# Patient Record
Sex: FEMALE | Race: BLACK OR AFRICAN AMERICAN | Employment: UNEMPLOYED | URBAN - METROPOLITAN AREA
[De-identification: names, ages, dates, MRNs, and addresses within clinical notes are randomized per-mention and may not be internally consistent; named-entity substitution may affect disease eponyms.]

---

## 2017-02-06 ENCOUNTER — OFFICE VISIT (OUTPATIENT)
Dept: INTERNAL MEDICINE CLINIC | Age: 52
End: 2017-02-06

## 2017-02-06 ENCOUNTER — OFFICE VISIT (OUTPATIENT)
Dept: PULMONOLOGY | Age: 52
End: 2017-02-06

## 2017-02-06 VITALS
WEIGHT: 173 LBS | BODY MASS INDEX: 31.83 KG/M2 | DIASTOLIC BLOOD PRESSURE: 78 MMHG | HEART RATE: 90 BPM | TEMPERATURE: 98 F | SYSTOLIC BLOOD PRESSURE: 108 MMHG | HEIGHT: 62 IN | RESPIRATION RATE: 16 BRPM | OXYGEN SATURATION: 97 %

## 2017-02-06 VITALS
SYSTOLIC BLOOD PRESSURE: 106 MMHG | HEART RATE: 109 BPM | BODY MASS INDEX: 31.65 KG/M2 | DIASTOLIC BLOOD PRESSURE: 80 MMHG | OXYGEN SATURATION: 97 % | WEIGHT: 172 LBS | HEIGHT: 62 IN | TEMPERATURE: 98.7 F

## 2017-02-06 DIAGNOSIS — J45.30 MILD PERSISTENT ASTHMA WITHOUT COMPLICATION: ICD-10-CM

## 2017-02-06 DIAGNOSIS — D86.9 SARCOIDOSIS: ICD-10-CM

## 2017-02-06 DIAGNOSIS — M77.12 LATERAL EPICONDYLITIS OF BOTH ELBOWS: Primary | ICD-10-CM

## 2017-02-06 DIAGNOSIS — M77.11 LATERAL EPICONDYLITIS OF BOTH ELBOWS: Primary | ICD-10-CM

## 2017-02-06 DIAGNOSIS — J32.1 SINUSITIS CHRONIC, FRONTAL: Primary | ICD-10-CM

## 2017-02-06 RX ORDER — PREDNISONE 5 MG/1
TABLET ORAL
Qty: 180 TAB | Refills: 3 | Status: SHIPPED | OUTPATIENT
Start: 2017-02-06 | End: 2018-12-03 | Stop reason: SDUPTHER

## 2017-02-06 NOTE — PROGRESS NOTES
Bryn Heart is a 45 y.o. female presents to office for asthma      1. Have you been to the ER, urgent care clinic or hospitalized since your last visit? no  2. Have you seen any other providers outside of [facility] since your last visit? no  3. Have you had a Flu shot this year?  no

## 2017-02-06 NOTE — PROGRESS NOTES
1. Have you been to the ER, urgent care clinic or hospitalized since your last visit? NO.     2. Have you seen or consulted any other health care providers outside of the [facility] since your last visit (Include any pap smears or colon screening)? NO      Do you have an Advanced Directive? NO    Would you like information on Advanced Directives?  YES

## 2017-02-06 NOTE — MR AVS SNAPSHOT
Visit Information Date & Time Provider Department Dept. Phone Encounter #  
 2/6/2017  3:30 PM Karsten Ramirez MD [facility] [id number] Follow-up Instructions Return in about 4 months (around 6/6/2017). Upcoming Health Maintenance Date Due MICROALBUMIN Q1 5/4/1974 Pneumococcal 19-64 Medium Risk (1 of 1 - PPSV23) 5/4/1983 DTaP/Tdap/Td series (1 - Tdap) 5/4/1985 INFLUENZA AGE 9 TO ADULT 8/1/2016 HEMOGLOBIN A1C Q6M 3/28/2017 EYE EXAM RETINAL OR DILATED Q1 4/7/2017 FOOT EXAM Q1 5/16/2017 LIPID PANEL Q1 6/15/2017 PAP AKA CERVICAL CYTOLOGY 8/7/2017 BREAST CANCER SCRN MAMMOGRAM 6/14/2018 COLONOSCOPY 5/26/2021 Allergies as of 2/6/2017  Review Complete On: 2/6/2017 By: Karsten Ramirez MD  
  
 Severity Noted Reaction Type Reactions Pollen Extracts Medium 05/07/2015    Runny Nose, Cough Lipitor [Atorvastatin]  04/25/2016    Other (comments) Muscle cramps and weakness Current Immunizations  Reviewed on 4/29/2016 No immunizations on file. Not reviewed this visit You Were Diagnosed With   
  
 Codes Comments Sinusitis chronic, frontal    -  Primary ICD-10-CM: J32.1 ICD-9-CM: 565.5 [facility]     ICD-10-CM: D86.9 ICD-9-CM: 135 Mild persistent asthma without complication     VJJ-37-CK: J45.30 ICD-9-CM: 493.90 Vitals BP Pulse Temp Resp Height(growth percentile) Weight(growth percentile) 108/78 (BP 1 Location: Right arm, BP Patient Position: Sitting) 90 98 °F (36.7 °C) (Oral) 16 5' 2\" (1.575 m) 173 lb (78.5 kg) LMP SpO2 BMI OB Status Smoking Status 03/30/2013 97% 31.64 kg/m2 Postmenopausal Never Smoker BMI and BSA Data Body Mass Index Body Surface Area  
 31.64 kg/m 2 1.85 m 2 Preferred Pharmacy Pharmacy Name Phone [id number] [phone] Your Updated Medication List  
 This list is accurate as of: 2/6/17  4:45 PM.  Always use your most recent med list.  
  
  
  
  
 ADVAIR DISKUS 500-50 mcg/dose diskus inhaler Generic drug:  fluticasone-salmeterol INHALE 1 PUFF BY MOUTH TWO TIMES A DAY  
  
 albuterol 90 mcg/actuation inhaler Commonly known as:  PROVENTIL HFA, VENTOLIN HFA, PROAIR HFA Take 2 Puffs by inhalation every four (4) hours as needed for Wheezing. aspirin delayed-release 81 mg tablet Take  by mouth daily. cyclobenzaprine 10 mg tablet Commonly known as:  FLEXERIL Take 1 Tab by mouth three (3) times daily as needed for Muscle Spasm(s). DULoxetine 60 mg capsule Commonly known as:  CYMBALTA Take 1 Cap by mouth daily. esomeprazole 40 mg capsule Commonly known as:  NEXIUM  
TAKE ONE CAPSULE BY MOUTH DAILY  
  
 fluticasone 50 mcg/actuation nasal spray Commonly known as:  FLONASE  
2 sprays in each nostril daily  
  
 glucose blood VI test strips strip Commonly known as:  ACCU-CHEK POOJA Pt to test 5 times daily. Dx:E11.65 JANUVIA 25 mg tablet Generic drug:  SITagliptin Take 25 mg by mouth daily. lisinopril 5 mg tablet Commonly known as:  Nonah Kirti Take  by mouth daily. metFORMIN 500 mg tablet Commonly known as:  GLUCOPHAGE Take  by mouth two (2) times daily (with meals). oxyCODONE-acetaminophen 5-325 mg per tablet Commonly known as:  PERCOCET Take 1 Tab by mouth every four (4) hours as needed for Pain. Max Daily Amount: 6 Tabs. * predniSONE 5 mg tablet Commonly known as:  DELTASONE  
TAKE TWO TABLETS BY MOUTH DAILY * predniSONE 5 mg tablet Commonly known as:  DELTASONE  
2 tablets every morning with breakfast  
  
 rosuvastatin 10 mg tablet Commonly known as:  CRESTOR Take 1 Tab by mouth nightly. triamterene-hydroCHLOROthiazide 37.5-25 mg per capsule Commonly known as:  Crystal Saint [place]air One tablet daily as needed for swelling  ZYRTEC PO  
 Take  by mouth. * Notice: This list has 2 medication(s) that are the same as other medications prescribed for you. Read the directions carefully, and ask your doctor or other care provider to review them with you. Prescriptions Sent to Pharmacy Refills  
 predniSONE (DELTASONE) 5 mg tablet 3 Si tablets every morning with breakfast  
 Class: Normal  
 Pharmacy: [address] [place] [address] #: [phone] Follow-up Instructions Return in about 4 months (around 2017). To-Do List   
 Around 2017 Imaging:  CT MAXILLOFACIAL WO CONT Patient Instructions Continue Advair 1 inhalation twice daily and remember to wash mouth with water and spit it out after inhaling Advair and also remember to exhale fully before inhaling Advair Albuterol 2 inhalations every 4 hours as needed if you require albuterol every 12 to control respiratory symptoms call the office for severe symptoms or the emergency room Prednisone 10 mg every morning Call if pain in elbow becomes a cyst and problem Introducing [facility]! Duke Schneider introduces [place] patient portal. Now you can access parts of your medical record, email your doctor's office, and request medication refills online. 1. In your internet browser, go to [email or web address] 2. Click on the First Time User? Click Here link in the Sign In box. You will see the New Member Sign Up page. 3. Enter your [place] Access Code exactly as it appears below. You will not need to use this code after youve completed the sign-up process. If you do not sign up before the expiration date, you must request a new code. · [place] Access Code: [email or web address] Expires: 2017  1:33 PM 
 
4. Enter the last four digits of your Social Security Number (xxxx) and Date of Birth (mm/dd/yyyy) as indicated and click Submit. You will be taken to the next sign-up page. 5. Create a Dropost.it ID. This will be your Dropost.it login ID and cannot be changed, so think of one that is secure and easy to remember. 6. Create a Dropost.it password. You can change your password at any time. 7. Enter your Password Reset Question and Answer. This can be used at a later time if you forget your password. 8. Enter your e-mail address. You will receive e-mail notification when new information is available in [address] 9. Click Sign Up. You can now view and download portions of your medical record. 10. Click the Download Summary menu link to download a portable copy of your medical information. If you have questions, please visit the Frequently Asked Questions section of the Dropost.it website. Remember, Dropost.it is NOT to be used for urgent needs. For medical emergencies, dial 911. Now available from your iPhone and Android! Please provide this summary of care documentation to your next provider. Your primary care clinician is listed as Konstantin Gonzales. If you have any questions after today's visit, please call [phone].

## 2017-02-06 NOTE — PATIENT INSTRUCTIONS
Continue Advair 1 inhalation twice daily and remember to wash mouth with water and spit it out after inhaling Advair and also remember to exhale fully before inhaling Advair  Albuterol 2 inhalations every 4 hours as needed if you require albuterol every 12 to control respiratory symptoms call the office for severe symptoms or the emergency room  Prednisone 10 mg every morning  Call if pain in elbow becomes a cyst and problem

## 2017-02-06 NOTE — MR AVS SNAPSHOT
Visit Information Date & Time Provider Department Dept. Phone Encounter #  
 2/6/2017  1:30 PM Medina Parker MD [facility] [phone] [id number] Your Appointments 2/6/2017  3:30 PM  
Follow Up with Cari Barton MD  
[address] ([address]) Appt Note: 3MFU FROM 11/17/16  
 [address] [place] [address], [address] [address]  
[phone]  
  
   
 [address] [place] [address], [address] [facility] [place] [address] Upcoming Health Maintenance Date Due MICROALBUMIN Q1 5/4/1974 Pneumococcal 19-64 Medium Risk (1 of 1 - PPSV23) 5/4/1983 DTaP/Tdap/Td series (1 - Tdap) 5/4/1985 INFLUENZA AGE 9 TO ADULT 8/1/2016 HEMOGLOBIN A1C Q6M 3/28/2017 EYE EXAM RETINAL OR DILATED Q1 4/7/2017 FOOT EXAM Q1 5/16/2017 LIPID PANEL Q1 6/15/2017 PAP AKA CERVICAL CYTOLOGY 8/7/2017 BREAST CANCER SCRN MAMMOGRAM 6/14/2018 COLONOSCOPY 5/26/2021 Allergies as of 2/6/2017  Review Complete On: 11/17/2016 By: Blossom Corado LPN Severity Noted Reaction Type Reactions Pollen Extracts Medium 05/07/2015    Runny Nose, Cough Lipitor [Atorvastatin]  04/25/2016    Other (comments) Muscle cramps and weakness Current Immunizations  Reviewed on 4/29/2016 No immunizations on file. Not reviewed this visit Vitals BP Pulse Temp Height(growth percentile) Weight(growth percentile) LMP  
 106/80 (!) 109 98.7 °F (37.1 °C) (Oral) 5' 2\" (1.575 m) 172 lb (78 kg) 03/30/2013 SpO2 BMI OB Status Smoking Status 97% 31.46 kg/m2 Postmenopausal Never Smoker Vitals History BMI and BSA Data Body Mass Index Body Surface Area  
 31.46 kg/m 2 1.85 m 2 Preferred Pharmacy Pharmacy Name Phone [id number] [address] [phone] Your Updated Medication List  
  
   
This list is accurate as of: 2/6/17  2:12 PM.  Always use your most recent med list.  
  
 ADVAIR DISKUS 500-50 mcg/dose diskus inhaler Generic drug:  fluticasone-salmeterol INHALE 1 PUFF BY MOUTH TWO TIMES A DAY  
  
 albuterol 90 mcg/actuation inhaler Commonly known as:  PROVENTIL HFA, VENTOLIN HFA, PROAIR HFA Take 2 Puffs by inhalation every four (4) hours as needed for Wheezing. aspirin delayed-release 81 mg tablet Take  by mouth daily. cyclobenzaprine 10 mg tablet Commonly known as:  FLEXERIL Take 1 Tab by mouth three (3) times daily as needed for Muscle Spasm(s). DULoxetine 60 mg capsule Commonly known as:  CYMBALTA Take 1 Cap by mouth daily. esomeprazole 40 mg capsule Commonly known as:  NEXIUM  
TAKE ONE CAPSULE BY MOUTH DAILY  
  
 fluticasone 50 mcg/actuation nasal spray Commonly known as:  FLONASE  
2 sprays in each nostril daily  
  
 glucose blood VI test strips strip Commonly known as:  ACCU-CHEK POOJA Pt to test 5 times daily. Dx:E11.65 JANUVIA 25 mg tablet Generic drug:  SITagliptin Take 25 mg by mouth daily. lisinopril 5 mg tablet Commonly known as:  Irizaryr Gely Take  by mouth daily. metFORMIN 500 mg tablet Commonly known as:  GLUCOPHAGE Take  by mouth two (2) times daily (with meals). oxyCODONE-acetaminophen 5-325 mg per tablet Commonly known as:  PERCOCET Take 1 Tab by mouth every four (4) hours as needed for Pain. Max Daily Amount: 6 Tabs. predniSONE 5 mg tablet Commonly known as:  DELTASONE  
TAKE TWO TABLETS BY MOUTH DAILY  
  
 rosuvastatin 10 mg tablet Commonly known as:  CRESTOR Take 1 Tab by mouth nightly. triamterene-hydroCHLOROthiazide 37.5-25 mg per capsule Commonly known as:  Esdras Makos One tablet daily as needed for swelling ZYRTEC PO Take  by mouth. Introducing [facility]!    
 Arias Bruno introduces [place] patient portal. Now you can access parts of your medical record, email your doctor's office, and request medication refills online. 1. In your internet browser, go to [email or web address] 2. Click on the First Time User? Click Here link in the Sign In box. You will see the New Member Sign Up page. 3. Enter your Pay with a Tweet Access Code exactly as it appears below. You will not need to use this code after youve completed the sign-up process. If you do not sign up before the expiration date, you must request a new code. · Pay with a Tweet Access Code: [email or web address] Expires: 5/7/2017  1:33 PM 
 
4. Enter the last four digits of your Social Security Number (xxxx) and Date of Birth (mm/dd/yyyy) as indicated and click Submit. You will be taken to the next sign-up page. 5. Create a GO-SIMt ID. This will be your Pay with a Tweet login ID and cannot be changed, so think of one that is secure and easy to remember. 6. Create a Pay with a Tweet password. You can change your password at any time. 7. Enter your Password Reset Question and Answer. This can be used at a later time if you forget your password. 8. Enter your e-mail address. You will receive e-mail notification when new information is available in [address] 9. Click Sign Up. You can now view and download portions of your medical record. 10. Click the Download Summary menu link to download a portable copy of your medical information. If you have questions, please visit the Frequently Asked Questions section of the Pay with a Tweet website. Remember, Pay with a Tweet is NOT to be used for urgent needs. For medical emergencies, dial 911. Now available from your iPhone and Android! Please provide this summary of care documentation to your next provider. Your primary care clinician is listed as Javier Odell. If you have any questions after today's visit, please call [phone].

## 2017-02-06 NOTE — PROGRESS NOTES
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 She denies chest pain and continues to cough up purulent mucus but also has nasal drainage with facial pain which is persistent and is well. She denies leg pain or swelling but is having pain in both elbows which he relates possibly to her new job duties  Allergies   Allergen Reactions    Pollen Extracts Runny Nose and Cough    Lipitor [Atorvastatin] Other (comments)     Muscle cramps and weakness       Current Outpatient Prescriptions   Medication Sig    lisinopril (PRINIVIL, ZESTRIL) 5 mg tablet Take  by mouth daily.  DULoxetine (CYMBALTA) 60 mg capsule Take 1 Cap by mouth daily.  metFORMIN (GLUCOPHAGE) 500 mg tablet Take  by mouth two (2) times daily (with meals).  sitaGLIPtin (JANUVIA) 25 mg tablet Take 25 mg by mouth daily.  oxyCODONE-acetaminophen (PERCOCET) 5-325 mg per tablet Take 1 Tab by mouth every four (4) hours as needed for Pain. Max Daily Amount: 6 Tabs.  glucose blood VI test strips (ACCU-CHEK POOJA) strip Pt to test 5 times daily. Dx:E11.65    aspirin delayed-release 81 mg tablet Take  by mouth daily.  predniSONE (DELTASONE) 5 mg tablet TAKE TWO TABLETS BY MOUTH DAILY    cyclobenzaprine (FLEXERIL) 10 mg tablet Take 1 Tab by mouth three (3) times daily as needed for Muscle Spasm(s).  esomeprazole (NEXIUM) 40 mg capsule TAKE ONE CAPSULE BY MOUTH DAILY    ADVAIR DISKUS 500-50 mcg/dose diskus inhaler INHALE 1 PUFF BY MOUTH TWO TIMES A DAY    albuterol (PROVENTIL HFA, VENTOLIN HFA) 90 mcg/actuation inhaler Take 2 Puffs by inhalation every four (4) hours as needed for Wheezing.  CETIRIZINE HCL (ZYRTEC PO) Take  by mouth.       fluticasone (FLONASE) 50 mcg/actuation nasal spray 2 sprays in each nostril daily    rosuvastatin (CRESTOR) 10 mg tablet Take 1 Tab by mouth nightly.  triamterene-hydrochlorothiazide (DYAZIDE) 37.5-25 mg per capsule One tablet daily as needed for swelling     No current facility-administered medications for this visit.       Past Medical History   Diagnosis Date    Allergic rhinitis due to allergen      s/p shots    Arthritis 6/13      MRI c spine w degen changes; Dr Higgins Quivers      Dr. Sandra Llamas;  ratio 68%, FEV1 78% w 6% inc postbd, TLC 77, RV 56, DLCO 61%    Bilateral great toe fractures 2014    Chronic sinusitis      Dr. Barbara Toth in the past    Colon polyp 5/16     Dr uAstin Anderson    Diabetes [facility]) 3/16     presented w hyperosmotic nonketotic hyperglycemia bs >1000    Diabetes mellitus ([facility]     Dyslipidemia      calculated 10 year risk score was 2.0% (12/13)    Edema     Eustachian tube dysfunction     FHx: colon cancer     FHx: heart disease     Fibrocystic breast      Dr Gilles Beatty GERD (gastroesophageal reflux disease)     Morbid obesity ([facility]      peak weight 196 lbs, bmi 35.8 from 10/13    Osteopenia      Dr. Elida Murray; DEXA t score -0.7 spine, -0.2 hip (8/14)    Sarcoidosis ([facility]      Past Surgical History   Procedure Laterality Date    Hx breast reduction       Dr. Nandini Hansen Pr chest surgery procedure unlisted  7/12      thyroid negative    Pr chest surgery procedure unlisted  2012     pfts w mod restrictive defect     Pr cardiac surg procedure unlist  9/10, 8/13     thallium negative ef 72%; negative ef 70%    Vascular surgery procedure unlist  12/13     venous doppler negative    Hx heent       nasal polypectomy Dr. Abdullahi Leal Hx heent       tear duct surgery right Dr. Clinton Perez 2010; left Dr Emil Matamoros 2015    Hx orthopaedic       DEXA -0.7 spine, -0.2 hip 8/14    Colonoscopy N/A 5/26/2016     Dr Austin Anderson hyperplastic     Social History     Social History    Marital status: LEGALLY      Spouse name: N/A    Number of children: N/A    Years of education: N/A     Occupational History    traffic control person [employer]     Social History Main Topics    Smoking status: Never Smoker    Smokeless tobacco: Never Used    Alcohol use No    Drug use: No    Sexual activity: Not on file     Other Topics Concern    Not on file     Social History Narrative     Family History   Problem Relation Age of Onset    Cancer Mother     Hypertension Mother     Cancer Father     Diabetes Father     Hypertension Father     Stroke Father     Diabetes Sister     Hypertension Sister     Heart Disease Sister     Colon Cancer Sister 52    Hypertension Brother     Cancer Maternal Aunt        Review of systems:  No fever or chills poor appetite or weight loss    Physical Exam:  Visit Vitals    /78 (BP 1 Location: Right arm, BP Patient Position: Sitting)    Pulse 90    Temp 98 °F (36.7 °C) (Oral)    Resp 16    Ht 5' 2\" (1.575 m)    Wt 78.5 kg (173 lb)    LMP 03/30/2013    SpO2 97%    BMI 31.64 kg/m2       Well-developed well-nourished  HEENT: Facial pain bilaterally under the eyes   lymph node: Supraclavicular cervical lymph nodes negative  Chest: Equal symmetrical expansion no dullness no wheezes rales  Heart: Regular rhythm no gallop or murmur no JVD no peripheral edema  Extremities: No cyanosis or clubbing  Neurological: Alert and oriented    LABS:    O2 sat 97% room air at rest    Impression:   Sarcoidosis which appears to be stable or improved related to shortness of breath with also a history of some dermatological lesions which have improved  Cough which is chronic but may be related to chronic sinusitis by history and physical exam of facial pain    Plan:  CT of the sinuses  Prednisone 10 mg a day and continue Advair and albuterol  The patient will call if her pain does not improve in her elbows and we will see her again otherwise in 4 months    Yong Wilkerson MD , [facility]    CC: Mainor Nichols MD [address] [facility], [address],[place] [address]     P: [phone]     F: [phone]

## 2017-02-06 NOTE — PROGRESS NOTES
45 y.o. BLACK OR  female who presents for evaluation. She has been having bilateral elbow pain for the last month or two. She was previously seeing Dr. Vee Boyle but this was more for her low back. We will try to get records. She works at [employer] but she apparently had her responsibilities change. 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  She has pain in the bilateral lateral epicondyles. She has been using Aleve 2 tablets twice daily along with prednisone 10 mg under the management of Dr. Juan Ignacio for her sarcoidosis. No neck pain or radicular complaints, no weakness, numbness or paresthesias, denied any actual joint swelling or injuries    Past Medical History   Diagnosis Date    Allergic rhinitis due to allergen      s/p shots    Arthritis 6/13      MRI c spine w degen changes; Dr Wyatt Scot      Dr. Juan Ignacio;  ratio 68%, FEV1 78% w 6% inc postbd, TLC 77, RV 56, DLCO 61%    Bilateral great toe fractures 2014    Chronic sinusitis      Dr. Nick Shells in the past    Colon polyp 5/16     Dr Brooke [facility]    Diabetes [facility]) 3/16     presented w hyperosmotic nonketotic hyperglycemia bs >1000    Diabetes mellitus ([facility]     Dyslipidemia      calculated 10 year risk score was 2.0% (12/13)    Edema     Eustachian tube dysfunction     FHx: colon cancer     FHx: heart disease     Fibrocystic breast      Dr Bradly Goel GERD (gastroesophageal reflux disease)     Morbid obesity ([address]      peak weight 196 lbs, bmi 35.8 from 10/13    Osteopenia      Dr. Sharrie Cranker; DEXA t score -0.7 spine, -0.2 hip (8/14)    Sarcoidosis ([address]      Current Outpatient Prescriptions   Medication Sig    lisinopril (PRINIVIL, ZESTRIL) 5 mg tablet Take  by mouth daily.  DULoxetine (CYMBALTA) 60 mg capsule Take 1 Cap by mouth daily.     metFORMIN (GLUCOPHAGE) 500 mg tablet Take  by mouth two (2) times daily (with meals).  sitaGLIPtin (JANUVIA) 25 mg tablet Take 25 mg by mouth daily.  fluticasone (FLONASE) 50 mcg/actuation nasal spray 2 sprays in each nostril daily    oxyCODONE-acetaminophen (PERCOCET) 5-325 mg per tablet Take 1 Tab by mouth every four (4) hours as needed for Pain. Max Daily Amount: 6 Tabs.  rosuvastatin (CRESTOR) 10 mg tablet Take 1 Tab by mouth nightly.  triamterene-hydrochlorothiazide (DYAZIDE) 37.5-25 mg per capsule One tablet daily as needed for swelling    glucose blood VI test strips (ACCU-CHEK POOJA) strip Pt to test 5 times daily. Dx:E11.65    aspirin delayed-release 81 mg tablet Take  by mouth daily.  predniSONE (DELTASONE) 5 mg tablet TAKE TWO TABLETS BY MOUTH DAILY    cyclobenzaprine (FLEXERIL) 10 mg tablet Take 1 Tab by mouth three (3) times daily as needed for Muscle Spasm(s).  esomeprazole (NEXIUM) 40 mg capsule TAKE ONE CAPSULE BY MOUTH DAILY    ADVAIR DISKUS 500-50 mcg/dose diskus inhaler INHALE 1 PUFF BY MOUTH TWO TIMES A DAY    albuterol (PROVENTIL HFA, VENTOLIN HFA) 90 mcg/actuation inhaler Take 2 Puffs by inhalation every four (4) hours as needed for Wheezing.  CETIRIZINE HCL (ZYRTEC PO) Take  by mouth.  predniSONE (DELTASONE) 5 mg tablet 2 tablets every morning with breakfast     No current facility-administered medications for this visit.    l  Allergies   Allergen Reactions    Pollen Extracts Runny Nose and Cough    Lipitor [Atorvastatin] Other (comments)     Muscle cramps and weakness       Visit Vitals    /80    Pulse (!) 109    Temp 98.7 °F (37.1 °C) (Oral)    Ht 5' 2\" (1.575 m)    Wt 172 lb (78 kg)    SpO2 97%    BMI 31.46 kg/m2     Range of motion for the shoulder and elbow joints bilaterally were normal.  There was marked tenderness on palpation of the bilateral lateral epicondyles. Strength, sensation, reflexes, pulses were otherwise normal    Assessment and plan:  1. Bilateral lateral epicondylitis.   Quick discussion regarding this. 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 Suggested she go see Dr. Endy Larsen regarding possible cortisone shot if the forearm bands do not help. Continue nonsteroidal as needed for now. Above conditions discussed at length and patient vocalized understanding.   All questions answered to patient satifaction

## 2017-02-07 RX ORDER — ESOMEPRAZOLE MAGNESIUM 40 MG/1
CAPSULE, DELAYED RELEASE ORAL
Qty: 90 CAP | Refills: 3 | Status: SHIPPED | OUTPATIENT
Start: 2017-02-07 | End: 2018-03-20 | Stop reason: SDUPTHER

## 2017-02-23 RX ORDER — DULOXETIN HYDROCHLORIDE 60 MG/1
CAPSULE, DELAYED RELEASE ORAL
Qty: 90 CAP | Refills: 0 | Status: SHIPPED | OUTPATIENT
Start: 2017-02-23 | End: 2017-08-25 | Stop reason: SDUPTHER

## 2017-06-22 ENCOUNTER — HOSPITAL ENCOUNTER (OUTPATIENT)
Dept: LAB | Age: 52
Discharge: HOME | End: 2017-06-22
Payer: COMMERCIAL

## 2017-06-22 ENCOUNTER — OFFICE VISIT (OUTPATIENT)
Dept: INTERNAL MEDICINE CLINIC | Age: 52
End: 2017-06-22

## 2017-06-22 VITALS
HEIGHT: 62 IN | WEIGHT: 169 LBS | OXYGEN SATURATION: 98 % | BODY MASS INDEX: 31.1 KG/M2 | DIASTOLIC BLOOD PRESSURE: 82 MMHG | SYSTOLIC BLOOD PRESSURE: 130 MMHG | HEART RATE: 97 BPM | RESPIRATION RATE: 14 BRPM | TEMPERATURE: 97.8 F

## 2017-06-22 DIAGNOSIS — H53.9 VISUAL DISTURBANCE: ICD-10-CM

## 2017-06-22 DIAGNOSIS — R51.9 NONINTRACTABLE HEADACHE, UNSPECIFIED CHRONICITY PATTERN, UNSPECIFIED HEADACHE TYPE: Primary | ICD-10-CM

## 2017-06-22 DIAGNOSIS — R51.9 NONINTRACTABLE HEADACHE, UNSPECIFIED CHRONICITY PATTERN, UNSPECIFIED HEADACHE TYPE: ICD-10-CM

## 2017-06-22 LAB
ANION GAP BLD CALC-SCNC: 8 MMOL/L (ref 3–18)
BASOPHILS # BLD AUTO: 0 K/UL (ref 0–0.06)
BASOPHILS # BLD: 0 % (ref 0–2)
BUN SERPL-MCNC: 15 MG/DL (ref 7–18)
BUN/CREAT SERPL: 12 (ref 12–20)
CALCIUM SERPL-MCNC: 8.9 MG/DL (ref 8.5–10.1)
CHLORIDE SERPL-SCNC: 104 MMOL/L (ref 100–108)
CO2 SERPL-SCNC: 28 MMOL/L (ref 21–32)
CREAT SERPL-MCNC: 1.25 MG/DL (ref 0.6–1.3)
CRP SERPL-MCNC: 2.6 MG/DL (ref 0–0.3)
DIFFERENTIAL METHOD BLD: NORMAL
EOSINOPHIL # BLD: 0.2 K/UL (ref 0–0.4)
EOSINOPHIL NFR BLD: 5 % (ref 0–5)
ERYTHROCYTE [DISTWIDTH] IN BLOOD BY AUTOMATED COUNT: 13 % (ref 11.6–14.5)
ERYTHROCYTE [SEDIMENTATION RATE] IN BLOOD: 49 MM/HR (ref 0–30)
GLUCOSE SERPL-MCNC: 236 MG/DL (ref 74–99)
HCT VFR BLD AUTO: 42.4 % (ref 35–45)
HGB BLD-MCNC: 13.6 G/DL (ref 12–16)
LYMPHOCYTES # BLD AUTO: 27 % (ref 21–52)
LYMPHOCYTES # BLD: 1.3 K/UL (ref 0.9–3.6)
MCH RBC QN AUTO: 30 PG (ref 24–34)
MCHC RBC AUTO-ENTMCNC: 32.1 G/DL (ref 31–37)
MCV RBC AUTO: 93.6 FL (ref 74–97)
MONOCYTES # BLD: 0.4 K/UL (ref 0.05–1.2)
MONOCYTES NFR BLD AUTO: 9 % (ref 3–10)
NEUTS SEG # BLD: 2.9 K/UL (ref 1.8–8)
NEUTS SEG NFR BLD AUTO: 59 % (ref 40–73)
PLATELET # BLD AUTO: 165 K/UL (ref 135–420)
PMV BLD AUTO: 11.4 FL (ref 9.2–11.8)
POTASSIUM SERPL-SCNC: 3.9 MMOL/L (ref 3.5–5.5)
RBC # BLD AUTO: 4.53 M/UL (ref 4.2–5.3)
SODIUM SERPL-SCNC: 140 MMOL/L (ref 136–145)
WBC # BLD AUTO: 4.9 K/UL (ref 4.6–13.2)

## 2017-06-22 PROCEDURE — 36415 COLL VENOUS BLD VENIPUNCTURE: CPT | Performed by: PHYSICIAN ASSISTANT

## 2017-06-22 PROCEDURE — 86140 C-REACTIVE PROTEIN: CPT | Performed by: PHYSICIAN ASSISTANT

## 2017-06-22 PROCEDURE — 85025 COMPLETE CBC W/AUTO DIFF WBC: CPT | Performed by: PHYSICIAN ASSISTANT

## 2017-06-22 PROCEDURE — 80048 BASIC METABOLIC PNL TOTAL CA: CPT | Performed by: PHYSICIAN ASSISTANT

## 2017-06-22 PROCEDURE — 85652 RBC SED RATE AUTOMATED: CPT | Performed by: PHYSICIAN ASSISTANT

## 2017-06-22 NOTE — PROGRESS NOTES
1. Have you been to the ER, urgent care clinic or hospitalized since your last visit? NO.     2. Have you seen or consulted any other health care providers outside of the [address] [place] [address] since your last visit (Include any pap smears or colon screening)? NO      Do you have an Advanced Directive? NO    Would you like information on Advanced Directives?  NO

## 2017-06-22 NOTE — PROGRESS NOTES
HPI/History  Andrae Alvarez is a 47 y.o.  female who presents for evaluation. Pt c/o bitemporal headache like someone squeezing or a constricting band for 6 days. Was intermittent and now more constant. Some past nausea and vomiting per report. Reports blurry vision on occasion then loss of vision which she cannot describe but reports she really cannot see anything for a few minutes. Some photophobia per report. No speech or facial abnormalities, weakness, or paresthesias. No cardiopulmonary sxs. No other sxs or complaints. Reports a hx of migraines but I do not see this in records and current issues do not sound to be migraine. Brain MRI 6/2014 normal. Her ophthalmologist is Barrie Jackson. Patient Active Problem List   Diagnosis Code    Sarcoidosis Dr. Marly Jean-Baptiste on low dose steroid D86.9    Asthma Dr. Marly Jean-Baptiste J45.909    Dyslipidemia E78.5    Gastroesophageal reflux disease without esophagitis K21.9    Diabetes type 2, uncontrolled ([facility] E11.65    Obesity (BMI 30.0-34. 9) E66.9    Lateral epicondylitis of both elbows M77.11, M77.12     Past Medical History:   Diagnosis Date    Allergic rhinitis due to allergen     s/p shots    Arthritis 6/13     MRI c spine w degen changes; Dr Keshawn Jean-Baptiste;  ratio 68%, FEV1 78% w 6% inc postbd, TLC 77, RV 56, DLCO 61%    Bilateral great toe fractures 2014    Chronic sinusitis     Dr. Hayden Peralta in the past    Colon polyp 5/16    Dr Álvaro Watkins    Diabetes [facility]) 3/16    presented w hyperosmotic nonketotic hyperglycemia bs >1000    Diabetes mellitus ([facility]     Dyslipidemia     calculated 10 year risk score was 2.0% (12/13)    Edema     Eustachian tube dysfunction     FHx: colon cancer     FHx: heart disease     Fibrocystic breast     Dr Radha Wilson GERD (gastroesophageal reflux disease)     Lateral epicondylitis of both elbows 2/6/2017    Morbid obesity (HCC)     peak weight 196 lbs, bmi 35.8 from 10/13   [address] [place] [address] Osteopenia     Dr. Ben Tavarez; DEXA t score -0.7 spine, -0.2 hip (8/14)    Sarcoidosis [facility])      Past Surgical History:   Procedure Laterality Date    CARDIAC SURG PROCEDURE UNLIST  9/10, 8/13    thallium negative ef 72%; negative ef 70%    CHEST SURGERY PROCEDURE UNLISTED  7/12    US thyroid negative    CHEST SURGERY PROCEDURE UNLISTED  2012    pfts w mod restrictive defect     COLONOSCOPY N/A 5/26/2016    Dr Shira Brice hyperplastic    Zion Mulch      Dr. Julian Willis HX HEENT      nasal polypectomy Dr. Kavitha Napier HX HEENT      tear duct surgery right Dr. Angelica Thacker 2010; left Dr Dusty Maldonado 2015    HX ORTHOPAEDIC      DEXA -0.7 spine, -0.2 hip 8/14    VASCULAR SURGERY PROCEDURE UNLIST  12/13    venous doppler negative     Social History     Social History    Marital status: LEGALLY      Spouse name: N/A    Number of children: N/A    Years of education: N/A     Occupational History    traffic control person [employer]     Social History Main Topics    Smoking status: Never Smoker    Smokeless tobacco: Never Used    Alcohol use No    Drug use: No    Sexual activity: Not on file     Other Topics Concern    Not on file     Social History Narrative     Family History   Problem Relation Age of Onset    Cancer Mother     Hypertension Mother     Cancer Father     Diabetes Father     Hypertension Father     Stroke Father     Diabetes Sister     Hypertension Sister     Heart Disease Sister     Colon Cancer Sister 52    Hypertension Brother     Cancer Maternal Aunt      Current Outpatient Prescriptions   Medication Sig    DULoxetine (CYMBALTA) 60 mg capsule TAKE 1 CAPSULE BY MOUTH DAILY    esomeprazole (NEXIUM) 40 mg capsule TAKE ONE CAPSULE BY MOUTH DAILY    predniSONE (DELTASONE) 5 mg tablet 2 tablets every morning with breakfast    lisinopril (PRINIVIL, ZESTRIL) 5 mg tablet Take  by mouth daily.     metFORMIN (GLUCOPHAGE) 500 mg tablet Take  by mouth two (2) times daily (with meals).  sitaGLIPtin (JANUVIA) 25 mg tablet Take 25 mg by mouth daily.  glucose blood VI test strips (ACCU-CHEK POOJA) strip Pt to test 5 times daily. Dx:E11.65    aspirin delayed-release 81 mg tablet Take  by mouth daily.  predniSONE (DELTASONE) 5 mg tablet TAKE TWO TABLETS BY MOUTH DAILY    ADVAIR DISKUS 500-50 mcg/dose diskus inhaler INHALE 1 PUFF BY MOUTH TWO TIMES A DAY    albuterol (PROVENTIL HFA, VENTOLIN HFA) 90 mcg/actuation inhaler Take 2 Puffs by inhalation every four (4) hours as needed for Wheezing.  CETIRIZINE HCL (ZYRTEC PO) Take  by mouth.  fluticasone (FLONASE) 50 mcg/actuation nasal spray 2 sprays in each nostril daily     No current facility-administered medications for this visit. Allergies   Allergen Reactions    Pollen Extracts Runny Nose and Cough    Lipitor [Atorvastatin] Other (comments)     Muscle cramps and weakness         Review of Systems  Aside from those included in HPI, remainder of complete ROS negative. Physical Examination  Visit Vitals    /82 (BP 1 Location: Right arm, BP Patient Position: Sitting)    Pulse 97    Temp 97.8 °F (36.6 °C) (Oral)    Resp 14    Ht 5' 2\" (1.575 m)    Wt 169 lb (76.7 kg)    LMP 03/30/2013    SpO2 98%    BMI 30.91 kg/m2       General - Alert and in no acute distress. Pt appears well, comfortable, and in good spirits. Pleasant, engaging. Nontoxic. Not anxious, non-diaphoretic. Mental status - Appropriate mood, behavior, speech content, dress, and thought processes. Head/face/scalp - Normocephalic, atraumatic. No temporal swelling, erythema, or warmth. Verbalized tenderness without grimace upon palpation of temples bilat but no palpable cords or abnormalities noted. Eyes - No periorbital findings. Pupils equal and reactive, extraocular movements intact. No erythema or discharge. No objective photophobia or FB sensation. Vision intact currently. Ears - Auditory canals and TMs unremarkable.   Nose - No erythema. No rhinorrhea. Mouth - Mucous membranes moist. Pharynx without lesions, swelling, erythema, or exudate. Midline uvula, palate rise, and tongue protrusion. Normal phonation. No trismus. Neck - Supple without rigidity. Lymph - No periauricular, perimandibular, or ant/post cervical tenderness or swelling. Pulm - No tachypnea, retractions, or cyanosis. Good respiratory effort. Cardiovascular - Normal rate. Neuromuscular - CN 2-12 appear intact. Good facial expressions. No speech abnormalities. Symmetric  strength with good dexterity. Symmetric U/LE strength. Assessment and Plan  1. Bitemporal headache, blurry vision, and vision loss/disturbance - In regards to headaches, it appears more tension than other. However, need to rule out temporal arteritis and intracranial process (mass, lesion, etc). Will check CBC, BMP, ESR, CRP today. Placed order for stat CT. Referral to ophtho and nurses to call, pt will also call Barrie Ritchie/Seth immediately after visit. She will promptly visit ED if ominous developments or concerns. Further planning as warranted and pending results/progression. Pt happily agrees with plan. PLEASE NOTE:   This document has been produced using voice recognition software. Unrecognized errors in transcription may be present.     Charlee Dance [employer] [facility] [place] [facility]  [phone]  6/22/2017

## 2017-06-22 NOTE — MR AVS SNAPSHOT
Visit Information Date & Time Provider Department Dept. Phone Encounter #  
 6/22/2017  2:00 PM Gracie Ziegler, [address] Internist of [address] [id number] Upcoming Health Maintenance Date Due MICROALBUMIN Q1 5/4/1974 Pneumococcal 19-64 Medium Risk (1 of 1 - PPSV23) 5/4/1983 DTaP/Tdap/Td series (1 - Tdap) 5/4/1985 HEMOGLOBIN A1C Q6M 3/28/2017 EYE EXAM RETINAL OR DILATED Q1 4/7/2017 FOOT EXAM Q1 5/16/2017 LIPID PANEL Q1 6/15/2017 PAP AKA CERVICAL CYTOLOGY 8/7/2017 INFLUENZA AGE 9 TO ADULT 8/1/2017 BREAST CANCER SCRN MAMMOGRAM 6/14/2018 COLONOSCOPY 5/26/2021 Allergies as of 6/22/2017  Review Complete On: 6/22/2017 By: ASHLI Armijo Severity Noted Reaction Type Reactions Pollen Extracts Medium 05/07/2015    Runny Nose, Cough Lipitor [Atorvastatin]  04/25/2016    Other (comments) Muscle cramps and weakness Current Immunizations  Reviewed on 4/29/2016 No immunizations on file. Not reviewed this visit You Were Diagnosed With   
  
 Codes Comments Nonintractable headache, unspecified chronicity pattern, unspecified headache type    -  Primary ICD-10-CM: R51 ICD-9-CM: 476. 0 Visual disturbance     ICD-10-CM: H53.9 ICD-9-CM: 368.9 Vitals BP Pulse Temp Resp Height(growth percentile) Weight(growth percentile) 130/82 (BP 1 Location: Right arm, BP Patient Position: Sitting) 97 97.8 °F (36.6 °C) (Oral) 14 5' 2\" (1.575 m) 169 lb (76.7 kg) LMP SpO2 BMI OB Status Smoking Status 03/30/2013 98% 30.91 kg/m2 Postmenopausal Never Smoker Vitals History BMI and BSA Data Body Mass Index Body Surface Area 30.91 kg/m 2 1.83 m 2 Preferred Pharmacy Pharmacy Name Phone [id number] [phone] Your Updated Medication List  
  
   
This list is accurate as of: 6/22/17  2:35 PM.  Always use your most recent med list.  
  
  
 ADVAIR DISKUS 500-50 mcg/dose diskus inhaler Generic drug:  fluticasone-salmeterol INHALE 1 PUFF BY MOUTH TWO TIMES A DAY  
  
 albuterol 90 mcg/actuation inhaler Commonly known as:  PROVENTIL HFA, VENTOLIN HFA, PROAIR HFA Take 2 Puffs by inhalation every four (4) hours as needed for Wheezing. aspirin delayed-release 81 mg tablet Take  by mouth daily. DULoxetine 60 mg capsule Commonly known as:  CYMBALTA TAKE 1 CAPSULE BY MOUTH DAILY  
  
 esomeprazole 40 mg capsule Commonly known as:  NEXIUM  
TAKE ONE CAPSULE BY MOUTH DAILY  
  
 fluticasone 50 mcg/actuation nasal spray Commonly known as:  FLONASE  
2 sprays in each nostril daily  
  
 glucose blood VI test strips strip Commonly known as:  ACCU-CHEK POOJA Pt to test 5 times daily. Dx:E11.65 JANUVIA 25 mg tablet Generic drug:  SITagliptin Take 25 mg by mouth daily. lisinopril 5 mg tablet Commonly known as:  Markel Apo Take  by mouth daily. metFORMIN 500 mg tablet Commonly known as:  GLUCOPHAGE Take  by mouth two (2) times daily (with meals). * predniSONE 5 mg tablet Commonly known as:  DELTASONE  
TAKE TWO TABLETS BY MOUTH DAILY * predniSONE 5 mg tablet Commonly known as:  DELTASONE  
2 tablets every morning with breakfast  
  
 ZYRTEC PO Take  by mouth. * Notice: This list has 2 medication(s) that are the same as other medications prescribed for you. Read the directions carefully, and ask your doctor or other care provider to review them with you. We Performed the Following REFERRAL TO OPHTHALMOLOGY [REF57 Custom] Comments: ASAP with any provider Blurry and loss of vision. Bitemporal headache (temporal arteritis?) Pt has seen Dr. Cathy Snow and Josué Griggs. To-Do List   
 Around 06/22/2017 Imaging:  CT HEAD WO CONT Referral Information Referral ID Referred By Referred To [id number] Mera Bullock MD   
   [facility], [place] [address] [address] [place]pa, [facility] Phone: [phone] Fax: [phone] Visits Status Start Date End Date 1 New Request 6/22/17 6/22/18 If your referral has a status of pending review or denied, additional information will be sent to support the outcome of this decision. Referral ID Referred By Referred To  
 [id number] Girtha Mustard B Not Available Visits Status Start Date End Date 1 New Request 6/22/17 6/22/18 If your referral has a status of pending review or denied, additional information will be sent to support the outcome of this decision. Introducing [facility] & HEALTH SERVICES! Caitie Barlow introduces [employer] patient portal. Now you can access parts of your medical record, email your doctor's office, and request medication refills online. 1. In your internet browser, go to [email or web address] 2. Click on the First Time User? Click Here link in the Sign In box. You will see the New Member Sign Up page. 3. Enter your [employer] Access Code exactly as it appears below. You will not need to use this code after youve completed the sign-up process. If you do not sign up before the expiration date, you must request a new code. · [employer] Access Code: [email or web address] Expires: 9/20/2017  2:35 PM 
 
4. Enter the last four digits of your Social Security Number (xxxx) and Date of Birth (mm/dd/yyyy) as indicated and click Submit. You will be taken to the next sign-up page. 5. Create a [employer] ID. This will be your [employer] login ID and cannot be changed, so think of one that is secure and easy to remember. 6. Create a [employer] password. You can change your password at any time. 7. Enter your Password Reset Question and Answer. This can be used at a later time if you forget your password. 8. Enter your e-mail address.  You will receive e-mail notification when new information is available in [employer]. 9. Click Sign Up. You can now view and download portions of your medical record. 10. Click the Download Summary menu link to download a portable copy of your medical information. If you have questions, please visit the Frequently Asked Questions section of the [employer] website. Remember, [employer] is NOT to be used for urgent needs. For medical emergencies, dial 911. Now available from your iPhone and Android! Please provide this summary of care documentation to your next provider. Your primary care clinician is listed as Liang Michael. If you have any questions after today's visit, please call [phone].

## 2017-06-23 ENCOUNTER — TELEPHONE (OUTPATIENT)
Dept: INTERNAL MEDICINE CLINIC | Age: 52
End: 2017-06-23

## 2017-06-23 ENCOUNTER — OFFICE VISIT (OUTPATIENT)
Dept: SURGERY | Age: 52
End: 2017-06-23

## 2017-06-23 ENCOUNTER — HOSPITAL ENCOUNTER (OUTPATIENT)
Dept: CT IMAGING | Age: 52
Discharge: HOME | End: 2017-06-23
Attending: PHYSICIAN ASSISTANT
Payer: COMMERCIAL

## 2017-06-23 VITALS — WEIGHT: 169 LBS | BODY MASS INDEX: 31.1 KG/M2 | HEIGHT: 62 IN | RESPIRATION RATE: 13 BRPM

## 2017-06-23 DIAGNOSIS — D86.9 SARCOIDOSIS: Primary | ICD-10-CM

## 2017-06-23 DIAGNOSIS — R51.9 NONINTRACTABLE HEADACHE, UNSPECIFIED CHRONICITY PATTERN, UNSPECIFIED HEADACHE TYPE: ICD-10-CM

## 2017-06-23 DIAGNOSIS — R51.9 NONINTRACTABLE HEADACHE, UNSPECIFIED CHRONICITY PATTERN, UNSPECIFIED HEADACHE TYPE: Primary | ICD-10-CM

## 2017-06-23 DIAGNOSIS — R79.82 ELEVATED C-REACTIVE PROTEIN (CRP): ICD-10-CM

## 2017-06-23 DIAGNOSIS — R70.0 ELEVATED ERYTHROCYTE SEDIMENTATION RATE: ICD-10-CM

## 2017-06-23 DIAGNOSIS — H53.9 VISUAL DISTURBANCE: ICD-10-CM

## 2017-06-23 PROCEDURE — 70450 CT HEAD/BRAIN W/O DYE: CPT

## 2017-06-23 RX ORDER — PREDNISONE 20 MG/1
60 TABLET ORAL
Qty: 20 TAB | Refills: 0 | Status: SHIPPED | OUTPATIENT
Start: 2017-06-23 | End: 2017-07-27

## 2017-06-23 NOTE — TELEPHONE ENCOUNTER
Signs of inflammation (slightly elevated ESR and elevated CRP). Need to rule out temporal arteritis. I will refer to vascular STAT. Will start 60mg prednisone daily until vascular can eval/potential biopsy to see if arteritis present. Have pt keep plan to see ophtho STAT as well and keep plan for CT. Discussed case with Dr. Richardson Gresham.

## 2017-06-23 NOTE — MR AVS SNAPSHOT
Visit Information Date & Time Provider Department Dept. Phone Encounter #  
 6/23/2017 11:00 AM Maria C Winter MD [facility] [phone] [id number] Upcoming Health Maintenance Date Due MICROALBUMIN Q1 5/4/1974 Pneumococcal 19-64 Medium Risk (1 of 1 - PPSV23) 5/4/1983 DTaP/Tdap/Td series (1 - Tdap) 5/4/1985 HEMOGLOBIN A1C Q6M 3/28/2017 EYE EXAM RETINAL OR DILATED Q1 4/7/2017 FOOT EXAM Q1 5/16/2017 LIPID PANEL Q1 6/15/2017 PAP AKA CERVICAL CYTOLOGY 8/7/2017 INFLUENZA AGE 9 TO ADULT 8/1/2017 BREAST CANCER SCRN MAMMOGRAM 6/14/2018 COLONOSCOPY 5/26/2021 Allergies as of 6/23/2017  Review Complete On: 6/23/2017 By: Romina Garcia LPN Severity Noted Reaction Type Reactions Pollen Extracts Medium 05/07/2015    Runny Nose, Cough Lipitor [Atorvastatin]  04/25/2016    Other (comments) Muscle cramps and weakness Current Immunizations  Reviewed on 4/29/2016 No immunizations on file. Not reviewed this visit Vitals Resp Height(growth percentile) Weight(growth percentile) LMP BMI OB Status 13 5' 2\" (1.575 m) 169 lb (76.7 kg) 03/30/2013 30.91 kg/m2 Postmenopausal  
 Smoking Status Never Smoker BMI and BSA Data Body Mass Index Body Surface Area 30.91 kg/m 2 1.83 m 2 Preferred Pharmacy Pharmacy Name Phone [id number] [phone] Your Updated Medication List  
  
   
This list is accurate as of: 6/23/17 12:01 PM.  Always use your most recent med list.  
  
  
  
  
 ADVAIR DISKUS 500-50 mcg/dose diskus inhaler Generic drug:  fluticasone-salmeterol INHALE 1 PUFF BY MOUTH TWO TIMES A DAY  
  
 albuterol 90 mcg/actuation inhaler Commonly known as:  PROVENTIL HFA, VENTOLIN HFA, PROAIR HFA Take 2 Puffs by inhalation every four (4) hours as needed for Wheezing. aspirin delayed-release 81 mg tablet Take  by mouth daily. DULoxetine 60 mg capsule Commonly known as:  CYMBALTA TAKE 1 CAPSULE BY MOUTH DAILY  
  
 esomeprazole 40 mg capsule Commonly known as:  NEXIUM  
TAKE ONE CAPSULE BY MOUTH DAILY  
  
 fluticasone 50 mcg/actuation nasal spray Commonly known as:  FLONASE  
2 sprays in each nostril daily  
  
 glucose blood VI test strips strip Commonly known as:  ACCU-CHEK POOJA Pt to test 5 times daily. Dx:E11.65 JANUVIA 25 mg tablet Generic drug:  SITagliptin Take 25 mg by mouth daily. lisinopril 5 mg tablet Commonly known as:  Ky Leaver Take  by mouth daily. metFORMIN 500 mg tablet Commonly known as:  GLUCOPHAGE Take  by mouth two (2) times daily (with meals). * predniSONE 5 mg tablet Commonly known as:  DELTASONE  
TAKE TWO TABLETS BY MOUTH DAILY * predniSONE 5 mg tablet Commonly known as:  DELTASONE  
2 tablets every morning with breakfast  
  
 * predniSONE 20 mg tablet Commonly known as:  Thao Caden Take 3 Tabs by mouth daily (with breakfast). ZYRTEC PO Take  by mouth. * Notice: This list has 3 medication(s) that are the same as other medications prescribed for you. Read the directions carefully, and ask your doctor or other care provider to review them with you. To-Do List   
 06/23/2017 3:00 PM  
  Appointment with [facility] CT RM 1 at [facility] RAD CT ([phone]) GENERAL INSTRUCTIONS  This study does not require you to drink contrast prior to your study. RELATED STUDY INFORMATION  Bring any films, CDs, and reports related with you on the day of your exam.  This only includes studies done outside of [address] [place] [address], [facility], Amy, and [facility] Alberto. QUESTIONS  Notify the CT Department if you have any questions concerning your study. Amy - [phone] [facility] [place] - [phone] Patient Instructions Call the office at [phone] if you have any questions or concerns. Introducing [facility] & HEALTH SERVICES! [facility] introduces [employer] patient portal. Now you can access parts of your medical record, email your doctor's office, and request medication refills online. 1. In your internet browser, go to [email or web address] 2. Click on the First Time User? Click Here link in the Sign In box. You will see the New Member Sign Up page. 3. Enter your [employer] Access Code exactly as it appears below. You will not need to use this code after youve completed the sign-up process. If you do not sign up before the expiration date, you must request a new code. · [employer] Access Code: [email or web address] Expires: 9/20/2017  2:35 PM 
 
4. Enter the last four digits of your Social Security Number (xxxx) and Date of Birth (mm/dd/yyyy) as indicated and click Submit. You will be taken to the next sign-up page. 5. Create a [employer] ID. This will be your [employer] login ID and cannot be changed, so think of one that is secure and easy to remember. 6. Create a [employer] password. You can change your password at any time. 7. Enter your Password Reset Question and Answer. This can be used at a later time if you forget your password. 8. Enter your e-mail address. You will receive e-mail notification when new information is available in [address] 9. Click Sign Up. You can now view and download portions of your medical record. 10. Click the Download Summary menu link to download a portable copy of your medical information. If you have questions, please visit the Frequently Asked Questions section of the [employer] website. Remember, [employer] is NOT to be used for urgent needs. For medical emergencies, dial 911. Now available from your iPhone and Android! Please provide this summary of care documentation to your next provider. Your primary care clinician is listed as Ramona Nageotte. If you have any questions after today's visit, please call [phone].

## 2017-06-23 NOTE — TELEPHONE ENCOUNTER
Spoke with Dr. Allan Cast who doubts the usefulness and/or validity of bx given her chronic use of steroids (via Dr. Mirza Russ) and her presentation. He advised waiting for results from ophtho eval and if still wish to pursue with bx then he can schedule. Discussed above with Dr. Lashell Mercedes. Will await ophtho eval before bx but will continue other tx and workup/eval as planned.

## 2017-06-23 NOTE — TELEPHONE ENCOUNTER
Left detailed message with information below, pt gave me permission to earlier since its the weekend

## 2017-06-23 NOTE — TELEPHONE ENCOUNTER
Head CT normal.   No evidence of mass, lesions, hemorrhage, sinus issue, or other abnormality. No significant change noted from 2014 MRI.

## 2017-06-23 NOTE — PROGRESS NOTES
Progress Note    Patient: Beverley Mg  MRN: [id number]  SSN: [id number]   YOB: 1965  Age: 47 y.o. Sex: female     Chief Complaint   Patient presents with    Headache       HPI    This bottoms is a 78-year-old woman sent by Dr. Facundo Sandy for bilateral temporal artery biopsy. She has been complaining of some lateral headaches and has had diminished vision for a good while. She also has sarcoidosis and takes chronic steroids for it. Her sed rate is 49. I discussed with her PCP fact that her being on prednisone will probably negate any accurate biopsy result that she could get.     Past Medical History:   Diagnosis Date    Allergic rhinitis due to allergen     s/p shots    Arthritis 6/13     MRI c spine w degen changes; Dr Renetta Mcneil;  ratio 68%, FEV1 78% w 6% inc postbd, TLC 77, RV 56, DLCO 61%    Bilateral great toe fractures 2014    Chronic sinusitis     Dr. Estefania Bhagat in the past    Colon polyp 5/16    Dr Palma Green    Diabetes [facility]) 3/16    presented w hyperosmotic nonketotic hyperglycemia bs >1000    Diabetes mellitus ([facility] [address]     Dyslipidemia     calculated 10 year risk score was 2.0% (12/13)    Edema     Eustachian tube dysfunction     FHx: colon cancer     FHx: heart disease     Fibrocystic breast     Dr Rocco Garsia GERD (gastroesophageal reflux disease)     Lateral epicondylitis of both elbows 2/6/2017    Morbid obesity (HCC)     peak weight 196 lbs, bmi 35.8 from 10/13    Osteopenia     Dr. Carlota Burleson; DEXA t score -0.7 spine, -0.2 hip (8/14)    Sarcoidosis [facility])      Past Surgical History:   Procedure Laterality Date    CARDIAC SURG PROCEDURE UNLIST  9/10, 8/13    thallium negative ef 72%; negative ef 70%    CHEST SURGERY PROCEDURE UNLISTED  7/12     thyroid negative    CHEST SURGERY PROCEDURE UNLISTED  2012    pfts w mod restrictive defect     COLONOSCOPY N/A 5/26/2016    Dr Palma uTcker nasal polypectomy Dr. Zahraa Carpio HX HEENT      tear duct surgery right Dr. Zaida Saez 2010; left Dr Mylene Saul 2015    HX ORTHOPAEDIC      DEXA -0.7 spine, -0.2 hip 8/14    VASCULAR SURGERY PROCEDURE UNLIST  12/13    venous doppler negative     Allergies   Allergen Reactions    Pollen Extracts Runny Nose and Cough    Lipitor [Atorvastatin] Other (comments)     Muscle cramps and weakness       Current Outpatient Prescriptions   Medication Sig Dispense Refill    predniSONE (DELTASONE) 20 mg tablet Take 3 Tabs by mouth daily (with breakfast). 20 Tab 0    DULoxetine (CYMBALTA) 60 mg capsule TAKE 1 CAPSULE BY MOUTH DAILY 90 Cap 0    esomeprazole (NEXIUM) 40 mg capsule TAKE ONE CAPSULE BY MOUTH DAILY 90 Cap 3    predniSONE (DELTASONE) 5 mg tablet 2 tablets every morning with breakfast 180 Tab 3    lisinopril (PRINIVIL, ZESTRIL) 5 mg tablet Take  by mouth daily.  metFORMIN (GLUCOPHAGE) 500 mg tablet Take  by mouth two (2) times daily (with meals).  sitaGLIPtin (JANUVIA) 25 mg tablet Take 25 mg by mouth daily.  fluticasone (FLONASE) 50 mcg/actuation nasal spray 2 sprays in each nostril daily 1 Bottle 5    glucose blood VI test strips (ACCU-CHEK POOJA) strip Pt to test 5 times daily. Dx:E11.65 500 Strip 3    aspirin delayed-release 81 mg tablet Take  by mouth daily.  predniSONE (DELTASONE) 5 mg tablet TAKE TWO TABLETS BY MOUTH DAILY 60 Tab 5    ADVAIR DISKUS 500-50 mcg/dose diskus inhaler INHALE 1 PUFF BY MOUTH TWO TIMES A DAY 1 Inhaler 5    albuterol (PROVENTIL HFA, VENTOLIN HFA) 90 mcg/actuation inhaler Take 2 Puffs by inhalation every four (4) hours as needed for Wheezing. 1 Inhaler 3    CETIRIZINE HCL (ZYRTEC PO) Take  by mouth.          Social History     Social History    Marital status: LEGALLY      Spouse name: N/A    Number of children: N/A    Years of education: N/A     Occupational History    traffic control person [employer]     Social History Main Topics  Smoking status: Never Smoker    Smokeless tobacco: Never Used    Alcohol use No    Drug use: No    Sexual activity: Not on file     Other Topics Concern    Not on file     Social History Narrative     Family History   Problem Relation Age of Onset    Cancer Mother     Hypertension Mother     Cancer Father     Diabetes Father     Hypertension Father     Stroke Father     Diabetes Sister     Hypertension Sister     Heart Disease Sister     Colon Cancer Sister 52    Hypertension Brother     Cancer Maternal Aunt          Review of systems:  Patient denies any reflux, emesis, abdominal pain, change in bowel habits, hematochezia, melena, fever, weight loss, fatigue chills, dermatitis, abnormal moles, change in vision, vertigo, epistaxis, dysphagia, hoarseness, chest pain, palpitations, hypertension, edema, cough, shortness of breath, wheezing, hemoptysis, snoring, hematuria, diabetes, thyroid disease, anemia, bruising, history of blood transfusion, dizziness, headache, or fainting.     Physical Examination    Well developed well nourished female in no apparent distress  Visit Vitals    Resp 13    Ht 5' 2\" (1.575 m)    Wt 76.7 kg (169 lb)    LMP 03/30/2013    BMI 30.91 kg/m2      Head: normocephalic, atraumatic  Mouth: Clear, no overt lesions, oral mucosa pink and moist  Neck: supple, no masses, no adenopathy or carotid bruits, trachea midline  Resp: clear to auscultation bilaterally, no wheeze, rhonchi or rales, excursions normal and symmetrical  Cardio: Regular rate and rhythm, no murmurs, clicks, gallops or rubs, no edema or varicosities  Abdomen: soft, nontender, nondistended, normoactive bowel sounds, no hernias, no hepatosplenomegaly,   Back: Deferred  Extremeties: warm, well-perfused, no tenderness or swelling, normal gait/station  Neuro: sensation and strength grossly intact and symmetrical  Psych: alert and oriented to person, place and time  Breast exam deferred    IMPRESSION  Headache    PLAN  No orders of the defined types were placed in this encounter.     Steroids for now  Amarilis Christie MD

## 2017-06-28 NOTE — TELEPHONE ENCOUNTER
Left detailed message asking if she could call back and let us know if she has seen the eye doctor or not. When she calls back, please get the answer to this question please and send to Arabella Garza.

## 2017-07-25 ENCOUNTER — TELEPHONE (OUTPATIENT)
Dept: INTERNAL MEDICINE CLINIC | Age: 52
End: 2017-07-25

## 2017-07-25 NOTE — TELEPHONE ENCOUNTER
[facility]26 with Paulo Roth, appt time was 3pm r/s to 11:30am    Paulo Roth has his half day tomorrow

## 2017-07-27 ENCOUNTER — OFFICE VISIT (OUTPATIENT)
Dept: INTERNAL MEDICINE CLINIC | Age: 52
End: 2017-07-27

## 2017-07-27 VITALS
HEIGHT: 62 IN | DIASTOLIC BLOOD PRESSURE: 70 MMHG | OXYGEN SATURATION: 98 % | TEMPERATURE: 97.7 F | WEIGHT: 166.6 LBS | BODY MASS INDEX: 30.66 KG/M2 | HEART RATE: 100 BPM | SYSTOLIC BLOOD PRESSURE: 130 MMHG | RESPIRATION RATE: 14 BRPM

## 2017-07-27 DIAGNOSIS — R51.9 HEADACHE, UNSPECIFIED HEADACHE TYPE: ICD-10-CM
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
## 2017-07-27 DIAGNOSIS — H53.9 CHANGE IN VISION: ICD-10-CM

## 2017-07-27 DIAGNOSIS — R20.2 PARESTHESIAS IN LEFT HAND: Primary | ICD-10-CM

## 2017-07-27 NOTE — MR AVS SNAPSHOT
Visit Information Date & Time Provider Department Dept. Phone Encounter #  
 7/27/2017  4:00 PM Jodie Dooley, [address] Internist of [facility] [id number] Your Appointments 7/27/2017  4:00 PM  
Office Visit with ASHLI Salas Internist of [facility] Appt Note: follow up on arm  
 [address] [facility] [address] [place]  
  
   
 [address], [address] Upcoming Health Maintenance Date Due MICROALBUMIN Q1 5/4/1974 Pneumococcal 19-64 Medium Risk (1 of 1 - PPSV23) 5/4/1983 DTaP/Tdap/Td series (1 - Tdap) 5/4/1985 HEMOGLOBIN A1C Q6M 3/28/2017 FOOT EXAM Q1 5/16/2017 LIPID PANEL Q1 6/15/2017 PAP AKA CERVICAL CYTOLOGY 8/7/2017 INFLUENZA AGE 9 TO ADULT 8/1/2017 BREAST CANCER SCRN MAMMOGRAM 6/14/2018 EYE EXAM RETINAL OR DILATED Q1 6/27/2018 COLONOSCOPY 5/26/2021 Allergies as of 7/27/2017  Review Complete On: 7/27/2017 By: ASHLI Salas Severity Noted Reaction Type Reactions Pollen Extracts Medium 05/07/2015    Runny Nose, Cough Lipitor [Atorvastatin]  04/25/2016    Other (comments) Muscle cramps and weakness Current Immunizations  Reviewed on 4/29/2016 No immunizations on file. Not reviewed this visit Vitals BP Pulse Temp Resp Height(growth percentile) Weight(growth percentile) 130/70 (BP 1 Location: Right arm, BP Patient Position: Sitting) 100 97.7 °F (36.5 °C) (Oral) 14 5' 2\" (1.575 m) 166 lb 9.6 oz (75.6 kg) LMP SpO2 BMI OB Status Smoking Status 03/30/2013 98% 30.47 kg/m2 Postmenopausal Never Smoker Vitals History BMI and BSA Data Body Mass Index Body Surface Area  
 30.47 kg/m 2 1.82 m 2 Preferred Pharmacy Pharmacy Name Phone [id number] [address] [address]  [phone] Your Updated Medication List  
  
   
 This list is accurate as of: 7/27/17  3:21 PM.  Always use your most recent med list.  
  
  
  
  
 ADVAIR DISKUS 500-50 mcg/dose diskus inhaler Generic drug:  fluticasone-salmeterol INHALE 1 PUFF BY MOUTH TWO TIMES A DAY  
  
 albuterol 90 mcg/actuation inhaler Commonly known as:  PROVENTIL HFA, VENTOLIN HFA, PROAIR HFA Take 2 Puffs by inhalation every four (4) hours as needed for Wheezing. aspirin delayed-release 81 mg tablet Take  by mouth daily. DULoxetine 60 mg capsule Commonly known as:  CYMBALTA TAKE 1 CAPSULE BY MOUTH DAILY  
  
 esomeprazole 40 mg capsule Commonly known as:  NEXIUM  
TAKE ONE CAPSULE BY MOUTH DAILY  
  
 fluticasone 50 mcg/actuation nasal spray Commonly known as:  FLONASE  
2 sprays in each nostril daily  
  
 glucose blood VI test strips strip Commonly known as:  ACCU-CHEK POOJA Pt to test 5 times daily. Dx:E11.65 JANUVIA 25 mg tablet Generic drug:  SITagliptin Take 25 mg by mouth daily. lisinopril 5 mg tablet Commonly known as:  Helon Estrin Take  by mouth daily. metFORMIN 500 mg tablet Commonly known as:  GLUCOPHAGE Take  by mouth two (2) times daily (with meals). * predniSONE 5 mg tablet Commonly known as:  DELTASONE  
TAKE TWO TABLETS BY MOUTH DAILY * predniSONE 5 mg tablet Commonly known as:  DELTASONE  
2 tablets every morning with breakfast  
  
 ZYRTEC PO Take  by mouth. * Notice: This list has 2 medication(s) that are the same as other medications prescribed for you. Read the directions carefully, and ask your doctor or other care provider to review them with you. Introducing [facility] & HEALTH SERVICES! Polly Persaud introduces [employer] patient portal. Now you can access parts of your medical record, email your doctor's office, and request medication refills online. 1. In your internet browser, go to [email or web address] 2. Click on the First Time User? Click Here link in the Sign In box. You will see the New Member Sign Up page. 3. Enter your [employer] Access Code exactly as it appears below. You will not need to use this code after youve completed the sign-up process. If you do not sign up before the expiration date, you must request a new code. · [employer] Access Code: [email or web address] Expires: 9/20/2017  2:35 PM 
 
4. Enter the last four digits of your Social Security Number (xxxx) and Date of Birth (mm/dd/yyyy) as indicated and click Submit. You will be taken to the next sign-up page. 5. Create a X[employer]rem[employer] ID. This will be your [employer] login ID and cannot be changed, so think of one that is secure and easy to remember. 6. Create a [employer] password. You can change your password at any time. 7. Enter your Password Reset Question and Answer. This can be used at a later time if you forget your password. 8. Enter your e-mail address. You will receive e-mail notification when new information is available in [address] 9. Click Sign Up. You can now view and download portions of your medical record. 10. Click the Download Summary menu link to download a portable copy of your medical information. If you have questions, please visit the Frequently Asked Questions section of the [employer] website. Remember, [employer] is NOT to be used for urgent needs. For medical emergencies, dial 911. Now available from your iPhone and Android! Please provide this summary of care documentation to your next provider. Your primary care clinician is listed as Jay Johnson. If you have any questions after today's visit, please call [phone].

## 2017-07-27 NOTE — PROGRESS NOTES
1. Have you been to the ER, urgent care clinic or hospitalized since your last visit? YES.     2. Have you seen or consulted any other health care providers outside of the [address] [place] [address] since your last visit (Include any pap smears or colon screening)? NO      Do you have an Advanced Directive? NO    Would you like information on Advanced Directives?  NO

## 2017-07-27 NOTE — PROGRESS NOTES
HPI/History  Maritza Cordoba is a 47 y.o.  female who presents for evaluation. Pt fell on 7/23 landing on left elbow. Functioning well with no issues aside from slight paresthesia/numbness affecting left 4th and 5th digits in ulnar distribution. No other associated sxs/complaints. Appears to be improving. Of note, pt was seen 6/22 for bitemporal headaches with visual disturbances/loss. ESR and CRP was elevated. Started on prednisone. Vascular was unavailable and sent to Dr. Shaun Jones for biopsy to rule out TA. Dr. Shaun Jones doubted the usefulness in this case and recommended waiting for ophtho eval which I had ordered. Head CT at the time was negative. Pt never answered or returned our calls for f/u and results of ophtho eval.    Today, pt reports she saw ophtho who had noted vision change and changed her eyeglass prescription. Aside from this, ophtho thought sinus issues may have contributed and referred to ENT. Pt has yet to make appt with ENT. HAs are essentially resolved, possibly intermittent but rare and mild since that visit. No other developments and denies any HEENT sxs today. She had no issues with or discontinuing prednisone that I prescribed. No other sxs or complaints. Patient Active Problem List   Diagnosis Code    Sarcoidosis Dr. Robbert Goodpasture on low dose steroid D86.9    Asthma Dr. Robbert Goodpasture J45.909    Dyslipidemia E78.5    Gastroesophageal reflux disease without esophagitis K21.9    Diabetes type 2, uncontrolled ([facility] [address] E11.65    Obesity (BMI 30.0-34. 9) E66.9    Lateral epicondylitis of both elbows M77.11, M77.12     Past Medical History:   Diagnosis Date    Allergic rhinitis due to allergen     s/p shots    Arthritis 6/13     MRI c spine w degen changes; Dr Jock Mill Dr. Robbert Goodpasture;  ratio 68%, FEV1 78% w 6% inc postbd, TLC 77, RV 56, DLCO 61%    Bilateral great toe fractures 2014    Chronic sinusitis     Dr. Jaime Butler in the past    Colon polyp 5/16     Tony    Diabetes ([facility] [address] 3/16    presented w hyperosmotic nonketotic hyperglycemia bs >1000    Diabetes mellitus ([facility] [address]     Dyslipidemia     calculated 10 year risk score was 2.0% (12/13)    Edema     Eustachian tube dysfunction     FHx: colon cancer     FHx: heart disease     Fibrocystic breast     Dr Jesus Cary GERD (gastroesophageal reflux disease)     Lateral epicondylitis of both elbows 2/6/2017    Morbid obesity (HCC)     peak weight 196 lbs, bmi 35.8 from 10/13    Osteopenia     Dr. Miladis Chacon; DEXA t score -0.7 spine, -0.2 hip (8/14)    Sarcoidosis [facility])      Past Surgical History:   Procedure Laterality Date    CARDIAC SURG PROCEDURE UNLIST  9/10, 8/13    thallium negative ef 72%; negative ef 70%    CHEST SURGERY PROCEDURE UNLISTED  7/12     thyroid negative    CHEST SURGERY PROCEDURE UNLISTED  2012    pfts w mod restrictive defect     COLONOSCOPY N/A 5/26/2016    Dr Crys Jerry [facility]    [facility]      Dr. Aminata Mar HX HEENT      nasal polypectomy Dr. Lisa Medina HX HEENT      tear duct surgery right Dr. 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[employer]     Social History Main Topics    Smoking status: Never Smoker    Smokeless tobacco: Never Used    Alcohol use No    Drug use: No    Sexual activity: Not on file     Other Topics Concern    Not on file     Social History Narrative     Family History   Problem Relation Age of Onset    Cancer Mother     Hypertension Mother     Cancer Father     Diabetes Father     Hypertension Father     Stroke Father     Diabetes Sister     Hypertension Sister     Heart Disease Sister     Colon Cancer Sister 52    Hypertension Brother     Cancer Maternal Aunt      Current Outpatient Prescriptions   Medication Sig    DULoxetine (CYMBALTA) 60 mg capsule TAKE 1 CAPSULE BY MOUTH DAILY    esomeprazole (NEXIUM) 40 mg capsule TAKE ONE CAPSULE BY MOUTH DAILY    predniSONE (DELTASONE) 5 mg tablet 2 tablets every morning with breakfast    lisinopril (PRINIVIL, ZESTRIL) 5 mg tablet Take  by mouth daily.  metFORMIN (GLUCOPHAGE) 500 mg tablet Take  by mouth two (2) times daily (with meals).  sitaGLIPtin (JANUVIA) 25 mg tablet Take 25 mg by mouth daily.  glucose blood VI test strips (ACCU-CHEK POOJA) strip Pt to test 5 times daily. Dx:E11.65    aspirin delayed-release 81 mg tablet Take  by mouth daily.  predniSONE (DELTASONE) 5 mg tablet TAKE TWO TABLETS BY MOUTH DAILY    ADVAIR DISKUS 500-50 mcg/dose diskus inhaler INHALE 1 PUFF BY MOUTH TWO TIMES A DAY    albuterol (PROVENTIL HFA, VENTOLIN HFA) 90 mcg/actuation inhaler Take 2 Puffs by inhalation every four (4) hours as needed for Wheezing.  CETIRIZINE HCL (ZYRTEC PO) Take  by mouth.  fluticasone (FLONASE) 50 mcg/actuation nasal spray 2 sprays in each nostril daily     No current facility-administered medications for this visit. Allergies   Allergen Reactions    Pollen Extracts Runny Nose and Cough    Lipitor [Atorvastatin] Other (comments)     Muscle cramps and weakness         Review of Systems  Aside from those included in HPI, remainder of ROS negative. Physical Examination  Visit Vitals    /70 (BP 1 Location: Right arm, BP Patient Position: Sitting)    Pulse 100    Temp 97.7 °F (36.5 °C) (Oral)    Resp 14    Ht 5' 2\" (1.575 m)    Wt 166 lb 9.6 oz (75.6 kg)    LMP 03/30/2013    SpO2 98%    BMI 30.47 kg/m2       General - Alert and in no acute distress. Pt appears well, comfortable, and in good spirits. Pleasant, engaging. Nontoxic. Not anxious, non-diaphoretic.   Mental status - Appropriate mood, behavior, speech content, dress, and thought processes. Eyes - Pupils equal and reactive, extraocular movements intact. No erythema or discharge. Pulm - No tachypnea, retractions, or cyanosis. Good respiratory effort. Cardiovascular - Normal rate. Left elbow unremarkable. No swelling, discoloration, abrasions, warmth, or other signs of trauma or findings. Good P/AROM. No tenderness or palpable deformities. Ulnar Tinel at elbow positive. No hand findings; good functioning. Neurovascularly intact. Otherwise, unremarkable exam.  Neuromuscular - No overt focal findings or movement disorder noted. No other findings. Assessment and Plan  1. Pt with paresthesia in left 4-5th fingers (ulnar distribution) after fall on 7/23 impacting left elbow. Functioning normally with no concerning sxs/developments. No signs of fracture or other injury. Should resolve or improve with time. Return/call if worsening, developments, decreasing functionality, or other issues. 2. Update on headaches and vision changes/loss from last visit (6/22). CT was unremarkable. Saw ophtho. Prescription lenses were adjusted. Pt is doing much better with no concerning developments/sxs. She is seeing ENT as ophtho thought sinuses were also contributing, however, pt asymptomatic. Observation otherwise. Further planning as warranted. Pt happily agrees with plan. PLEASE NOTE:   This document has been produced using voice recognition software. Unrecognized errors in transcription may be present.     [employer] of [address] [place] [address]  [phone]  7/27/2017

## 2017-08-25 RX ORDER — DULOXETIN HYDROCHLORIDE 60 MG/1
CAPSULE, DELAYED RELEASE ORAL
Qty: 90 CAP | Refills: 0 | Status: SHIPPED | OUTPATIENT
Start: 2017-08-25 | End: 2018-08-27

## 2017-09-25 RX ORDER — METFORMIN HYDROCHLORIDE 500 MG/1
500 TABLET ORAL 2 TIMES DAILY WITH MEALS
Qty: 180 TAB | Refills: 0 | Status: SHIPPED | OUTPATIENT
Start: 2017-09-25 | End: 2017-11-15 | Stop reason: SDUPTHER

## 2017-09-28 ENCOUNTER — HOSPITAL ENCOUNTER (OUTPATIENT)
Dept: LAB | Age: 52
Discharge: HOME | End: 2017-09-28
Payer: COMMERCIAL

## 2017-09-28 ENCOUNTER — OFFICE VISIT (OUTPATIENT)
Dept: INTERNAL MEDICINE CLINIC | Age: 52
End: 2017-09-28

## 2017-09-28 VITALS
HEIGHT: 62 IN | RESPIRATION RATE: 16 BRPM | TEMPERATURE: 98.4 F | BODY MASS INDEX: 30.47 KG/M2 | SYSTOLIC BLOOD PRESSURE: 124 MMHG | WEIGHT: 165.6 LBS | DIASTOLIC BLOOD PRESSURE: 78 MMHG | HEART RATE: 86 BPM | OXYGEN SATURATION: 98 %

## 2017-09-28 DIAGNOSIS — E78.5 DYSLIPIDEMIA: ICD-10-CM

## 2017-09-28 DIAGNOSIS — Z23 ENCOUNTER FOR IMMUNIZATION: ICD-10-CM

## 2017-09-28 DIAGNOSIS — Z12.4 SCREENING FOR CERVICAL CANCER: ICD-10-CM
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
## 2017-09-28 LAB
ANION GAP SERPL CALC-SCNC: 13 MMOL/L (ref 3–18)
BASOPHILS # BLD: 0 K/UL (ref 0–0.06)
BASOPHILS NFR BLD: 0 % (ref 0–2)
BUN SERPL-MCNC: 15 MG/DL (ref 7–18)
BUN/CREAT SERPL: 11 (ref 12–20)
CALCIUM SERPL-MCNC: 9.1 MG/DL (ref 8.5–10.1)
CHLORIDE SERPL-SCNC: 99 MMOL/L (ref 100–108)
CO2 SERPL-SCNC: 24 MMOL/L (ref 21–32)
CREAT SERPL-MCNC: 1.31 MG/DL (ref 0.6–1.3)
DIFFERENTIAL METHOD BLD: ABNORMAL
EOSINOPHIL # BLD: 0.1 K/UL (ref 0–0.4)
EOSINOPHIL NFR BLD: 1 % (ref 0–5)
ERYTHROCYTE [DISTWIDTH] IN BLOOD BY AUTOMATED COUNT: 12.6 % (ref 11.6–14.5)
EST. AVERAGE GLUCOSE BLD GHB EST-MCNC: 298 MG/DL
GLUCOSE SERPL-MCNC: 327 MG/DL (ref 74–99)
HBA1C MFR BLD: 12 % (ref 4.2–5.6)
HCT VFR BLD AUTO: 40 % (ref 35–45)
HGB BLD-MCNC: 13.1 G/DL (ref 12–16)
LYMPHOCYTES # BLD: 1.2 K/UL (ref 0.9–3.6)
LYMPHOCYTES NFR BLD: 19 % (ref 21–52)
MCH RBC QN AUTO: 30.1 PG (ref 24–34)
MCHC RBC AUTO-ENTMCNC: 32.8 G/DL (ref 31–37)
MCV RBC AUTO: 92 FL (ref 74–97)
MONOCYTES # BLD: 0.6 K/UL (ref 0.05–1.2)
MONOCYTES NFR BLD: 10 % (ref 3–10)
NEUTS SEG # BLD: 4.4 K/UL (ref 1.8–8)
NEUTS SEG NFR BLD: 70 % (ref 40–73)
PLATELET # BLD AUTO: 165 K/UL (ref 135–420)
PMV BLD AUTO: 11.9 FL (ref 9.2–11.8)
POTASSIUM SERPL-SCNC: 3.6 MMOL/L (ref 3.5–5.5)
RBC # BLD AUTO: 4.35 M/UL (ref 4.2–5.3)
SODIUM SERPL-SCNC: 136 MMOL/L (ref 136–145)
WBC # BLD AUTO: 6.2 K/UL (ref 4.6–13.2)

## 2017-09-28 PROCEDURE — 80061 LIPID PANEL: CPT | Performed by: NURSE PRACTITIONER

## 2017-09-28 PROCEDURE — 85025 COMPLETE CBC W/AUTO DIFF WBC: CPT | Performed by: NURSE PRACTITIONER

## 2017-09-28 PROCEDURE — 82043 UR ALBUMIN QUANTITATIVE: CPT | Performed by: NURSE PRACTITIONER

## 2017-09-28 PROCEDURE — 83036 HEMOGLOBIN GLYCOSYLATED A1C: CPT | Performed by: NURSE PRACTITIONER

## 2017-09-28 PROCEDURE — 36415 COLL VENOUS BLD VENIPUNCTURE: CPT | Performed by: NURSE PRACTITIONER

## 2017-09-28 PROCEDURE — 80048 BASIC METABOLIC PNL TOTAL CA: CPT | Performed by: NURSE PRACTITIONER

## 2017-09-28 RX ORDER — METRONIDAZOLE 500 MG/1
500 TABLET ORAL
COMMUNITY
Start: 2017-09-26 | End: 2017-10-03

## 2017-09-28 RX ORDER — SITAGLIPTIN 100 MG/1
25 TABLET, FILM COATED ORAL DAILY
COMMUNITY
Start: 2017-09-25 | End: 2017-11-21 | Stop reason: DRUGHIGH

## 2017-09-28 RX ORDER — AMOXICILLIN AND CLAVULANATE POTASSIUM 875; 125 MG/1; MG/1
TABLET, FILM COATED ORAL
COMMUNITY
Start: 2017-09-26 | End: 2017-10-06

## 2017-09-28 RX ORDER — METFORMIN HYDROCHLORIDE 1000 MG/1
1000 TABLET ORAL 2 TIMES DAILY
COMMUNITY
Start: 2017-09-26 | End: 2017-10-20 | Stop reason: SDUPTHER

## 2017-09-28 NOTE — PROGRESS NOTES
Pt is here for F/U from [facility] ED for elevated blood sugar. Was seen Tuesday early AM. Pt states her BS was 700 at the ED. Pt states she was treated for a sinus infection & Trichomonitis at the ER. 1. Have you been to the ER, urgent care clinic since your last visit? Hospitalized since your last visit? Yes [address] [facility] ED 9/26/17 Elevated BS    2. Have you seen or consulted any other health care providers outside of the [address] [place] [address] since your last visit? Include any pap smears or colon screening. No      Pt states she no longer goes to an endocrinologist.     Pt given Flu vaccine in R deltoid & Pneumovax 23 in L deltoid per verbral read back order Berna Carney NP  Pt tolerated procedures w/o reaction.

## 2017-09-28 NOTE — PATIENT INSTRUCTIONS
Please get your cholesterol labs done prior to the next follow up appointment. These labs require you to be fasting. Learning About Diabetes Food Guidelines  Your Care Instructions  Meal planning is important to manage diabetes. It helps keep your blood sugar at a target level (which you set with your doctor). You don't have to eat special foods. You can eat what your family eats, including sweets once in a while. But you do have to pay attention to how often you eat and how much you eat of certain foods. You may want to work with a dietitian or a certified diabetes educator (CDE) to help you plan meals and snacks. A dietitian or CDE can also help you lose weight if that is one of your goals. What should you know about eating carbs? Managing the amount of carbohydrate (carbs) you eat is an important part of healthy meals when you have diabetes. Carbohydrate is found in many foods. · Learn which foods have carbs. And learn the amounts of carbs in different foods. ¨ Bread, cereal, pasta, and rice have about 15 grams of carbs in a serving. A serving is 1 slice of bread (1 ounce), ½ cup of cooked cereal, or 1/3 cup of cooked pasta or rice. ¨ Fruits have 15 grams of carbs in a serving. A serving is 1 small fresh fruit, such as an apple or orange; ½ of a banana; ½ cup of cooked or canned fruit; ½ cup of fruit juice; 1 cup of melon or raspberries; or 2 tablespoons of dried fruit. ¨ Milk and no-sugar-added yogurt have 15 grams of carbs in a serving. A serving is 1 cup of milk or 2/3 cup of no-sugar-added yogurt. ¨ Starchy vegetables have 15 grams of carbs in a serving. A serving is ½ cup of mashed potatoes or sweet potato; 1 cup winter squash; ½ of a small baked potato; ½ cup of cooked beans; or ½ cup cooked corn or green peas. · Learn how much carbs to eat each day and at each meal. A dietitian or CDE can teach you how to keep track of the amount of carbs you eat. This is called carbohydrate counting.   · If you are not sure how to count carbohydrate grams, use the Plate Method to plan meals. It is a good, quick way to make sure that you have a balanced meal. It also helps you spread carbs throughout the day. ¨ Divide your plate by types of foods. Put non-starchy vegetables on half the plate, meat or other protein food on one-quarter of the plate, and a grain or starchy vegetable in the final quarter of the plate. To this you can add a small piece of fruit and 1 cup of milk or yogurt, depending on how many carbs you are supposed to eat at a meal.  · Try to eat about the same amount of carbs at each meal. Do not \"save up\" your daily allowance of carbs to eat at one meal.  · Proteins have very little or no carbs per serving. Examples of proteins are beef, chicken, turkey, fish, eggs, tofu, cheese, cottage cheese, and peanut butter. A serving size of meat is 3 ounces, which is about the size of a deck of cards. Examples of meat substitute serving sizes (equal to 1 ounce of meat) are 1/4 cup of cottage cheese, 1 egg, 1 tablespoon of peanut butter, and ½ cup of tofu. How can you eat out and still eat healthy? · Learn to estimate the serving sizes of foods that have carbohydrate. If you measure food at home, it will be easier to estimate the amount in a serving of restaurant food. · If the meal you order has too much carbohydrate (such as potatoes, corn, or baked beans), ask to have a low-carbohydrate food instead. Ask for a salad or green vegetables. · If you use insulin, check your blood sugar before and after eating out to help you plan how much to eat in the future. · If you eat more carbohydrate at a meal than you had planned, take a walk or do other exercise. This will help lower your blood sugar. What else should you know? · Limit saturated fat, such as the fat from meat and dairy products. This is a healthy choice because people who have diabetes are at higher risk of heart disease.  So choose lean cuts of meat and nonfat or low-fat dairy products. Use olive or canola oil instead of butter or shortening when cooking. · Don't skip meals. Your blood sugar may drop too low if you skip meals and take insulin or certain medicines for diabetes. · Check with your doctor before you drink alcohol. Alcohol can cause your blood sugar to drop too low. Alcohol can also cause a bad reaction if you take certain diabetes medicines. Follow-up care is a key part of your treatment and safety. Be sure to make and go to all appointments, and call your doctor if you are having problems. It's also a good idea to know your test results and keep a list of the medicines you take. Where can you learn more? Go to http://ralph-fabian.info/. Enter J814 in the search box to learn more about \"Learning About Diabetes Food Guidelines. \"  Current as of: March 13, 2017  Content Version: 11.3  © 6843-5784 [employer]. Care instructions adapted under license by [employer] (which disclaims liability or warranty for this information). If you have questions about a medical condition or this instruction, always ask your healthcare professional. [place] [address] any warranty or liability for your use of this information. Learning About Meal Planning for Diabetes  Why plan your meals? Meal planning can be a key part of managing diabetes. Planning meals and snacks with the right balance of carbohydrate, protein, and fat can help you keep your blood sugar at the target level you set with your doctor. You don't have to eat special foods. You can eat what your family eats, including sweets once in a while. But you do have to pay attention to how often you eat and how much you eat of certain foods. You may want to work with a dietitian or a certified diabetes educator. He or she can give you tips and meal ideas and can answer your questions about meal planning.  This health professional can also help you reach a healthy weight if that is one of your goals. What plan is right for you? Your dietitian or diabetes educator may suggest that you start with the plate format or carbohydrate counting. The plate format  The plate format is a simple way to help you manage how you eat. You plan meals by learning how much space each food should take on a plate. Using the plate format helps you spread carbohydrate throughout the day. It can make it easier to keep your blood sugar level within your target range. It also helps you see if you're eating healthy portion sizes. To use the plate format, you put non-starchy vegetables on half your plate. Add meat or meat substitutes on one-quarter of the plate. Put a grain or starchy vegetable (such as brown rice or a potato) on the final quarter of the plate. You can add a small piece of fruit and some low-fat or fat-free milk or yogurt, depending on your carbohydrate goal for each meal.  Here are some tips for using the plate format:  · Make sure that you are not using an oversized plate. A 9-inch plate is best. Many restaurants use larger plates. · Get used to using the plate format at home. Then you can use it when you eat out. · Write down your questions about using the plate format. Talk to your doctor, a dietitian, or a diabetes educator about your concerns. Carbohydrate counting  With carbohydrate counting, you plan meals based on the amount of carbohydrate in each food. Carbohydrate raises blood sugar higher and more quickly than any other nutrient. It is found in desserts, breads and cereals, and fruit. It's also found in starchy vegetables such as potatoes and corn, grains such as rice and pasta, and milk and yogurt. Spreading carbohydrate throughout the day helps keep your blood sugar levels within your target range.   Your daily amount depends on several things, including your weight, how active you are, which diabetes medicines you take, and what your goals are for your blood sugar levels. A registered dietitian or diabetes educator can help you plan how much carbohydrate to include in each meal and snack. A guideline for your daily amount of carbohydrate is:  · 45 to 60 grams at each meal. That's about the same as 3 to 4 carbohydrate servings. · 15 to 20 grams at each snack. That's about the same as 1 carbohydrate serving. The Nutrition Facts label on packaged foods tells you how much carbohydrate is in a serving of the food. First, look at the serving size on the food label. Is that the amount you eat in a serving? All of the nutrition information on a food label is based on that serving size. So if you eat more or less than that, you'll need to adjust the other numbers. Total carbohydrate is the next thing you need to look for on the label. If you count carbohydrate servings, one serving of carbohydrate is 15 grams. For foods that don't come with labels, such as fresh fruits and vegetables, you'll need a guide that lists carbohydrate in these foods. Ask your doctor, dietitian, or diabetes educator about books or other nutrition guides you can use. If you take insulin, you need to know how many grams of carbohydrate are in a meal. This lets you know how much rapid-acting insulin to take before you eat. If you use an insulin pump, you get a constant rate of insulin during the day. So the pump must be programmed at meals to give you extra insulin to cover the rise in blood sugar after meals. When you know how much carbohydrate you will eat, you can take the right amount of insulin. Or, if you always use the same amount of insulin, you need to make sure that you eat the same amount of carbohydrate at meals. If you need more help to understand carbohydrate counting and food labels, ask your doctor, dietitian, or diabetes educator. How do you get started with meal planning? Here are some tips to get started:  · Plan your meals a week at a time.  Don't forget to include snacks too. · Use cookbooks or online recipes to plan several main meals. Plan some quick meals for busy nights. You also can double some recipes that freeze well. Then you can save half for other busy nights when you don't have time to cook. · Make sure you have the ingredients you need for your recipes. If you're running low on basic items, put these items on your shopping list too. · List foods that you use to make breakfasts, lunches, and snacks. List plenty of fruits and vegetables. · Post this list on the refrigerator. Add to it as you think of more things you need. · Take the list to the store to do your weekly shopping. Follow-up care is a key part of your treatment and safety. Be sure to make and go to all appointments, and call your doctor if you are having problems. It's also a good idea to know your test results and keep a list of the medicines you take. Where can you learn more? Go to http://ralph-fabian.info/. [employer] in the search box to learn more about \"Learning About Meal Planning for Diabetes. \"  Current as of: March 13, 2017  Content Version: 11.3  © 9453-5936 [employer] Care instructions adapted under license by [employer] (which disclaims liability or warranty for this information). If you have questions about a medical condition or this instruction, always ask your healthcare professional. [place] [address] any warranty or liability for your use of this information. Noninsulin Medicines for Type 2 Diabetes: Care Instructions  Your Care Instructions  There are different types of noninsulin medicines for diabetes. Each works in a different way. But they all help you control your blood sugar. Some types help your body make insulin to lower your blood sugar. Others lower how much insulin your body needs. Some can slow how fast your body digests sugars.  And some can remove extra glucose through your urine.  · Alpha-glucosidase inhibitors. These keep starches from breaking down. This means that they lower the amount of glucose absorbed when you eat. They don't help your body make more insulin. So they will not cause low blood sugar unless you use them with other medicines for diabetes. They include acarbose and miglitol. · DPP-4 inhibitors. These help your body raise the level of insulin after you eat. They also help your body make less of a hormone that raises blood sugar. They include linagliptin, saxagliptin, and sitagliptin. · Incretin hormones (GLP-1 receptor agonists). Your body makes a protein that can raise your insulin level. It also can lower your blood sugar and make you less hungry. You can get shots of hormones that work the same way. They include exenatide and liraglutide. · Meglitinides. These help your body release insulin. They also help slow how your body digests sugars. So they can keep your blood sugar from rising too fast after you eat. They include nateglinide and repaglinide. · Metformin. This lowers how much glucose your liver makes. And it helps you respond better to insulin. It also lowers the amount of stored sugar that your liver releases when you are not eating. · SGLT2 inhibitors. These help to remove extra glucose through your urine. They may also help some people lose weight. They include canagliflozin, dapagliflozin, and empagliflozin. · Sulfonylureas. These help your body release more insulin. Some work for many hours. They can cause low blood sugar if you don't eat as you planned. They include glipizide and glyburide. · Thiazolidinediones. These reduce the amount of blood glucose. They also help you respond better to insulin. They include pioglitazone and rosiglitazone. You may need to take more than one medicine for diabetes. Two or more medicines may work better to lower your blood sugar level than just one does.   Follow-up care is a key part of your treatment and safety. Be sure to make and go to all appointments, and call your doctor if you are having problems. It's also a good idea to know your test results and keep a list of the medicines you take. How can you care for yourself at home? · Eat a healthy diet. Get some exercise each day. This may help you to reduce how much medicine you need. · Do not take other prescription or over-the-counter medicines, vitamins, herbal products, or supplements without talking to your doctor first. Some medicines for type 2 diabetes can cause problems with other medicines or supplements. · Tell your doctor if you plan to get pregnant. Some of these drugs are not safe for pregnant women. · Be safe with medicines. Take your medicines exactly as prescribed. Meglitinides and sulfonylureas can cause your blood sugar to drop very low. Call your doctor if you think you are having a problem with your medicine. · Check your blood sugar often. You can use a glucose monitor. Keeping track can help you know how certain foods, activities, and medicines affect your blood sugar. And it can help you keep your blood sugar from getting so low that it's not safe. When should you call for help? Call 911 anytime you think you may need emergency care. For example, call if:  · You passed out (lost consciousness). · You are confused or cannot think clearly. · Your blood sugar is very high or very low. Watch closely for changes in your health, and be sure to contact your doctor if:  · Your blood sugar stays outside the level your doctor set for you. · You have any problems. Where can you learn more? Go to http://ralph-fabian.info/. Enter H153 in the search box to learn more about \"Noninsulin Medicines for Type 2 Diabetes: Care Instructions. \"  Current as of: March 13, 2017  Content Version: 11.3  © 9844-3640 [employer].  Care instructions adapted under license by [employer] (which disclaims liability or warranty for this information). If you have questions about a medical condition or this instruction, always ask your healthcare professional. [address] any warranty or liability for your use of this information. Albumin Urine Test: About This Test  What is it? An albumin urine test checks urine for a protein called albumin. This protein is normally found in the blood. When the kidneys are damaged, small amounts of albumin leak into the urine. This is called albuminuria. If the amount of albumin is very small, but still abnormal, it is called microalbuminuria. You might provide a urine sample for your doctor during a visit. Your doctor might also ask you for a one-time sample at home or over a specific period of time, such as over 4 hours or 24 hours. Your doctor will tell you what to do. Why is this test done? This test is done to check for albumin in the urine. It helps tell your doctor how well your kidneys are working. This test is done most often to check the kidneys in people with diabetes. Other conditions also cause albuminuria. These conditions include high blood pressure, heart failure, and cirrhosis. The sooner your doctor knows you have kidney damage, the more your doctor can do to protect your kidneys. How can you prepare for the test?  · Do not exercise just before the test.  · Tell your doctor if you are having your period or have vaginal discharge. · Tell your doctor about all the nonprescription and prescription medicines and herbs or other supplements you take. Some of these can affect the results of this test.  What happens before the test?  · Your doctor or the lab likely will give you the container you need to hold the urine. You will get instructions on when and how to collect the urine. This might be a one-time sample or a number of samples over a period of time.   What happens during the test?  One-time urine collection  · Wash your hands before you start.  · If the collection cup you are given has a lid, remove it carefully. Set it down with the inner surface up. Do not touch the inside of the cup with your fingers. · Clean the area around your genitals. ¨ For men: Pull back the foreskin, if present, and clean the head of the penis with medicated towelettes or swabs. ¨ For women: Spread open the genital folds of skin with one hand. Then use medicated towelettes or swabs in your other hand to clean the area where urine comes out (the urethra). Wipe the area from front to back. · Start urinating into the toilet or urinal. A woman should hold apart the genital folds of skin while she urinates. · After the urine has flowed for several seconds, place the cup into the urine stream. Collect about 2 fluid ounces of this \"midstream\" urine without stopping your flow of urine. · Don't touch the rim of the cup to your genital area. Don't get toilet paper, pubic hair, stool (feces), menstrual blood, or anything else in the urine sample. · Finish urinating into the toilet or urinal.  · Carefully replace and tighten the lid on the cup, and then return it to the lab. If you are collecting the urine at home and can't get it to the lab in an hour, refrigerate it. Urine collection over time  You collect your urine for a period of time, such as over 4 or 24 hours. · You start collecting your urine in the morning. When you first get up, empty your bladder. But do not save this urine. Write down the time that you began. · For the set period of time, collect all your urine. Urinate into a small, clean container. Then pour the urine into the large container. Don't touch the inside of the container with your fingers. · Keep the collected urine in the refrigerator for the collection time. Empty your bladder for the last time at or just before the end of the collection period. Add this urine to the large container. Then write down the time.   What else should you know about the test?  · If your results are higher than normal, your doctor may check your urine more often to watch for kidney damage. · If your test shows that you may have kidney damage, you may get other tests. What happens after the test?  · Follow your doctor's instructions for taking the urine to the doctor's office or lab. · You can go back to your usual activities right away. When should you call for help? Watch closely for changes in your health, and be sure to contact your doctor if you have any problems. Follow-up care is a key part of your treatment and safety. Be sure to make and go to all appointments, and call your doctor if you are having problems. It's also a good idea to keep a list of the medicines you take. Ask your doctor when you can expect to have your test results. Where can you learn more? Go to http://ralph-fabian.info/. Enter 321 [id number] in the search box to learn more about \"Albumin Urine Test: About This Test.\"  Current as of: March 16, 2016  Content Version: 11.3  © 0864-6890 [employer]. Care instructions adapted under license by [employer] (which disclaims liability or warranty for this information). If you have questions about a medical condition or this instruction, always ask your healthcare professional. [address] any warranty or liability for your use of this information.

## 2017-09-28 NOTE — MR AVS SNAPSHOT
Visit Information Date & Time Provider Department Dept. Phone Encounter #  
 9/28/2017  3:00 PM Tarsha Bazzi NP Internist of [facility] [id number] Follow-up Instructions Return in about 2 weeks (around 10/12/2017) for lab results, diabetes. Your Appointments 10/13/2017  8:40 AM  
LAB with [facility] NURSE VISIT Internist of [place] ([address]) Appt Note: LABS PER [address] [place]  
  
   
 [address] [facility]  
  
    
 10/20/2017  3:00 PM  
Office Visit with Tarsha Bazzi NP Internist of [facility] ([address]) Appt Note: ov per   
 [address] [place] [address] [address] [place]  
  
   
 [address] Upcoming Health Maintenance Date Due  
 LIPID PANEL Q1 6/15/2017 PAP AKA CERVICAL CYTOLOGY 8/7/2017 HEMOGLOBIN A1C Q6M 3/28/2018 BREAST CANCER SCRN MAMMOGRAM 6/14/2018 EYE EXAM RETINAL OR DILATED Q1 6/27/2018 FOOT EXAM Q1 9/28/2018 MICROALBUMIN Q1 9/28/2018 COLONOSCOPY 5/26/2021 DTaP/Tdap/Td series (2 - Td) 4/24/2023 Allergies as of 9/28/2017  Review Complete On: 9/28/2017 By: Tarsha Bazzi NP Severity Noted Reaction Type Reactions Pollen Extracts Medium 05/07/2015    Runny Nose, Cough Lipitor [Atorvastatin]  04/25/2016    Other (comments) Muscle cramps and weakness Current Immunizations  Reviewed on 9/28/2017 Name Date Influenza Vaccine (Quad) PF  Incomplete Pneumococcal Polysaccharide (PPSV-23)  Incomplete Tdap 4/24/2013 12:00 AM  
  
 Reviewed by Tarsha Bazzi NP on 9/28/2017 at  3:51 PM  
You Were Diagnosed With   
  
 Codes Comments  Uncontrolled type 2 diabetes mellitus with diabetic nephropathy, without long-term current use of insulin ([facility]    -  Primary ICD-10-CM: E11.21, E11.65 
 ICD-9-CM: 250.42, 583.81 Screening for cervical cancer     ICD-10-CM: Z12.4 ICD-9-CM: V76.2 Encounter for immunization     ICD-10-CM: I54 ICD-9-CM: V03.89 Dyslipidemia     ICD-10-CM: E78.5 ICD-9-CM: 272.4 Vitals BP Pulse Temp Resp Height(growth percentile) Weight(growth percentile) 124/78 86 98.4 °F (36.9 °C) (Oral) 16 5' 2\" (1.575 m) 165 lb 9.6 oz (75.1 kg) LMP SpO2 BMI OB Status Smoking Status 03/30/2013 98% 30.29 kg/m2 Postmenopausal Never Smoker Vitals History BMI and BSA Data Body Mass Index Body Surface Area  
 30.29 kg/m 2 1.81 m 2 Preferred Pharmacy Pharmacy Name Phone [id number] [address] [place] [phone] Your Updated Medication List  
  
   
This list is accurate as of: 9/28/17  4:13 PM.  Always use your most recent med list.  
  
  
  
  
 ADVAIR DISKUS 500-50 mcg/dose diskus inhaler Generic drug:  fluticasone-salmeterol INHALE 1 PUFF BY MOUTH TWO TIMES A DAY  
  
 albuterol 90 mcg/actuation inhaler Commonly known as:  PROVENTIL HFA, VENTOLIN HFA, PROAIR HFA Take 2 Puffs by inhalation every four (4) hours as needed for Wheezing. amoxicillin-clavulanate 875-125 mg per tablet Commonly known as:  AUGMENTIN Take 1 Tab by Mouth Twice Daily for 10 days. aspirin delayed-release 81 mg tablet Take  by mouth daily. DULoxetine 60 mg capsule Commonly known as:  CYMBALTA TAKE 1 CAPSULE BY MOUTH DAILY  
  
 esomeprazole 40 mg capsule Commonly known as:  NEXIUM  
TAKE ONE CAPSULE BY MOUTH DAILY  
  
 fluticasone 50 mcg/actuation nasal spray Commonly known as:  FLONASE  
2 sprays in each nostril daily  
  
 glucose blood VI test strips strip Commonly known as:  ACCU-CHEK POOJA Pt to test 5 times daily. Dx:E11.65 JANUVIA 100 mg tablet Generic drug:  SITagliptin Take 25 mg by mouth daily. lisinopril 5 mg tablet Commonly known as:  Laury [place]  
 Take  by mouth daily. * metFORMIN 500 mg tablet Commonly known as:  GLUCOPHAGE Take 1 Tab by mouth two (2) times daily (with meals). * metFORMIN 1,000 mg tablet Commonly known as:  GLUCOPHAGE Take 1,000 mg by mouth two (2) times a day. metroNIDAZOLE 500 mg tablet Commonly known as:  FLAGYL  
500 mg.  
  
 predniSONE 5 mg tablet Commonly known as:  DELTASONE  
2 tablets every morning with breakfast  
  
 ZYRTEC PO Take  by mouth. * Notice: This list has 2 medication(s) that are the same as other medications prescribed for you. Read the directions carefully, and ask your doctor or other care provider to review them with you. We Performed the Following INFLUENZA VIRUS VAC QUAD,SPLIT,PRESV FREE SYRINGE IM [id number] CPT(R)] PNEUMOCOCCAL POLYSACCHARIDE VACCINE, 23-VALENT, ADULT OR IMMUNOSUPPRESSED PT DOSE, [24794 CPT(R)] REFERRAL TO OBSTETRICS AND GYNECOLOGY [REF51 Custom] Comments:  
 Screening cervical cancer Follow-up Instructions Return in about 2 weeks (around 10/12/2017) for lab results, diabetes. To-Do List   
 10/02/2017 Lab:  LIPID PANEL Referral Information Referral ID Referred By Referred To  
  
 [id number] Ted Villa MD   
   [address] [place] [address],[facility] Phone: [phone] Fax: [phone] Visits Status Start Date End Date 1 New Request 9/28/17 9/28/18 If your referral has a status of pending review or denied, additional information will be sent to support the outcome of this decision. Patient Instructions Please get your cholesterol labs done prior to the next follow up appointment. These labs require you to be fasting. Learning About Diabetes Food Guidelines Your Care Instructions Meal planning is important to manage diabetes. It helps keep your blood sugar at a target level (which you set with your doctor).  You don't have to eat special foods. You can eat what your family eats, including sweets once in a while. But you do have to pay attention to how often you eat and how much you eat of certain foods. You may want to work with a dietitian or a certified diabetes educator (CDE) to help you plan meals and snacks. A dietitian or CDE can also help you lose weight if that is one of your goals. What should you know about eating carbs? Managing the amount of carbohydrate (carbs) you eat is an important part of healthy meals when you have diabetes. Carbohydrate is found in many foods. · Learn which foods have carbs. And learn the amounts of carbs in different foods. ¨ Bread, cereal, pasta, and rice have about 15 grams of carbs in a serving. A serving is 1 slice of bread (1 ounce), ½ cup of cooked cereal, or 1/3 cup of cooked pasta or rice. ¨ Fruits have 15 grams of carbs in a serving. A serving is 1 small fresh fruit, such as an apple or orange; ½ of a banana; ½ cup of cooked or canned fruit; ½ cup of fruit juice; 1 cup of melon or raspberries; or 2 tablespoons of dried fruit. ¨ Milk and no-sugar-added yogurt have 15 grams of carbs in a serving. A serving is 1 cup of milk or 2/3 cup of no-sugar-added yogurt. ¨ Starchy vegetables have 15 grams of carbs in a serving. A serving is ½ cup of mashed potatoes or sweet potato; 1 cup winter squash; ½ of a small baked potato; ½ cup of cooked beans; or ½ cup cooked corn or green peas. · Learn how much carbs to eat each day and at each meal. A dietitian or CDE can teach you how to keep track of the amount of carbs you eat. This is called carbohydrate counting. · If you are not sure how to count carbohydrate grams, use the Plate Method to plan meals. It is a good, quick way to make sure that you have a balanced meal. It also helps you spread carbs throughout the day. ¨ Divide your plate by types of foods.  Put non-starchy vegetables on half the plate, meat or other protein food on one-quarter of the plate, and a grain or starchy vegetable in the final quarter of the plate. To this you can add a small piece of fruit and 1 cup of milk or yogurt, depending on how many carbs you are supposed to eat at a meal. 
· Try to eat about the same amount of carbs at each meal. Do not \"save up\" your daily allowance of carbs to eat at one meal. 
· Proteins have very little or no carbs per serving. Examples of proteins are beef, chicken, turkey, fish, eggs, tofu, cheese, cottage cheese, and peanut butter. A serving size of meat is 3 ounces, which is about the size of a deck of cards. Examples of meat substitute serving sizes (equal to 1 ounce of meat) are 1/4 cup of cottage cheese, 1 egg, 1 tablespoon of peanut butter, and ½ cup of tofu. How can you eat out and still eat healthy? · Learn to estimate the serving sizes of foods that have carbohydrate. If you measure food at home, it will be easier to estimate the amount in a serving of restaurant food. · If the meal you order has too much carbohydrate (such as potatoes, corn, or baked beans), ask to have a low-carbohydrate food instead. Ask for a salad or green vegetables. · If you use insulin, check your blood sugar before and after eating out to help you plan how much to eat in the future. · If you eat more carbohydrate at a meal than you had planned, take a walk or do other exercise. This will help lower your blood sugar. What else should you know? · Limit saturated fat, such as the fat from meat and dairy products. This is a healthy choice because people who have diabetes are at higher risk of heart disease. So choose lean cuts of meat and nonfat or low-fat dairy products. Use olive or canola oil instead of butter or shortening when cooking. · Don't skip meals. Your blood sugar may drop too low if you skip meals and take insulin or certain medicines for diabetes. · Check with your doctor before you drink alcohol. Alcohol can cause your blood sugar to drop too low. Alcohol can also cause a bad reaction if you take certain diabetes medicines. Follow-up care is a key part of your treatment and safety. Be sure to make and go to all appointments, and call your doctor if you are having problems. It's also a good idea to know your test results and keep a list of the medicines you take. Where can you learn more? Go to http://ralph-fabian.info/. Enter D543 in the search box to learn more about \"Learning About Diabetes Food Guidelines. \" Current as of: March 13, 2017 Content Version: 11.3 © 7122-2494 [employer]. Care instructions adapted under license by [employer] (which disclaims liability or warranty for this information). If you have questions about a medical condition or this instruction, always ask your healthcare professional. [place] [address] any warranty or liability for your use of this information. Learning About Meal Planning for Diabetes Why plan your meals? Meal planning can be a key part of managing diabetes. Planning meals and snacks with the right balance of carbohydrate, protein, and fat can help you keep your blood sugar at the target level you set with your doctor. You don't have to eat special foods. You can eat what your family eats, including sweets once in a while. But you do have to pay attention to how often you eat and how much you eat of certain foods. You may want to work with a dietitian or a certified diabetes educator. He or she can give you tips and meal ideas and can answer your questions about meal planning. This health professional can also help you reach a healthy weight if that is one of your goals. What plan is right for you? Your dietitian or diabetes educator may suggest that you start with the plate format or carbohydrate counting. The plate format The plate format is a simple way to help you manage how you eat. You plan meals by learning how much space each food should take on a plate. Using the plate format helps you spread carbohydrate throughout the day. It can make it easier to keep your blood sugar level within your target range. It also helps you see if you're eating healthy portion sizes. To use the plate format, you put non-starchy vegetables on half your plate. Add meat or meat substitutes on one-quarter of the plate. Put a grain or starchy vegetable (such as brown rice or a potato) on the final quarter of the plate. You can add a small piece of fruit and some low-fat or fat-free milk or yogurt, depending on your carbohydrate goal for each meal. 
Here are some tips for using the plate format: · Make sure that you are not using an oversized plate. A 9-inch plate is best. Many restaurants use larger plates. · Get used to using the plate format at home. Then you can use it when you eat out. · Write down your questions about using the plate format. Talk to your doctor, a dietitian, or a diabetes educator about your concerns. Carbohydrate counting With carbohydrate counting, you plan meals based on the amount of carbohydrate in each food. Carbohydrate raises blood sugar higher and more quickly than any other nutrient. It is found in desserts, breads and cereals, and fruit. It's also found in starchy vegetables such as potatoes and corn, grains such as rice and pasta, and milk and yogurt. Spreading carbohydrate throughout the day helps keep your blood sugar levels within your target range. Your daily amount depends on several things, including your weight, how active you are, which diabetes medicines you take, and what your goals are for your blood sugar levels. A registered dietitian or diabetes educator can help you plan how much carbohydrate to include in each meal and snack. A guideline for your daily amount of carbohydrate is: · 45 to 60 grams at each meal. That's about the same as 3 to 4 carbohydrate servings. · 15 to 20 grams at each snack. That's about the same as 1 carbohydrate serving. The Nutrition Facts label on packaged foods tells you how much carbohydrate is in a serving of the food. First, look at the serving size on the food label. Is that the amount you eat in a serving? All of the nutrition information on a food label is based on that serving size. So if you eat more or less than that, you'll need to adjust the other numbers. Total carbohydrate is the next thing you need to look for on the label. If you count carbohydrate servings, one serving of carbohydrate is 15 grams. For foods that don't come with labels, such as fresh fruits and vegetables, you'll need a guide that lists carbohydrate in these foods. Ask your doctor, dietitian, or diabetes educator about books or other nutrition guides you can use. If you take insulin, you need to know how many grams of carbohydrate are in a meal. This lets you know how much rapid-acting insulin to take before you eat. If you use an insulin pump, you get a constant rate of insulin during the day. So the pump must be programmed at meals to give you extra insulin to cover the rise in blood sugar after meals. When you know how much carbohydrate you will eat, you can take the right amount of insulin. Or, if you always use the same amount of insulin, you need to make sure that you eat the same amount of carbohydrate at meals. If you need more help to understand carbohydrate counting and food labels, ask your doctor, dietitian, or diabetes educator. How do you get started with meal planning? Here are some tips to get started: 
· Plan your meals a week at a time. Don't forget to include snacks too. · Use cookbooks or online recipes to plan several main meals. Plan some quick meals for busy nights.  You also can double some recipes that freeze well. Then you can save half for other busy nights when you don't have time to cook. · Make sure you have the ingredients you need for your recipes. If you're running low on basic items, put these items on your shopping list too. · List foods that you use to make breakfasts, lunches, and snacks. List plenty of fruits and vegetables. · Post this list on the refrigerator. Add to it as you think of more things you need. · Take the list to the store to do your weekly shopping. Follow-up care is a key part of your treatment and safety. Be sure to make and go to all appointments, and call your doctor if you are having problems. It's also a good idea to know your test results and keep a list of the medicines you take. Where can you learn more? Go to http://ralphBimbasketfabian.info/. Celestine Lizama in the search box to learn more about \"Learning About Meal Planning for Diabetes. \" Current as of: March 13, 2017 Content Version: 11.3 © 0388-6487 [employer]. Care instructions adapted under license by [employer] (which disclaims liability or warranty for this information). If you have questions about a medical condition or this instruction, always ask your healthcare professional. [address] any warranty or liability for your use of this information. Noninsulin Medicines for Type 2 Diabetes: Care Instructions Your Care Instructions There are different types of noninsulin medicines for diabetes. Each works in a different way. But they all help you control your blood sugar. Some types help your body make insulin to lower your blood sugar. Others lower how much insulin your body needs. Some can slow how fast your body digests sugars. And some can remove extra glucose through your urine. · Alpha-glucosidase inhibitors. These keep starches from breaking down. This means that they lower the amount of glucose absorbed when you eat. They don't help your body make more insulin. So they will not cause low blood sugar unless you use them with other medicines for diabetes. They include acarbose and miglitol. · DPP-4 inhibitors. These help your body raise the level of insulin after you eat. They also help your body make less of a hormone that raises blood sugar. They include linagliptin, saxagliptin, and sitagliptin. · Incretin hormones (GLP-1 receptor agonists). Your body makes a protein that can raise your insulin level. It also can lower your blood sugar and make you less hungry. You can get shots of hormones that work the same way. They include exenatide and liraglutide. · Meglitinides. These help your body release insulin. They also help slow how your body digests sugars. So they can keep your blood sugar from rising too fast after you eat. They include nateglinide and repaglinide. · Metformin. This lowers how much glucose your liver makes. And it helps you respond better to insulin. It also lowers the amount of stored sugar that your liver releases when you are not eating. · SGLT2 inhibitors. These help to remove extra glucose through your urine. They may also help some people lose weight. They include canagliflozin, dapagliflozin, and empagliflozin. · Sulfonylureas. These help your body release more insulin. Some work for many hours. They can cause low blood sugar if you don't eat as you planned. They include glipizide and glyburide. · Thiazolidinediones. These reduce the amount of blood glucose. They also help you respond better to insulin. They include pioglitazone and rosiglitazone. You may need to take more than one medicine for diabetes. Two or more medicines may work better to lower your blood sugar level than just one does. Follow-up care is a key part of your treatment and safety.  Be sure to make and go to all appointments, and call your doctor if you are having problems. It's also a good idea to know your test results and keep a list of the medicines you take. How can you care for yourself at home? · Eat a healthy diet. Get some exercise each day. This may help you to reduce how much medicine you need. · Do not take other prescription or over-the-counter medicines, vitamins, herbal products, or supplements without talking to your doctor first. Some medicines for type 2 diabetes can cause problems with other medicines or supplements. · Tell your doctor if you plan to get pregnant. Some of these drugs are not safe for pregnant women. · Be safe with medicines. Take your medicines exactly as prescribed. Meglitinides and sulfonylureas can cause your blood sugar to drop very low. Call your doctor if you think you are having a problem with your medicine. · Check your blood sugar often. You can use a glucose monitor. Keeping track can help you know how certain foods, activities, and medicines affect your blood sugar. And it can help you keep your blood sugar from getting so low that it's not safe. When should you call for help? Call 911 anytime you think you may need emergency care. For example, call if: 
· You passed out (lost consciousness). · You are confused or cannot think clearly. · Your blood sugar is very high or very low. Watch closely for changes in your health, and be sure to contact your doctor if: 
· Your blood sugar stays outside the level your doctor set for you. · You have any problems. Where can you learn more? Go to http://ralph-fabian.info/. Enter H153 in the search box to learn more about \"Noninsulin Medicines for Type 2 Diabetes: Care Instructions. \" Current as of: March 13, 2017 Content Version: 11.3 © 6934-5791 [employer]. Care instructions adapted under license by [employer] (which disclaims liability or warranty for this information).  If you have questions about a medical condition or this instruction, always ask your healthcare professional. [address] any warranty or liability for your use of this information. Albumin Urine Test: About This Test 
What is it? An albumin urine test checks urine for a protein called albumin. This protein is normally found in the blood. When the kidneys are damaged, small amounts of albumin leak into the urine. This is called albuminuria. If the amount of albumin is very small, but still abnormal, it is called microalbuminuria. You might provide a urine sample for your doctor during a visit. Your doctor might also ask you for a one-time sample at home or over a specific period of time, such as over 4 hours or 24 hours. Your doctor will tell you what to do. Why is this test done? This test is done to check for albumin in the urine. It helps tell your doctor how well your kidneys are working. This test is done most often to check the kidneys in people with diabetes. Other conditions also cause albuminuria. These conditions include high blood pressure, heart failure, and cirrhosis. The sooner your doctor knows you have kidney damage, the more your doctor can do to protect your kidneys. How can you prepare for the test? 
· Do not exercise just before the test. 
· Tell your doctor if you are having your period or have vaginal discharge. · Tell your doctor about all the nonprescription and prescription medicines and herbs or other supplements you take. Some of these can affect the results of this test. 
What happens before the test? 
· Your doctor or the lab likely will give you the container you need to hold the urine. You will get instructions on when and how to collect the urine. This might be a one-time sample or a number of samples over a period of time. What happens during the test? 
One-time urine collection · Wash your hands before you start. · If the collection cup you are given has a lid, remove it carefully.  Set it down with the inner surface up. Do not touch the inside of the cup with your fingers. · Clean the area around your genitals. ¨ For men: Pull back the foreskin, if present, and clean the head of the penis with medicated towelettes or swabs. ¨ For women: Spread open the genital folds of skin with one hand. Then use medicated towelettes or swabs in your other hand to clean the area where urine comes out (the urethra). Wipe the area from front to back. · Start urinating into the toilet or urinal. A woman should hold apart the genital folds of skin while she urinates. · After the urine has flowed for several seconds, place the cup into the urine stream. Collect about 2 fluid ounces of this \"midstream\" urine without stopping your flow of urine. · Don't touch the rim of the cup to your genital area. Don't get toilet paper, pubic hair, stool (feces), menstrual blood, or anything else in the urine sample. · Finish urinating into the toilet or urinal. 
· Carefully replace and tighten the lid on the cup, and then return it to the lab. If you are collecting the urine at home and can't get it to the lab in an hour, refrigerate it. Urine collection over time You collect your urine for a period of time, such as over 4 or 24 hours. · You start collecting your urine in the morning. When you first get up, empty your bladder. But do not save this urine. Write down the time that you began. · For the set period of time, collect all your urine. Urinate into a small, clean container. Then pour the urine into the large container. Don't touch the inside of the container with your fingers. · Keep the collected urine in the refrigerator for the collection time. Empty your bladder for the last time at or just before the end of the collection period. Add this urine to the large container. Then write down the time.  
What else should you know about the test? 
 · If your results are higher than normal, your doctor may check your urine more often to watch for kidney damage. · If your test shows that you may have kidney damage, you may get other tests. What happens after the test? 
· Follow your doctor's instructions for taking the urine to the doctor's office or lab. · You can go back to your usual activities right away. When should you call for help? Watch closely for changes in your health, and be sure to contact your doctor if you have any problems. Follow-up care is a key part of your treatment and safety. Be sure to make and go to all appointments, and call your doctor if you are having problems. It's also a good idea to keep a list of the medicines you take. Ask your doctor when you can expect to have your test results. Where can you learn more? Go to http://ralph-fabian.info/. Enter L729 in the search box to learn more about \"Albumin Urine Test: About This Test.\" Current as of: March 16, 2016 Content Version: 11.3 © 5310-5411 [employer]. Care instructions adapted under license by [address] [facility] [employer] (which disclaims liability or warranty for this information). If you have questions about a medical condition or this instruction, always ask your healthcare professional. [address] any warranty or liability for your use of this information. Introducing [facility] & HEALTH SERVICES! [facility] introduces Jellyvision patient portal. Now you can access parts of your medical record, email your doctor's office, and request medication refills online. 1. In your internet browser, go to [email or web address] 2. Click on the First Time User? Click Here link in the Sign In box. You will see the New Member Sign Up page. 3. Enter your Jellyvision Access Code exactly as it appears below. You will not need to use this code after youve completed the sign-up process.  If you do not sign up before the expiration date, you must request a new code. · [employer] Access Code: [email or web address] Expires: 12/27/2017  1:51 PM 
 
4. Enter the last four digits of your Social Security Number (xxxx) and Date of Birth (mm/dd/yyyy) as indicated and click Submit. You will be taken to the next sign-up page. 5. Create a [employer] ID. This will be your [employer] login ID and cannot be changed, so think of one that is secure and easy to remember. 6. Create a [employer] password. You can change your password at any time. 7. Enter your Password Reset Question and Answer. This can be used at a later time if you forget your password. 8. Enter your e-mail address. You will receive e-mail notification when new information is available in [address] 9. Click Sign Up. You can now view and download portions of your medical record. 10. Click the Download Summary menu link to download a portable copy of your medical information. If you have questions, please visit the Frequently Asked Questions section of the [employer] website. Remember, [employer] is NOT to be used for urgent needs. For medical emergencies, dial 911. Now available from your iPhone and Android! Please provide this summary of care documentation to your next provider. Your primary care clinician is listed as Jorje Thakkar. If you have any questions after today's visit, please call [phone].
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
## 2017-09-29 ENCOUNTER — TELEPHONE (OUTPATIENT)
Dept: INTERNAL MEDICINE CLINIC | Age: 52
End: 2017-09-29

## 2017-09-29 LAB
CHOLEST SERPL-MCNC: 271 MG/DL
CREAT UR-MCNC: 159.43 MG/DL (ref 30–125)
HDLC SERPL-MCNC: 53 MG/DL (ref 40–60)
HDLC SERPL: 5.1 {RATIO} (ref 0–5)
LDLC SERPL CALC-MCNC: 169.8 MG/DL (ref 0–100)
LIPID PROFILE,FLP: ABNORMAL
MICROALBUMIN UR-MCNC: 0.8 MG/DL (ref 0–3)
MICROALBUMIN/CREAT UR-RTO: 5 MG/G (ref 0–30)
TRIGL SERPL-MCNC: 241 MG/DL (ref ?–150)
VLDLC SERPL CALC-MCNC: 48.2 MG/DL

## 2017-09-29 NOTE — TELEPHONE ENCOUNTER
Please inform Mrs. Joseph that her labs returned. No signs of infection or anemia. Her electrolytes, Na, K have returned to normal however your kidneys show damage, this is most likely related to the uncontrolled diabetes. Also the urine that we took yesterday is another measure of your kidney function and the lab was quite elevated. At this time I recommend good hydration, 6-8- 8 oz glasses of water daily. Monitor you blood sugars as we discussed and log them and bring the log to the next appointment. Take the medications as currently prescribed. It is important to work on getting the diabetes under control or your kidneys will continue to be damaged. Make good healthy food choices, decrease carbohydrates in the diet- breads, pastas, rice, decrease sweets, soda, sweet tea. Exercise at least three times a week, the exercise needs to increase the heart rate and make you sweat. Diet and exercise are the staples to improving diabetes. Please follow up as recommended. We will repeat the labs in the future.

## 2017-10-02 DIAGNOSIS — E78.5 DYSLIPIDEMIA: ICD-10-CM

## 2017-10-13 ENCOUNTER — HOSPITAL ENCOUNTER (OUTPATIENT)
Dept: LAB | Age: 52
Discharge: HOME | End: 2017-10-13
Payer: COMMERCIAL

## 2017-10-13 LAB
CHOLEST SERPL-MCNC: 247 MG/DL
HDLC SERPL-MCNC: 48 MG/DL (ref 40–60)
HDLC SERPL: 5.1 {RATIO} (ref 0–5)
LDLC SERPL CALC-MCNC: 154.6 MG/DL (ref 0–100)
LIPID PROFILE,FLP: ABNORMAL
TRIGL SERPL-MCNC: 222 MG/DL (ref ?–150)
VLDLC SERPL CALC-MCNC: 44.4 MG/DL

## 2017-10-13 PROCEDURE — 36415 COLL VENOUS BLD VENIPUNCTURE: CPT | Performed by: NURSE PRACTITIONER

## 2017-10-13 PROCEDURE — 80061 LIPID PANEL: CPT | Performed by: NURSE PRACTITIONER

## 2017-10-20 ENCOUNTER — HOSPITAL ENCOUNTER (OUTPATIENT)
Dept: LAB | Age: 52
Discharge: HOME | End: 2017-10-20
Payer: COMMERCIAL

## 2017-10-20 ENCOUNTER — OFFICE VISIT (OUTPATIENT)
Dept: INTERNAL MEDICINE CLINIC | Age: 52
End: 2017-10-20

## 2017-10-20 VITALS
BODY MASS INDEX: 29.63 KG/M2 | RESPIRATION RATE: 16 BRPM | TEMPERATURE: 98.2 F | HEIGHT: 62 IN | HEART RATE: 108 BPM | DIASTOLIC BLOOD PRESSURE: 76 MMHG | WEIGHT: 161 LBS | SYSTOLIC BLOOD PRESSURE: 112 MMHG | OXYGEN SATURATION: 98 %

## 2017-10-20 DIAGNOSIS — E11.65 UNCONTROLLED TYPE 2 DIABETES MELLITUS WITH HYPERGLYCEMIA, WITHOUT LONG-TERM CURRENT USE OF INSULIN (HCC): ICD-10-CM

## 2017-10-20 DIAGNOSIS — E88.9 PERIPHERAL NEUROPATHY DUE TO METABOLIC DISORDER (HCC): ICD-10-CM

## 2017-10-20 DIAGNOSIS — E66.3 OVERWEIGHT (BMI 25.0-29.9): ICD-10-CM

## 2017-10-20 DIAGNOSIS — R79.89 ELEVATED SERUM CREATININE: ICD-10-CM

## 2017-10-20 DIAGNOSIS — E78.5 DYSLIPIDEMIA: ICD-10-CM

## 2017-10-20 DIAGNOSIS — E11.65 UNCONTROLLED TYPE 2 DIABETES MELLITUS WITH HYPERGLYCEMIA, WITHOUT LONG-TERM CURRENT USE OF INSULIN (HCC): Primary | ICD-10-CM

## 2017-10-20 DIAGNOSIS — G63 PERIPHERAL NEUROPATHY DUE TO METABOLIC DISORDER (HCC): ICD-10-CM

## 2017-10-20 DIAGNOSIS — M25.50 ARTHRALGIA, UNSPECIFIED JOINT: ICD-10-CM

## 2017-10-20 LAB
ANION GAP SERPL CALC-SCNC: 9 MMOL/L (ref 3–18)
BUN SERPL-MCNC: 21 MG/DL (ref 7–18)
BUN/CREAT SERPL: 16 (ref 12–20)
CALCIUM SERPL-MCNC: 9.5 MG/DL (ref 8.5–10.1)
CHLORIDE SERPL-SCNC: 104 MMOL/L (ref 100–108)
CO2 SERPL-SCNC: 27 MMOL/L (ref 21–32)
CREAT SERPL-MCNC: 1.35 MG/DL (ref 0.6–1.3)
GLUCOSE SERPL-MCNC: 155 MG/DL (ref 74–99)
POTASSIUM SERPL-SCNC: 4 MMOL/L (ref 3.5–5.5)
SODIUM SERPL-SCNC: 140 MMOL/L (ref 136–145)

## 2017-10-20 PROCEDURE — 80048 BASIC METABOLIC PNL TOTAL CA: CPT | Performed by: NURSE PRACTITIONER

## 2017-10-20 PROCEDURE — 36415 COLL VENOUS BLD VENIPUNCTURE: CPT | Performed by: NURSE PRACTITIONER

## 2017-10-20 RX ORDER — PEN NEEDLE, DIABETIC 30 GX3/16"
NEEDLE, DISPOSABLE MISCELLANEOUS
Qty: 1 PACKAGE | Refills: 11 | Status: SHIPPED | OUTPATIENT
Start: 2017-10-20 | End: 2017-10-20 | Stop reason: CLARIF

## 2017-10-20 RX ORDER — OXYCODONE AND ACETAMINOPHEN 10; 325 MG/1; MG/1
1 TABLET ORAL
Qty: 21 TAB | Refills: 0 | Status: SHIPPED | OUTPATIENT
Start: 2017-10-20 | End: 2018-08-27

## 2017-10-20 RX ORDER — NAPROXEN SODIUM 220 MG
TABLET ORAL
Qty: 100 SYRINGE | Refills: 3 | Status: SHIPPED | OUTPATIENT
Start: 2017-10-20 | End: 2018-08-27 | Stop reason: ALTCHOICE

## 2017-10-20 RX ORDER — INSULIN GLARGINE 100 [IU]/ML
INJECTION, SOLUTION SUBCUTANEOUS
Qty: 1 VIAL | Refills: 0 | Status: SHIPPED | OUTPATIENT
Start: 2017-10-20 | End: 2018-02-26 | Stop reason: SDUPTHER

## 2017-10-20 NOTE — PROGRESS NOTES
Chief Complaint   Patient presents with    Diabetes     2 week follow up with lab results. Patient states that she test her blood sugar regularly and takes her DM mediation. Health Maintenance Due   Topic Date Due    PAP AKA CERVICAL CYTOLOGY  08/07/2017     1. Have you been to the ER, urgent care clinic or hospitalized since your last visit? NO.     2. Have you seen or consulted any other health care providers outside of the [address] [place] [address] since your last visit (Include any pap smears or colon screening)?  NO

## 2017-10-20 NOTE — PATIENT INSTRUCTIONS
Hypoglycemic Signs and Symptoms    Shaky or jittery  Sweaty  Hungry  Headachy  Blurred vision  Sleepy or tired  Dizzy or lightheaded  Confused or disoriented  Pale  Uncoordinated  Irritable or nervous  Argumentative or combative  Changed behavior or personality  Trouble concentrating  Weak  Fast or irregular heart beat  Unable to eat or drink  Seizures or convulsions (jerky movements)  Unconsciousness    Check blood glucose levels    Knowing your blood glucose level can help you decide how much medicine to take, what food to eat, and how physically active to be. To find out your blood glucose level, check yourself with a blood glucose meter as often as your doctor advises. How do I treat hypoglycemia? If you begin to feel one or more hypoglycemia symptoms, check your blood glucose. If your blood glucose level is below your target or less than 70, eat or drink 15 grams of carbohydrates right away. Examples include    four glucose tablets or one tube of glucose gel  1/2 cup (4 ounces) of fruit juice--not low-calorie or reduced sugar*  1/2 can (4 to 6 ounces) of soda--not low-calorie or reduced sugar  1 tablespoon of sugar, honey, or corn syrup  2 tablespoons of raisins  Wait 15 minutes and check your blood glucose again. If your glucose level is still low, eat or drink another 15 grams of glucose or carbohydrates. Check your blood glucose again after another 15 minutes. Repeat these steps until your glucose level is back to normal.    If your next meal is more than 1 hour away, have a snack to keep your blood glucose level in your target range. Try crackers or a piece of fruit. Insulin Glargine (By injection)   Insulin Glargine, Recombinant (IN-oakes-markus GLAR-nic, nestor-KOM-bi-star)  Treats diabetes. Brand Name(s): Basaglar KwikPen, Lantus, Lantus Novaplus, Lantus SoloStar, Lantus SoloStar Novaplus, Toujeo   There may be other brand names for this medicine. When This Medicine Should Not Be Used:    This medicine is not right for everyone. Do not use it if you had an allergic reaction to insulin glargine. How to Use This Medicine:   Injectable  · Your healthcare provider will work with you to personalize your dose and treatment based on your insulin needs and lifestyle. You will be taught how to give yourself the injections. Make sure you understand all instructions. Ask the doctor, nurse, or pharmacist if you have questions. · If you use insulin once a day, it is best to use it at about the same time every day. · Always double-check both the concentration (strength) of your insulin and your dose. Concentration and dose are not the same. The dose is how many units of insulin you will use. The concentration tells how many units of insulin are in each milliliter (mL), such as 100 units/mL (U-100), but this does not mean you will use 100 units at a time. · Read and follow the patient instructions that come with this medicine. Talk to your doctor or pharmacist if you have any questions. · This medicine should look clear before you use it. Do not shake the vial. Do not mix this medicine with any other insulin or with water. · You will be shown the body areas where this shot can be given. Use a different body area each time you give yourself a shot. Keep track of where you give each shot to make sure you rotate body areas. · Use a new needle and syringe each time you inject your medicine. If you use a syringe, use only the kind that is made for insulin injections. Some insulin must be given with a specific type of syringe or needle. Ask your pharmacist if you are not sure which one to use. · Always check the label before use, to make sure you have the correct type of insulin. Do not change the brand, type, or concentration unless your doctor tells you to. If you use a pump or other device, make sure the insulin is made for that device. · Unopened medicine: Store the vials, cartridges, and SoloStar® pens in the refrigerator. Protect from light. Do not freeze. · Opened medicine:   ¨ Vials: Store in the refrigerator or at room temperature in a cool place, away from sunlight and heat. Use within 28 days. ¨ Cartridge or [employer]: Store at room temperature, away from direct heat and light. Do not refrigerate. Throw away any opened cartridge or Lantus® SoloStar® pen after 28 days. Throw away any opened [employer] after 42 days. · Throw away used needles in a hard, closed container that the needles cannot poke through. Keep this container away from children and pets. Drugs and Foods to Avoid:   Ask your doctor or pharmacist before using any other medicine, including over-the-counter medicines, vitamins, and herbal products. · Some medicines can change the amount of insulin you need to use and make it harder for you to control your diabetes. Tell your doctor about all other medicines that you are using. · Do not drink alcohol while you are using this medicine. Warnings While Using This Medicine:   · Tell your doctor if you are pregnant or breastfeeding, or if you have kidney disease, liver disease, heart disease, or heart failure. · This medicine may cause the following problems:  ¨ Low blood sugar or low potassium levels in the blood  ¨ Fluid retention or heart failure (when used with thiazolidinedione [TZD] medicine)  · Never share insulin pens, needles, or cartridges with anyone. Sharing these can pass hepatitis viruses, HIV, or other illnesses from one person to another. · Keep all medicine out of the reach of children. Never share your medicine with anyone.   Possible Side Effects While Using This Medicine:   Call your doctor right away if you notice any of these side effects:  · Allergic reaction: Itching or hives, swelling in your face or hands, swelling or tingling in your mouth or throat, chest tightness, trouble breathing  · Dry mouth, increased thirst, muscle cramps, nausea or vomiting, uneven heartbeat  · Rapid weight gain, swelling in your hands, ankles, or feet, trouble breathing, tiredness  · Shaking, trembling, sweating, fast or pounding heartbeat, lightheadedness, hunger, confusion  If you notice these less serious side effects, talk with your doctor:   · Redness, pain, itching, swelling, or any skin changes where the shot was given  If you notice other side effects that you think are caused by this medicine, tell your doctor. Call your doctor for medical advice about side effects. You may report side effects to FDA at 0-088-NNJ-3572  © 2017 [facility] Information is for End User's use only and may not be sold, redistributed or otherwise used for commercial purposes. The above information is an  only. It is not intended as medical advice for individual conditions or treatments. Talk to your doctor, nurse or pharmacist before following any medical regimen to see if it is safe and effective for you.

## 2017-10-20 NOTE — PROGRESS NOTES
Peña Appiah is a 47 y.o.  female and presents with    Chief Complaint   Patient presents with    Diabetes     2 week follow up with lab results. Subjective:  HPI   Mrs. Melchor Correia presents today for follow up to diabetes and ?LOC stated on last visit note. She is checking BS at home fasting, averaging between 250s-300s daily. Taking Metformin 2000 mg PO and Januvia as prescribed. Denies polydipsia, however complaints of increased blurred vision despite recent eye examination and new glasses, increased joint and peripheral neuropathy. She has been to DM education classes at [facility] in the past. Her weight status has changed from obese to overweight. Elevated creatinine on last labs. She states her disease is starting to affect her work, related to lack of pain control. Patient reported x 1 episode on last office visit after visiting the ED for hyperglycemia. Since that time she denies other episodes of possible LOC, black outs, syncope, lightheadedness, or dizziness. Denies CP, SOB, dyspnea, N/V, headaches. Additional Concerns: none     ROS   Review of Systems   Constitutional: Negative for chills and fever. HENT: Positive for congestion. Negative for ear discharge, ear pain and sore throat. Eyes: Positive for blurred vision. Negative for double vision, photophobia, pain, discharge and redness. Respiratory: Negative for cough, hemoptysis, sputum production, shortness of breath and wheezing. Cardiovascular: Negative for chest pain, palpitations, orthopnea, leg swelling and PND. Gastrointestinal: Negative for abdominal pain, constipation, diarrhea, heartburn, nausea and vomiting. Genitourinary: Negative for dysuria, frequency and urgency. Musculoskeletal: Positive for joint pain. Negative for falls and myalgias. Skin: Negative for itching and rash. Neurological: Positive for tingling. Negative for dizziness, tremors, loss of consciousness and headaches. Endo/Heme/Allergies: Negative for polydipsia. Allergies   Allergen Reactions    Pollen Extracts Runny Nose and Cough    Lipitor [Atorvastatin] Other (comments)     Muscle cramps and weakness         Current Outpatient Prescriptions   Medication Sig Dispense Refill    insulin glargine (LANTUS) 100 unit/mL injection Lantus 10 unit SQ QHS  Indications: type 2 diabetes mellitus 1 Vial 0    Insulin Needles, Disposable, 31 gauge x 5/16\" ndle Use with Lantus administration QHS, dispose of needle after use 1 Package 11    oxyCODONE-acetaminophen (PERCOCET 10)  mg per tablet Take 1 Tab by mouth every eight (8) hours as needed for Pain. Max Daily Amount: 3 Tabs. 21 Tab 0    JANUVIA 100 mg tablet Take 25 mg by mouth daily.  metFORMIN (GLUCOPHAGE) 500 mg tablet Take 1 Tab by mouth two (2) times daily (with meals). (Patient taking differently: Take 1,000 mg by mouth two (2) times daily (with meals). ) 180 Tab 0    DULoxetine (CYMBALTA) 60 mg capsule TAKE 1 CAPSULE BY MOUTH DAILY 90 Cap 0    esomeprazole (NEXIUM) 40 mg capsule TAKE ONE CAPSULE BY MOUTH DAILY 90 Cap 3    predniSONE (DELTASONE) 5 mg tablet 2 tablets every morning with breakfast 180 Tab 3    lisinopril (PRINIVIL, ZESTRIL) 5 mg tablet Take  by mouth daily.  glucose blood VI test strips (ACCU-CHEK POOJA) strip Pt to test 5 times daily. Dx:E11.65 500 Strip 3    aspirin delayed-release 81 mg tablet Take  by mouth daily.  ADVAIR DISKUS 500-50 mcg/dose diskus inhaler INHALE 1 PUFF BY MOUTH TWO TIMES A DAY 1 Inhaler 5    albuterol (PROVENTIL HFA, VENTOLIN HFA) 90 mcg/actuation inhaler Take 2 Puffs by inhalation every four (4) hours as needed for Wheezing. 1 Inhaler 3    CETIRIZINE HCL (ZYRTEC PO) Take  by mouth.         fluticasone (FLONASE) 50 mcg/actuation nasal spray 2 sprays in each nostril daily 1 Bottle 5       Social History     Social History    Marital status: LEGALLY      Spouse name: N/A    Number of children: N/A    Years of education: N/A     Occupational History    traffic control person [employer]     Social History Main Topics    Smoking status: Never Smoker    Smokeless tobacco: Never Used    Alcohol use No    Drug use: No    Sexual activity: Not on file     Other Topics Concern    Not on file     Social History Narrative       Past Medical History:   Diagnosis Date    Allergic rhinitis due to allergen     s/p shots    Arthritis 6/13     MRI c spine w degen changes; Dr Issa Stoddard;  ratio 68%, FEV1 78% w 6% inc postbd, TLC 77, RV 56, DLCO 61%    Bilateral great toe fractures 2014    Chronic sinusitis     Dr. Ricardo Arreaga in the past    Colon polyp 5/16    Dr Karena Stallworth    Diabetes [facility]) 3/16    presented w hyperosmotic nonketotic hyperglycemia bs >1000    Diabetes mellitus ([facility] [address]     Dyslipidemia     calculated 10 year risk score was 2.0% (12/13)    Edema     Eustachian tube dysfunction     FHx: colon cancer     FHx: heart disease     Fibrocystic breast     Dr Zara Potter GERD (gastroesophageal reflux disease)     Lateral epicondylitis of both elbows 2/6/2017    Morbid obesity (HCC)     peak weight 196 lbs, bmi 35.8 from 10/13    Osteopenia     Dr. Nabila Ba; DEXA t score -0.7 spine, -0.2 hip (8/14)    Sarcoidosis        Past Surgical History:   Procedure Laterality Date    CARDIAC SURG PROCEDURE UNLIST  9/10, 8/13    thallium negative ef 72%; negative ef 70%    CHEST SURGERY PROCEDURE UNLISTED  7/12     thyroid negative    CHEST SURGERY PROCEDURE UNLISTED  2012    pfts w mod restrictive defect     COLONOSCOPY N/A 5/26/2016    Dr Karena Stallworth hyperplastic    [facility]      Dr. Jeffrey Agustin HX HEENT      nasal polypectomy Dr. Tad Diaz HX HEENT      tear duct surgery right Dr. Olivia Gonzalez 2010; left Dr Clerance Spurling 2015    HX ORTHOPAEDIC      DEXA -0.7 spine, -0.2 hip 8/14    VASCULAR SURGERY PROCEDURE UNLIST  12/13    venous doppler negative       Family History Problem Relation Age of Onset    Cancer Mother     Hypertension Mother     Cancer Father     Diabetes Father     Hypertension Father     Stroke Father     Diabetes Sister     Hypertension Sister     Heart Disease Sister     Colon Cancer Sister 52    Hypertension Brother     Cancer Maternal Aunt        Objective:  Vitals:    10/20/17 1443   BP: 112/76   Pulse: (!) 108   Resp: 16   Temp: 98.2 °F (36.8 °C)   TempSrc: Oral   SpO2: 98%   Weight: 161 lb (73 kg)   Height: 5' 2\" (1.575 m)   PainSc:   8   PainLoc: Back   LMP: 03/30/2013       LABS   Results for orders placed or performed during the hospital encounter of 10/13/17   LIPID PANEL   Result Value Ref Range    LIPID PROFILE          Cholesterol, total 247 (H) <200 MG/DL    Triglyceride 222 (H) <150 MG/DL    HDL Cholesterol 48 40 - 60 MG/DL    LDL, calculated 154.6 (H) 0 - 100 MG/DL    VLDL, calculated 44.4 MG/DL    CHOL/HDL Ratio 5.1 (H) 0 - 5.0         TESTS  none    PE  Physical Exam   Constitutional: She is oriented to person, place, and time. She appears well-developed and well-nourished. HENT:   Head: Normocephalic and atraumatic. Eyes: Conjunctivae are normal.   Wearing prescription eye glasses, eyes mild redness, patient has seasonal allergies and uses OTC eye drops   Neurological: She is alert and oriented to person, place, and time. Psychiatric: She has a normal mood and affect. Her behavior is normal. Judgment and thought content normal.       Assessment/Plan:    1. Type 2 Diabetes uncontrolled- Reviewed labs with the patient and HgbA1c 12.0, lipid panel, Cr 1.31-this is increased from 6/2017 labs. She is taking Metformin and Januvia as prescribed. Repeat BMP today. Prescribed Lantus 10 unit SQ QHS. Discussed and given handouts regarding hypoglycemia.  Keep food log, pt encouraged to eat multiple small meals daily as she states she will skip meals, discussed past history of hypoglycemic events on Lantus/Lispro most likely was related to not eating, not the insulin dose. Monitor blood sugars at home. I did not discontinue any of her other medications at this time as repeating BMP today. She denies repeat episode of ? LOC/passing out, this was a single episode reported on last visit, post ED visit for elevated blood sugars. Prescribed percocet x 7 days for joint/neuropathy pain however instructed to discuss pain control further with Dr. Andra Romero. Patient instructed to make a 2 week follow up visit with Dr. Andra Romero to discuss chronic problems- diabetes with peripheral neuropathy and nephropathy, dyslipidemia. Patient verbalized understanding and agrees with the plan of care. 2. Dyslipidemia- Follow up with Dr. Andra Romero in 2 weeks to discuss, history of myalgias on Lipitor. 3. Elevated Creatinine- Repeat BMP today. Follow up with Dr. Andra Romero in 2 weeks. 4. Overweight- Patient moved from obese category to overweight due to 12 lb weight loss since 2/2017. Lab review: labs reviewed and patient was notified prior to visit    Today's Visit:   Diagnoses and all orders for this visit:    1. Uncontrolled type 2 diabetes mellitus with hyperglycemia, without long-term current use of insulin (HCC)  -     insulin glargine (LANTUS) 100 unit/mL injection; Lantus 10 unit SQ QHS  Indications: type 2 diabetes mellitus  -     Insulin Needles, Disposable, 31 gauge x 5/16\" ndle; Use with Lantus administration QHS, dispose of needle after use  -     METABOLIC PANEL, BASIC; Future    2. Dyslipidemia    3. Arthralgia, unspecified joint  -     oxyCODONE-acetaminophen (PERCOCET 10)  mg per tablet; Take 1 Tab by mouth every eight (8) hours as needed for Pain. Max Daily Amount: 3 Tabs. 4. Peripheral neuropathy due to metabolic disorder  -     oxyCODONE-acetaminophen (PERCOCET 10)  mg per tablet; Take 1 Tab by mouth every eight (8) hours as needed for Pain. Max Daily Amount: 3 Tabs. 5. Elevated serum creatinine    6.  Overweight (BMI 25.0-29. 9)      Health Maintenance: Not addressed today. I have discussed the diagnosis with the patient and the intended plan as seen in the above orders. The patient has received an after-visit summary and questions were answered concerning future plans. I have discussed medication side effects and warnings with the patient as well. I have reviewed the plan of care with the patient, accepted their input and they are in agreement with the treatment goals. Follow-up Disposition:  Return in about 2 weeks (around 11/3/2017) for Diabetes, cholesterol, elevated creatinine. More than 1/2 of this 40 minute visit was spent in counseling and coordination of care, as described above.     EILEEN Limon  Internist of [address], [address] [place] [address].  Phone: [phone]  Fax: [phone]

## 2017-10-20 NOTE — MR AVS SNAPSHOT
Visit Information Date & Time Provider Department Dept. Phone Encounter #  
 10/20/2017  3:00 PM Jennifer Baer NP [facility] [address] [place] [address] [phone] Follow-up Instructions Return in about 2 weeks (around 11/3/2017) for Diabetes, cholesterol, elevated creatinine. Your Appointments 11/2/2017  8:00 AM  
Office Visit with Que Lyon MD  
[facility] [address] [place] [address]-[facility] Appt Note: 2 week follow up  
 [address], [address] [address] [place] [address] [place]  
  
   
 [address], [facility] Upcoming Health Maintenance Date Due  
 PAP AKA CERVICAL CYTOLOGY 8/7/2017 HEMOGLOBIN A1C Q6M 3/28/2018 BREAST CANCER SCRN MAMMOGRAM 6/14/2018 EYE EXAM RETINAL OR DILATED Q1 6/27/2018 FOOT EXAM Q1 9/28/2018 MICROALBUMIN Q1 9/28/2018 LIPID PANEL Q1 10/13/2018 COLONOSCOPY 5/26/2021 DTaP/Tdap/Td series (2 - Td) 4/24/2023 Allergies as of 10/20/2017  Review Complete On: 10/20/2017 By: Jennifer Baer NP Severity Noted Reaction Type Reactions Pollen Extracts Medium 05/07/2015    Runny Nose, Cough Lipitor [Atorvastatin]  04/25/2016    Other (comments) Muscle cramps and weakness Current Immunizations  Reviewed on 9/28/2017 Name Date Influenza Vaccine (Quad) PF 9/28/2017 Pneumococcal Polysaccharide (PPSV-23) 9/28/2017 Tdap 4/24/2013 12:00 AM  
  
 Not reviewed this visit You Were Diagnosed With   
  
 Codes Comments Uncontrolled type 2 diabetes mellitus with hyperglycemia, without long-term current use of insulin ([facility] [address]    -  Primary ICD-10-CM: E11.65 ICD-9-CM: 250.02 Dyslipidemia     ICD-10-CM: E78.5 ICD-9-CM: 272.4 Arthralgia, unspecified joint     ICD-10-CM: M25.50 ICD-9-CM: 719.40 Vitals BP Pulse Temp Resp Height(growth percentile) Weight(growth percentile) 112/76 (BP 1 Location: Left arm, BP Patient Position: Sitting) (!) 108 98.2 °F (36.8 °C) (Oral) 16 5' 2\" (1.575 m) 161 lb (73 kg) LMP SpO2 BMI OB Status Smoking Status 03/30/2013 98% 29.45 kg/m2 Postmenopausal Never Smoker Vitals History BMI and BSA Data Body Mass Index Body Surface Area  
 29.45 kg/m 2 1.79 m 2 Preferred Pharmacy Pharmacy Name Phone [facility]ARMACY #[id number]- [place] [address] Uma Schaeffer [phone] Your Updated Medication List  
  
   
This list is accurate as of: 10/20/17  3:51 PM.  Always use your most recent med list.  
  
  
  
  
 ADVAIR DISKUS 500-50 mcg/dose diskus inhaler Generic drug:  fluticasone-salmeterol INHALE 1 PUFF BY MOUTH TWO TIMES A DAY  
  
 albuterol 90 mcg/actuation inhaler Commonly known as:  PROVENTIL HFA, VENTOLIN HFA, PROAIR HFA Take 2 Puffs by inhalation every four (4) hours as needed for Wheezing. aspirin delayed-release 81 mg tablet Take  by mouth daily. DULoxetine 60 mg capsule Commonly known as:  CYMBALTA TAKE 1 CAPSULE BY MOUTH DAILY  
  
 esomeprazole 40 mg capsule Commonly known as:  NEXIUM  
TAKE ONE CAPSULE BY MOUTH DAILY  
  
 fluticasone 50 mcg/actuation nasal spray Commonly known as:  FLONASE  
2 sprays in each nostril daily  
  
 glucose blood VI test strips strip Commonly known as:  ACCU-CHEK POOJA Pt to test 5 times daily. Dx:E11.65  
  
 insulin glargine 100 unit/mL injection Commonly known as:  LANTUS Lantus 10 unit SQ QHS  Indications: type 2 diabetes mellitus Insulin Needles (Disposable) 31 gauge x 5/16\" Ndle Use with Lantus administration QHS, dispose of needle after use JANUVIA 100 mg tablet Generic drug:  SITagliptin Take 25 mg by mouth daily. lisinopril 5 mg tablet Commonly known as:  Nguyen Reasons Take  by mouth daily. metFORMIN 500 mg tablet Commonly known as:  GLUCOPHAGE  
 Take 1 Tab by mouth two (2) times daily (with meals). oxyCODONE-acetaminophen  mg per tablet Commonly known as:  PERCOCET 10 Take 1 Tab by mouth every eight (8) hours as needed for Pain. Max Daily Amount: 3 Tabs. predniSONE 5 mg tablet Commonly known as:  DELTASONE  
2 tablets every morning with breakfast  
  
 ZYRTEC PO Take  by mouth. Prescriptions Printed Refills  
 oxyCODONE-acetaminophen (PERCOCET 10)  mg per tablet 0 Sig: Take 1 Tab by mouth every eight (8) hours as needed for Pain. Max Daily Amount: 3 Tabs. Class: Print Route: Oral  
  
Prescriptions Sent to Pharmacy Refills  
 insulin glargine (LANTUS) 100 unit/mL injection 0 Sig: Lantus 10 unit SQ QHS  Indications: type 2 diabetes mellitus Class: Normal  
 Pharmacy: [facility], [place] [address] Ph #: [phone] Insulin Needles, Disposable, 31 gauge x 5/16\" ndle 11 Sig: Use with Lantus administration QHS, dispose of needle after use Class: Normal  
 Pharmacy: [facility], [place] [address] Ph #: [phone] Follow-up Instructions Return in about 2 weeks (around 11/3/2017) for Diabetes, cholesterol, elevated creatinine. Patient Instructions Hypoglycemic Signs and Symptoms Shaky or jittery Sweaty Hungry Headachy Blurred vision Sleepy or tired Dizzy or lightheaded Confused or disoriented Pale Uncoordinated Irritable or nervous Argumentative or combative Changed behavior or personality Trouble concentrating Weak Fast or irregular heart beat Unable to eat or drink Seizures or convulsions (jerky movements) Unconsciousness Check blood glucose levels Knowing your blood glucose level can help you decide how much medicine to take, what food to eat, and how physically active to be.  To find out your blood glucose level, check yourself with a blood glucose meter as often as your doctor advises. How do I treat hypoglycemia? If you begin to feel one or more hypoglycemia symptoms, check your blood glucose. If your blood glucose level is below your target or less than 70, eat or drink 15 grams of carbohydrates right away. Examples include 
 
four glucose tablets or one tube of glucose gel 1/2 cup (4 ounces) of fruit juicenot low-calorie or reduced sugar* 
1/2 can (4 to 6 ounces) of sodanot low-calorie or reduced sugar 1 tablespoon of sugar, honey, or corn syrup 2 tablespoons of raisins Wait 15 minutes and check your blood glucose again. If your glucose level is still low, eat or drink another 15 grams of glucose or carbohydrates. Check your blood glucose again after another 15 minutes. Repeat these steps until your glucose level is back to normal. 
 
If your next meal is more than 1 hour away, have a snack to keep your blood glucose level in your target range. Try crackers or a piece of fruit. Insulin Glargine (By injection) Insulin Glargine, Recombinant (INTaraoakes-markus GLAR-jeen, ree-KOM-bi-nant) Treats diabetes. Brand Name(s): Basaglar KwikPen, Lantus, Lantus Novaplus, Lantus SoloStar, Lantus SoloStar Novaplus, Toujeo There may be other brand names for this medicine. When This Medicine Should Not Be Used: This medicine is not right for everyone. Do not use it if you had an allergic reaction to insulin glargine. How to Use This Medicine:  
Injectable · Your healthcare provider will work with you to personalize your dose and treatment based on your insulin needs and lifestyle. You will be taught how to give yourself the injections. Make sure you understand all instructions. Ask the doctor, nurse, or pharmacist if you have questions. · If you use insulin once a day, it is best to use it at about the same time every day. · Always double-check both the concentration (strength) of your insulin and your dose. Concentration and dose are not the same.  The dose is how many units of insulin you will use. The concentration tells how many units of insulin are in each milliliter (mL), such as 100 units/mL (U-100), but this does not mean you will use 100 units at a time. · Read and follow the patient instructions that come with this medicine. Talk to your doctor or pharmacist if you have any questions. · This medicine should look clear before you use it. Do not shake the vial. Do not mix this medicine with any other insulin or with water. · You will be shown the body areas where this shot can be given. Use a different body area each time you give yourself a shot. Keep track of where you give each shot to make sure you rotate body areas. · Use a new needle and syringe each time you inject your medicine. If you use a syringe, use only the kind that is made for insulin injections. Some insulin must be given with a specific type of syringe or needle. Ask your pharmacist if you are not sure which one to use. · Always check the label before use, to make sure you have the correct type of insulin. Do not change the brand, type, or concentration unless your doctor tells you to. If you use a pump or other device, make sure the insulin is made for that device. · Unopened medicine: Store the vials, cartridges, and SoloStar® pens in the refrigerator. Protect from light. Do not freeze. · Opened medicine: ¨ Vials: Store in the refrigerator or at room temperature in a cool place, away from sunlight and heat. Use within 28 days. ¨ Cartridge or [employer]: Store at room temperature, away from direct heat and light. Do not refrigerate. Throw away any opened cartridge or Lantus® SoloStar® pen after 28 days. Throw away any opened [employer] after 42 days. · Throw away used needles in a hard, closed container that the needles cannot poke through. Keep this container away from children and pets. Drugs and Foods to Avoid: Ask your doctor or pharmacist before using any other medicine, including over-the-counter medicines, vitamins, and herbal products. · Some medicines can change the amount of insulin you need to use and make it harder for you to control your diabetes. Tell your doctor about all other medicines that you are using. · Do not drink alcohol while you are using this medicine. Warnings While Using This Medicine: · Tell your doctor if you are pregnant or breastfeeding, or if you have kidney disease, liver disease, heart disease, or heart failure. · This medicine may cause the following problems: 
¨ Low blood sugar or low potassium levels in the blood ¨ Fluid retention or heart failure (when used with thiazolidinedione [TZD] medicine) · Never share insulin pens, needles, or cartridges with anyone. Sharing these can pass hepatitis viruses, HIV, or other illnesses from one person to another. · Keep all medicine out of the reach of children. Never share your medicine with anyone. Possible Side Effects While Using This Medicine:  
Call your doctor right away if you notice any of these side effects: · Allergic reaction: Itching or hives, swelling in your face or hands, swelling or tingling in your mouth or throat, chest tightness, trouble breathing · Dry mouth, increased thirst, muscle cramps, nausea or vomiting, uneven heartbeat · Rapid weight gain, swelling in your hands, ankles, or feet, trouble breathing, tiredness · Shaking, trembling, sweating, fast or pounding heartbeat, lightheadedness, hunger, confusion If you notice these less serious side effects, talk with your doctor: · Redness, pain, itching, swelling, or any skin changes where the shot was given If you notice other side effects that you think are caused by this medicine, tell your doctor. Call your doctor for medical advice about side effects. You may report side effects to FDA at 5-698-FDA-4021 © 2017 [facility] Information is for End User's use only and may not be sold, redistributed or otherwise used for commercial purposes. The above information is an  only. It is not intended as medical advice for individual conditions or treatments. Talk to your doctor, nurse or pharmacist before following any medical regimen to see if it is safe and effective for you. Introducing [facility]! Tiffanie Torres introduces [employer] patient portal. Now you can access parts of your medical record, email your doctor's office, and request medication refills online. 1. In your internet browser, go to [email or web address] 2. Click on the First Time User? Click Here link in the Sign In box. You will see the New Member Sign Up page. 3. Enter your [employer] Access Code exactly as it appears below. You will not need to use this code after youve completed the sign-up process. If you do not sign up before the expiration date, you must request a new code. · [employer] Access Code: [email or web address] Expires: 12/27/2017  1:51 PM 
 
4. Enter the last four digits of your Social Security Number (xxxx) and Date of Birth (mm/dd/yyyy) as indicated and click Submit. You will be taken to the next sign-up page. 5. Create a [employer] ID. This will be your [employer] login ID and cannot be changed, so think of one that is secure and easy to remember. 6. Create a [employer] password. You can change your password at any time. 7. Enter your Password Reset Question and Answer. This can be used at a later time if you forget your password. 8. Enter your e-mail address. You will receive e-mail notification when new information is available in [address] 9. Click Sign Up. You can now view and download portions of your medical record. 10. Click the Download Summary menu link to download a portable copy of your medical information. If you have questions, please visit the Frequently Asked Questions section of the [employer] website. Remember, [employer] is NOT to be used for urgent needs. For medical emergencies, dial 911. Now available from your iPhone and Android! Please provide this summary of care documentation to your next provider. Your primary care clinician is listed as Adiel Moralez. If you have any questions after today's visit, please call [phone].

## 2017-10-20 NOTE — TELEPHONE ENCOUNTER
Last Visit: 09/28/2017 with MD Rosalva Plaza    Next Appointment: noted to f/u in 2 weeks   Previous Refill Encounters: 04/13/2016 per MD Andra Romero 500 stripts with 3 refills     Requested Prescriptions     Pending Prescriptions Disp Refills    glucose blood VI test strips (ACCU-CHEK POOJA) strip 500 Strip 3     Sig: Pt to test 5 times daily.   Dx:E11.65

## 2017-10-25 ENCOUNTER — TELEPHONE (OUTPATIENT)
Dept: INTERNAL MEDICINE CLINIC | Age: 52
End: 2017-10-25

## 2017-10-25 NOTE — TELEPHONE ENCOUNTER
Pt dropping off a [address] form to have filled out    Please call pt when form is ready.     Form was given to [facility]
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
## 2017-10-27 NOTE — TELEPHONE ENCOUNTER
I 'm unable to come up with reason for pt to be unemployed after reviewing her chart    Is this for disability or time off due to illness?

## 2017-11-02 ENCOUNTER — OFFICE VISIT (OUTPATIENT)
Dept: INTERNAL MEDICINE CLINIC | Age: 52
End: 2017-11-02

## 2017-11-02 VITALS
WEIGHT: 162.4 LBS | SYSTOLIC BLOOD PRESSURE: 112 MMHG | OXYGEN SATURATION: 98 % | DIASTOLIC BLOOD PRESSURE: 60 MMHG | HEIGHT: 62 IN | RESPIRATION RATE: 14 BRPM | TEMPERATURE: 99.2 F | HEART RATE: 94 BPM | BODY MASS INDEX: 29.88 KG/M2

## 2017-11-02 DIAGNOSIS — G89.29 CHRONIC NECK PAIN: ICD-10-CM

## 2017-11-02 DIAGNOSIS — M54.2 CHRONIC NECK PAIN: ICD-10-CM

## 2017-11-02 DIAGNOSIS — D86.9 SARCOIDOSIS: ICD-10-CM

## 2017-11-02 DIAGNOSIS — E78.5 DYSLIPIDEMIA: ICD-10-CM

## 2017-11-02 RX ORDER — PRAVASTATIN SODIUM 20 MG/1
20 TABLET ORAL
Qty: 30 TAB | Refills: 5 | Status: SHIPPED | OUTPATIENT
Start: 2017-11-02 | End: 2017-12-01 | Stop reason: SDUPTHER

## 2017-11-02 RX ORDER — PREGABALIN 50 MG/1
50 CAPSULE ORAL 2 TIMES DAILY
Qty: 60 CAP | Refills: 5 | Status: SHIPPED | OUTPATIENT
Start: 2017-11-02 | End: 2018-08-27 | Stop reason: SDUPTHER

## 2017-11-02 NOTE — PROGRESS NOTES
1. Have you been to the ER, urgent care clinic or hospitalized since your last visit? NO.     2. Have you seen or consulted any other health care providers outside of the [address] [place] [address] since your last visit (Include any pap smears or colon screening)? NO      Do you have an Advanced Directive? NO    Would you like information on Advanced Directives?  YES

## 2017-11-02 NOTE — PATIENT INSTRUCTIONS
Advance Directives: Care Instructions  Your Care Instructions  An advance directive is a legal way to state your wishes at the end of your life. It tells your family and your doctor what to do if you can no longer say what you want. There are two main types of advance directives. You can change them any time that your wishes change. · A living will tells your family and your doctor your wishes about life support and other treatment. · A durable power of  for health care lets you name a person to make treatment decisions for you when you can't speak for yourself. This person is called a health care agent. If you do not have an advance directive, decisions about your medical care may be made by a doctor or a  who doesn't know you. It may help to think of an advance directive as a gift to the people who care for you. If you have one, they won't have to make tough decisions by themselves. Follow-up care is a key part of your treatment and safety. Be sure to make and go to all appointments, and call your doctor if you are having problems. It's also a good idea to know your test results and keep a list of the medicines you take. How can you care for yourself at home? · Discuss your wishes with your loved ones and your doctor. This way, there are no surprises. · Many states have a unique form. Or you might use a universal form that has been approved by many states. This kind of form can sometimes be completed and stored online. Your electronic copy will then be available wherever you have a connection to the Internet. In most cases, doctors will respect your wishes even if you have a form from a different state. · You don't need a  to do an advance directive. But you may want to get legal advice. · Think about these questions when you prepare an advance directive:  ¨ Who do you want to make decisions about your medical care if you are not able to?  Many people choose a family member or close friend. ¨ Do you know enough about life support methods that might be used? If not, talk to your doctor so you understand. ¨ What are you most afraid of that might happen? You might be afraid of having pain, losing your independence, or being kept alive by machines. ¨ Where would you prefer to die? Choices include your home, a hospital, or a nursing home. ¨ Would you like to have information about hospice care to support you and your family? ¨ Do you want to donate organs when you die? ¨ Do you want certain Cong[employer] practices performed before you die? If so, put your wishes in the advance directive. · Read your advance directive every year, and make changes as needed. When should you call for help? Be sure to contact your doctor if you have any questions. Where can you learn more? Go to http://ralph-fabian.info/. Enter R264 in the search box to learn more about \"Advance Directives: Care Instructions. \"  Current as of: September 24, 2016  Content Version: 11.4  © 4760-6481 [employer]. Care instructions adapted under license by [employer] (which disclaims liability or warranty for this information). If you have questions about a medical condition or this instruction, always ask your healthcare professional. [address] any warranty or liability for your use of this information.

## 2017-11-02 NOTE — MR AVS SNAPSHOT
Visit Information Date & Time Provider Department Dept. Phone Encounter #  
 11/2/2017  8:00 AM Luis Woodson MD [facility] [phone] [id number] Your Appointments 2/1/2018  8:00 AM  
LAB with [facility] NURSE VISIT [facility] ([address]) Appt Note: lab  
 [address], [address] [facility] [address]  
  
   
 [address], [address]  
  
    
 2/8/2018  8:40 AM  
Office Visit with Luis Woodson MD  
[facility] [address]) Appt Note: 3 month follow up  
 [address], [address] [place] [address] [address]  
  
   
 [address], [address] Upcoming Health Maintenance Date Due  
 PAP AKA CERVICAL CYTOLOGY 8/7/2017 HEMOGLOBIN A1C Q6M 3/28/2018 BREAST CANCER SCRN MAMMOGRAM 6/14/2018 EYE EXAM RETINAL OR DILATED Q1 6/27/2018 FOOT EXAM Q1 9/28/2018 MICROALBUMIN Q1 9/28/2018 LIPID PANEL Q1 10/13/2018 COLONOSCOPY 5/26/2021 DTaP/Tdap/Td series (2 - Td) 4/24/2023 Allergies as of 11/2/2017  Review Complete On: 11/2/2017 By: Nasra Isbell Severity Noted Reaction Type Reactions Pollen Extracts Medium 05/07/2015    Runny Nose, Cough Lipitor [Atorvastatin]  04/25/2016    Other (comments) Muscle cramps and weakness Current Immunizations  Reviewed on 9/28/2017 Name Date Influenza Vaccine (Quad) PF 9/28/2017 Pneumococcal Polysaccharide (PPSV-23) 9/28/2017 Tdap 4/24/2013 12:00 AM  
  
 Not reviewed this visit Vitals BP Pulse Temp Resp Height(growth percentile) Weight(growth percentile) 112/60 (BP 1 Location: Right arm, BP Patient Position: Sitting) 94 99.2 °F (37.3 °C) (Oral) 14 5' 2\" (1.575 m) 162 lb 6.4 oz (73.7 kg) LMP SpO2 BMI OB Status Smoking Status 03/30/2013 98% 29.7 kg/m2 Postmenopausal Never Smoker Vitals History BMI and BSA Data Body Mass Index Body Surface Area  
 29.7 kg/m 2 1.8 m 2 Preferred Pharmacy Pharmacy Name Phone [id number]- [address] Caitie Toure [phone] Your Updated Medication List  
  
   
This list is accurate as of: 11/2/17  8:54 AM.  Always use your most recent med list.  
  
  
  
  
 ADVAIR DISKUS 500-50 mcg/dose diskus inhaler Generic drug:  fluticasone-salmeterol INHALE 1 PUFF BY MOUTH TWO TIMES A DAY  
  
 albuterol 90 mcg/actuation inhaler Commonly known as:  PROVENTIL HFA, VENTOLIN HFA, PROAIR HFA Take 2 Puffs by inhalation every four (4) hours as needed for Wheezing. aspirin delayed-release 81 mg tablet Take  by mouth daily. DULoxetine 60 mg capsule Commonly known as:  CYMBALTA TAKE 1 CAPSULE BY MOUTH DAILY  
  
 esomeprazole 40 mg capsule Commonly known as:  NEXIUM  
TAKE ONE CAPSULE BY MOUTH DAILY  
  
 fluticasone 50 mcg/actuation nasal spray Commonly known as:  FLONASE  
2 sprays in each nostril daily  
  
 glucose blood VI test strips strip Commonly known as:  ACCU-CHEK POOJA Pt to test 5 times daily. Dx:E11.65  
  
 insulin glargine 100 unit/mL injection Commonly known as:  LANTUS Lantus 10 unit SQ QHS  Indications: type 2 diabetes mellitus Insulin Syringe-Needle U-100 0.5 mL 31 gauge x 5/16 Syrg Commonly known as:  BD INSULIN SYRINGE ULTRA-FINE Lantus 10 units SQ QHS daily JANUVIA 100 mg tablet Generic drug:  SITagliptin Take 25 mg by mouth daily. lisinopril 5 mg tablet Commonly known as:  Ronjuanita Ruffing Take  by mouth daily. metFORMIN 500 mg tablet Commonly known as:  GLUCOPHAGE Take 1 Tab by mouth two (2) times daily (with meals). oxyCODONE-acetaminophen  mg per tablet Commonly known as:  PERCOCET 10 Take 1 Tab by mouth every eight (8) hours as needed for Pain. Max Daily Amount: 3 Tabs. predniSONE 5 mg tablet Commonly known as:  Marcelene Im  
 2 tablets every morning with breakfast  
  
 ZYRTEC PO Take  by mouth. Patient Instructions Advance Directives: Care Instructions Your Care Instructions An advance directive is a legal way to state your wishes at the end of your life. It tells your family and your doctor what to do if you can no longer say what you want. There are two main types of advance directives. You can change them any time that your wishes change. · A living will tells your family and your doctor your wishes about life support and other treatment. · A durable power of  for health care lets you name a person to make treatment decisions for you when you can't speak for yourself. This person is called a health care agent. If you do not have an advance directive, decisions about your medical care may be made by a doctor or a  who doesn't know you. It may help to think of an advance directive as a gift to the people who care for you. If you have one, they won't have to make tough decisions by themselves. Follow-up care is a key part of your treatment and safety. Be sure to make and go to all appointments, and call your doctor if you are having problems. It's also a good idea to know your test results and keep a list of the medicines you take. How can you care for yourself at home? · Discuss your wishes with your loved ones and your doctor. This way, there are no surprises. · Many states have a unique form. Or you might use a universal form that has been approved by many states. This kind of form can sometimes be completed and stored online. Your electronic copy will then be available wherever you have a connection to the Internet. In most cases, doctors will respect your wishes even if you have a form from a different state. · You don't need a  to do an advance directive. But you may want to get legal advice. · Think about these questions when you prepare an advance directive: ¨ Who do you want to make decisions about your medical care if you are not able to? Many people choose a family member or close friend. ¨ Do you know enough about life support methods that might be used? If not, talk to your doctor so you understand. ¨ What are you most afraid of that might happen? You might be afraid of having pain, losing your independence, or being kept alive by machines. ¨ Where would you prefer to die? Choices include your home, a hospital, or a nursing home. ¨ Would you like to have information about hospice care to support you and your family? ¨ Do you want to donate organs when you die? ¨ Do you want certain Ep[employer] practices performed before you die? If so, put your wishes in the advance directive. · Read your advance directive every year, and make changes as needed. When should you call for help? Be sure to contact your doctor if you have any questions. Where can you learn more? Go to http://ralph-fabian.info/. Enter R264 in the search box to learn more about \"Advance Directives: Care Instructions. \" Current as of: September 24, 2016 Content Version: 11.4 © 3705-0574 mPATH. Care instructions adapted under license by [employer] (which disclaims liability or warranty for this information). If you have questions about a medical condition or this instruction, always ask your healthcare professional. [address] any warranty or liability for your use of this information. Introducing \[facility] [place] [facility]\"" & HEALTH SERVICES! [facility] introduces [employer] patient portal. Now you can access parts of your medical record, email your doctor's office, and request medication refills online. 1. In your internet browser, go to [email or web address] 2. Click on the First Time User? Click Here link in the Sign In box. You will see the New Member Sign Up page. 3. Enter your Bizzingo Access Code exactly as it appears below. You will not need to use this code after youve completed the sign-up process. If you do not sign up before the expiration date, you must request a new code. · Bizzingo Access Code: [email or web address] Expires: 12/27/2017  1:51 PM 
 
4. Enter the last four digits of your Social Security Number (xxxx) and Date of Birth (mm/dd/yyyy) as indicated and click Submit. You will be taken to the next sign-up page. 5. Create a Bizzingo ID. This will be your Bizzingo login ID and cannot be changed, so think of one that is secure and easy to remember. 6. Create a Bizzingo password. You can change your password at any time. 7. Enter your Password Reset Question and Answer. This can be used at a later time if you forget your password. 8. Enter your e-mail address. You will receive e-mail notification when new information is available in [address]. 9. Click Sign Up. You can now view and download portions of your medical record. 10. Click the Download Summary menu link to download a portable copy of your medical information. If you have questions, please visit the Frequently Asked Questions section of the Bizzingo website. Remember, Bizzingo is NOT to be used for urgent needs. For medical emergencies, dial 911. Now available from your iPhone and Android! Please provide this summary of care documentation to your next provider. Your primary care clinician is listed as Noa Batista. If you have any questions after today's visit, please call [phone].

## 2017-11-03 ENCOUNTER — TELEPHONE (OUTPATIENT)
Dept: INTERNAL MEDICINE CLINIC | Age: 52
End: 2017-11-03

## 2017-11-03 NOTE — PROGRESS NOTES
47 y.o. BLACK OR  female who presents for eval    She has forms to fill out for her employment    She remains under the care of Dr. Yamel Alvarez for the sarcoid and continues on pred 10 with no outlined plans to dec the dosing in the near future    She had severe hyperglycemia in 3/16 and ended up on basal bolus under the mgmt of [facility]. She was kept on metformin and januvia also. Apparently, her control was good enough that she was having HYPO episodes and her insulin regimen was eventually dc'ed in late 2016. She has not had routine f/u since then as she was released to us but she never came for routine check. Earlier this year, she was running up to the 300+ range then drop to the 100 range on oral regimen but she did not call for guidance. She ended up in the [facility] ER on 9/26/17 presenting w uti and trichomomiasis and sugars>700. Improved w insulin and hydration. She drove home from the ER that visit and apparently blacked out although no injury, etiology unclear. She has not had any recurrence. She f/u w [facility] 2 times afterwards and seems to be doing ok. Sugars are still running high, she had been started back on lantus only by NP Geovanna    No cardiovascular or gi/gu sx    She could not tolerate the lipitor due to cramps but is willing to try something else. Had similar issues w crestor also    She's been seeing Dr Jim Padilla group for her spine issues. She's on cymbalta for pain but it doesn't seem to be helping.   She wants to try lyrica    Past Medical History:   Diagnosis Date    Allergic rhinitis due to allergen     s/p shots    Arthritis 6/13     MRI c spine w degen changes; Dr Xavier Alvarez;  ratio 68%, FEV1 78% w 6% inc postbd, TLC 77, RV 56, DLCO 61%    Bilateral great toe fractures 2014    Chronic sinusitis     Dr. Jazmín Deal in the past    Colon polyp 5/16    Dr Chang Davidson    Diabetes [facility])     presented w hyperosmotic nonketotic hyperglycemia bs >1000 (3/16); gluc>700 (9/17)    Dyslipidemia     calculated 10 year risk score was 2.0% (12/13)    Edema     Eustachian tube dysfunction     FHx: colon cancer     FHx: heart disease     Fibrocystic breast     Dr Gilles Beatty GERD (gastroesophageal reflux disease)     Lateral epicondylitis of both elbows 2/6/2017    Morbid obesity (HCC)     peak weight 196 lbs, bmi 35.8 from 10/13    Osteopenia     Dr. Elida Murray; DEXA t score -0.7 spine, -0.2 hip (8/14)    Sarcoidosis      Past Surgical History:   Procedure Laterality Date    CARDIAC SURG PROCEDURE UNLIST  9/10, 8/13    thallium negative ef 72%; negative ef 70%    CHEST SURGERY PROCEDURE UNLISTED  7/12     thyroid negative    CHEST SURGERY PROCEDURE UNLISTED  2012    pfts w mod restrictive defect     COLONOSCOPY N/A 5/26/2016    Dr Austin Anderson hyperplastic    Fuad Hansen HX HEENT      nasal polypectomy Dr. Abdullahi Leal HX HEENT      tear duct surgery right Dr. Clinton Perez 2010; left Dr Emil Matamoros 2015    HX ORTHOPAEDIC      DEXA -0.7 spine, -0.2 hip 8/14    VASCULAR SURGERY PROCEDURE UNLIST  12/13    venous doppler negative     Social History     Social History    Marital status: LEGALLY      Spouse name: N/A    Number of children: N/A    Years of education: N/A     Occupational History    traffic control person [employer]     Social History Main Topics    Smoking status: Never Smoker    Smokeless tobacco: Never Used    Alcohol use No    Drug use: No    Sexual activity: Not on file     Other Topics Concern    Not on file     Social History Narrative     Current Outpatient Prescriptions   Medication Sig    pravastatin (PRAVACHOL) 20 mg tablet Take 1 Tab by mouth nightly.  pregabalin (LYRICA) 50 mg capsule Take 1 Cap by mouth two (2) times a day. Max Daily Amount: 100 mg.    glucose blood VI test strips (ACCU-CHEK POOJA) strip Pt to test 5 times daily.   Dx:E11.65    insulin glargine (LANTUS) 100 unit/mL injection Lantus 10 unit SQ QHS  Indications: type 2 diabetes mellitus    oxyCODONE-acetaminophen (PERCOCET 10)  mg per tablet Take 1 Tab by mouth every eight (8) hours as needed for Pain. Max Daily Amount: 3 Tabs.  Insulin Syringe-Needle U-100 (BD INSULIN SYRINGE ULTRA-FINE) 0.5 mL 31 gauge x 5/16 syrg Lantus 10 units SQ QHS daily    JANUVIA 100 mg tablet Take 25 mg by mouth daily.  metFORMIN (GLUCOPHAGE) 500 mg tablet Take 1 Tab by mouth two (2) times daily (with meals). (Patient taking differently: Take 1,000 mg by mouth two (2) times daily (with meals). )    DULoxetine (CYMBALTA) 60 mg capsule TAKE 1 CAPSULE BY MOUTH DAILY    esomeprazole (NEXIUM) 40 mg capsule TAKE ONE CAPSULE BY MOUTH DAILY    predniSONE (DELTASONE) 5 mg tablet 2 tablets every morning with breakfast    lisinopril (PRINIVIL, ZESTRIL) 5 mg tablet Take  by mouth daily.  aspirin delayed-release 81 mg tablet Take  by mouth daily.  ADVAIR DISKUS 500-50 mcg/dose diskus inhaler INHALE 1 PUFF BY MOUTH TWO TIMES A DAY    albuterol (PROVENTIL HFA, VENTOLIN HFA) 90 mcg/actuation inhaler Take 2 Puffs by inhalation every four (4) hours as needed for Wheezing.  CETIRIZINE HCL (ZYRTEC PO) Take  by mouth. No current facility-administered medications for this visit.       Allergies   Allergen Reactions    Pollen Extracts Runny Nose and Cough    Crestor [Rosuvastatin] Myalgia    Lipitor [Atorvastatin] Other (comments)     Muscle cramps and weakness       REVIEW OF SYSTEMS: gyn 8/14 Dr Dian Aguirre,  [facility] 9/14, DEXA 8/14  Ophtho  no vision change or eye pain  Oral  no mouth pain, tongue or tooth problems  Ears  no hearing loss, ear pain, fullness, no swallowing problems  Cardiac  no CP, PND, orthopnea, edema, palpitations or syncope  Chest  no breast masses  Resp  no wheezing, chronic coughing, dyspnea  GI  no heartburn, nausea, vomiting, change in bowel habits, bleeding, hemorrhoids  Urinary  no dysuria, hematuria, flank pain, urgency, frequency  Constitutional  no wt loss, night sweats, unexplained fevers  Neuro  no focal weakness, numbness, paresthesias, incoordination, ataxia, involuntary movements  Endo - no polyuria, polydipsia, nocturia, hot flashes    Visit Vitals    /60 (BP 1 Location: Right arm, BP Patient Position: Sitting)    Pulse 94    Temp 99.2 °F (37.3 °C) (Oral)    Resp 14    Ht 5' 2\" (1.575 m)    Wt 162 lb 6.4 oz (73.7 kg)    SpO2 98%    BMI 29.7 kg/m2     Affect is appropriate. Mood stable  No apparent distress  HEENT --Anicteric sclerae  No thyromegaly, JVD, or bruits. Lungs --Clear to auscultation although diminished BS bilat, normal percussion. No wheezes or rales  Heart --Regular rate and rhythm, no murmurs, rubs, gallops, or clicks. Abdomen -- Soft and nontender, no hepatosplenomegaly or masses. Extremities -- Without cyanosis, clubbing, trace pedal edema. 2+ pulses equally and bilaterally.   Foot exam bilaterally showed  Reflexes 2+   Vibration, proprioception, filament test intact  Pulses 1-2+ DP and PT  No deformities noted  No onychomycosis    LABS  From 9/10 showed   gluc 90,   cr 1.10, gfr>60, alt 30, hba1c 5.6,   chol 219, tg 142, hdl 51, ldl-c 140,               hiv/rpr neg, hep b/c neg  From 8/13 showed   gluc 94,   cr 0.89, gfr 88,  alt 19,            chol 229, tg 135, hdl 45, ldl-c 157, wbc 4.2, hb 13.7, plt 170, ua neg, tsh 1.14  From 12/13 showed                 chol 247, tg 190, hdl 47, ldl-c 162  From 2/14 showed   gluc 102, cr 1.16, gfr 60,             wbc 4.7, hb 13.9, plt 156  From 6/14 showed   gluc 121, cr 0.97, gfr 79,  alt 17,            chol 261, tg 157, hdl 47, ldl-c 183,                 tsh 0.92, b12 244, mma 144  From 6/15 showed   gluc 188, cr 1.16, gfr 60,             wbc 6.1, hb 14.7, plt 166  From 3/16 showed   glu 1004, cr 1.64, gfr 33,  alt 30, hba1c 14.4,           wbc 6.0, hb 14.9, plt 175, ua neg, tsh 0.72, hep a/b/c  From 6/16 showed   gluc 102, cr 1.17, gfr 49,  alt 47, hba1c 7.6,   chol 226, tg 136, hdl 54, ldl-c 145, wbc 3.3, hb 13.0, plt 172, ua neg, tsh 1.64  From 9/16 showed   gluc 136, cr 1.21, gfr 59,  alt 35, hba1c 6.7,             fructosamine 231  From 6/17 showed   gluc 236, cr 1.25, gfr 54,            wbc 4.9, hb 13.6, plt 165  From 9/17 showed   glu>700,  cr 1.60, gfr 40  From 10/17 showed gluc 155, cr 1.35, gfr 50,   hba1c 12.0, chol 247, tg 222, hdl 48, ldl-c 155,              umar 5     Patient Active Problem List   Diagnosis Code    Sarcoidosis Dr. Mariaa Estevez on low dose steroid D86.9    Asthma Dr. Mariaa Estevez J45.909    Dyslipidemia E78.5    Gastroesophageal reflux disease without esophagitis K21.9    Overweight (BMI 25.0-29. 9) E66.3    Lateral epicondylitis of both elbows M77.11, M77.12    Uncontrolled type 2 diabetes mellitus with complication, with long-term current use of insulin (HCC) E11.8, E11.65, Z79.4     Assessment and plan:  1. Sarcoidosis and asthma. F/U Dr. Mariaa Estevez, on pred 10  2. GERD. PPI and avoidance measures  3. Obesity. Lifestyle and dietary measures  4. Edema. Prn med  5. Dyslipidemia. Trial pravachol although she could have same issues  6. Osteopenia. Ca/d/wt bearing exercise  7. DM. Continue current and will ask for PharmERIKA Lai to help with titration, f/u 3 mos  8. Spine issues. Will get records Dr Haroldo Bettencourt. Will start lyrica and eventually wean off cymbalta  9. Colon polyp, FHx colon ca. Fiber, colo 2021  10. Forms filled out for time off from 9/26/17 to 11/3/17        RTC 1/18    Above conditions discussed at length and patient vocalized understanding.   All questions answered to patient satifaction      TOTAL time 40 min spent of which at least 30 min was on counseling, answering questions and coordination of care

## 2017-11-15 RX ORDER — METFORMIN HYDROCHLORIDE 1000 MG/1
1000 TABLET ORAL 2 TIMES DAILY WITH MEALS
Qty: 180 TAB | Refills: 1 | Status: SHIPPED | OUTPATIENT
Start: 2017-11-15 | End: 2018-05-29 | Stop reason: SDUPTHER

## 2017-11-15 NOTE — TELEPHONE ENCOUNTER
Refills req for metformin 1000 mgs , send to [facility] on file on Adelina Kessler in [facility], [address]

## 2017-11-15 NOTE — TELEPHONE ENCOUNTER
Patient requests a prescription for Metformin to reflect new directions. Last Visit: 11/02/2017 with MD Moon Grier    Next Appointment: 02/08/2018 with MD Moon Grier     Requested Prescriptions     Pending Prescriptions Disp Refills    metFORMIN (GLUCOPHAGE) 1,000 mg tablet 180 Tab 1     Sig: Take 1 Tab by mouth two (2) times daily (with meals).

## 2017-11-17 NOTE — TELEPHONE ENCOUNTER
Patient has changed pharmacies. Last Visit: 11/02/2017 with MD Ivan Ortiz    Next Appointment: 02/08/2018 with MD Ivan Ortiz     Requested Prescriptions     Pending Prescriptions Disp Refills    glucose blood VI test strips (ACCU-CHEK POOJA) strip 500 Strip 3     Sig: Pt to test 5 times daily.   Dx:E11.65

## 2017-11-21 ENCOUNTER — OFFICE VISIT (OUTPATIENT)
Dept: INTERNAL MEDICINE CLINIC | Age: 52
End: 2017-11-21

## 2017-11-21 NOTE — MR AVS SNAPSHOT
Visit Information Date & Time Provider Department Dept. Phone Encounter #  
 11/21/2017  8:00 AM Danelle Hyatt [facility] [phone] [id number] Your Appointments 2/1/2018  8:00 AM  
LAB with [facility] NURSE VISIT [facility] ([address]) Appt Note: lab  
 [address], [address] [facility] [address]  
  
   
 [address], [address]  
  
    
 2/8/2018  8:40 AM  
Office Visit with Mariana Roach MD  
[facility] [address]) Appt Note: 3 month follow up  
 [address], [address] [place] [address] [address]  
  
   
 [address], [address] Upcoming Health Maintenance Date Due  
 PAP AKA CERVICAL CYTOLOGY 8/7/2017 HEMOGLOBIN A1C Q6M 3/28/2018 BREAST CANCER SCRN MAMMOGRAM 6/14/2018 EYE EXAM RETINAL OR DILATED Q1 6/27/2018 FOOT EXAM Q1 9/28/2018 MICROALBUMIN Q1 9/28/2018 LIPID PANEL Q1 10/13/2018 COLONOSCOPY 5/26/2021 DTaP/Tdap/Td series (2 - Td) 4/24/2023 Allergies as of 11/21/2017  Review Complete On: 11/2/2017 By: Mariana Roach MD  
  
 Severity Noted Reaction Type Reactions Pollen Extracts Medium 05/07/2015    Runny Nose, Cough Crestor [Rosuvastatin]  11/02/2017    Myalgia Lipitor [Atorvastatin]  04/25/2016    Other (comments) Muscle cramps and weakness Current Immunizations  Reviewed on 11/2/2017 Name Date Influenza Vaccine (Quad) PF 9/28/2017 Pneumococcal Polysaccharide (PPSV-23) 9/28/2017 Tdap 4/24/2013 12:00 AM  
  
 Not reviewed this visit Vitals LMP OB Status Smoking Status 03/30/2013 Postmenopausal Never Smoker Preferred Pharmacy Pharmacy Name Phone [id number] [place], [address] [facility] [id number] [place] [address] Your Updated Medication List  
  
   
 This list is accurate as of: 11/21/17  8:35 AM.  Always use your most recent med list.  
  
  
  
  
 ADVAIR DISKUS 500-50 mcg/dose diskus inhaler Generic drug:  fluticasone-salmeterol INHALE 1 PUFF BY MOUTH TWO TIMES A DAY  
  
 albuterol 90 mcg/actuation inhaler Commonly known as:  PROVENTIL HFA, VENTOLIN HFA, PROAIR HFA Take 2 Puffs by inhalation every four (4) hours as needed for Wheezing. aspirin delayed-release 81 mg tablet Take  by mouth daily. DULoxetine 60 mg capsule Commonly known as:  CYMBALTA TAKE 1 CAPSULE BY MOUTH DAILY  
  
 esomeprazole 40 mg capsule Commonly known as:  NEXIUM  
TAKE ONE CAPSULE BY MOUTH DAILY  
  
 glucose blood VI test strips strip Commonly known as:  ACCU-CHEK POOJA Pt to test 5 times daily. Dx:E11.65  
  
 insulin glargine 100 unit/mL injection Commonly known as:  LANTUS Lantus 10 unit SQ QHS  Indications: type 2 diabetes mellitus Insulin Syringe-Needle U-100 0.5 mL 31 gauge x 5/16 Syrg Commonly known as:  BD INSULIN SYRINGE ULTRA-FINE Lantus 10 units SQ QHS daily JANUVIA 100 mg tablet Generic drug:  SITagliptin Take 25 mg by mouth daily. lisinopril 5 mg tablet Commonly known as:  Samantha Berg Take  by mouth daily. metFORMIN 1,000 mg tablet Commonly known as:  GLUCOPHAGE Take 1 Tab by mouth two (2) times daily (with meals). oxyCODONE-acetaminophen  mg per tablet Commonly known as:  PERCOCET 10 Take 1 Tab by mouth every eight (8) hours as needed for Pain. Max Daily Amount: 3 Tabs. pravastatin 20 mg tablet Commonly known as:  PRAVACHOL Take 1 Tab by mouth nightly. predniSONE 5 mg tablet Commonly known as:  DELTASONE  
2 tablets every morning with breakfast  
  
 pregabalin 50 mg capsule Commonly known as:  Walda Smoker Take 1 Cap by mouth two (2) times a day. Max Daily Amount: 100 mg. ZYRTEC PO Take  by mouth. Patient Instructions December 19th 8am 
 
Merary Mcmahon [phone] Introducing [facility]! Jasson Vallejo introduces [employer] patient portal. Now you can access parts of your medical record, email your doctor's office, and request medication refills online. 1. In your internet browser, go to [email or web address] 2. Click on the First Time User? Click Here link in the Sign In box. You will see the New Member Sign Up page. 3. Enter your [employer] Access Code exactly as it appears below. You will not need to use this code after youve completed the sign-up process. If you do not sign up before the expiration date, you must request a new code. · [employer] Access Code: [email or web address] Expires: 12/27/2017 12:51 PM 
 
4. Enter the last four digits of your Social Security Number (xxxx) and Date of Birth (mm/dd/yyyy) as indicated and click Submit. You will be taken to the next sign-up page. 5. Create a [employer] ID. This will be your [employer] login ID and cannot be changed, so think of one that is secure and easy to remember. 6. Create a [employer] password. You can change your password at any time. 7. Enter your Password Reset Question and Answer. This can be used at a later time if you forget your password. 8. Enter your e-mail address. You will receive e-mail notification when new information is available in [address] 9. Click Sign Up. You can now view and download portions of your medical record. 10. Click the Download Summary menu link to download a portable copy of your medical information. If you have questions, please visit the Frequently Asked Questions section of the [employer] website. Remember, [employer] is NOT to be used for urgent needs. For medical emergencies, dial 911. Now available from your iPhone and Android! Please provide this summary of care documentation to your next provider. Your primary care clinician is listed as Noa Batista.  If you have any questions after today's visit, please call [phone].

## 2017-11-21 NOTE — PROGRESS NOTES
Pharmacy Note - Diabetes   11/21/17         Patient name: Thony Rodriguez (69 y.o., female)  YOB: 1965    Referred by: Dr. Mariana Roach for diabetes education / management     Past Medical History:   Diagnosis Date    Allergic rhinitis due to allergen     s/p shots    Arthritis 6/13     MRI c spine w degen changes; Dr Cristino Cabezas;  ratio 68%, FEV1 78% w 6% inc postbd, TLC 77, RV 56, DLCO 61%    Bilateral great toe fractures 2014    Chronic sinusitis     Dr. Karma Johnson in the past    Colon polyp 5/16    Dr Chao Almendarez    Diabetes [facility])     presented w hyperosmotic nonketotic hyperglycemia bs >1000 (3/16); gluc>700 (9/17)    Dyslipidemia     calculated 10 year risk score was 2.0% (12/13)    Edema     Eustachian tube dysfunction     FHx: colon cancer     FHx: heart disease     Fibrocystic breast     Dr Blaise Apley GERD (gastroesophageal reflux disease)     Lateral epicondylitis of both elbows 2/6/2017    Morbid obesity ([address]     peak weight 196 lbs, bmi 35.8 from 10/13    Osteopenia     Dr. Linnea Champion; DEXA t score -0.7 spine, -0.2 hip (8/14)   Delmy Fujrosangela Sarcoidosis      Family History   Problem Relation Age of Onset    Cancer Mother     Hypertension Mother     Cancer Father    Delmy Fujita Diabetes Father     Hypertension Father     Stroke Father     Diabetes Sister     Hypertension Sister     Heart Disease Sister     Colon Cancer Sister 52    Hypertension Brother     Cancer Maternal Aunt        Allergies   Allergen Reactions    Pollen Extracts Runny Nose and Cough    Crestor [Rosuvastatin] Myalgia    Lipitor [Atorvastatin] Other (comments)     Muscle cramps and weakness         Subjective / Objective      Patient initially diagnosed with diabetes in 2015. Medications:   Current medications for diabetes include:      Key Antihyperglycemic Medications             metFORMIN (GLUCOPHAGE) 1,000 mg tablet Take 1 Tab by mouth two (2) times daily (with meals).     insulin glargine (LANTUS) 100 unit/mL injection Lantus 10 unit SQ QHS  Indications: type 2 diabetes mellitus    MATTHIEUUVIA 100 mg tablet Take 25 mg by mouth daily. Previous medications for diabetes include:   ·  insulin injections: Humalog & Lantus    Patient reports adherence with her medications. Blood Glucose Findings:    She checks her blood glucose readings 3 times daily (usually a fasting reading, one reading around 2-3PM and another around 10-11PM). - fasting range: 88-mid 100's mg/dL   - postprandial range: 140-160's mg/dL   - trend: decreasing steadily    Looked at patient meter and most readings within this range. Did have a a couple of readings in the 200's but most numbers within reported range. The meter reported a 7 day average of 131, 21 day average of 146, and a 30 day average of 150. Hypoglycemic episodes: Not currently, has had episodes in the past when using basal bolus insulin regimen  Hypo: none  Hyper: none    Her last A1c value(s) noted to be:      Lab Results   Component Value Date/Time    Hemoglobin A1c 12.0 09/28/2017 04:08 PM    Hemoglobin A1c 7.6 06/15/2016 08:17 AM    Hemoglobin A1c 14.5 03/24/2016 10:27 AM    Hemoglobin A1c, External 6.7 09/28/2016       Dietary Considerations:   A typical days food intake is as follows:   - breakfast: cereal (with 1% [employer] Milk), fruit    - lunch: to-go peanut butter, banananas, sandwiches   - dinner: fruit, peanut butter/jelly, milk    - beverages: water, small soda (lasts whole day)    - snacks: fruit (likes bananas/grapes)     Exercise consists of ~1hr per day (started within past month and had turned an empty bedroom into a home gym).       Screenings/Prevention:  -Diabetic Eye and Foot Exams:      Diabetic Foot and Eye Exam HM Status   Topic Date Due    Eye Exam  06/27/2018    Diabetic Foot Care  09/28/2018     -Microalbumin / Creatinine ratio:       Lab Results   Component Value Date/Time    Microalbumin/Creat ratio (mg/g creat) 5 09/28/2017 04:08 PM    Microalbumin,urine random 0.80 09/28/2017 04:08 PM     -Lipid Panel / ASCVD Risk Parameters      Lab Results   Component Value Date/Time    Cholesterol, total 247 10/13/2017 08:52 AM    HDL Cholesterol 48 10/13/2017 08:52 AM    LDL, calculated 154.6 10/13/2017 08:52 AM    VLDL, calculated 44.4 10/13/2017 08:52 AM    Triglyceride 222 10/13/2017 08:52 AM    CHOL/HDL Ratio 5.1 10/13/2017 08:52 AM        Lab Results   Component Value Date/Time    ALT (SGPT) 47 06/15/2016 08:17 AM    AST (SGOT) 41 06/15/2016 08:17 AM    Alk. phosphatase 74 06/15/2016 08:17 AM    Bilirubin, total 0.5 06/15/2016 08:17 AM        BP Readings from Last 3 Encounters:   11/02/17 112/60   10/20/17 112/76   09/28/17 124/78        Social History   Substance Use Topics    Smoking status: Never Smoker    Smokeless tobacco: Never Used    Alcohol use No      Race: BLACK OR     Calculated ASCVD Risk score: 8.6%   -Statin therapy: pravastatin 20 mg (low intensity)     -Immunizations:      Immunization History   Administered Date(s) Administered    Influenza Vaccine (Quad) PF 09/28/2017    Pneumococcal Polysaccharide (PPSV-23) 09/28/2017    Tdap 04/24/2013       Assessment / Plan        1. Diabetes:  Uncontrolled (goal A1C at least <7%). Patient states she has made many recent changes to both her diet and her exercise regimen which have likely helped improved her blood sugar readings. She reports improved vision recently as well. For her diet she reports working to reduce the amount of carbohydrates she has been eating and eating more fruits. She states she still doesn't eat very much throughout the day though. She also reports increasing her exercise to about an hour per day. Given glucose control on current regimen, did not make any adjustments to insulin dose today.  Encouraged patient to continue checking blood sugars often and will follow up with patient in December to make sure diabetes stays under control. Screenings/Prevention:  -Vaccinations: Patient is eligible for the following vaccinations:  Influenza (done Fall 2017), Pneumococcal (Pneumovax if < 64 years: completed), Hepatitis B (would be eligible for: recommended for unvaccinated adults age 23 - 56 years per ADA guidelines)   -Dilated eye exam (recommended at least every 2 years or annually if retinopathy present): next due 6/2018    -Microalbumin-to creatinine ratio (recommended annually): next due 9/2018    -Foot Exam (recommended annually): next due 9/2018     2. Diet/lifestyle:  Reinforced importance of regular activity/exercise to help improve insulin resistance and reviewed food labels/carbohydrates/general carb guidelines for meals (45-60 g) and snacks (goal of 15g). Patient education materials were provided and reviewed with the patient. She states she doesn't eat many non-starchy vegetables or too much protein. Encouraged patient to try to incorporate more of these into her diet. 3.  Hyperlipidemia:  ASCVD risk score calculated as: 8.6% based on parameters listed above. Patient qualifies for high intensity statin therapy based on current recommendations but does have listed intolerances to the high intensity statins. Was recently tried on pravastatin and did not voice any issue with the medication. Could consider at a future visit with the PCP increasing pravastatin to 40 mg to reach moderate intensity statin therapy if patient remains able to tolerate the pravastatin 20 mg. Patient verbalized understanding of the information presented and all of the patients questions were answered. AVS was handed to the patient and information reviewed. Patient advised to call the office with any additional questions or concerns. Follow-up: Dec 19th @ 8AM.  Notification of recommendations will be sent to Dr. Kieran Uriostegui MD for review.     Thank you for the consult,  Lolis Traore, PharmD, BCPS

## 2017-12-01 DIAGNOSIS — E78.5 DYSLIPIDEMIA: ICD-10-CM

## 2017-12-01 NOTE — TELEPHONE ENCOUNTER
Insurance requires a minimum fill for 90 days. If appropriate, please sign pended medication order. If not, please notify me. Last Visit: 11/02/2017 with MD Savannah [facility]    Next Appointment: 02/08/2017 with MD Savannah [facility]   Previous Refill Encounters: 11/02/2017 per MD Savannah [facility] #[phone] with 5 refills     Requested Prescriptions     Pending Prescriptions Disp Refills    pravastatin (PRAVACHOL) 20 mg tablet 90 Tab 1     Sig: Take 1 Tab by mouth nightly.

## 2017-12-03 RX ORDER — PRAVASTATIN SODIUM 20 MG/1
20 TABLET ORAL
Qty: 90 TAB | Refills: 1 | Status: SHIPPED | OUTPATIENT
Start: 2017-12-03 | End: 2018-08-27 | Stop reason: SDUPTHER

## 2018-02-01 ENCOUNTER — TELEPHONE (OUTPATIENT)
Dept: INTERNAL MEDICINE CLINIC | Age: 53
End: 2018-02-01

## 2018-03-20 RX ORDER — ESOMEPRAZOLE MAGNESIUM 40 MG/1
CAPSULE, DELAYED RELEASE ORAL
Qty: 90 CAP | Refills: 0 | Status: SHIPPED | OUTPATIENT
Start: 2018-03-20 | End: 2019-03-07 | Stop reason: CLARIF

## 2018-05-30 RX ORDER — METFORMIN HYDROCHLORIDE 1000 MG/1
TABLET ORAL
Qty: 180 TAB | Refills: 0 | Status: SHIPPED | OUTPATIENT
Start: 2018-05-30 | End: 2018-08-27 | Stop reason: ALTCHOICE

## 2018-06-16 ENCOUNTER — HOSPITAL ENCOUNTER (EMERGENCY)
Age: 53
Discharge: HOME | End: 2018-06-16
Attending: EMERGENCY MEDICINE
Payer: SELF-PAY

## 2018-06-16 VITALS
HEART RATE: 95 BPM | RESPIRATION RATE: 16 BRPM | TEMPERATURE: 97.4 F | SYSTOLIC BLOOD PRESSURE: 117 MMHG | HEIGHT: 62 IN | DIASTOLIC BLOOD PRESSURE: 77 MMHG | WEIGHT: 180 LBS | OXYGEN SATURATION: 99 % | BODY MASS INDEX: 33.13 KG/M2

## 2018-06-16 DIAGNOSIS — R42 LIGHTHEADED: Primary | ICD-10-CM

## 2018-06-16 LAB
ALBUMIN SERPL-MCNC: 3.5 G/DL (ref 3.4–5)
ALBUMIN/GLOB SERPL: 0.8 {RATIO} (ref 0.8–1.7)
ALP SERPL-CCNC: 81 U/L (ref 45–117)
ALT SERPL-CCNC: 21 U/L (ref 13–56)
ANION GAP SERPL CALC-SCNC: 6 MMOL/L (ref 3–18)
APPEARANCE UR: ABNORMAL
AST SERPL-CCNC: 13 U/L (ref 15–37)
BACTERIA URNS QL MICRO: ABNORMAL /HPF
BASOPHILS # BLD: 0 K/UL (ref 0–0.1)
BASOPHILS NFR BLD: 0 % (ref 0–2)
BILIRUB SERPL-MCNC: 0.4 MG/DL (ref 0.2–1)
BILIRUB UR QL: NEGATIVE
BUN SERPL-MCNC: 28 MG/DL (ref 7–18)
BUN/CREAT SERPL: 17 (ref 12–20)
CALCIUM SERPL-MCNC: 9.6 MG/DL (ref 8.5–10.1)
CHLORIDE SERPL-SCNC: 106 MMOL/L (ref 100–108)
CO2 SERPL-SCNC: 29 MMOL/L (ref 21–32)
COLOR UR: YELLOW
CREAT SERPL-MCNC: 1.63 MG/DL (ref 0.6–1.3)
DIFFERENTIAL METHOD BLD: ABNORMAL
EOSINOPHIL # BLD: 0.1 K/UL (ref 0–0.4)
EOSINOPHIL NFR BLD: 2 % (ref 0–5)
EPITH CASTS URNS QL MICRO: ABNORMAL /LPF (ref 0–5)
ERYTHROCYTE [DISTWIDTH] IN BLOOD BY AUTOMATED COUNT: 13 % (ref 11.6–14.5)
GLOBULIN SER CALC-MCNC: 4.4 G/DL (ref 2–4)
GLUCOSE BLD STRIP.AUTO-MCNC: 155 MG/DL (ref 70–110)
GLUCOSE SERPL-MCNC: 151 MG/DL (ref 74–99)
GLUCOSE UR STRIP.AUTO-MCNC: 100 MG/DL
HCT VFR BLD AUTO: 37.9 % (ref 35–45)
HGB BLD-MCNC: 12.6 G/DL (ref 12–16)
HGB UR QL STRIP: NEGATIVE
KETONES UR QL STRIP.AUTO: NEGATIVE MG/DL
LEUKOCYTE ESTERASE UR QL STRIP.AUTO: ABNORMAL
LIPASE SERPL-CCNC: 224 U/L (ref 73–393)
LYMPHOCYTES # BLD: 1.1 K/UL (ref 0.9–3.6)
LYMPHOCYTES NFR BLD: 17 % (ref 21–52)
MCH RBC QN AUTO: 30.4 PG (ref 24–34)
MCHC RBC AUTO-ENTMCNC: 33.2 G/DL (ref 31–37)
MCV RBC AUTO: 91.3 FL (ref 74–97)
MONOCYTES # BLD: 0.4 K/UL (ref 0.05–1.2)
MONOCYTES NFR BLD: 6 % (ref 3–10)
NEUTS SEG # BLD: 5 K/UL (ref 1.8–8)
NEUTS SEG NFR BLD: 75 % (ref 40–73)
NITRITE UR QL STRIP.AUTO: NEGATIVE
PH BLDV: 7.39 [PH] (ref 7.32–7.42)
PH UR STRIP: 5 [PH] (ref 5–8)
PLATELET # BLD AUTO: 169 K/UL (ref 135–420)
PMV BLD AUTO: 10.8 FL (ref 9.2–11.8)
POTASSIUM SERPL-SCNC: 4.6 MMOL/L (ref 3.5–5.5)
PROT SERPL-MCNC: 7.9 G/DL (ref 6.4–8.2)
PROT UR STRIP-MCNC: NEGATIVE MG/DL
RBC # BLD AUTO: 4.15 M/UL (ref 4.2–5.3)
RBC #/AREA URNS HPF: ABNORMAL /HPF (ref 0–5)
SODIUM SERPL-SCNC: 141 MMOL/L (ref 136–145)
SP GR UR REFRACTOMETRY: 1.02 (ref 1–1.03)
UROBILINOGEN UR QL STRIP.AUTO: 0.2 EU/DL (ref 0.2–1)
WBC # BLD AUTO: 6.6 K/UL (ref 4.6–13.2)
WBC URNS QL MICRO: ABNORMAL /HPF (ref 0–4)

## 2018-06-16 PROCEDURE — 80053 COMPREHEN METABOLIC PANEL: CPT | Performed by: EMERGENCY MEDICINE

## 2018-06-16 PROCEDURE — 82962 GLUCOSE BLOOD TEST: CPT

## 2018-06-16 PROCEDURE — 83690 ASSAY OF LIPASE: CPT | Performed by: EMERGENCY MEDICINE

## 2018-06-16 PROCEDURE — 85025 COMPLETE CBC W/AUTO DIFF WBC: CPT | Performed by: EMERGENCY MEDICINE

## 2018-06-16 PROCEDURE — 82800 BLOOD PH: CPT | Performed by: EMERGENCY MEDICINE

## 2018-06-16 PROCEDURE — 93005 ELECTROCARDIOGRAM TRACING: CPT

## 2018-06-16 PROCEDURE — 99284 EMERGENCY DEPT VISIT MOD MDM: CPT

## 2018-06-16 PROCEDURE — 81001 URINALYSIS AUTO W/SCOPE: CPT | Performed by: PHYSICIAN ASSISTANT

## 2018-06-16 NOTE — ED PROVIDER NOTES
EMERGENCY DEPARTMENT HISTORY AND PHYSICAL EXAM    5:19 PM      Date: 6/16/2018  Patient Name: Funmi Joseph    History of Presenting Illness     Chief Complaint   Patient presents with    High Blood Sugar         History Provided By: Patient    Additional History (Context): Funmi Joseph is a 46 y.o. female with diabetes and asthma who presents c/o high BS at home with lightheadedness and generalized body cramps throughout the day. While presenting the pt is still feeling sx. While in triage the pt bs 155. She has not taken anything to treat her sx at home. Denies CP, SOB, fever, chills, nausea, vomiting, polyphagia, polydipsia, weakness, numbness, or any other associated sx. PCP: Pepe Brady MD    Chief Complaint: high BS  Duration:  Hours  Timing:  Acute  Location: head/body  Quality: Cramping  Severity: Moderate  Modifying Factors: none  Associated Symptoms: lightheadedness and body cramp      Current Outpatient Prescriptions   Medication Sig Dispense Refill    metFORMIN (GLUCOPHAGE) 1,000 mg tablet TAKE ONE TABLET BY MOUTH TWICE A  Tab 0    esomeprazole (NEXIUM) 40 mg capsule TAKE ONE CAPSULE BY MOUTH DAILY 90 Cap 0    insulin glargine (LANTUS) 100 unit/mL injection Lantus 10 unit SQ QHS  Indications: type 2 diabetes mellitus 1 Vial 6    pravastatin (PRAVACHOL) 20 mg tablet Take 1 Tab by mouth nightly. 90 Tab 1    SITagliptin (JANUVIA) 100 mg tablet Take 100 mg by mouth daily.  glucose blood VI test strips (ACCU-CHEK POOJA) strip Pt to test 5 times daily. Dx:E11.65 500 Strip 3    pregabalin (LYRICA) 50 mg capsule Take 1 Cap by mouth two (2) times a day. Max Daily Amount: 100 mg. 60 Cap 5    oxyCODONE-acetaminophen (PERCOCET 10)  mg per tablet Take 1 Tab by mouth every eight (8) hours as needed for Pain. Max Daily Amount: 3 Tabs.  21 Tab 0    Insulin Syringe-Needle U-100 (BD INSULIN SYRINGE ULTRA-FINE) 0.5 mL 31 gauge x 5/16 syrg Lantus 10 units SQ QHS daily 100 Syringe 3    DULoxetine (CYMBALTA) 60 mg capsule TAKE 1 CAPSULE BY MOUTH DAILY 90 Cap 0    predniSONE (DELTASONE) 5 mg tablet 2 tablets every morning with breakfast 180 Tab 3    lisinopril (PRINIVIL, ZESTRIL) 5 mg tablet Take  by mouth daily.  aspirin delayed-release 81 mg tablet Take  by mouth daily.  ADVAIR DISKUS 500-50 mcg/dose diskus inhaler INHALE 1 PUFF BY MOUTH TWO TIMES A DAY 1 Inhaler 5    albuterol (PROVENTIL HFA, VENTOLIN HFA) 90 mcg/actuation inhaler Take 2 Puffs by inhalation every four (4) hours as needed for Wheezing. 1 Inhaler 3    CETIRIZINE HCL (ZYRTEC PO) Take  by mouth.            Past History     Past Medical History:  Past Medical History:   Diagnosis Date    Allergic rhinitis due to allergen     s/p shots    Arthritis 6/13     MRI c spine w degen changes; Dr Ezekiel Guillaume;  ratio 68%, FEV1 78% w 6% inc postbd, TLC 77, RV 56, DLCO 61%    Bilateral great toe fractures 2014    Chronic sinusitis     Dr. Kary Stern in the past    Colon polyp 5/16    Dr Rehan Whitfield    Diabetes [facility])     presented w hyperosmotic nonketotic hyperglycemia bs >1000 (3/16); gluc>700 (9/17)    Dyslipidemia     calculated 10 year risk score was 2.0% (12/13)    Edema     Eustachian tube dysfunction     FHx: colon cancer     FHx: heart disease     Fibrocystic breast     Dr Michelle Rodriguez GERD (gastroesophageal reflux disease)     Lateral epicondylitis of both elbows 2/6/2017    Morbid obesity ([facility])     peak weight 196 lbs, bmi 35.8 from 10/13    Osteopenia     Dr. Dariel Hooks; DEXA t score -0.7 spine, -0.2 hip (8/14)    Sarcoidosis        Past Surgical History:  Past Surgical History:   Procedure Laterality Date    CARDIAC SURG PROCEDURE UNLIST  9/10, 8/13    thallium negative ef 72%; negative ef 70%    CHEST SURGERY PROCEDURE UNLISTED  7/12     thyroid negative    CHEST SURGERY PROCEDURE UNLISTED  2012    pfts w mod restrictive defect     COLONOSCOPY N/A 5/26/2016    Dr Rehan Whitfield hyperplastic Senia Luna HX HEENT      nasal polypectomy Dr. Robinson Pan HX HEENT      tear duct surgery right Dr. Trevor Mathews 2010; left Dr Husam Bae 2015    HX ORTHOPAEDIC      DEXA -0.7 spine, -0.2 hip 8/14    VASCULAR SURGERY PROCEDURE UNLIST  12/13    venous doppler negative       Family History:  Family History   Problem Relation Age of Onset    Cancer Mother     Hypertension Mother     Cancer Father     Diabetes Father     Hypertension Father     Stroke Father     Diabetes Sister     Hypertension Sister     Heart Disease Sister     Colon Cancer Sister 52    Hypertension Brother     Cancer Maternal Aunt        Social History:  Social History   Substance Use Topics    Smoking status: Never Smoker    Smokeless tobacco: Never Used    Alcohol use No       Allergies: Allergies   Allergen Reactions    Pollen Extracts Runny Nose and Cough    Crestor [Rosuvastatin] Myalgia    Lipitor [Atorvastatin] Other (comments)     Muscle cramps and weakness           Review of Systems     Review of Systems   Constitutional: Negative for chills and fever. Respiratory: Negative for shortness of breath. Cardiovascular: Negative for chest pain. Gastrointestinal: Negative for diarrhea, nausea and vomiting. Endocrine: Negative for polydipsia and polyphagia. Musculoskeletal: Positive for myalgias (general body cramps). Neurological: Positive for light-headedness. Negative for dizziness, weakness and numbness. All other systems reviewed and are negative. Physical Exam     Visit Vitals    /77 (BP 1 Location: Left arm, BP Patient Position: At rest;Sitting)    Pulse 95    Temp 97.4 °F (36.3 °C)    Resp 16    Ht 5' 2\" (1.575 m)    Wt 81.6 kg (180 lb)    LMP 03/30/2013    SpO2 99%    BMI 32.92 kg/m2       Physical Exam   Constitutional: She is oriented to person, place, and time. She appears well-developed. HENT:   Head: Normocephalic and atraumatic.    Eyes: EOM are normal. Pupils are equal, round, and reactive to light. Neck: Normal range of motion. Neck supple. Cardiovascular: Normal rate, regular rhythm and normal heart sounds. Exam reveals no friction rub. No murmur heard. Pulmonary/Chest: Effort normal and breath sounds normal. No respiratory distress. She has no wheezes. Abdominal: Soft. She exhibits no distension. There is no tenderness. There is no rebound and no guarding. Musculoskeletal: Normal range of motion. Neurological: She is alert and oriented to person, place, and time. Skin: Skin is warm and dry. Psychiatric: She has a normal mood and affect. Her behavior is normal. Thought content normal.         Diagnostic Study Results     Labs wnl  ekg nsr at 74; nl axis. Nl int. No errol/no hypertorphy       Medical Decision Making      53y. o  female had a fingerstick reading of \"high\" at home. No fever or chills. Glucose here is normal.  She feels lightheadedness and muscle cramps EKGs were unremarkable for discharge for outpatient follow-up    Diagnosis     No diagnosis found. _______________________________    Attestations:  Naresh Fuentes 89 acting as a scribe for and in the presence of Scarlet Rubio MD      June 16, 2018 at 5:19 PM       Provider Attestation:      I personally performed the services described in the documentation, reviewed the documentation, as recorded by the scribe in my presence, and it accurately and completely records my words and actions.  June 16, 2018 at 5:19 PM - Scarlet Rubio MD    _______________________________

## 2018-06-16 NOTE — DISCHARGE INSTRUCTIONS
Lightheadedness or Faintness: Care Instructions  Your Care Instructions  Lightheadedness is a feeling that you are about to faint or \"pass out. \" You do not feel as if you or your surroundings are moving. It is different from vertigo, which is the feeling that you or things around you are spinning or tilting. Lightheadedness usually goes away or gets better when you lie down. If lightheadedness gets worse, it can lead to a fainting spell. It is common to feel lightheaded from time to time. Lightheadedness usually is not caused by a serious problem. It often is caused by a short-lasting drop in blood pressure and blood flow to your head that occurs when you get up too quickly from a seated or lying position. Follow-up care is a key part of your treatment and safety. Be sure to make and go to all appointments, and call your doctor if you are having problems. It's also a good idea to know your test results and keep a list of the medicines you take. How can you care for yourself at home? · Lie down for 1 or 2 minutes when you feel lightheaded. After lying down, sit up slowly and remain sitting for 1 to 2 minutes before slowly standing up. · Avoid movements, positions, or activities that have made you lightheaded in the past.  · Get plenty of rest, especially if you have a cold or flu, which can cause lightheadedness. · Make sure you drink plenty of fluids, especially if you have a fever or have been sweating. · Do not drive or put yourself and others in danger while you feel lightheaded. When should you call for help? Call 911 anytime you think you may need emergency care. For example, call if:  ? · You have symptoms of a stroke. These may include:  ¨ Sudden numbness, tingling, weakness, or loss of movement in your face, arm, or leg, especially on only one side of your body. ¨ Sudden vision changes. ¨ Sudden trouble speaking. ¨ Sudden confusion or trouble understanding simple statements.   ¨ Sudden problems with walking or balance. ¨ A sudden, severe headache that is different from past headaches. ? · You have symptoms of a heart attack. These may include:  ¨ Chest pain or pressure, or a strange feeling in the chest.  ¨ Sweating. ¨ Shortness of breath. ¨ Nausea or vomiting. ¨ Pain, pressure, or a strange feeling in the back, neck, jaw, or upper belly or in one or both shoulders or arms. ¨ Lightheadedness or sudden weakness. ¨ A fast or irregular heartbeat. After you call 911, the  may tell you to chew 1 adult-strength or 2 to 4 low-dose aspirin. Wait for an ambulance. Do not try to drive yourself. ? Watch closely for changes in your health, and be sure to contact your doctor if:  ? · Your lightheadedness gets worse or does not get better with home care. Where can you learn more? Go to http://ralph-fabian.info/. Enter X641 in the search box to learn more about \"Lightheadedness or Faintness: Care Instructions. \"  Current as of: March 20, 2017  Content Version: 11.4  © 5980-6328 [employer]. Care instructions adapted under license by [employer] (which disclaims liability or warranty for this information). If you have questions about a medical condition or this instruction, always ask your healthcare professional. [address] any warranty or liability for your use of this information.

## 2018-06-18 LAB
ATRIAL RATE: 74 BPM
CALCULATED P AXIS, ECG09: 38 DEGREES
CALCULATED R AXIS, ECG10: 24 DEGREES
CALCULATED T AXIS, ECG11: 51 DEGREES
DIAGNOSIS, 93000: NORMAL
P-R INTERVAL, ECG05: 150 MS
Q-T INTERVAL, ECG07: 396 MS
QRS DURATION, ECG06: 76 MS
QTC CALCULATION (BEZET), ECG08: 439 MS
VENTRICULAR RATE, ECG03: 74 BPM

## 2018-07-24 ENCOUNTER — HOSPITAL ENCOUNTER (EMERGENCY)
Age: 53
Discharge: HOME | End: 2018-07-24
Attending: EMERGENCY MEDICINE | Admitting: EMERGENCY MEDICINE
Payer: SELF-PAY

## 2018-07-24 VITALS
HEART RATE: 109 BPM | OXYGEN SATURATION: 98 % | BODY MASS INDEX: 33.84 KG/M2 | TEMPERATURE: 98.4 F | SYSTOLIC BLOOD PRESSURE: 111 MMHG | WEIGHT: 185 LBS | DIASTOLIC BLOOD PRESSURE: 69 MMHG | RESPIRATION RATE: 18 BRPM

## 2018-07-24 PROCEDURE — 75810000275 HC EMERGENCY DEPT VISIT NO LEVEL OF CARE

## 2018-07-24 NOTE — ED NOTES
Patient came to triage room and states she is leaving, feeling better. Informed patient to come back if symptoms worsen. Patient alert and oriented, no distress and ambulatory.

## 2018-07-27 NOTE — ED NOTES
Left message for the patient to contact the  Department, patient will be giving information to follow-up with [facility]. Patient will need to bring proof of income, proof of residency, and photo identification. Patient will also be giving an application to complete for [employer] to see if the patient qualifies for the [facility], [facility], or [facility]

## 2018-08-27 ENCOUNTER — HOSPITAL ENCOUNTER (OUTPATIENT)
Dept: LAB | Age: 53
Discharge: HOME | End: 2018-08-27

## 2018-08-27 ENCOUNTER — OFFICE VISIT (OUTPATIENT)
Dept: FAMILY MEDICINE CLINIC | Age: 53
End: 2018-08-27

## 2018-08-27 VITALS
RESPIRATION RATE: 20 BRPM | DIASTOLIC BLOOD PRESSURE: 85 MMHG | WEIGHT: 181.6 LBS | OXYGEN SATURATION: 98 % | BODY MASS INDEX: 33.42 KG/M2 | TEMPERATURE: 98.3 F | HEIGHT: 62 IN | SYSTOLIC BLOOD PRESSURE: 129 MMHG | HEART RATE: 73 BPM

## 2018-08-27 DIAGNOSIS — E78.5 DYSLIPIDEMIA: ICD-10-CM

## 2018-08-27 DIAGNOSIS — H35.30 MACULAR DEGENERATION OF BOTH EYES, UNSPECIFIED TYPE: ICD-10-CM

## 2018-08-27 DIAGNOSIS — R94.4 DECREASED GFR: ICD-10-CM

## 2018-08-27 DIAGNOSIS — D86.9 SARCOIDOSIS: ICD-10-CM

## 2018-08-27 DIAGNOSIS — M25.472 EDEMA OF BOTH ANKLES: ICD-10-CM

## 2018-08-27 DIAGNOSIS — J30.89 ENVIRONMENTAL AND SEASONAL ALLERGIES: ICD-10-CM

## 2018-08-27 DIAGNOSIS — E11.65 TYPE 2 DIABETES MELLITUS WITH HYPERGLYCEMIA, WITH LONG-TERM CURRENT USE OF INSULIN (HCC): ICD-10-CM

## 2018-08-27 DIAGNOSIS — K21.9 GASTROESOPHAGEAL REFLUX DISEASE WITHOUT ESOPHAGITIS: ICD-10-CM

## 2018-08-27 DIAGNOSIS — J45.20 MILD INTERMITTENT ASTHMA WITHOUT COMPLICATION: ICD-10-CM

## 2018-08-27 DIAGNOSIS — E78.1 HYPERTRIGLYCERIDEMIA: ICD-10-CM

## 2018-08-27 DIAGNOSIS — G62.9 NEUROPATHY: ICD-10-CM

## 2018-08-27 DIAGNOSIS — Z76.89 ENCOUNTER TO ESTABLISH CARE: Primary | ICD-10-CM

## 2018-08-27 DIAGNOSIS — Z79.4 TYPE 2 DIABETES MELLITUS WITH HYPERGLYCEMIA, WITH LONG-TERM CURRENT USE OF INSULIN (HCC): ICD-10-CM

## 2018-08-27 DIAGNOSIS — M25.471 EDEMA OF BOTH ANKLES: ICD-10-CM

## 2018-08-27 DIAGNOSIS — Z12.31 BREAST CANCER SCREENING BY MAMMOGRAM: ICD-10-CM

## 2018-08-27 DIAGNOSIS — R79.89 ELEVATED SERUM CREATININE: ICD-10-CM

## 2018-08-27 LAB
ALBUMIN SERPL-MCNC: 3.3 G/DL (ref 3.4–5)
ALBUMIN/GLOB SERPL: 0.9 {RATIO} (ref 0.8–1.7)
ALP SERPL-CCNC: 62 U/L (ref 45–117)
ALT SERPL-CCNC: 21 U/L (ref 13–56)
AMPHET UR QL SCN: NEGATIVE
ANION GAP SERPL CALC-SCNC: 8 MMOL/L (ref 3–18)
AST SERPL-CCNC: 12 U/L (ref 15–37)
BARBITURATES UR QL SCN: NEGATIVE
BASOPHILS # BLD: 0 K/UL (ref 0–0.1)
BASOPHILS NFR BLD: 0 % (ref 0–2)
BENZODIAZ UR QL: NEGATIVE
BILIRUB SERPL-MCNC: 0.4 MG/DL (ref 0.2–1)
BUN SERPL-MCNC: 31 MG/DL (ref 7–18)
BUN/CREAT SERPL: 17 (ref 12–20)
CALCIUM SERPL-MCNC: 8.7 MG/DL (ref 8.5–10.1)
CANNABINOIDS UR QL SCN: NEGATIVE
CHLORIDE SERPL-SCNC: 106 MMOL/L (ref 100–108)
CHOLEST SERPL-MCNC: 238 MG/DL
CO2 SERPL-SCNC: 30 MMOL/L (ref 21–32)
COCAINE UR QL SCN: NEGATIVE
CREAT SERPL-MCNC: 1.8 MG/DL (ref 0.6–1.3)
CREAT UR-MCNC: 177 MG/DL (ref 30–125)
DIFFERENTIAL METHOD BLD: ABNORMAL
EOSINOPHIL # BLD: 0.2 K/UL (ref 0–0.4)
EOSINOPHIL NFR BLD: 3 % (ref 0–5)
ERYTHROCYTE [DISTWIDTH] IN BLOOD BY AUTOMATED COUNT: 12.4 % (ref 11.6–14.5)
EST. AVERAGE GLUCOSE BLD GHB EST-MCNC: 169 MG/DL
GLOBULIN SER CALC-MCNC: 3.5 G/DL (ref 2–4)
GLUCOSE SERPL-MCNC: 92 MG/DL (ref 74–99)
HAV IGM SER QL: NEGATIVE
HBA1C MFR BLD: 7.5 % (ref 4.2–5.6)
HBV CORE IGM SER QL: NEGATIVE
HBV SURFACE AG SER QL: <0.1 INDEX
HBV SURFACE AG SER QL: NEGATIVE
HCT VFR BLD AUTO: 37.1 % (ref 35–45)
HCV AB SER IA-ACNC: 0.12 INDEX
HCV AB SERPL QL IA: NEGATIVE
HCV COMMENT,HCGAC: NORMAL
HDLC SERPL-MCNC: 54 MG/DL (ref 40–60)
HDLC SERPL: 4.4 {RATIO} (ref 0–5)
HDSCOM,HDSCOM: NORMAL
HGB BLD-MCNC: 11.8 G/DL (ref 12–16)
HIV 1+2 AB+HIV1 P24 AG SERPL QL IA: NONREACTIVE
HIV12 RESULT COMMENT, HHIVC: NORMAL
LDLC SERPL CALC-MCNC: 145.6 MG/DL (ref 0–100)
LIPID PROFILE,FLP: ABNORMAL
LYMPHOCYTES # BLD: 1.4 K/UL (ref 0.9–3.6)
LYMPHOCYTES NFR BLD: 24 % (ref 21–52)
MAGNESIUM SERPL-MCNC: 1.9 MG/DL (ref 1.6–2.6)
MCH RBC QN AUTO: 29.9 PG (ref 24–34)
MCHC RBC AUTO-ENTMCNC: 31.8 G/DL (ref 31–37)
MCV RBC AUTO: 94.2 FL (ref 74–97)
METHADONE UR QL: NEGATIVE
MICROALBUMIN UR-MCNC: 2.32 MG/DL (ref 0–3)
MICROALBUMIN/CREAT UR-RTO: 13 MG/G (ref 0–30)
MONOCYTES # BLD: 0.6 K/UL (ref 0.05–1.2)
MONOCYTES NFR BLD: 10 % (ref 3–10)
NEUTS SEG # BLD: 3.5 K/UL (ref 1.8–8)
NEUTS SEG NFR BLD: 63 % (ref 40–73)
OPIATES UR QL: NEGATIVE
PCP UR QL: NEGATIVE
PLATELET # BLD AUTO: 184 K/UL (ref 135–420)
PMV BLD AUTO: 11.2 FL (ref 9.2–11.8)
POTASSIUM SERPL-SCNC: 4.9 MMOL/L (ref 3.5–5.5)
PROT SERPL-MCNC: 6.8 G/DL (ref 6.4–8.2)
RBC # BLD AUTO: 3.94 M/UL (ref 4.2–5.3)
SODIUM SERPL-SCNC: 144 MMOL/L (ref 136–145)
SP1: NORMAL
SP2: NORMAL
SP3: NORMAL
TRIGL SERPL-MCNC: 192 MG/DL (ref ?–150)
TSH SERPL DL<=0.05 MIU/L-ACNC: 2.33 UIU/ML (ref 0.36–3.74)
VLDLC SERPL CALC-MCNC: 38.4 MG/DL
WBC # BLD AUTO: 5.5 K/UL (ref 4.6–13.2)

## 2018-08-27 PROCEDURE — 80074 ACUTE HEPATITIS PANEL: CPT | Performed by: NURSE PRACTITIONER

## 2018-08-27 PROCEDURE — 80053 COMPREHEN METABOLIC PANEL: CPT | Performed by: NURSE PRACTITIONER

## 2018-08-27 PROCEDURE — 87389 HIV-1 AG W/HIV-1&-2 AB AG IA: CPT | Performed by: NURSE PRACTITIONER

## 2018-08-27 PROCEDURE — 83735 ASSAY OF MAGNESIUM: CPT | Performed by: NURSE PRACTITIONER

## 2018-08-27 PROCEDURE — 80061 LIPID PANEL: CPT | Performed by: NURSE PRACTITIONER

## 2018-08-27 PROCEDURE — 80307 DRUG TEST PRSMV CHEM ANLYZR: CPT | Performed by: NURSE PRACTITIONER

## 2018-08-27 PROCEDURE — 82043 UR ALBUMIN QUANTITATIVE: CPT | Performed by: NURSE PRACTITIONER

## 2018-08-27 PROCEDURE — 83036 HEMOGLOBIN GLYCOSYLATED A1C: CPT | Performed by: NURSE PRACTITIONER

## 2018-08-27 PROCEDURE — 85025 COMPLETE CBC W/AUTO DIFF WBC: CPT | Performed by: NURSE PRACTITIONER

## 2018-08-27 PROCEDURE — 84443 ASSAY THYROID STIM HORMONE: CPT | Performed by: NURSE PRACTITIONER

## 2018-08-27 RX ORDER — ALBUTEROL SULFATE 90 UG/1
2 AEROSOL, METERED RESPIRATORY (INHALATION)
Qty: 3 INHALER | Refills: 3 | Status: SHIPPED | COMMUNITY
Start: 2018-08-27 | End: 2019-03-07 | Stop reason: CLARIF

## 2018-08-27 RX ORDER — HYDROCHLOROTHIAZIDE 25 MG/1
25 TABLET ORAL DAILY
COMMUNITY
End: 2018-08-27 | Stop reason: SDUPTHER

## 2018-08-27 RX ORDER — PREGABALIN 50 MG/1
50 CAPSULE ORAL 2 TIMES DAILY
Qty: 60 CAP | Refills: 3 | Status: SHIPPED | COMMUNITY
Start: 2018-08-27 | End: 2019-03-07 | Stop reason: SDUPTHER

## 2018-08-27 RX ORDER — LISINOPRIL 5 MG/1
5 TABLET ORAL DAILY
Qty: 30 TAB | Refills: 3 | Status: SHIPPED | OUTPATIENT
Start: 2018-08-27 | End: 2018-12-03 | Stop reason: SDUPTHER

## 2018-08-27 RX ORDER — FLUTICASONE PROPIONATE AND SALMETEROL 500; 50 UG/1; UG/1
1 POWDER RESPIRATORY (INHALATION) 2 TIMES DAILY
Qty: 3 INHALER | Refills: 3 | Status: SHIPPED | COMMUNITY
Start: 2018-08-27 | End: 2019-03-07 | Stop reason: CLARIF

## 2018-08-27 RX ORDER — PRAVASTATIN SODIUM 20 MG/1
20 TABLET ORAL
Qty: 30 TAB | Refills: 3 | Status: SHIPPED | OUTPATIENT
Start: 2018-08-27 | End: 2018-12-03 | Stop reason: DRUGHIGH

## 2018-08-27 RX ORDER — MOMETASONE FUROATE 50 UG/1
2 SPRAY, METERED NASAL
Qty: 3 CONTAINER | Refills: 3 | Status: SHIPPED | COMMUNITY
Start: 2018-08-27 | End: 2021-03-24

## 2018-08-27 RX ORDER — ALBUTEROL SULFATE 90 UG/1
2 AEROSOL, METERED RESPIRATORY (INHALATION)
Qty: 1 INHALER | Refills: 0 | Status: SHIPPED | COMMUNITY
Start: 2018-08-27 | End: 2018-08-28 | Stop reason: SDUPTHER

## 2018-08-27 RX ORDER — FLUTICASONE PROPIONATE AND SALMETEROL 500; 50 UG/1; UG/1
1 POWDER RESPIRATORY (INHALATION) EVERY 12 HOURS
Qty: 1 INHALER | Refills: 0 | Status: SHIPPED | COMMUNITY
Start: 2018-08-27 | End: 2018-08-28 | Stop reason: SDUPTHER

## 2018-08-27 RX ORDER — INSULIN GLARGINE 100 [IU]/ML
25 INJECTION, SOLUTION SUBCUTANEOUS
Qty: 3 PEN | Refills: 0 | Status: SHIPPED | COMMUNITY
Start: 2018-08-27 | End: 2018-08-28 | Stop reason: SDUPTHER

## 2018-08-27 RX ORDER — HYDROCHLOROTHIAZIDE 25 MG/1
25 TABLET ORAL DAILY
Qty: 30 TAB | Refills: 3 | Status: SHIPPED | OUTPATIENT
Start: 2018-08-27 | End: 2018-12-03 | Stop reason: SDUPTHER

## 2018-08-27 RX ORDER — MOMETASONE FUROATE 50 UG/1
2 SPRAY, METERED NASAL
Qty: 2 CONTAINER | Refills: 0 | Status: SHIPPED | COMMUNITY
Start: 2018-08-27 | End: 2018-08-28 | Stop reason: SDUPTHER

## 2018-08-27 RX ORDER — INSULIN GLARGINE 100 [IU]/ML
25 INJECTION, SOLUTION SUBCUTANEOUS
Qty: 1 VIAL | Refills: 0 | Status: SHIPPED | COMMUNITY
Start: 2018-08-27 | End: 2018-08-27 | Stop reason: SDUPTHER

## 2018-08-27 RX ORDER — INSULIN GLARGINE 100 [IU]/ML
25 INJECTION, SOLUTION SUBCUTANEOUS
Qty: 3 PEN | Refills: 3 | Status: SHIPPED | COMMUNITY
Start: 2018-08-27 | End: 2019-08-06 | Stop reason: SDUPTHER

## 2018-08-27 NOTE — LETTER
8/27/2018 [facility] [address], [address] Yisel Joseph, 1965, is picking up the following medications ordered from the [facility] Program: STOCK:  VENTOLIN HFA #1, ADVAIR DISKUS 500/50 MCG #1, KOMBIGLYZE XR 2.5/1000 MG #60, NASONEX 50 MCG #1 AND LANTUS SOLOSTAR #2 PENS. Domingo Olson Patient's Signature: _____________________________ Today's Date: 8/27/2018

## 2018-08-27 NOTE — PATIENT INSTRUCTIONS
[facility] reminders! Foundation Operating Hours: These may change without notice. Mon- Wed 7am to 5pm. Closed for lunch 12-1pm  Thurs 7am to 12pm  Fridays closed     Office number:     **In case of inclement weather (snow and/or ice) please do not try to come into the office for your appointment. Please call in and you will not be counted as a \"No Show. \" SAFETY FIRST!!**    NO SHOW POLICY ~ If a patient has 3 no shows for an appointment with their Provider, Mental Health Provider, or the Nurse Navigator, they will be discharged from the practice for 6 months. Medication ordering will also be suspended. If the patient is discharged from the [facility] they can go to the [place] [address] or [address] [place] [address] where they can be seen for their primary care needs plus obtain the same type of medications as they received at the [facility] To avoid being discharged the patient must call the office at [phone] 24 hours prior to their appointment if they need to cancel or reschedule, arrive to their appointments on time (preferrably 15 minutes early) (If you are late you will not be seen) and come to all scheduled appointments with the provider, mental health, and/or nurse navigator. If the patient is discharged from the practice, they can apply to be re-established after 6 months. Lab work:    Unless you are instructed differently, please return to the office between the hours of 7 am and 11:00 am Monday through Thursday to have your labs drawn one to two weeks before your next scheduled PROVIDER appointment. If you do not have an appointment to follow up on these results, please make one or plan to call the office if you do not hear from us to get the results. No news does not mean good news. Medications:   Medication  times are firm:  Mondays and Tuesdays from 1pm-5pm  Wednesdays and Thursdays from 8am-11:30am  These hours are subject to change without notice.     The [facility] or [facility] will assist you with your medications when available as not all medications can be obtained through this program. Medications are added and deleted from the [address] as deemed necessary by the pharmaceutical companies. There is a chance that a medication you currently receive from [facility] may be discontinued. If this occurs an alternative medication will be sent to a local pharmacy for you to obtain and pay for. If your medications are new or have changed, and you get your medications from the [facility]. [place] [address] [place] [address]), you MUST talk to the pharmacy staff to sign the new prescription applications. If you don't sign the applications we cannot get the medications for you. It usually takes 6-8 weeks for your medications to arrive. The Pharmacy staff will call you when your medications are available. If you don't hear from them you call [phone] and inquire about your medicines. You are responsible for obtaining your medications. You will have 30 days to come in and  your medications. If you don't  your medicines within those 30 days, those medicines may be placed on the self as samples and you will have to start all over again by completing the applications and waiting the 6-8 weeks for your medicines to arrive. Foot Care: Local ministry through [facility] ([address] [facility])  Every second Tuesday of the month (except for holidays and election days) from 9am to 1 pm. The services provided by these ministry volunteers are free of charge with the option to donate. They will inspect your feet thoroughly, soak them for 10 minutes, cut and file your nails. They care for diabetics as well. Keep in mind this service is free and will be on a first come first serve basis. Bad teeth? Ask about the Dental Clinic to get you in front of a local dentist when a dentist is available. They only extract teeth. No fillings, root canals, or dentures.  The bus leaves the second Thursday of the month for those on the list. (Ask about availability as these appointments are limited). If you are scheduled for the dentist and you fail to come into the Foundation to meet the bus, you WILL NOT be placed on the dental list again. IMPORTANT: if you have high blood pressure please take your blood pressure  medications before arriving to the Foundation. If your blood pressure is elevated  the dental clinic WILL NOT extract any teeth and you will not have another  opportunity to go to the clinic. DIABETES:   Do you have uncontrolled diabetes or you just want to learn more about your diabetes? Schedule with the Nurse navigator for our new 5-week Diabetes program. You will learn how to properly manage your diabetes: nutrition, exercise, medication therapy. Eye exams for Diabetics. Please let us know so we can add you to the list to see an eye doctor. These  appointments are limited. You will receive a free eye exam and free glasses if  needed. Unfortunately, if you are not a diabetic, we do not have a free service  for eye exams for you (yet!). We do have information on where to  go to get a  huge discount on eye exams and glasses. Sick visits: If you are sick and it is not an emergency call the office to schedule an appointment to see the provider. Care in the office is FREE but charges will be applied if you go to the Emergency room. If you feel better and do not wish to attend your sick appointment please call  the office and cancel to avoid a no-show. Charges and cost items from the Foundation:    Most of our orders are covered by [employer] but there ARE SOME CHARGES for items such as radiology interpretations and anesthesiology during procedures and surgeries that are not covered under [employer].       [facility]   Please make sure you have contacted [employer] to check on your payment options:   1 [phone] Mon - Fri 8-4pm. It is important that you are screened in order to qualify for assistance and to avoid huge medical charges. This service is to benefit you. The [facility] is not responsible for ANY charges you may accrue regardless of who ordered the medication, procedure, treatment or test. If you go to the Emergency Room, you WILL be charged! Behavior and emotional issues! It is stressful to be sick, have an illness, take medications, not have a job, not have medical insurance, have family issues or just getting older! Schedule an appointment with our mental health provider. She is in the office Mondays and Wednesdays from 8am to [address] [facility] can also contact the following: The national suicide hotline (3-941-015-ICHE or 4-628.299.6701)    [address], [address] [place] [address]   [phone]    Drug and Alcohol Addiction Issues! It is hard to stop a poor habit but there is help out there. Please feel free to attend any of the following support groups to help you kick the habit or go to [facility] Emergency Department to be evaluated by the psychiatric team. Never give up!! AlAnon meetings: [facility] MONDAY 10:30 AM [place] [address] SATURDAY 8:00 PM [facility] SATURDAY NIGHT [address]         ALYSSA BITS:  Disposing medicines in household trash: Almost all medicines can be thrown into your household trash. These include prescription and over-the-counter (OTC) drugs in pills, liquids, drops, patches, creams, and inhalers. Follow these steps:  1) Remove the drugs from their original containers and mix them with something undesirable, such as used coffee grounds, dirt, or cat litter.  This makes the medicine less appealing to children and pets and unrecognizable to someone who might intentionally go through the trash looking for drugs. 2) Put the mixture in something you can close (a re-sealable zipper storage bag, empty can, or other container) to prevent the drug from leaking or spilling out. 3) Throw the container in the garbage. 4) Scratch out all your personal information on the empty medicine packaging to protect your identity and privacy. Throw the packaging away. If you have a question about your medicine, ask your health care provider or pharmacist.         Learning About Asthma  What is asthma? Asthma is a long-term condition that affects your breathing. It causes the airways that lead to the lungs to swell. People with asthma may have asthma attacks. During an asthma attack, the airways tighten and become narrower. This makes it hard to breathe, and you may wheeze or cough. If you have a bad asthma attack, you may need emergency care. Asthma affects people in different ways. Some people only have asthma attacks during allergy season, or when they breathe in cold air, or when they exercise. Others have many bad attacks that send them to the doctor often. What are the symptoms? Symptoms of asthma can be mild or severe. You may have mild attacks now and then, you may have severe symptoms every day, or you may have something in between. How often you have symptoms can also change. When you have asthma, you may:  · Wheeze, making a loud or soft whistling noise when you breathe in and out. · Cough a lot. · Feel tightness in your chest.  · Feel short of breath. · Have trouble sleeping because of coughing or having a hard time breathing. · Get tired quickly during exercise. Your symptoms may be worse at night. How can you prevent asthma attacks? Certain things can make asthma symptoms worse. These are called triggers. When you are around a trigger, an asthma attack is more likely. Common triggers include:  · Cigarette smoke or air pollution.   · Things you are allergic to, such as:  ¨ Pollen, mold, or dust mites. ¨ Pet hair, skin, or saliva. · Illnesses, like colds, flu, or pneumonia. · Exercise. · Dry, cold air. Here are some ways to avoid a few common triggers:  · Do not smoke or allow others to smoke around you. If you need help quitting, talk to your doctor about stop-smoking programs and medicines. These can increase your chances of quitting for good. · If there is a lot of pollution, pollen, or dust outside, stay at home and keep your windows closed. Use an air conditioner or air filter in your home. Check your local weather report or newspaper for air quality and pollen reports. · Get the flu vaccine every year. Talk to your doctor about getting a pneumococcal shot. Wash your hands often to prevent infections. · Avoid exercising outdoors in cold weather. If you are outdoors in cold weather, wear a scarf around your face and breathe through your nose. How is asthma treated? There are two parts to treating asthma, which are outlined in your asthma action plan. The goals are to:  · Control asthma over the long term. The asthma action plan tells you which medicine you may need to take every day. This is called a controller medicine. It helps to reduce the swelling of the airways and prevent asthma attacks. · Treat asthma attacks when they occur. The asthma action plan tells you what to do when you have an asthma attack. It helps you identify triggers that can cause your attacks. You use quick-relief medicine during an attack. The asthma plan also helps you track your symptoms and know how well the treatment is working. Follow-up care is a key part of your treatment and safety. Be sure to make and go to all appointments, and call your doctor if you are having problems. It's also a good idea to know your test results and keep a list of the medicines you take. Where can you learn more? Go to http://ralph-fabian.info/.   Enter S713 in the search box to learn more about \"Learning About Asthma. \"  Current as of: December 6, 2017  Content Version: 11.7  © 8542-3803 [employer]. Care instructions adapted under license by [employer] (which disclaims liability or warranty for this information). If you have questions about a medical condition or this instruction, always ask your healthcare professional. [address] any warranty or liability for your use of this information. Pregabalin (Lyrica) - (By mouth)   Why this medicine is used:   Treats nerve and muscle pain, including fibromyalgia. Also treats seizures. Contact a nurse or doctor right away if you have:  · Thoughts of hurting yourself  · Muscle pain, tenderness, weakness     Common side effects:  · Rapid weight gain; swelling in your hands, ankles, or feet  · Confusion, trouble concentrating, tiredness  · Constipation, dry mouth  · Headache  © 2017 [facility] Information is for End User's use only and may not be sold, redistributed or otherwise used for commercial purposes.

## 2018-08-27 NOTE — PROGRESS NOTES
YAQUELIN HOFFMAN [facility]  [facility], [address] [facility]  [phone] office/[phone] fax      8/27/2018    Reason for visit:   Chief Complaint   Patient presents with    Establish Care    Asthma     on albuterol    Diabetes     DM II - on Lantus 25 units qhs    GERD     takes nexium    Sarcoidosis     Used to see Dr Kristan Danielson       Patient: Rexann Lennox, 1965, [id number]       Primary MD: Karol Gamboa NP    Subjective:   Rexann Lennox, a 46 y.o. female, who presents for Establish Care; Asthma (on albuterol); Diabetes (DM II - on Lantus 25 units qhs); GERD (takes nexium); and Sarcoidosis (Used to see Dr Kristan Danielson)        [address]   The history is provided by the patient (Used to see PCP on [facility]. ). Asthma   The history is provided by the patient. This is a chronic (Dx 10 yrs ago) problem. Treatments tried: Advair and rescue inhaler. Diabetes   The history is provided by the patient. This is a chronic problem. Treatments tried: Lantus 25units qpm, metformin and januvia. GERD   The history is provided by the patient. This is a chronic (10 yrs) problem. Treatments tried: Taking OTC Nexium qd    Ankle swelling    The history is provided by the patient. This is a chronic problem. The problem has been gradually improving (with HCTZ). The patient is experiencing no pain. Treatments tried: HCTZ 25mg qd    Medication Evaluation   The history is provided by the patient (Metformin and lisinopril- Blink card- get meds free x1yr).          Past Medical History:   Diagnosis Date    Allergic rhinitis due to allergen     s/p shots    Arthritis 6/13     MRI c spine w degen changes; Dr Umer Coughlin;  ratio 68%, FEV1 78% w 6% inc postbd, TLC 77, RV 56, DLCO 61%    Bilateral great toe fractures 2014    Chronic sinusitis     Dr. Komal Villafana in the past    Colon polyp 5/16    Dr Abraham Tobin    Decreased GFR 8/28/2018    Diabetes ([address] presented w hyperosmotic nonketotic hyperglycemia bs >1000 (3/16); gluc>700 ()    Dyslipidemia     calculated 10 year risk score was 2.0% ()    Edema     Edema of both ankles 2018    Elevated serum creatinine 2018    Environmental and seasonal allergies 2018    Eustachian tube dysfunction     FHx: colon cancer     FHx: heart disease     Fibrocystic breast     Dr Juliann Morgan Gastroesophageal reflux disease without esophagitis 2015    GERD (gastroesophageal reflux disease)     Hypertriglyceridemia 2018    Lateral epicondylitis of both elbows 2017    Macular degeneration 2018    Per patient    Macular degeneration of both eyes 2018    Mild intermittent asthma without complication     Morbid obesity (HCC)     peak weight 196 lbs, bmi 35.8 from 10/13    Neuropathy 2018    Osteopenia     Dr. Car Cisneros; DEXA t score -0.7 spine, -0.2 hip ()    Sarcoidosis     Dr Kasia Padilla Type 2 diabetes mellitus with hyperglycemia, with long-term current use of insulin ([facility] [address] 2018       Past Surgical History:   Procedure Laterality Date    CARDIAC SURG PROCEDURE UNLIST  9/10,     thallium negative ef 72%; negative ef 70%    CHEST SURGERY PROCEDURE UNLISTED       thyroid negative    CHEST SURGERY PROCEDURE UNLISTED      pfts w mod restrictive defect     COLONOSCOPY N/A 2016    Dr Chang Davidson [facility]      Dr. Rigoberto Sharma HX HEENT      nasal polypectomy Dr. Melvin Dyer HX HEENT      tear duct surgery right Dr. Malik Ashvin ; left Dr Piper Side     HX ORTHOPAEDIC      DEXA -0.7 spine, -0.2 hip     VASCULAR SURGERY PROCEDURE UNLIST      venous doppler negative       Social History     Social History    Marital status: LEGALLY      Spouse name: N/A    Number of children: 0    Years of education: 13     Occupational History    Unemployed      Social History Main Topics    Smoking status: Never Smoker    Smokeless tobacco: Never Used    Alcohol use 0.0 oz/week     0 Standard drinks or equivalent per week      Comment: occasional wine    Drug use: No    Sexual activity: Not Currently     Other Topics Concern     Service No    Blood Transfusions No    Caffeine Concern Yes     due to reflux    Occupational Exposure No    Hobby Hazards No    Sleep Concern Yes    Stress Concern Yes     wants a job    Weight Concern Yes    Special Diet No    Back Care No    Exercise Yes    Bike Helmet No    Seat Belt Yes    Self-Exams Yes     Social History Narrative    Patient lives with a friend, no pets. Allergies   Allergen Reactions    Pollen Extracts Runny Nose and Cough    Amoxicillin Hives, Shortness of Breath and Swelling    Crestor [Rosuvastatin] Myalgia    Lipitor [Atorvastatin] Other (comments)     Muscle cramps and weakness         Current Outpatient Prescriptions on File Prior to Visit   Medication Sig Dispense Refill    esomeprazole (NEXIUM) 40 mg capsule TAKE ONE CAPSULE BY MOUTH DAILY 90 Cap 0    glucose blood VI test strips (ACCU-CHEK POOJA) strip Pt to test 5 times daily. Dx:E11.65 500 Strip 3    predniSONE (DELTASONE) 5 mg tablet 2 tablets every morning with breakfast 180 Tab 3    aspirin delayed-release 81 mg tablet Take  by mouth daily.  CETIRIZINE HCL (ZYRTEC PO) Take  by mouth. No current facility-administered medications on file prior to visit. Review of Systems   Constitutional: Negative. HENT: Negative. Eyes:        Had eye exam 6 months ago. I was seeing white spots. The eye MD wanted me to see specialist but I dont have insurance. I applied for [facility] and was approved last week. I will call and make opthalmology appt with them. Respiratory:        Dx sarcoidosis 13 yrs ago. Taking prednisone for maintenence dose. I used to see Dr Prateek Cifuentes, Last saw him over a yr before I lost my insurance. Cardiovascular: Negative. Gastrointestinal:        See HPI   Endocrine:        See HPI   Genitourinary: Negative. Musculoskeletal: Negative. Skin: Negative. Allergic/Immunologic: Positive for environmental allergies. I have chronic sinusitis. I used to take flonase. I do take zyrtec. Neurological:        I have neuropathy in my hands and feet from DM. Hematological: Negative. Psychiatric/Behavioral: Negative. Objective:   Visit Vitals    /85 (BP 1 Location: Right arm, BP Patient Position: Sitting)    Pulse 73    Temp 98.3 °F (36.8 °C) (Oral)    Resp 20    Ht 5' 2\" (1.575 m)    Wt 181 lb 9.6 oz (82.4 kg)    LMP 03/30/2013    SpO2 98%    BMI 33.22 kg/m2      Wt Readings from Last 3 Encounters:   08/27/18 181 lb 9.6 oz (82.4 kg)   07/24/18 185 lb (83.9 kg)   06/16/18 180 lb (81.6 kg)     Lab Results   Component Value Date/Time    Glucose 92 08/27/2018 09:30 AM    Glucose (POC) 155 (H) 06/16/2018 04:47 PM    Glucose, POC 89 05/26/2016 10:49 AM         Physical Exam   Constitutional: She is oriented to person, place, and time. Obese   HENT:   Head: Atraumatic. Wearing prescription glasses   Neck: Neck supple. Cardiovascular: Normal rate and regular rhythm. Pulmonary/Chest: Effort normal and breath sounds normal.   Noted pt to clear her throat and cough to clear throat. Possible due to chronic sinusitis. Lungs clear. Abdominal: Bowel sounds are normal.   Musculoskeletal: Normal range of motion. Neurological: She is alert and oriented to person, place, and time. Skin: Skin is warm and dry. Psychiatric: She has a normal mood and affect. Assessment:    Dorathy Dorthey Riedel who has risk factors including (see above previous medical hx) and:           ICD-10-CM ICD-9-CM    1. Encounter to establish care Z76.89 V65.8 COLLECTION VENOUS BLOOD,VENIPUNCTURE   2.  Type 2 diabetes mellitus with hyperglycemia, with long-term current use of insulin ([facility]) E11.65 250.00 insulin glargine (LANTUS,BASAGLAR) 100 unit/mL (3 mL) inpn    Z79.4 790.29 lisinopril (PRINIVIL, ZESTRIL) 5 mg tablet     V58.67 Insulin Needles, Disposable, 30 gauge x 1/3\"      HEMOGLOBIN A1C WITH EAG      sAXagliptin-metFORMIN (KOMBIGLYZE XR) 2.5-1,000 mg TM24      DISCONTINUED: sAXagliptin-metFORMIN (KOMBIGLYZE XR) 2.5-1,000 mg TM24      DISCONTINUED: sAXagliptin-metFORMIN (KOMBIGLYZE XR) 2.5-1,000 mg TM24      DISCONTINUED: insulin glargine (LANTUS,BASAGLAR) 100 unit/mL (3 mL) inpn   3. Neuropathy G62.9 355.9 pregabalin (LYRICA) 50 mg capsule      CBC WITH AUTOMATED DIFF      DRUG SCREEN, URINE      HEMOGLOBIN A1C WITH EAG      HEPATITIS PANEL, ACUTE      HIV 1/2 AG/AB, 4TH GENERATION,W RFLX CONFIRM      LIPID PANEL      TSH 3RD GENERATION      MICROALBUMIN, UR, RAND W/ MICROALB/CREAT RATIO      METABOLIC PANEL, COMPREHENSIVE      MAGNESIUM   4. Mild intermittent asthma without complication F94.06 303.02 fluticasone-salmeterol (ADVAIR DISKUS) 500-50 mcg/dose diskus inhaler      albuterol (PROVENTIL HFA, VENTOLIN HFA, PROAIR HFA) 90 mcg/actuation inhaler      REFERRAL TO PULMONARY DISEASE      DISCONTINUED: hydroCHLOROthiazide (HYDRODIURIL) 25 mg tablet      DISCONTINUED: fluticasone-salmeterol (ADVAIR) 500-50 mcg/dose diskus inhaler      DISCONTINUED: albuterol (PROVENTIL HFA, VENTOLIN HFA, PROAIR HFA) 90 mcg/actuation inhaler   5. Sarcoidosis Dr. Marielle Navas on low dose steroid D86.9 135 fluticasone-salmeterol (ADVAIR DISKUS) 500-50 mcg/dose diskus inhaler      albuterol (PROVENTIL HFA, VENTOLIN HFA, PROAIR HFA) 90 mcg/actuation inhaler      REFERRAL TO PULMONARY DISEASE      DISCONTINUED: hydroCHLOROthiazide (HYDRODIURIL) 25 mg tablet      DISCONTINUED: fluticasone-salmeterol (ADVAIR) 500-50 mcg/dose diskus inhaler      DISCONTINUED: albuterol (PROVENTIL HFA, VENTOLIN HFA, PROAIR HFA) 90 mcg/actuation inhaler   6. Edema of both ankles M25.471 719.07 hydroCHLOROthiazide (HYDRODIURIL) 25 mg tablet    M25.472     7.  Dyslipidemia E78.5 272.4 pravastatin (PRAVACHOL) 20 mg tablet      LIPID PANEL   8. Environmental and seasonal allergies J30.89 477.8 mometasone (NASONEX) 50 mcg/actuation nasal spray      DISCONTINUED: mometasone (NASONEX) 50 mcg/actuation nasal spray   9. Gastroesophageal reflux disease without esophagitis K21.9 530.81    10. Elevated serum creatinine Y24.60 406.85 METABOLIC PANEL, COMPREHENSIVE   11. Hypertriglyceridemia E78.1 272.1 LIPID PANEL   12. Decreased GFR I23.4 765.4 METABOLIC PANEL, COMPREHENSIVE   13. Macular degeneration of both eyes, unspecified type H35.30 362.50    14. Breast cancer screening by mammogram Z12.31 Z62.36 REFERRAL TO [facility]         1. Encounter to establish care    - CBC WITH AUTOMATED DIFF; Future  - DRUG SCREEN, URINE; Future  - HEMOGLOBIN A1C WITH EAG; Future  - HEPATITIS PANEL, ACUTE; Future  - HIV 1/2 AG/AB, 4TH GENERATION,W RFLX CONFIRM; Future  - LIPID PANEL; Future  - TSH 3RD GENERATION; Future  - MICROALBUMIN, UR, RAND W/ MICROALB/CREAT RATIO; Future  - METABOLIC PANEL, COMPREHENSIVE; Future  - MAGNESIUM; Future    - COLLECTION VENOUS BLOOD,VENIPUNCTURE    2. Type 2 diabetes mellitus with hyperglycemia, with long-term current use of insulin ([facility] [address]    Notified SALINA Moffett, of reduced dose of Kombiglyze XR from 2 tabs to 1 tab every day. - insulin glargine (LANTUS,BASAGLAR) 100 unit/mL (3 mL) inpn; 25 Units by SubCUTAneous route nightly. Dispense: 3 Pen; Refill: 3  - lisinopril (PRINIVIL, ZESTRIL) 5 mg tablet; Take 1 Tab by mouth daily. For kidney protection  Dispense: 30 Tab; Refill: 3  - sAXagliptin-metFORMIN (KOMBIGLYZE XR) 2.5-1,000 mg TM24; Take 1 Tabs by mouth daily. For diabetes  Dispense: 180 Tab; Refill: 3  - sAXagliptin-metFORMIN (KOMBIGLYZE XR) 2.5-1,000 mg TM24; Take 1 Tabs by mouth daily. For diabetes  Dispense: 180 Tab; Refill: 0  - insulin glargine (LANTUS,BASAGLAR) 100 unit/mL (3 mL) inpn; 25 Units by SubCUTAneous route nightly.   Dispense: 3 Pen; Refill: 0  - Insulin Needles, Disposable, 30 gauge x 1/3\"; Use 1-2 times daily. Qty 100  Dispense: 1 Package; Refill: 11  - HEMOGLOBIN A1C WITH EAG; Future    3. Neuropathy  Reorder    - pregabalin (LYRICA) 50 mg capsule; Take 1 Cap by mouth two (2) times a day. Max Daily Amount: 100 mg. For neuropathy  Dispense: 60 Cap; Refill: 3      4. Mild intermittent asthma without complication  Pt is already established with Dr Dioni Dowell, pulm. Will refer back to pulm. - fluticasone-salmeterol (ADVAIR DISKUS) 500-50 mcg/dose diskus inhaler; Take 1 Puff by inhalation two (2) times a day. For asthma and sarcoidosis  Dispense: 3 Inhaler; Refill: 3  - fluticasone-salmeterol (ADVAIR) 500-50 mcg/dose diskus inhaler; Take 1 Puff by inhalation every twelve (12) hours. For asthma and sarcoidosis  Dispense: 1 Inhaler; Refill: 0  - albuterol (PROVENTIL HFA, VENTOLIN HFA, PROAIR HFA) 90 mcg/actuation inhaler; Take 2 Puffs by inhalation every six (6) hours as needed for Wheezing or Shortness of Breath. Dispense: 1 Inhaler; Refill: 0  - albuterol (PROVENTIL HFA, VENTOLIN HFA, PROAIR HFA) 90 mcg/actuation inhaler; Take 2 Puffs by inhalation every six (6) hours as needed. For shortness of breath  Dispense: 3 Inhaler; Refill: 3  - REFERRAL TO PULMONARY DISEASE    5. Sarcoidosis Dr. Dioni Dowell on low dose steroid    - fluticasone-salmeterol (ADVAIR DISKUS) 500-50 mcg/dose diskus inhaler; Take 1 Puff by inhalation two (2) times a day. For asthma and sarcoidosis  Dispense: 3 Inhaler; Refill: 3  - fluticasone-salmeterol (ADVAIR) 500-50 mcg/dose diskus inhaler; Take 1 Puff by inhalation every twelve (12) hours. For asthma and sarcoidosis  Dispense: 1 Inhaler; Refill: 0  - albuterol (PROVENTIL HFA, VENTOLIN HFA, PROAIR HFA) 90 mcg/actuation inhaler; Take 2 Puffs by inhalation every six (6) hours as needed for Wheezing or Shortness of Breath. Dispense: 1 Inhaler; Refill: 0  - albuterol (PROVENTIL HFA, VENTOLIN HFA, PROAIR HFA) 90 mcg/actuation inhaler;  Take 2 Puffs by inhalation every six (6) hours as needed. For shortness of breath  Dispense: 3 Inhaler; Refill: 3  - REFERRAL TO PULMONARY DISEASE    6. Edema of both ankles  Patient was instructed on relieving and preventing leg edema. The first line of defense is: leg elevation. Elevate legs above the level of the heart which puts minimal pressure on the back of the knees and thighs and lower back. Other ways to decrease swelling is limiting salt intake, drink plenty of water, and avoid sitting with the feet dependent.    - hydroCHLOROthiazide (HYDRODIURIL) 25 mg tablet; Take 1 Tab by mouth daily. For swelling  Dispense: 30 Tab; Refill: 3    7. Dyslipidemia    - pravastatin (PRAVACHOL) 20 mg tablet; Take 1 Tab by mouth nightly. Dispense: 30 Tab; Refill: 3  - LIPID PANEL; Future    8. Environmental and seasonal allergies  Sinus action plan! Avoid the allergen if you know what it is (strong smells, animals, cigarettes or carpet areas)    Start your medications as soon as the first sneeze, runny nose, watery eye or tickle in your throat appears and plan to continue it EVERY DAY for the next 2-4 weeks. Antihistamine:   Instructed pt to sit up slowly when getting up from a lying position and then wait a moment before standing. Once standing, pt needs to wait a moment before attempting to walk. This will help prevent the loss of equilibrium when their allergies are active. - mometasone (NASONEX) 50 mcg/actuation nasal spray; 2 Sprays by Both Nostrils route daily as needed. For allergies  Dispense: 2 Container; Refill: 0  - mometasone (NASONEX) 50 mcg/actuation nasal spray; 2 Sprays by Both Nostrils route daily as needed. For allergies  Dispense: 3 Container; Refill: 3    9. Gastroesophageal reflux disease without esophagitis  OTC nexium is effective for pts sx. 10. Elevated serum creatinine    - METABOLIC PANEL, COMPREHENSIVE; Future    11. Hypertriglyceridemia    - LIPID PANEL; Future    12.  Decreased GFR    - METABOLIC PANEL, COMPREHENSIVE; Future    13. Macular degeneration of both eyes, unspecified type  Pt was approved for [facility] and will see specialist there    14. Breast cancer screening by mammogram    - REFERRAL TO PUBLIC [facility]      See additional info under plan      Written instructions followed our verbal discussion of all information discussed above, pending tests ordered and future goals/plans. Patient expressed understanding of current diagnosis, planned testing, follow up and if needed to contact the office for any questions or concerns prior to the next visit. Plan:       Reviewed medication and completed the medication reconciliation with the patient. Reviewed side effects of medications with the patient. Questions were answered and patient verb understanding. Labs obtained to establish baseline, evaluate metabolic health, nutritional status, vitamin deficiencies and screening for at risk items based on the demographics of the patient, previous medical history and current social practices.  Will contact the patient in when all labs are resulted by phone to review and make lifestyle and medication recommendations. Follow up labs will be completed to monitor improvement prior to their next visit. NN will review labs at NPO/Lab review appt: 9/6/18  1) Urine creatinine slightly elevated  2) A1c 7.5 (11 months ago A1c 12). Cont same dose insulin. No chg.  3) . Goal <150; . Goal <70. Work on diet and continue Pravastatin. 4) Serum creatinine slightly elevated; GFR 36. (Possible cause of reduced GFR could be the long term high doses of metformin and Januvia. Cam Jacobo has been dc'd. Instruct pt to decrease Kombiglyze to 1 tab every day and not 2 tabs every day).  Thank you    Pt will need labs 1-2 weeks prior to follow up appt on 12/3/18  A1c, CMP, lipid    Orders Placed This Encounter    CBC WITH AUTOMATED DIFF     Standing Status:   Future     Number of Occurrences:   1     Standing Expiration Date:   8/27/2018    DRUG SCREEN, URINE     Standing Status:   Future     Number of Occurrences:   1     Standing Expiration Date:   8/27/2018    HEMOGLOBIN A1C WITH EAG     Standing Status:   Future     Number of Occurrences:   1     Standing Expiration Date:   8/27/2018    HEPATITIS PANEL, ACUTE     Standing Status:   Future     Number of Occurrences:   1     Standing Expiration Date:   8/27/2018    HIV 1/2 AG/AB, 4TH GENERATION,W RFLX CONFIRM     Standing Status:   Future     Number of Occurrences:   1     Standing Expiration Date:   8/27/2018    LIPID PANEL     Standing Status:   Future     Number of Occurrences:   1     Standing Expiration Date:   8/27/2018    TSH 3RD GENERATION     Standing Status:   Future     Number of Occurrences:   1     Standing Expiration Date:   8/27/2018    MICROALBUMIN, UR, RAND W/ MICROALB/CREAT RATIO     Standing Status:   Future     Number of Occurrences:   1     Standing Expiration Date:   4/25/6792    METABOLIC PANEL, COMPREHENSIVE     Standing Status:   Future     Number of Occurrences:   1     Standing Expiration Date:   8/27/2018    MAGNESIUM     Standing Status:   Future     Number of Occurrences:   1     Standing Expiration Date:   8/27/2018    LIPID PANEL     Standing Status:   Future     Standing Expiration Date:   1/31/2019    HEMOGLOBIN A1C WITH EAG     Standing Status:   Future     Standing Expiration Date:   2/36/4251    METABOLIC PANEL, COMPREHENSIVE     Standing Status:   Future     Standing Expiration Date:   1/31/2019    [facility]     Referral Priority:   Routine     Referral Type:   Consultation     Referral Reason:   Specialty Services Required    REFERRAL TO PULMONARY DISEASE     Referral Priority:   Routine     Referral Type:   Consultation     Referral Reason:   Specialty Services Required     Referred to Provider:   Nakia Mckee MD    COLLECTION VENOUS Thomasena Sides DISCONTD: hydroCHLOROthiazide (HYDRODIURIL) 25 mg tablet     Sig: Take 25 mg by mouth daily.  fluticasone-salmeterol (ADVAIR DISKUS) 500-50 mcg/dose diskus inhaler     Sig: Take 1 Puff by inhalation two (2) times a day. For asthma and sarcoidosis     Dispense:  3 Inhaler     Refill:  3    DISCONTD: fluticasone-salmeterol (ADVAIR) 500-50 mcg/dose diskus inhaler     Sig: Take 1 Puff by inhalation every twelve (12) hours. For asthma and sarcoidosis     Dispense:  1 Inhaler     Refill:  0     Order Specific Question:   Expiration Date     Answer:   2018     Order Specific Question:   Lot#     Answer:   [id number]     Order Specific Question:        Answer:   Clearance Bellis: insulin glargine (LANTUS) 100 unit/mL injection     Si Units by SubCUTAneous route nightly. Indications: type 2 diabetes mellitus     Dispense:  1 Vial     Refill:  0    insulin glargine (LANTUS,BASAGLAR) 100 unit/mL (3 mL) inpn     Si Units by SubCUTAneous route nightly. Dispense:  3 Pen     Refill:  3    lisinopril (PRINIVIL, ZESTRIL) 5 mg tablet     Sig: Take 1 Tab by mouth daily. For kidney protection     Dispense:  30 Tab     Refill:  3    DISCONTD: sAXagliptin-metFORMIN (KOMBIGLYZE XR) 2.5-1,000 mg TM24     Sig: Take 2 Tabs by mouth daily. For diabetes     Dispense:  180 Tab     Refill:  3    DISCONTD: sAXagliptin-metFORMIN (KOMBIGLYZE XR) 2.5-1,000 mg TM24     Sig: Take 2 Tabs by mouth daily. For diabetes     Dispense:  180 Tab     Refill:  0     Order Specific Question:   Expiration Date     Answer:   2020     Order Specific Question:   Lot#     Answer:   [id number]     Order Specific Question:        Answer:   [employer]    DISCONTD: albuterol (PROVENTIL HFA, VENTOLIN HFA, PROAIR HFA) 90 mcg/actuation inhaler     Sig: Take 2 Puffs by inhalation every six (6) hours as needed for Wheezing or Shortness of Breath.      Dispense:  1 Inhaler     Refill:  0     Order Specific Question:   Expiration Date     Answer:   2018 Order Specific Question:   Lot#     Answer:   [id number]     Order Specific Question:        Answer:   [facility]    albuterol (PROVENTIL HFA, VENTOLIN HFA, PROAIR HFA) 90 mcg/actuation inhaler     Sig: Take 2 Puffs by inhalation every six (6) hours as needed. For shortness of breath     Dispense:  3 Inhaler     Refill:  3    hydroCHLOROthiazide (HYDRODIURIL) 25 mg tablet     Sig: Take 1 Tab by mouth daily. For swelling     Dispense:  30 Tab     Refill:  3    DISCONTD: insulin glargine (LANTUS,BASAGLAR) 100 unit/mL (3 mL) inpn     Si Units by SubCUTAneous route nightly. Dispense:  3 Pen     Refill:  0     Order Specific Question:   Expiration Date     Answer:   2020     Order Specific Question:   Lot#     Answer:   [id number]     Order Specific Question:        Answer:   Jesús Dessert Insulin Needles, Disposable, 30 gauge x 1/3\"     Sig: Use 1-2 times daily. Qty 100     Dispense:  1 Package     Refill:  11     Cheapest brand available please    pravastatin (PRAVACHOL) 20 mg tablet     Sig: Take 1 Tab by mouth nightly. Dispense:  30 Tab     Refill:  3    pregabalin (LYRICA) 50 mg capsule     Sig: Take 1 Cap by mouth two (2) times a day. Max Daily Amount: 100 mg. For neuropathy     Dispense:  60 Cap     Refill:  3    DISCONTD: mometasone (NASONEX) 50 mcg/actuation nasal spray     Si Sprays by Both Nostrils route daily as needed. For allergies     Dispense:  2 Container     Refill:  0     Order Specific Question:   Expiration Date     Answer:   2019     Order Specific Question:   Lot#     Answer:   [id number]     Order Specific Question:        Answer:   [facility]    mometasone (NASONEX) 50 mcg/actuation nasal spray     Si Sprays by Both Nostrils route daily as needed. For allergies     Dispense:  3 Container     Refill:  3    sAXagliptin-metFORMIN (KOMBIGLYZE XR) 2.5-1,000 mg TM24     Sig: Take 1 Tab by mouth daily.  For diabetes     Dispense:  90 Tab     Refill:  3 Current Outpatient Prescriptions   Medication Sig Dispense Refill    sAXagliptin-metFORMIN (KOMBIGLYZE XR) 2.5-1,000 mg TM24 Take 1 Tab by mouth daily. For diabetes 90 Tab 3    fluticasone-salmeterol (ADVAIR DISKUS) 500-50 mcg/dose diskus inhaler Take 1 Puff by inhalation two (2) times a day. For asthma and sarcoidosis 3 Inhaler 3    insulin glargine (LANTUS,BASAGLAR) 100 unit/mL (3 mL) inpn 25 Units by SubCUTAneous route nightly. 3 Pen 3    lisinopril (PRINIVIL, ZESTRIL) 5 mg tablet Take 1 Tab by mouth daily. For kidney protection 30 Tab 3    albuterol (PROVENTIL HFA, VENTOLIN HFA, PROAIR HFA) 90 mcg/actuation inhaler Take 2 Puffs by inhalation every six (6) hours as needed. For shortness of breath 3 Inhaler 3    hydroCHLOROthiazide (HYDRODIURIL) 25 mg tablet Take 1 Tab by mouth daily. For swelling 30 Tab 3    Insulin Needles, Disposable, 30 gauge x 1/3\" Use 1-2 times daily. Qty 100 1 Package 11    pravastatin (PRAVACHOL) 20 mg tablet Take 1 Tab by mouth nightly. 30 Tab 3    pregabalin (LYRICA) 50 mg capsule Take 1 Cap by mouth two (2) times a day. Max Daily Amount: 100 mg. For neuropathy 60 Cap 3    mometasone (NASONEX) 50 mcg/actuation nasal spray 2 Sprays by Both Nostrils route daily as needed. For allergies 3 Container 3    esomeprazole (NEXIUM) 40 mg capsule TAKE ONE CAPSULE BY MOUTH DAILY 90 Cap 0    glucose blood VI test strips (ACCU-CHEK POOJA) strip Pt to test 5 times daily. Dx:E11.65 500 Strip 3    predniSONE (DELTASONE) 5 mg tablet 2 tablets every morning with breakfast 180 Tab 3    aspirin delayed-release 81 mg tablet Take  by mouth daily.  CETIRIZINE HCL (ZYRTEC PO) Take  by mouth. Follow-up Disposition:  Return in about 3 months (around 11/27/2018). Call [facility] for financial assistance- handout with phone number and address given to patient. Talon Hernandez.  Erin, RN, MSN, [employer]   [facility] minutes with the patient in face-to-face consultation, of which greater than 50% was spent in counseling and coordination of care as described above.

## 2018-08-27 NOTE — MR AVS SNAPSHOT
[address] [facility] [id number] [phone] Patient: Talita Diaz MRN: [id number] Visit Information Date & Time Provider Department Dept. Phone Encounter #  
 8/27/2018  9:00 AM Coral Rooney NP [address] [facility] [id number] Follow-up Instructions Return in about 3 months (around 11/27/2018). Your Appointments 9/6/2018  9:00 AM  
CONSULT with NURSE NAVIGATOR [address] [place] [address] ([facility]) Appt Note: NPO/LAB REVIEW  
 [address] [facility] [address]  
[address] [facility] [address]  
  
    
 11/26/2018 10:00 AM  
PAP/PELVIC with Coral Rooney NP [address] [place] [address] ([facility]) Appt Note: Pap w/labs [address] [facility] [id number]  
[address] [facility] [address]  
  
    
 12/3/2018  8:30 AM  
Follow Up with Coral Rooney NP [address] [place] [address] ([facility]) Appt Note: 3 mth follow up  
 [address] [facility] [address]  
[address] [facility] [address] Upcoming Health Maintenance Date Due  
 BREAST CANCER SCRN MAMMOGRAM 6/14/2017 PAP AKA CERVICAL CYTOLOGY 8/7/2017 HEMOGLOBIN A1C Q6M 8/27/2018* Influenza Age 5 to Adult 10/1/2018* FOOT EXAM Q1 9/28/2018 MICROALBUMIN Q1 9/28/2018 LIPID PANEL Q1 10/13/2018 EYE EXAM RETINAL OR DILATED Q1 11/20/2018 COLONOSCOPY 5/26/2021 DTaP/Tdap/Td series (2 - Td) 4/24/2023 *Topic was postponed. The date shown is not the original due date. Allergies as of 8/27/2018  Review Complete On: 7/24/2018 By: Bonita Burgess Severity Noted Reaction Type Reactions Pollen Extracts Medium 05/07/2015    Runny Nose, Cough Amoxicillin  08/27/2018    Hives, Shortness of Breath, Swelling Crestor [Rosuvastatin]  11/02/2017    Myalgia Lipitor [Atorvastatin]  04/25/2016    Other (comments) Muscle cramps and weakness Current Immunizations  Reviewed on 11/2/2017 Name Date Influenza Vaccine (Quad) PF 9/28/2017 Pneumococcal Polysaccharide (PPSV-23) 9/28/2017 Tdap 4/24/2013 12:00 AM  
  
 Not reviewed this visit You Were Diagnosed With   
  
 Codes Comments Encounter to establish care    -  Primary ICD-10-CM: Z76.89 
ICD-9-CM: V65.8 Type 2 diabetes mellitus with hyperglycemia, with long-term current use of insulin (HCC)     ICD-10-CM: E11.65, Z79.4 ICD-9-CM: 250.00, 790.29, V58.67 Neuropathy     ICD-10-CM: G62.9 ICD-9-CM: 355.9 Mild intermittent asthma without complication     EGG-55-HO: J45.20 ICD-9-CM: 493.90 Sarcoidosis     ICD-10-CM: D86.9 ICD-9-CM: 135 Edema of both ankles     ICD-10-CM: M25.471, M25.472 ICD-9-CM: 719.07 Dyslipidemia     ICD-10-CM: E78.5 ICD-9-CM: 272.4 Environmental and seasonal allergies     ICD-10-CM: J30.89 ICD-9-CM: 477.8 Gastroesophageal reflux disease without esophagitis     ICD-10-CM: K21.9 ICD-9-CM: 530.81 Macular degeneration of both eyes, unspecified type     ICD-10-CM: H35.30 ICD-9-CM: 362.50 Breast cancer screening by mammogram     ICD-10-CM: Z12.31 
ICD-9-CM: V76.12 Vitals BP Pulse Temp Resp Height(growth percentile) Weight(growth percentile) 129/85 (BP 1 Location: Right arm, BP Patient Position: Sitting) 73 98.3 °F (36.8 °C) (Oral) 20 5' 2\" (1.575 m) 181 lb 9.6 oz (82.4 kg) LMP SpO2 BMI OB Status Smoking Status 03/30/2013 98% 33.22 kg/m2 Postmenopausal Never Smoker Vitals History BMI and BSA Data Body Mass Index Body Surface Area  
 33.22 kg/m 2 1.9 m 2 Preferred Pharmacy Pharmacy Name Phone [facility] [address] [facility] [phone] Your Updated Medication List  
  
   
This list is accurate as of 8/27/18  9:39 AM.  Always use your most recent med list.  
  
  
  
  
 * albuterol 90 mcg/actuation inhaler Commonly known as:  PROVENTIL HFA, VENTOLIN HFA, PROAIR HFA Take 2 Puffs by inhalation every six (6) hours as needed for Wheezing or Shortness of Breath. * albuterol 90 mcg/actuation inhaler Commonly known as:  PROVENTIL HFA, VENTOLIN HFA, PROAIR HFA Take 2 Puffs by inhalation every six (6) hours as needed. For shortness of breath  
  
 aspirin delayed-release 81 mg tablet Take  by mouth daily. esomeprazole 40 mg capsule Commonly known as:  NEXIUM  
TAKE ONE CAPSULE BY MOUTH DAILY * fluticasone-salmeterol 500-50 mcg/dose diskus inhaler Commonly known as:  ADVAIR DISKUS Take 1 Puff by inhalation two (2) times a day. For asthma and sarcoidosis * fluticasone-salmeterol 500-50 mcg/dose diskus inhaler Commonly known as:  ADVAIR Take 1 Puff by inhalation every twelve (12) hours. For asthma and sarcoidosis  
  
 glucose blood VI test strips strip Commonly known as:  ACCU-CHEK POOJA Pt to test 5 times daily. Dx:E11.65  
  
 hydroCHLOROthiazide 25 mg tablet Commonly known as:  HYDRODIURIL Take 1 Tab by mouth daily. For swelling * insulin glargine 100 unit/mL (3 mL) Inpn Commonly known as:  LANTUS,BASAGLAR  
25 Units by SubCUTAneous route nightly. * insulin glargine 100 unit/mL (3 mL) Inpn Commonly known as:  LANTUS,BASAGLAR  
25 Units by SubCUTAneous route nightly. Insulin Needles (Disposable) 30 gauge x 1/3\" Use 1-2 times daily. Qty 100  
  
 lisinopril 5 mg tablet Commonly known as:  Nita Baileyton Take 1 Tab by mouth daily. For kidney protection * mometasone 50 mcg/actuation nasal spray Commonly known as:  Loretta Alaniz 2 Sprays by Both Nostrils route daily as needed. For allergies * mometasone 50 mcg/actuation nasal spray Commonly known as:  NASONEX  
2 Sprays by Both Nostrils route daily as needed. For allergies  
  
 pravastatin 20 mg tablet Commonly known as:  PRAVACHOL Take 1 Tab by mouth nightly. predniSONE 5 mg tablet Commonly known as:  DELTASONE  
2 tablets every morning with breakfast  
  
 pregabalin 50 mg capsule Commonly known as:  Clarissa Bourne Take 1 Cap by mouth two (2) times a day. Max Daily Amount: 100 mg. For neuropathy * sAXagliptin-metFORMIN 2.5-1,000 mg Tm24 Commonly known as:  KOMBIGLYZE XR Take 2 Tabs by mouth daily. For diabetes * sAXagliptin-metFORMIN 2.5-1,000 mg Tm24 Commonly known as:  KOMBIGLYZE XR Take 2 Tabs by mouth daily. For diabetes ZYRTEC PO Take  by mouth. * Notice: This list has 10 medication(s) that are the same as other medications prescribed for you. Read the directions carefully, and ask your doctor or other care provider to review them with you. Prescriptions Printed Refills  
 fluticasone-salmeterol (ADVAIR DISKUS) 500-50 mcg/dose diskus inhaler 3 Sig: Take 1 Puff by inhalation two (2) times a day. For asthma and sarcoidosis Class: Program  
 Route: Inhalation  
 insulin glargine (LANTUS,BASAGLAR) 100 unit/mL (3 mL) inpn 3 Si Units by SubCUTAneous route nightly. Class: Program  
 Route: SubCUTAneous sAXagliptin-metFORMIN (KOMBIGLYZE XR) 2.5-1,000 mg TM24 3 Sig: Take 2 Tabs by mouth daily. For diabetes Class: Program  
 Route: Oral  
 albuterol (PROVENTIL HFA, VENTOLIN HFA, PROAIR HFA) 90 mcg/actuation inhaler 3 Sig: Take 2 Puffs by inhalation every six (6) hours as needed. For shortness of breath Class: Program  
 Route: Inhalation  
 pregabalin (LYRICA) 50 mg capsule 3 Sig: Take 1 Cap by mouth two (2) times a day. Max Daily Amount: 100 mg. For neuropathy  Class: Program  
 Route: Oral  
 mometasone (NASONEX) 50 mcg/actuation nasal spray 3 Si Sprays by Both Nostrils route daily as needed. For allergies Class: Program  
 Route: Both Nostrils Prescriptions Sent to Mail Order Refills  
 fluticasone-salmeterol (ADVAIR DISKUS) 500-50 mcg/dose diskus inhaler 3 Sig: Take 1 Puff by inhalation two (2) times a day. For asthma and sarcoidosis Class: Program  
 Pharmacy: [address] Ph #: [phone] Route: Inhalation  
 insulin glargine (LANTUS,BASAGLAR) 100 unit/mL (3 mL) inpn 3 Si Units by SubCUTAneous route nightly. Class: Program  
 Pharmacy: [address] Ph #: [phone] Route: SubCUTAneous sAXagliptin-metFORMIN (KOMBIGLYZE XR) 2.5-1,000 mg TM24 3 Sig: Take 2 Tabs by mouth daily. For diabetes Class: Program  
 Pharmacy: [address] Ph #: [phone] Route: Oral  
 albuterol (PROVENTIL HFA, VENTOLIN HFA, PROAIR HFA) 90 mcg/actuation inhaler 3 Sig: Take 2 Puffs by inhalation every six (6) hours as needed. For shortness of breath Class: Program  
 Pharmacy: [address] Ph #: [phone] Route: Inhalation  
 pregabalin (LYRICA) 50 mg capsule 3 Sig: Take 1 Cap by mouth two (2) times a day. Max Daily Amount: 100 mg. For neuropathy Class: Program  
 Pharmacy: [address] Ph #: [phone] Route: Oral  
 mometasone (NASONEX) 50 mcg/actuation nasal spray 3 Si Sprays by Both Nostrils route daily as needed. For allergies Class: Program  
 Pharmacy: [address] Ph #: [phone] Route: Both Nostrils Prescriptions Sent to Pharmacy Refills fluticasone-salmeterol (ADVAIR DISKUS) 500-50 mcg/dose diskus inhaler 3 Sig: Take 1 Puff by inhalation two (2) times a day. For asthma and sarcoidosis Class: Program  
 Pharmacy: [facility] [address] [place] [address] Ph #: [phone] Route: Inhalation  
 insulin glargine (LANTUS,BASAGLAR) 100 unit/mL (3 mL) inpn 3 Si Units by SubCUTAneous route nightly. Class: Program  
 Pharmacy: [facility] [address] [place] [address] Ph #: [phone] Route: SubCUTAneous  
 lisinopril (PRINIVIL, ZESTRIL) 5 mg tablet 3 Sig: Take 1 Tab by mouth daily. For kidney protection Class: Normal  
 Pharmacy: [address] Ph #: [phone] Route: Oral  
 sAXagliptin-metFORMIN (KOMBIGLYZE XR) 2.5-1,000 mg TM24 3 Sig: Take 2 Tabs by mouth daily. For diabetes Class: Program  
 Pharmacy: [facility] [address] Ph #: [phone] Route: Oral  
 albuterol (PROVENTIL HFA, VENTOLIN HFA, PROAIR HFA) 90 mcg/actuation inhaler 3 Sig: Take 2 Puffs by inhalation every six (6) hours as needed. For shortness of breath Class: Program  
 Pharmacy: [address] Ph #: [phone] Route: Inhalation  
 hydroCHLOROthiazide (HYDRODIURIL) 25 mg tablet 3 Sig: Take 1 Tab by mouth daily. For swelling Class: Normal  
 Pharmacy: [place] [address] Ph #: [phone] Route: Oral  
 Insulin Needles, Disposable, 30 gauge x 1/3\" 11 Sig: Use 1-2 times daily. Qty 100 Class: Normal  
 Pharmacy: [facility] [address] [place] [address] Ph #: [phone]  
 pravastatin (PRAVACHOL) 20 mg tablet 3 Sig: Take 1 Tab by mouth nightly. Class: Normal  
 Pharmacy: [facility] [address] Ph #: [phone]  Route: Oral  
 pregabalin (LYRICA) 50 mg capsule 3 Sig: Take 1 Cap by mouth two (2) times a day. Max Daily Amount: 100 mg. For neuropathy Class: Program  
 Pharmacy: [facility] [address] [place] [address] Ph #: [phone] Route: Oral  
 mometasone (NASONEX) 50 mcg/actuation nasal spray 3 Si Sprays by Both Nostrils route daily as needed. For allergies Class: Program  
 Pharmacy: [facility] [address] [place] [address] Ph #: [phone] Route: Both Nostrils We Performed the Following COLLECTION VENOUS BLOOD,VENIPUNCTURE [id number] CPT(R)] [address] [ZWQ63 Custom] REFERRAL TO PULMONARY DISEASE [PRJ52 Custom] Comments:  
 Used to see Dr Kisha Varghese. Last visit approx 1 year ago. Dx sarcoidosis and asthma. Follow-up Instructions Return in about 3 months (around 2018). Referral Information Referral ID Referred By Referred To  
  
 [id number] Kaitlynn Carter Not Available Visits Status Start Date End Date 1 New Request 18 If your referral has a status of pending review or denied, additional information will be sent to support the outcome of this decision. Referral ID Referred By Referred To  
 [id number] [address] Jay Harrington MD  
   [address] [place] [address], [address] [place] [address],[facility] [address] Phone: [phone] Fax: [phone] Visits Status Start Date End Date 1 New Request 18 If your referral has a status of pending review or denied, additional information will be sent to support the outcome of this decision. Patient Instructions [facility] reminders! Foundation Operating Hours: These may change without notice. Mon- Wed 7am to 5pm. Closed for lunch 12-1pm 
Th 7am to 12pm 
 closed Office number:  **In case of inclement weather (snow and/or ice) please do not try to come into the office for your appointment. Please call in and you will not be counted as a \"No Show. \" SAFETY FIRST!!** 
 
NO SHOW POLICY ~ If a patient has 3 no shows for an appointment with their Provider, Mental Health Provider, or the Nurse Navigator, they will be discharged from the practice for 6 months. Medication ordering will also be suspended. If the patient is discharged from the [facility], they can go to the [facility] or [address] on [facility] where they can be seen for their primary care needs plus obtain the same type of medications as they received at the [facility]. To avoid being discharged the patient must call the office at [phone] 24 hours prior to their appointment if they need to cancel or reschedule, arrive to their appointments on time (preferrably 15 minutes early) (If you are late you will not be seen) and come to all scheduled appointments with the provider, mental health, and/or nurse navigator. If the patient is discharged from the practice, they can apply to be re-established after 6 months. Lab work:   
Unless you are instructed differently, please return to the office between the hours of 7 am and 11:00 am Monday through Thursday to have your labs drawn one to two weeks before your next scheduled PROVIDER appointment. If you do not have an appointment to follow up on these results, please make one or plan to call the office if you do not hear from us to get the results. No news does not mean good news. Medications:  
Medication  times are firm: 
Mondays and Tuesdays from 1pm-5pm 
Wednesdays and Thursdays from 8am-11:30am 
These hours are subject to change without notice. The [facility] or [facility] will assist you with your medications when available as not all medications can be obtained through this program. Medications are added and deleted from the [address] as deemed necessary by the pharmaceutical companies.  There is a chance that a medication you currently receive from [facility] may be discontinued. If this occurs an alternative medication will be sent to a local pharmacy for you to obtain and pay for. If your medications are new or have changed, and you get your medications from the [facility] [address] [place] [address]), you MUST talk to the pharmacy staff to sign the new prescription applications. If you don't sign the applications we cannot get the medications for you. It usually takes 6-8 weeks for your medications to arrive. The Pharmacy staff will call you when your medications are available. If you don't hear from them you call [phone] and inquire about your medicines. You are responsible for obtaining your medications. You will have 30 days to come in and  your medications. If you don't  your medicines within those 30 days, those medicines may be placed on the self as samples and you will have to start all over again by completing the applications and waiting the 6-8 weeks for your medicines to arrive. Foot Care: Local ministry through [facility] ([address]) Every second Tuesday of the month (except for holidays and election days) from 9am to 1 pm. The services provided by these ministry volunteers are free of charge with the option to donate. They will inspect your feet thoroughly, soak them for 10 minutes, cut and file your nails. They care for diabetics as well. Keep in mind this service is free and will be on a first come first serve basis. Bad teeth? Ask about the Dental Clinic to get you in front of a local dentist when a dentist is available. They only extract teeth. No fillings, root canals, or dentures. The bus leaves the second Thursday of the month for those on the list. (Ask about availability as these appointments are limited). If you are scheduled for the dentist and you fail to come into the Foundation to meet the bus, you WILL NOT be placed on the dental list again. IMPORTANT: if you have high blood pressure please take your blood pressure  medications before arriving to the Foundation. If your blood pressure is elevated  the dental clinic WILL NOT extract any teeth and you will not have another  opportunity to go to the clinic. DIABETES:  
Do you have uncontrolled diabetes or you just want to learn more about your diabetes? Schedule with the Nurse navigator for our new 5-week Diabetes program. You will learn how to properly manage your diabetes: nutrition, exercise, medication therapy. Eye exams for Diabetics. Please let us know so we can add you to the list to see an eye doctor. These  appointments are limited. You will receive a free eye exam and free glasses if  needed. Unfortunately, if you are not a diabetic, we do not have a free service  for eye exams for you (yet!). We do have information on where to  go to get a  huge discount on eye exams and glasses. Sick visits: If you are sick and it is not an emergency call the office to schedule an appointment to see the provider. Care in the office is FREE but charges will be applied if you go to the Emergency room. If you feel better and do not wish to attend your sick appointment please call  the office and cancel to avoid a no-show. Charges and cost items from the Foundation: Most of our orders are covered by [facility] but there ARE SOME CHARGES for items such as radiology interpretations and anesthesiology during procedures and surgeries that are not covered under [facility]. [facility]  
Please make sure you have contacted [employer] to check on your payment options:  
1 [phone] Mon - Fri 8-4pm. It is important that you are screened in order to qualify for assistance and to avoid huge medical charges. This service is to benefit you.  The [facility] is not responsible for ANY charges you may accrue regardless of who ordered the medication, procedure, treatment or test. If you go to the Emergency Room, you WILL be charged! Behavior and emotional issues! It is stressful to be sick, have an illness, take medications, not have a job, not have medical insurance, have family issues or just getting older! Schedule an appointment with our mental health provider. She is in the office Mondays and Wednesdays from 8am to [place] [address] can also contact the following: The national suicide hotline (8-390-786-XNEQ or 0-936.306.7662) [address] [facility] [address] [facility], [address] [facility] 
[phone] Community Services [facility] ([facility]B) - [place] [address] [facility], [address] 
[phone] Drug and Alcohol Addiction Issues! It is hard to stop a poor habit but there is help out there. Please feel free to attend any of the following support groups to help you kick the habit or go to [facility] to be evaluated by the psychiatric team. Never give up!! [place] meetings: [facility] MONDAY 10:30  [place] [address] [facility] SATURDAY 8:00 PM [facility] SATURDAY NIGHT [place] BITS: 
Disposing medicines in household trash: Almost all medicines can be thrown into your household trash. These include prescription and over-the-counter (OTC) drugs in pills, liquids, drops, patches, creams, and inhalers. Follow these steps: 
1) Remove the drugs from their original containers and mix them with something undesirable, such as used coffee grounds, dirt, or cat litter. This makes the medicine less appealing to children and pets and unrecognizable to someone who might intentionally go through the trash looking for drugs. 2) Put the mixture in something you can close (a re-sealable zipper storage bag, empty can, or other container) to prevent the drug from leaking or spilling out. 3) Throw the container in the garbage. 4) Scratch out all your personal information on the empty medicine packaging to protect your identity and privacy. Throw the packaging away. If you have a question about your medicine, ask your health care provider or pharmacist. 
 
 
  
Learning About Asthma What is asthma? Asthma is a long-term condition that affects your breathing. It causes the airways that lead to the lungs to swell. People with asthma may have asthma attacks. During an asthma attack, the airways tighten and become narrower. This makes it hard to breathe, and you may wheeze or cough. If you have a bad asthma attack, you may need emergency care. Asthma affects people in different ways. Some people only have asthma attacks during allergy season, or when they breathe in cold air, or when they exercise. Others have many bad attacks that send them to the doctor often. What are the symptoms? Symptoms of asthma can be mild or severe. You may have mild attacks now and then, you may have severe symptoms every day, or you may have something in between. How often you have symptoms can also change. When you have asthma, you may: · Wheeze, making a loud or soft whistling noise when you breathe in and out. · Cough a lot. · Feel tightness in your chest. 
· Feel short of breath. · Have trouble sleeping because of coughing or having a hard time breathing. · Get tired quickly during exercise. Your symptoms may be worse at night. How can you prevent asthma attacks? Certain things can make asthma symptoms worse. These are called triggers. When you are around a trigger, an asthma attack is more likely. Common triggers include: · Cigarette smoke or air pollution. · Things you are allergic to, such as: 
¨ Pollen, mold, or dust mites. ¨ Pet hair, skin, or saliva. · Illnesses, like colds, flu, or pneumonia. · Exercise. · Dry, cold air. Here are some ways to avoid a few common triggers: · Do not smoke or allow others to smoke around you. If you need help quitting, talk to your doctor about stop-smoking programs and medicines. These can increase your chances of quitting for good. · If there is a lot of pollution, pollen, or dust outside, stay at home and keep your windows closed. Use an air conditioner or air filter in your home. Check your local weather report or newspaper for air quality and pollen reports. · Get the flu vaccine every year. Talk to your doctor about getting a pneumococcal shot. Wash your hands often to prevent infections. · Avoid exercising outdoors in cold weather. If you are outdoors in cold weather, wear a scarf around your face and breathe through your nose. How is asthma treated? There are two parts to treating asthma, which are outlined in your asthma action plan. The goals are to: 
· Control asthma over the long term. The asthma action plan tells you which medicine you may need to take every day. This is called a controller medicine. It helps to reduce the swelling of the airways and prevent asthma attacks. · Treat asthma attacks when they occur. The asthma action plan tells you what to do when you have an asthma attack. It helps you identify triggers that can cause your attacks. You use quick-relief medicine during an attack. The asthma plan also helps you track your symptoms and know how well the treatment is working. Follow-up care is a key part of your treatment and safety. Be sure to make and go to all appointments, and call your doctor if you are having problems. It's also a good idea to know your test results and keep a list of the medicines you take. Where can you learn more? Go to http://ralph-fabian.info/. Enter [phone] in the search box to learn more about \"Learning About Asthma. \" Current as of: December 6, 2017 Content Version: 11.7 © 7732-5785 [employer].  Care instructions adapted under license by [address] (which disclaims liability or warranty for this information). If you have questions about a medical condition or this instruction, always ask your healthcare professional. [address] any warranty or liability for your use of this information. Pregabalin (Lyrica) - (By mouth) Why this medicine is used:  
Treats nerve and muscle pain, including fibromyalgia. Also treats seizures. Contact a nurse or doctor right away if you have: · Thoughts of hurting yourself · Muscle pain, tenderness, weakness Common side effects: 
· Rapid weight gain; swelling in your hands, ankles, or feet · Confusion, trouble concentrating, tiredness · Constipation, dry mouth 
· Headache © 2017 [address] Information is for End User's use only and may not be sold, redistributed or otherwise used for commercial purposes. Introducing [facility] & HEALTH SERVICES! [facility] introduces EZ2CAD patient portal. Now you can access parts of your medical record, email your doctor's office, and request medication refills online. 1. In your internet browser, go to [email or web address] 2. Click on the First Time User? Click Here link in the Sign In box. You will see the New Member Sign Up page. 3. Enter your EZ2CAD Access Code exactly as it appears below. You will not need to use this code after youve completed the sign-up process. If you do not sign up before the expiration date, you must request a new code. · EZ2CAD Access Code: [email or web address] Expires: 9/14/2018  4:51 PM 
 
4. Enter the last four digits of your Social Security Number (xxxx) and Date of Birth (mm/dd/yyyy) as indicated and click Submit. You will be taken to the next sign-up page. 5. Create a EZ2CAD ID. This will be your EZ2CAD login ID and cannot be changed, so think of one that is secure and easy to remember. 6. Create a Intrinsity password. You can change your password at any time. 7. Enter your Password Reset Question and Answer. This can be used at a later time if you forget your password. 8. Enter your e-mail address. You will receive e-mail notification when new information is available in [address] 9. Click Sign Up. You can now view and download portions of your medical record. 10. Click the Download Summary menu link to download a portable copy of your medical information. If you have questions, please visit the Frequently Asked Questions section of the Intrinsity website. Remember, Intrinsity is NOT to be used for urgent needs. For medical emergencies, dial 911. Now available from your iPhone and Android! Please provide this summary of care documentation to your next provider. Your primary care clinician is listed as Jp Cortez. If you have any questions after today's visit, please call [phone].

## 2018-08-28 PROBLEM — E78.1 HYPERTRIGLYCERIDEMIA: Status: ACTIVE | Noted: 2018-08-28

## 2018-08-28 PROBLEM — M25.472 EDEMA OF BOTH ANKLES: Status: ACTIVE | Noted: 2018-08-28

## 2018-08-28 PROBLEM — H35.30 MACULAR DEGENERATION OF BOTH EYES: Status: ACTIVE | Noted: 2018-08-28

## 2018-08-28 PROBLEM — J30.89 ENVIRONMENTAL AND SEASONAL ALLERGIES: Status: ACTIVE | Noted: 2018-08-28

## 2018-08-28 PROBLEM — M25.471 EDEMA OF BOTH ANKLES: Status: ACTIVE | Noted: 2018-08-28

## 2018-08-28 PROBLEM — R79.89 ELEVATED SERUM CREATININE: Status: ACTIVE | Noted: 2018-08-28

## 2018-08-28 PROBLEM — R94.4 DECREASED GFR: Status: ACTIVE | Noted: 2018-08-28

## 2018-08-28 PROBLEM — G62.9 NEUROPATHY: Status: ACTIVE | Noted: 2018-08-28

## 2018-08-28 PROBLEM — J45.20 MILD INTERMITTENT ASTHMA WITHOUT COMPLICATION: Status: ACTIVE | Noted: 2018-08-28

## 2018-08-28 PROBLEM — E11.65 TYPE 2 DIABETES MELLITUS WITH HYPERGLYCEMIA, WITH LONG-TERM CURRENT USE OF INSULIN (HCC): Status: ACTIVE | Noted: 2018-08-28

## 2018-08-28 PROBLEM — Z79.4 TYPE 2 DIABETES MELLITUS WITH HYPERGLYCEMIA, WITH LONG-TERM CURRENT USE OF INSULIN (HCC): Status: ACTIVE | Noted: 2018-08-28

## 2018-08-28 NOTE — PROGRESS NOTES
Danae-New patient:  1) Urine creatinine slightly elevated  2) A1c 7.5 (11 months ago A1c 12). Cont same dose insulin. No chg.  3) . Goal <150; . Goal <70. Work on diet and continue Pravastatin. 4) Serum creatinine slightly elevated; GFR 36. (Possible cause of reduced GFR could be the long term high doses of metformin and Januvia. Janay Galindo has been dc'd. Instruct pt to decrease Kombiglyze to 1 tab every day and not 2 tabs every day).  Thank you    Pt will need labs 1-2 weeks prior to follow up appt on 12/3/18  A1c, CMP, lipid

## 2018-09-06 ENCOUNTER — OFFICE VISIT (OUTPATIENT)
Dept: FAMILY MEDICINE CLINIC | Age: 53
End: 2018-09-06

## 2018-09-06 DIAGNOSIS — Z71.9 HEALTH EDUCATION/COUNSELING: Primary | ICD-10-CM

## 2018-09-06 NOTE — PROGRESS NOTES
Jam Washburn is a 46 y.o. female is here for her initial visit with the Nurse Navigator for orientation appointment to learn on how the [facility] works and the services we can provide. We have reviewed [facility]:   Hours of operation and number to contact us. Inclement weather policy     No Show Policy     IF YOU ARE TAKING MEDICINE FOR HIGH BLOOD PRESSURE~ PLEASE TAKE YOUR 2 HOURS PRIOR TO ANY OFFICE VISIT TO SEE YOUR PROVIDER OR THE   NURSES. ~~~PLEASE BRING ALL MEDICATIONS YOU ARE TAKING TO YOUR VISIT WITH YOUR PROVIDER OR NURSE NAVIGATOR~~~     Lab work (Hours to have lab work drawn). Pt will come in around the Thanksgiving holiday for lab draw prior to next appt w/ Provider. Medication Policies including times to  med's, number to dial to reach your pharmacy technician and the paperwork that must be signed to obtain med's. Foot care thru the [facility] foot ministry hours as well as directions     Dental Clinic      Diabetic eye exams     Sick visits     APA Program including location at [facility] and phone contact number. PLEASE NOTE THE APA CONTACT IS AT THE [facility] TUESDAY MORNINGS FROM 8 AM-1130 AM.YOU MUST SEE APA BEFORE BEING REFERRED TO A SPECIALIST! Mental Health appointments with Ms. Asuncion Anand are scheduled on Mondays and Wednesdays. Suicide Hotline Number and how to contact AA/NA meetings for help with addictions      We have reviewed Ms. Ojeda's lab work today. MAGGY Cruz, RN                   Danae-New patient:   1) Urine creatinine slightly elevated   2) A1c 7.5 (11 months ago A1c 12). Cont same dose insulin. No chg.   3) . Goal <150; . Goal <70. Work on diet and continue Pravastatin. 4) Serum creatinine slightly elevated; GFR 36. (Possible cause of reduced GFR could be the long term high doses of metformin and Januvia. Edmund Teague has been dc'd.  Instruct pt to decrease Kombiglyze to 1 tab every day and not 2 tabs every day). Thank you     Pt will need labs 1-2 weeks prior to follow up appt on 12/3/18   A1c, CMP, lipid       Ms. Joseph verbalizes correct change in dose of Kombiglyze \" I will start taking only one tab starting tomorrow\". She will continue her current dose of Pravastatin.

## 2018-09-12 ENCOUNTER — TELEPHONE (OUTPATIENT)
Dept: FAMILY MEDICINE CLINIC | Age: 53
End: 2018-09-12

## 2018-09-17 NOTE — TELEPHONE ENCOUNTER
Medication: Kombiglyze 2.5/1000, dose: 1 tab, how often: qd , current number of medication days provided: 90, refill per application. Lot #: [id number], EXP 09/2019. This medication was received and verified for the following 1. Correct Patient, 2. Correct Diagnosis, 3. Correct Drug, 4. Correct route, and no current allergy to medication. Please contact patient to come  their medications.      Carl Jain, MSN,RN,[facility]

## 2018-09-20 ENCOUNTER — TELEPHONE (OUTPATIENT)
Dept: FAMILY MEDICINE CLINIC | Age: 53
End: 2018-09-20

## 2018-09-20 NOTE — TELEPHONE ENCOUNTER
Medication: Advair 500/50, dose: 1 inhalation, how often: twice daily, current number of medication days provided: 90, refill per application. Lot #: [id number], EXP 09/2019. This medication was received and verified for the following 1. Correct Patient, 2. Correct Diagnosis, 3. Correct Drug, 4. Correct route, and no current allergy to medication. Medication: Ventolin HFA, dose: 2 puffs, how often: every 6 hours as needed, current number of medication days provided: 90, refill per application. Lot #: [id number], EXP 09/2019. This medication was received and verified for the following 1. Correct Patient, 2. Correct Diagnosis, 3. Correct Drug, 4. Correct route, and no current allergy to medication. Please contact patient to come  their medications.      Columbus Severin, MSN,RN,[facility]

## 2018-09-25 ENCOUNTER — TELEPHONE (OUTPATIENT)
Dept: FAMILY MEDICINE CLINIC | Age: 53
End: 2018-09-25

## 2018-09-25 NOTE — TELEPHONE ENCOUNTER
Medication: Nasonex, dose: 2 sprays both nostrils, how often: every day as needed for allergies, current number of medication days provided: 90, refill per application. Lot #: [id number], EXP 09/2019. This medication was received and verified for the following 1. Correct Patient, 2. Correct Diagnosis, 3. Correct Drug, 4. Correct route, and no current allergy to medication. Please contact patient to come  their medications.      Mitzy Snow, MSN,RN,AGNP-C     [facility]

## 2018-09-26 ENCOUNTER — OFFICE VISIT (OUTPATIENT)
Dept: PULMONOLOGY | Age: 53
End: 2018-09-26

## 2018-09-26 VITALS
WEIGHT: 185 LBS | SYSTOLIC BLOOD PRESSURE: 120 MMHG | BODY MASS INDEX: 34.04 KG/M2 | OXYGEN SATURATION: 99 % | HEIGHT: 62 IN | HEART RATE: 71 BPM | DIASTOLIC BLOOD PRESSURE: 80 MMHG | RESPIRATION RATE: 20 BRPM | TEMPERATURE: 98.2 F

## 2018-09-26 DIAGNOSIS — J45.909 UNCOMPLICATED ASTHMA, UNSPECIFIED ASTHMA SEVERITY, UNSPECIFIED WHETHER PERSISTENT: Primary | ICD-10-CM

## 2018-09-26 DIAGNOSIS — D86.9 SARCOIDOSIS: ICD-10-CM

## 2018-09-26 RX ORDER — PREDNISONE 5 MG/1
5 TABLET ORAL
Qty: 30 TAB | Refills: 11 | Status: SHIPPED | OUTPATIENT
Start: 2018-09-26 | End: 2019-10-05 | Stop reason: SDUPTHER

## 2018-09-26 RX ORDER — PREDNISONE 1 MG/1
3 TABLET ORAL
Qty: 90 TAB | Refills: 11 | Status: SHIPPED | OUTPATIENT
Start: 2018-09-26 | End: 2019-10-05 | Stop reason: SDUPTHER

## 2018-09-26 NOTE — PROGRESS NOTES
Verbal Order with read back per Fred Norris MD  For PFT smart panel. AMB POC PFT complete w/ bronchodilator  AMB POC PFT complete w/o bronchodilator    Dr. Pritesh Markham MD will co-sign the orders.

## 2018-09-26 NOTE — MR AVS SNAPSHOT
[address] [facility], [address] [address] 
[phone] Patient: Jed Singh MRN: [id number] RKJ:[id number] Visit Information Date & Time Provider Department Dept. Phone Encounter #  
 9/26/2018 10:45 AM Efrain Enciso MD [facility] [id number] Follow-up Instructions Return in about 6 months (around 3/26/2019). Your Appointments 9/26/2018 10:45 AM  
Follow Up with Efrain Enciso MD  
[address] ([facility]) Appt Note: from 2/6/17per NP Esther Barajas for asthma fu; [place] [address] [place] , [address] [place] [address]  
[phone]  
  
   
 [address] [place] [address], [facility],[address] [place] [address]  
  
    
 11/26/2018 10:00 AM  
PAP/PELVIC with Angela Jasmine NP [address] [place] [address] ([facility]) Appt Note: Pap w/labs [address] [facility] [address]  
[address] [facility] [address]  
  
    
 12/3/2018  8:30 AM  
Follow Up with Angela Jasmine NP [facility] ([facility]) Appt Note: 3 mth follow up  
 [address] [facility] [address]  
[address] [facility] [address] Upcoming Health Maintenance Date Due Shingrix Vaccine Age 50> (1 of 2) 5/4/2014 BREAST CANCER SCRN MAMMOGRAM 10/1/2018* Influenza Age 5 to Adult 10/1/2018* PAP AKA CERVICAL CYTOLOGY 11/1/2018* EYE EXAM RETINAL OR DILATED Q1 11/20/2018 HEMOGLOBIN A1C Q6M 2/27/2019 MICROALBUMIN Q1 8/27/2019 LIPID PANEL Q1 8/27/2019 FOOT EXAM Q1 8/28/2019 COLONOSCOPY 5/26/2021 DTaP/Tdap/Td series (2 - Td) 4/24/2023 *Topic was postponed. The date shown is not the original due date. Allergies as of 9/26/2018  Review Complete On: 9/26/2018 By: Milagros Ortiz, RT Severity Noted Reaction Type Reactions Pollen Extracts Medium 05/07/2015    Runny Nose, Cough Amoxicillin  08/27/2018    Hives, Shortness of Breath, Swelling Crestor [Rosuvastatin]  11/02/2017    Myalgia Lipitor [Atorvastatin]  04/25/2016    Other (comments) Muscle cramps and weakness Current Immunizations  Reviewed on 11/2/2017 Name Date Influenza Vaccine (Quad) PF 9/28/2017 Pneumococcal Polysaccharide (PPSV-23) 9/28/2017 Tdap 4/24/2013 12:00 AM  
  
 Not reviewed this visit You Were Diagnosed With   
  
 Codes Comments Uncomplicated asthma, unspecified asthma severity, unspecified whether persistent    -  Primary ICD-10-CM: J45.909 ICD-9-CM: 493.90 Sarcoidosis     ICD-10-CM: D86.9 ICD-9-CM: 135 Vitals BP Pulse Temp Resp Height(growth percentile) Weight(growth percentile) 120/80 (BP 1 Location: Left arm, BP Patient Position: At rest) 71 98.2 °F (36.8 °C) (Oral) 20 5' 2\" (1.575 m) 185 lb (83.9 kg) LMP SpO2 BMI OB Status Smoking Status 03/30/2013 99% 33.84 kg/m2 Postmenopausal Never Smoker BMI and BSA Data Body Mass Index Body Surface Area  
 33.84 kg/m 2 1.92 m 2 Preferred Pharmacy Pharmacy Name Phone [facility] ,[facility] [facility] [phone] Your Updated Medication List  
  
   
This list is accurate as of 9/26/18 10:39 AM.  Always use your most recent med list.  
  
  
  
  
 albuterol 90 mcg/actuation inhaler Commonly known as:  PROVENTIL HFA, VENTOLIN HFA, PROAIR HFA Take 2 Puffs by inhalation every six (6) hours as needed. For shortness of breath  
  
 aspirin delayed-release 81 mg tablet Take  by mouth daily. esomeprazole 40 mg capsule Commonly known as:  NEXIUM  
TAKE ONE CAPSULE BY MOUTH DAILY  
  
 fluticasone-salmeterol 500-50 mcg/dose diskus inhaler Commonly known as:  ADVAIR DISKUS Take 1 Puff by inhalation two (2) times a day. For asthma and sarcoidosis  
  
 glucose blood VI test strips strip Commonly known as:  ACCU-CHEK POOJA Pt to test 5 times daily. Dx:E11.65  
  
 hydroCHLOROthiazide 25 mg tablet Commonly known as:  HYDRODIURIL Take 1 Tab by mouth daily. For swelling  
  
 insulin glargine 100 unit/mL (3 mL) Inpn Commonly known as:  LANTUS,BASAGLAR  
25 Units by SubCUTAneous route nightly. Insulin Needles (Disposable) 30 gauge x 1/3\" Use 1-2 times daily. Qty 100  
  
 lisinopril 5 mg tablet Commonly known as:  Magan Aubree Take 1 Tab by mouth daily. For kidney protection  
  
 mometasone 50 mcg/actuation nasal spray Commonly known as:  NASONEX  
2 Sprays by Both Nostrils route daily as needed. For allergies  
  
 pravastatin 20 mg tablet Commonly known as:  PRAVACHOL Take 1 Tab by mouth nightly. * predniSONE 5 mg tablet Commonly known as:  DELTASONE  
2 tablets every morning with breakfast  
  
 * predniSONE 1 mg tablet Commonly known as:  Mirna Brink Take 3 Tabs by mouth daily (with breakfast). * predniSONE 5 mg tablet Commonly known as:  Mirna Brink Take 1 Tab by mouth daily (with breakfast). pregabalin 50 mg capsule Commonly known as:  Marionette Spark Take 1 Cap by mouth two (2) times a day. Max Daily Amount: 100 mg. For neuropathy sAXagliptin-metFORMIN 2.5-1,000 mg Tm24 Commonly known as:  KOMBIGLYZE XR Take 1 Tab by mouth daily. For diabetes ZYRTEC PO Take  by mouth. * Notice: This list has 3 medication(s) that are the same as other medications prescribed for you. Read the directions carefully, and ask your doctor or other care provider to review them with you. Prescriptions Sent to Pharmacy Refills  
 predniSONE (DELTASONE) 1 mg tablet 11 Sig: Take 3 Tabs by mouth daily (with breakfast).   
 Class: Normal  
 [facility]: [facility] [address], [address] Ph #: [phone] Route: Oral  
 predniSONE (DELTASONE) 5 mg tablet 11 Sig: Take 1 Tab by mouth daily (with breakfast). Class: Normal  
 [facility]acy: [facility] [address], [address] Ph #: [phone] Route: Oral  
  
We Performed the Following AMB POC PFT COMPLETE W/BRONCHODILATOR [62808 CPT(R)] Follow-up Instructions Return in about 6 months (around 3/26/2019). To-Do List   
 09/26/2018 Procedures: AMB POC PFT COMPLETE W/BRONCHODILATOR Patient Instructions Continue Advair 1 inhalation twice daily and remember to exhale fully before inhaling and to wash mouth with water and spit it out after inhaling Albuterol 2 inhalations every 4 hours as needed if you require albuterol too often to control respiratory symptoms call the office for severe symptoms go to the emergency room Start reducing prednisone: 
Prednisone 5 mg once daily with prednisone 1 mg 3 tablets once daily at the same time and if you tolerate this reduction in prednisone after several months reduce the prednisone dosage again to prednisone 5 mg once daily with prednisone 1 mg 2 tablets once daily Introducing [facility] & HEALTH SERVICES! [employer] introduces [employer] patient portal. Now you can access parts of your medical record, email your doctor's office, and request medication refills online. 1. In your internet browser, go to [email or web address] 2. Click on the First Time User? Click Here link in the Sign In box. You will see the New Member Sign Up page. 3. Enter your N[employer] Access Code exactly as it appears below. You will not need to use this code after youve completed the sign-up process. If you do not sign up before the expiration date, you must request a new code. · [employer] Access Code: [email or web address] Expires: 12/25/2018 10:33 AM 
 
 4. Enter the last four digits of your Social Security Number (xxxx) and Date of Birth (mm/dd/yyyy) as indicated and click Submit. You will be taken to the next sign-up page. 5. Create a Bizpora ID. This will be your Bizpora login ID and cannot be changed, so think of one that is secure and easy to remember. 6. Create a Bizpora password. You can change your password at any time. 7. Enter your Password Reset Question and Answer. This can be used at a later time if you forget your password. 8. Enter your e-mail address. You will receive e-mail notification when new information is available in [address] 9. Click Sign Up. You can now view and download portions of your medical record. 10. Click the Download Summary menu link to download a portable copy of your medical information. If you have questions, please visit the Frequently Asked Questions section of the Bizpora website. Remember, Bizpora is NOT to be used for urgent needs. For medical emergencies, dial 911. Now available from your iPhone and Android! Please provide this summary of care documentation to your next provider. Your primary care clinician is listed as Soledad Cagle. If you have any questions after today's visit, please call [phone].

## 2018-09-26 NOTE — PROGRESS NOTES
The pt. States she lost her job at [employer] when a [address] occurred and due to missing work secondary to medical needs, they let her go. She c/o asthma problems and needs to use the Albuterol MDI more than usual.    The pt. C/o feeling tired all of the time. She questions if, she needs a Sleep Study. She went to the [facility] in [place] and they did some major changes to her meds. She then went to the [facility]. No recent ED visits due to respiratory symptoms. The pt. Is informed of the Pt. Assistance Programs and will check her medications out online to apply.

## 2018-09-26 NOTE — PROGRESS NOTES
[facility]  Pulmonary, Critical Care, and Sleep Medicine      Chief complaint:  Asthma sarcoidosis    HPI:    Mya David    is 46years old and returns the office today for follow-up concerning asthma and sarcoidosis and says she is taking Advair faithfully and that when she was ill several months ago related to a prednisone reaction she required albuterol up to 3 times a day. She also relates she does not walk much because of neuropathy pain. She denies chest pain she has a dry cough and no leg swelling although she has had this in the past      Allergies   Allergen Reactions    Pollen Extracts Runny Nose and Cough    Amoxicillin Hives, Shortness of Breath and Swelling    Crestor [Rosuvastatin] Myalgia    Lipitor [Atorvastatin] Other (comments)     Muscle cramps and weakness       Current Outpatient Prescriptions   Medication Sig    fluticasone-salmeterol (ADVAIR DISKUS) 500-50 mcg/dose diskus inhaler Take 1 Puff by inhalation two (2) times a day. For asthma and sarcoidosis    insulin glargine (LANTUS,BASAGLAR) 100 unit/mL (3 mL) inpn 25 Units by SubCUTAneous route nightly.  lisinopril (PRINIVIL, ZESTRIL) 5 mg tablet Take 1 Tab by mouth daily. For kidney protection    albuterol (PROVENTIL HFA, VENTOLIN HFA, PROAIR HFA) 90 mcg/actuation inhaler Take 2 Puffs by inhalation every six (6) hours as needed. For shortness of breath    hydroCHLOROthiazide (HYDRODIURIL) 25 mg tablet Take 1 Tab by mouth daily. For swelling    Insulin Needles, Disposable, 30 gauge x 1/3\" Use 1-2 times daily. Qty 100    pravastatin (PRAVACHOL) 20 mg tablet Take 1 Tab by mouth nightly.  pregabalin (LYRICA) 50 mg capsule Take 1 Cap by mouth two (2) times a day. Max Daily Amount: 100 mg. For neuropathy    mometasone (NASONEX) 50 mcg/actuation nasal spray 2 Sprays by Both Nostrils route daily as needed.  For allergies    esomeprazole (NEXIUM) 40 mg capsule TAKE ONE CAPSULE BY MOUTH DAILY    glucose blood VI test strips (ACCU-CHEK POOJA) strip Pt to test 5 times daily. Dx:E11.65    predniSONE (DELTASONE) 5 mg tablet 2 tablets every morning with breakfast    aspirin delayed-release 81 mg tablet Take  by mouth daily.  CETIRIZINE HCL (ZYRTEC PO) Take  by mouth.  sAXagliptin-metFORMIN (KOMBIGLYZE XR) 2.5-1,000 mg TM24 Take 1 Tab by mouth daily. For diabetes     No current facility-administered medications for this visit.       Past Medical History:   Diagnosis Date    Allergic rhinitis due to allergen     s/p shots    Arthritis 6/13     MRI c spine w degen changes; Dr Krista Estevez;  ratio 68%, FEV1 78% w 6% inc postbd, TLC 77, RV 56, DLCO 61%    Bilateral great toe fractures 2014    Chronic sinusitis     Dr. Yvrose Pardo in the past    Colon polyp 5/16    Dr Case Monson    Decreased GFR 8/28/2018    Diabetes ([address]     presented w hyperosmotic nonketotic hyperglycemia bs >1000 (3/16); gluc>700 (9/17)    Diabetes mellitus ([address]     Dyslipidemia     calculated 10 year risk score was 2.0% (12/13)    Edema     Edema of both ankles 8/28/2018    Elevated serum creatinine 8/28/2018    Environmental and seasonal allergies 8/28/2018    Eustachian tube dysfunction     FHx: colon cancer     FHx: heart disease     Fibrocystic breast     Dr Kandis Klinefelter Gastroesophageal reflux disease without esophagitis 12/9/2015    GERD (gastroesophageal reflux disease)     Hypertriglyceridemia 8/28/2018    Lateral epicondylitis of both elbows 2/6/2017    Macular degeneration 08/2018    Per patient    Macular degeneration of both eyes 8/28/2018    Mild intermittent asthma without complication 9/87/9798    Morbid obesity (HCC)     peak weight 196 lbs, bmi 35.8 from 10/13    Neuropathy 8/28/2018    Osteopenia     Dr. Leelee Camarillo; DEXA t score -0.7 spine, -0.2 hip (8/14)    Sarcoidosis     Dr Reyna Martinez Type 2 diabetes mellitus with hyperglycemia, with long-term current use of insulin ([address] 8/28/2018     Past Surgical History:   Procedure Laterality Date    CARDIAC SURG PROCEDURE UNLIST  9/10, 8/13    thallium negative ef 72%; negative ef 70%    CHEST SURGERY PROCEDURE UNLISTED  7/12     thyroid negative    CHEST SURGERY PROCEDURE UNLISTED  2012    pfts w mod restrictive defect     COLONOSCOPY N/A 5/26/2016    Dr Mendy Abebe hyperplastic    [place] [address]      Dr. Desi Gonzalez HX HEENT      nasal polypectomy Dr. Hardy Isabel HX HEENT      tear duct surgery right Dr. Angela Soriano 2010; left Dr Susu Luevano 2015    HX ORTHOPAEDIC      DEXA -0.7 spine, -0.2 hip 8/14    VASCULAR SURGERY PROCEDURE UNLIST  12/13    venous doppler negative     Social History     Social History    Marital status: LEGALLY      Spouse name: N/A    Number of children: 0    Years of education: 13     Occupational History    Unemployed      Social History Main Topics    Smoking status: Never Smoker    Smokeless tobacco: Never Used    Alcohol use 0.0 oz/week     0 Standard drinks or equivalent per week      Comment: occasional wine    Drug use: No    Sexual activity: Not Currently     Other Topics Concern     Service No    Blood Transfusions No    Caffeine Concern Yes     due to reflux    Occupational Exposure No    Hobby Hazards No    Sleep Concern Yes    Stress Concern Yes     wants a job    Weight Concern Yes    Special Diet No    Back Care No    Exercise Yes    Bike Helmet No    Seat Belt Yes    Self-Exams Yes     Social History Narrative    Patient lives with a friend, no pets.      Family History   Problem Relation Age of Onset   [facility] Cancer Mother     Hypertension Mother     Cancer Father    [facility] Diabetes Father     Hypertension Father     Stroke Father     Diabetes Sister     Hypertension Sister     Heart Disease Sister     Colon Cancer Sister 52    Hypertension Brother     Cancer Maternal Aunt        Review of systems:  She denies fever chills poor appetite or weight loss    Physical Exam:  Visit Vitals    /80 (BP 1 Location: Left arm, BP Patient Position: At rest)    Pulse 71    Temp 98.2 °F (36.8 °C) (Oral)    Resp 20    Ht 5' 2\" (1.575 m)    Wt 83.9 kg (185 lb)    LMP 03/30/2013    SpO2 99%    BMI 33.84 kg/m2       Well-developed well-nourished  HEENT: WNL  Lymph node exam: Supraclavicular cervical lymph nodes negative  Chest: Equal symmetrical expansion no dullness no wheezes rales rubs  Heart: Regular rhythm no gallop no murmur no JVD no peripheral edema  Extremities: No cyanosis or clubbing  Neurological: Alert and oriented    Labs:    O2 sat room air at rest 99%  Spirometry 9/26/18 very mild obstructive lung defect no improvement with bronchodilator    Impression:     By history and physical exam stable asthma  Sarcoidosis which appears to have resolved spontaneously or is not active now    Plan:     Continue Advair as needed albuterol and start reducing maintenance prednisone slowly to 8 mg daily follow-up in 6 months    Vic Mederos MD , [facility]    CC: Rajani Ortega NP     [address] [facility]. [address]  [place], [address] [address]     P: [phone]     F: [phone]

## 2018-09-26 NOTE — PATIENT INSTRUCTIONS
Continue Advair 1 inhalation twice daily and remember to exhale fully before inhaling and to wash mouth with water and spit it out after inhaling    Albuterol 2 inhalations every 4 hours as needed if you require albuterol too often to control respiratory symptoms call the office for severe symptoms go to the emergency room    Start reducing prednisone:  Prednisone 5 mg once daily with prednisone 1 mg 3 tablets once daily at the same time and if you tolerate this reduction in prednisone after several months reduce the prednisone dosage again to prednisone 5 mg once daily with prednisone 1 mg 2 tablets once daily

## 2018-10-08 ENCOUNTER — TELEPHONE (OUTPATIENT)
Dept: PULMONOLOGY | Age: 53
End: 2018-10-08

## 2018-10-08 NOTE — TELEPHONE ENCOUNTER
Per pt, she needs both of her prednisone prescriptions to be sent to [facility] on [address] instead of [address].

## 2018-11-26 ENCOUNTER — HOSPITAL ENCOUNTER (OUTPATIENT)
Dept: LAB | Age: 53
Discharge: HOME | End: 2018-11-26

## 2018-11-26 ENCOUNTER — OFFICE VISIT (OUTPATIENT)
Dept: FAMILY MEDICINE CLINIC | Age: 53
End: 2018-11-26

## 2018-11-26 VITALS
OXYGEN SATURATION: 99 % | DIASTOLIC BLOOD PRESSURE: 84 MMHG | SYSTOLIC BLOOD PRESSURE: 129 MMHG | TEMPERATURE: 97.8 F | HEART RATE: 81 BPM | HEIGHT: 62 IN | BODY MASS INDEX: 33.9 KG/M2 | WEIGHT: 184.2 LBS | RESPIRATION RATE: 18 BRPM

## 2018-11-26 DIAGNOSIS — Z01.89 ROUTINE LAB DRAW: ICD-10-CM

## 2018-11-26 DIAGNOSIS — R94.4 DECREASED GFR: ICD-10-CM

## 2018-11-26 DIAGNOSIS — E11.65 TYPE 2 DIABETES MELLITUS WITH HYPERGLYCEMIA, WITH LONG-TERM CURRENT USE OF INSULIN (HCC): ICD-10-CM

## 2018-11-26 DIAGNOSIS — E78.5 DYSLIPIDEMIA: ICD-10-CM

## 2018-11-26 DIAGNOSIS — R79.89 ELEVATED SERUM CREATININE: ICD-10-CM

## 2018-11-26 DIAGNOSIS — E78.1 HYPERTRIGLYCERIDEMIA: ICD-10-CM

## 2018-11-26 DIAGNOSIS — Z79.4 TYPE 2 DIABETES MELLITUS WITH HYPERGLYCEMIA, WITH LONG-TERM CURRENT USE OF INSULIN (HCC): ICD-10-CM

## 2018-11-26 DIAGNOSIS — Z23 ENCOUNTER FOR IMMUNIZATION: Primary | ICD-10-CM

## 2018-11-26 DIAGNOSIS — Z01.419 WELL WOMAN EXAM WITH ROUTINE GYNECOLOGICAL EXAM: ICD-10-CM

## 2018-11-26 LAB
ALBUMIN SERPL-MCNC: 3.4 G/DL (ref 3.4–5)
ALBUMIN/GLOB SERPL: 1 {RATIO} (ref 0.8–1.7)
ALP SERPL-CCNC: 94 U/L (ref 45–117)
ALT SERPL-CCNC: 22 U/L (ref 13–56)
ANION GAP SERPL CALC-SCNC: 9 MMOL/L (ref 3–18)
AST SERPL-CCNC: 12 U/L (ref 15–37)
BILIRUB SERPL-MCNC: 0.2 MG/DL (ref 0.2–1)
BUN SERPL-MCNC: 28 MG/DL (ref 7–18)
BUN/CREAT SERPL: 19 (ref 12–20)
CALCIUM SERPL-MCNC: 8.9 MG/DL (ref 8.5–10.1)
CHLORIDE SERPL-SCNC: 110 MMOL/L (ref 100–108)
CHOLEST SERPL-MCNC: 201 MG/DL
CO2 SERPL-SCNC: 23 MMOL/L (ref 21–32)
CREAT SERPL-MCNC: 1.48 MG/DL (ref 0.6–1.3)
EST. AVERAGE GLUCOSE BLD GHB EST-MCNC: 197 MG/DL
GLOBULIN SER CALC-MCNC: 3.3 G/DL (ref 2–4)
GLUCOSE SERPL-MCNC: 167 MG/DL (ref 74–99)
HBA1C MFR BLD: 8.5 % (ref 4.2–5.6)
HDLC SERPL-MCNC: 49 MG/DL (ref 40–60)
HDLC SERPL: 4.1 {RATIO} (ref 0–5)
LDLC SERPL CALC-MCNC: 103 MG/DL (ref 0–100)
LIPID PROFILE,FLP: ABNORMAL
POTASSIUM SERPL-SCNC: 4.3 MMOL/L (ref 3.5–5.5)
PROT SERPL-MCNC: 6.7 G/DL (ref 6.4–8.2)
SODIUM SERPL-SCNC: 142 MMOL/L (ref 136–145)
TRIGL SERPL-MCNC: 245 MG/DL (ref ?–150)
VLDLC SERPL CALC-MCNC: 49 MG/DL
WET MOUNT POCT, WMPOCT: NORMAL

## 2018-11-26 PROCEDURE — 80053 COMPREHEN METABOLIC PANEL: CPT

## 2018-11-26 PROCEDURE — 88142 CYTOPATH C/V THIN LAYER: CPT

## 2018-11-26 PROCEDURE — 83036 HEMOGLOBIN GLYCOSYLATED A1C: CPT

## 2018-11-26 PROCEDURE — 80061 LIPID PANEL: CPT

## 2018-11-26 PROCEDURE — 87591 N.GONORRHOEAE DNA AMP PROB: CPT

## 2018-11-26 NOTE — PROGRESS NOTES
Apple Joshi is a 46 y.o. female who presents for routine immunizations. She denies any symptoms , reactions or allergies that would exclude them from being immunized today. Risks and adverse reactions were discussed and the VIS was given to them. All questions were addressed. She was observed for 20 min post injection. There were no reactions observed. Shweta Allen LPN      Verified patient name, , demographics and orders. Venipuncture for labs performed using 23G x 3/4\" butterfly  Right AC using aseptic technique. Skin intact and dry. No active bleeding or complications noted. Bandaid dressing applied.

## 2018-11-26 NOTE — PROGRESS NOTES
Subjective:   46 y.o. female for Well Woman Check. Patient's last menstrual period was 03/30/2013. Social History: not sexually active over 1 yr  Pertinent past medical hstory: none, no history of HTN, DVT, CAD, DM, liver disease, migraines or smoking. ROS:  Feeling well. No dyspnea or chest pain on exertion. No abdominal pain, change in bowel habits, black or bloody stools. No urinary tract symptoms. GYN ROS: normal menses, no abnormal bleeding, pelvic pain or discharge, no breast pain or new or enlarging lumps on self exam. No neurological complaints. Mammo 4 weeks ago    Objective:     Visit Vitals  /84 (BP 1 Location: Left arm, BP Patient Position: Sitting)   Pulse 81   Temp 97.8 °F (36.6 °C) (Oral)   Resp 18   Ht 5' 2\" (1.575 m)   Wt 184 lb 3.2 oz (83.6 kg)   LMP 03/30/2013   SpO2 99%   BMI 33.69 kg/m²     The patient appears well, alert, oriented x 3, in no distress. ENT normal.  Neck supple. No adenopathy or thyromegaly. CHRISTIANA. Lungs are clear, good air entry, no wheezes, rhonchi or rales. S1 and S2 normal, no murmurs, regular rate and rhythm. Abdomen soft without tenderness, guarding, mass or organomegaly. Extremities show no edema, normal peripheral pulses. Neurological is normal, no focal findings. BREAST EXAM: breasts appear normal, no suspicious masses, no skin or nipple changes or axillary nodes    PELVIC EXAM: normal external genitalia, vulva, vagina, cervix, uterus and adnexa    Assessment/Plan:   well woman  Mammogram in 1 yr  pap smear in 3 yrs  return annually or prn    ICD-10-CM ICD-9-CM    1. Routine lab draw Z00.00 V72.60 COLLECTION VENOUS BLOOD,VENIPUNCTURE   2. Encounter for immunization Z23 V03.89 INFLUENZA VIRUS VAC QUAD,SPLIT,PRESV FREE SYRINGE IM   3.  Well woman exam with routine gynecological exam Z01.419 V72.31 CHLAMYDIA/NEISSERIA/TRICHOMONAS AMP      AMB POC SMEAR, STAIN & INTERPRET, WET MOUNT      PAP, LIQUID BASED, MANUAL SCREEN       Face to Face time: 20min

## 2018-11-27 LAB
C TRACH RRNA SPEC QL NAA+PROBE: NEGATIVE
N GONORRHOEA RRNA SPEC QL NAA+PROBE: NEGATIVE
SPECIMEN SOURCE: NORMAL
T VAGINALIS RRNA SPEC QL NAA+PROBE: NEGATIVE

## 2018-11-27 NOTE — PROGRESS NOTES
Will review results with pt at 12/3/18 appt  1) A1c 7.5 to 8.5  2)  to 245;  to 103  3) Creatinine remains elevated but improved; GFR remains decreased and improved- reduced kombiglyze back in Aug which possibly corrected GFR and creatinine slightly. Pt is also on HCTZ. Will continue to monitor in 3 months. If GFR and Creatinine cont to decline, will consider alternate therapy for HCTZ.

## 2018-12-03 ENCOUNTER — OFFICE VISIT (OUTPATIENT)
Dept: FAMILY MEDICINE CLINIC | Age: 53
End: 2018-12-03

## 2018-12-03 VITALS
HEART RATE: 73 BPM | BODY MASS INDEX: 34.04 KG/M2 | SYSTOLIC BLOOD PRESSURE: 136 MMHG | OXYGEN SATURATION: 99 % | RESPIRATION RATE: 20 BRPM | TEMPERATURE: 97.8 F | HEIGHT: 62 IN | DIASTOLIC BLOOD PRESSURE: 84 MMHG | WEIGHT: 185 LBS

## 2018-12-03 DIAGNOSIS — E11.65 TYPE 2 DIABETES MELLITUS WITH HYPERGLYCEMIA, WITH LONG-TERM CURRENT USE OF INSULIN (HCC): ICD-10-CM

## 2018-12-03 DIAGNOSIS — Z79.4 TYPE 2 DIABETES MELLITUS WITH HYPERGLYCEMIA, WITH LONG-TERM CURRENT USE OF INSULIN (HCC): ICD-10-CM

## 2018-12-03 DIAGNOSIS — R79.89 ELEVATED SERUM CREATININE: ICD-10-CM

## 2018-12-03 DIAGNOSIS — M25.471 EDEMA OF BOTH ANKLES: ICD-10-CM

## 2018-12-03 DIAGNOSIS — E78.5 DYSLIPIDEMIA: ICD-10-CM

## 2018-12-03 DIAGNOSIS — M25.472 EDEMA OF BOTH ANKLES: ICD-10-CM

## 2018-12-03 DIAGNOSIS — E78.1 HYPERTRIGLYCERIDEMIA: ICD-10-CM

## 2018-12-03 DIAGNOSIS — R94.4 DECREASED GFR: Primary | ICD-10-CM

## 2018-12-03 DIAGNOSIS — F41.9 ANXIETY: ICD-10-CM

## 2018-12-03 RX ORDER — FLUOXETINE HYDROCHLORIDE 20 MG/1
20 CAPSULE ORAL DAILY
Qty: 30 CAP | Refills: 0 | Status: SHIPPED | COMMUNITY
Start: 2018-12-03 | End: 2019-01-10 | Stop reason: SDUPTHER

## 2018-12-03 RX ORDER — LISINOPRIL 5 MG/1
5 TABLET ORAL DAILY
Qty: 30 TAB | Refills: 3 | Status: SHIPPED | OUTPATIENT
Start: 2018-12-03 | End: 2019-03-11

## 2018-12-03 RX ORDER — FLUOXETINE HYDROCHLORIDE 20 MG/1
20 CAPSULE ORAL DAILY
Qty: 90 CAP | Refills: 3 | Status: SHIPPED | COMMUNITY
Start: 2018-12-03 | End: 2019-03-07 | Stop reason: CLARIF

## 2018-12-03 RX ORDER — HYDROCHLOROTHIAZIDE 25 MG/1
25 TABLET ORAL DAILY
Qty: 30 TAB | Refills: 3 | Status: SHIPPED | OUTPATIENT
Start: 2018-12-03 | End: 2019-03-07 | Stop reason: SDUPTHER

## 2018-12-03 RX ORDER — PRAVASTATIN SODIUM 40 MG/1
40 TABLET ORAL DAILY
Qty: 30 TAB | Refills: 3 | Status: SHIPPED | OUTPATIENT
Start: 2018-12-03 | End: 2019-03-07 | Stop reason: CLARIF

## 2018-12-03 NOTE — LETTER
12/3/2018 [facility] [address] [facility] [place], [address] Yisel Joseph, 1965, is picking up the following medications ordered from the [facility] Program: STOCK:  PROZAC 20 MG #30 Benita Sprague Patient's Signature: _____________________________ Today's Date: 12/3/2018

## 2018-12-03 NOTE — PROGRESS NOTES
[address]  [address] [facility], [address]  [phone] office/[phone] fax      12/3/2018    Reason for visit:   Chief Complaint   Patient presents with    Follow Up Chronic Condition    Back Pain     level 8 of 10     Palpitations     on and off       Patient: Apple Joshi, 1965, [id number]       Primary MD: Alejandra Rao NP    Subjective:   Apple Joshi, a 46 y.o. female, who presents for Follow Up Chronic Condition; Back Pain (level 8 of 10 ); and Palpitations (on and off)      Past Medical History:   Diagnosis Date    Arthritis 6/13     MRI c spine w degen changes; Dr Sahu Spurling Bilateral great toe fractures 2014    Chronic sinusitis     Dr. Juanito Clemente in the past    Colon polyp 5/16    Dr Gavin Certain    Decreased GFR 8/28/2018    DJD (degenerative joint disease) of cervical spine 2016    DJD (degenerative joint disease), lumbar 2016    Dyslipidemia     calculated 10 year risk score was 2.0% (12/13)    Edema of both ankles 8/28/2018    Elevated serum creatinine 8/28/2018    Environmental and seasonal allergies 8/28/2018    Eustachian tube dysfunction     FHx: colon cancer     FHx: heart disease     Fibrocystic breast     Dr Reji Medrano GERD (gastroesophageal reflux disease)     Hypertriglyceridemia 8/28/2018    Lateral epicondylitis of both elbows 2/6/2017    Macular degeneration of both eyes 8/28/2018    Mild intermittent asthma without complication 23/07/5425    Dr. Emma Cm;  ratio 68%, FEV1 78% w 6% inc postbd, TLC 77, RV 56, DLCO 61%    Morbid obesity (HCC)     peak weight 196 lbs, bmi 35.8 from 10/13    Neuropathy 8/28/2018    Osteopenia     Dr. Renetta Fabian; DEXA t score -0.7 spine, -0.2 hip (8/14)    Sarcoidosis     Dr Meenu Rodrigez Type 2 diabetes mellitus with hyperglycemia, with long-term current use of insulin ([facility] [address] 8/28/2018       Past Surgical History:   Procedure Laterality Date    CARDIAC SURG PROCEDURE UNLIST 9/10, 8/13    thallium negative ef 72%; negative ef 70%    CHEST SURGERY PROCEDURE UNLISTED  7/12     thyroid negative    CHEST SURGERY PROCEDURE UNLISTED  2012    pfts w mod restrictive defect     COLONOSCOPY N/A 5/26/2016    Dr Julia Jauregui hyperplastic    Joana Marley HX HEENT      nasal polypectomy Dr. Divina Mendez HX HEENT      tear duct surgery right Dr. Yumiko Bone 2010; left Dr Ba Hermosillo 2015    HX ORTHOPAEDIC      DEXA -0.7 spine, -0.2 hip 8/14    VASCULAR SURGERY PROCEDURE UNLIST  12/13    venous doppler negative       Social History     Socioeconomic History    Marital status: LEGALLY      Spouse name: Not on file    Number of children: 0    Years of education: 13    Highest education level: Not on file   Social Needs    Financial resource strain: Not on file    Food insecurity - worry: Not on file    Food insecurity - inability: Not on file   Communication Specialist [employer] needs - medical: Not on file   [employer] needs - non-medical: Not on file   Occupational History    Occupation: Unemployed   Tobacco Use    Smoking status: Never Smoker    Smokeless tobacco: Never Used   Substance and Sexual Activity    Alcohol use: Yes     Alcohol/week: 0.0 oz     Comment: occasional wine    Drug use: No    Sexual activity: Not Currently   Other Topics Concern     Service No    Blood Transfusions No    Caffeine Concern Yes     Comment: due to reflux    Occupational Exposure No    Hobby Hazards No    Sleep Concern Yes    Stress Concern Yes     Comment: wants a job    Weight Concern Yes    Special Diet No    Back Care No    Exercise Yes    Bike Helmet No    Seat Belt Yes    Self-Exams Yes   Social History Narrative    Patient lives with a friend, no pets.        Allergies   Allergen Reactions    Pollen Extracts Runny Nose and Cough    Amoxicillin Hives, Shortness of Breath and Swelling    Crestor [Rosuvastatin] Myalgia    Lipitor [Atorvastatin] Other (comments)     Muscle cramps and weakness      Pcn [Penicillins] Angioedema       Current Outpatient Medications on File Prior to Visit   Medication Sig Dispense Refill    predniSONE (DELTASONE) 1 mg tablet Take 3 Tabs by mouth daily (with breakfast). 90 Tab 11    sAXagliptin-metFORMIN (KOMBIGLYZE XR) 2.5-1,000 mg TM24 Take 1 Tab by mouth daily. For diabetes 90 Tab 3    fluticasone-salmeterol (ADVAIR DISKUS) 500-50 mcg/dose diskus inhaler Take 1 Puff by inhalation two (2) times a day. For asthma and sarcoidosis 3 Inhaler 3    insulin glargine (LANTUS,BASAGLAR) 100 unit/mL (3 mL) inpn 25 Units by SubCUTAneous route nightly. 3 Pen 3    albuterol (PROVENTIL HFA, VENTOLIN HFA, PROAIR HFA) 90 mcg/actuation inhaler Take 2 Puffs by inhalation every six (6) hours as needed. For shortness of breath 3 Inhaler 3    Insulin Needles, Disposable, 30 gauge x 1/3\" Use 1-2 times daily. Qty 100 1 Package 11    pravastatin (PRAVACHOL) 20 mg tablet Take 1 Tab by mouth nightly. 30 Tab 3    pregabalin (LYRICA) 50 mg capsule Take 1 Cap by mouth two (2) times a day. Max Daily Amount: 100 mg. For neuropathy 60 Cap 3    mometasone (NASONEX) 50 mcg/actuation nasal spray 2 Sprays by Both Nostrils route daily as needed. For allergies 3 Container 3    esomeprazole (NEXIUM) 40 mg capsule TAKE ONE CAPSULE BY MOUTH DAILY 90 Cap 0    glucose blood VI test strips (ACCU-CHEK POOJA) strip Pt to test 5 times daily. Dx:E11.65 500 Strip 3    aspirin delayed-release 81 mg tablet Take  by mouth daily.  predniSONE (DELTASONE) 5 mg tablet Take 1 Tab by mouth daily (with breakfast). 30 Tab 11    [DISCONTINUED] predniSONE (DELTASONE) 5 mg tablet 2 tablets every morning with breakfast 180 Tab 3    CETIRIZINE HCL (ZYRTEC PO) Take  by mouth. No current facility-administered medications on file prior to visit. Review of Systems   Constitutional: Negative. HENT: Positive for congestion and sinus pain.  Negative for ear discharge, ear pain and sore throat. My dental appt was canceled. I had a tooth removed and placed on ABX-PCN and I had a reaction. Throat started to swell. Cam Meléndez a few months ago. Eyes: Negative. Respiratory: Negative. Cardiovascular: Negative. Gastrointestinal: Negative. Genitourinary:        Mammo done 2 weeks ago   Musculoskeletal: Negative. Skin: Negative. Neurological: Positive for headaches (started a month ago. Doing nasonex and zyrtec. Taking tylenol with no relief. ). Endo/Heme/Allergies: Positive for environmental allergies. Psychiatric/Behavioral: Positive for depression. The patient is nervous/anxious. Anxiety due to declining health. Having pressure in my chest at times. I am willing to take meds for anxiety and see a counselor. Objective:   Visit Vitals  /84 (BP 1 Location: Left arm, BP Patient Position: Sitting)   Pulse 73   Temp 97.8 °F (36.6 °C) (Oral)   Resp 20   Ht 5' 2\" (1.575 m)   Wt 185 lb (83.9 kg)   LMP 03/30/2013   SpO2 99%   BMI 33.84 kg/m²      Wt Readings from Last 3 Encounters:   12/03/18 185 lb (83.9 kg)   11/26/18 184 lb 3.2 oz (83.6 kg)   09/26/18 185 lb (83.9 kg)     Lab Results   Component Value Date/Time    Glucose 167 (H) 11/26/2018 09:50 AM    Glucose (POC) 155 (H) 06/16/2018 04:47 PM    Glucose, POC 89 05/26/2016 10:49 AM         Physical Exam   Constitutional: She is oriented to person, place, and time. She appears well-nourished. HENT:   Head: Atraumatic. Nose: Right sinus exhibits maxillary sinus tenderness and frontal sinus tenderness. Left sinus exhibits maxillary sinus tenderness and frontal sinus tenderness. Neck: Neck supple. Cardiovascular: Normal rate, regular rhythm and normal heart sounds. Pulmonary/Chest: Effort normal and breath sounds normal.   Musculoskeletal: Normal range of motion. Neurological: She is alert and oriented to person, place, and time. Skin: Skin is warm and dry. Psychiatric: Her mood appears anxious. She exhibits a depressed mood. Pt teary-eyed during visit. Assessment:    Yisel Faustomirianfouzia Del Angel who has risk factors including (see above previous medical hx) and:       ICD-10-CM ICD-9-CM    1. Decreased GFR R94.4 794.4    2. Edema of both ankles M25.471 719.07 hydroCHLOROthiazide (HYDRODIURIL) 25 mg tablet    H56.030  METABOLIC PANEL, COMPREHENSIVE   3. Type 2 diabetes mellitus with hyperglycemia, with long-term current use of insulin (HCC) E11.65 250.00 lisinopril (PRINIVIL, ZESTRIL) 5 mg tablet    Z79.4 790.29 HEMOGLOBIN A1C WITH EAG     V58.67    4. Dyslipidemia E78.5 272.4 pravastatin (PRAVACHOL) 40 mg tablet      LIPID PANEL   5. Elevated serum creatinine R79.89 790.99    6. Hypertriglyceridemia E78.1 272.1 pravastatin (PRAVACHOL) 40 mg tablet      LIPID PANEL   7. Anxiety F41.9 300.00 REFERRAL TO PSYCHIATRY      FLUoxetine (PROZAC) 20 mg capsule      FLUoxetine (PROZAC) 20 mg capsule     1. Edema of both ankles  Reorder    - hydroCHLOROthiazide (HYDRODIURIL) 25 mg tablet; Take 1 Tab by mouth daily. For swelling  Dispense: 30 Tab; Refill: 3  - METABOLIC PANEL, COMPREHENSIVE; Future    2. Type 2 diabetes mellitus with hyperglycemia, with long-term current use of insulin (HCC)  Reorder    - lisinopril (PRINIVIL, ZESTRIL) 5 mg tablet; Take 1 Tab by mouth daily. For kidney protection  Dispense: 30 Tab; Refill: 3  - HEMOGLOBIN A1C WITH EAG; Future    3. Dyslipidemia  Increased pravastatin due to increasing lipid. - pravastatin (PRAVACHOL) 40 mg tablet; Take 1 Tab by mouth daily. For cholesterol  Dispense: 30 Tab; Refill: 3  - LIPID PANEL; Future    4. Decreased GFR      5. Elevated serum creatinine      6. Hypertriglyceridemia    - pravastatin (PRAVACHOL) 40 mg tablet; Take 1 Tab by mouth daily. For cholesterol  Dispense: 30 Tab; Refill: 3  - LIPID PANEL; Future    7.  Anxiety  Encouraged pt to follow up with [facility] Saez mental health, NP for counseling to work on problems, develop coping mechanisms and have someone to leave the problems with who could help in resolving daily issues. - REFERRAL TO PSYCHIATRY  - FLUoxetine (PROZAC) 20 mg capsule; Take 1 Cap by mouth daily. For anxiety/depression  Dispense: 30 Cap; Refill: 0  - FLUoxetine (PROZAC) 20 mg capsule; Take 1 Cap by mouth daily. For anxiety/depression  Dispense: 90 Cap; Refill: 3      Written instructions followed our verbal discussion of all information discussed above, pending tests ordered and future goals/plans. Patient expressed understanding of current diagnosis, planned testing, follow up and if needed to contact the office for any questions or concerns prior to the next visit. Plan:   Med reconciliation completed with patient. Reviewed side effects of medications with the patient. Questions were answered and patient verb understanding. Discussed lab results with patient  1) A1c 7.5 to 8.5  2)  to 245;  to 103  3) Creatinine remains elevated but improved; GFR remains decreased and improved- reduced kombiglyze back in Aug which possibly corrected GFR and creatinine slightly. Pt is also on HCTZ. Will continue to monitor in 3 months. If GFR and Creatinine cont to decline, will consider alternate therapy for HCTZ. Orders Placed This Encounter    HEMOGLOBIN A1C WITH EAG     Standing Status:   Future     Standing Expiration Date:   4/30/2019    LIPID PANEL     Standing Status:   Future     Standing Expiration Date:   3/88/8032    METABOLIC PANEL, COMPREHENSIVE     Standing Status:   Future     Standing Expiration Date:   4/30/2019    REFERRAL TO PSYCHIATRY     Referral Priority:   Routine     Referral Type:   Behavioral Health     Referral Reason:   Specialty Services Required     Requested Specialty:   Psychiatry     Number of Visits Requested:   1    hydroCHLOROthiazide (HYDRODIURIL) 25 mg tablet     Sig: Take 1 Tab by mouth daily.  For swelling     Dispense:  30 Tab     Refill:  3  lisinopril (PRINIVIL, ZESTRIL) 5 mg tablet     Sig: Take 1 Tab by mouth daily. For kidney protection     Dispense:  30 Tab     Refill:  3    pravastatin (PRAVACHOL) 40 mg tablet     Sig: Take 1 Tab by mouth daily. For cholesterol     Dispense:  30 Tab     Refill:  3    FLUoxetine (PROZAC) 20 mg capsule     Sig: Take 1 Cap by mouth daily. For anxiety/depression     Dispense:  30 Cap     Refill:  0    FLUoxetine (PROZAC) 20 mg capsule     Sig: Take 1 Cap by mouth daily. For anxiety/depression     Dispense:  90 Cap     Refill:  3    sodium chloride (SALINE MIST) 0.65 % nasal squeeze bottle     Si.05 mL by Both Nostrils route as needed for Congestion. For dry nose, environmental allergies     Dispense:  30 mL     Refill:  0     Current Outpatient Medications   Medication Sig Dispense Refill    hydroCHLOROthiazide (HYDRODIURIL) 25 mg tablet Take 1 Tab by mouth daily. For swelling 30 Tab 3    lisinopril (PRINIVIL, ZESTRIL) 5 mg tablet Take 1 Tab by mouth daily. For kidney protection 30 Tab 3    pravastatin (PRAVACHOL) 40 mg tablet Take 1 Tab by mouth daily. For cholesterol 30 Tab 3    FLUoxetine (PROZAC) 20 mg capsule Take 1 Cap by mouth daily. For anxiety/depression 30 Cap 0    FLUoxetine (PROZAC) 20 mg capsule Take 1 Cap by mouth daily. For anxiety/depression 90 Cap 3    sodium chloride (SALINE MIST) 0.65 % nasal squeeze bottle 0.05 mL by Both Nostrils route as needed for Congestion. For dry nose, environmental allergies 30 mL 0    predniSONE (DELTASONE) 1 mg tablet Take 3 Tabs by mouth daily (with breakfast). 90 Tab 11    sAXagliptin-metFORMIN (KOMBIGLYZE XR) 2.5-1,000 mg TM24 Take 1 Tab by mouth daily. For diabetes 90 Tab 3    fluticasone-salmeterol (ADVAIR DISKUS) 500-50 mcg/dose diskus inhaler Take 1 Puff by inhalation two (2) times a day. For asthma and sarcoidosis 3 Inhaler 3    insulin glargine (LANTUS,BASAGLAR) 100 unit/mL (3 mL) inpn 25 Units by SubCUTAneous route nightly.  3 Pen 3    albuterol (PROVENTIL HFA, VENTOLIN HFA, PROAIR HFA) 90 mcg/actuation inhaler Take 2 Puffs by inhalation every six (6) hours as needed. For shortness of breath 3 Inhaler 3    Insulin Needles, Disposable, 30 gauge x 1/3\" Use 1-2 times daily. Qty 100 1 Package 11    pravastatin (PRAVACHOL) 20 mg tablet Take 1 Tab by mouth nightly. 30 Tab 3    pregabalin (LYRICA) 50 mg capsule Take 1 Cap by mouth two (2) times a day. Max Daily Amount: 100 mg. For neuropathy 60 Cap 3    mometasone (NASONEX) 50 mcg/actuation nasal spray 2 Sprays by Both Nostrils route daily as needed. For allergies 3 Container 3    esomeprazole (NEXIUM) 40 mg capsule TAKE ONE CAPSULE BY MOUTH DAILY 90 Cap 0    glucose blood VI test strips (ACCU-CHEK POOJA) strip Pt to test 5 times daily. Dx:E11.65 500 Strip 3    aspirin delayed-release 81 mg tablet Take  by mouth daily.  predniSONE (DELTASONE) 5 mg tablet Take 1 Tab by mouth daily (with breakfast). 30 Tab 11    CETIRIZINE HCL (ZYRTEC PO) Take  by mouth. Follow-up Disposition:  Return in about 3 months (around 3/3/2019) for Hypertension, Cholesterol, Diabetes. labs needed for 3 month follow-up appt  A1c, CMP, lipid    Warner Osmel. Erin, RN, MSN, [employer]   [address] [place] [address]      I spent 30 minutes with the patient in face-to-face consultation, of which greater than 50% was spent in counseling and coordination of care as described above.

## 2018-12-11 ENCOUNTER — TELEPHONE (OUTPATIENT)
Dept: FAMILY MEDICINE CLINIC | Age: 53
End: 2018-12-11

## 2018-12-11 NOTE — TELEPHONE ENCOUNTER
Medication: Ventolin HFA, dose: 2 puffs, how often: every 6 hours as needed, current number of medication days provided: 90, refill per application. Lot #: [id number], EXP 12/2019. This medication was received and verified for the following 1. Correct Patient, 2. Correct Diagnosis, 3. Correct Drug, 4. Correct route, and no current allergy to medication. Medication: Advair 500/50, dose: 1 inhalation, how often: twice daily, current number of medication days provided: 90, refill per application. Lot #: [id number], EXP 12/2019. This medication was received and verified for the following 1. Correct Patient, 2. Correct Diagnosis, 3. Correct Drug, 4. Correct route, and no current allergy to medication. Please contact patient to come  their medications.      Lyndon Oliveira, MSN,RN,[facility]

## 2018-12-20 ENCOUNTER — HOSPITAL ENCOUNTER (EMERGENCY)
Age: 53
Discharge: HOME | End: 2018-12-21
Attending: EMERGENCY MEDICINE
Payer: SELF-PAY

## 2018-12-20 VITALS
OXYGEN SATURATION: 99 % | TEMPERATURE: 98.2 F | DIASTOLIC BLOOD PRESSURE: 85 MMHG | RESPIRATION RATE: 18 BRPM | BODY MASS INDEX: 33.84 KG/M2 | SYSTOLIC BLOOD PRESSURE: 129 MMHG | WEIGHT: 185 LBS | HEART RATE: 87 BPM

## 2018-12-20 DIAGNOSIS — M62.838 NECK MUSCLE SPASM: ICD-10-CM

## 2018-12-20 DIAGNOSIS — M47.812 CERVICAL SPINE ARTHRITIS: Primary | ICD-10-CM

## 2018-12-20 DIAGNOSIS — M62.830 SPASM OF MUSCLE OF LOWER BACK: ICD-10-CM

## 2018-12-20 DIAGNOSIS — M47.816 SPONDYLOSIS OF LUMBAR REGION WITHOUT MYELOPATHY OR RADICULOPATHY: ICD-10-CM

## 2018-12-20 PROCEDURE — 99283 EMERGENCY DEPT VISIT LOW MDM: CPT

## 2018-12-21 ENCOUNTER — APPOINTMENT (OUTPATIENT)
Dept: GENERAL RADIOLOGY | Age: 53
End: 2018-12-21
Attending: PHYSICIAN ASSISTANT
Payer: SELF-PAY

## 2018-12-21 PROCEDURE — 72100 X-RAY EXAM L-S SPINE 2/3 VWS: CPT

## 2018-12-21 PROCEDURE — 72040 X-RAY EXAM NECK SPINE 2-3 VW: CPT

## 2018-12-21 RX ORDER — LIDOCAINE 50 MG/G
PATCH TOPICAL
Qty: 15 EACH | Refills: 0 | Status: SHIPPED | OUTPATIENT
Start: 2018-12-21 | End: 2019-09-03

## 2018-12-21 RX ORDER — METHOCARBAMOL 500 MG/1
500 TABLET, FILM COATED ORAL 4 TIMES DAILY
Qty: 30 TAB | Refills: 0 | Status: SHIPPED | OUTPATIENT
Start: 2018-12-21 | End: 2019-03-11

## 2018-12-21 NOTE — DISCHARGE INSTRUCTIONS
Neck Spasm: Exercises  Your Care Instructions  Here are some examples of typical rehabilitation exercises for your condition. Start each exercise slowly. Ease off the exercise if you start to have pain. Your doctor or physical therapist will tell you when you can start these exercises and which ones will work best for you. How to do the exercises  Levator scapula stretch    1. Sit in a firm chair, or stand up straight. 2. Gently tilt your head toward your left shoulder. 3. Turn your head to look down into your armpit, bending your head slightly forward. Let the weight of your head stretch your neck muscles. 4. Hold for 15 to 30 seconds. 5. Return to your starting position. 6. Follow the same instructions above, but tilt your head toward your right shoulder. 7. Repeat 2 to 4 times toward each shoulder. Upper trapezius stretch    1. Sit in a firm chair, or stand up straight. 2. This stretch works best if you keep your shoulder down as you lean away from it. To help you remember to do this, start by relaxing your shoulders and lightly holding on to your thighs or your chair. 3. Tilt your head toward your shoulder and hold for 15 to 30 seconds. Let the weight of your head stretch your muscles. 4. If you would like a little added stretch, place your arm behind your back. Use the arm opposite of the direction you are tilting your head. For example, if you are tilting your head to the left, place your right arm behind your back. 5. Repeat 2 to 4 times toward each shoulder. Neck rotation    1. Sit in a firm chair, or stand up straight. 2. Keeping your chin level, turn your head to the right, and hold for 15 to 30 seconds. 3. Turn your head to the left, and hold for 15 to 30 seconds. 4. Repeat 2 to 4 times to each side. Chin tuck    1. Lie on the floor with a rolled-up towel under your neck. Your head should be touching the floor. 2. Slowly bring your chin toward the front of your neck.   3. Hold for a count of 6, and then relax for up to 10 seconds. 4. Repeat 8 to 12 times. Forward neck flexion    1. Sit in a firm chair, or stand up straight. 2. Bend your head forward. 3. Hold for 15 to 30 seconds, then return to your starting position. 4. Repeat 2 to 4 times. Follow-up care is a key part of your treatment and safety. Be sure to make and go to all appointments, and call your doctor if you are having problems. It's also a good idea to know your test results and keep a list of the medicines you take. Where can you learn more? Go to http://ralph-fabian.info/. Enter P962 in the search box to learn more about \"Neck Spasm: Exercises. \"  Current as of: November 29, 2017  Content Version: 11.8  © 5175-8392 [employer]. Care instructions adapted under license by [employer] (which disclaims liability or warranty for this information). If you have questions about a medical condition or this instruction, always ask your healthcare professional. [place] [address] any warranty or liability for your use of this information. Low Back Arthritis: Exercises  Your Care Instructions  Here are some examples of typical rehabilitation exercises for your condition. Start each exercise slowly. Ease off the exercise if you start to have pain. Your doctor or physical therapist will tell you when you can start these exercises and which ones will work best for you. When you are not being active, find a comfortable position for rest. Some people are comfortable on the floor or a medium-firm bed with a small pillow under their head and another under their knees. Some people prefer to lie on their side with a pillow between their knees. Don't stay in one position for too long. Take short walks (10 to 20 minutes) every 2 to 3 hours. Avoid slopes, hills, and stairs until you feel better.  Walk only distances you can manage without pain, especially leg pain.  How to do the exercises  Pelvic tilt    8. Lie on your back with your knees bent. 9. \"Brace\" your stomach--tighten your muscles by pulling in and imagining your belly button moving toward your spine. 10. Press your lower back into the floor. You should feel your hips and pelvis rock back. 11. Hold for 6 seconds while breathing smoothly. 12. Relax and allow your pelvis and hips to rock forward. 13. Repeat 8 to 12 times. Back stretches    6. Get down on your hands and knees on the floor. 7. Relax your head and allow it to droop. Round your back up toward the ceiling until you feel a nice stretch in your upper, middle, and lower back. Hold this stretch for as long as it feels comfortable, or about 15 to 30 seconds. 8. Return to the starting position with a flat back while you are on your hands and knees. 9. Let your back sway by pressing your stomach toward the floor. Lift your buttocks toward the ceiling. 10. Hold this position for 15 to 30 seconds. 11. Repeat 2 to 4 times. Follow-up care is a key part of your treatment and safety. Be sure to make and go to all appointments, and call your doctor if you are having problems. It's also a good idea to know your test results and keep a list of the medicines you take. Where can you learn more? Go to http://ralph-fabian.info/. Enter T718 in the search box to learn more about \"Low Back Arthritis: Exercises. \"  Current as of: November 29, 2017  Content Version: 11.8  © 3961-9592 [employer]. Care instructions adapted under license by [employer] (which disclaims liability or warranty for this information). If you have questions about a medical condition or this instruction, always ask your healthcare professional. [address] any warranty or liability for your use of this information.            Neck Spasm: Care Instructions  Your Care Instructions  A neck spasm is sudden tightness and pain in your neck muscles. A spasm may be caused by some activities or repeated movements. For example, you may be more likely to have a neck spasm if you slouch, paint a ceiling, work at a computer, or sleep with your neck twisted. But the cause isn't always clear. Home treatment includes using heat or ice, taking over-the-counter (OTC) pain medicines, and avoiding activities that may lead to neck pain. Gentle stretching, or treatments such as massage or manipulation, may also help ease a neck spasm. For a neck spasm that doesn't get better with home care, your doctor may prescribe medicine. He or she may also suggest exercise or physical therapy to help strengthen or relax your neck muscles. Follow-up care is a key part of your treatment and safety. Be sure to make and go to all appointments, and call your doctor if you are having problems. It's also a good idea to know your test results and keep a list of the medicines you take. How can you care for yourself at home? · To relieve pain, use heat or ice (whichever feels better) on the affected area. ? Put a warm water bottle, a heating pad set on low, or a warm cloth on your neck. Put a thin cloth between the heating pad and your skin. Do not go to sleep with a heating pad on your skin. ? Try ice or a cold pack on the area for 10 to 20 minutes at a time. Put a thin cloth between the ice and your skin. · Ask your doctor if you can take acetaminophen (such as Tylenol) or nonsteroidal anti-inflammatory drugs, such as ibuprofen or naproxen. Your doctor can prescribe stronger medicines if needed. Be safe with medicines. Read and follow all instructions on the label. · Stretch your muscles every day, especially before and after exercise and at bedtime. Regular stretching can help relax your muscles. · Try to find a pillow and a position in bed that help improve your night's rest.  · Try to stay active. It's best to start activity slowly.  If an exercise makes your pain worse, stop doing it. When should you call for help? Call 911 anytime you think you may need emergency care. For example, call if:    · You are unable to move an arm or a leg at all.   [facility] your doctor now or seek immediate medical care if:    · You have new or worse symptoms in your arms, legs, belly, or buttocks. Symptoms may include:  ? Numbness or tingling. ? Weakness. ? Pain.     · You lose bladder or bowel control.    Watch closely for changes in your health, and be sure to contact your doctor if:    · You do not get better as expected. Where can you learn more? Go to http://ralph-fabian.info/. Enter K690 in the search box to learn more about \"Neck Spasm: Care Instructions. \"  Current as of: November 29, 2017  Content Version: 11.8  © 1383-7923 [employer]. Care instructions adapted under license by [employer] (which disclaims liability or warranty for this information). If you have questions about a medical condition or this instruction, always ask your healthcare professional. [address] any warranty or liability for your use of this information.

## 2018-12-21 NOTE — ED PROVIDER NOTES
EMERGENCY DEPARTMENT HISTORY AND PHYSICAL EXAM    Date: 12/20/2018  Patient Name: Jeanette Nicholas    History of Presenting Illness     Chief Complaint   Patient presents with    Neck Pain    Back Pain         History Provided By: Patient    Chief Complaint: back and neck pain  Duration: 2 Days  Timing:  Constant  Location: left low back, left neck  Quality: sharp spasm  Severity: 10 out of 10  Modifying Factors: taking 800mg motrin w/o relief  Associated Symptoms: denies any other associated signs or symptoms      Additional History (Context): Jeanette Nicholas is a 46 y.o. female with djd, kidney disease, gerd, asthma, neuropathy who presents with non radiating left sided neck and low back pain x 2 days. Denies trauma, falls, urinary complaints, abd pain, dizziness, fever, chills or any other complaints. Pt states she has been taking 800mg Motrin w/o relief. No other symptoms or complaints. PCP: Desire Armenta NP    Current Outpatient Medications   Medication Sig Dispense Refill    methocarbamol (ROBAXIN) 500 mg tablet Take 1 Tab by mouth four (4) times daily. 30 Tab 0    lidocaine (LIDODERM) 5 % Apply patch to the affected area for 12 hours a day and remove for 12 hours a day. 15 Each 0    hydroCHLOROthiazide (HYDRODIURIL) 25 mg tablet Take 1 Tab by mouth daily. For swelling 30 Tab 3    lisinopril (PRINIVIL, ZESTRIL) 5 mg tablet Take 1 Tab by mouth daily. For kidney protection 30 Tab 3    pravastatin (PRAVACHOL) 40 mg tablet Take 1 Tab by mouth daily. For cholesterol 30 Tab 3    FLUoxetine (PROZAC) 20 mg capsule Take 1 Cap by mouth daily. For anxiety/depression 30 Cap 0    FLUoxetine (PROZAC) 20 mg capsule Take 1 Cap by mouth daily. For anxiety/depression 90 Cap 3    sodium chloride (SALINE MIST) 0.65 % nasal squeeze bottle 0.05 mL by Both Nostrils route as needed for Congestion.  For dry nose, environmental allergies 30 mL 0    predniSONE (DELTASONE) 1 mg tablet Take 3 Tabs by mouth daily (with breakfast). 90 Tab 11    predniSONE (DELTASONE) 5 mg tablet Take 1 Tab by mouth daily (with breakfast). 30 Tab 11    sAXagliptin-metFORMIN (KOMBIGLYZE XR) 2.5-1,000 mg TM24 Take 1 Tab by mouth daily. For diabetes 90 Tab 3    fluticasone-salmeterol (ADVAIR DISKUS) 500-50 mcg/dose diskus inhaler Take 1 Puff by inhalation two (2) times a day. For asthma and sarcoidosis 3 Inhaler 3    insulin glargine (LANTUS,BASAGLAR) 100 unit/mL (3 mL) inpn 25 Units by SubCUTAneous route nightly. 3 Pen 3    albuterol (PROVENTIL HFA, VENTOLIN HFA, PROAIR HFA) 90 mcg/actuation inhaler Take 2 Puffs by inhalation every six (6) hours as needed. For shortness of breath 3 Inhaler 3    Insulin Needles, Disposable, 30 gauge x 1/3\" Use 1-2 times daily. Qty 100 1 Package 11    pregabalin (LYRICA) 50 mg capsule Take 1 Cap by mouth two (2) times a day. Max Daily Amount: 100 mg. For neuropathy 60 Cap 3    mometasone (NASONEX) 50 mcg/actuation nasal spray 2 Sprays by Both Nostrils route daily as needed. For allergies 3 Container 3    esomeprazole (NEXIUM) 40 mg capsule TAKE ONE CAPSULE BY MOUTH DAILY 90 Cap 0    glucose blood VI test strips (ACCU-CHEK POOJA) strip Pt to test 5 times daily. Dx:E11.65 500 Strip 3    aspirin delayed-release 81 mg tablet Take  by mouth daily.  CETIRIZINE HCL (ZYRTEC PO) Take  by mouth.            Past History     Past Medical History:  Past Medical History:   Diagnosis Date    Arthritis 6/13     MRI c spine w degen changes; Dr Jeffrey Callahan Bilateral great toe fractures 2014    Chronic sinusitis     Dr. Nina Barrera in the past    Colon polyp 5/16    Dr Cameron Velazquez    Decreased GFR 8/28/2018    DJD (degenerative joint disease) of cervical spine 2016    DJD (degenerative joint disease), lumbar 2016    Dyslipidemia     calculated 10 year risk score was 2.0% (12/13)    Edema of both ankles 8/28/2018    Elevated serum creatinine 8/28/2018    Environmental and seasonal allergies 8/28/2018    Eustachian tube dysfunction     FHx: colon cancer     FHx: heart disease     Fibrocystic breast     Dr Montiel Left GERD (gastroesophageal reflux disease)     Hypertriglyceridemia 8/28/2018    Lateral epicondylitis of both elbows 2/6/2017    Macular degeneration of both eyes 8/28/2018    Mild intermittent asthma without complication 16/99/6412    Dr. Camren Frost;  ratio 68%, FEV1 78% w 6% inc postbd, TLC 77, RV 56, DLCO 61%    Morbid obesity (HCC)     peak weight 196 lbs, bmi 35.8 from 10/13    Neuropathy 8/28/2018    Osteopenia     Dr. Patience Patino; DEXA t score -0.7 spine, -0.2 hip (8/14)    Sarcoidosis     Dr Paige Aguilera Type 2 diabetes mellitus with hyperglycemia, with long-term current use of insulin ([address] 8/28/2018       Past Surgical History:  Past Surgical History:   Procedure Laterality Date    CARDIAC SURG PROCEDURE UNLIST  9/10, 8/13    thallium negative ef 72%; negative ef 70%    CHEST SURGERY PROCEDURE UNLISTED  7/12     thyroid negative    CHEST SURGERY PROCEDURE UNLISTED  2012    pfts w mod restrictive defect     COLONOSCOPY N/A 5/26/2016    Dr Cameron Velazquez hyperplastic    Marcio Rumble      Dr. Lorenzo Paula HX HEENT      nasal polypectomy Dr. Rocío Coronado HX HEENT      tear duct surgery right Dr. Jennifer Gautam 2010; left Dr Demetra Chavez 2015    HX ORTHOPAEDIC      DEXA -0.7 spine, -0.2 hip 8/14    VASCULAR SURGERY PROCEDURE UNLIST  12/13    venous doppler negative       Family History:  Family History   Problem Relation Age of Onset    Cancer Mother     Hypertension Mother     Cancer Father     Diabetes Father     Hypertension Father     Stroke Father     Diabetes Sister     Hypertension Sister     Heart Disease Sister     Colon Cancer Sister 52    Hypertension Brother     Cancer Maternal Aunt        Social History:  Social History     Tobacco Use    Smoking status: Never Smoker    Smokeless tobacco: Never Used   Substance Use Topics    Alcohol use:  Yes     Alcohol/week: 0.0 oz Comment: occasional wine    Drug use: No       Allergies: Allergies   Allergen Reactions    Pollen Extracts Runny Nose and Cough    Amoxicillin Hives, Shortness of Breath and Swelling    Crestor [Rosuvastatin] Myalgia    Lipitor [Atorvastatin] Other (comments)     Muscle cramps and weakness      Pcn [Penicillins] Angioedema         Review of Systems   Review of Systems   Constitutional: Negative for chills and fever. Respiratory: Negative. Cardiovascular: Negative. Musculoskeletal: Positive for arthralgias, back pain and neck pain. Neurological: Negative. All other systems reviewed and are negative. All Other Systems Negative  Physical Exam     Vitals:    12/20/18 2254   BP: 129/85   Pulse: 87   Resp: 18   Temp: 98.2 °F (36.8 °C)   SpO2: 99%   Weight: 83.9 kg (185 lb)     Physical Exam   Constitutional: She is oriented to person, place, and time. She appears well-developed and well-nourished. No distress. HENT:   Head: Normocephalic and atraumatic. Right Ear: External ear normal.   Left Ear: External ear normal.   Mouth/Throat: Oropharynx is clear and moist. No oropharyngeal exudate. Eyes: Conjunctivae are normal. Right eye exhibits no discharge. Left eye exhibits no discharge. Neck: Normal range of motion. Neck supple. Cardiovascular: Normal rate and regular rhythm. Pulmonary/Chest: Effort normal and breath sounds normal.   Abdominal: Soft. Bowel sounds are normal.   Musculoskeletal: Normal range of motion. Cervical back: She exhibits tenderness (left paracervical), pain and spasm. She exhibits normal range of motion, no bony tenderness, no swelling, no edema, no deformity and no laceration. Thoracic back: Normal.        Lumbar back: She exhibits tenderness (left parathoracic), pain and spasm. She exhibits normal range of motion, no bony tenderness, no swelling, no edema, no deformity and no laceration.    Negative SLR bilat   Lymphadenopathy:     She has no cervical adenopathy. Neurological: She is alert and oriented to person, place, and time. Skin: Skin is warm and dry. She is not diaphoretic. Psychiatric: She has a normal mood and affect. Diagnostic Study Results     Labs -   No results found for this or any previous visit (from the past 12 hour(s)). Radiologic Studies -   XR SPINE LUMB 2 OR 3 V    (Results Pending)   XR SPINE CERV PA LAT ODONT 3 V MAX    (Results Pending)     CT Results  (Last 48 hours)    None        CXR Results  (Last 48 hours)    None            Medical Decision Making   I am the first provider for this patient. I reviewed the vital signs, available nursing notes, past medical history, past surgical history, family history and social history. Vital Signs-Reviewed the patient's vital signs. Pulse Oximetry Analysis - 99% on RA      Records Reviewed: Nursing Notes    Procedures:  Procedures    Provider Notes (Medical Decision Making): pt with hx of djd in cervical (on mri from 2013) and lumbar spine c/o left sided neck and low back pain x 2 days. No neuro complaints and no injuries. Muscular tenderness on exam. Xrays today with progressive djd, especially in the c spine. Will rx for lidoderm, robaxin. ASHLI Sandoval 4:32 AM     MED RECONCILIATION:  No current facility-administered medications for this encounter. Current Outpatient Medications   Medication Sig    methocarbamol (ROBAXIN) 500 mg tablet Take 1 Tab by mouth four (4) times daily.  lidocaine (LIDODERM) 5 % Apply patch to the affected area for 12 hours a day and remove for 12 hours a day.  hydroCHLOROthiazide (HYDRODIURIL) 25 mg tablet Take 1 Tab by mouth daily. For swelling    lisinopril (PRINIVIL, ZESTRIL) 5 mg tablet Take 1 Tab by mouth daily. For kidney protection    pravastatin (PRAVACHOL) 40 mg tablet Take 1 Tab by mouth daily. For cholesterol    FLUoxetine (PROZAC) 20 mg capsule Take 1 Cap by mouth daily.  For anxiety/depression    FLUoxetine (PROZAC) 20 mg capsule Take 1 Cap by mouth daily. For anxiety/depression    sodium chloride (SALINE MIST) 0.65 % nasal squeeze bottle 0.05 mL by Both Nostrils route as needed for Congestion. For dry nose, environmental allergies    predniSONE (DELTASONE) 1 mg tablet Take 3 Tabs by mouth daily (with breakfast).  predniSONE (DELTASONE) 5 mg tablet Take 1 Tab by mouth daily (with breakfast).  sAXagliptin-metFORMIN (KOMBIGLYZE XR) 2.5-1,000 mg TM24 Take 1 Tab by mouth daily. For diabetes    fluticasone-salmeterol (ADVAIR DISKUS) 500-50 mcg/dose diskus inhaler Take 1 Puff by inhalation two (2) times a day. For asthma and sarcoidosis    insulin glargine (LANTUS,BASAGLAR) 100 unit/mL (3 mL) inpn 25 Units by SubCUTAneous route nightly.  albuterol (PROVENTIL HFA, VENTOLIN HFA, PROAIR HFA) 90 mcg/actuation inhaler Take 2 Puffs by inhalation every six (6) hours as needed. For shortness of breath    Insulin Needles, Disposable, 30 gauge x 1/3\" Use 1-2 times daily. Qty 100    pregabalin (LYRICA) 50 mg capsule Take 1 Cap by mouth two (2) times a day. Max Daily Amount: 100 mg. For neuropathy    mometasone (NASONEX) 50 mcg/actuation nasal spray 2 Sprays by Both Nostrils route daily as needed. For allergies    esomeprazole (NEXIUM) 40 mg capsule TAKE ONE CAPSULE BY MOUTH DAILY    glucose blood VI test strips (ACCU-CHEK POOJA) strip Pt to test 5 times daily. Dx:E11.65    aspirin delayed-release 81 mg tablet Take  by mouth daily.  CETIRIZINE HCL (ZYRTEC PO) Take  by mouth. Disposition:  home    DISCHARGE NOTE:     Pt has been reexamined. Patient has no new complaints, changes, or physical findings. Care plan outlined and precautions discussed. Results of xray were reviewed with the patient. All medications were reviewed with the patient; will d/c home with lidoderm, robaxin. All of pt's questions and concerns were addressed.  Patient was instructed and agrees to follow up with pcp and ortho, as well as to return to the ED upon further deterioration. Patient is ready to go home. Follow-up Information     Follow up With Specialties Details Why Contact Info    Jose Wu NP Nurse Practitioner   [address]  [address]  [phone]          Current Discharge Medication List      START taking these medications    Details   methocarbamol (ROBAXIN) 500 mg tablet Take 1 Tab by mouth four (4) times daily. Qty: 30 Tab, Refills: 0      lidocaine (LIDODERM) 5 % Apply patch to the affected area for 12 hours a day and remove for 12 hours a day. Qty: 15 Each, Refills: 0             Diagnosis     Clinical Impression:   1. Cervical spine arthritis    2. Neck muscle spasm    3. Spondylosis of lumbar region without myelopathy or radiculopathy    4.  Spasm of muscle of lower back

## 2019-01-03 ENCOUNTER — TELEPHONE (OUTPATIENT)
Dept: FAMILY MEDICINE CLINIC | Age: 54
End: 2019-01-03

## 2019-01-03 NOTE — TELEPHONE ENCOUNTER
Medication: Nasonex, dose: 2 sprays both nostrils, how often: every day as needed for allergies, current number of medication days provided: 90, refill per application. Lot #: [id number], EXP 01/2020. This medication was received and verified for the following 1. Correct Patient, 2. Correct Diagnosis, 3. Correct Drug, 4. Correct route, and no current allergy to medication. Please contact patient to come  their medications.      Beverley Khan, MSN,RN,[facility]

## 2019-01-07 ENCOUNTER — OFFICE VISIT (OUTPATIENT)
Dept: ORTHOPEDIC SURGERY | Age: 54
End: 2019-01-07

## 2019-01-07 VITALS
BODY MASS INDEX: 33.68 KG/M2 | HEIGHT: 62 IN | DIASTOLIC BLOOD PRESSURE: 77 MMHG | SYSTOLIC BLOOD PRESSURE: 114 MMHG | HEART RATE: 80 BPM | WEIGHT: 183 LBS

## 2019-01-07 DIAGNOSIS — M47.816 SPONDYLOSIS OF LUMBAR REGION WITHOUT MYELOPATHY OR RADICULOPATHY: ICD-10-CM

## 2019-01-07 DIAGNOSIS — M50.30 DDD (DEGENERATIVE DISC DISEASE), CERVICAL: Primary | ICD-10-CM

## 2019-01-07 DIAGNOSIS — M47.812 SPONDYLOSIS OF CERVICAL REGION WITHOUT MYELOPATHY OR RADICULOPATHY: ICD-10-CM

## 2019-01-07 DIAGNOSIS — M51.36 DDD (DEGENERATIVE DISC DISEASE), LUMBAR: ICD-10-CM

## 2019-01-07 NOTE — PROGRESS NOTES
Chief complaint/History of Present Illness:  Chief Complaint   Patient presents with    Back Pain    Neck Pain     tingling in left hand     HPI  Elisa Delgado is a  46 y.o.  female      HISTORY OF PRESENT ILLNESS:  The patient comes in today after last being seen by Dr. Harmeet Smith 04/25/2016. The patient states she has been doing okay until about a month ago. She started having neck and low back pain. She ended up going to the ER 12/21/2018, where they did x-rays of her neck and back and saw degenerative disc disease and degenerative joint disease. The patient has a history of sarcoidosis and renal dysfunction along with diabetes. She states her blood sugar runs about 150. The ER gave her Lidoderm patches and Robaxin. She states the Lidoderm does not really help. The Robaxin does help somewhat. She takes it at nighttime. She describes her pain in the posterior neck that goes to the trapezius and then the low back is more on the left side in the myofascial area from her bra down to the low back. She has no radicular lumbar pain. She states she was using ibuprofen 800 mg but after the trip to the ER had to stop it due to her kidney function. She states she does get some tingling in the left arm but it is just off and on. She denies fever, bowel or bladder dysfunction. PHYSICAL EXAMINATION:  Ms. Mayela Rios is a 50-year-old female. She alert and oriented. Normal mood and affect. She has a full weightbearing, nonantalgic gait. No assistive device. She has 4/5 strength in bilateral upper and lower extremities. Negative straight leg raise. Negative Anam. She is more tender in the muscles of the left lumbar region from her bra line down to her low back. ASSESSMENT/PLAN:  This is a patient with degenerative disc disease and degenerative joint disease, cervical and lumbar spine. We are gong to put her in some physical therapy.   This is what Dr. Harmeet Smith wanted back then but she was unable to go. She now has Medicaid so she should be able to attend. I am not going to put her on any steroids or NSAIDs due to her kidney function and her diabetes. We will see her back in 2 months following her therapy with Dr. Analisa Webb.           Review of systems:    Past Medical History:   Diagnosis Date    Arthritis 6/13     MRI c spine w degen changes; Dr Shelbie Wetzel Bilateral great toe fractures 2014    Chronic sinusitis     Dr. Gonzales April in the past    Colon polyp 5/16    Dr Mirlande Schaefer    Decreased GFR 8/28/2018    DJD (degenerative joint disease) of cervical spine 2016    DJD (degenerative joint disease), lumbar 2016    Dyslipidemia     calculated 10 year risk score was 2.0% (12/13)    Edema of both ankles 8/28/2018    Elevated serum creatinine 8/28/2018    Environmental and seasonal allergies 8/28/2018    Eustachian tube dysfunction     FHx: colon cancer     FHx: heart disease     Fibrocystic breast     Dr Padmini Kay GERD (gastroesophageal reflux disease)     Hypertriglyceridemia 8/28/2018    Lateral epicondylitis of both elbows 2/6/2017    Macular degeneration of both eyes 8/28/2018    Mild intermittent asthma without complication 16/13/8320    Dr. De La Fuente;  ratio 68%, FEV1 78% w 6% inc postbd, TLC 77, RV 56, DLCO 61%    Morbid obesity (HCC)     peak weight 196 lbs, bmi 35.8 from 10/13    Neuropathy 8/28/2018    Osteopenia     Dr. Clover Wesley; DEXA t score -0.7 spine, -0.2 hip (8/14)    Sarcoidosis     Dr Iban King Type 2 diabetes mellitus with hyperglycemia, with long-term current use of insulin ([facility] [address] 8/28/2018     Past Surgical History:   Procedure Laterality Date    CARDIAC SURG PROCEDURE UNLIST  9/10, 8/13    thallium negative ef 72%; negative ef 70%    CHEST SURGERY PROCEDURE UNLISTED  7/12    US thyroid negative    CHEST SURGERY PROCEDURE UNLISTED  2012    pfts w mod restrictive defect     COLONOSCOPY N/A 5/26/2016    Dr Mirlande Schaefer hyperplastic    HX BREAST REDUCTION      Dr. Lakia Montiel HX HEENT      nasal polypectomy Dr. Nicolas Heck HX HEENT      tear duct surgery right Dr. Adan Hager 2010; left Dr Georgi Hurst 2015    HX ORTHOPAEDIC      DEXA -0.7 spine, -0.2 hip 8/14    VASCULAR SURGERY PROCEDURE UNLIST  12/13    venous doppler negative     Social History     Socioeconomic History    Marital status: LEGALLY      Spouse name: Not on file    Number of children: 0    Years of education: 13    Highest education level: Not on file   Social Needs    Financial resource strain: Not on file    Food insecurity - worry: Not on file    Food insecurity - inability: Not on file   [employer] needs - medical: Not on file   [employer] needs - non-medical: Not on file   Occupational History    Occupation: Unemployed   Tobacco Use    Smoking status: Never Smoker    Smokeless tobacco: Never Used   Substance and Sexual Activity    Alcohol use: Yes     Alcohol/week: 0.0 oz     Comment: occasional wine    Drug use: No    Sexual activity: Not Currently   Other Topics Concern     Service No    Blood Transfusions No    Caffeine Concern Yes     Comment: due to reflux    Occupational Exposure No    Hobby Hazards No    Sleep Concern Yes    Stress Concern Yes     Comment: wants a job    Weight Concern Yes    Special Diet No    Back Care No    Exercise Yes    Bike Helmet No    Seat Belt Yes    Self-Exams Yes   Social History Narrative    Patient lives with a friend, no pets.      Family History   Problem Relation Age of Onset   [facility] Cancer Mother     Hypertension Mother     Cancer Father    [facility] Diabetes Father     Hypertension Father     Stroke Father     Diabetes Sister     Hypertension Sister     Heart Disease Sister     Colon Cancer Sister 52    Hypertension Brother     Cancer Maternal Aunt        Physical Exam:  Visit Vitals  /77   Pulse 80   Ht 5' 2\" (1.575 m)   Wt 183 lb (83 kg)   LMP 03/30/2013   BMI 33.47 kg/m²     Pain Scale: 8/10     has been . reviewed and is appropriate        Diagnoses and all orders for this visit:    1. DDD (degenerative disc disease), cervical  -     REFERRAL TO PHYSICAL THERAPY    2. DDD (degenerative disc disease), lumbar  -     REFERRAL TO PHYSICAL THERAPY    3. Spondylosis of cervical region without myelopathy or radiculopathy  -     REFERRAL TO PHYSICAL THERAPY    4. Spondylosis of lumbar region without myelopathy or radiculopathy  -     REFERRAL TO PHYSICAL THERAPY            Follow-up Disposition:  Return in about 2 months (around 3/7/2019) for with Dr Yonathan Frazier.         We have informed Yisel Rao to notify us for immediate appointment if she has any worsening neurogical symptoms or if an emergency situation presents, then call [phone]

## 2019-01-08 ENCOUNTER — TELEPHONE (OUTPATIENT)
Dept: FAMILY MEDICINE CLINIC | Age: 54
End: 2019-01-08

## 2019-01-10 ENCOUNTER — HOSPITAL ENCOUNTER (OUTPATIENT)
Dept: PHYSICAL THERAPY | Age: 54
Discharge: HOME | End: 2019-01-10
Payer: COMMERCIAL

## 2019-01-10 ENCOUNTER — OFFICE VISIT (OUTPATIENT)
Dept: FAMILY MEDICINE CLINIC | Age: 54
End: 2019-01-10

## 2019-01-10 VITALS
TEMPERATURE: 97.7 F | RESPIRATION RATE: 22 BRPM | DIASTOLIC BLOOD PRESSURE: 81 MMHG | SYSTOLIC BLOOD PRESSURE: 132 MMHG | HEART RATE: 66 BPM | WEIGHT: 184.4 LBS | BODY MASS INDEX: 33.93 KG/M2 | HEIGHT: 62 IN

## 2019-01-10 DIAGNOSIS — Z09 HOSPITAL DISCHARGE FOLLOW-UP: Primary | ICD-10-CM

## 2019-01-10 DIAGNOSIS — M47.896 OTHER OSTEOARTHRITIS OF SPINE, LUMBAR REGION: ICD-10-CM

## 2019-01-10 DIAGNOSIS — M47.812 OSTEOARTHRITIS OF CERVICAL SPINE, UNSPECIFIED SPINAL OSTEOARTHRITIS COMPLICATION STATUS: ICD-10-CM

## 2019-01-10 PROCEDURE — 97162 PT EVAL MOD COMPLEX 30 MIN: CPT

## 2019-01-10 PROCEDURE — 97140 MANUAL THERAPY 1/> REGIONS: CPT

## 2019-01-10 PROCEDURE — 97110 THERAPEUTIC EXERCISES: CPT

## 2019-01-10 RX ORDER — DICLOFENAC SODIUM AND MISOPROSTOL 50; 200 MG/1; UG/1
1 TABLET, DELAYED RELEASE ORAL
Qty: 180 TAB | Refills: 0 | Status: SHIPPED | COMMUNITY
Start: 2019-01-10 | End: 2019-01-10 | Stop reason: CLARIF

## 2019-01-10 NOTE — TELEPHONE ENCOUNTER
Medication: Prozac 20mg , dose: 1 cap, how often: qd , current number of medication days provided: 90, refill per application. Lot #: [id number], EXP 04/2021. This medication was received and verified for the following 1. Correct Patient, 2. Correct Diagnosis, 3. Correct Drug, 4. Correct route, and no current allergy to medication. Please contact patient to come  their medications.      Maria Canavan, MSN,RN,AGNP-C     [facility]

## 2019-01-10 NOTE — PROGRESS NOTES
Chief Complaint   Patient presents with    ED Follow-up    Back Pain       Pt states she went to the ED on 12/21/18 for neck and back pain. She states she was diagnosed with arthritis. Pt states she saw ortho on 1/7/19 and they prescribed PT which she starts today. Pt states her pain in her back this am is 6/10. Visit Vitals  /81 (BP 1 Location: Left arm, BP Patient Position: Sitting)   Pulse 66   Temp 97.7 °F (36.5 °C) (Oral)   Resp 22   Ht 5' 2\" (1.575 m)   Wt 184 lb 6.4 oz (83.6 kg)   BMI 33.73 kg/m²       Physical Exam   Constitutional: She is oriented to person, place, and time. She appears well-nourished. HENT:   Head: Atraumatic. Neck: Neck supple. Cardiovascular: Regular rhythm. Pulmonary/Chest: Breath sounds normal.   Musculoskeletal:        Cervical back: She exhibits normal range of motion, no tenderness, no swelling, no deformity and no spasm. Lumbar back: She exhibits pain and spasm. Gait stable. Pt able to sit in straight back chair w/o difficulty. Neurological: She is alert and oriented to person, place, and time. Skin: Skin is warm. Psychiatric: She has a normal mood and affect. Current Outpatient Medications on File Prior to Visit   Medication Sig Dispense Refill    methocarbamol (ROBAXIN) 500 mg tablet Take 1 Tab by mouth four (4) times daily. 30 Tab 0    lidocaine (LIDODERM) 5 % Apply patch to the affected area for 12 hours a day and remove for 12 hours a day. 15 Each 0    hydroCHLOROthiazide (HYDRODIURIL) 25 mg tablet Take 1 Tab by mouth daily. For swelling 30 Tab 3    lisinopril (PRINIVIL, ZESTRIL) 5 mg tablet Take 1 Tab by mouth daily. For kidney protection 30 Tab 3    pravastatin (PRAVACHOL) 40 mg tablet Take 1 Tab by mouth daily. For cholesterol 30 Tab 3    FLUoxetine (PROZAC) 20 mg capsule Take 1 Cap by mouth daily. For anxiety/depression 90 Cap 3    predniSONE (DELTASONE) 5 mg tablet Take 1 Tab by mouth daily (with breakfast).  30 Tab 11    sAXagliptin-metFORMIN (KOMBIGLYZE XR) 2.5-1,000 mg TM24 Take 1 Tab by mouth daily. For diabetes 90 Tab 3    fluticasone-salmeterol (ADVAIR DISKUS) 500-50 mcg/dose diskus inhaler Take 1 Puff by inhalation two (2) times a day. For asthma and sarcoidosis 3 Inhaler 3    insulin glargine (LANTUS,BASAGLAR) 100 unit/mL (3 mL) inpn 25 Units by SubCUTAneous route nightly. 3 Pen 3    albuterol (PROVENTIL HFA, VENTOLIN HFA, PROAIR HFA) 90 mcg/actuation inhaler Take 2 Puffs by inhalation every six (6) hours as needed. For shortness of breath 3 Inhaler 3    Insulin Needles, Disposable, 30 gauge x 1/3\" Use 1-2 times daily. Qty 100 1 Package 11    pregabalin (LYRICA) 50 mg capsule Take 1 Cap by mouth two (2) times a day. Max Daily Amount: 100 mg. For neuropathy 60 Cap 3    mometasone (NASONEX) 50 mcg/actuation nasal spray 2 Sprays by Both Nostrils route daily as needed. For allergies 3 Container 3    esomeprazole (NEXIUM) 40 mg capsule TAKE ONE CAPSULE BY MOUTH DAILY 90 Cap 0    glucose blood VI test strips (ACCU-CHEK POOJA) strip Pt to test 5 times daily. Dx:E11.65 500 Strip 3    aspirin delayed-release 81 mg tablet Take  by mouth daily.  CETIRIZINE HCL (ZYRTEC PO) Take  by mouth.  FLUoxetine (PROZAC) 20 mg capsule Take 1 Cap by mouth daily. For anxiety/depression 30 Cap 0    sodium chloride (SALINE MIST) 0.65 % nasal squeeze bottle 0.05 mL by Both Nostrils route as needed for Congestion. For dry nose, environmental allergies 30 mL 0    predniSONE (DELTASONE) 1 mg tablet Take 3 Tabs by mouth daily (with breakfast). 90 Tab 11     No current facility-administered medications on file prior to visit.         1. Hospital discharge follow-up    History Provided By: Patient     Chief Complaint: back and neck pain  Duration: 2 Days  Timing:  Constant  Location: left low back, left neck  Quality: sharp spasm  Severity: 10 out of 10  Modifying Factors: taking 800mg motrin w/o relief  Associated Symptoms: denies any other associated signs or symptoms    Additional History (Context): Nguyen Wang is a 46 y.o. female with djd, kidney disease, gerd, asthma, neuropathy who presents with non radiating left sided neck and low back pain x 2 days. Denies trauma, falls, urinary complaints, abd pain, dizziness, fever, chills or any other complaints. Pt states she has been taking 800mg Motrin w/o relief. No other symptoms or complaints. Clinical Impression:   1. Cervical spine arthritis    2. Neck muscle spasm    3. Spondylosis of lumbar region without myelopathy or radiculopathy    4. Spasm of muscle of lower back       Provider Notes (Medical Decision Making): pt with hx of djd in cervical (on mri from 2013) and lumbar spine c/o left sided neck and low back pain x 2 days. No neuro complaints and no injuries. Muscular tenderness on exam. Xrays today with progressive djd, especially in the c spine. Will rx for lidoderm, robaxin. ASHLI Reese 4:32 AM     2. Osteoarthritis of cervical spine, unspecified spinal osteoarthritis complication status  Originally though to prescribe arthrotec but due to kidney function issues this was not completed. .Pt saw Chriss Tinoco NP spine, post ED visit and was ordered phy therapy. Pt goes today for initial appt.     3. Other osteoarthritis of spine, lumbar region    Face to Face time: 20 min

## 2019-01-10 NOTE — PROGRESS NOTES
Patient is here for ED follow up of cervical spine arthritis and muscle spasm of back. Patient states that she has improved and back pain this morning is level 6 of 10. She is scheduled for physical therapy today at [facility] [address].

## 2019-01-10 NOTE — LETTER
1/10/2019 [facility] [address] [place], [address] Yisel Joseph, 1965, is picking up the following medications ordered from the [facility]: 
 
Mary Jane Holloway 49 MCG #3 Ilia Monroe Patient's Signature: _____________________________ Today's Date: 1/10/2019

## 2019-01-10 NOTE — PROGRESS NOTES
PT DAILY TREATMENT NOTE 10-18 Patient Name: hCepe Alegria Date:1/10/2019 : 1965 [x]  Patient  Verified Payor: [employer] / Plan: [address] / Product Type: Managed Care Medicaid / In time:2:05  Out time:2:50 Total Treatment Time (min): 45 Visit #: 1 of 10 Treatment Area: Neck pain [M54.2] Lumbar back pain [M54.5] SUBJECTIVE Pain Level (0-10 scale): 6 Any medication changes, allergies to medications, adverse drug reactions, diagnosis change, or new procedure performed?: [x] No    [] Yes (see summary sheet for update) Subjective functional status/changes:   [] No changes reported Patient is a 46 y.o female presenting with a chief complaint of neck and low back pain that have been going on for about a month, stating the neck pain is worse than the low back and she is getting headaches about 3-5x/week from the pain. Patient states her pain today is a 6/10 and she describes it has a \"pulsing muscle pain\". Patient states her neck pain is worse with rotation and that she occasionally has radiating pain down the left upper extremity. Patient states when she lays down flat it increases her low back pain. Patient states the pain got so bad a month ago she went to the ED, which xrays showed c/s and l/s arthritis. Patient states she did have a recent kidney problem and she isn't sure if that's why her low back started hurting. Patient states sometimes the pain is so bad it causes her to [facility] up\" and it feels like a really bad muscle cramp. OBJECTIVE 20 min [x]Eval                  []Re-Eval  
 
 
17 min Therapeutic Exercise:  [x] See flow sheet : HEP exercises Rationale: increase ROM and increase strength to improve the patients ability to perform ADLs with improved ease. 8 min Manual Therapy:  Gentle STM UT Rationale: increase ROM and increase tissue extensibility to improve patients functional mobility.   
 
       
With 
 [] TE 
 [] TA 
 [] neuro 
 [] other: Patient Education: [x] Review HEP [] Progressed/Changed HEP based on:  
[] positioning   [] body mechanics   [] transfers   [] heat/ice application   
[] other:   
 
Other Objective/Functional Measures: SEE IE  
 
Pain Level (0-10 scale) post treatment: 6 
 
ASSESSMENT/Changes in Function: Patient presents with symptoms consistent with myofascial pain with impairments of decreased cervical and lumbar AROM in all directions, increased muscle guarding with cervical PROM, increased muscular weakness in UE and LE, increased myofascial restrictions in UT and QL, negative spurlings and cervical traction test, positive ULTT on LUE. Patient will benefit from skilled physical therapy in order to address these deficits. Patient will continue to benefit from skilled PT services to modify and progress therapeutic interventions, address functional mobility deficits, address ROM deficits, address strength deficits, analyze and address soft tissue restrictions, analyze and cue movement patterns, analyze and modify body mechanics/ergonomics and assess and modify postural abnormalities to attain remaining goals. [x]  See Plan of Care 
[]  See progress note/recertification 
[]  See Discharge Summary Progress towards goals / Updated goals: 
Short Term Goals: To be accomplished in 2 weeks: 1. Patient will be independent and compliant with HEP in order to improve functional mobility. Long Term Goals: To be accomplished in 5 weeks: 1. Patient will improve FOTO score to 47 in order to demonstrate improvements in functional independence. 2. Patient will be able to improve cervical AROM rotation B/L to 50 degrees void of pain in order to improve ease of turning her head while driving. 3. Patinet will be able to perform moderate household activities without difficulty in order to improve ease of ADLs. 4. Patient will be able to improve B/L glute med strength to 4+/5 in order to improve ease of ADLs. PLAN 
[]  Upgrade activities as tolerated     [x]  Continue plan of care 
[]  Update interventions per flow sheet      
[]  Discharge due to:_ 
[]  Other:_ Michael Juarez, PT 1/10/2019  3:02 PM 
 
Future Appointments Date Time Provider Claudette Benjamin 1/15/2019 10:00 AM MAUREEN GUERRERO [facility]  
1/16/2019  2:00 PM Dario Cantrell NP [facility]  
1/17/2019 12:30 PM Amarjit Epps [facility]  
1/22/2019  9:30 AM Erlene Due, PTA [facility]  
1/24/2019 10:00 AM Earma Dung, PT [facility]  
1/29/2019 10:30 AM Erlene Due, PTA [facility]  
1/31/2019 10:30 AM Earma Dung, PT [facility]  
2/5/2019 10:30 AM Erlene Due, PTA [facility]  
2/7/2019 10:30 AM Erlene Due, PTA [facility]  
2/12/2019 10:30 AM Earma Dung, PT [facility]  
3/7/2019  1:30 PM Douglas Beckford  [address]   
3/11/2019 11:00 AM Evans Khan NP [facility]

## 2019-01-10 NOTE — PROGRESS NOTES
[address]  [address] [place], [address] 
[phone] fax Plan of Care/ Statement of Necessity for Physical Therapy Services Patient name: Kurt Gold Start of Care: 1/10/2019 Referral source: Milvia Medina NP : 1965 Medical Diagnosis: Neck pain [M54.2] Lumbar back pain [M54.5] Payor: [facility] / Plan: [address] / Product Type: Managed Care Medicaid /  Onset Date: 1 month ago Treatment Diagnosis: neck and low back pain Prior Hospitalization: see medical history Provider#: [id number] Medications: Verified on Patient summary List  
 Comorbidities: allergies, arthritis, asthma, back pain, DM, headaches, kidney problems,  
 Prior Level of Function: Able to perform ADLs and recreational activities (I) and pain free. The Plan of Care and following information is based on the information from the initial evaluation. Assessment/ key information: Patient is a 46 y.o female presenting with a chief complaint of neck and low back pain that have been going on for about a month, stating the neck pain is worse than the low back and she is getting headaches about 3-5x/week from the pain. Patient states her pain today is a 6/10 and she describes it has a \"pulsing muscle pain\". Patient states her neck pain is worse with rotation and that she occasionally has radiating pain down the left upper extremity. Patient states when she lays down flat it increases her low back pain. Patient states the pain got so bad a month ago she went to the ED, which xrays showed c/s and l/s arthritis. Patient states she did have a recent kidney problem and she isn't sure if that's why her low back started hurting. Patient states sometimes the pain is so bad it causes her to [facility] up\" and it feels like a really bad muscle cramp.  Patient presents with symptoms consistent with myofascial pain with impairments of decreased cervical and lumbar AROM in all directions, increased muscle guarding with cervical PROM, increased muscular weakness in UE and LE, increased myofascial restrictions in UT and QL, negative spurlings and cervical traction test, positive ULTT on LUE. Patient will benefit from skilled physical therapy in order to address these deficits. Evaluation Complexity History MEDIUM  Complexity : 1-2 comorbidities / personal factors will impact the outcome/ POC ; Examination MEDIUM Complexity : 3 Standardized tests and measures addressing body structure, function, activity limitation and / or participation in recreation  ;Presentation MEDIUM Complexity : Evolving with changing characteristics  ; Clinical Decision Making MEDIUM Complexity : FOTO score of 26-74 Overall Complexity Rating: MEDIUM Problem List: pain affecting function, decrease ROM, decrease strength, decrease ADL/ functional abilitiies, decrease activity tolerance and decrease flexibility/ joint mobility Treatment Plan may include any combination of the following: Therapeutic exercise, Neuromuscular re-education, Physical agent/modality, Manual therapy, Patient education and Functional mobility training Patient / Family readiness to learn indicated by: asking questions, trying to perform skills and interest 
Persons(s) to be included in education: patient (P) Barriers to Learning/Limitations: None Patient Goal (s): relieve the pain Patient Self Reported Health Status: poor Rehabilitation Potential: good Short Term Goals: To be accomplished in 2 weeks: 1. Patient will be independent and compliant with HEP in order to improve functional mobility. Long Term Goals: To be accomplished in 5 weeks: 1. Patient will improve FOTO score to 47 in order to demonstrate improvements in functional independence.  
 2. Patient will be able to improve cervical AROM rotation B/L to 50 degrees void of pain in order to improve ease of turning her head while driving. 3. Patinet will be able to perform moderate household activities without difficulty in order to improve ease of ADLs. 4. Patient will be able to improve B/L glute med strength to 4+/5 in order to improve ease of ADLs. Frequency / Duration: Patient to be seen 2 times per week for 5 weeks. Patient/ Caregiver education and instruction: Diagnosis, prognosis, activity modification and exercises 
 [x]  Plan of care has been reviewed with EDYTA Raygoza, PT 1/10/2019 2:54 PM 
 
________________________________________________________________________ I certify that the above Therapy Services are being furnished while the patient is under my care. I agree with the treatment plan and certify that this therapy is necessary. [de-identified] Signature:____________Date:_________TIME:________ 
 
[employer], Date and Time must be completed for valid certification ** Please sign and return to [address] [place], [address] 
[phone] fax

## 2019-01-15 ENCOUNTER — HOSPITAL ENCOUNTER (OUTPATIENT)
Dept: PHYSICAL THERAPY | Age: 54
Discharge: HOME | End: 2019-01-15
Payer: COMMERCIAL

## 2019-01-15 PROCEDURE — 97140 MANUAL THERAPY 1/> REGIONS: CPT

## 2019-01-15 PROCEDURE — 97110 THERAPEUTIC EXERCISES: CPT

## 2019-01-15 NOTE — PROGRESS NOTES
PT DAILY TREATMENT NOTE 10-18 Patient Name: Estela Rubin Date:1/15/2019 : 1965 [x]  Patient  Verified Payor: Rom 22 / Plan: [address] / Product Type: Managed Care Medicaid / In time: 10:00  Out time:10:36 Total Treatment Time (min): 36 Visit #: 2 of 10 Treatment Area: Neck pain [M54.2] Lumbar back pain [M54.5] SUBJECTIVE Pain Level (0-10 scale): 6/10 Any medication changes, allergies to medications, adverse drug reactions, diagnosis change, or new procedure performed?: [x] No    [] Yes (see summary sheet for update) Subjective functional status/changes:   [] No changes reported Patient stated that she had a lot of muscle soreness following the initial evaluation. Patient attempted the HEP and reported no questions of concerns. OBJECTIVE 28 min Therapeutic Exercise:  [x] See flow sheet :  
Rationale: increase ROM and increase strength to improve the patients ability to perform ADls. 8 min Manual Therapy:  Gentle STM to (B) UT /levator scap Rationale: decrease pain, increase ROM and increase tissue extensibility to increase ease with ADls. With 
 [] TE 
 [] TA 
 [] neuro 
 [] other: Patient Education: [x] Review HEP [] Progressed/Changed HEP based on:  
[] positioning   [] body mechanics   [] transfers   [] heat/ice application   
[] other:   
 
Other Objective/Functional Measures: Initiated exercises per POC. Pain Level (0-10 scale) post treatment:  Neck: 6/10, back: 4/10 ASSESSMENT/Changes in Function: Reduced reps of exercises secondary to patient report of increased muscle soreness following the initial evaluation. Patient reported that her neck felt better following the manual, but overall reported no change in pain at end of the session in the neck, but reported decreased pain in low back.   
 
Patient will continue to benefit from skilled PT services to modify and progress therapeutic interventions, address functional mobility deficits, address ROM deficits, address strength deficits, analyze and address soft tissue restrictions, analyze and cue movement patterns, assess and modify postural abnormalities and address imbalance/dizziness to attain remaining goals. []  See Plan of Care 
[]  See progress note/recertification 
[]  See Discharge Summary Progress towards goals / Updated goals: 
Short Term Goals: To be accomplished in 2 weeks: 
             1. TQLMDSN will be independent and compliant with HEP in order to improve functional mobility. Goal met, per patient (1/15/19) Long Term Goals: To be accomplished in 5 weeks: 
             1. Patient will improve FOTO score to 47 in order to demonstrate improvements in functional independence. 
             2. Patient will be able to improve cervical AROM rotation B/L to 50 degrees void of pain in order to improve ease of turning her head while driving. 
             3. Patinet will be able to perform moderate household activities without difficulty in order to improve ease of ADLs. [id number]. Patient will be able to improve B/L glute med strength to 4+/5 in order to improve ease of ADLs. PLAN 
[]  Upgrade activities as tolerated     [x]  Continue plan of care 
[]  Update interventions per flow sheet      
[]  Discharge due to:_ 
[]  Other:_   
 
Karen Stephenson, PT 1/15/2019  10:05 AM 
 
Future Appointments Date Time Provider Claudette Benjamin 1/16/2019  2:00 PM Vianey Handy NP [address] [facility]  
1/17/2019 12:30 PM Tunde Puga [facility]  
1/22/2019  9:30 AM Chey Roth PTA [facility]  
1/24/2019 10:00 AM Jon Montenegro PT [facility]  
1/29/2019 10:30 AM Chey Roth PTA [facility]  
1/31/2019 10:30 AM Jon Montenegro PT [facility]  
2/5/2019 10:30 AM Chey Roth PTA [facility]  
 2/7/2019 10:30 AM Amanda Dow, PTA [facility]  
2/12/2019 10:30 AM Homar Obrien PT [facility]  
3/7/2019  1:30 PM Wendy Earl  [address]   
3/11/2019 11:00 AM Jeff Khan NP [address] [facility]

## 2019-01-17 ENCOUNTER — HOSPITAL ENCOUNTER (OUTPATIENT)
Dept: PHYSICAL THERAPY | Age: 54
Discharge: HOME | End: 2019-01-17
Payer: COMMERCIAL

## 2019-01-17 PROCEDURE — 97140 MANUAL THERAPY 1/> REGIONS: CPT

## 2019-01-17 PROCEDURE — 97110 THERAPEUTIC EXERCISES: CPT

## 2019-01-17 NOTE — PROGRESS NOTES
PT DAILY TREATMENT NOTE 10-18 Patient Name: Akosua Amend Date:2019 : 1965 [x]  Patient  Verified Payor: Rom 22 / Plan: [address] / Product Type: Managed Care Medicaid / In time: 12:30  Out time: 1:09 Total Treatment Time (min): 39 Visit #: 2 of 10 Treatment Area: Neck pain [M54.2] Lumbar back pain [M54.5] SUBJECTIVE Pain Level (0-10 scale): neck: 6/10; LBP: 4/10 Any medication changes, allergies to medications, adverse drug reactions, diagnosis change, or new procedure performed?: [x] No    [] Yes (see summary sheet for update) Subjective functional status/changes:   [] No changes reported Patient stated that her back is feeling a little better, but her neck is still bothering her. Patient stated she felt ok after doing all the exercises last session. OBJECTIVE 31 min Therapeutic Exercise:  [x] See flow sheet :  
Rationale: increase ROM and increase strength to improve the patients ability to perform ADls. 8 min Manual Therapy:  STM to (B) UT/levator scap region Rationale: decrease pain, increase ROM and increase tissue extensibility to increase ease with ADls. With 
 [] TE 
 [] TA 
 [] neuro 
 [] other: Patient Education: [x] Review HEP [] Progressed/Changed HEP based on:  
[] positioning   [] body mechanics   [] transfers   [] heat/ice application   
[] other:   
 
Other Objective/Functional Measures:  
Cervical AROM ROT: Right: 50deg left: 35deg Emphasized patient maintaining neutral cervical spine while performing UE movement in supine. Patient was able to perform without UT activation, but shoulder musculature fatigue noted with UE exercises. Pain Level (0-10 scale) post treatment: necl: 4/10; back: 2/10 ASSESSMENT/Changes in Function:  Significant improvement with cervical AROM rotation to the right since [facility].  No change in AROM to the left as of yet. Patient was very tender at first with manual at left UT region, but as therapist continued with gentle STM, sensitivity reduced and patient reported feeling better. Patient reported decreased neck and low back pain at end of the session. Patient will continue to benefit from skilled PT services to modify and progress therapeutic interventions, address functional mobility deficits, address ROM deficits, address strength deficits, analyze and address soft tissue restrictions, analyze and cue movement patterns, assess and modify postural abnormalities and address imbalance/dizziness to attain remaining goals. []  See Plan of Care 
[]  See progress note/recertification 
[]  See Discharge Summary Progress towards goals / Updated goals: 
Short Term Goals: To be accomplished in 2 weeks: 
             5. LTDSZLD will be independent and compliant with HEP in order to improve functional mobility. Goal met, per patient (1/15/19) Long Term Goals: To be accomplished in 5 weeks: 
             1. Patient will improve FOTO score to 47 in order to demonstrate improvements in functional independence. 
             2. Patient will be able to improve cervical AROM rotation B/L to 50 degrees void of pain in order to improve ease of turning her head while driving. Progressing: Cervical AROM ROT: Right: 50deg left: 35deg (1/17/19)              3. Patient will be able to perform moderate household activities without difficulty in order to improve ease of ADLs. [id number]. Patient will be able to improve B/L glute med strength to 4+/5 in order to improve ease of ADLs.   
 
PLAN 
[]  Upgrade activities as tolerated     [x]  Continue plan of care 
[]  Update interventions per flow sheet      
[]  Discharge due to:_ 
[]  Other:_   
 
Dorothe Dubin, PT 1/17/2019  12:27 PM 
 
Future Appointments Date Time Provider Claudette Benjamin [facility]17/2019 12:[facility]  
 1/22/2019  9:30 AM Leónguillermocamille Antiones, PTA [facility]  
1/24/2019 10:00 AM Elizabeth Ayers, PT [facility]  
1/29/2019 10:30 [facility]  
1/31/2019 10:30 AM Elizabeth Ayers, PT [facility]  
2/5/2019 10:30 [facility]  
2/7/2019 10:30 AM Leónguillermo Sandy, PTA [facility]  
2/12/2019 10:30 AM Elizabeth Ayers, PT [facility]  
2/18/2019  2:00 PM Quirino Hill  [facility]  
3/7/2019  1:30 PM Dory Veloz  [address] [facility]  
3/11/2019 11:00 AM Emil Khan NP [address] [facility]

## 2019-01-29 ENCOUNTER — HOSPITAL ENCOUNTER (OUTPATIENT)
Dept: PHYSICAL THERAPY | Age: 54
Discharge: HOME | End: 2019-01-29
Payer: COMMERCIAL

## 2019-01-29 PROCEDURE — 97110 THERAPEUTIC EXERCISES: CPT

## 2019-01-29 PROCEDURE — 97140 MANUAL THERAPY 1/> REGIONS: CPT

## 2019-01-29 NOTE — PROGRESS NOTES
PT DAILY TREATMENT NOTE  Patient Name: Betsy Arce Date:2019 : 1965 [x]  Patient  Verified Payor: Rom Bhatt / Plan: [id number] / Product Type: Managed Care Medicaid / In time:10:35  Out time:11:10 Total Treatment Time (min): 35 Visit #: 3 of 10 Treatment Area: Neck pain [M54.2] Lumbar back pain [M54.5] SUBJECTIVE Pain Level (0-10 scale): 8/10 Any medication changes, allergies to medications, adverse drug reactions, diagnosis change, or new procedure performed?: [x] No    [] Yes (see summary sheet for update) Subjective functional status/changes:   [] No changes reported \"Hurting today. My sister has cancer and I've been taking care of her. I missed last week. \" OBJECTIVE 27 min Therapeutic Exercise:  [x] See flow sheet :  
Rationale: increase ROM, increase strength and increase proprioception to improve the patients ability to perform ADL's. 
 
8 min Manual Therapy:  STM/DTM (B) UT, lev scap, C/S paraspinals. SOR. Manual UT stretch. Rationale: decrease pain, increase ROM, increase tissue extensibility and decrease trigger points to improve ease of performing functional activities. With 
 [x] TE 
 [] TA 
 [] neuro 
 [] other: Patient Education: [x] Review HEP [] Progressed/Changed HEP based on:  
[] positioning   [] body mechanics   [] transfers   [] heat/ice application   
[] other:   
 
Other Objective/Functional Measures: Added side-lying fire-hydrant 12 reps (B). Pain Level (0-10 scale) post treatment: 0/10 ASSESSMENT/Changes in Function: CC is low back pain provocation with prolonged walking. Denied low back pain while performing supine exercises. Pt denied pain post-treatment.  
 
Patient will continue to benefit from skilled PT services to modify and progress therapeutic interventions, address functional mobility deficits, address ROM deficits, address strength deficits, analyze and address soft tissue restrictions and analyze and modify body mechanics/ergonomics to attain remaining goals. [x]  See Plan of Care 
[]  See progress note/recertification 
[]  See Discharge Summary Progress towards goals / Updated goals: 
Short Term Goals: To be accomplished in 2 weeks: 
             1. WMCAAPI will be independent and compliant with HEP in order to improve functional mobility. Goal met, per patient (1/15/19); Limited HEP compliance in the last 2 weeks per pt. 1/29/2019 Long Term Goals: To be accomplished in 5 weeks: 
             1. Patient will improve FOTO score to 47 in order to demonstrate improvements in functional independence. 
             2. Patient will be able to improve cervical AROM rotation B/L to 50 degrees void of pain in order to improve ease of turning her head while driving. Progressing: Cervical AROM ROT: Right: 50deg left: 35deg (1/17/19)              3. Patient will be able to perform moderate household activities without difficulty in order to improve ease of ADLs. 21 . Patient will be able to improve B/L glute med strength to 4+/5 in order to improve ease of ADLs.   
 
PLAN 
[]  Upgrade activities as tolerated     [x]  Continue plan of care 
[]  Update interventions per flow sheet      
[]  Discharge due to:_ 
[]  Other:_   
 
Olimpia Browne PTA 1/29/2019  10:19 AM 
 
Future Appointments Date Time Provider Claudette Benjamin 1/29/2019 10:30 AM Joe Shruthi, PTA [facility]  
1/31/2019 10:30 AM Deandre Lime, PT [facility]  
2/5/2019 10:30 AM Joe Shruthi, PTA [facility]  
2/7/2019 10:30 AM Joe Shruthi, PTA [facility]  
2/12/2019 10:30 AM Deandre Lime, PT [facility]  
2/18/2019  2:00 PM Annalisa Rocha  [facility]  
3/7/2019  1:30 PM Zev Espinosa  [address]   
3/11/2019 11:00 AM Ortiz Khan NP [address] [facility]

## 2019-01-31 ENCOUNTER — HOSPITAL ENCOUNTER (OUTPATIENT)
Dept: PHYSICAL THERAPY | Age: 54
Discharge: HOME | End: 2019-01-31
Payer: COMMERCIAL

## 2019-01-31 PROCEDURE — 97140 MANUAL THERAPY 1/> REGIONS: CPT

## 2019-01-31 PROCEDURE — 97110 THERAPEUTIC EXERCISES: CPT

## 2019-01-31 NOTE — PROGRESS NOTES
PT DAILY TREATMENT NOTE 10-18 Patient Name: Margarita Ley Date:2019 : 1965 [x]  Patient  Verified Payor: Rom 22 / Plan: [address] / Product Type: Managed Care Medicaid / In time:10:30  Out time:11:04 Total Treatment Time (min): 34 Visit #: 5 of 10 Treatment Area: Neck pain [M54.2] Lumbar back pain [M54.5] SUBJECTIVE Pain Level (0-10 scale): 5 Any medication changes, allergies to medications, adverse drug reactions, diagnosis change, or new procedure performed?: [x] No    [] Yes (see summary sheet for update) Subjective functional status/changes:   [] No changes reported Patient reports getting pain relief ost therapy sessions, however a couple hours later the pain returns. Patient states the neck is bothering her more than the back. OBJECTIVE 26 min Therapeutic Exercise:  [x] See flow sheet :  
Rationale: increase ROM and increase strength to improve the patients ability to perform ADLs with improved ease. 8 min Manual Therapy:  Gentle TPR, STM left UT Rationale: decrease pain, increase ROM and increase tissue extensibility to improve patients functional mobility. With 
 [x] TE 
 [] TA 
 [] neuro 
 [] other: Patient Education: [x] Review HEP [] Progressed/Changed HEP based on:  
[x] positioning   [] body mechanics   [] transfers   [] heat/ice application   
[] other:   
 
Other Objective/Functional Measures:   
 
Pain Level (0-10 scale) post treatment: 2 
 
ASSESSMENT/Changes in Function: Decreased pain symptoms post session. Patient demonstrates increased muscular fatigue with cervical nods.    
 
Patient will continue to benefit from skilled PT services to modify and progress therapeutic interventions, address functional mobility deficits, address ROM deficits, address strength deficits, analyze and address soft tissue restrictions, analyze and cue movement patterns, analyze and modify body mechanics/ergonomics and assess and modify postural abnormalities to attain remaining goals. [x]  See Plan of Care 
[]  See progress note/recertification 
[]  See Discharge Summary Progress towards goals / Updated goals: 
Short Term Goals: To be accomplished in 2 weeks: 
             0. AFCEPVR will be independent and compliant with HEP in order to improve functional mobility. Goal met, per patient (1/15/19); Limited HEP compliance in the last 2 weeks per pt. 1/29/2019 Long Term Goals: To be accomplished in 5 weeks: 
             1. Patient will improve FOTO score to 47 in order to demonstrate improvements in functional independence. 
             2. Patient will be able to improve cervical AROM rotation B/L to 50 degrees void of pain in order to improve ease of turning her head while driving. Progressing: Cervical AROM ROT: Right: 50deg left: 35deg (1/17/19)  
             3. Patient will be able to perform moderate household activities without difficulty in order to improve ease of ADLs. [id number]. Patient will be able to improve B/L glute med strength to 4+/5 in order to improve ease of ADLs.   
 
 
PLAN 
[]  Upgrade activities as tolerated     [x]  Continue plan of care 
[]  Update interventions per flow sheet      
[]  Discharge due to:_ 
[]  Other:_ Sherryle Bookbinder, PT 1/31/2019  10:33 AM 
 
Future Appointments Date Time Provider Claudette Benjamin 2/5/2019 10:30 AM Cristy Padilla PTA [facility]  
2/7/2019 10:30 AM [facility]  
2/12/2019 10:30 AM [facility]en[facility]  
2/18/2019  2:00 PM Hernan Mcdonald  [address]  
3/7/2019  1:30 PM Cameron Campbell  [address]  
3/11/2019 11:00 AM Arnel Khan NP [address] [facility]

## 2019-02-05 ENCOUNTER — APPOINTMENT (OUTPATIENT)
Dept: PHYSICAL THERAPY | Age: 54
End: 2019-02-05

## 2019-02-07 ENCOUNTER — APPOINTMENT (OUTPATIENT)
Dept: PHYSICAL THERAPY | Age: 54
End: 2019-02-07

## 2019-02-08 NOTE — PROGRESS NOTES
In [address] [facility] [address]  [address] [address] [place], [address] 
[phone] fax Physical Therapy Discharge Summary Patient name: Enzo Silva Start of Care: 1/10/2019 Referral source: Alexandru Barton NP : 1965 Medical Diagnosis: Neck pain [M54.2] Lumbar back pain [M54.5] [facility] [place] [address] / Product Type: Managed Care Medicaid /  Onset Date: 1 month ago Treatment Diagnosis: neck and low back pain Prior Hospitalization: see medical history Provider#: [id number] Medications: Verified on Patient summary List  
 Comorbidities: allergies, arthritis, asthma, back pain, DM, headaches, kidney problems,  
 Prior Level of Function: Able to perform ADLs and recreational activities (I) and pain free. Visits from Start of Care: 5    Missed Visits: 2 Reporting Period : 1/10/19 to 19 Summary of Care: 
Short Term Goals: To be accomplished in 2 weeks: 
             8. GMLPINK will be independent and compliant with HEP in order to improve functional mobility. Goal met, per patient (1/15/19); Limited HEP compliance in the last 2 weeks per pt. 2019 Long Term Goals: To be accomplished in 5 weeks: 
             1. Patient will improve FOTO score to 47 in order to demonstrate improvements in functional independence. 
             2. Patient will be able to improve cervical AROM rotation B/L to 50 degrees void of pain in order to improve ease of turning her head while driving. Progressing: Cervical AROM ROT: Right: 50deg left: 35deg (19)  
             3. Patient will be able to perform moderate household activities without difficulty in order to improve ease of ADLs. [id number]. Patient will be able to improve B/L glute med strength to 4+/5 in order to improve ease of ADLs.   
 
 
ASSESSMENT/RECOMMENDATIONS: 
 [x]Discontinue therapy: Patient would like to self discharge due to her sister being very ill.    
 
Thank you for this referral! 
 
Leslye Jernigan, PT 2/8/2019 1:29 PM

## 2019-02-12 ENCOUNTER — APPOINTMENT (OUTPATIENT)
Dept: PHYSICAL THERAPY | Age: 54
End: 2019-02-12

## 2019-02-13 DIAGNOSIS — M25.472 EDEMA OF BOTH ANKLES: ICD-10-CM

## 2019-02-13 DIAGNOSIS — M25.471 EDEMA OF BOTH ANKLES: ICD-10-CM

## 2019-02-13 DIAGNOSIS — E11.65 TYPE 2 DIABETES MELLITUS WITH HYPERGLYCEMIA, WITH LONG-TERM CURRENT USE OF INSULIN (HCC): ICD-10-CM

## 2019-02-13 DIAGNOSIS — Z79.4 TYPE 2 DIABETES MELLITUS WITH HYPERGLYCEMIA, WITH LONG-TERM CURRENT USE OF INSULIN (HCC): ICD-10-CM

## 2019-02-13 RX ORDER — HYDROCHLOROTHIAZIDE 25 MG/1
TABLET ORAL
Qty: 30 TAB | Refills: 2 | OUTPATIENT
Start: 2019-02-13

## 2019-02-13 RX ORDER — LISINOPRIL 5 MG/1
TABLET ORAL
Qty: 30 TAB | Refills: 2 | OUTPATIENT
Start: 2019-02-13

## 2019-02-13 NOTE — TELEPHONE ENCOUNTER
Requested Prescriptions     Refused Prescriptions Disp Refills    lisinopril (PRINIVIL, ZESTRIL) 5 mg tablet [Pharmacy Med Name: LISINOPRIL 5 MG TABLET] 30 Tab 2     Sig: TAKE ONE TABLET BY MOUTH DAILY     Refused By: Iggy Verma     Reason for Refusal: Patient has requested refill too soon    hydroCHLOROthiazide (HYDRODIURIL) 25 mg tablet [Pharmacy Med Name: hydroCHLOROthiazide 25 MG TABLET] 30 Tab 2     Sig: TAKE ONE TABLET BY MOUTH DAILY     Refused By: Iggy Verma     Reason for Refusal: Patient has requested refill too soon     Pt was given 3 refills on 12/3/18.

## 2019-02-18 ENCOUNTER — OFFICE VISIT (OUTPATIENT)
Dept: FAMILY MEDICINE CLINIC | Age: 54
End: 2019-02-18

## 2019-02-18 DIAGNOSIS — F41.9 ANXIETY: Primary | ICD-10-CM

## 2019-02-18 DIAGNOSIS — F32.0 CURRENT MILD EPISODE OF MAJOR DEPRESSIVE DISORDER, UNSPECIFIED WHETHER RECURRENT (HCC): ICD-10-CM

## 2019-02-18 DIAGNOSIS — F51.01 PRIMARY INSOMNIA: ICD-10-CM

## 2019-02-18 NOTE — PROGRESS NOTES
This pleasant AA female is here today for an initial visit. Patient is being seen for depression and anxiety. Patient presents with pleasant affect and depressed mood. She presents well developed and well nourished. Reports appetite is good and sleep is good. States factors leading to depression/anxiety are  and current illnesses. States she is very depressed because of her current medical diagnoses, Diabetes, Sarcoidosis, also mentioned scars from breast reduction surgery that would not heal. States she hasn't been in a relationship for years. Also states she is living with her sister who is dying from cancer. States she feels alone as she has never been  nor does she have children. Patient is open and candid regarding these issues. We have discussed these methods of dealing/coping: acceptance being the key to adversity and seeking alternative fruitful plans and outcomes. Patient deniesSI/HI, hallucinations both visual and/or auditory. Patient was given written information on depression and anxiety. Patient will return to office in two months.

## 2019-02-18 NOTE — PROGRESS NOTES
*** is here today for initial/F/U visit. Patient is being seen for ***. Patient presents with *** affect and *** mood. *** presents well developed and well nourished. Reports appetite is *** and sleep is ***. States factors leading to depression/anxiety are finances, children, homelessness, unemployment and/or current illness. Patient is open/closed and candid regarding ***. We have discussed these methods of dealing/coping: ***. Patient denies/endorses SI/HI, hallucinations both visual and/or auditory. Patient was given written information on ***. Patient will return to office in *** weeks.

## 2019-02-28 ENCOUNTER — HOSPITAL ENCOUNTER (OUTPATIENT)
Dept: LAB | Age: 54
Discharge: HOME | End: 2019-02-28
Payer: COMMERCIAL

## 2019-02-28 ENCOUNTER — TELEPHONE (OUTPATIENT)
Dept: FAMILY MEDICINE CLINIC | Age: 54
End: 2019-02-28

## 2019-02-28 DIAGNOSIS — E78.5 DYSLIPIDEMIA: ICD-10-CM

## 2019-02-28 DIAGNOSIS — M25.471 EDEMA OF BOTH ANKLES: ICD-10-CM

## 2019-02-28 DIAGNOSIS — E11.65 TYPE 2 DIABETES MELLITUS WITH HYPERGLYCEMIA, WITH LONG-TERM CURRENT USE OF INSULIN (HCC): ICD-10-CM

## 2019-02-28 DIAGNOSIS — M25.472 EDEMA OF BOTH ANKLES: ICD-10-CM

## 2019-02-28 DIAGNOSIS — E78.1 HYPERTRIGLYCERIDEMIA: ICD-10-CM

## 2019-02-28 DIAGNOSIS — Z79.4 TYPE 2 DIABETES MELLITUS WITH HYPERGLYCEMIA, WITH LONG-TERM CURRENT USE OF INSULIN (HCC): ICD-10-CM

## 2019-02-28 LAB
ALBUMIN SERPL-MCNC: 3.5 G/DL (ref 3.4–5)
ALBUMIN/GLOB SERPL: 1 {RATIO} (ref 0.8–1.7)
ALP SERPL-CCNC: 70 U/L (ref 45–117)
ALT SERPL-CCNC: 22 U/L (ref 13–56)
ANION GAP SERPL CALC-SCNC: 5 MMOL/L (ref 3–18)
AST SERPL-CCNC: 14 U/L (ref 15–37)
BILIRUB SERPL-MCNC: 0.5 MG/DL (ref 0.2–1)
BUN SERPL-MCNC: 29 MG/DL (ref 7–18)
BUN/CREAT SERPL: 17 (ref 12–20)
CALCIUM SERPL-MCNC: 8.7 MG/DL (ref 8.5–10.1)
CHLORIDE SERPL-SCNC: 106 MMOL/L (ref 100–108)
CHOLEST SERPL-MCNC: 258 MG/DL
CO2 SERPL-SCNC: 28 MMOL/L (ref 21–32)
CREAT SERPL-MCNC: 1.75 MG/DL (ref 0.6–1.3)
EST. AVERAGE GLUCOSE BLD GHB EST-MCNC: 169 MG/DL
GLOBULIN SER CALC-MCNC: 3.6 G/DL (ref 2–4)
GLUCOSE SERPL-MCNC: 92 MG/DL (ref 74–99)
HBA1C MFR BLD: 7.5 % (ref 4.2–5.6)
HDLC SERPL-MCNC: 45 MG/DL (ref 40–60)
HDLC SERPL: 5.7 {RATIO} (ref 0–5)
LDLC SERPL CALC-MCNC: 162.2 MG/DL (ref 0–100)
LIPID PROFILE,FLP: ABNORMAL
POTASSIUM SERPL-SCNC: 4.3 MMOL/L (ref 3.5–5.5)
PROT SERPL-MCNC: 7.1 G/DL (ref 6.4–8.2)
SODIUM SERPL-SCNC: 139 MMOL/L (ref 136–145)
TRIGL SERPL-MCNC: 254 MG/DL (ref ?–150)
VLDLC SERPL CALC-MCNC: 50.8 MG/DL

## 2019-02-28 PROCEDURE — 83036 HEMOGLOBIN GLYCOSYLATED A1C: CPT

## 2019-02-28 PROCEDURE — 80053 COMPREHEN METABOLIC PANEL: CPT

## 2019-02-28 PROCEDURE — 36415 COLL VENOUS BLD VENIPUNCTURE: CPT

## 2019-02-28 PROCEDURE — 80061 LIPID PANEL: CPT

## 2019-02-28 NOTE — TELEPHONE ENCOUNTER
Medication: Kombiglyze 2.5/1000, dose: 1 tab, how often: qd , current number of medication days provided: 90, refill per application. Lot #: Lot #[id number], EXP 02/2020. This medication was received and verified for the following 1. Correct Patient, 2. Correct Diagnosis, 3. Correct Drug, 4. Correct route, and no current allergy to medication.      Rosalina Brunner [facility]

## 2019-03-04 NOTE — PROGRESS NOTES
Will review results with pt at 3/11/19 appt  1)  to 254;   to 162  2) A1c 8.5 to 7.5  3) Elevated creat 1.48 to 1.75; GFR 45 to 37- refer to nephrology

## 2019-03-04 NOTE — PROGRESS NOTES
[address] [facility], [address] 
[phone] office/[phone] fax 3/11/2019 Reason for visit:  
Chief Complaint Patient presents with  Follow Up Chronic Condition  Back Pain  
  left lower back pain level 9 of 10 Patient: Nicolás Boudreaux, 1965, [id number] Primary MD: Muriel García NP Subjective:   Nicolás Boudreaux, a 46 y.o. female, who presents for Follow Up Chronic Condition and Back Pain (left lower back pain level 9 of 10) Past Medical History:  
Diagnosis Date  Bilateral great toe fractures 2014  Chronic sinusitis Dr. Angeles Garcia in the past  
 Colon polyp 5/16 Dr Georgette Aguirre  Decreased GFR 8/28/2018  Depression 2019  DJD (degenerative joint disease) of cervical spine 2016  DJD (degenerative joint disease), lumbar 2013 MRI c spine w degen changes; Dr Natalie Shelton  Dyslipidemia   
 calculated 10 year risk score was 2.0% (12/13)  Edema of both ankles 8/28/2018  Elevated serum creatinine 8/28/2018  Environmental and seasonal allergies 8/28/2018  Eustachian tube dysfunction  FHx: colon cancer  FHx: heart disease  Fibrocystic breast   
 Dr New Stallworth  GERD (gastroesophageal reflux disease)  Hypertriglyceridemia 8/28/2018  Lateral epicondylitis of both elbows 2/6/2017  Macular degeneration of both eyes 8/28/2018  Mild intermittent asthma without complication 91/46/1061 Dr. Ernestine Paula;  ratio 68%, FEV1 78% w 6% inc postbd, TLC 77, RV 56, DLCO 61%  Morbid obesity ([address] peak weight 196 lbs, bmi 35.8 from 10/13  Neuropathy 8/28/2018  Osteopenia Dr. Hetal Head; DEXA t score -0.7 spine, -0.2 hip (8/14)  Sarcoidosis Dr Pieter Hyman  Type 2 diabetes mellitus with hyperglycemia, with long-term current use of insulin ([address] 8/28/2018 Past Surgical History:  
Procedure Laterality Date  CARDIAC SURG PROCEDURE UNLIST  9/10, 8/13 thallium negative ef 72%; negative ef 70%  CHEST SURGERY PROCEDURE UNLISTED  7/12  thyroid negative  CHEST SURGERY PROCEDURE UNLISTED  2012  
 pfts w mod restrictive defect  COLONOSCOPY N/A 5/26/2016 Dr Padmini Marshall hyperplastic  HX BREAST REDUCTION Dr. Pacheco Groves  HX HEENT    
 nasal polypectomy Dr. Naheed Scott  HX HEENT    
 tear duct surgery right Dr. Valente Villarreal 2010; left Dr Joseph [id number]  HX ORTHOPAEDIC    
 DEXA -0.7 spine, -0.2 hip 8/14  VASCULAR SURGERY PROCEDURE UNLIST  12/13  
 venous doppler negative Social History Socioeconomic History  Marital status: LEGALLY  Spouse name: Not on file  Number of children: 0  
 Years of education: 13  
 Highest education level: Not on file Social Needs  Financial resource strain: Not on file  Food insecurity - worry: Not on file  Food insecurity - inability: Not on file  Transportation needs - medical: Not on file  Transportation needs - non-medical: Not on file Occupational History  Occupation: Unemployed Tobacco Use  Smoking status: Never Smoker  Smokeless tobacco: Never Used Substance and Sexual Activity  Alcohol use: Yes Alcohol/week: 0.0 oz  
  Comment: occasional wine  Drug use: No  
 Sexual activity: Not Currently Other Topics Concern   Service No  
 Blood Transfusions No  
 Caffeine Concern Yes Comment: due to reflux  Occupational Exposure No  
 Hobby Hazards No  
 Sleep Concern Yes  Stress Concern Yes Comment: wants a job  Weight Concern Yes  Special Diet No  
 Back Care No  
 Exercise Yes  Bike Helmet No  
 Seat Belt Yes  Self-Exams Yes Social History Narrative Patient lives with a friend, no pets. Allergies Allergen Reactions  Pollen Extracts Runny Nose and Cough  Amoxicillin Hives, Shortness of Breath and Swelling  Crestor [Rosuvastatin] Myalgia  Ibuprofen Other (comments) dont prescribe due to kidney function  Lipitor [Atorvastatin] Other (comments) Muscle cramps and weakness  Pcn [Penicillins] Angioedema Current Outpatient Medications on File Prior to Visit Medication Sig Dispense Refill  lidocaine (LIDODERM) 5 % Apply patch to the affected area for 12 hours a day and remove for 12 hours a day. 15 Each 0  
 sodium chloride (SALINE MIST) 0.65 % nasal squeeze bottle 0.05 mL by Both Nostrils route as needed for Congestion. For dry nose, environmental allergies 30 mL 0  
 predniSONE (DELTASONE) 1 mg tablet Take 3 Tabs by mouth daily (with breakfast). 90 Tab 11  
 predniSONE (DELTASONE) 5 mg tablet Take 1 Tab by mouth daily (with breakfast). 30 Tab 11  
 insulin glargine (LANTUS,BASAGLAR) 100 unit/mL (3 mL) inpn 25 Units by SubCUTAneous route nightly. 3 Pen 3  
 Insulin Needles, Disposable, 30 gauge x 1/3\" Use 1-2 times daily. Qty 100 1 Package 11  
 glucose blood VI test strips (ACCU-CHEK POOJA) strip Pt to test 5 times daily. Dx:E11.65 500 Strip 3  [] azithromycin (ZITHROMAX) 250 mg tablet Take 2 tablets today, then take 1 tablet daily 6 Tab 0  
 mometasone (NASONEX) 50 mcg/actuation nasal spray 2 Sprays by Both Nostrils route daily as needed. For allergies 3 Container 3  
 aspirin delayed-release 81 mg tablet Take  by mouth daily.  CETIRIZINE HCL (ZYRTEC PO) Take  by mouth. No current facility-administered medications on file prior to visit. Review of Systems Constitutional:  
     I have not been taking the cholesterol as I should because I forget it at night. HENT: Negative. Eyes: Negative. Respiratory: Negative for cough, sputum production, shortness of breath and wheezing. Following up with Dr Selin Montano for my sarcoid. Next appt in April. Taking prednisone still. Cardiovascular: Negative for chest pain, palpitations and leg swelling. Gastrointestinal: Negative. Genitourinary: Negative. Musculoskeletal:  
     Seeing Dr Idalia Huber Neurological: Negative. Endo/Heme/Allergies:  
     I was getting insulin from [facility] health dept and from here so I have 4 month left (approx 9 pens) Psychiatric/Behavioral: Negative. Objective:  
Visit Vitals /71 (BP 1 Location: Left arm, BP Patient Position: Sitting) Pulse 75 Temp 98.5 °F (36.9 °C) (Oral) Resp 20 Ht 5' 2\" (1.575 m) Wt 179 lb 12.8 oz (81.6 kg) LMP 03/30/2013 SpO2 97% BMI 32.89 kg/m² Wt Readings from Last 3 Encounters:  
03/11/19 179 lb 12.8 oz (81.6 kg) 03/07/19 181 lb 3.2 oz (82.2 kg) 03/05/19 182 lb (82.6 kg) Lab Results Component Value Date/Time Glucose 92 02/28/2019 11:11 AM  
 Glucose (POC) 155 (H) 06/16/2018 04:47 PM  
 Glucose, POC 89 05/26/2016 10:49 AM  
 
 
 
Physical Exam  
Constitutional: She is oriented to person, place, and time. She appears well-nourished. HENT:  
Head: Atraumatic. Neck: Neck supple. Cardiovascular: Normal rate, regular rhythm and normal heart sounds. Pulmonary/Chest: Effort normal and breath sounds normal.  
Musculoskeletal: No limitations noted today Neurological: She is alert and oriented to person, place, and time. Skin: Skin is warm and dry. Psychiatric: She has a normal mood and affect. Her behavior is normal. Thought content normal.  
 
 
Assessment:   
Yisel Joseph who has risk factors including (see above previous medical hx) and: ICD-10-CM ICD-9-CM 1. Type 2 diabetes mellitus with hyperglycemia, with long-term current use of insulin ([facility]) E11.65 250.00 lisinopril (PRINIVIL, ZESTRIL) 5 mg tablet  
 Z79.4 790.29 HEMOGLOBIN A1C WITH EAG  
  V58.67 2. Neuropathy G62.9 355.9 pregabalin (LYRICA) 50 mg capsule 3. Elevated serum creatinine R79.89 790.99 REFERRAL TO NEPHROLOGY METABOLIC PANEL, COMPREHENSIVE 4. Decreased GFR R94.4 794.4 REFERRAL TO NEPHROLOGY    METABOLIC PANEL, COMPREHENSIVE  
 5. Mild intermittent asthma without complication E56.10 267.20   
6. Sarcoidosis Dr. Scar Martinez on low dose steroid D86.9 135   
7. Edema of both ankles M25.471 719.07 hydroCHLOROthiazide (HYDRODIURIL) 25 mg tablet M25.472    
8. Gastroesophageal reflux disease without esophagitis K21.9 530.81 omeprazole (PRILOSEC) 20 mg capsule 9. Dyslipidemia E78.5 272.4 pravastatin (PRAVACHOL) 40 mg tablet LIPID PANEL 10. Medication management Z79.899 V58.69 1. Type 2 diabetes mellitus with hyperglycemia, with long-term current use of insulin (HCC) Reorder 
 
- lisinopril (PRINIVIL, ZESTRIL) 5 mg tablet; Take 1 Tab by mouth daily. For kidney protection  Dispense: 30 Tab; Refill: 3 
- HEMOGLOBIN A1C WITH EAG; Future 2. Neuropathy Reorder 
 
- pregabalin (LYRICA) 50 mg capsule; Take 1 Cap by mouth two (2) times a day. Max Daily Amount: 100 mg. For neuropathy  Dispense: 60 Cap; Refill: 3 3. Elevated serum creatinine 
 
- REFERRAL TO NEPHROLOGY 
- METABOLIC PANEL, COMPREHENSIVE; Future 4. Decreased GFR Diff dx: 
DM Medications  
 
- REFERRAL TO NEPHROLOGY 
- METABOLIC PANEL, COMPREHENSIVE; Future 5. Mild intermittent asthma without complication Pt to notify Dr Scar Martinez ie Advair and albuterol are no longer available via [facility] due to pt having insurance. 6. Sarcoidosis Dr. Scar Martinez on low dose steroid 7. Edema of both ankles Reorder 
 
- hydroCHLOROthiazide (HYDRODIURIL) 25 mg tablet; Take 1 Tab by mouth daily. For swelling  Dispense: 30 Tab; Refill: 3 8. Gastroesophageal reflux disease without esophagitis Instructed pt to avoid foods that may stir up the reflux such as hot, spicy foods. Avoid laying down for 45 minutes after eating a meal.  Decrease the caffeine, acidic foods/drinks, elimination of alcohol and tobacco products.  
 
Discussed GERD treatment and the goal of it being a temporary measure while the patient improves their diet due to the risk of persistent GERD causing Moser's esophagus which is precancerous and can cause osteoporosis. Patient verbalized understanding and will work on diet. This medication is provided on a temporary basis and advised of the risk of low magnesium which may cause muscle cramping and risk of osteoporosis and hypokalemia. - omeprazole (PRILOSEC) 20 mg capsule; Take 1 Cap by mouth daily as needed (reflux). Dispense: 30 Cap; Refill: 3 9. Dyslipidemia Pt was not taking medication as prescribed due to forgetting medication. Instructed pt to take med in the am with other meds. Hopefully this will improve compliance. - pravastatin (PRAVACHOL) 40 mg tablet; Take 1 Tab by mouth daily. For cholesterol  Dispense: 30 Tab; Refill: 3 
- LIPID PANEL; Future 10. Medication management Due to pt having insurance, pt can no longer obtain medications via [facility]. Once applications for [facility] meds have , pt will notify office and substitutes will be sent to outside pharmacy of pts choice. Pt verb understanding. Pt is picking up kombiglyze from [facility] today. At pts 3 month follow up, will order metformin and januvia to replace the kombiglyze. Written instructions followed our verbal discussion of all information discussed above, pending tests ordered and future goals/plans. Patient expressed understanding of current diagnosis, planned testing, follow up and if needed to contact the office for any questions or concerns prior to the next visit. Plan:  
Med reconciliation completed with patient. Reviewed side effects of medications with the patient. Questions were answered and patient verb understanding. Discussed lab results with patient 1)  to 254;  to 162- taking pravastatin? 2) A1c 8.5 to 7.5 3) Elevated creat 1.48 to 1.75; GFR 45 to 37- refer to nephrology Orders Placed This Encounter  LIPID PANEL Standing Status:   Future Standing Expiration Date:   2019  METABOLIC PANEL, COMPREHENSIVE Standing Status:   Future Standing Expiration Date:   7/31/2019  
 HEMOGLOBIN A1C WITH EAG Standing Status:   Future Standing Expiration Date:   7/31/2019  REFERRAL TO NEPHROLOGY Referral Priority:   Routine Referral Type:   Consultation Referral Reason:   Specialty Services Required Number of Visits Requested:   1  
 omeprazole (PRILOSEC) 20 mg capsule Sig: Take 1 Cap by mouth daily as needed (reflux). Dispense:  30 Cap Refill:  3  
 FLUoxetine (PROZAC) 20 mg capsule Sig: Take 1 Cap by mouth daily. For anxiety/depression Dispense:  30 Cap Refill:  3  
 hydroCHLOROthiazide (HYDRODIURIL) 25 mg tablet Sig: Take 1 Tab by mouth daily. For swelling Dispense:  30 Tab Refill:  3  
 lisinopril (PRINIVIL, ZESTRIL) 5 mg tablet Sig: Take 1 Tab by mouth daily. For kidney protection Dispense:  30 Tab Refill:  3  
 pravastatin (PRAVACHOL) 40 mg tablet Sig: Take 1 Tab by mouth daily. For cholesterol Dispense:  30 Tab Refill:  3  
 pregabalin (LYRICA) 50 mg capsule Sig: Take 1 Cap by mouth two (2) times a day. Max Daily Amount: 100 mg. For neuropathy Dispense:  60 Cap Refill:  3 Current Outpatient Medications Medication Sig Dispense Refill  omeprazole (PRILOSEC) 20 mg capsule Take 1 Cap by mouth daily as needed (reflux). 30 Cap 3  
 FLUoxetine (PROZAC) 20 mg capsule Take 1 Cap by mouth daily. For anxiety/depression 30 Cap 3  
 hydroCHLOROthiazide (HYDRODIURIL) 25 mg tablet Take 1 Tab by mouth daily. For swelling 30 Tab 3  
 lisinopril (PRINIVIL, ZESTRIL) 5 mg tablet Take 1 Tab by mouth daily. For kidney protection 30 Tab 3  pravastatin (PRAVACHOL) 40 mg tablet Take 1 Tab by mouth daily. For cholesterol 30 Tab 3  pregabalin (LYRICA) 50 mg capsule Take 1 Cap by mouth two (2) times a day. Max Daily Amount: 100 mg.  For neuropathy 60 Cap 3  
  lidocaine (LIDODERM) 5 % Apply patch to the affected area for 12 hours a day and remove for 12 hours a day. 15 Each 0  
 sodium chloride (SALINE MIST) 0.65 % nasal squeeze bottle 0.05 mL by Both Nostrils route as needed for Congestion. For dry nose, environmental allergies 30 mL 0  
 predniSONE (DELTASONE) 1 mg tablet Take 3 Tabs by mouth daily (with breakfast). 90 Tab 11  
 predniSONE (DELTASONE) 5 mg tablet Take 1 Tab by mouth daily (with breakfast). 30 Tab 11  
 insulin glargine (LANTUS,BASAGLAR) 100 unit/mL (3 mL) inpn 25 Units by SubCUTAneous route nightly. 3 Pen 3  
 Insulin Needles, Disposable, 30 gauge x 1/3\" Use 1-2 times daily. Qty 100 1 Package 11  
 glucose blood VI test strips (ACCU-CHEK POOJA) strip Pt to test 5 times daily. Dx:E11.65 500 Strip 3  
 mometasone (NASONEX) 50 mcg/actuation nasal spray 2 Sprays by Both Nostrils route daily as needed. For allergies 3 Container 3  
 aspirin delayed-release 81 mg tablet Take  by mouth daily.  CETIRIZINE HCL (ZYRTEC PO) Take  by mouth. Follow-up Disposition: 
Return in about 3 months (around 6/11/2019) for Hypertension, Cholesterol, Diabetes. labs needed for 3 month follow-up appt A1c, CMP, lipids Jorden Chua. Erin, RN, MSN, [employer] [address] [place] [address] I spent 25 minutes with the patient in face-to-face consultation, of which greater than 50% was spent in counseling and coordination of care as described above.

## 2019-03-05 ENCOUNTER — OFFICE VISIT (OUTPATIENT)
Dept: PULMONOLOGY | Age: 54
End: 2019-03-05

## 2019-03-05 VITALS
BODY MASS INDEX: 33.49 KG/M2 | TEMPERATURE: 98.2 F | RESPIRATION RATE: 18 BRPM | SYSTOLIC BLOOD PRESSURE: 114 MMHG | HEIGHT: 62 IN | DIASTOLIC BLOOD PRESSURE: 80 MMHG | OXYGEN SATURATION: 99 % | HEART RATE: 97 BPM | WEIGHT: 182 LBS

## 2019-03-05 DIAGNOSIS — D86.9 SARCOIDOSIS: ICD-10-CM

## 2019-03-05 DIAGNOSIS — J45.909 UNCOMPLICATED ASTHMA, UNSPECIFIED ASTHMA SEVERITY, UNSPECIFIED WHETHER PERSISTENT: Primary | ICD-10-CM

## 2019-03-05 RX ORDER — AZITHROMYCIN 250 MG/1
TABLET, FILM COATED ORAL
Qty: 6 TAB | Refills: 0 | Status: SHIPPED | OUTPATIENT
Start: 2019-03-05 | End: 2019-03-10

## 2019-03-05 NOTE — PROGRESS NOTES
Chief Complaint   Patient presents with    Asthma    Sarcoidosis    Wheezing    Cough     greenish sputum      1. Have you been to the ER, urgent care clinic since your last visit? Hospitalized since your last visit? No    2. Have you seen or consulted any other health care providers outside of the [address] [place] [address] since your last visit? Include any pap smears or colon screening.  No

## 2019-03-05 NOTE — PROGRESS NOTES
[facility]  Pulmonary, Critical Care, and Sleep Medicine      Chief complaint:  Sarcoidosis and asthma    HPI:    Enzo Silva    is 46years old returns the office today for follow-up concerning sarcoidosis and asthma and relates that she has significant wheezing and has been coughing with purulent mucus production for over a month and denies chest pain leg swelling but has significant dyspnea on exertion which is been more bothersome recently. She is only been taking her Advair once a day and relates she uses her albuterol frequently she is on 8 mg of prednisone daily      Allergies   Allergen Reactions    Pollen Extracts Runny Nose and Cough    Amoxicillin Hives, Shortness of Breath and Swelling    Crestor [Rosuvastatin] Myalgia    Ibuprofen Other (comments)     dont prescribe due to kidney function    Lipitor [Atorvastatin] Other (comments)     Muscle cramps and weakness      Pcn [Penicillins] Angioedema     Current Outpatient Medications   Medication Sig    methocarbamol (ROBAXIN) 500 mg tablet Take 1 Tab by mouth four (4) times daily.  lidocaine (LIDODERM) 5 % Apply patch to the affected area for 12 hours a day and remove for 12 hours a day.  hydroCHLOROthiazide (HYDRODIURIL) 25 mg tablet Take 1 Tab by mouth daily. For swelling    lisinopril (PRINIVIL, ZESTRIL) 5 mg tablet Take 1 Tab by mouth daily. For kidney protection    pravastatin (PRAVACHOL) 40 mg tablet Take 1 Tab by mouth daily. For cholesterol    FLUoxetine (PROZAC) 20 mg capsule Take 1 Cap by mouth daily. For anxiety/depression    predniSONE (DELTASONE) 1 mg tablet Take 3 Tabs by mouth daily (with breakfast).  predniSONE (DELTASONE) 5 mg tablet Take 1 Tab by mouth daily (with breakfast).  sAXagliptin-metFORMIN (KOMBIGLYZE XR) 2.5-1,000 mg TM24 Take 1 Tab by mouth daily.  For diabetes    fluticasone-salmeterol (ADVAIR DISKUS) 500-50 mcg/dose diskus inhaler Take 1 Puff by inhalation two (2) times a day. For asthma and sarcoidosis    insulin glargine (LANTUS,BASAGLAR) 100 unit/mL (3 mL) inpn 25 Units by SubCUTAneous route nightly.  albuterol (PROVENTIL HFA, VENTOLIN HFA, PROAIR HFA) 90 mcg/actuation inhaler Take 2 Puffs by inhalation every six (6) hours as needed. For shortness of breath    Insulin Needles, Disposable, 30 gauge x 1/3\" Use 1-2 times daily. Qty 100    pregabalin (LYRICA) 50 mg capsule Take 1 Cap by mouth two (2) times a day. Max Daily Amount: 100 mg. For neuropathy    mometasone (NASONEX) 50 mcg/actuation nasal spray 2 Sprays by Both Nostrils route daily as needed. For allergies    esomeprazole (NEXIUM) 40 mg capsule TAKE ONE CAPSULE BY MOUTH DAILY    glucose blood VI test strips (ACCU-CHEK POOJA) strip Pt to test 5 times daily. Dx:E11.65    aspirin delayed-release 81 mg tablet Take  by mouth daily.  CETIRIZINE HCL (ZYRTEC PO) Take  by mouth.  sodium chloride (SALINE MIST) 0.65 % nasal squeeze bottle 0.05 mL by Both Nostrils route as needed for Congestion. For dry nose, environmental allergies     No current facility-administered medications for this visit.       Past Medical History:   Diagnosis Date    Arthritis 6/13     MRI c spine w degen changes; Dr Ivet Burgess Bilateral great toe fractures 2014    Chronic sinusitis     Dr. Jaime Butler in the past    Colon polyp 5/16    Dr Yap Situ    Decreased GFR 8/28/2018    Degenerative disc disease, cervical 01/2019    Degenerative disc disease, lumbar 01/2019    Depression 2019    DJD (degenerative joint disease) of cervical spine 2016    DJD (degenerative joint disease), lumbar 2016    Dyslipidemia     calculated 10 year risk score was 2.0% (12/13)    Edema of both ankles 8/28/2018    Elevated serum creatinine 8/28/2018    Environmental and seasonal allergies 8/28/2018    Eustachian tube dysfunction     FHx: colon cancer     FHx: heart disease     Fibrocystic breast     Dr Ramonita Lamar GERD (gastroesophageal reflux disease)     Hypertriglyceridemia 8/28/2018    Lateral epicondylitis of both elbows 2/6/2017    Macular degeneration of both eyes 8/28/2018    Mild intermittent asthma without complication 65/93/1280    Dr. Sosa Calle;  ratio 68%, FEV1 78% w 6% inc postbd, TLC 77, RV 56, DLCO 61%    Morbid obesity (HCC)     peak weight 196 lbs, bmi 35.8 from 10/13    Neuropathy 8/28/2018    Osteopenia     Dr. Ela Hunter; DEXA t score -0.7 spine, -0.2 hip (8/14)    Sarcoidosis     Dr Krish Arroyo Type 2 diabetes mellitus with hyperglycemia, with long-term current use of insulin ([facility] 8/28/2018     Past Surgical History:   Procedure Laterality Date    CARDIAC SURG PROCEDURE UNLIST  9/10, 8/13    thallium negative ef 72%; negative ef 70%    CHEST SURGERY PROCEDURE UNLISTED  7/12     thyroid negative    CHEST SURGERY PROCEDURE UNLISTED  2012    pfts w mod restrictive defect     COLONOSCOPY N/A 5/26/2016    Dr Sam Cee hyperplastic    Francis Mot      Dr. Kiera Doherty HX HEENT      nasal polypectomy Dr. Jannette Carlton HX HEENT      tear duct surgery right Dr. Dedra Beck 2010; left Dr Elyse Blank 2015    HX ORTHOPAEDIC      DEXA -0.7 spine, -0.2 hip 8/14    VASCULAR SURGERY PROCEDURE UNLIST  12/13    venous doppler negative     Social History     Socioeconomic History    Marital status: LEGALLY      Spouse name: Not on file    Number of children: 0    Years of education: 13    Highest education level: Not on file   Social Needs    Financial resource strain: Not on file    Food insecurity - worry: Not on file    Food insecurity - inability: Not on file   [employer] needs - medical: Not on file   [employer] needs - non-medical: Not on file   Occupational History    Occupation: Unemployed   Tobacco Use    Smoking status: Never Smoker    Smokeless tobacco: Never Used   Substance and Sexual Activity    Alcohol use:  Yes     Alcohol/week: 0.0 oz     Comment: occasional wine    Drug use: No    Sexual activity: Not Currently   Other Topics Concern     Service No    Blood Transfusions No    Caffeine Concern Yes     Comment: due to reflux    Occupational Exposure No    Hobby Hazards No    Sleep Concern Yes    Stress Concern Yes     Comment: wants a job    Weight Concern Yes    Special Diet No    Back Care No    Exercise Yes    Bike Helmet No    Seat Belt Yes    Self-Exams Yes   Social History Narrative    Patient lives with a friend, no pets.      Family History   Problem Relation Age of Onset   [facility] Cancer Mother     Hypertension Mother     Cancer Father    [facility] Diabetes Father     Hypertension Father     Stroke Father     Diabetes Sister     Hypertension Sister     Heart Disease Sister     Colon Cancer Sister 52    Hypertension Brother     Cancer Maternal Aunt        Review of systems:  She denies fever or shaking chills but has chills and sweating which she attributes to menopause and also has visual disturbances which are being followed and are felt to be due to diabetes  3 pound weight loss since last visit 6 months ago  Physical Exam:  Visit Vitals  /80 (BP 1 Location: Right arm, BP Patient Position: Sitting)   Pulse 97   Temp 98.2 °F (36.8 °C) (Oral)   Resp 18   Ht 5' 2\" (1.575 m)   Wt 82.6 kg (182 lb)   LMP 03/30/2013   SpO2 99%   BMI 33.29 kg/m²       Well-developed well-nourished  HEENT: WNL  Lymph node exam: Supraclavicular cervical lymph nodes negative  Chest: Equal symmetrical expansion no dullness and absence of wheezes rales rubs  Heart: Regular rhythm no gallop no murmur no JVD no cyanosis clubbing  Neurological: Alert and oriented  Skin: No rashes  Musculoskeletal: No acute joint inflammation or muscle wasting    Labs:    O2 sat room air at rest 99  Chest x-ray 3/5/19: Personally reviewed, bilateral fibrotic infiltrates right greater than left and a dense peripheral infiltrate left lung which looks scarlike a nodule or infiltrate in the right apex calcified hilar lymph nodes with a similar appearance to 10/26/2016 chest x-ray  Impression:     Asthma sarcoidosis with symptoms of increasing shortness of breath also symptoms of acute bacterial bronchitis and history of not taking proper dose of Advair but stable physical exam and chest x-ray    Plan:   Continue prednisone 8 mg  Advair twice daily  Empiric treatment with Zithromax for bronchitis  follow-up 6 weeks with complete pulmonary function tests    Inderjit Sanchez MD , [facility]    CC: Roosevelt Solis, NP     [address] [facility] [facility] [address]  [place], [address] [facility] [address]     P: [phone]     F: [phone]

## 2019-03-07 ENCOUNTER — OFFICE VISIT (OUTPATIENT)
Dept: ORTHOPEDIC SURGERY | Age: 54
End: 2019-03-07

## 2019-03-07 VITALS
BODY MASS INDEX: 33.34 KG/M2 | RESPIRATION RATE: 18 BRPM | HEIGHT: 62 IN | WEIGHT: 181.2 LBS | SYSTOLIC BLOOD PRESSURE: 131 MMHG | HEART RATE: 91 BPM | OXYGEN SATURATION: 100 % | DIASTOLIC BLOOD PRESSURE: 86 MMHG | TEMPERATURE: 98.8 F

## 2019-03-07 DIAGNOSIS — M54.59 MECHANICAL LOW BACK PAIN: ICD-10-CM

## 2019-03-07 DIAGNOSIS — M79.18 MYOFASCIAL PAIN: ICD-10-CM

## 2019-03-07 DIAGNOSIS — M54.2 NECK PAIN: ICD-10-CM

## 2019-03-07 DIAGNOSIS — M54.50 LUMBAR PAIN: Primary | ICD-10-CM

## 2019-03-07 RX ORDER — ALBUTEROL SULFATE 90 UG/1
2 AEROSOL, METERED RESPIRATORY (INHALATION)
Qty: 1 INHALER | Refills: 3 | Status: CANCELLED | OUTPATIENT
Start: 2019-03-07

## 2019-03-07 RX ORDER — METFORMIN HYDROCHLORIDE 1000 MG/1
1000 TABLET ORAL DAILY
Qty: 30 TAB | Refills: 3 | Status: CANCELLED | OUTPATIENT
Start: 2019-03-07

## 2019-03-07 RX ORDER — INSULIN GLARGINE 100 [IU]/ML
25 INJECTION, SOLUTION SUBCUTANEOUS
Qty: 3 PEN | Refills: 3 | Status: CANCELLED | OUTPATIENT
Start: 2019-03-07

## 2019-03-07 RX ORDER — BUDESONIDE AND FORMOTEROL FUMARATE DIHYDRATE 160; 4.5 UG/1; UG/1
2 AEROSOL RESPIRATORY (INHALATION) 2 TIMES DAILY
Qty: 1 INHALER | Refills: 3 | Status: CANCELLED | OUTPATIENT
Start: 2019-03-07

## 2019-03-07 NOTE — PROGRESS NOTES
[address]  [address], [address]  [place] [address]  Phone: [phone]  Fax: [phone]        Carlie Abrams  : 1965  PCP: Rhonda Wright NP  3/7/2019    PROGRESS NOTE      HISTORY OF PRESENT ILLNESS  Yisel Pugh is a 46 y.o. female. Teri Sanches comes in to the office today for c/o neck and low back pain. She was seen by me in  for back pain. At the time, she was referred to PT but was unable to attend due to medical complications. At the time, she had recently been diagnosed with DM. She was prescribed Percocet and advised on a Thercane. She was seen by Geno King NP 19 where she c/o neck and low back pain x 1 month. She was seen in the ED 18 for these complaints and she was prescribed Lidoderm patches and Robaxin with minimal relief. She was told to discontinue Ibuprofen due to renal function. She describes her pain in the posterior neck extending into her trapezius and left-sided back pain from her bra line to her low back. She denies any LE radicular symptoms, but has episodic LUE tingling. She has h/o sarcoidosis, renal dysfunction, and diabetes. Pt presents for PT f/u. She attended PT at [facility] for her neck and low back, but had to discontinue a few sessions early because of having to care for her terminally ill sister. She found some temporary relief from PT. She has some residual left neck and upper back pain radiating into her LUE where her hand occasional \"locks up like I'm having a stroke. \"     ASSESSMENT  Her pain likely remains myofascial in nature given her diffuse left-sided tenderness, however, I would like to obtain updated MRIs to r/o an underlying pathology. PLAN  1. Lumbar MRI. 2. Cervical MRI. Pt will f/u in after MRIs or sooner as needed. Diagnoses and all orders for this visit:    1. Lumbar pain  -     MRI LUMB SPINE WO CONT; Future    2.  Neck pain  -     MRI CERV SPINE WO CONT; Future    3. Mechanical low back pain  -     MRI LUMB SPINE WO CONT; Future    4. Myofascial pain  -     MRI LUMB SPINE WO CONT; Future  -     MRI CERV SPINE WO CONT; Future         Follow-up Disposition:  Return after MRI.       PAST MEDICAL HISTORY   Past Medical History:   Diagnosis Date    Bilateral great toe fractures 2014    Chronic sinusitis     Dr. Marin Montes De Oca in the past    Colon polyp 5/16    Dr Luisa Ferrell    Decreased GFR 8/28/2018    Depression 2019    DJD (degenerative joint disease) of cervical spine 2016    DJD (degenerative joint disease), lumbar 2013    MRI c spine w degen changes; Dr Benjaman Kocher    Dyslipidemia     calculated 10 year risk score was 2.0% (12/13)    Edema of both ankles 8/28/2018    Elevated serum creatinine 8/28/2018    Environmental and seasonal allergies 8/28/2018    Eustachian tube dysfunction     FHx: colon cancer     FHx: heart disease     Fibrocystic breast     Dr Kristin Billingsley GERD (gastroesophageal reflux disease)     Hypertriglyceridemia 8/28/2018    Lateral epicondylitis of both elbows 2/6/2017    Macular degeneration of both eyes 8/28/2018    Mild intermittent asthma without complication 84/91/9754    Dr. Jose F Scott;  ratio 68%, FEV1 78% w 6% inc postbd, TLC 77, RV 56, DLCO 61%    Morbid obesity (HCC)     peak weight 196 lbs, bmi 35.8 from 10/13    Neuropathy 8/28/2018    Osteopenia     Dr. Jian Park; DEXA t score -0.7 spine, -0.2 hip (8/14)    Sarcoidosis     Dr Ricardo Watts Type 2 diabetes mellitus with hyperglycemia, with long-term current use of insulin ([facility] [address] 8/28/2018       Past Surgical History:   Procedure Laterality Date    CARDIAC SURG PROCEDURE UNLIST  9/10, 8/13    thallium negative ef 72%; negative ef 70%    CHEST SURGERY PROCEDURE UNLISTED  7/12     thyroid negative    CHEST SURGERY PROCEDURE UNLISTED  2012    pfts w mod restrictive defect     COLONOSCOPY N/A 5/26/2016    Dr Perri Maple hyperplastic Booker Goldmann Dr. Kiera Doherty HX HEENT      nasal polypectomy Dr. Gigi Burkett    HX HEENT      tear duct surgery right Dr. Dedra Beck 2010; left Dr Elyse Blank 2015    HX ORTHOPAEDIC      DEXA -0.7 spine, -0.2 hip 8/14    VASCULAR SURGERY PROCEDURE UNLIST  12/13    venous doppler negative   . MEDICATIONS    Current Outpatient Medications   Medication Sig Dispense Refill    azithromycin (ZITHROMAX) 250 mg tablet Take 2 tablets today, then take 1 tablet daily 6 Tab 0    methocarbamol (ROBAXIN) 500 mg tablet Take 1 Tab by mouth four (4) times daily. 30 Tab 0    lidocaine (LIDODERM) 5 % Apply patch to the affected area for 12 hours a day and remove for 12 hours a day. 15 Each 0    hydroCHLOROthiazide (HYDRODIURIL) 25 mg tablet Take 1 Tab by mouth daily. For swelling 30 Tab 3    lisinopril (PRINIVIL, ZESTRIL) 5 mg tablet Take 1 Tab by mouth daily. For kidney protection 30 Tab 3    predniSONE (DELTASONE) 1 mg tablet Take 3 Tabs by mouth daily (with breakfast). 90 Tab 11    predniSONE (DELTASONE) 5 mg tablet Take 1 Tab by mouth daily (with breakfast). 30 Tab 11    insulin glargine (LANTUS,BASAGLAR) 100 unit/mL (3 mL) inpn 25 Units by SubCUTAneous route nightly. 3 Pen 3    Insulin Needles, Disposable, 30 gauge x 1/3\" Use 1-2 times daily. Qty 100 1 Package 11    pregabalin (LYRICA) 50 mg capsule Take 1 Cap by mouth two (2) times a day. Max Daily Amount: 100 mg. For neuropathy 60 Cap 3    mometasone (NASONEX) 50 mcg/actuation nasal spray 2 Sprays by Both Nostrils route daily as needed. For allergies 3 Container 3    glucose blood VI test strips (ACCU-CHEK POOJA) strip Pt to test 5 times daily. Dx:E11.65 500 Strip 3    aspirin delayed-release 81 mg tablet Take  by mouth daily.  CETIRIZINE HCL (ZYRTEC PO) Take  by mouth.  sodium chloride (SALINE MIST) 0.65 % nasal squeeze bottle 0.05 mL by Both Nostrils route as needed for Congestion.  For dry nose, environmental allergies 30 mL 0        ALLERGIES  Allergies   Allergen Reactions    Pollen Extracts Runny Nose and Cough    Amoxicillin Hives, Shortness of Breath and Swelling    Crestor [Rosuvastatin] Myalgia    Ibuprofen Other (comments)     dont prescribe due to kidney function    Lipitor [Atorvastatin] Other (comments)     Muscle cramps and weakness      Pcn [Penicillins] Angioedema          SOCIAL HISTORY    Social History     Socioeconomic History    Marital status: LEGALLY      Spouse name: Not on file    Number of children: 0    Years of education: 13    Highest education level: Not on file   Occupational History    Occupation: Unemployed   Tobacco Use    Smoking status: Never Smoker    Smokeless tobacco: Never Used   Substance and Sexual Activity    Alcohol use: Yes     Alcohol/week: 0.0 oz     Comment: occasional wine    Drug use: No    Sexual activity: Not Currently   Other Topics Concern     Service No    Blood Transfusions No    Caffeine Concern Yes     Comment: due to reflux    Occupational Exposure No    Hobby Hazards No    Sleep Concern Yes    Stress Concern Yes     Comment: wants a job    Weight Concern Yes    Special Diet No    Back Care No    Exercise Yes    Bike Helmet No    Seat Belt Yes    Self-Exams Yes   Social History Narrative    Patient lives with a friend, no pets. FAMILY HISTORY  Family History   Problem Relation Age of Onset    Cancer Mother     Hypertension Mother     Cancer Father    [de-identified] Diabetes Father     Hypertension Father     Stroke Father     Diabetes Sister     Hypertension Sister     Heart Disease Sister     Colon Cancer Sister 52    Hypertension Brother     Cancer Maternal Aunt          REVIEW OF SYSTEMS  Review of Systems   Musculoskeletal: Positive for back pain, myalgias and neck pain.           PHYSICAL EXAMINATION  Visit Vitals  /86 (BP 1 Location: Left arm, BP Patient Position: Sitting)   Pulse 91   Temp 98.8 °F (37.1 °C) (Oral)   Resp 18   Ht 5' 2\" (1.575 m)   Wt 181 lb 3.2 oz (82.2 kg)   LMP 03/30/2013   SpO2 100%   BMI 33.14 kg/m²       Pain Assessment  3/7/2019   Location of Pain Back   Location Modifiers -   Severity of Pain 7   Quality of Pain Throbbing; Sharp;Dull;Aching   Quality of Pain Comment -   Duration of Pain Persistent   Frequency of Pain Constant   Aggravating Factors Bending; Other (Comment)   Aggravating Factors Comment certain movements. Limiting Behavior Yes   Relieving Factors NSAID;Heat   Result of Injury -           Constitutional:  Well developed, well nourished, in no acute distress. Psychiatric: Affect and mood are appropriate. Integumentary: No rashes or abrasions noted on exposed areas. SPINE/MUSCULOSKELETAL EXAM    Per Gabi Antonio NP 1/7/19: She alert and oriented. Normal mood and affect. She has a full weightbearing, nonantalgic gait. No assistive device. She has 4/5 strength in bilateral upper and lower extremities. Negative straight leg raise. Negative Anam. She is more tender in the muscles of the left lumbar region from her bra line down to her low back. Cervical spine:  Neck is midline. Normal muscle tone. No focal atrophy is noted. ROM pain free. Shoulder ROM intact. Tenderness to palpation of cervical and thoracic paraspinals on the L. Negative Spurling's sign. Negative Tinel's sign. Negative Kramer's sign. Sensation in the bilateral arms grossly intact to light touch. Lumbar spine:  No rash, ecchymosis, or gross obliquity. No fasciculations. No focal atrophy is noted. No pain with hip ROM. Full range of motion. Tenderness to palpation of lumbar paraspinals and QL on the L. No tenderness to palpation at the sciatic notch. SI joints non-tender. Trochanters non tender. Sensation in the bilateral legs grossly intact to light touch.       MOTOR:      Biceps  Triceps Deltoids Wrist Ext Wrist Flex Hand Intrin   Right 5/5 5/5 5/5 5/5 5/5 5/5   Left 5/5 5/5 5/5 5/5 5/5 5/5 Hip Flex  Quads Hamstrings Ankle DF EHL Ankle PF   Right 5/5 5/5 5/5 5/5 5/5 5/5   Left 5/5 5/5 5/5 5/5 5/5 5/5     DTRs are 2+ biceps, triceps, brachioradialis, patella, and Achilles. RADIOGRAPHS  Lumbar XR images taken on 12/21/18 personally reviewed with patient:  The vertebra are normal in height. . There is 5 mm of anterior offset of L4 on L5  demonstrating slight increase compared to the previous study. Disc space at L4/5  is moderately narrowed also increased from the previous exam.  The pedicles are  intact. There is no evidence of fracture or dislocation. Mineralization is  normal.     IMPRESSION  IMPRESSION:     Progression of degenerative disc disease and grade 1 spondylolisthesis L4/5. Cervical XR images taken on 12/21/18 personally reviewed with patient:  The lateral view demonstrates from skull base to T3. The vertebra are normal in  height. . There is 2 mm anterior offset of C3 on C4 which has increased from the  previous exam. 1 to 2 mm of anterior offset of C4 on C5 unchanged. 2 mm anterior  offset of C5 on C6 unchanged. Disc spaces at C5/6 and C6/7 are markedly narrowed  with spurring. Progressed from the previous exam. There is mild disc space  narrowing at C3/4 and C4/5.   The prevertebral soft tissues are normal.  There is  no evidence of fracture or dislocation.     IMPRESSION  IMPRESSION:     Progression of multilevel degenerative disc disease and multilevel mild AP offset    Lumbar x-ray films from 2/16/2016 personally reviewed with patient:  -Mild degenerative disc disease.  -Minimal grade 1 spondylolisthesis L4/5.     Thoracic x-ray films from 2/16/2016 personally reviewed with patient  -Degenerative change of the lower cervical spine.  -Degenerative change of the lower thoracic spine.     Cervical x-ray films from 2/16/2016 personally reviewed with patient:  -Multilevel degenerative changes of the cervical spine, most marked C6/C7.:    10 minutes of face-to-face contact were spent with the patient during today's visit extensively discussing symptoms and treatment plan. All questions were answered. More than half of this visit today was spent on counseling.      Written by Brit Freedman as dictated by Anton Caro MD

## 2019-03-07 NOTE — PATIENT INSTRUCTIONS
Back Stretches: Exercises  Your Care Instructions  Here are some examples of exercises for stretching your back. Start each exercise slowly. Ease off the exercise if you start to have pain. Your doctor or physical therapist will tell you when you can start these exercises and which ones will work best for you. How to do the exercises  Overhead stretch    1. Stand comfortably with your feet shoulder-width apart. 2. Looking straight ahead, raise both arms over your head and reach toward the ceiling. Do not allow your head to tilt back. 3. Hold for 15 to 30 seconds, then lower your arms to your sides. 4. Repeat 2 to 4 times. Side stretch    1. Stand comfortably with your feet shoulder-width apart. 2. Raise one arm over your head, and then lean to the other side. 3. Slide your hand down your leg as you let the weight of your arm gently stretch your side muscles. Hold for 15 to 30 seconds. 4. Repeat 2 to 4 times on each side. Press-up    1. Lie on your stomach, supporting your body with your forearms. 2. Press your elbows down into the floor to raise your upper back. As you do this, relax your stomach muscles and allow your back to arch without using your back muscles. As your press up, do not let your hips or pelvis come off the floor. 3. Hold for 15 to 30 seconds, then relax. 4. Repeat 2 to 4 times. Relax and rest    1. Lie on your back with a rolled towel under your neck and a pillow under your knees. Extend your arms comfortably to your sides. 2. Relax and breathe normally. 3. Remain in this position for about 10 minutes. 4. If you can, do this 2 or 3 times each day. Follow-up care is a key part of your treatment and safety. Be sure to make and go to all appointments, and call your doctor if you are having problems. It's also a good idea to know your test results and keep a list of the medicines you take. Where can you learn more?   Go to http://ralph-fabian.info/. Enter H152 in the search box to learn more about \"Back Stretches: Exercises. \"  Current as of: September 20, 2018  Content Version: 11.9  © 7423-0705 [employer]. Care instructions adapted under license by [email or web address] (which disclaims liability or warranty for this information). If you have questions about a medical condition or this instruction, always ask your healthcare professional. [address] any warranty or liability for your use of this information. Therapeutic Ball: Back Exercises  Your Care Instructions  Here are some examples of typical exercises for your condition. Start each exercise slowly. Ease off the exercise if you start to have pain. Your doctor or physical therapist will tell you when you can start these exercises and which ones will work best for you. To prepare, make sure that your ball is the right size for you. When inflated and firm, it should allow you to sit with your hips and knees bent at about a 90-degree angle (like the letter L). How to do the exercises  Seated position on ball    1. Use this exercise to get used to moving on the ball and to find your best sitting position. 2. Sit comfortably on the ball with your feet about hip-width apart. If you feel unsteady, rest your hands on the ball near your hips. 3. As you do this exercise, try to keep your shoulders and upper body relaxed and still. 4. Using your stomach and back muscles to move your pelvis, roll the ball forward. This will round your back. 5. Still using your stomach and back muscles, roll the ball back. You will arch your back. 6. Repeat this rounding-arching motion a few times. 7. Stop in between the two positions, where your back is not rounded or arched. This is called your neutral position. Pelvic rotation    1. Sit tall on the ball. 2. Slowly rotate your hips in a [place] pattern.  Keep the movement focused at your hips. 3. Repeat, but [place] in the other direction. 4. Repeat 8 to 12 times. Postural sitting    1. Use this position to find a stable, relaxed posture on the ball. You can use this position as your starting point for other ball exercises. If you feel unsteady on the ball, start on a chair first.  2. Sit on a ball or chair, with your feet planted straight in front of you. 3. Imagine that a string at the top of your head is pulling you straight up. Think of yourself as 2 inches taller than you are.  4. Slightly tuck your chin. 5. Keep your shoulders back and relaxed. Knee extension    1. Sit tall on the ball with your feet planted in front of you, hip-width apart. As you do this exercise, avoid slumping your shoulders and arching your back. 2. Rest your hands on the ball near your hip or a steady object next to you. (If you feel very stable on the ball, rest your hands in your lap or at your side.)  3. Slowly straighten one leg at the knee. Slowly lower it back down. Repeat with the other leg. 4. Repeat this exercise 8 to 12 times. Roll-ups    1. Lie on your back with your knees bent, feet resting on the floor. 2. Lay the ball on your thighs. Rest your hands up high on the ball. 3. Raising your head and shoulder blades, roll the ball up your thighs. Exhale as you roll up. 4. If this is hard on your neck, gently support your lower head and upper neck with one hand. Don't use that hand to pull your head up. 5. Repeat 8 to 12 times. Ball curls    1. Lie on your back with your ankles resting on the ball, knees straight. 2. Use your legs to roll the exercise ball toward you. Allow your knees to bend and move closer to your chest.  3. Pause briefly, and then roll the ball to the starting position. Try to keep the ball rolling straight. You will feel the muscles in your lower belly working. 4. Repeat 8 to 12 times. Bridge with ball under legs    1.  Lie on your back with your legs up, calves resting on the ball. For more challenge, rest your heels on the ball. 2. Look up at the ceiling, and keep your chin relaxed. You can place a small pillow under your head or neck for comfort. 3. With your arms by your side, press your hands onto the floor for stability. 4. Tighten your belly muscles by pulling in your belly button toward your spine. 5. Push your heels down toward the floor, squeeze your buttocks, and lift your hips off the floor until your shoulders, hips, and knees are all in a straight line. 6. Try to keep the ball steady. Hold for about 6 seconds as you continue to breathe normally. 7. Slowly lower your hips back down to the floor. 8. Repeat 8 to 12 times. Ball curls with bridge    1. Start flat on your back with your ankles resting on the ball. 2. Look up at the ceiling, and keep your chin relaxed. You can place a small pillow under your head or neck for comfort. 3. With your arms by your side, press your hands onto the floor for stability. 4. Tighten your belly muscles by pulling in your belly button toward your spine. 5. Push your heels down toward the floor, squeeze your buttocks, and lift your hips off the floor until your shoulders, hips, and knees are all in a straight line. 6. While holding the bridge position, roll the ball toward you with your heels. Keep your hips as level as you can. 7. Pause briefly, and then roll the ball back out. Try to keep the ball rolling straight. You will feel the muscles in your lower belly working as you straighten your legs. 8. Lower your hips, and return to your starting position. 9. Repeat 8 to 12 times. 10. When you can keep your body and the ball steady throughout this exercise, you're ready for more challenge. Try keeping your hips raised while rolling the ball out, holding the bridge, and rolling back, a few times in a row. Praying evelin    1. Kneel upright with the ball in front of you.   2. To start, clasp your hands together. Rest them on the ball in front of you. 3. As you do this exercise, keep your back and hips straight and tighten your belly and buttocks muscles. Keep your knees in place. 4. Press on the ball with your arms. Lean forward from the knees. This rolls the ball forward. You will bear most of your weight on your arms. 5. If your back starts to ache, you've gone too far. Pull back a bit. 6. Roll back to the start position. 7. Repeat 8 to 12 times. Walk-out plank on ball    1. Kneel over the ball. Place your hands on the floor in front of you. 2. Walk your hands forward until your legs are straight on the ball. This is the plank position. 3. When in plank position, hold your body straight and tighten your belly and buttocks muscles. Keep your chin slightly tucked. 4. Roll as far forward as you can without losing your balance or letting your hips drop. You may stop with the ball under your thighs, or even under your knees or shins. 5. Hold a few seconds, then walk your hands back and return to the start position. 6. Repeat 8 to 12 times. Push-up with thighs on ball    1. Kneel over the ball. Place your hands on the floor in front of you. 2. Walk your hands forward until your legs are straight on the ball. This is the plank position. 3. When in plank position, hold your body straight and tighten your belly and buttocks muscles. Keep your chin slightly tucked. 4. Roll as far forward as you can without losing your balance or letting your hips drop. You may stop with the ball under your thighs, or even under your knees or shins. 5. Bend your elbows. Slowly lower your body toward the ground as far as you can without losing your balance. 6. If your wrists hurt, try moving your hands a little farther apart so they're not right under your shoulders. 7. Slowly straighten your arms. 8. Do 8 to 12 of these push-ups. Wall squat with ball    1. Stand facing away from a wall.  Place your feet about shoulder-width apart. 2. Place the ball between your middle back and the wall. Move your feet out in front of you so they are about a foot in front of your hips. 3. Keep your arms at your sides, or put your hands on your hips. 4. Slowly squat down as if you are going to sit in a chair, rolling your back over the ball as you squat. The ball should move with you but stay pressed into the wall. 5. Be sure that your knees do not go in front of your toes as you squat. 6. Hold for 6 seconds. 7. Slowly rise to your standing position. 8. Repeat 8 to 12 times. Child's pose with ball    1. Kneeling upright with your back straight, rest your hands on the ball in front of you. 2. Breathe out as you bend at the hips, and roll the ball forward. Lower your chest toward the ground, and drop your hips back toward your heels. 3. To stretch your upper back and shoulders, hold this position for 15 to 30 seconds. 4. Repeat 2 to 4 times. Follow-up care is a key part of your treatment and safety. Be sure to make and go to all appointments, and call your doctor if you are having problems. It's also a good idea to know your test results and keep a list of the medicines you take. Where can you learn more? Go to http://ralph-fabian.info/. Enter A223 in the search box to learn more about \"Therapeutic Ball: Back Exercises. \"  Current as of: September 20, 2018  Content Version: 11.9  © 5425-2666 [employer]. Care instructions adapted under license by [employer] (which disclaims liability or warranty for this information). If you have questions about a medical condition or this instruction, always ask your healthcare professional. [address] any warranty or liability for your use of this information. Low Back Pain: Exercises  Your Care Instructions  Here are some examples of typical rehabilitation exercises for your condition. Start each exercise slowly. Ease off the exercise if you start to have pain. Your doctor or physical therapist will tell you when you can start these exercises and which ones will work best for you. How to do the exercises  Press-up    1. Lie on your stomach, supporting your body with your forearms. 2. Press your elbows down into the floor to raise your upper back. As you do this, relax your stomach muscles and allow your back to arch without using your back muscles. As your press up, do not let your hips or pelvis come off the floor. 3. Hold for 15 to 30 seconds, then relax. 4. Repeat 2 to 4 times. Alternate arm and leg (bird dog) exercise    1. Start on the floor, on your hands and knees. 2. Tighten your belly muscles. 3. Raise one leg off the floor, and hold it straight out behind you. Be careful not to let your hip drop down, because that will twist your trunk. 4. Hold for about 6 seconds, then lower your leg and switch to the other leg. 5. Repeat 8 to 12 times on each leg. 6. Over time, work up to holding for 10 to 30 seconds each time. 7. If you feel stable and secure with your leg raised, try raising the opposite arm straight out in front of you at the same time. Knee-to-chest exercise    1. Lie on your back with your knees bent and your feet flat on the floor. 2. Bring one knee to your chest, keeping the other foot flat on the floor (or keeping the other leg straight, whichever feels better on your lower back). 3. Keep your lower back pressed to the floor. Hold for at least 15 to 30 seconds. 4. Relax, and lower the knee to the starting position. 5. Repeat with the other leg. Repeat 2 to 4 times with each leg. 6. To get more stretch, put your other leg flat on the floor while pulling your knee to your chest.    Curl-ups    1. Lie on the floor on your back with your knees bent at a 90-degree angle. Your feet should be flat on the floor, about 12 inches from your buttocks.   2. Cross your arms over your chest. If this bothers your neck, try putting your hands behind your neck (not your head), with your elbows spread apart. 3. Slowly tighten your belly muscles and raise your shoulder blades off the floor. 4. Keep your head in line with your body, and do not press your chin to your chest.  5. Hold this position for 1 or 2 seconds, then slowly lower yourself back down to the floor. 6. Repeat 8 to 12 times. Pelvic tilt exercise    1. Lie on your back with your knees bent. 2. \"Brace\" your stomach. This means to tighten your muscles by pulling in and imagining your belly button moving toward your spine. You should feel like your back is pressing to the floor and your hips and pelvis are rocking back. 3. Hold for about 6 seconds while you breathe smoothly. 4. Repeat 8 to 12 times. Heel dig bridging    1. Lie on your back with both knees bent and your ankles bent so that only your heels are digging into the floor. Your knees should be bent about 90 degrees. 2. Then push your heels into the floor, squeeze your buttocks, and lift your hips off the floor until your shoulders, hips, and knees are all in a straight line. 3. Hold for about 6 seconds as you continue to breathe normally, and then slowly lower your hips back down to the floor and rest for up to 10 seconds. 4. Do 8 to 12 repetitions. Hamstring stretch in doorway    1. Lie on your back in a doorway, with one leg through the open door. 2. Slide your leg up the wall to straighten your knee. You should feel a gentle stretch down the back of your leg. 3. Hold the stretch for at least 15 to 30 seconds. Do not arch your back, point your toes, or bend either knee. Keep one heel touching the floor and the other heel touching the wall. 4. Repeat with your other leg. 5. Do 2 to 4 times for each leg. Hip flexor stretch    1. Kneel on the floor with one knee bent and one leg behind you. Place your forward knee over your foot.  Keep your other knee touching the floor.  2. Slowly push your hips forward until you feel a stretch in the upper thigh of your rear leg. 3. Hold the stretch for at least 15 to 30 seconds. Repeat with your other leg. 4. Do 2 to 4 times on each side. Wall sit    1. Stand with your back 10 to 12 inches away from a wall. 2. Lean into the wall until your back is flat against it. 3. Slowly slide down until your knees are slightly bent, pressing your lower back into the wall. 4. Hold for about 6 seconds, then slide back up the wall. 5. Repeat 8 to 12 times. Follow-up care is a key part of your treatment and safety. Be sure to make and go to all appointments, and call your doctor if you are having problems. It's also a good idea to know your test results and keep a list of the medicines you take. Where can you learn more? Go to http://ralph-fabian.info/. Enter K214 in the search box to learn more about \"Low Back Pain: Exercises. \"  Current as of: September 20, 2018  Content Version: 11.9  © 5731-2273 [employer]. Care instructions adapted under license by [employer] (which disclaims liability or warranty for this information). If you have questions about a medical condition or this instruction, always ask your healthcare professional. [address] any warranty or liability for your use of this information. Healthy Upper Back: Exercises  Your Care Instructions  Here are some examples of exercises for your upper back. Start each exercise slowly. Ease off the exercise if you start to have pain. Your doctor or physical therapist will tell you when you can start these exercises and which ones will work best for you. How to do the exercises  Lower neck and upper back stretch    5. Stretch your arms out in front of your body. Clasp one hand on top of your other hand. 6. Gently reach out so that you feel your shoulder blades stretching away from each other.   7. Gently bend your head forward. 8. Hold for 15 to 30 seconds. 9. Repeat 2 to 4 times. Midback stretch    5. Kneel on the floor, and sit back on your ankles. 6. Lean forward, place your hands on the floor, and stretch your arms out in front of you. Rest your head between your arms. 7. Gently push your chest toward the floor, reaching as far in front of you as possible. 8. Hold for 15 to 30 seconds. 9. Repeat 2 to 4 times. Shoulder rolls    5. Sit comfortably with your feet shoulder-width apart. You can also do this exercise while standing. 6. Roll your shoulders up, then back, and then down in a smooth, circular motion. 7. Repeat 2 to 4 times. Wall push-up    5. Stand against a wall with your feet about 12 to 24 inches back from the wall. If you feel any pain when you do this exercise, stand closer to the wall. 6. Place your hands on the wall slightly wider apart than your shoulders, and lean forward. 7. Gently lean your body toward the wall. Then push back to your starting position. Keep the motion smooth and controlled. 8. Repeat 8 to 12 times. Resisted shoulder blade squeeze    1. Sit or stand, holding the band in both hands in front of you. Keep your elbows close to your sides, bent at a 90-degree angle. Your palms should face up. 2. Squeeze your shoulder blades together, and move your arms to the outside, stretching the band. Be sure to keep your elbows at your sides while you do this. 3. Relax. 4. Repeat 8 to 12 times. Resisted rows    1. Put the band around a solid object, such as a bedpost, at about waist level. Hold one end of the band in each hand. 2. With your elbows at your sides and bent to 90 degrees, pull the band back to move your shoulder blades toward each other. Return to the starting position. 3. Repeat 8 to 12 times. Follow-up care is a key part of your treatment and safety. Be sure to make and go to all appointments, and call your doctor if you are having problems.  It's also a good idea to know your test results and keep a list of the medicines you take. Where can you learn more? Go to http://ralph-fabian.info/. Enter F170 in the search box to learn more about \"Healthy Upper Back: Exercises. \"  Current as of: September 20, 2018  Content Version: 11.9  © 5415-2295 [employer]. Care instructions adapted under license by [employer] (which disclaims liability or warranty for this information). If you have questions about a medical condition or this instruction, always ask your healthcare professional. [address] any warranty or liability for your use of this information. Neck: Exercises  Your Care Instructions  Here are some examples of typical rehabilitation exercises for your condition. Start each exercise slowly. Ease off the exercise if you start to have pain. Your doctor or physical therapist will tell you when you can start these exercises and which ones will work best for you. How to do the exercises  Neck stretch    10. This stretch works best if you keep your shoulder down as you lean away from it. To help you remember to do this, start by relaxing your shoulders and lightly holding on to your thighs or your chair. 11. Tilt your head toward your shoulder and hold for 15 to 30 seconds. Let the weight of your head stretch your muscles. 12. If you would like a little added stretch, use your hand to gently and steadily pull your head toward your shoulder. For example, keeping your right shoulder down, lean your head to the left. 13. Repeat 2 to 4 times toward each shoulder. Diagonal neck stretch    10. Turn your head slightly toward the direction you will be stretching, and tilt your head diagonally toward your chest and hold for 15 to 30 seconds.   11. If you would like a little added stretch, use your hand to gently and steadily pull your head forward on the diagonal.  12. Repeat 2 to 4 times toward each side. Dorsal glide stretch    8. Sit or stand tall and look straight ahead. 9. Slowly tuck your chin as you glide your head backward over your body  10. Hold for a count of 6, and then relax for up to 10 seconds. 11. Repeat 8 to 12 times. Chest and shoulder stretch    9. Sit or stand tall and glide your head backward as in the dorsal glide stretch. 10. Raise both arms so that your hands are next to your ears. 11. Take a deep breath, and as you breathe out, lower your elbows down and behind your back. You will feel your shoulder blades slide down and together, and at the same time you will feel a stretch across your chest and the front of your shoulders. 12. Hold for about 6 seconds, and then relax for up to 10 seconds. 13. Repeat 8 to 12 times. Strengthening: Hands on head    5. Move your head backward, forward, and side to side against gentle pressure from your hands, holding each position for about 6 seconds. 6. Repeat 8 to 12 times. Follow-up care is a key part of your treatment and safety. Be sure to make and go to all appointments, and call your doctor if you are having problems. It's also a good idea to know your test results and keep a list of the medicines you take. Where can you learn more? Go to http://ralph-fabian.info/. Enter P975 in the search box to learn more about \"Neck: Exercises. \"  Current as of: September 20, 2018  Content Version: 11.9  © 5021-5956 [employer] Care instructions adapted under license by [employer] (which disclaims liability or warranty for this information). If you have questions about a medical condition or this instruction, always ask your healthcare professional. [place] [address] any warranty or liability for your use of this information. Shoulder Blade: Exercises  Your Care Instructions  Here are some examples of typical exercises for your condition. Start each exercise slowly. Ease off the exercise if you start to have pain. Your doctor or physical therapist will tell you when you can start these exercises and which ones will work best for you. How to do the exercises  Shoulder roll    1. Stand tall with your chin slightly tucked. Imagine that a string at the top of your head is pulling you straight up. 2. Keep your arms relaxed. All motion will be in your shoulders. 3. Shrug your shoulders up toward your ears, then up and back. [place] your shoulders down and back, like you're sliding your hands down into your back pants pockets. 4. Repeat the circles at least 2 to 4 times. 5. This exercise is also helpful anytime you want to relax. Lower neck and upper back stretch    1. With your arms about shoulder height, clasp your hands in front of you. 2. Drop your chin toward your chest.  3. Reach straight forward so you are rounding your upper back. Think about pulling your shoulder blades apart. Karuna Villavicencio feel a stretch across your upper back and shoulders. Hold for at least 6 seconds. 4. Repeat 2 to 4 times. Triceps stretch    1. Reach your arm straight up. 2. Keeping your elbow in place, bend your arm and reach your hand down behind your back. 3. With your other hand, apply gentle pressure to the bent elbow. Karuna Villavicencio feel a stretch at the back of your upper arm and shoulder. Hold about 6 seconds. 4. Repeat 2 to 4 times with each arm. Shoulder stretch    1. Relax your shoulders. 2. Raise one arm to shoulder height, and reach it across your chest.  3. Pull the arm slightly toward you with your other arm. This will help you get a gentle stretch. Hold for about 6 seconds. 4. Repeat 2 to 4 times. Shoulder blade squeeze    1. Sit or stand up tall with your arms at your sides. 2. Keep your shoulders relaxed and down, not shrugged. 3. Squeeze your shoulder blades together. Hold for 6 seconds, then relax. 4. Repeat 8 to 12 times. Straight-arm shoulder blade squeeze    1.  Sit or stand tall. Relax your shoulders. 2. With palms down, hold your elastic tubing or band straight out in front of you. 3. Start with slight tension in the tubing or band, with your hands about shoulder-width apart. 4. Slowly pull straight out to the sides, squeezing your shoulder blades together. Keep your arms straight and at shoulder height. Slowly release. 5. Repeat 8 to 12 times. Rowing    1. Wood your elastic tubing or band at about waist height. Take one end in each hand. 2. Sit or stand with your feet hip-width apart. 3. Hold your arms straight in front of you. Adjust your distance to create slight tension in the tubing or band. 4. Slightly tuck your chin. Relax your shoulders. 5. Without shrugging your shoulders, pull straight back. Your elbows will pass alongside your waist.    Pull-downs    1. Wood your elastic tubing or band in the top of a closed door. Take one end in each hand. 2. Either sit or stand, depending on what is more comfortable. If you feel unsteady, sit on a chair. 3. Start with your arms up and comfortably apart, elbows straight. There should be a slight tension in the tubing or band. 4. Slightly tuck your chin, and look straight ahead. 5. Keeping your back straight, slowly pull down and back, bending your elbows. 6. Stop where your hands are level with your chin, in a \"goalpost\" position. 7. Repeat 8 to 12 times. Chest T stretch    1. Lie on your back. Raise your knees so they are bent. Plant your feet on the floor, hip-width apart. 2. Tuck your chin, and relax your shoulders. 3. Reach your arms straight out to the sides. If you don't feel a mild stretch in your shoulders and across your chest, use a foam roll or a tightly rolled blanket under your spine, from your tailbone to your head. 4. Relax in this position for at least 15 to 30 seconds while you breathe normally. Repeat 2 to 4 times.   5. As you get used to this stretch, keep adding a little more time until you are able relax in this position for 2 or 3 minutes. When you can relax for at least 2 minutes, you only need to do the exercise 1 time per session. Chest goalpost stretch    1. Lie on your back. Raise your knees so they are bent. Plant your feet on the floor, hip-width apart. 2. Tuck your chin, and relax your shoulders. 3. Reach your arms straight out to the sides. 4. Bend your arms at the elbows, with your hands pointed toward the top of your head. Your arms should make an L on either side of your head. Your palms should be facing up. 5. If you don't feel a mild stretch in your shoulders and across your chest, use a foam roll or tightly rolled blanket under your spine, from your tailbone to your head. 6. Relax in this position for at least 15 to 30 seconds while you breathe normally. Repeat 2 to 4 times. 7. Each day you do this exercise, add a little more time until you can relax in this position for 2 or 3 minutes. When you can relax for at least 2 minutes, you only need to do the exercise 1 time per session. Follow-up care is a key part of your treatment and safety. Be sure to make and go to all appointments, and call your doctor if you are having problems. It's also a good idea to know your test results and keep a list of the medicines you take. Where can you learn more? Go to http://ralph-fabian.info/. Enter [phone] in the search box to learn more about \"Shoulder Blade: Exercises. \"  Current as of: September 20, 2018  Content Version: 11.9  © 9601-6928 [employer]. Care instructions adapted under license by [employer] (which disclaims liability or warranty for this information). If you have questions about a medical condition or this instruction, always ask your healthcare professional. [address] any warranty or liability for your use of this information.

## 2019-03-11 ENCOUNTER — OFFICE VISIT (OUTPATIENT)
Dept: FAMILY MEDICINE CLINIC | Age: 54
End: 2019-03-11

## 2019-03-11 VITALS
DIASTOLIC BLOOD PRESSURE: 71 MMHG | SYSTOLIC BLOOD PRESSURE: 110 MMHG | HEIGHT: 62 IN | BODY MASS INDEX: 33.09 KG/M2 | RESPIRATION RATE: 20 BRPM | WEIGHT: 179.8 LBS | HEART RATE: 75 BPM | TEMPERATURE: 98.5 F | OXYGEN SATURATION: 97 %

## 2019-03-11 DIAGNOSIS — Z79.899 MEDICATION MANAGEMENT: ICD-10-CM

## 2019-03-11 DIAGNOSIS — M25.472 EDEMA OF BOTH ANKLES: ICD-10-CM

## 2019-03-11 DIAGNOSIS — J45.20 MILD INTERMITTENT ASTHMA WITHOUT COMPLICATION: ICD-10-CM

## 2019-03-11 DIAGNOSIS — G62.9 NEUROPATHY: ICD-10-CM

## 2019-03-11 DIAGNOSIS — M25.471 EDEMA OF BOTH ANKLES: ICD-10-CM

## 2019-03-11 DIAGNOSIS — E11.65 TYPE 2 DIABETES MELLITUS WITH HYPERGLYCEMIA, WITH LONG-TERM CURRENT USE OF INSULIN (HCC): Primary | ICD-10-CM

## 2019-03-11 DIAGNOSIS — K21.9 GASTROESOPHAGEAL REFLUX DISEASE WITHOUT ESOPHAGITIS: ICD-10-CM

## 2019-03-11 DIAGNOSIS — R79.89 ELEVATED SERUM CREATININE: ICD-10-CM

## 2019-03-11 DIAGNOSIS — D86.9 SARCOIDOSIS: ICD-10-CM

## 2019-03-11 DIAGNOSIS — R94.4 DECREASED GFR: ICD-10-CM

## 2019-03-11 DIAGNOSIS — Z79.4 TYPE 2 DIABETES MELLITUS WITH HYPERGLYCEMIA, WITH LONG-TERM CURRENT USE OF INSULIN (HCC): Primary | ICD-10-CM

## 2019-03-11 DIAGNOSIS — E78.5 DYSLIPIDEMIA: ICD-10-CM

## 2019-03-11 RX ORDER — PRAVASTATIN SODIUM 40 MG/1
40 TABLET ORAL DAILY
Qty: 30 TAB | Refills: 3 | Status: SHIPPED | OUTPATIENT
Start: 2019-03-11 | End: 2019-06-10 | Stop reason: DRUGHIGH

## 2019-03-11 RX ORDER — OMEPRAZOLE 20 MG/1
20 CAPSULE, DELAYED RELEASE ORAL
Qty: 30 CAP | Refills: 3 | Status: SHIPPED | OUTPATIENT
Start: 2019-03-11 | End: 2019-06-10 | Stop reason: SDUPTHER

## 2019-03-11 RX ORDER — LISINOPRIL 5 MG/1
5 TABLET ORAL DAILY
Qty: 30 TAB | Refills: 3 | Status: SHIPPED | OUTPATIENT
Start: 2019-03-11 | End: 2019-06-10 | Stop reason: SDUPTHER

## 2019-03-11 RX ORDER — HYDROCHLOROTHIAZIDE 25 MG/1
25 TABLET ORAL DAILY
Qty: 30 TAB | Refills: 3 | Status: SHIPPED | OUTPATIENT
Start: 2019-03-11 | End: 2019-06-10 | Stop reason: SDUPTHER

## 2019-03-11 RX ORDER — FLUOXETINE HYDROCHLORIDE 20 MG/1
20 CAPSULE ORAL DAILY
Qty: 30 CAP | Refills: 3 | Status: SHIPPED | OUTPATIENT
Start: 2019-03-11 | End: 2019-06-10 | Stop reason: SDUPTHER

## 2019-03-11 RX ORDER — PREGABALIN 50 MG/1
50 CAPSULE ORAL 2 TIMES DAILY
Qty: 60 CAP | Refills: 3 | Status: SHIPPED | OUTPATIENT
Start: 2019-03-11 | End: 2020-04-22

## 2019-03-11 NOTE — PROGRESS NOTES
Patient is here for routine follow-up. She c/o left lower back pain level 9 of 10. She is followed for back pain by Dr Murray Laboy at [facility].

## 2019-03-11 NOTE — LETTER
3/11/2019 [facility] [address], [address] Yisel Joseph, 1965, is picking up the following medications ordered from the [facility]: 
 
KOMBIGLYZE XR 2.5/1000 MG #180 Chas Espino Patient's Signature: _____________________________ Today's Date: 3/11/2019

## 2019-03-11 NOTE — PATIENT INSTRUCTIONS
The [facility] reminders! Foundation Operating Hours: These may change without notice. Mon- Wed 7am to 5pm. Closed for lunch 12-1pm  Thurs 7am to 12pm  Fridays closed     Office number:     **In case of inclement weather (snow and/or ice) please do not try to come into the office for your appointment. Please call in and you will not be counted as a \"No Show. \" SAFETY FIRST!!**    NO SHOW POLICY ~ If a patient has 3 no shows for an appointment with their Provider, Mental Health Provider, or the Nurse Navigator, they will be discharged from the practice for 6 months. Medication ordering will also be suspended. If the patient is discharged from the [facility], they can go to the [place] [address] or [address] [place] [address] where they can be seen for their primary care needs plus obtain the same type of medications as they received at the [facility]. To avoid being discharged the patient must call the office at [phone] 24 hours prior to their appointment if they need to cancel or reschedule, arrive to their appointments on time (preferrably 15 minutes early) (If you are late you will not be seen) and come to all scheduled appointments with the provider, mental health, and/or nurse navigator. If the patient is discharged from the practice, they can apply to be re-established after 6 months. Lab work:    Unless you are instructed differently, please return to the office between the hours of 7 am and 11:00 am Monday through Thursday to have your labs drawn one to two weeks before your next scheduled PROVIDER appointment. If you do not have an appointment to follow up on these results, please make one or plan to call the office if you do not hear from us to get the results. No news does not mean good news. Medications:   Medication  times are firm:  Mondays and Tuesdays from 1pm-5pm  Wednesdays and Thursdays from 8am-11:30am  These hours are subject to change without notice.     The [facility] or [facility] will assist you with your medications when available as not all medications can be obtained through this program. Medications are added and deleted from the [address] as deemed necessary by the pharmaceutical companies. There is a chance that a medication you currently receive from [facility] may be discontinued. If this occurs an alternative medication will be sent to a local pharmacy for you to obtain and pay for. If your medications are new or have changed, and you get your medications from the [facility]. Aaron [address] [place] [address]), you MUST talk to the pharmacy staff to sign the new prescription applications. If you don't sign the applications we cannot get the medications for you. It usually takes 6-8 weeks for your medications to arrive. The Pharmacy staff will call you when your medications are available. If you don't hear from them you call [phone] and inquire about your medicines. You are responsible for obtaining your medications. You will have 30 days to come in and  your medications. If you don't  your medicines within those 30 days, those medicines may be placed on the self as samples and you will have to start all over again by completing the applications and waiting the 6-8 weeks for your medicines to arrive. Foot Care: Local ministry through [facility] ([address] [facility])  Every second Tuesday of the month (except for holidays and election days) from 9am to 1 pm. The services provided by these ministry volunteers are free of charge with the option to donate. They will inspect your feet thoroughly, soak them for 10 minutes, cut and file your nails. They care for diabetics as well. Keep in mind this service is free and will be on a first come first serve basis. Bad teeth? Ask about the Dental Clinic to get you in front of a local dentist when a dentist is available. They only extract teeth. No fillings, root canals, or dentures.  The bus leaves the second Thursday of the month for those on the list. (Ask about availability as these appointments are limited). If you are scheduled for the dentist and you fail to come into the Foundation to meet the bus, you WILL NOT be placed on the dental list again. IMPORTANT: if you have high blood pressure please take your blood pressure  medications before arriving to the Foundation. If your blood pressure is elevated  the dental clinic WILL NOT extract any teeth and you will not have another  opportunity to go to the clinic. DIABETES:   Do you have uncontrolled diabetes or you just want to learn more about your diabetes? Schedule with the Nurse navigator for our new 5-week Diabetes program. You will learn how to properly manage your diabetes: nutrition, exercise, medication therapy. Eye exams for Diabetics. Please let us know so we can add you to the list to see an eye doctor. These  appointments are limited. You will receive a free eye exam and free glasses if  needed. Unfortunately, if you are not a diabetic, we do not have a free service  for eye exams for you (yet!). We do have information on where to  go to get a  huge discount on eye exams and glasses. Sick visits: If you are sick and it is not an emergency call the office to schedule an appointment to see the provider. Care in the office is FREE but charges will be applied if you go to the Emergency room. If you feel better and do not wish to attend your sick appointment please call  the office and cancel to avoid a no-show. Charges and cost items from the Foundation:    Most of our orders are covered by [address] [place] [address] but there ARE SOME CHARGES for items such as radiology interpretations and anesthesiology during procedures and surgeries that are not covered under [facility].       [facility]   Please make sure you have contacted [employer] to check on your payment options:   1 [phone] Mon - Fri 8-4pm. It is important that you are screened in order to qualify for assistance and to avoid huge medical charges. This service is to benefit you. The [facility] is not responsible for ANY charges you may accrue regardless of who ordered the medication, procedure, treatment or test. If you go to the Emergency Room, you WILL be charged! Behavior and emotional issues! It is stressful to be sick, have an illness, take medications, not have a job, not have medical insurance, have family issues or just getting older! Schedule an appointment with our mental health provider. She is in the office Mondays and Wednesdays from 8am to [address] [facility] can also contact the following: The national suicide hotline (4-400-448-HVIQ or 2-151.364.7688)    [address], [address] [place] [address] [address] [place] [address]   [phone]    Drug and Alcohol Addiction Issues! It is hard to stop a poor habit but there is help out there. Please feel free to attend any of the following support groups to help you kick the habit or go to [facility] to be evaluated by the psychiatric team. Never give up!! AlAnon meetings: [facility] MONDAY 10:30 AM [facility] [address] SATURDAY 8:00 PM [facility] SATURDAY NIGHT [address]         ALYSSA BITS:  Disposing medicines in household trash: Almost all medicines can be thrown into your household trash. These include prescription and over-the-counter (OTC) drugs in pills, liquids, drops, patches, creams, and inhalers. Follow these steps:  1) Remove the drugs from their original containers and mix them with something undesirable, such as used coffee grounds, dirt, or cat litter.  This makes the medicine less appealing to children and pets and unrecognizable to someone who might intentionally go through the trash looking for drugs. 2) Put the mixture in something you can close (a re-sealable zipper storage bag, empty can, or other container) to prevent the drug from leaking or spilling out. 3) Throw the container in the garbage. 4) Scratch out all your personal information on the empty medicine packaging to protect your identity and privacy. Throw the packaging away.     If you have a question about your medicine, ask your health care provider or pharmacist.

## 2019-03-26 ENCOUNTER — TELEPHONE (OUTPATIENT)
Dept: ORTHOPEDIC SURGERY | Age: 54
End: 2019-03-26

## 2019-03-26 ENCOUNTER — DOCUMENTATION ONLY (OUTPATIENT)
Dept: ORTHOPEDIC SURGERY | Age: 54
End: 2019-03-26

## 2019-03-26 NOTE — PROGRESS NOTES
Fatoumata Hess, Can you do a Peer to Peer with [facility]AW  Tracking # [id number]  Call YAW @ [phone]  She is having MRI L-Spine and C-Spine @ [facility] on 19, She did PT and can not take NSAIDS due to renal function      Policy Ann BOTTOMS, [place] [address]  Patient's Relation Self   1965  Group# [id number]  ID/Cert# [id number] Preston Taylor

## 2019-03-26 NOTE — TELEPHONE ENCOUNTER
Vinny Guzman from [facility] called requesting to speak to someone regarding a peer to peer review that is needed for the cervical and lumbar spine MRIs without contrast. Please advise Vinny Guzman back regarding this at [phone].

## 2019-03-26 NOTE — PROGRESS NOTES
Called and performed peer to peer.  They must have ED not from December, Jonna's OV Note from 1/07 and all PT notes faxed in order to consider for approval. Please fax all of the above

## 2019-04-01 NOTE — PROGRESS NOTES
All records required from [facility] faxed to [phone] with tracking # [id number], they will notify us if approved for 2 MRI's.

## 2019-04-16 ENCOUNTER — OFFICE VISIT (OUTPATIENT)
Dept: PULMONOLOGY | Age: 54
End: 2019-04-16

## 2019-04-16 VITALS
TEMPERATURE: 98.2 F | HEIGHT: 62 IN | WEIGHT: 178 LBS | SYSTOLIC BLOOD PRESSURE: 100 MMHG | OXYGEN SATURATION: 98 % | DIASTOLIC BLOOD PRESSURE: 70 MMHG | HEART RATE: 86 BPM | RESPIRATION RATE: 19 BRPM | BODY MASS INDEX: 32.76 KG/M2

## 2019-04-16 DIAGNOSIS — D86.9 SARCOIDOSIS: ICD-10-CM

## 2019-04-16 DIAGNOSIS — J40 BRONCHITIS: ICD-10-CM

## 2019-04-16 DIAGNOSIS — J45.909 UNCOMPLICATED ASTHMA, UNSPECIFIED ASTHMA SEVERITY, UNSPECIFIED WHETHER PERSISTENT: Primary | ICD-10-CM

## 2019-04-16 RX ORDER — HYDROGEN PEROXIDE 3 %
20 SOLUTION, NON-ORAL MISCELLANEOUS DAILY
COMMUNITY
End: 2021-06-16

## 2019-04-16 NOTE — PROGRESS NOTES
Verbal Order with read back per Dr. Rachel Reyes MD  For PFT smart panel. AMB POC PFT complete w/ bronchodilator AMB POC PFT complete w/o bronchodilator Gas Dilute/ wash out lung vol w/wo distrib vet & vol 
Diffusing capacity Dr. Rachel Reyes MD will co-sign the orders.

## 2019-04-16 NOTE — PATIENT INSTRUCTIONS
Prednisone 5 mg every morning with two 1 mg tablets for a total of 7 mg every morning Continue Advair 1 inhalation twice daily and remember to exhale fully before inhaling and to wash mouth with water and spit it out after inhaling Albuterol 2 inhalations every 4 hours as needed but if you require albuterol too often to control respiratory symptoms call the office for severe symptoms go to the emergency room Submit sputum to the hospital lab Obtain echocardiogram

## 2019-04-16 NOTE — PROGRESS NOTES
Roney De La Rosa presents today for Chief Complaint Patient presents with  Asthma  
  follow up from 3/5/2019  Sarcoidosis Is someone accompanying this pt? No 
 
Is the patient using any DME equipment during OV? No  
 -DME Company N/A Depression Screening: 
3 most recent PHQ Screens 4/16/2019 PHQ Not Done - Little interest or pleasure in doing things Not at all Feeling down, depressed, irritable, or hopeless Not at all Total Score PHQ 2 0 Learning Assessment: 
Learning Assessment 8/27/2018 PRIMARY LEARNER Patient HIGHEST LEVEL OF EDUCATION - PRIMARY LEARNER  -  
BARRIERS PRIMARY LEARNER -  
CO-LEARNER CAREGIVER -  
PRIMARY LANGUAGE ENGLISH  
LEARNER PREFERENCE PRIMARY DEMONSTRATION  
  VIDEOS READING  
ANSWERED BY Patient RELATIONSHIP SELF Abuse Screening: 
Abuse Screening Questionnaire 2/6/2017 Do you ever feel afraid of your partner? Jason Parry Are you in a relationship with someone who physically or mentally threatens you? Jason Parry Is it safe for you to go home? [place] Central [place] Fall Risk No flowsheet data found. Coordination of Care: 1. Have you been to the ER, urgent care clinic since your last visit? Hospitalized since your last visit? No 
 
2. Have you seen or consulted any other health care providers outside of the [address] [place] [address] since your last visit? Include any pap smears or colon screening. No 
 
Medication variance in dosage/sig per patient as follows: None.

## 2019-04-16 NOTE — PROGRESS NOTES
[facility] Pulmonary, Critical Care, and Sleep Medicine Chief complaint: 
Sarcoidosis asthma bronchitis HPI: 
 
Itz Jimenez    is 46years old and comes to the office today for follow-up concerning sarcoidosis and is on 8 mg of prednisone a day and relates that she has shortness of breath with exertion which is relatively stable and a chronic cough which was treated with Zithromax and improved but then recur and is now green in color. She denies chest pain and says her leg edema is controlled with diuretics Allergies Allergen Reactions  Pollen Extracts Runny Nose and Cough  Amoxicillin Hives, Shortness of Breath and Swelling  Crestor [Rosuvastatin] Myalgia  Ibuprofen Other (comments) dont prescribe due to kidney function  Lipitor [Atorvastatin] Other (comments) Muscle cramps and weakness  Pcn [Penicillins] Angioedema Current Outpatient Medications Medication Sig  esomeprazole (NEXIUM) 20 mg capsule Take 20 mg by mouth daily.  FLUoxetine (PROZAC) 20 mg capsule Take 1 Cap by mouth daily. For anxiety/depression  hydroCHLOROthiazide (HYDRODIURIL) 25 mg tablet Take 1 Tab by mouth daily. For swelling  lisinopril (PRINIVIL, ZESTRIL) 5 mg tablet Take 1 Tab by mouth daily. For kidney protection  pravastatin (PRAVACHOL) 40 mg tablet Take 1 Tab by mouth daily. For cholesterol  pregabalin (LYRICA) 50 mg capsule Take 1 Cap by mouth two (2) times a day. Max Daily Amount: 100 mg. For neuropathy  lidocaine (LIDODERM) 5 % Apply patch to the affected area for 12 hours a day and remove for 12 hours a day.  predniSONE (DELTASONE) 1 mg tablet Take 3 Tabs by mouth daily (with breakfast).  predniSONE (DELTASONE) 5 mg tablet Take 1 Tab by mouth daily (with breakfast).  insulin glargine (LANTUS,BASAGLAR) 100 unit/mL (3 mL) inpn 25 Units by SubCUTAneous route nightly.  Insulin Needles, Disposable, 30 gauge x 1/3\" Use 1-2 times daily. Qty 100  
 mometasone (NASONEX) 50 mcg/actuation nasal spray 2 Sprays by Both Nostrils route daily as needed. For allergies  glucose blood VI test strips (ACCU-CHEK POOJA) strip Pt to test 5 times daily. Dx:E11.65  
 aspirin delayed-release 81 mg tablet Take 81 mg by mouth daily.  CETIRIZINE HCL (ZYRTEC PO) Take 1 Tab by mouth daily.  omeprazole (PRILOSEC) 20 mg capsule Take 1 Cap by mouth daily as needed (reflux).  sodium chloride (SALINE MIST) 0.65 % nasal squeeze bottle 0.05 mL by Both Nostrils route as needed for Congestion. For dry nose, environmental allergies No current facility-administered medications for this visit. Past Medical History:  
Diagnosis Date  Bilateral great toe fractures 2014  Chronic sinusitis Dr. Marley Winston in the past  
 Colon polyp 5/16 Dr Idania Aguirre  Decreased GFR 8/28/2018  Depression 2019  Diabetes mellitus ([facility] [address]  DJD (degenerative joint disease) of cervical spine 2016  DJD (degenerative joint disease), lumbar 2013 MRI c spine w degen changes; Dr Brian Bañuelos  Dyslipidemia   
 calculated 10 year risk score was 2.0% (12/13)  Edema of both ankles 8/28/2018  Elevated serum creatinine 8/28/2018  Environmental and seasonal allergies 8/28/2018  Eustachian tube dysfunction  FHx: colon cancer  FHx: heart disease  Fibrocystic breast   
 Dr Emiliano Leroy  GERD (gastroesophageal reflux disease)  Hypertriglyceridemia 8/28/2018  Lateral epicondylitis of both elbows 2/6/2017  Macular degeneration of both eyes 8/28/2018  Mild intermittent asthma without complication 19/00/2892 Dr. Markel Valencia;  ratio 68%, FEV1 78% w 6% inc postbd, TLC 77, RV 56, DLCO 61%  Morbid obesity ([address] peak weight 196 lbs, bmi 35.8 from 10/13  Neuropathy 8/28/2018  Osteopenia Dr. Jacy Reyna; DEXA t score -0.7 spine, -0.2 hip (8/14)  Sarcoidosis Dr Cristina Nunez  Stage 3 chronic kidney disease ([facility] [address]  Type 2 diabetes mellitus with hyperglycemia, with long-term current use of insulin ([facility] [address] 8/28/2018 Past Surgical History:  
Procedure Laterality Date  CARDIAC SURG PROCEDURE UNLIST  9/10, 8/13  
 thallium negative ef 72%; negative ef 70%  CHEST SURGERY PROCEDURE UNLISTED  7/12 US thyroid negative  CHEST SURGERY PROCEDURE UNLISTED  2012  
 pfts w mod restrictive defect  COLONOSCOPY N/A 5/26/2016 Dr Willoughby Europe hyperplastic  HX BREAST REDUCTION Dr. Jono Garcia  HX HEENT    
 nasal polypectomy Dr. Yvrose Pardo  HX HEENT    
 tear duct surgery right Dr. Mandy Saravia 2010; left Dr Kelechi Cowan 2110  HX ORTHOPAEDIC    
 DEXA -0.7 spine, -0.2 hip 8/14  VASCULAR SURGERY PROCEDURE UNLIST  12/13  
 venous doppler negative Social History Socioeconomic History  Marital status: LEGALLY  Spouse name: Not on file  Number of children: 0  
 Years of education: 13  
 Highest education level: Not on file Occupational History  Occupation: Unemployed Social Needs  Financial resource strain: Not on file  Food insecurity:  
  Worry: Not on file Inability: Not on file  Transportation needs:  
  Medical: Not on file Non-medical: Not on file Tobacco Use  Smoking status: Never Smoker  Smokeless tobacco: Never Used Substance and Sexual Activity  Alcohol use: Yes Alcohol/week: 0.0 oz  
  Comment: occasional wine  Drug use: No  
 Sexual activity: Not Currently Lifestyle  Physical activity:  
  Days per week: Not on file Minutes per session: Not on file  Stress: Not on file Relationships  Social connections:  
  Talks on phone: Not on file Gets together: Not on file Attends Orthodoxy service: Not on file Active member of club or organization: Not on file Attends meetings of clubs or organizations: Not on file Relationship status: Not on file  Intimate partner violence:  
  Fear of current or ex partner: Not on file Emotionally abused: Not on file Physically abused: Not on file Forced sexual activity: Not on file Other Topics Concern   Service No  
 Blood Transfusions No  
 Caffeine Concern Yes Comment: due to reflux  Occupational Exposure No  
 Hobby Hazards No  
 Sleep Concern Yes  Stress Concern Yes Comment: wants a job  Weight Concern Yes  Special Diet No  
 Back Care No  
 Exercise Yes  Bike Helmet No  
 Seat Belt Yes  Self-Exams Yes Social History Narrative Patient lives with a friend, no pets. Family History Problem Relation Age of Onset  Cancer Mother  Hypertension Mother  Cancer Father  Diabetes Father  Hypertension Father  Stroke Father  Diabetes Sister  Hypertension Sister  Heart Disease Sister  Colon Cancer Sister 52  Hypertension Brother  Cancer Maternal Aunt Review of systems: 
She denies fever chills poor appetite but has lost a few pounds Physical Exam: 
Visit Vitals /70 (BP 1 Location: Left arm, BP Patient Position: At rest) Pulse 86 Temp 98.2 °F (36.8 °C) (Oral) Resp 19 Ht 5' 2\" (1.575 m) Wt 80.7 kg (178 lb) LMP 03/30/2013 SpO2 98% BMI 32.56 kg/m² Well-developed well-nourished HEENT: WNL Lymph node exam: Supraclavicular cervical lymph nodes negative Chest: Equal symmetrical expansion no dullness no wheezes rales rubs Heart: Regular rhythm no gallop no murmur no JVD trace pretibial edema bilaterally Neurological: Alert and oriented Skin: No rash Labs: 
 
O2 sat room air at rest 98% spirometry 4/16/19 very mild airflow obstruction with no improvement with bronchodilator normal lung volumes except for reduced RV and reduced diffusion of 60% which is unchanged since 2014 Impression:  
 
Stable pulmonary functions and symptoms as we will reduce her prednisone History of a borderline pulmonary artery pressure in the past with sarcoidosis we will repeat echocardiography to evaluate pulmonary artery pressure Chronic bronchitis unresponsive to some antibiotics will make sure the patient does not have  atypical Mycobacterium infection and evaluate for difficult bacterial organism with cultures Plan:  
 
Sputum cultures for AFB and routine Echocardiogram 
Slowly reduce prednisone to 7 mg a day for now follow-up in 6-7 weeks or sooner if needed Charma Prader, MD , [facility] 
 
CC: Ellen Guevara, MAGGY  
 
[address] [facility], [address] [address]     P: [phone]     F: [phone]

## 2019-04-18 ENCOUNTER — HOSPITAL ENCOUNTER (OUTPATIENT)
Dept: LAB | Age: 54
Discharge: HOME | End: 2019-04-18
Payer: MEDICAID

## 2019-04-18 DIAGNOSIS — J40 BRONCHITIS: ICD-10-CM

## 2019-04-18 PROCEDURE — 87205 SMEAR GRAM STAIN: CPT

## 2019-04-18 PROCEDURE — 87206 SMEAR FLUORESCENT/ACID STAI: CPT

## 2019-04-18 PROCEDURE — 87186 SC STD MICRODIL/AGAR DIL: CPT

## 2019-04-18 PROCEDURE — 87077 CULTURE AEROBIC IDENTIFY: CPT

## 2019-04-21 LAB
BACTERIA SPEC CULT: ABNORMAL
GRAM STN SPEC: ABNORMAL
SERVICE CMNT-IMP: ABNORMAL

## 2019-04-22 DIAGNOSIS — E87.5 HYPERKALEMIA: Primary | ICD-10-CM

## 2019-04-22 RX ORDER — SULFAMETHOXAZOLE AND TRIMETHOPRIM 800; 160 MG/1; MG/1
1 TABLET ORAL 2 TIMES DAILY
Qty: 20 TAB | Refills: 0 | Status: SHIPPED | OUTPATIENT
Start: 2019-04-22 | End: 2019-05-02

## 2019-04-23 ENCOUNTER — HOSPITAL ENCOUNTER (OUTPATIENT)
Dept: ULTRASOUND IMAGING | Age: 54
Discharge: HOME | End: 2019-04-23
Attending: INTERNAL MEDICINE
Payer: MEDICAID

## 2019-04-23 DIAGNOSIS — N18.30 CHRONIC KIDNEY DISEASE, STAGE III (MODERATE) (HCC): ICD-10-CM

## 2019-04-23 PROCEDURE — 76770 US EXAM ABDO BACK WALL COMP: CPT

## 2019-04-26 ENCOUNTER — DOCUMENTATION ONLY (OUTPATIENT)
Dept: ORTHOPEDIC SURGERY | Age: 54
End: 2019-04-26

## 2019-04-26 NOTE — PROGRESS NOTES
Spoke with [facility] and MRI of C& L Spine are still denied for no 6 weeks of conservative treatment. This was determined after review by [facility] physician seeing all notes faxed to them on 04-. LM on  for patient.

## 2019-05-14 ENCOUNTER — HOSPITAL ENCOUNTER (OUTPATIENT)
Dept: NON INVASIVE DIAGNOSTICS | Age: 54
Discharge: HOME | End: 2019-05-14
Attending: INTERNAL MEDICINE
Payer: MEDICAID

## 2019-05-14 VITALS
WEIGHT: 178 LBS | SYSTOLIC BLOOD PRESSURE: 100 MMHG | HEIGHT: 62 IN | BODY MASS INDEX: 32.76 KG/M2 | DIASTOLIC BLOOD PRESSURE: 70 MMHG

## 2019-05-14 DIAGNOSIS — D86.9 SARCOIDOSIS: ICD-10-CM

## 2019-05-14 PROCEDURE — 93306 TTE W/DOPPLER COMPLETE: CPT

## 2019-05-14 NOTE — PROGRESS NOTES
Completed 2D Echocardiogram. Report to follow. Patient advised that the armband contains PHI. I cut the armband off and shred it.        Adonay Carney, RCS, RDCS

## 2019-05-15 LAB
ECHO AO ROOT DIAM: 2.75 CM
ECHO LA AREA 4C: 13.7 CM2
ECHO LA VOL 2C: 23.37 ML (ref 22–52)
ECHO LA VOL 4C: 31.23 ML (ref 22–52)
ECHO LA VOL BP: 29.57 ML (ref 22–52)
ECHO LA VOL/BSA BIPLANE: 16.25 ML/M2 (ref 16–28)
ECHO LA VOLUME INDEX A2C: 12.85 ML/M2 (ref 16–28)
ECHO LA VOLUME INDEX A4C: 17.17 ML/M2 (ref 16–28)
ECHO LV E' LATERAL VELOCITY: 9.4 CM/S
ECHO LV E' SEPTAL VELOCITY: 5.69 CM/S
ECHO LV INTERNAL DIMENSION DIASTOLIC: 4.44 CM (ref 3.9–5.3)
ECHO LV INTERNAL DIMENSION SYSTOLIC: 2.79 CM
ECHO LV IVSD: 0.91 CM (ref 0.6–0.9)
ECHO LV MASS 2D: 149.5 G (ref 67–162)
ECHO LV MASS INDEX 2D: 82.2 G/M2 (ref 43–95)
ECHO LV POSTERIOR WALL DIASTOLIC: 0.9 CM (ref 0.6–0.9)
ECHO LVOT DIAM: 1.97 CM
ECHO LVOT PEAK GRADIENT: 4.2 MMHG
ECHO LVOT PEAK VELOCITY: 102.36 CM/S
ECHO LVOT VTI: 17.79 CM
ECHO MV A VELOCITY: 82.85 CM/S
ECHO MV E DECELERATION TIME (DT): 140.3 MS
ECHO MV E VELOCITY: 61.54 CM/S
ECHO MV E/A RATIO: 0.74
ECHO MV E/E' LATERAL: 6.55
ECHO MV E/E' RATIO (AVERAGED): 8.68
ECHO MV E/E' SEPTAL: 10.82
ECHO PULMONARY ARTERY SYSTOLIC PRESSURE (PASP): 27 MMHG
ECHO RV TAPSE: 1.94 CM (ref 1.5–2)
ECHO TV REGURGITANT MAX VELOCITY: 244.71 CM/S
ECHO TV REGURGITANT PEAK GRADIENT: 24 MMHG

## 2019-05-31 LAB
ACID FAST STN SPEC: NEGATIVE
MYCOBACTERIUM SPEC QL CULT: NEGATIVE
SPECIMEN PREPARATION: NORMAL
SPECIMEN SOURCE: NORMAL

## 2019-06-04 ENCOUNTER — OFFICE VISIT (OUTPATIENT)
Dept: PULMONOLOGY | Age: 54
End: 2019-06-04

## 2019-06-04 VITALS
TEMPERATURE: 98.2 F | RESPIRATION RATE: 16 BRPM | HEART RATE: 83 BPM | HEIGHT: 62 IN | OXYGEN SATURATION: 98 % | SYSTOLIC BLOOD PRESSURE: 102 MMHG | DIASTOLIC BLOOD PRESSURE: 60 MMHG | BODY MASS INDEX: 31.83 KG/M2 | WEIGHT: 173 LBS

## 2019-06-04 DIAGNOSIS — D86.9 SARCOIDOSIS: ICD-10-CM

## 2019-06-04 DIAGNOSIS — J40 BRONCHITIS: ICD-10-CM

## 2019-06-04 DIAGNOSIS — J45.909 UNCOMPLICATED ASTHMA, UNSPECIFIED ASTHMA SEVERITY, UNSPECIFIED WHETHER PERSISTENT: Primary | ICD-10-CM

## 2019-06-04 NOTE — PROGRESS NOTES
[facility]  Pulmonary, Critical Care, and Sleep Medicine      Chief complaint:  Sarcoidosis asthma recurrent bronchitis    HPI:    Betsy Cruz Bound    is 53years old and comes to the office today for follow-up concerning sarcoidosis asthma recurrent bronchitis and is taking her Advair faithfully and relates that her shortness of breath is stable but that after treatment for bronchitis with antibiotics her cough after a while returned and the cough remains purulent. She denies any chest pain or leg swelling      Allergies   Allergen Reactions    Pollen Extracts Runny Nose and Cough    Amoxicillin Hives, Shortness of Breath and Swelling    Crestor [Rosuvastatin] Myalgia    Ibuprofen Other (comments)     dont prescribe due to kidney function    Lipitor [Atorvastatin] Other (comments)     Muscle cramps and weakness      Pcn [Penicillins] Angioedema     Current Outpatient Medications   Medication Sig    esomeprazole (NEXIUM) 20 mg capsule Take 20 mg by mouth daily.  FLUoxetine (PROZAC) 20 mg capsule Take 1 Cap by mouth daily. For anxiety/depression    hydroCHLOROthiazide (HYDRODIURIL) 25 mg tablet Take 1 Tab by mouth daily. For swelling    lisinopril (PRINIVIL, ZESTRIL) 5 mg tablet Take 1 Tab by mouth daily. For kidney protection    pravastatin (PRAVACHOL) 40 mg tablet Take 1 Tab by mouth daily. For cholesterol    pregabalin (LYRICA) 50 mg capsule Take 1 Cap by mouth two (2) times a day. Max Daily Amount: 100 mg. For neuropathy    lidocaine (LIDODERM) 5 % Apply patch to the affected area for 12 hours a day and remove for 12 hours a day.  predniSONE (DELTASONE) 1 mg tablet Take 3 Tabs by mouth daily (with breakfast).  predniSONE (DELTASONE) 5 mg tablet Take 1 Tab by mouth daily (with breakfast).  insulin glargine (LANTUS,BASAGLAR) 100 unit/mL (3 mL) inpn 25 Units by SubCUTAneous route nightly.  Insulin Needles, Disposable, 30 gauge x 1/3\" Use 1-2 times daily.  Qty 100  mometasone (NASONEX) 50 mcg/actuation nasal spray 2 Sprays by Both Nostrils route daily as needed. For allergies    glucose blood VI test strips (ACCU-CHEK POOJA) strip Pt to test 5 times daily. Dx:E11.65    aspirin delayed-release 81 mg tablet Take 81 mg by mouth daily.  CETIRIZINE HCL (ZYRTEC PO) Take 1 Tab by mouth daily.  omeprazole (PRILOSEC) 20 mg capsule Take 1 Cap by mouth daily as needed (reflux).  sodium chloride (SALINE MIST) 0.65 % nasal squeeze bottle 0.05 mL by Both Nostrils route as needed for Congestion. For dry nose, environmental allergies     No current facility-administered medications for this visit.       Past Medical History:   Diagnosis Date    Bilateral great toe fractures 2014    Chronic sinusitis     Dr. Ferraro Backbone in the past    Colon polyp 5/16    Dr Yaneth Edmonds    Decreased GFR 8/28/2018    Depression 2019    Diabetes mellitus ([facility] [address]     DJD (degenerative joint disease) of cervical spine 2016    DJD (degenerative joint disease), lumbar 2013    MRI c spine w degen changes; Dr Kaufman Dose    Dyslipidemia     calculated 10 year risk score was 2.0% (12/13)    Edema of both ankles 8/28/2018    Elevated serum creatinine 8/28/2018    Environmental and seasonal allergies 8/28/2018    Eustachian tube dysfunction     FHx: colon cancer     FHx: heart disease     Fibrocystic breast     Dr Chantell Pink GERD (gastroesophageal reflux disease)     Hypertriglyceridemia 8/28/2018    Lateral epicondylitis of both elbows 2/6/2017    Macular degeneration of both eyes 8/28/2018    Mild intermittent asthma without complication 07/72/8081    Dr. Jayme Garcia;  ratio 68%, FEV1 78% w 6% inc postbd, TLC 77, RV 56, DLCO 61%    Morbid obesity (HCC)     peak weight 196 lbs, bmi 35.8 from 10/13    Neuropathy 8/28/2018    Osteopenia     Dr. Ankita Martinez; DEXA t score -0.7 spine, -0.2 hip (8/14)    Sarcoidosis     Dr Rudolph Neighbours 3 chronic kidney disease ([address]     Type 2 diabetes mellitus with hyperglycemia, with long-term current use of insulin ([facility] [address] 8/28/2018     Past Surgical History:   Procedure Laterality Date    CARDIAC SURG PROCEDURE UNLIST  9/10, 8/13    thallium negative ef 72%; negative ef 70%    CHEST SURGERY PROCEDURE UNLISTED  7/12     thyroid negative    CHEST SURGERY PROCEDURE UNLISTED  2012    pfts w mod restrictive defect     COLONOSCOPY N/A 5/26/2016    Dr Vivien Rowe hyperplastic    Ze Span      Dr. Lilian Kline HX HEENT      nasal polypectomy Dr. Virginia Stanford HX HEENT      tear duct surgery right Dr. Arlette Livingston 2010; left Dr Andra Winston 2015    HX ORTHOPAEDIC      DEXA -0.7 spine, -0.2 hip 8/14    VASCULAR SURGERY PROCEDURE UNLIST  12/13    venous doppler negative     Social History     Socioeconomic History    Marital status: LEGALLY      Spouse name: Not on file    Number of children: 0    Years of education: 13    Highest education level: Not on file   Occupational History    Occupation: Unemployed   Social Needs    Financial resource strain: Not on file    Food insecurity:     Worry: Not on file     Inability: Not on file   Double the Donation needs:     Medical: Not on file     Non-medical: Not on file   Tobacco Use    Smoking status: Never Smoker    Smokeless tobacco: Never Used   Substance and Sexual Activity    Alcohol use:  Yes     Alcohol/week: 0.0 oz     Comment: occasional wine    Drug use: No    Sexual activity: Not Currently   Lifestyle    Physical activity:     Days per week: Not on file     Minutes per session: Not on file    Stress: Not on file   Relationships    Social connections:     Talks on phone: Not on file     Gets together: Not on file     Attends Caodaism service: Not on file     Active member of club or organization: Not on file     Attends meetings of clubs or organizations: Not on file     Relationship status: Not on file    Intimate partner violence:     Fear of current or ex partner: Not on file     Emotionally abused: Not on file     Physically abused: Not on file     Forced sexual activity: Not on file   Other Topics Concern     Service No    Blood Transfusions No    Caffeine Concern Yes     Comment: due to reflux    Occupational Exposure No    Hobby Hazards No    Sleep Concern Yes    Stress Concern Yes     Comment: wants a job    Weight Concern Yes    Special Diet No    Back Care No    Exercise Yes    Bike Helmet No    Seat Belt Yes    Self-Exams Yes   Social History Narrative    Patient lives with a friend, no pets.      Family History   Problem Relation Age of Onset   [address] [facility] [address] Cancer Mother     Hypertension Mother     Cancer Father    [address] [facility] Diabetes Father     Hypertension Father     Stroke Father     Diabetes Sister     Hypertension Sister     Heart Disease Sister     Colon Cancer Sister 52    Hypertension Brother     Cancer Maternal Aunt        Review of systems:  She denies fever chills poor appetite weight loss    Physical Exam:  Visit Vitals  /60 (BP 1 Location: Left arm, BP Patient Position: Sitting)   Pulse 83   Temp 98.2 °F (36.8 °C) (Oral)   Resp 16   Ht 5' 2\" (1.575 m)   Wt 78.5 kg (173 lb)   LMP 03/30/2013   SpO2 98% Comment: RA Rest   BMI 31.64 kg/m²       Well-developed well-nourished  HEENT: WNL  Lymph node exam: Supraclavicular cervical lymph nodes negative  Chest: Equal symmetrical expansion no dullness no wheezes rales rubs  Heart: Regular rhythm no gallop no murmur no JVD no extremities: No cyanosis clubbing or calf tenderness  Neurological: Alert and oriented    Labs:  Echocardiogram 5/14/2019: Normal left ventricular function and normal pulmonary artery pressure  O2 sat room air at rest 98%    Impression:     Sarcoidosis appears stable despite slowly reducing maintenance prednisone  No pulmonary artery hypertension  Recurrent bronchitis with some upper respiratory symptoms that could suggest some chronic sinusitis  Plan:     Prednisone continue 7 mg daily  Continue Advair and as needed albuterol  Reculture sputum and treat for sinusitis i.e. longer antibiotic course  Follow-up in 4 months otherwise or sooner if needed    Nakia Mckee MD , [facility]    CC: Analisa Damon NP     [address] [facility], [address] [address]     P: [phone]     F: [phone]

## 2019-06-04 NOTE — PROGRESS NOTES
Jania Guajardo presents today for   Chief Complaint   Patient presents with    Results     Echo        Is someone accompanying this pt? No     Is the patient using any DME equipment during OV? No    -DME Company None    Depression Screening:  3 most recent PHQ Screens 4/16/2019   PHQ Not Done -   Little interest or pleasure in doing things Not at all   Feeling down, depressed, irritable, or hopeless Not at all   Total Score PHQ 2 0       Learning Assessment:  Learning Assessment 8/27/2018   PRIMARY LEARNER Patient   HIGHEST LEVEL OF EDUCATION - PRIMARY LEARNER  -   BARRIERS PRIMARY LEARNER -   CO-LEARNER CAREGIVER -   PRIMARY LANGUAGE ENGLISH   LEARNER PREFERENCE PRIMARY DEMONSTRATION     VIDEOS     READING   ANSWERED BY Patient   RELATIONSHIP SELF       Abuse Screening:  Abuse Screening Questionnaire 2/6/2017   Do you ever feel afraid of your partner? N   Are you in a relationship with someone who physically or mentally threatens you? N   Is it safe for you to go home? Y       Fall Risk  No flowsheet data found. Coordination of Care:  1. Have you been to the ER, urgent care clinic since your last visit? Hospitalized since your last visit? No    2. Have you seen or consulted any other health care providers outside of the [address] [place] [address] since your last visit? Include any pap smears or colon screening.  No

## 2019-06-04 NOTE — PATIENT INSTRUCTIONS
Prednisone 7 mg every morning with breakfast     continue Advair 1 inhalation twice daily and remember to exhale fully before inhaling and remember to wash mouth with water and spit it out after inhaling    Continue albuterol 2 inhalations every 4 hours as needed but if you require albuterol too often to control respiratory symptoms call the office for severe symptoms go to the emergency room    Always call for symptoms such as worsening shortness of breath    Submit sputum sample and call for results about 5 to 7 days later

## 2019-06-10 ENCOUNTER — OFFICE VISIT (OUTPATIENT)
Dept: FAMILY MEDICINE CLINIC | Age: 54
End: 2019-06-10

## 2019-06-10 ENCOUNTER — HOSPITAL ENCOUNTER (OUTPATIENT)
Dept: LAB | Age: 54
Discharge: HOME | End: 2019-06-10
Payer: MEDICAID

## 2019-06-10 VITALS
WEIGHT: 175.6 LBS | TEMPERATURE: 98.3 F | OXYGEN SATURATION: 98 % | DIASTOLIC BLOOD PRESSURE: 70 MMHG | HEIGHT: 62 IN | HEART RATE: 77 BPM | SYSTOLIC BLOOD PRESSURE: 108 MMHG | RESPIRATION RATE: 16 BRPM | BODY MASS INDEX: 32.31 KG/M2

## 2019-06-10 DIAGNOSIS — M25.471 EDEMA OF BOTH ANKLES: ICD-10-CM

## 2019-06-10 DIAGNOSIS — E78.1 HYPERTRIGLYCERIDEMIA: ICD-10-CM

## 2019-06-10 DIAGNOSIS — E11.65 TYPE 2 DIABETES MELLITUS WITH HYPERGLYCEMIA, WITH LONG-TERM CURRENT USE OF INSULIN (HCC): ICD-10-CM

## 2019-06-10 DIAGNOSIS — R94.4 DECREASED GFR: ICD-10-CM

## 2019-06-10 DIAGNOSIS — G62.9 NEUROPATHY: ICD-10-CM

## 2019-06-10 DIAGNOSIS — E78.5 DYSLIPIDEMIA: ICD-10-CM

## 2019-06-10 DIAGNOSIS — Z79.4 TYPE 2 DIABETES MELLITUS WITH HYPERGLYCEMIA, WITH LONG-TERM CURRENT USE OF INSULIN (HCC): Primary | ICD-10-CM

## 2019-06-10 DIAGNOSIS — E11.65 TYPE 2 DIABETES MELLITUS WITH HYPERGLYCEMIA, WITH LONG-TERM CURRENT USE OF INSULIN (HCC): Primary | ICD-10-CM

## 2019-06-10 DIAGNOSIS — R79.89 ELEVATED SERUM CREATININE: ICD-10-CM

## 2019-06-10 DIAGNOSIS — Z79.4 TYPE 2 DIABETES MELLITUS WITH HYPERGLYCEMIA, WITH LONG-TERM CURRENT USE OF INSULIN (HCC): ICD-10-CM

## 2019-06-10 DIAGNOSIS — M25.472 EDEMA OF BOTH ANKLES: ICD-10-CM

## 2019-06-10 PROBLEM — E11.40 TYPE 2 DIABETES MELLITUS WITH DIABETIC NEUROPATHY (HCC): Status: ACTIVE | Noted: 2019-06-10

## 2019-06-10 PROBLEM — E11.3599 TYPE 2 DIABETES MELLITUS WITH PROLIFERATIVE RETINOPATHY (HCC): Status: ACTIVE | Noted: 2019-06-10

## 2019-06-10 LAB
ALBUMIN SERPL-MCNC: 3.5 G/DL (ref 3.4–5)
ALBUMIN/GLOB SERPL: 1.1 {RATIO} (ref 0.8–1.7)
ALP SERPL-CCNC: 72 U/L (ref 45–117)
ALT SERPL-CCNC: 20 U/L (ref 13–56)
ANION GAP SERPL CALC-SCNC: 6 MMOL/L (ref 3–18)
AST SERPL-CCNC: 14 U/L (ref 15–37)
BILIRUB SERPL-MCNC: 0.3 MG/DL (ref 0.2–1)
BUN SERPL-MCNC: 35 MG/DL (ref 7–18)
BUN/CREAT SERPL: 18 (ref 12–20)
CALCIUM SERPL-MCNC: 8.5 MG/DL (ref 8.5–10.1)
CHLORIDE SERPL-SCNC: 106 MMOL/L (ref 100–108)
CHOLEST SERPL-MCNC: 206 MG/DL
CO2 SERPL-SCNC: 25 MMOL/L (ref 21–32)
CREAT SERPL-MCNC: 1.95 MG/DL (ref 0.6–1.3)
EST. AVERAGE GLUCOSE BLD GHB EST-MCNC: 151 MG/DL
GLOBULIN SER CALC-MCNC: 3.3 G/DL (ref 2–4)
GLUCOSE SERPL-MCNC: 123 MG/DL (ref 74–99)
HBA1C MFR BLD: 6.9 % (ref 4.2–5.6)
HDLC SERPL-MCNC: 46 MG/DL (ref 40–60)
HDLC SERPL: 4.5 {RATIO} (ref 0–5)
LDLC SERPL CALC-MCNC: 112.4 MG/DL (ref 0–100)
LIPID PROFILE,FLP: ABNORMAL
POTASSIUM SERPL-SCNC: 5 MMOL/L (ref 3.5–5.5)
PROT SERPL-MCNC: 6.8 G/DL (ref 6.4–8.2)
SODIUM SERPL-SCNC: 137 MMOL/L (ref 136–145)
TRIGL SERPL-MCNC: 238 MG/DL (ref ?–150)
VLDLC SERPL CALC-MCNC: 47.6 MG/DL

## 2019-06-10 PROCEDURE — 83036 HEMOGLOBIN GLYCOSYLATED A1C: CPT

## 2019-06-10 PROCEDURE — 80053 COMPREHEN METABOLIC PANEL: CPT

## 2019-06-10 PROCEDURE — 80061 LIPID PANEL: CPT

## 2019-06-10 RX ORDER — FLUOXETINE HYDROCHLORIDE 20 MG/1
20 CAPSULE ORAL DAILY
Qty: 30 CAP | Refills: 3 | Status: SHIPPED | OUTPATIENT
Start: 2019-06-10 | End: 2019-08-27 | Stop reason: SDUPTHER

## 2019-06-10 RX ORDER — PRAVASTATIN SODIUM 40 MG/1
40 TABLET ORAL DAILY
Qty: 30 TAB | Refills: 3 | Status: SHIPPED | OUTPATIENT
Start: 2019-06-10 | End: 2019-08-27 | Stop reason: SDUPTHER

## 2019-06-10 RX ORDER — LISINOPRIL 5 MG/1
5 TABLET ORAL DAILY
Qty: 30 TAB | Refills: 3 | Status: SHIPPED | OUTPATIENT
Start: 2019-06-10 | End: 2019-08-27 | Stop reason: SDUPTHER

## 2019-06-10 RX ORDER — HYDROCHLOROTHIAZIDE 25 MG/1
25 TABLET ORAL DAILY
Qty: 30 TAB | Refills: 3 | Status: SHIPPED | OUTPATIENT
Start: 2019-06-10 | End: 2019-08-27 | Stop reason: SDUPTHER

## 2019-06-10 RX ORDER — ALBUTEROL SULFATE 90 UG/1
2 AEROSOL, METERED RESPIRATORY (INHALATION)
COMMUNITY
End: 2021-03-24 | Stop reason: SDUPTHER

## 2019-06-10 RX ORDER — FLUTICASONE PROPIONATE AND SALMETEROL 500; 50 UG/1; UG/1
1 POWDER RESPIRATORY (INHALATION) EVERY 12 HOURS
COMMUNITY
End: 2021-03-24 | Stop reason: ALTCHOICE

## 2019-06-10 NOTE — PROGRESS NOTES
[address]  [facility], [address] [facility]  [phone] office/[phone] fax      6/10/2019    Reason for visit:   Chief Complaint   Patient presents with    Follow Up Chronic Condition    Foot Pain       Patient: Titus Adam, 1965, [id number]       Primary MD: Serenity Harrison NP    Subjective:   Titus Adam, a 53 y.o. female, who presents for Follow Up Chronic Condition and Foot Pain      Past Medical History:   Diagnosis Date    Bilateral great toe fractures 2014    Chronic sinusitis     Dr. Bibiana Schreiber in the past    Colon polyp 5/16    Dr Jak Carrasco    Decreased GFR 8/28/2018    Depression 2019    Diabetes mellitus ([address]     DJD (degenerative joint disease) of cervical spine 2016    DJD (degenerative joint disease), lumbar 2013    MRI c spine w degen changes; Dr Mynor Osorio    Dyslipidemia     calculated 10 year risk score was 2.0% (12/13)    Edema of both ankles 8/28/2018    Elevated serum creatinine 8/28/2018    Environmental and seasonal allergies 8/28/2018    Eustachian tube dysfunction     FHx: colon cancer     FHx: heart disease     Fibrocystic breast     Dr Julian Cordero GERD (gastroesophageal reflux disease)     Hypertriglyceridemia 8/28/2018    Lateral epicondylitis of both elbows 2/6/2017    Macular degeneration of both eyes 8/28/2018    Mild intermittent asthma without complication 87/47/7890    Dr. Yoan Lau;  ratio 68%, FEV1 78% w 6% inc postbd, TLC 77, RV 56, DLCO 61%    Morbid obesity (HCC)     peak weight 196 lbs, bmi 35.8 from 10/13    Neuropathy 8/28/2018    Osteopenia     Dr. Bass Numbers; DEXA t score -0.7 spine, -0.2 hip (8/14)    Sarcoidosis     Dr Delena Fothergill 3 chronic kidney disease ([address]     Type 2 diabetes mellitus with hyperglycemia, with long-term current use of insulin ([address] 8/28/2018       Past Surgical History:   Procedure Laterality Date    CARDIAC SURG PROCEDURE UNLIST  9/10, 8/13    thallium negative ef 72%; negative ef 70%    CHEST SURGERY PROCEDURE UNLISTED  7/12    US thyroid negative    CHEST SURGERY PROCEDURE UNLISTED  2012    pfts w mod restrictive defect     COLONOSCOPY N/A 5/26/2016    Dr Ann Díaz HX HEENT      nasal polypectomy Dr. Corona Engel HX HEENT      tear duct surgery right Dr. Nga Canela 2010; left Dr Priscila Wilder 2015    HX ORTHOPAEDIC      DEXA -0.7 spine, -0.2 hip 8/14    VASCULAR SURGERY PROCEDURE UNLIST  12/13    venous doppler negative       Social History     Socioeconomic History    Marital status: LEGALLY      Spouse name: Not on file    Number of children: 0    Years of education: 13    Highest education level: Not on file   Occupational History    Occupation: Unemployed   Social Needs    Financial resource strain: Not on file    Food insecurity:     Worry: Not on file     Inability: Not on file   Renrenmoney needs:     Medical: Not on file     Non-medical: Not on file   Tobacco Use    Smoking status: Never Smoker    Smokeless tobacco: Never Used   Substance and Sexual Activity    Alcohol use:  Yes     Alcohol/week: 0.0 oz     Comment: occasional wine    Drug use: No    Sexual activity: Not Currently   Lifestyle    Physical activity:     Days per week: Not on file     Minutes per session: Not on file    Stress: Not on file   Relationships    Social connections:     Talks on phone: Not on file     Gets together: Not on file     Attends Latter day service: Not on file     Active member of club or organization: Not on file     Attends meetings of clubs or organizations: Not on file     Relationship status: Not on file    Intimate partner violence:     Fear of current or ex partner: Not on file     Emotionally abused: Not on file     Physically abused: Not on file     Forced sexual activity: Not on file   Other Topics Concern     Service No    Blood Transfusions No    Caffeine Concern Yes     Comment: due to reflux    Occupational Exposure No    Hobby Hazards No    Sleep Concern Yes    Stress Concern Yes     Comment: wants a job    Weight Concern Yes    Special Diet No    Back Care No    Exercise Yes    Bike Helmet No    Seat Belt Yes    Self-Exams Yes   Social History Narrative    Patient lives with a friend, no pets. Allergies   Allergen Reactions    Pollen Extracts Runny Nose and Cough    Amoxicillin Hives, Shortness of Breath and Swelling    Crestor [Rosuvastatin] Myalgia    Ibuprofen Other (comments)     dont prescribe due to kidney function    Lipitor [Atorvastatin] Other (comments)     Muscle cramps and weakness      Pcn [Penicillins] Angioedema       Current Outpatient Medications on File Prior to Visit   Medication Sig Dispense Refill    albuterol (PROVENTIL HFA, VENTOLIN HFA, PROAIR HFA) 90 mcg/actuation inhaler Take  by inhalation.  fluticasone propion-salmeterol (ADVAIR DISKUS) 500-50 mcg/dose diskus inhaler Take 1 Puff by inhalation every twelve (12) hours.  esomeprazole (NEXIUM) 20 mg capsule Take 20 mg by mouth daily.  pregabalin (LYRICA) 50 mg capsule Take 1 Cap by mouth two (2) times a day. Max Daily Amount: 100 mg. For neuropathy 60 Cap 3    lidocaine (LIDODERM) 5 % Apply patch to the affected area for 12 hours a day and remove for 12 hours a day. 15 Each 0    predniSONE (DELTASONE) 1 mg tablet Take 3 Tabs by mouth daily (with breakfast). 90 Tab 11    predniSONE (DELTASONE) 5 mg tablet Take 1 Tab by mouth daily (with breakfast). 30 Tab 11    insulin glargine (LANTUS,BASAGLAR) 100 unit/mL (3 mL) inpn 25 Units by SubCUTAneous route nightly. 3 Pen 3    mometasone (NASONEX) 50 mcg/actuation nasal spray 2 Sprays by Both Nostrils route daily as needed. For allergies 3 Container 3    aspirin delayed-release 81 mg tablet Take 81 mg by mouth daily.  CETIRIZINE HCL (ZYRTEC PO) Take 1 Tab by mouth daily.       Insulin Needles, Disposable, 30 gauge x 1/3\" Use 1-2 times daily. Qty 100 1 Package 11    glucose blood VI test strips (ACCU-CHEK POOJA) strip Pt to test 5 times daily. Dx:E11.65 500 Strip 3     No current facility-administered medications on file prior to visit. Review of Systems   Constitutional: Negative. HENT: Negative. Eyes: Negative. Respiratory: Negative. Cardiovascular: Negative. Gastrointestinal: Negative. Genitourinary: Negative. Musculoskeletal: Positive for myalgias. My hands have been locking up during the last month. They feel like muscle spasms. Skin: Negative. Neurological: Positive for tingling. My neuropathy has gotten worse over the last month. I have not been walking much. My feet have been swollen off and on throughout the day. I take Lyrica. The pain has been 6/10 on the pain scale. Endo/Heme/Allergies:        BS in   BS in pm 220-290. Lantus 25 units at night. Psychiatric/Behavioral: Negative. Sister passed March 30th. Objective:   Visit Vitals  /70   Pulse 77   Temp 98.3 °F (36.8 °C)   Resp 16   Ht 5' 2\" (1.575 m)   Wt 175 lb 9.6 oz (79.7 kg)   LMP 03/30/2013   SpO2 98%   BMI 32.12 kg/m²      Wt Readings from Last 3 Encounters:   06/10/19 175 lb 9.6 oz (79.7 kg)   06/04/19 173 lb (78.5 kg)   05/14/19 178 lb (80.7 kg)     Lab Results   Component Value Date/Time    Glucose 123 (H) 06/10/2019 11:28 AM    Glucose (POC) 155 (H) 06/16/2018 04:47 PM    Glucose, POC 89 05/26/2016 10:49 AM         Physical Exam   Constitutional: She is oriented to person, place, and time. She appears well-developed and well-nourished. HENT:   Head: Normocephalic and atraumatic. Neck: Normal range of motion. Neck supple. No tracheal deviation present. Cardiovascular: Normal rate, regular rhythm and normal heart sounds. Pulmonary/Chest: Breath sounds normal. She has no wheezes. She has no rales. Musculoskeletal:   Normal gait.    Neurological: She is alert and oriented to person, place, and time. Skin: Skin is warm and dry. Psychiatric: She has a normal mood and affect. Her behavior is normal. Judgment and thought content normal.         Assessment:    Yisel Joseph who has risk factors including (see above previous medical hx) and:     1. Type 2 diabetes mellitus with hyperglycemia, with long-term current use of insulin (HCC)    - lisinopril (PRINIVIL, ZESTRIL) 5 mg tablet; Take 1 Tab by mouth daily. For kidney protection  Dispense: 30 Tab; Refill: 3    2. Neuropathy  Pt to cont lyrica. Pts insurance will not cover this medication. Once pt completes her lyrica, will consider prescribing gabapetin or cymbalta to assist with neuropathy pains. 3. Edema of both ankles  Reorder. Despite no edema being noted today, will continue current therapy as this has kept her swelling at bay. - hydroCHLOROthiazide (HYDRODIURIL) 25 mg tablet; Take 1 Tab by mouth daily. For swelling  Dispense: 30 Tab; Refill: 3    4. Dyslipidemia    - pravastatin (PRAVACHOL) 40 mg tablet; Take 1 Tab by mouth daily. For cholesterol  Dispense: 30 Tab; Refill: 3    5. Hypertriglyceridemia      6. Elevated serum creatinine      7. Decreased GFR  Cont to see nephrology    Written instructions followed our verbal discussion of all information discussed above, pending tests ordered and future goals/plans. Patient expressed understanding of current diagnosis, planned testing, follow up and if needed to contact the office for any questions or concerns prior to the next visit. Plan:   Med reconciliation completed with patient. Reviewed side effects of medications with the patient. Questions were answered and patient verb understanding. Discussed lab results with patient  Pt did not get labs drawn prior to appt-Lipid, CMP, A1c. Will obtain today.      Orders Placed This Encounter    albuterol (PROVENTIL HFA, VENTOLIN HFA, PROAIR HFA) 90 mcg/actuation inhaler     Sig: Take  by inhalation.  fluticasone propion-salmeterol (ADVAIR DISKUS) 500-50 mcg/dose diskus inhaler     Sig: Take 1 Puff by inhalation every twelve (12) hours.  hydroCHLOROthiazide (HYDRODIURIL) 25 mg tablet     Sig: Take 1 Tab by mouth daily. For swelling     Dispense:  30 Tab     Refill:  3    FLUoxetine (PROZAC) 20 mg capsule     Sig: Take 1 Cap by mouth daily. For anxiety/depression     Dispense:  30 Cap     Refill:  3    lisinopril (PRINIVIL, ZESTRIL) 5 mg tablet     Sig: Take 1 Tab by mouth daily. For kidney protection     Dispense:  30 Tab     Refill:  3    pravastatin (PRAVACHOL) 40 mg tablet     Sig: Take 1 Tab by mouth daily. For cholesterol     Dispense:  30 Tab     Refill:  3     Current Outpatient Medications   Medication Sig Dispense Refill    albuterol (PROVENTIL HFA, VENTOLIN HFA, PROAIR HFA) 90 mcg/actuation inhaler Take  by inhalation.  fluticasone propion-salmeterol (ADVAIR DISKUS) 500-50 mcg/dose diskus inhaler Take 1 Puff by inhalation every twelve (12) hours.  hydroCHLOROthiazide (HYDRODIURIL) 25 mg tablet Take 1 Tab by mouth daily. For swelling 30 Tab 3    FLUoxetine (PROZAC) 20 mg capsule Take 1 Cap by mouth daily. For anxiety/depression 30 Cap 3    lisinopril (PRINIVIL, ZESTRIL) 5 mg tablet Take 1 Tab by mouth daily. For kidney protection 30 Tab 3    pravastatin (PRAVACHOL) 40 mg tablet Take 1 Tab by mouth daily. For cholesterol 30 Tab 3    esomeprazole (NEXIUM) 20 mg capsule Take 20 mg by mouth daily.  pregabalin (LYRICA) 50 mg capsule Take 1 Cap by mouth two (2) times a day. Max Daily Amount: 100 mg. For neuropathy 60 Cap 3    lidocaine (LIDODERM) 5 % Apply patch to the affected area for 12 hours a day and remove for 12 hours a day. 15 Each 0    predniSONE (DELTASONE) 1 mg tablet Take 3 Tabs by mouth daily (with breakfast). 90 Tab 11    predniSONE (DELTASONE) 5 mg tablet Take 1 Tab by mouth daily (with breakfast).  30 Tab 11    insulin glargine (LANTUS,BASAGLAR) 100 unit/mL (3 mL) inpn 25 Units by SubCUTAneous route nightly. 3 Pen 3    mometasone (NASONEX) 50 mcg/actuation nasal spray 2 Sprays by Both Nostrils route daily as needed. For allergies 3 Container 3    aspirin delayed-release 81 mg tablet Take 81 mg by mouth daily.  CETIRIZINE HCL (ZYRTEC PO) Take 1 Tab by mouth daily.  Insulin Needles, Disposable, 30 gauge x 1/3\" Use 1-2 times daily. Qty 100 1 Package 11    glucose blood VI test strips (ACCU-CHEK POOJA) strip Pt to test 5 times daily. Dx:E11.65 500 Strip 3       Follow-up and Dispositions    · Return in about 3 months (around 9/10/2019) for Cholesterol, Diabetes. No labs needed for 3 month follow-up appt  A1c and lipid 6 months    Carlee Khna, DNP, AGNP-C  [address] [facility]      I spent 25 minutes with the patient in face-to-face consultation, of which greater than 50% was spent in counseling and coordination of care as described above.

## 2019-06-10 NOTE — PROGRESS NOTES
A1c 7.5 to 6.9   to 238;  to 112  Creatinine 1.75 to 1.95, GFR continues to decline 37 to 32.  Pt to cont to follow nephrology

## 2019-06-10 NOTE — Clinical Note
6/11/19 After reviewing Dr Penny Crow, April note it appears pt was to reschedule her follow up in 2 months. Please contact patient and confirm she has made this appt. Thank youEmmady, please call patient with above.

## 2019-06-10 NOTE — PROGRESS NOTES
Karla, Pt was referred to nephro some time back. Pt states she has been to see them but I dont have any notes from the visits. Will you please call neph and obtain those noted. Thank you      Lara Knows, please call pt with the followin) A1c 7.5 to 6.9. Continue Lantus 25 units in pm.  2)  to 238;  to 112. Goal TG <150, LDL <70. Instruct pt to pt continue pravachol 40mg and work on diet. Trying to avoid increasing dose due to pts hx of myalgias when taking statins. Pt seems to tolerate this med at this dose. 3) Creatinine 1.75 to 1.95, GFR continues to decline 37 to 32. Pt to cont to follow nephrology.

## 2019-06-10 NOTE — PATIENT INSTRUCTIONS
The [facility] reminders! Foundation Operating Hours: These may change without notice. Mon- Wed 7am to 5pm. Closed for lunch 12-1pm 
Thurs 7am to 12pm 
Fridays closed Office number:  Have Insurance? ? You can still come to the [facility]. You will not have to find a new provider!! 
 
If you have medical insurance: 
 
1) You will need to have all labs drawn at [facility] 1-2 weeks prior to your follow up appointment with your provider. Enter the hospital through the main entrance, walk around the circular desk and sign in. They will call your name when it is time to have your labs drawn. 2) No longer be able to obtain medications through our [facility] program. All medications will be sent to an outside pharmacy of your choice. **In case of inclement weather (snow and/or ice) please do not try to come into the office for your appointment. Please call in and you will not be counted as a \"No Show. \" SAFETY FIRST!!** 
NO SHOW POLICY ~ If a patient has 3 no shows for an appointment with their Provider, Mental Health Provider, or the Nurse Navigator, they will be discharged from the practice for 6 months. Medication ordering will also be suspended. If the patient is discharged from the [facility], they can go to the [place] [address] or [address] [place] [address] where they can be seen for their primary care needs plus obtain the same type of medications as they received at the [facility]. To avoid being discharged the patient must call the office at [phone] 24 hours prior to their appointment if they need to cancel or reschedule, arrive to their appointments on time (preferrably 15 minutes early) (If you are late you will not be seen) and come to all scheduled appointments with the provider, mental health, and/or nurse navigator. If the patient is discharged from the practice, they can apply to be re-established after 6 months. Lab work: Unless you are instructed differently, please return to the office between the hours of 7 am and 11:00 am Monday through Thursday to have your labs drawn one to two weeks before your next scheduled PROVIDER appointment. If you do not have an appointment to follow up on these results, please make one or plan to call the office if you do not hear from us to get the results. No news does not mean good news. Medications:  
Medication  times are firm: 
Mondays and Tuesdays from 1pm-5pm 
Wednesdays and Thursdays from 8am-11:30am 
These hours are subject to change without notice. The [facility] or [facility] will assist you with your medications when available as not all medications can be obtained through this program. Medications are added and deleted from the [employer] [address] as deemed necessary by the pharmaceutical companies. There is a chance that a medication you currently receive from [facility] may be discontinued. If this occurs an alternative medication will be sent to a local pharmacy for you to obtain and pay for. If your medications are new or have changed, and you get your medications from the [facility] [address] [place] [address]), you MUST talk to the pharmacy staff to sign the new prescription applications. If you don't sign the applications we cannot get the medications for you. It usually takes 6-8 weeks for your medications to arrive. The Pharmacy staff will call you when your medications are available. If you don't hear from them you call [phone] and inquire about your medicines. You are responsible for obtaining your medications. You will have 30 days to come in and  your medications. If you don't  your medicines within those 30 days, those medicines may be placed on the self as samples and you will have to start all over again by completing the applications and waiting the 6-8 weeks for your medicines to arrive. Foot Care: [facility] through [facility] ([address]) Every second Tuesday of the month (except for holidays and election days) from 9am to 1 pm. The services provided by these ministry volunteers are free of charge with the option to donate. They will inspect your feet thoroughly, soak them for 10 minutes, cut and file your nails. They care for diabetics as well. Keep in mind this service is free and will be on a first come first serve basis. Bad teeth? Ask about the Dental Clinic to get you in front of a local dentist when a dentist is available. They only extract teeth. No fillings, root canals, or dentures. The bus leaves the second Thursday of the month for those on the list. (Ask about availability as these appointments are limited). If you are scheduled for the dentist and you fail to come into the Foundation to meet the bus, you WILL NOT be placed on the dental list again. IMPORTANT: if you have high blood pressure please take your blood pressure  medications before arriving to the Foundation. If your blood pressure is elevated  the dental clinic WILL NOT extract any teeth and you will not have another  opportunity to go to the clinic. DIABETES:  
Do you have uncontrolled diabetes or you just want to learn more about your diabetes? Schedule with the Nurse navigator for our new 5-week Diabetes program. You will learn how to properly manage your diabetes: nutrition, exercise, medication therapy. Eye exams for Diabetics. Please let us know so we can add you to the list to see an eye doctor. These  appointments are limited. You will receive a free eye exam and free glasses if  needed. Unfortunately, if you are not a diabetic, we do not have a free service  for eye exams for you (yet!). We do have information on where to  go to get a  huge discount on eye exams and glasses. Sick visits: If you are sick and it is not an emergency call the office to schedule an appointment to see the provider. Care in the office is FREE but charges will be applied if you go to the Emergency room. If you feel better and do not wish to attend your sick appointment please call  the office and cancel to avoid a no-show. Charges and cost items from the Foundation: Most of our orders are covered by [employer] but there ARE SOME CHARGES for items such as radiology interpretations and anesthesiology during procedures and surgeries that are not covered under [employer]. [facility]  
Please make sure you have contacted [employer] to check on your payment options:  
1 [phone] Mon - Fri 8-4pm. It is important that you are screened in order to qualify for assistance and to avoid huge medical charges. This service is to benefit you. The [facility] is not responsible for ANY charges you may accrue regardless of who ordered the medication, procedure, treatment or test. If you go to the Emergency Room, you WILL be charged! Behavior and emotional issues! It is stressful to be sick, have an illness, take medications, not have a job, not have medical insurance, have family issues or just getting older! Schedule an appointment with our mental health provider. She is in the office Mondays and Wednesdays from 8am to [place] [address] can also contact the following: The national suicide hotline (6-494-590-CAOI or 7-991.426.1669) [address] 
[phone] [facility] [place] [address] [address] 
[phone] Drug and Alcohol Addiction Issues! It is hard to stop a poor habit but there is help out there. Please feel free to attend any of the following support groups to help you kick the habit or go to [facility] Emergency Department to be evaluated by the psychiatric team. Never give up!! [place] meetings: [facility] [place] MONDAY 10:30  [address] [place] [place] SATURDAY 8:00 PM [facility] SATURDAY NIGHT M[facility]  
 
 
TI BITS: 
Disposing medicines in household trash: Almost all medicines can be thrown into your household trash. These include prescription and over-the-counter (OTC) drugs in pills, liquids, drops, patches, creams, and inhalers. Follow these steps: 
1) Remove the drugs from their original containers and mix them with something undesirable, such as used coffee grounds, dirt, or cat litter. This makes the medicine less appealing to children and pets and unrecognizable to someone who might intentionally go through the trash looking for drugs. 2) Put the mixture in something you can close (a re-sealable zipper storage bag, empty can, or other container) to prevent the drug from leaking or spilling out. 3) Throw the container in the garbage. 4) Scratch out all your personal information on the empty medicine packaging to protect your identity and privacy. Throw the packaging away.  
 
If you have a question about your medicine, ask your health care provider or pharmacist.

## 2019-06-11 NOTE — PROGRESS NOTES
6/11/19 After reviewing Dr Ct Villalobos, April note it appears pt was to reschedule her follow up in 2 months. Please contact patient and confirm she has made this appt. Thank you    Vick Tang, please call patient with above. See scanned media for Dr Juanita Kim note.

## 2019-06-18 NOTE — PROGRESS NOTES
Pt saw Dr Lundy Bachelor today. Was told she had \"a lot of cysts in both kidneys. He doesn't think it is any cancer\".

## 2019-07-02 ENCOUNTER — TELEPHONE (OUTPATIENT)
Dept: FAMILY MEDICINE CLINIC | Age: 54
End: 2019-07-02

## 2019-07-02 DIAGNOSIS — E11.65 TYPE 2 DIABETES MELLITUS WITH HYPERGLYCEMIA, WITH LONG-TERM CURRENT USE OF INSULIN (HCC): Primary | ICD-10-CM

## 2019-07-02 DIAGNOSIS — Z79.4 TYPE 2 DIABETES MELLITUS WITH HYPERGLYCEMIA, WITH LONG-TERM CURRENT USE OF INSULIN (HCC): Primary | ICD-10-CM

## 2019-07-02 RX ORDER — METFORMIN HYDROCHLORIDE 1000 MG/1
1000 TABLET ORAL DAILY
Qty: 30 TAB | Refills: 3 | Status: SHIPPED | OUTPATIENT
Start: 2019-07-02 | End: 2019-08-27 | Stop reason: SDUPTHER

## 2019-07-02 NOTE — TELEPHONE ENCOUNTER
Called patient to let her know that her refill request was sent to [facility] and to please read the labels. Medications sent was Januvia and Metformin please take as directed and call the office if you have any questions. Left phone message.

## 2019-07-02 NOTE — TELEPHONE ENCOUNTER
Patient need refill on sugar medication says Yumiko Kyle not covered by her insurance and she only has a few left.

## 2019-07-02 NOTE — TELEPHONE ENCOUNTER
Ruiz Burgess, please notify patient Januvia and metformin have been sent to [facility]. Instruct pt to please read medication labels. Thank you    Type 2 diabetes mellitus with hyperglycemia, with long-term current use of insulin (HCC)  - metFORMIN (GLUCOPHAGE) 1,000 mg tablet; Take 1 Tab by mouth daily. For diabetes  Dispense: 30 Tab; Refill: 3  - SITagliptin (JANUVIA) 25 mg tablet; Take 1 Tab by mouth daily. For diabetes  Dispense: 30 Tab;  Refill: 3

## 2019-08-06 DIAGNOSIS — E11.65 TYPE 2 DIABETES MELLITUS WITH HYPERGLYCEMIA, WITH LONG-TERM CURRENT USE OF INSULIN (HCC): ICD-10-CM

## 2019-08-06 DIAGNOSIS — G62.9 NEUROPATHY: ICD-10-CM

## 2019-08-06 DIAGNOSIS — Z79.4 TYPE 2 DIABETES MELLITUS WITH HYPERGLYCEMIA, WITH LONG-TERM CURRENT USE OF INSULIN (HCC): ICD-10-CM

## 2019-08-06 RX ORDER — INSULIN GLARGINE 100 [IU]/ML
25 INJECTION, SOLUTION SUBCUTANEOUS
Qty: 5 PEN | Refills: 1 | Status: SHIPPED | OUTPATIENT
Start: 2019-08-06 | End: 2021-03-24 | Stop reason: SDUPTHER

## 2019-08-06 RX ORDER — PREGABALIN 50 MG/1
50 CAPSULE ORAL 2 TIMES DAILY
Qty: 60 CAP | Refills: 3 | OUTPATIENT
Start: 2019-08-06

## 2019-08-06 NOTE — TELEPHONE ENCOUNTER
Called patient to let her know that her refill request for the Lantus was sent to the pharmacy but the Lyrica is not covered by her insurance.   Gabapentin was sent to the pharmacy to be taken as directed

## 2019-08-06 NOTE — TELEPHONE ENCOUNTER
Requested Prescriptions     Signed Prescriptions Disp Refills    insulin glargine (LANTUS,BASAGLAR) 100 unit/mL (3 mL) inpn 5 Pen 1     Si Units by SubCUTAneous route nightly. Authorizing Provider: Joan ACOSTA     Refused Prescriptions Disp Refills    pregabalin (LYRICA) 50 mg capsule 60 Cap 3     Sig: Take 1 Cap by mouth two (2) times a day. Max Daily Amount: 100 mg. For neuropathy     Refused By: Zack Rocha     Reason for Refusal: Medication Discontinued     Merlin Harry, please call pt and make her aware the lyrica is not covered under [facility]. She tried cymbalta back in 2017, according to patient it was not effective. Will e-scribe gabapentin 100mg BID to replace lyrica for neuropathy pains if patient wishes to try this therapy. Can discuss more at follow up appt on 9/10/19 as well.

## 2019-08-16 ENCOUNTER — APPOINTMENT (OUTPATIENT)
Dept: GENERAL RADIOLOGY | Age: 54
End: 2019-08-16
Attending: EMERGENCY MEDICINE
Payer: MEDICAID

## 2019-08-16 ENCOUNTER — HOSPITAL ENCOUNTER (EMERGENCY)
Age: 54
Discharge: HOME | End: 2019-08-16
Attending: EMERGENCY MEDICINE
Payer: MEDICAID

## 2019-08-16 VITALS
DIASTOLIC BLOOD PRESSURE: 68 MMHG | RESPIRATION RATE: 20 BRPM | OXYGEN SATURATION: 97 % | HEART RATE: 79 BPM | SYSTOLIC BLOOD PRESSURE: 109 MMHG | BODY MASS INDEX: 31.28 KG/M2 | TEMPERATURE: 98.8 F | WEIGHT: 170 LBS | HEIGHT: 62 IN

## 2019-08-16 DIAGNOSIS — M79.10 MUSCULAR ACHES: ICD-10-CM

## 2019-08-16 DIAGNOSIS — T50.905A MEDICATION SIDE EFFECT, INITIAL ENCOUNTER: Primary | ICD-10-CM

## 2019-08-16 LAB
ALBUMIN SERPL-MCNC: 3.5 G/DL (ref 3.4–5)
ALBUMIN/GLOB SERPL: 0.9 {RATIO} (ref 0.8–1.7)
ALP SERPL-CCNC: 77 U/L (ref 45–117)
ALT SERPL-CCNC: 21 U/L (ref 13–56)
ANION GAP SERPL CALC-SCNC: 9 MMOL/L (ref 3–18)
APPEARANCE UR: CLEAR
AST SERPL-CCNC: 12 U/L (ref 10–38)
BASOPHILS # BLD: 0 K/UL (ref 0–0.1)
BASOPHILS NFR BLD: 0 % (ref 0–2)
BILIRUB SERPL-MCNC: 0.5 MG/DL (ref 0.2–1)
BILIRUB UR QL: NEGATIVE
BUN SERPL-MCNC: 30 MG/DL (ref 7–18)
BUN/CREAT SERPL: 18 (ref 12–20)
CALCIUM SERPL-MCNC: 9.5 MG/DL (ref 8.5–10.1)
CHLORIDE SERPL-SCNC: 106 MMOL/L (ref 100–111)
CO2 SERPL-SCNC: 26 MMOL/L (ref 21–32)
COLOR UR: YELLOW
CREAT SERPL-MCNC: 1.63 MG/DL (ref 0.6–1.3)
DIFFERENTIAL METHOD BLD: ABNORMAL
EOSINOPHIL # BLD: 0.1 K/UL (ref 0–0.4)
EOSINOPHIL NFR BLD: 3 % (ref 0–5)
ERYTHROCYTE [DISTWIDTH] IN BLOOD BY AUTOMATED COUNT: 12.7 % (ref 11.6–14.5)
GLOBULIN SER CALC-MCNC: 3.9 G/DL (ref 2–4)
GLUCOSE SERPL-MCNC: 131 MG/DL (ref 74–99)
GLUCOSE UR STRIP.AUTO-MCNC: NEGATIVE MG/DL
HCT VFR BLD AUTO: 36.5 % (ref 35–45)
HGB BLD-MCNC: 11.8 G/DL (ref 12–16)
HGB UR QL STRIP: NEGATIVE
KETONES UR QL STRIP.AUTO: ABNORMAL MG/DL
LEUKOCYTE ESTERASE UR QL STRIP.AUTO: NEGATIVE
LYMPHOCYTES # BLD: 0.8 K/UL (ref 0.9–3.6)
LYMPHOCYTES NFR BLD: 16 % (ref 21–52)
MCH RBC QN AUTO: 30.6 PG (ref 24–34)
MCHC RBC AUTO-ENTMCNC: 32.3 G/DL (ref 31–37)
MCV RBC AUTO: 94.8 FL (ref 74–97)
MONOCYTES # BLD: 0.5 K/UL (ref 0.05–1.2)
MONOCYTES NFR BLD: 10 % (ref 3–10)
NEUTS SEG # BLD: 3.7 K/UL (ref 1.8–8)
NEUTS SEG NFR BLD: 71 % (ref 40–73)
NITRITE UR QL STRIP.AUTO: NEGATIVE
PH UR STRIP: 5 [PH] (ref 5–8)
PLATELET # BLD AUTO: 170 K/UL (ref 135–420)
PMV BLD AUTO: 11.1 FL (ref 9.2–11.8)
POTASSIUM SERPL-SCNC: 5 MMOL/L (ref 3.5–5.5)
PROT SERPL-MCNC: 7.4 G/DL (ref 6.4–8.2)
PROT UR STRIP-MCNC: NEGATIVE MG/DL
RBC # BLD AUTO: 3.85 M/UL (ref 4.2–5.3)
SODIUM SERPL-SCNC: 141 MMOL/L (ref 136–145)
SP GR UR REFRACTOMETRY: 1.02 (ref 1–1.03)
UROBILINOGEN UR QL STRIP.AUTO: 0.2 EU/DL (ref 0.2–1)
WBC # BLD AUTO: 5.2 K/UL (ref 4.6–13.2)

## 2019-08-16 PROCEDURE — 73090 X-RAY EXAM OF FOREARM: CPT

## 2019-08-16 PROCEDURE — 85025 COMPLETE CBC W/AUTO DIFF WBC: CPT

## 2019-08-16 PROCEDURE — 99284 EMERGENCY DEPT VISIT MOD MDM: CPT

## 2019-08-16 PROCEDURE — 74011250637 HC RX REV CODE- 250/637: Performed by: EMERGENCY MEDICINE

## 2019-08-16 PROCEDURE — 80053 COMPREHEN METABOLIC PANEL: CPT

## 2019-08-16 PROCEDURE — 81003 URINALYSIS AUTO W/O SCOPE: CPT

## 2019-08-16 PROCEDURE — 93005 ELECTROCARDIOGRAM TRACING: CPT

## 2019-08-16 RX ORDER — ONDANSETRON 4 MG/1
4 TABLET, ORALLY DISINTEGRATING ORAL
Status: COMPLETED | OUTPATIENT
Start: 2019-08-16 | End: 2019-08-16

## 2019-08-16 RX ORDER — ONDANSETRON 4 MG/1
4 TABLET, FILM COATED ORAL
Qty: 8 TAB | Refills: 0 | Status: SHIPPED | OUTPATIENT
Start: 2019-08-16 | End: 2021-03-24

## 2019-08-16 RX ORDER — ONDANSETRON 2 MG/ML
4 INJECTION INTRAMUSCULAR; INTRAVENOUS
Status: DISCONTINUED | OUTPATIENT
Start: 2019-08-16 | End: 2019-08-16

## 2019-08-16 RX ADMIN — ONDANSETRON 4 MG: 4 TABLET, ORALLY DISINTEGRATING ORAL at 20:42

## 2019-08-16 NOTE — ED TRIAGE NOTES
Pt reports right lower arm pain and swelling for past several days. Last night took Norco she was prescribed when her teeth were removed 2 weeks ago. States she has an IV in that arm when her teeth were removed in end of July.

## 2019-08-17 LAB
ATRIAL RATE: 66 BPM
CALCULATED P AXIS, ECG09: 33 DEGREES
CALCULATED R AXIS, ECG10: 13 DEGREES
CALCULATED T AXIS, ECG11: 34 DEGREES
DIAGNOSIS, 93000: NORMAL
P-R INTERVAL, ECG05: 154 MS
Q-T INTERVAL, ECG07: 414 MS
QRS DURATION, ECG06: 62 MS
QTC CALCULATION (BEZET), ECG08: 434 MS
VENTRICULAR RATE, ECG03: 66 BPM

## 2019-08-17 NOTE — DISCHARGE INSTRUCTIONS
Patient Education     Side Effects of Medicine: Care Instructions  Your Care Instructions  Medicines are a big part of treatment for many health problems. But all medicines have side effects. Often these are mild problems. They might include a dry mouth or upset stomach. But sometimes medicines can cause dangerous side effects. One example is a bad allergic reaction. The best treatment will depend on what side effects you have. If you have a serious side effect, you may need to stop taking the medicine. You may also need to take another medicine to treat the side effect. If you have a mild side effect, it may go away after you take the medicine for a while. The doctor has checked you carefully, but problems can develop later. If you notice any problems or new symptoms, get medical treatment right away. Follow-up care is a key part of your treatment and safety. Be sure to make and go to all appointments, and call your doctor if you are having problems. It's also a good idea to know your test results and keep a list of the medicines you take. How can you care for yourself at home? · Be safe with medicines. Take your medicines exactly as prescribed. Call your doctor if you think you are having a problem with your medicine. · Call your doctor if side effects bother you and you wonder if you should keep taking a medicine. Your doctor may be able to lower your dose or change your medicine. Do not suddenly quit taking your medicine unless a doctor tells you to. · Make sure your doctor has a list of all the medicines, vitamins, supplements, and herbal remedies you take. Ask about side effects. When should you call for help? Call 911 anytime you think you may need emergency care. For example, call if:  · You have symptoms of a severe allergic reaction. These may include:  ¨ Sudden raised, red areas (hives) all over your body. ¨ Swelling of the throat, mouth, lips, or tongue. ¨ Trouble breathing.   ¨ Passing out (losing consciousness). Or you may feel very lightheaded or suddenly feel weak, confused, or restless. Call your doctor now or seek immediate medical care if:  · You have symptoms of an allergic reaction, such as:  ¨ A rash or hives (raised, red areas on the skin). ¨ Itching. ¨ Swelling. ¨ Belly pain, nausea, or vomiting. Watch closely for changes in your health, and be sure to contact your doctor if:  · You think you are having a new problem with your medicine. · You do not get better as expected. Where can you learn more? Go to [employer]  Enter D152 in the search box to learn more about \"Side Effects of Medicine: Care Instructions. \"   © 3357-7699 [employer]. Care instructions adapted under license by [facility] (which disclaims liability or warranty for this information). This care instruction is for use with your licensed healthcare professional. If you have questions about a medical condition or this instruction, always ask your healthcare professional. [place] [address] any warranty or liability for your use of this information.   Content Version: 57.0.567674; Current as of: November 20, 2015

## 2019-08-17 NOTE — ED NOTES
9:59 PM  08/16/19     Discharge instructions given to [facility] (name) with verbalization of understanding. Patient accompanied by self. Patient discharged with the following prescriptions none. Patient discharged to home (destination).       Anthony Dave

## 2019-08-17 NOTE — ED PROVIDER NOTES
HPI Patient is a 54 yo female present to ER for nausea, vomiting and malaise. She states she had right sided arm pain and took an old Norco analgesia pill. She developed nausea and vomiting. She came to ER immediate afterward.     Past Medical History:   Diagnosis Date    Asthma     Bilateral great toe fractures 2014    Chronic sinusitis     Dr. Anam Iraheta in the past    Colon polyp 5/16    Dr Mao Mcnulty    Depression 2019    DJD (degenerative joint disease) of cervical spine 2016    DJD (degenerative joint disease), lumbar 2013    MRI c spine w degen changes; Dr Lorri Myers    Dyslipidemia     calculated 10 year risk score was 2.0% (12/13)    Edema of both ankles 8/28/2018    Environmental and seasonal allergies 8/28/2018    Eustachian tube dysfunction     Fibrocystic breast     Dr Adriano Long GERD (gastroesophageal reflux disease)     Hypertriglyceridemia 8/28/2018    Lateral epicondylitis of both elbows 2/6/2017    Macular degeneration of both eyes 8/28/2018    Mild intermittent asthma without complication 87/54/4835    Dr. Reid Cantrell;  ratio 68%, FEV1 78% w 6% inc postbd, TLC 77, RV 56, DLCO 61%    Morbid obesity (HCC)     peak weight 196 lbs, bmi 35.8 from 10/13    Neuropathy 8/28/2018    Osteopenia     Dr. Brenda Davidson; DEXA t score -0.7 spine, -0.2 hip (8/14)    Sarcoidosis     Dr Kobe Ontiveros 4 chronic kidney disease ([address] 04/2019    Dr Joselo Mc, nephro    Type 2 diabetes mellitus with hyperglycemia, with long-term current use of insulin ([address] 8/28/2018       Past Surgical History:   Procedure Laterality Date    CARDIAC SURG PROCEDURE UNLIST  9/10, 8/13    thallium negative ef 72%; negative ef 70%    CHEST SURGERY PROCEDURE UNLISTED  7/12     thyroid negative    CHEST SURGERY PROCEDURE UNLISTED  2012    pfts w mod restrictive defect     COLONOSCOPY N/A 5/26/2016    Dr Mao Mcnulty hyperplastic    Shiloyle Cue      Dr. Angi Mendoza HX HEENT      nasal polypectomy Dr. Sharrie Severance HEENT      tear duct surgery right Dr. Sujey Aguilar 2010; left Dr Chrissy Mireles 2015    HX ORTHOPAEDIC      DEXA -0.7 spine, -0.2 hip 8/14    VASCULAR SURGERY PROCEDURE UNLIST  12/13    venous doppler negative         Family History:   Problem Relation Age of Onset    Cancer Mother     Hypertension Mother     Cancer Father     Diabetes Father     Hypertension Father     Stroke Father     Diabetes Sister     Hypertension Sister     Heart Disease Sister     Colon Cancer Sister 52    Hypertension Brother     Cancer Maternal Aunt        Social History     Socioeconomic History    Marital status: LEGALLY      Spouse name: Not on file    Number of children: 0    Years of education: 13    Highest education level: Not on file   Occupational History    Occupation: Unemployed   Social Needs    Financial resource strain: Not on file    Food insecurity:     Worry: Not on file     Inability: Not on file   amBX needs:     Medical: Not on file     Non-medical: Not on file   Tobacco Use    Smoking status: Never Smoker    Smokeless tobacco: Never Used   Substance and Sexual Activity    Alcohol use:  Yes     Alcohol/week: 0.0 standard drinks     Comment: occasional wine    Drug use: No    Sexual activity: Not Currently   Lifestyle    Physical activity:     Days per week: Not on file     Minutes per session: Not on file    Stress: Not on file   Relationships    Social connections:     Talks on phone: Not on file     Gets together: Not on file     Attends Sabianism service: Not on file     Active member of club or organization: Not on file     Attends meetings of clubs or organizations: Not on file     Relationship status: Not on file    Intimate partner violence:     Fear of current or ex partner: Not on file     Emotionally abused: Not on file     Physically abused: Not on file     Forced sexual activity: Not on file   Other Topics Concern     Service No    Blood Transfusions No    Caffeine Concern Yes     Comment: due to reflux    Occupational Exposure No    Hobby Hazards No    Sleep Concern Yes    Stress Concern Yes     Comment: wants a job    Weight Concern Yes    Special Diet No    Back Care No    Exercise Yes    Bike Helmet No    Seat Belt Yes    Self-Exams Yes   Social History Narrative    Patient lives with a friend, no pets. ALLERGIES: Pollen extracts; Amoxicillin; Crestor [rosuvastatin]; Ibuprofen; Lipitor [atorvastatin]; and Pcn [penicillins]    Review of Systems   Constitutional: Negative. HENT: Negative. Eyes: Negative. Respiratory: Negative. Cardiovascular: Negative. Gastrointestinal: Positive for nausea and vomiting. Endocrine: Negative. Genitourinary: Negative. Musculoskeletal: Negative. Skin: Negative. Allergic/Immunologic: Negative. Neurological: Negative. Hematological: Negative. Psychiatric/Behavioral: Negative. All other systems reviewed and are negative. Vitals:    08/16/19 1842   BP: 109/68   Pulse: 79   Resp: 20   Temp: 98.8 °F (37.1 °C)   SpO2: 97%   Weight: 77.1 kg (170 lb)   Height: 5' 2\" (1.575 m)            Physical Exam   Constitutional: She is oriented to person, place, and time. She appears well-developed and well-nourished. No distress. HENT:   Head: Normocephalic. Right Ear: External ear normal.   Left Ear: External ear normal.   Mouth/Throat: No oropharyngeal exudate. Eyes: Pupils are equal, round, and reactive to light. Conjunctivae and EOM are normal. Right eye exhibits no discharge. Left eye exhibits no discharge. No scleral icterus. Neck: Normal range of motion. Neck supple. No JVD present. No tracheal deviation present. No thyromegaly present. Cardiovascular: Normal rate, regular rhythm, normal heart sounds and intact distal pulses. Exam reveals no gallop and no friction rub. No murmur heard. Pulmonary/Chest: Effort normal and breath sounds normal. No stridor.  No respiratory distress. She has no wheezes. She has no rales. She exhibits no tenderness. Abdominal: Soft. Bowel sounds are normal. She exhibits no distension and no mass. There is no tenderness. There is no rebound and no guarding. Musculoskeletal: Normal range of motion. She exhibits no edema or tenderness. RIGHT ARM: no edema, no tenderness, normal ROM, pulses and sensory. Lymphadenopathy:     She has no cervical adenopathy. Neurological: She is alert and oriented to person, place, and time. She displays normal reflexes. No cranial nerve deficit. She exhibits normal muscle tone. Coordination normal.   Skin: Skin is warm and dry. No rash noted. She is not diaphoretic. No erythema. No pallor. Nursing note and vitals reviewed. MDM Dif Dx: medication side affect, allergic reaction, anxiety, malaise of unknown etiology. Hospital course: Patient treated with IV Zofran & IV hydration. Her symptoms resolved. Dx: medication side effect    Disp: d/c   home      Dictation disclaimer:  Please note that this dictation was completed with [employer], the computer voice recognition software. Quite often unanticipated grammatical, syntax, homophones, and other interpretive errors are inadvertently transcribed by the computer software. Please disregard these errors. Please excuse any errors that have escaped final proofreading.

## 2019-08-22 ENCOUNTER — TELEPHONE (OUTPATIENT)
Dept: FAMILY MEDICINE CLINIC | Age: 54
End: 2019-08-22

## 2019-08-22 NOTE — TELEPHONE ENCOUNTER
Patient called informing that she went to ER on 8/16 with allergic reaction to pain meds that were prescribed for tooth extraction. She was informed to re-check with PCP in 2 days. She is complaining of right arm swelling and pain level 8/10. She is only on Zofran for nausea and her regular medications. Patient states that she was not prescribed anything for her arm at the ER. Patient has been out of Lyrica and is wondering if her symptoms are aggravated by not having had medication. Patient requests refill of Lyrica. Message sent to provider.

## 2019-08-22 NOTE — TELEPHONE ENCOUNTER
Pt will need to either go to the ED for eval of her right arm swelling and pain or schedule a sick appt next week. There is no mention of Rt arm swelling in ED note. As far as the lyrica, her insurance will not cover this medication. I am not sure why she is swelling. Pt was seeing Dr Edmund Beltre for her neck pain, she will need to refer back to him for alternative treatment.

## 2019-08-22 NOTE — TELEPHONE ENCOUNTER
Called patient and read her the full provider answer: with explanation to go to ED for eval of right arm. Patient understands that she will need to follow-up with Dr Rasheeda Vincent for alternative treatment to Lyrica. Patient's follow-up appt on 9/10 changed to 9/3 at 1:45 p for 3 mo f/u and arm pain follow-up s/p ER visit prn.

## 2019-08-27 NOTE — PROGRESS NOTES
[address]  [facility], [address] [facility]  [phone] office/[phone] fax      9/3/2019    Reason for visit:   Chief Complaint   Patient presents with    Follow Up Chronic Condition       Patient: Allyson Thomas, 1965, [id number]       Primary MD: Mandy Kaye NP    Subjective:   Allyson Thomas, a 53 y.o. female, who presents for Follow Up Chronic Condition      Past Medical History:   Diagnosis Date    Asthma     Bilateral great toe fractures 2014    Chronic sinusitis     Dr. Rashad Alcaraz in the past    Colon polyp 5/16    Dr Hernandez Body    Depression 2019    DJD (degenerative joint disease) of cervical spine 2016    DJD (degenerative joint disease), lumbar 2013    MRI c spine w degen changes; Dr Milan Hernandez    Dyslipidemia     calculated 10 year risk score was 2.0% (12/13)    Edema of both ankles 8/28/2018    Environmental and seasonal allergies 8/28/2018    Eustachian tube dysfunction     Fibrocystic breast     Dr Kyler Carpenter GERD (gastroesophageal reflux disease)     Hypertriglyceridemia 8/28/2018    Lateral epicondylitis of both elbows 2/6/2017    Macular degeneration of both eyes 8/28/2018    Mild intermittent asthma without complication 23/03/5326    Dr. Ronald Figueredo;  ratio 68%, FEV1 78% w 6% inc postbd, TLC 77, RV 56, DLCO 61%    Morbid obesity (HCC)     peak weight 196 lbs, bmi 35.8 from 10/13    Neuropathy 8/28/2018    Osteopenia     Dr. Ruben Eagle; DEXA t score -0.7 spine, -0.2 hip (8/14)    Sarcoidosis     Dr Balta Whitfield 4 chronic kidney disease ([address] 04/2019    Dr Hallie Chase, nephro    Type 2 diabetes mellitus with hyperglycemia, with long-term current use of insulin ([address] 8/28/2018       Past Surgical History:   Procedure Laterality Date    CARDIAC SURG PROCEDURE UNLIST  9/10, 8/13    thallium negative ef 72%; negative ef 70%    CHEST SURGERY PROCEDURE UNLISTED  7/12     thyroid negative    CHEST SURGERY PROCEDURE UNLISTED 2012    pfts w mod restrictive defect     COLONOSCOPY N/A 5/26/2016    Dr [facility] hyperplastic    Earlene Revel      Dr. Omar Wolff HX HEENT      nasal polypectomy Dr. Jann Cadena HX HEENT      tear duct surgery right Dr. Ty Mendoza 2010; left Dr Perez Mckeon 2015    HX ORTHOPAEDIC      DEXA -0.7 spine, -0.2 hip 8/14    VASCULAR SURGERY PROCEDURE UNLIST  12/13    venous doppler negative       Social History     Socioeconomic History    Marital status: LEGALLY      Spouse name: Not on file    Number of children: 0    Years of education: 13    Highest education level: Not on file   Occupational History    Occupation: Unemployed   Social Needs    Financial resource strain: Not on file    Food insecurity:     Worry: Not on file     Inability: Not on file   [place] needs:     Medical: Not on file     Non-medical: Not on file   Tobacco Use    Smoking status: Never Smoker    Smokeless tobacco: Never Used   Substance and Sexual Activity    Alcohol use:  Yes     Alcohol/week: 0.0 standard drinks     Comment: occasional wine    Drug use: No    Sexual activity: Not Currently   Lifestyle    Physical activity:     Days per week: Not on file     Minutes per session: Not on file    Stress: Not on file   Relationships    Social connections:     Talks on phone: Not on file     Gets together: Not on file     Attends Buddhism service: Not on file     Active member of club or organization: Not on file     Attends meetings of clubs or organizations: Not on file     Relationship status: Not on file    Intimate partner violence:     Fear of current or ex partner: Not on file     Emotionally abused: Not on file     Physically abused: Not on file     Forced sexual activity: Not on file   Other Topics Concern     Service No    Blood Transfusions No    Caffeine Concern Yes     Comment: due to reflux    Occupational Exposure No    Hobby Hazards No    Sleep Concern Yes    Stress Concern Yes Comment: wants a job    Weight Concern Yes    Special Diet No    Back Care No    Exercise Yes    Bike Helmet No    Seat Belt Yes    Self-Exams Yes   Social History Narrative    Patient lives with a friend, no pets. Allergies   Allergen Reactions    Pollen Extracts Runny Nose and Cough    Amoxicillin Hives, Shortness of Breath and Swelling    Crestor [Rosuvastatin] Myalgia    Ibuprofen Other (comments)     dont prescribe due to kidney function    Lipitor [Atorvastatin] Other (comments)     Muscle cramps and weakness      Pcn [Penicillins] Angioedema       Current Outpatient Medications on File Prior to Visit   Medication Sig Dispense Refill    ondansetron hcl (ZOFRAN) 4 mg tablet Take 1 Tab by mouth every eight (8) hours as needed for Nausea. 8 Tab 0    insulin glargine (LANTUS,BASAGLAR) 100 unit/mL (3 mL) inpn 25 Units by SubCUTAneous route nightly. 5 Pen 1    albuterol (PROVENTIL HFA, VENTOLIN HFA, PROAIR HFA) 90 mcg/actuation inhaler Take  by inhalation.  fluticasone propion-salmeterol (ADVAIR DISKUS) 500-50 mcg/dose diskus inhaler Take 1 Puff by inhalation every twelve (12) hours.  esomeprazole (NEXIUM) 20 mg capsule Take 20 mg by mouth daily.  pregabalin (LYRICA) 50 mg capsule Take 1 Cap by mouth two (2) times a day. Max Daily Amount: 100 mg. For neuropathy 60 Cap 3    predniSONE (DELTASONE) 1 mg tablet Take 3 Tabs by mouth daily (with breakfast). 90 Tab 11    predniSONE (DELTASONE) 5 mg tablet Take 1 Tab by mouth daily (with breakfast). 30 Tab 11    mometasone (NASONEX) 50 mcg/actuation nasal spray 2 Sprays by Both Nostrils route daily as needed. For allergies 3 Container 3    CETIRIZINE HCL (ZYRTEC PO) Take 1 Tab by mouth daily.  glucose blood VI test strips (ACCU-CHEK POOJA) strip Pt to test 5 times daily. Dx:E11.65 500 Strip 3     No current facility-administered medications on file prior to visit.          Pt comes in today for their routine 3 month follow up. Pt has medical insurance. Pt states she takes all meds as prescribed. Complaints today:  1) Nerve pain to both feet. Pain score: 7/10. Piercing pains bottom of feet. 2) Right elbow veins will poke out and causes pain. Swelling/pain occurs 2x/week. Not swelling or paining today. Last occurred over the weekend. 3) Had all teeth pulled in July due to recurrent infections. 4) Next appt with Neph in Nov.  5) request pen needles for insulin    Pt denies any other complaints or issues. Objective:   Visit Vitals  /78   Pulse 90   Temp 98.4 °F (36.9 °C)   Resp 20   Ht 5' 2\" (1.575 m)   Wt 166 lb 9.6 oz (75.6 kg)   LMP 03/30/2013   SpO2 96%   BMI 30.47 kg/m²      Wt Readings from Last 3 Encounters:   09/03/19 166 lb 9.6 oz (75.6 kg)   08/16/19 170 lb (77.1 kg)   06/10/19 175 lb 9.6 oz (79.7 kg)     Lab Results   Component Value Date/Time    Glucose 131 (H) 08/16/2019 07:30 PM    Glucose (POC) 155 (H) 06/16/2018 04:47 PM    Glucose, POC 89 05/26/2016 10:49 AM       Physical Exam   Constitutional: She is oriented to person, place, and time. She appears well-developed and well-nourished. HENT:   Head: Normocephalic and atraumatic. Neck: Normal range of motion. Neck supple. Cardiovascular: Normal rate, regular rhythm and normal heart sounds. Pulmonary/Chest: Effort normal and breath sounds normal. No respiratory distress. She has no wheezes. She has no rales. Musculoskeletal: Normal range of motion. Right elbow: She exhibits normal range of motion and no swelling. No tenderness found. Gait stable. Neurological: She is alert and oriented to person, place, and time. Skin: Skin is warm and dry. Psychiatric: She has a normal mood and affect. Her behavior is normal. Judgment and thought content normal.       Assessment:    Yisel Joseph who has risk factors including (see above previous medical hx) and:     1.  Type 2 diabetes mellitus with hyperglycemia, with long-term current use of insulin ([facility]  Awaiting lab results    - lisinopril (PRINIVIL, ZESTRIL) 5 mg tablet; Take 1 Tab by mouth daily. For kidney protection  Dispense: 30 Tab; Refill: 3  - metFORMIN (GLUCOPHAGE) 1,000 mg tablet; Take 1 Tab by mouth daily. For diabetes  Dispense: 30 Tab; Refill: 3  - SITagliptin (JANUVIA) 25 mg tablet; Take 1 Tab by mouth daily. For diabetes  Dispense: 30 Tab; Refill: 3  - HEMOGLOBIN A1C WITH EAG; Future  - MICROALBUMIN, UR, RAND W/ MICROALB/CREAT RATIO; Future  - REFERRAL TO PRIMARY CARE  -  DIABETES FOOT EXAM  - MICROALBUMIN, UR, RAND W/ MICROALB/CREAT RATIO  - HEMOGLOBIN A1C WITH EAG  - Insulin Needles, Disposable, 30 gauge x 1/3\"; Use 1-2 times daily. Qty 100  Dispense: 1 Package; Refill: 11    2. Neuropathy  See 8/6/19 note:   Kendall Officer, please call pt and make her aware the lyrica is not covered under [employer]. She tried cymbalta back in 2017, according to patient it was not effective. Will e-scribe gabapentin 100mg BID to replace lyrica for neuropathy pains if patient wishes to try this therapy. Can discuss more at follow up appt on 9/10/19 as well. Due to pts pain level being a 7, will prescribe gabapentin 100mg TID instead of BID. Instructed pt to be cautious when starting a new med and to avoid driving or operating heavy machinery until they know how the medication will affect them. Pt verb understanding.    - gabapentin (NEURONTIN) 100 mg capsule; Take 1 Cap by mouth three (3) times daily. Max Daily Amount: 300 mg. Dispense: 90 Cap; Refill: 1    3. Dyslipidemia  Reorder    - pravastatin (PRAVACHOL) 40 mg tablet; Take 1 Tab by mouth daily. For cholesterol  Dispense: 30 Tab; Refill: 3  - LIPID PANEL; Future  - REFERRAL TO PRIMARY CARE  - LIPID PANEL    4. Edema of both ankles  Reorder    - hydroCHLOROthiazide (HYDRODIURIL) 25 mg tablet; Take 1 Tab by mouth daily. For swelling  Dispense: 30 Tab; Refill: 3    5.  Current mild episode of major depressive disorder, unspecified whether recurrent ([facility]  Reorder    - FLUoxetine (PROZAC) 20 mg capsule; Take 1 Cap by mouth daily. For anxiety/depression  Dispense: 30 Cap; Refill: 3  - REFERRAL TO PRIMARY CARE    6. Counseling on health promotion and disease prevention  Pt educated on the importance of obtaining the influenza and pneumonia vaccines. Reminded pt she will be due a mammo in Oct/Nov. Strongly encouraged pt to continue to consult with nephrology. Pt verb understanding    Medication Alternative:  N/a    Written instructions followed our verbal discussion of all information discussed above, pending tests ordered and future goals/plans. Patient expressed understanding of current diagnosis, planned testing, follow up and if needed to contact the office for any questions or concerns prior to the next visit. Plan:   Med reconciliation completed with patient. Reviewed side effects of medications with the patient. Questions were answered and patient verb understanding. Discussed lab results with patient  Pt did not have A1c, microalbumin, lipid obtained prior to todays appt. Will obtain today. Orders Placed This Encounter    HEMOGLOBIN A1C WITH EAG     Standing Status:   Future     Number of Occurrences:   1     Standing Expiration Date:   9/10/2019    MICROALBUMIN, UR, RAND W/ MICROALB/CREAT RATIO     Standing Status:   Future     Number of Occurrences:   1     Standing Expiration Date:   9/10/2019    LIPID PANEL     Standing Status:   Future     Number of Occurrences:   1     Standing Expiration Date:   9/10/2019    REFERRAL TO PRIMARY CARE     Referral Priority:   Routine     Referral Type:   Consultation     Referral Reason:   Specialty Services Required     Referred to Provider:   Andreea Verma MD     Number of Visits Requested:   1     DIABETES FOOT EXAM    aspirin delayed-release 81 mg tablet     Sig: Take 1 Tab by mouth daily.  For heart health     Dispense:  30 Tab     Refill:  11    FLUoxetine (PROZAC) 20 mg capsule     Sig: Take 1 Cap by mouth daily. For anxiety/depression     Dispense:  30 Cap     Refill:  3    hydroCHLOROthiazide (HYDRODIURIL) 25 mg tablet     Sig: Take 1 Tab by mouth daily. For swelling     Dispense:  30 Tab     Refill:  3    lisinopril (PRINIVIL, ZESTRIL) 5 mg tablet     Sig: Take 1 Tab by mouth daily. For kidney protection     Dispense:  30 Tab     Refill:  3    metFORMIN (GLUCOPHAGE) 1,000 mg tablet     Sig: Take 1 Tab by mouth daily. For diabetes     Dispense:  30 Tab     Refill:  3    pravastatin (PRAVACHOL) 40 mg tablet     Sig: Take 1 Tab by mouth daily. For cholesterol     Dispense:  30 Tab     Refill:  3    SITagliptin (JANUVIA) 25 mg tablet     Sig: Take 1 Tab by mouth daily. For diabetes     Dispense:  30 Tab     Refill:  3    gabapentin (NEURONTIN) 100 mg capsule     Sig: Take 1 Cap by mouth three (3) times daily. Max Daily Amount: 300 mg. Dispense:  90 Cap     Refill:  1    Insulin Needles, Disposable, 30 gauge x 1/3\"     Sig: Use 1-2 times daily. Qty 100     Dispense:  1 Package     Refill:  11     Cheapest brand available please     Current Outpatient Medications   Medication Sig Dispense Refill    aspirin delayed-release 81 mg tablet Take 1 Tab by mouth daily. For heart health 30 Tab 11    FLUoxetine (PROZAC) 20 mg capsule Take 1 Cap by mouth daily. For anxiety/depression 30 Cap 3    hydroCHLOROthiazide (HYDRODIURIL) 25 mg tablet Take 1 Tab by mouth daily. For swelling 30 Tab 3    lisinopril (PRINIVIL, ZESTRIL) 5 mg tablet Take 1 Tab by mouth daily. For kidney protection 30 Tab 3    metFORMIN (GLUCOPHAGE) 1,000 mg tablet Take 1 Tab by mouth daily. For diabetes 30 Tab 3    pravastatin (PRAVACHOL) 40 mg tablet Take 1 Tab by mouth daily. For cholesterol 30 Tab 3    SITagliptin (JANUVIA) 25 mg tablet Take 1 Tab by mouth daily. For diabetes 30 Tab 3    gabapentin (NEURONTIN) 100 mg capsule Take 1 Cap by mouth three (3) times daily.  Max Daily Amount: 300 mg. 90 Cap 1    Insulin Needles, Disposable, 30 gauge x 1/3\" Use 1-2 times daily. Qty 100 1 Package 11    ondansetron hcl (ZOFRAN) 4 mg tablet Take 1 Tab by mouth every eight (8) hours as needed for Nausea. 8 Tab 0    insulin glargine (LANTUS,BASAGLAR) 100 unit/mL (3 mL) inpn 25 Units by SubCUTAneous route nightly. 5 Pen 1    albuterol (PROVENTIL HFA, VENTOLIN HFA, PROAIR HFA) 90 mcg/actuation inhaler Take  by inhalation.  fluticasone propion-salmeterol (ADVAIR DISKUS) 500-50 mcg/dose diskus inhaler Take 1 Puff by inhalation every twelve (12) hours.  esomeprazole (NEXIUM) 20 mg capsule Take 20 mg by mouth daily.  pregabalin (LYRICA) 50 mg capsule Take 1 Cap by mouth two (2) times a day. Max Daily Amount: 100 mg. For neuropathy 60 Cap 3    predniSONE (DELTASONE) 1 mg tablet Take 3 Tabs by mouth daily (with breakfast). 90 Tab 11    predniSONE (DELTASONE) 5 mg tablet Take 1 Tab by mouth daily (with breakfast). 30 Tab 11    mometasone (NASONEX) 50 mcg/actuation nasal spray 2 Sprays by Both Nostrils route daily as needed. For allergies 3 Container 3    CETIRIZINE HCL (ZYRTEC PO) Take 1 Tab by mouth daily.  glucose blood VI test strips (ACCU-CHEK POOJA) strip Pt to test 5 times daily. Dx:E11.65 500 Strip 3         No follow up appt needed due to patient having medical insurance. Patient is no longer eligible for care at [facility], [facility] and will need to establish care with a Medicaid approved provider. Referral given. Cipriano Garcia. [facility], [address] [facility]      I spent 25 minutes with the patient in face-to-face consultation, of which greater than 50% was spent in counseling and coordination of care as described above.

## 2019-09-03 ENCOUNTER — OFFICE VISIT (OUTPATIENT)
Dept: FAMILY MEDICINE CLINIC | Age: 54
End: 2019-09-03

## 2019-09-03 ENCOUNTER — HOSPITAL ENCOUNTER (OUTPATIENT)
Dept: LAB | Age: 54
Discharge: HOME | End: 2019-09-03

## 2019-09-03 VITALS
TEMPERATURE: 98.4 F | RESPIRATION RATE: 20 BRPM | SYSTOLIC BLOOD PRESSURE: 122 MMHG | HEART RATE: 90 BPM | WEIGHT: 166.6 LBS | HEIGHT: 62 IN | OXYGEN SATURATION: 96 % | BODY MASS INDEX: 30.66 KG/M2 | DIASTOLIC BLOOD PRESSURE: 78 MMHG

## 2019-09-03 DIAGNOSIS — Z71.89 COUNSELING ON HEALTH PROMOTION AND DISEASE PREVENTION: ICD-10-CM

## 2019-09-03 DIAGNOSIS — G62.9 NEUROPATHY: ICD-10-CM

## 2019-09-03 DIAGNOSIS — M25.472 EDEMA OF BOTH ANKLES: ICD-10-CM

## 2019-09-03 DIAGNOSIS — E78.5 DYSLIPIDEMIA: ICD-10-CM

## 2019-09-03 DIAGNOSIS — F32.0 CURRENT MILD EPISODE OF MAJOR DEPRESSIVE DISORDER, UNSPECIFIED WHETHER RECURRENT (HCC): ICD-10-CM

## 2019-09-03 DIAGNOSIS — E11.65 TYPE 2 DIABETES MELLITUS WITH HYPERGLYCEMIA, WITH LONG-TERM CURRENT USE OF INSULIN (HCC): Primary | ICD-10-CM

## 2019-09-03 DIAGNOSIS — M25.471 EDEMA OF BOTH ANKLES: ICD-10-CM

## 2019-09-03 DIAGNOSIS — Z79.4 TYPE 2 DIABETES MELLITUS WITH HYPERGLYCEMIA, WITH LONG-TERM CURRENT USE OF INSULIN (HCC): Primary | ICD-10-CM

## 2019-09-03 LAB
CHOLEST SERPL-MCNC: 218 MG/DL
CREAT UR-MCNC: 180 MG/DL (ref 30–125)
EST. AVERAGE GLUCOSE BLD GHB EST-MCNC: 163 MG/DL
HBA1C MFR BLD: 7.3 % (ref 4.2–5.6)
HDLC SERPL-MCNC: 45 MG/DL (ref 40–60)
HDLC SERPL: 4.8 {RATIO} (ref 0–5)
LDLC SERPL CALC-MCNC: 129 MG/DL (ref 0–100)
LIPID PROFILE,FLP: ABNORMAL
MICROALBUMIN UR-MCNC: <0.5 MG/DL (ref 0–3)
MICROALBUMIN/CREAT UR-RTO: ABNORMAL MG/G (ref 0–30)
TRIGL SERPL-MCNC: 220 MG/DL (ref ?–150)
VLDLC SERPL CALC-MCNC: 44 MG/DL

## 2019-09-03 PROCEDURE — 80061 LIPID PANEL: CPT

## 2019-09-03 PROCEDURE — 83036 HEMOGLOBIN GLYCOSYLATED A1C: CPT

## 2019-09-03 PROCEDURE — 82043 UR ALBUMIN QUANTITATIVE: CPT

## 2019-09-03 RX ORDER — GABAPENTIN 100 MG/1
100 CAPSULE ORAL 3 TIMES DAILY
Qty: 90 CAP | Refills: 1 | Status: SHIPPED | OUTPATIENT
Start: 2019-09-03 | End: 2021-03-24

## 2019-09-03 RX ORDER — ASPIRIN 81 MG/1
81 TABLET ORAL DAILY
Qty: 30 TAB | Refills: 11 | Status: SHIPPED | OUTPATIENT
Start: 2019-09-03

## 2019-09-03 RX ORDER — FLUOXETINE HYDROCHLORIDE 20 MG/1
20 CAPSULE ORAL DAILY
Qty: 30 CAP | Refills: 3 | Status: SHIPPED | OUTPATIENT
Start: 2019-09-03 | End: 2021-03-24

## 2019-09-03 RX ORDER — METFORMIN HYDROCHLORIDE 1000 MG/1
1000 TABLET ORAL DAILY
Qty: 30 TAB | Refills: 3 | Status: SHIPPED | OUTPATIENT
Start: 2019-09-03 | End: 2021-03-24 | Stop reason: ALTCHOICE

## 2019-09-03 RX ORDER — PRAVASTATIN SODIUM 40 MG/1
40 TABLET ORAL DAILY
Qty: 30 TAB | Refills: 3 | Status: SHIPPED | OUTPATIENT
Start: 2019-09-03 | End: 2021-03-24 | Stop reason: DRUGHIGH

## 2019-09-03 RX ORDER — HYDROCHLOROTHIAZIDE 25 MG/1
25 TABLET ORAL DAILY
Qty: 30 TAB | Refills: 3 | Status: SHIPPED | OUTPATIENT
Start: 2019-09-03 | End: 2021-03-24 | Stop reason: SDUPTHER

## 2019-09-03 RX ORDER — LISINOPRIL 5 MG/1
5 TABLET ORAL DAILY
Qty: 30 TAB | Refills: 3 | Status: SHIPPED | OUTPATIENT
Start: 2019-09-03 | End: 2021-03-24 | Stop reason: ALTCHOICE

## 2019-09-03 NOTE — PROGRESS NOTES
No new complaints today. Patient name, date of birth and orders verified before specimen collection. Rt  arm is dry and intact. 23g butterfly  was utilized to collect specimen. Pt tolerated procedure well.

## 2019-09-03 NOTE — PROGRESS NOTES
Danae, please call pt with the following lab results:  1) Urine creatinine elevated. Continue lisinopril therapy  2)  to 220;  to 129. Work on diet and continue pravastatin  3) A1c 6.9 to 7.3.  Work on diet and Continue metformin    Thank you

## 2019-09-03 NOTE — PATIENT INSTRUCTIONS
As of July 1st 2019, If you have MEDICAID insurance you can NO LONGER come to the [facility]. You have the option to choose who you want to see for your medical health as long as they are accepting new patients and take your insurance. You may also use the provider that is located on your card. The closet provider office to [facility] is [address]. This office is in the [place] [address] next to [facility]. If you chose to go with [facility], please stop at our  and the  will schedule you a 3 month appointment. *Hetal Smith [address], [address]              Phone: [phone]        If you decide not to go with [facility], please locate a new provider in the next 1-2 weeks and call our office with the providers name and appointment date. This will allow our office to gather your medical information to transfer to your new provider to help reduce disconnect with your care. If you have MEDICARE insurance, [facility] is located at Located within [facility] Phone: [phone]. Please don't wait until the last minute to locate your new provider. Remember, you are responsible for your care, so please follow up with your new provider as scheduled.       Take care,  Dr. Darien Bradshaw

## 2019-10-07 RX ORDER — PREDNISONE 5 MG/1
TABLET ORAL
Qty: 30 TAB | Refills: 5 | Status: SHIPPED | OUTPATIENT
Start: 2019-10-07 | End: 2020-06-02

## 2019-10-07 RX ORDER — PREDNISONE 1 MG/1
TABLET ORAL
Qty: 90 TAB | Refills: 5 | Status: SHIPPED | OUTPATIENT
Start: 2019-10-07 | End: 2020-06-02

## 2019-11-19 ENCOUNTER — OFFICE VISIT (OUTPATIENT)
Dept: PULMONOLOGY | Age: 54
End: 2019-11-19

## 2019-11-19 VITALS
RESPIRATION RATE: 18 BRPM | DIASTOLIC BLOOD PRESSURE: 67 MMHG | TEMPERATURE: 98 F | OXYGEN SATURATION: 93 % | HEIGHT: 62 IN | SYSTOLIC BLOOD PRESSURE: 108 MMHG | WEIGHT: 168 LBS | HEART RATE: 80 BPM | BODY MASS INDEX: 30.91 KG/M2

## 2019-11-19 DIAGNOSIS — J45.909 UNCOMPLICATED ASTHMA, UNSPECIFIED ASTHMA SEVERITY, UNSPECIFIED WHETHER PERSISTENT: Primary | ICD-10-CM

## 2019-11-19 DIAGNOSIS — J32.9 SINUSITIS, UNSPECIFIED CHRONICITY, UNSPECIFIED LOCATION: ICD-10-CM

## 2019-11-19 DIAGNOSIS — J40 BRONCHITIS: ICD-10-CM

## 2019-11-19 DIAGNOSIS — D86.9 SARCOIDOSIS: ICD-10-CM

## 2019-11-19 DIAGNOSIS — J32.9 SINUSITIS, UNSPECIFIED CHRONICITY, UNSPECIFIED LOCATION: Primary | ICD-10-CM

## 2019-11-19 NOTE — PROGRESS NOTES
[facility]  Pulmonary, Critical Care, and Sleep Medicine      Chief complaint:  Asthma sarcoidosis    HPI:    Yudith Michelle    is 53years old and returns the office today for follow-up and relates that her shortness of breath is stable that her cough is stable and that she continues to have nasal drainage which is purulent. She denies chest pain leg swelling and is going to happen eye exam soon and denies rashes but has some subcutaneous nodules on her lateral aspect of her left arm and left ankle. The nodules are mildly tender and not discolored      Allergies   Allergen Reactions    Pollen Extracts Runny Nose and Cough    Amoxicillin Hives, Shortness of Breath and Swelling    Crestor [Rosuvastatin] Myalgia    Ibuprofen Other (comments)     dont prescribe due to kidney function    Lipitor [Atorvastatin] Other (comments)     Muscle cramps and weakness      Pcn [Penicillins] Angioedema     Current Outpatient Medications   Medication Sig    predniSONE (DELTASONE) 5 mg tablet TAKE ONE TABLET BY MOUTH DAILY WITH BREAKFAST    predniSONE (DELTASONE) 1 mg tablet TAKE THREE TABLETS BY MOUTH DAILY WITH BREAKFAST    aspirin delayed-release 81 mg tablet Take 1 Tab by mouth daily. For heart health    FLUoxetine (PROZAC) 20 mg capsule Take 1 Cap by mouth daily. For anxiety/depression    hydroCHLOROthiazide (HYDRODIURIL) 25 mg tablet Take 1 Tab by mouth daily. For swelling    lisinopril (PRINIVIL, ZESTRIL) 5 mg tablet Take 1 Tab by mouth daily. For kidney protection    metFORMIN (GLUCOPHAGE) 1,000 mg tablet Take 1 Tab by mouth daily. For diabetes    pravastatin (PRAVACHOL) 40 mg tablet Take 1 Tab by mouth daily. For cholesterol    SITagliptin (JANUVIA) 25 mg tablet Take 1 Tab by mouth daily. For diabetes    gabapentin (NEURONTIN) 100 mg capsule Take 1 Cap by mouth three (3) times daily. Max Daily Amount: 300 mg.    Insulin Needles, Disposable, 30 gauge x 1/3\" Use 1-2 times daily.  Romina Aguillon 100    insulin glargine (LANTUS,BASAGLAR) 100 unit/mL (3 mL) inpn 25 Units by SubCUTAneous route nightly.  albuterol (PROVENTIL HFA, VENTOLIN HFA, PROAIR HFA) 90 mcg/actuation inhaler Take  by inhalation.  fluticasone propion-salmeterol (ADVAIR DISKUS) 500-50 mcg/dose diskus inhaler Take 1 Puff by inhalation every twelve (12) hours.  esomeprazole (NEXIUM) 20 mg capsule Take 20 mg by mouth daily.  mometasone (NASONEX) 50 mcg/actuation nasal spray 2 Sprays by Both Nostrils route daily as needed. For allergies    glucose blood VI test strips (ACCU-CHEK POOJA) strip Pt to test 5 times daily. Dx:E11.65    ondansetron hcl (ZOFRAN) 4 mg tablet Take 1 Tab by mouth every eight (8) hours as needed for Nausea.  pregabalin (LYRICA) 50 mg capsule Take 1 Cap by mouth two (2) times a day. Max Daily Amount: 100 mg. For neuropathy    CETIRIZINE HCL (ZYRTEC PO) Take 1 Tab by mouth daily. No current facility-administered medications for this visit.       Past Medical History:   Diagnosis Date    Asthma     Bilateral great toe fractures 2014    Chronic sinusitis     Dr. Eli Baker in the past    Colon polyp 5/16    Dr Keisha Lau    Depression 2019    DJD (degenerative joint disease) of cervical spine 2016    DJD (degenerative joint disease), lumbar 2013    MRI c spine w degen changes; Dr Hattie Ventura    Dyslipidemia     calculated 10 year risk score was 2.0% (12/13)    Edema of both ankles 8/28/2018    Environmental and seasonal allergies 8/28/2018    Eustachian tube dysfunction     Fibrocystic breast     Dr Nils Gilliland GERD (gastroesophageal reflux disease)     Hypertriglyceridemia 8/28/2018    Lateral epicondylitis of both elbows 2/6/2017    Macular degeneration of both eyes 8/28/2018    Mild intermittent asthma without complication 56/16/0970    Dr. Bree Roberts;  ratio 68%, FEV1 78% w 6% inc postbd, TLC 77, RV 56, DLCO 61%    Morbid obesity (HCC)     peak weight 196 lbs, bmi 35.8 from 10/13    Neuropathy 8/28/2018    Osteopenia     Dr. Kristopher Carlson; DEXA t score -0.7 spine, -0.2 hip (8/14)    Sarcoidosis     Dr Perez Second 4 chronic kidney disease ([facility] [address] 04/2019    Dr Iram Ross, nephro    Type 2 diabetes mellitus with hyperglycemia, with long-term current use of insulin ([facility] [address] 8/28/2018     Past Surgical History:   Procedure Laterality Date    CARDIAC SURG PROCEDURE UNLIST  9/10, 8/13    thallium negative ef 72%; negative ef 70%    CHEST SURGERY PROCEDURE UNLISTED  7/12     thyroid negative    CHEST SURGERY PROCEDURE UNLISTED  2012    pfts w mod restrictive defect     COLONOSCOPY N/A 5/26/2016    Dr Radha Farias HX HEENT      nasal polypectomy Dr. Cristofer Lund HX HEENT      tear duct surgery right Dr. Britney Choi 2010; left Dr Eli Morales 2015    HX ORTHOPAEDIC      DEXA -0.7 spine, -0.2 hip 8/14    VASCULAR SURGERY PROCEDURE UNLIST  12/13    venous doppler negative     Social History     Socioeconomic History    Marital status: LEGALLY      Spouse name: Not on file    Number of children: 0    Years of education: 13    Highest education level: Not on file   Occupational History    Occupation: Unemployed   Social Needs    Financial resource strain: Not on file    Food insecurity:     Worry: Not on file     Inability: Not on file   Nobl needs:     Medical: Not on file     Non-medical: Not on file   Tobacco Use    Smoking status: Never Smoker    Smokeless tobacco: Never Used   Substance and Sexual Activity    Alcohol use:  Yes     Alcohol/week: 0.0 standard drinks     Comment: occasional wine    Drug use: No    Sexual activity: Not Currently   Lifestyle    Physical activity:     Days per week: Not on file     Minutes per session: Not on file    Stress: Not on file   Relationships    Social connections:     Talks on phone: Not on file     Gets together: Not on file     Attends Faith service: Not on file     Active member of club or organization: Not on file     Attends meetings of clubs or organizations: Not on file     Relationship status: Not on file    Intimate partner violence:     Fear of current or ex partner: Not on file     Emotionally abused: Not on file     Physically abused: Not on file     Forced sexual activity: Not on file   Other Topics Concern     Service No    Blood Transfusions No    Caffeine Concern Yes     Comment: due to reflux    Occupational Exposure No    Hobby Hazards No    Sleep Concern Yes    Stress Concern Yes     Comment: wants a job    Weight Concern Yes    Special Diet No    Back Care No    Exercise Yes    Bike Helmet No    Seat Belt Yes    Self-Exams Yes   Social History Narrative    Patient lives with a friend, no pets.      Family History   Problem Relation Age of Onset   [facility] Cancer Mother     Hypertension Mother     Cancer Father    [facility] Diabetes Father     Hypertension Father     Stroke Father     Diabetes Sister     Hypertension Sister     Heart Disease Sister     Colon Cancer Sister 52    Hypertension Brother     Cancer Maternal Aunt        Review of systems:  She denies fever chills poor appetite weight loss    Physical Exam:  Visit Vitals  /67 (BP 1 Location: Right arm, BP Patient Position: Sitting)   Pulse 80   Temp 98 °F (36.7 °C) (Oral)   Resp 18   Ht 5' 2\" (1.575 m)   Wt 76.2 kg (168 lb)   LMP 03/30/2013   SpO2 93%   BMI 30.73 kg/m²       Well-developed well-nourished  HEENT: WNL  Lymph node exam: Supraclavicular cervical lymph nodes negative  Chest: Equal symmetrical expansion no dullness no wheezes rales rubs  Heart: Regular rhythm no gallop no murmur no no peripheral edema  Extremities: No cyanosis clubbing or calf tenderness  Skin: Small movable not significantly tender nodule subcutaneous on the lateral aspect of the left arm and at the ankle lateral aspect  Neurological: Alert and oriented    Labs:    O2 sat room air at rest 93%    Impression:     My history and physical exam sarcoidosis appears stable the subcutaneous nodules may represent Darier- Roussy sarcoidosis lesions    Plan:   Continue prednisone 7 mg a day Advair and as needed albuterol  Continue to observe lesions  Follow-up in 6 months with spirometry and diffusion capacity or sooner if needed and in the meantime the patient will get an eye exam    Allison Mchugh MD , [facility]    CC: No primary care provider on file. [facility], [address] [address]     P: [phone]     F: [phone]

## 2019-11-19 NOTE — PROGRESS NOTES
Yudith Michelle presents today for   Chief Complaint   Patient presents with    Asthma    Sarcoidosis       Is someone accompanying this pt? No    Is the patient using any DME equipment during OV? No    -DME Company NA    Depression Screening:  3 most recent PHQ Screens 11/19/2019   PHQ Not Done -   Little interest or pleasure in doing things Several days   Feeling down, depressed, irritable, or hopeless Several days   Total Score PHQ 2 2       Learning Assessment:  Learning Assessment 8/27/2018   PRIMARY LEARNER Patient   HIGHEST LEVEL OF EDUCATION - PRIMARY LEARNER  -   BARRIERS PRIMARY LEARNER -   CO-LEARNER CAREGIVER -   PRIMARY LANGUAGE ENGLISH   LEARNER PREFERENCE PRIMARY DEMONSTRATION     VIDEOS     READING   ANSWERED BY Patient   RELATIONSHIP SELF       Abuse Screening:  Abuse Screening Questionnaire 2/6/2017   Do you ever feel afraid of your partner? N   Are you in a relationship with someone who physically or mentally threatens you? N   Is it safe for you to go home? Y       Fall Risk  No flowsheet data found. Coordination of Care:  1. Have you been to the ER, urgent care clinic since your last visit? Hospitalized since your last visit? Yes; Name: [facility] seen for allergic reaction to medication. 2. Have you seen or consulted any other health care providers outside of the [address] [place] [address] since your last visit? Include any pap smears or colon screening.  No.

## 2019-11-19 NOTE — PATIENT INSTRUCTIONS
Continue Advair 1 inhalation twice daily and remember to exhale fully before inhaling and to wash mouth with water and spit it out after inhaling    Albuterol 2 inhalations every 4 hours as needed but if you require albuterol too often to control respiratory symptoms call the office for severe symptoms go to the emergency room    Obtain culture of nasal drainage and if I do not call you with results call me in about 10 days    Consider a sinus wash

## 2019-11-21 ENCOUNTER — HOSPITAL ENCOUNTER (OUTPATIENT)
Dept: LAB | Age: 54
Discharge: HOME | End: 2019-11-21
Payer: MEDICAID

## 2019-11-21 PROCEDURE — 87205 SMEAR GRAM STAIN: CPT

## 2019-11-21 PROCEDURE — 87186 SC STD MICRODIL/AGAR DIL: CPT

## 2019-11-21 PROCEDURE — 87077 CULTURE AEROBIC IDENTIFY: CPT

## 2019-11-24 LAB
BACTERIA SPEC CULT: ABNORMAL
GRAM STN SPEC: ABNORMAL
SERVICE CMNT-IMP: ABNORMAL

## 2019-11-25 RX ORDER — LEVOFLOXACIN 250 MG/1
TABLET ORAL
Qty: 15 TAB | Refills: 0 | Status: SHIPPED | OUTPATIENT
Start: 2019-11-25 | End: 2020-02-25 | Stop reason: ALTCHOICE

## 2019-12-05 ENCOUNTER — TELEPHONE (OUTPATIENT)
Dept: PULMONOLOGY | Age: 54
End: 2019-12-05

## 2020-01-06 ENCOUNTER — TELEPHONE (OUTPATIENT)
Dept: PULMONOLOGY | Age: 55
End: 2020-01-06

## 2020-01-06 NOTE — TELEPHONE ENCOUNTER
Pt states she finished her antibiotics about a week ago, and the infection is back. Please advise [phone].

## 2020-01-08 ENCOUNTER — TELEPHONE (OUTPATIENT)
Dept: PULMONOLOGY | Age: 55
End: 2020-01-08

## 2020-01-08 DIAGNOSIS — J32.9 SINUSITIS, UNSPECIFIED CHRONICITY, UNSPECIFIED LOCATION: Primary | ICD-10-CM

## 2020-01-08 NOTE — TELEPHONE ENCOUNTER
Pt states she finished the Levaquin back early Dec. About the middle of Dec she started back with congestion, cough with green nasal drainage. Pt still with sxs. appt offered but she states Dr. Colby Killian advised her to call him back if she started with sxs again.

## 2020-01-08 NOTE — TELEPHONE ENCOUNTER
Per verbal order from Dr. Prateek Cifuentes, he called pt's phone and was not available. He has placed a sinus culture order.  Pt needs to pickup culture cup and order from office and take to lab once the nasal/sinus specimen obtained

## 2020-01-09 NOTE — TELEPHONE ENCOUNTER
Spoke with patient. Informed Dr. Colby Killian has placed the sinus culture and order and specimen container here at office to pickup.  Pt verbalizes understanding and states she will come to office to pickup

## 2020-01-10 ENCOUNTER — HOSPITAL ENCOUNTER (OUTPATIENT)
Dept: LAB | Age: 55
Discharge: HOME | End: 2020-01-10

## 2020-01-10 LAB — XX-LABCORP SPECIMEN COL,LCBCF: NORMAL

## 2020-01-10 PROCEDURE — 99001 SPECIMEN HANDLING PT-LAB: CPT

## 2020-01-14 LAB
BACTERIA SPT CULT: ABNORMAL
GRAM STN SPT: NORMAL
SPECIMEN STATUS REPORT, ROLRST: NORMAL
SQUAMOUS SPT QL MICRO: NORMAL
WBC SPT QL MICRO: NORMAL

## 2020-01-14 RX ORDER — DOXYCYCLINE 100 MG/1
100 CAPSULE ORAL 2 TIMES DAILY
Qty: 20 CAP | Refills: 0 | Status: SHIPPED | OUTPATIENT
Start: 2020-01-14 | End: 2020-02-25 | Stop reason: ALTCHOICE

## 2020-01-15 ENCOUNTER — HOSPITAL ENCOUNTER (OUTPATIENT)
Dept: LAB | Age: 55
Discharge: HOME | End: 2020-01-15

## 2020-01-15 LAB — XX-LABCORP SPECIMEN COL,LCBCF: NORMAL

## 2020-01-15 PROCEDURE — 99001 SPECIMEN HANDLING PT-LAB: CPT

## 2020-01-20 ENCOUNTER — HOSPITAL ENCOUNTER (OUTPATIENT)
Dept: GENERAL RADIOLOGY | Age: 55
Discharge: HOME | End: 2020-01-20
Attending: FAMILY MEDICINE
Payer: MEDICAID

## 2020-01-20 ENCOUNTER — HOSPITAL ENCOUNTER (OUTPATIENT)
Dept: MAMMOGRAPHY | Age: 55
Discharge: HOME | End: 2020-01-20
Attending: FAMILY MEDICINE
Payer: MEDICAID

## 2020-01-20 DIAGNOSIS — Z12.31 VISIT FOR SCREENING MAMMOGRAM: ICD-10-CM

## 2020-01-20 DIAGNOSIS — M25.552 BILATERAL HIP PAIN: ICD-10-CM

## 2020-01-20 DIAGNOSIS — M25.551 BILATERAL HIP PAIN: ICD-10-CM

## 2020-01-20 PROCEDURE — 73521 X-RAY EXAM HIPS BI 2 VIEWS: CPT

## 2020-01-20 PROCEDURE — 77067 SCR MAMMO BI INCL CAD: CPT

## 2020-02-25 ENCOUNTER — OFFICE VISIT (OUTPATIENT)
Dept: PULMONOLOGY | Age: 55
End: 2020-02-25

## 2020-02-25 VITALS
BODY MASS INDEX: 29.44 KG/M2 | HEART RATE: 79 BPM | HEIGHT: 62 IN | TEMPERATURE: 98.3 F | WEIGHT: 160 LBS | OXYGEN SATURATION: 99 % | SYSTOLIC BLOOD PRESSURE: 110 MMHG | RESPIRATION RATE: 19 BRPM | DIASTOLIC BLOOD PRESSURE: 70 MMHG

## 2020-02-25 DIAGNOSIS — D86.9 SARCOIDOSIS: ICD-10-CM

## 2020-02-25 DIAGNOSIS — J45.909 UNCOMPLICATED ASTHMA, UNSPECIFIED ASTHMA SEVERITY, UNSPECIFIED WHETHER PERSISTENT: Primary | ICD-10-CM

## 2020-02-25 DIAGNOSIS — J32.9 SINUSITIS, UNSPECIFIED CHRONICITY, UNSPECIFIED LOCATION: ICD-10-CM

## 2020-02-25 NOTE — PROGRESS NOTES
Verbal Order with read back per Dr. Luis Mcnair MD  For PFT smart panel. AMB POC PFT complete w/ bronchodilator  AMB POC PFT complete w/o bronchodilator  Gas Dilute/ wash out lung vol w/wo distrib  Vol &  Diffusing capacity    Dr. Luis Mcnair MD will co-sign the orders.

## 2020-02-25 NOTE — PROGRESS NOTES
Krissy Mendez presents today for   Chief Complaint   Patient presents with    Asthma    Sarcoidosis       Is someone accompanying this pt? No     Is the patient using any DME equipment during OV? No     -DME Company n/a     Depression Screening:  3 most recent PHQ Screens 2/25/2020   PHQ Not Done -   Little interest or pleasure in doing things Not at all   Feeling down, depressed, irritable, or hopeless Not at all   Total Score PHQ 2 0       Learning Assessment:  Learning Assessment 8/27/2018   PRIMARY LEARNER Patient   HIGHEST LEVEL OF EDUCATION - PRIMARY LEARNER  -   BARRIERS PRIMARY LEARNER -   CO-LEARNER CAREGIVER -   PRIMARY LANGUAGE ENGLISH   LEARNER PREFERENCE PRIMARY DEMONSTRATION     VIDEOS     READING   ANSWERED BY Patient   RELATIONSHIP SELF       Abuse Screening:  Abuse Screening Questionnaire 2/6/2017   Do you ever feel afraid of your partner? N   Are you in a relationship with someone who physically or mentally threatens you? N   Is it safe for you to go home? Y       Fall Risk  No flowsheet data found. Coordination of Care:  1. Have you been to the ER, urgent care clinic since your last visit? Hospitalized since your last visit? No    2. Have you seen or consulted any other health care providers outside of the [address] [place] [address] since your last visit? Include any pap smears or colon screening.  No

## 2020-02-25 NOTE — PATIENT INSTRUCTIONS
Prednisone 6 mg every morning with breakfast    Call for symptoms related to decrease prednisone    Continue Advair 1 inhalation twice daily and remember to exhale fully before inhaling and to wash mouth with water and spit it out after inhaling    Albuterol 2 inhalations every 4 hours as needed but if you require albuterol too often to control respiratory symptoms call the office

## 2020-02-25 NOTE — PROGRESS NOTES
[facility]  Pulmonary, Critical Care, and Sleep Medicine      Chief complaint:  Sarcoidosis asthma    HPI:    Bryn Heart    is 53years old returns the office today for follow-up concerning sarcoidosis and is down to 7 mg of prednisone a day. She relates her breathing is stable and she is on Advair and albuterol and does not take her albuterol that frequently. She also relates that she has persistent nasal drainage which is purulent at times and some facial discomfort. She denies chest pain leg swelling. The patient also says she has a significant sleep disorder which she cannot fall asleep      Allergies   Allergen Reactions    Pollen Extracts Runny Nose and Cough    Amoxicillin Hives, Shortness of Breath and Swelling    Crestor [Rosuvastatin] Myalgia    Ibuprofen Other (comments)     dont prescribe due to kidney function    Lipitor [Atorvastatin] Other (comments)     Muscle cramps and weakness      Pcn [Penicillins] Angioedema     Current Outpatient Medications   Medication Sig    predniSONE (DELTASONE) 5 mg tablet TAKE ONE TABLET BY MOUTH DAILY WITH BREAKFAST    predniSONE (DELTASONE) 1 mg tablet TAKE THREE TABLETS BY MOUTH DAILY WITH BREAKFAST    aspirin delayed-release 81 mg tablet Take 1 Tab by mouth daily. For heart health    FLUoxetine (PROZAC) 20 mg capsule Take 1 Cap by mouth daily. For anxiety/depression    hydroCHLOROthiazide (HYDRODIURIL) 25 mg tablet Take 1 Tab by mouth daily. For swelling    lisinopril (PRINIVIL, ZESTRIL) 5 mg tablet Take 1 Tab by mouth daily. For kidney protection    metFORMIN (GLUCOPHAGE) 1,000 mg tablet Take 1 Tab by mouth daily. For diabetes    pravastatin (PRAVACHOL) 40 mg tablet Take 1 Tab by mouth daily. For cholesterol    SITagliptin (JANUVIA) 25 mg tablet Take 1 Tab by mouth daily. For diabetes    gabapentin (NEURONTIN) 100 mg capsule Take 1 Cap by mouth three (3) times daily.  Max Daily Amount: 300 mg.    Insulin Needles, Disposable, 30 gauge x 1/3\" Use 1-2 times daily. Qty 100    insulin glargine (LANTUS,BASAGLAR) 100 unit/mL (3 mL) inpn 25 Units by SubCUTAneous route nightly.  albuterol (PROVENTIL HFA, VENTOLIN HFA, PROAIR HFA) 90 mcg/actuation inhaler Take  by inhalation.  fluticasone propion-salmeterol (ADVAIR DISKUS) 500-50 mcg/dose diskus inhaler Take 1 Puff by inhalation every twelve (12) hours.  esomeprazole (NEXIUM) 20 mg capsule Take 20 mg by mouth daily.  mometasone (NASONEX) 50 mcg/actuation nasal spray 2 Sprays by Both Nostrils route daily as needed. For allergies    glucose blood VI test strips (ACCU-CHEK POOJA) strip Pt to test 5 times daily. Dx:E11.65    CETIRIZINE HCL (ZYRTEC PO) Take 1 Tab by mouth daily.  ondansetron hcl (ZOFRAN) 4 mg tablet Take 1 Tab by mouth every eight (8) hours as needed for Nausea.  pregabalin (LYRICA) 50 mg capsule Take 1 Cap by mouth two (2) times a day. Max Daily Amount: 100 mg. For neuropathy     No current facility-administered medications for this visit.       Past Medical History:   Diagnosis Date    Asthma     Bilateral great toe fractures 2014    Chronic sinusitis     Dr. Chloe Aleman in the past    Colon polyp 5/16    Dr Vivien Rowe    Depression 2019    Diabetes mellitus ([facility] [address]     DJD (degenerative joint disease) of cervical spine 2016    DJD (degenerative joint disease), lumbar 2013    MRI c spine w degen changes; Dr Norm Soriano    Dyslipidemia     calculated 10 year risk score was 2.0% (12/13)    Edema of both ankles 8/28/2018    Environmental and seasonal allergies 8/28/2018    Eustachian tube dysfunction     Fibrocystic breast     Dr Susanna Bermudez GERD (gastroesophageal reflux disease)     Hypertriglyceridemia 8/28/2018    Lateral epicondylitis of both elbows 2/6/2017    Macular degeneration of both eyes 8/28/2018    Mild intermittent asthma without complication 01/53/4109    Dr. Adelaida Masters;  ratio 68%, FEV1 78% w 6% inc postbd, TLC 77, RV 56, DLCO 61%    Morbid obesity (HCC)     peak weight 196 lbs, bmi 35.8 from 10/13    Neuropathy 8/28/2018    Osteopenia     Dr. Nabila Ba; DEXA t score -0.7 spine, -0.2 hip (8/14)    Sarcoidosis     Dr Sugey Low 4 chronic kidney disease ([facility] [address] 04/2019    Dr Rosendo House, nephro    Type 2 diabetes mellitus with hyperglycemia, with long-term current use of insulin ([facility] [address] 8/28/2018     Past Surgical History:   Procedure Laterality Date    CARDIAC SURG PROCEDURE UNLIST  9/10, 8/13    thallium negative ef 72%; negative ef 70%    CHEST SURGERY PROCEDURE UNLISTED  7/12     thyroid negative    CHEST SURGERY PROCEDURE UNLISTED  2012    pfts w mod restrictive defect     COLONOSCOPY N/A 5/26/2016    Dr Karena Stallworth hyperplastic    [facility]      Dr. Jeffrey Agustin HX HEENT      nasal polypectomy Dr. Tad Diaz HX HEENT      tear duct surgery right Dr. Olivia Gonzalez 2010; left Dr Clerance Spurling 2015    HX ORTHOPAEDIC      DEXA -0.7 spine, -0.2 hip 8/14    VASCULAR SURGERY PROCEDURE UNLIST  12/13    venous doppler negative     Social History     Socioeconomic History    Marital status: LEGALLY      Spouse name: Not on file    Number of children: 0    Years of education: 13    Highest education level: Not on file   Occupational History    Occupation: Unemployed   Social Needs    Financial resource strain: Not on file    Food insecurity:     Worry: Not on file     Inability: Not on file   [place] needs:     Medical: Not on file     Non-medical: Not on file   Tobacco Use    Smoking status: Never Smoker    Smokeless tobacco: Never Used   Substance and Sexual Activity    Alcohol use:  Yes     Alcohol/week: 0.0 standard drinks     Comment: occasional wine    Drug use: No    Sexual activity: Not Currently   Lifestyle    Physical activity:     Days per week: Not on file     Minutes per session: Not on file    Stress: Not on file   Relationships    Social connections:     Talks on phone: Not on file     Gets together: Not on file     Attends Shinto service: Not on file     Active member of club or organization: Not on file     Attends meetings of clubs or organizations: Not on file     Relationship status: Not on file    Intimate partner violence:     Fear of current or ex partner: Not on file     Emotionally abused: Not on file     Physically abused: Not on file     Forced sexual activity: Not on file   Other Topics Concern     Service No    Blood Transfusions No    Caffeine Concern Yes     Comment: due to reflux    Occupational Exposure No    Hobby Hazards No    Sleep Concern Yes    Stress Concern Yes     Comment: wants a job    Weight Concern Yes    Special Diet No    Back Care No    Exercise Yes    Bike Helmet No    Seat Belt Yes    Self-Exams Yes   Social History Narrative    Patient lives with a friend, no pets.      Family History   Problem Relation Age of Onset   [employer] Cancer Mother     Hypertension Mother     Cancer Father    [employer] Diabetes Father     Hypertension Father     Stroke Father     Diabetes Sister     Hypertension Sister     Heart Disease Sister     Colon Cancer Sister 52    Hypertension Brother     Cancer Maternal Aunt        Review of systems:  She denies fever chills poor appetite weight loss and relates her skin nodules are no worse and also relates that she has discomfort in her left hip which is being evaluated    Physical Exam:  Visit Vitals  /70 (BP 1 Location: Left arm, BP Patient Position: At rest)   Pulse 79   Temp 98.3 °F (36.8 °C) (Oral)   Resp 19   Ht 5' 2\" (1.575 m)   Wt 72.6 kg (160 lb)   LMP 03/30/2013   SpO2 99%   BMI 29.26 kg/m²       Well-developed well-nourished  HEENT: WNL left maxillary tenderness  Lymph node exam: Supraclavicular cervical and nodes negative  Chest: Equal symmetrical expansion no dullness and absence of wheezes rales rubs  Heart: Regular rhythm no gallop no murmur no JVD no peripheral edema  Extremities: No cyanosis clubbing or calf tenderness  Neurological: Alert and oriented  Skin: I felt the subcutaneous nodules were not quite as prominent    Labs:  Pulmonary function tests 2/25/2020: Normal vital capacity with lung volumes and also normal spirometry and reduced diffusion capacity which is stable for several years  O2 sat 99% room air at rest    Impression:   Sleep disorder  By history physical exam and pulmonary function test stable sarcoidosis and asthma  Facial pain suggesting possible soft sinusitis especially with history of purulent nasal drainage  Hip pain and patient on steroids for a long time and is being evaluated for this  Plan:   Reduce prednisone to 6 mg a day and continue Advair and as needed albuterol  Sinus x-rays  Otherwise follow-up in 6 months or sooner if needed  Cece Clemens MD , [facility]    CC: Chantal Felix MD     [address] [facility], [address] [address]     P: [phone]     F: [phone]

## 2020-03-04 ENCOUNTER — HOSPITAL ENCOUNTER (OUTPATIENT)
Dept: GENERAL RADIOLOGY | Age: 55
Discharge: HOME | End: 2020-03-04
Payer: MEDICAID

## 2020-03-04 ENCOUNTER — OFFICE VISIT (OUTPATIENT)
Dept: ORTHOPEDIC SURGERY | Age: 55
End: 2020-03-04

## 2020-03-04 VITALS
HEIGHT: 62 IN | HEART RATE: 79 BPM | DIASTOLIC BLOOD PRESSURE: 74 MMHG | SYSTOLIC BLOOD PRESSURE: 126 MMHG | TEMPERATURE: 97.9 F | RESPIRATION RATE: 16 BRPM | BODY MASS INDEX: 29.26 KG/M2 | OXYGEN SATURATION: 98 %

## 2020-03-04 DIAGNOSIS — M51.36 DDD (DEGENERATIVE DISC DISEASE), LUMBAR: ICD-10-CM

## 2020-03-04 DIAGNOSIS — M54.50 LUMBAR PAIN: Primary | ICD-10-CM

## 2020-03-04 DIAGNOSIS — M54.59 MECHANICAL LOW BACK PAIN: ICD-10-CM

## 2020-03-04 DIAGNOSIS — D86.9 SARCOIDOSIS: ICD-10-CM

## 2020-03-04 DIAGNOSIS — M47.816 SPONDYLOSIS OF LUMBAR REGION WITHOUT MYELOPATHY OR RADICULOPATHY: ICD-10-CM

## 2020-03-04 DIAGNOSIS — M54.2 NECK PAIN: ICD-10-CM

## 2020-03-04 DIAGNOSIS — M79.18 MYOFASCIAL PAIN: ICD-10-CM

## 2020-03-04 DIAGNOSIS — J32.9 SINUSITIS, UNSPECIFIED CHRONICITY, UNSPECIFIED LOCATION: ICD-10-CM

## 2020-03-04 PROCEDURE — 70220 X-RAY EXAM OF SINUSES: CPT

## 2020-03-04 RX ORDER — LIDOCAINE 50 MG/G
1 PATCH TOPICAL EVERY 24 HOURS
Qty: 30 PATCH | Refills: 1 | Status: SHIPPED | OUTPATIENT
Start: 2020-03-04 | End: 2020-11-05 | Stop reason: SDUPTHER

## 2020-03-04 RX ORDER — METHOCARBAMOL 500 MG/1
500 TABLET, FILM COATED ORAL
Qty: 90 TAB | Refills: 1 | Status: SHIPPED | OUTPATIENT
Start: 2020-03-04 | End: 2021-03-24

## 2020-03-04 NOTE — PROGRESS NOTES
Sandra Jimeneztz presents today for   Chief Complaint   Patient presents with    Back Pain       Is someone accompanying this pt? no    Is the patient using any DME equipment during OV? no    Depression Screening:  3 most recent PHQ Screens 2/25/2020   PHQ Not Done -   Little interest or pleasure in doing things Not at all   Feeling down, depressed, irritable, or hopeless Not at all   Total Score PHQ 2 0       Learning Assessment:  Learning Assessment 8/27/2018   PRIMARY LEARNER Patient   HIGHEST LEVEL OF EDUCATION - PRIMARY LEARNER  -   BARRIERS PRIMARY LEARNER -   CO-LEARNER CAREGIVER -   PRIMARY LANGUAGE ENGLISH   LEARNER PREFERENCE PRIMARY DEMONSTRATION     VIDEOS     READING   ANSWERED BY Patient   RELATIONSHIP SELF       Abuse Screening:  Abuse Screening Questionnaire 2/6/2017   Do you ever feel afraid of your partner? N   Are you in a relationship with someone who physically or mentally threatens you? N   Is it safe for you to go home? Y         Coordination of Care:  1. Have you been to the ER, urgent care clinic since your last visit? no  Hospitalized since your last visit? no    2. Have you seen or consulted any other health care providers outside of the [address] [place] [address] since your last visit? Yes, pulmonology and nephrology Include any pap smears or colon screening.  none    Last  Checked 03/04/2020

## 2020-03-04 NOTE — PROGRESS NOTES
[address]  [address], [address]  [place], [address] [place] [address] Street  Phone: [phone]  Fax: [phone], [address]  : 1965  PCP: Chris Pan MD    PROGRESS NOTE    HISTORY OF PRESENT ILLNESS:  Chief Complaint   Patient presents with    Back Pain     Yuliet Calabrese is a 53 y.o.  female with history of neck and low back pain. She was last seen with Dr. Arnulfo Escalante in 2019. He ordered a L and C MRI. These were denied due to no conservative treatment. Today, she is here for progressive, worsening pain. She states her back is really the worst but she has neck pain as well. She failed Lyrica. Is she on gabapentin 900 mg TID with minimal relief. This was just increased last month by her PCP. She has been on this for some time. She was in PT for her back but had to stop because she was caring for her sister. Denies bladder/bowel dysfunction, saddle paresthesia, weakness, gait disturbance, or other neurological deficit. Pt at this time desires to  continue with current care/proceed with medication evaluation/proceed with care. ASSESSMENT  53 y.o. female with back and neck. Diagnoses and all orders for this visit:    1. Lumbar pain  -     REFERRAL TO PHYSICAL THERAPY  -     methocarbamol (ROBAXIN) 500 mg tablet; Take 1 Tab by mouth three (3) times daily as needed for Muscle Spasm(s). Indications: muscle spasm  -     lidocaine (LIDODERM) 5 %; 1 Patch by TransDERmal route every twenty-four (24) hours. Apply patch to the affected area for 12 hours a day and remove for 12 hours a day. 2. Neck pain  -     REFERRAL TO PHYSICAL THERAPY  -     methocarbamol (ROBAXIN) 500 mg tablet; Take 1 Tab by mouth three (3) times daily as needed for Muscle Spasm(s). Indications: muscle spasm  -     lidocaine (LIDODERM) 5 %; 1 Patch by TransDERmal route every twenty-four (24) hours. Apply patch to the affected area for 12 hours a day and remove for 12 hours a day.     3. Mechanical low back pain  -     REFERRAL TO PHYSICAL THERAPY  -     methocarbamol (ROBAXIN) 500 mg tablet; Take 1 Tab by mouth three (3) times daily as needed for Muscle Spasm(s). Indications: muscle spasm  -     lidocaine (LIDODERM) 5 %; 1 Patch by TransDERmal route every twenty-four (24) hours. Apply patch to the affected area for 12 hours a day and remove for 12 hours a day. 4. Myofascial pain  -     REFERRAL TO PHYSICAL THERAPY  -     methocarbamol (ROBAXIN) 500 mg tablet; Take 1 Tab by mouth three (3) times daily as needed for Muscle Spasm(s). Indications: muscle spasm  -     lidocaine (LIDODERM) 5 %; 1 Patch by TransDERmal route every twenty-four (24) hours. Apply patch to the affected area for 12 hours a day and remove for 12 hours a day. 5. DDD (degenerative disc disease), lumbar  -     REFERRAL TO PHYSICAL THERAPY  -     methocarbamol (ROBAXIN) 500 mg tablet; Take 1 Tab by mouth three (3) times daily as needed for Muscle Spasm(s). Indications: muscle spasm  -     lidocaine (LIDODERM) 5 %; 1 Patch by TransDERmal route every twenty-four (24) hours. Apply patch to the affected area for 12 hours a day and remove for 12 hours a day. 6. Spondylosis of lumbar region without myelopathy or radiculopathy  -     REFERRAL TO PHYSICAL THERAPY  -     methocarbamol (ROBAXIN) 500 mg tablet; Take 1 Tab by mouth three (3) times daily as needed for Muscle Spasm(s). Indications: muscle spasm  -     lidocaine (LIDODERM) 5 %; 1 Patch by TransDERmal route every twenty-four (24) hours. Apply patch to the affected area for 12 hours a day and remove for 12 hours a day. IMPRESSION/PLAN    1) Pt was given information on back exercises. 2) talk to PCP about changing her anti-depressant to Cymbalta. 3) referral to PT for back pain. 4) lidocaine patches, she has tried OTC salon patches. 5) refill robaxin  6) Ms. Joseph has a reminder for a \"due or due soon\" health maintenance.  I have asked that she contact her primary care provider, Candice De Santiago MD, for follow-up on this health maintenance. 7) We have informed patient to notify us for immediate appointment if he has any worsening neurogical symptoms or if an emergency situation presents, then call 911  8) Pt will follow-up in 8 weeks for PT FU, will consider LMRI at this visit after conservative treatment. Risks and benefits of ongoing  therapy have been reviewed with the patient.  is appropriate. No pain behaviors. Denies thoughts of harming self or others. Pt has a good risk to benefit ratio which allows the pt to function in a home environment without side effects.          PAST MEDICAL HISTORY  Past Medical History:   Diagnosis Date    Asthma     Bilateral great toe fractures 2014    Chronic sinusitis     Dr. Mauro Guaman in the past    Colon polyp 5/16    Dr Yue Guzmán    Depression 2019    Diabetes mellitus [facility]     DJD (degenerative joint disease) of cervical spine 2016    DJD (degenerative joint disease), lumbar 2013    MRI c spine w degen changes; Dr Larios Him    Dyslipidemia     calculated 10 year risk score was 2.0% (12/13)    Edema of both ankles 8/28/2018    Environmental and seasonal allergies 8/28/2018    Eustachian tube dysfunction     Fibrocystic breast     Dr Erika Lantigua GERD (gastroesophageal reflux disease)     Hypertriglyceridemia 8/28/2018    Lateral epicondylitis of both elbows 2/6/2017    Macular degeneration of both eyes 8/28/2018    Mild intermittent asthma without complication 51/62/5657    Dr. Michael Pulido;  ratio 68%, FEV1 78% w 6% inc postbd, TLC 77, RV 56, DLCO 61%    Morbid obesity (HCC)     peak weight 196 lbs, bmi 35.8 from 10/13    Neuropathy 8/28/2018    Osteopenia     Dr. Noam Coburn; DEXA t score -0.7 spine, -0.2 hip (8/14)    Sarcoidosis     Dr Michell Pascual 4 chronic kidney disease ([facility] 04/2019    Dr Daquan Ariza, nephro    Type 2 diabetes mellitus with hyperglycemia, with long-term current use of insulin ([address] 8/28/2018        MEDICATIONS  Current Outpatient Medications   Medication Sig Dispense Refill    methocarbamol (ROBAXIN) 500 mg tablet Take 1 Tab by mouth three (3) times daily as needed for Muscle Spasm(s). Indications: muscle spasm 90 Tab 1    lidocaine (LIDODERM) 5 % 1 Patch by TransDERmal route every twenty-four (24) hours. Apply patch to the affected area for 12 hours a day and remove for 12 hours a day. 30 Patch 1    predniSONE (DELTASONE) 5 mg tablet TAKE ONE TABLET BY MOUTH DAILY WITH BREAKFAST 30 Tab 5    predniSONE (DELTASONE) 1 mg tablet TAKE THREE TABLETS BY MOUTH DAILY WITH BREAKFAST 90 Tab 5    aspirin delayed-release 81 mg tablet Take 1 Tab by mouth daily. For heart health 30 Tab 11    FLUoxetine (PROZAC) 20 mg capsule Take 1 Cap by mouth daily. For anxiety/depression 30 Cap 3    hydroCHLOROthiazide (HYDRODIURIL) 25 mg tablet Take 1 Tab by mouth daily. For swelling 30 Tab 3    lisinopril (PRINIVIL, ZESTRIL) 5 mg tablet Take 1 Tab by mouth daily. For kidney protection 30 Tab 3    metFORMIN (GLUCOPHAGE) 1,000 mg tablet Take 1 Tab by mouth daily. For diabetes 30 Tab 3    pravastatin (PRAVACHOL) 40 mg tablet Take 1 Tab by mouth daily. For cholesterol 30 Tab 3    SITagliptin (JANUVIA) 25 mg tablet Take 1 Tab by mouth daily. For diabetes 30 Tab 3    gabapentin (NEURONTIN) 100 mg capsule Take 1 Cap by mouth three (3) times daily. Max Daily Amount: 300 mg. (Patient taking differently: Take 300 mg by mouth three (3) times daily.) 90 Cap 1    Insulin Needles, Disposable, 30 gauge x 1/3\" Use 1-2 times daily. Qty 100 1 Package 11    ondansetron hcl (ZOFRAN) 4 mg tablet Take 1 Tab by mouth every eight (8) hours as needed for Nausea. 8 Tab 0    insulin glargine (LANTUS,BASAGLAR) 100 unit/mL (3 mL) inpn 25 Units by SubCUTAneous route nightly. 5 Pen 1    albuterol (PROVENTIL HFA, VENTOLIN HFA, PROAIR HFA) 90 mcg/actuation inhaler Take  by inhalation.       fluticasone propion-salmeterol (ADVAIR DISKUS) 500-50 mcg/dose diskus inhaler Take 1 Puff by inhalation every twelve (12) hours.  esomeprazole (NEXIUM) 20 mg capsule Take 20 mg by mouth daily.  mometasone (NASONEX) 50 mcg/actuation nasal spray 2 Sprays by Both Nostrils route daily as needed. For allergies 3 Container 3    glucose blood VI test strips (ACCU-CHEK POOJA) strip Pt to test 5 times daily. Dx:E11.65 500 Strip 3    CETIRIZINE HCL (ZYRTEC PO) Take 1 Tab by mouth daily.  pregabalin (LYRICA) 50 mg capsule Take 1 Cap by mouth two (2) times a day. Max Daily Amount: 100 mg. For neuropathy 60 Cap 3       ALLERGIES  Allergies   Allergen Reactions    Pollen Extracts Runny Nose and Cough    Amoxicillin Hives, Shortness of Breath and Swelling    Crestor [Rosuvastatin] Myalgia    Ibuprofen Other (comments)     dont prescribe due to kidney function    Lipitor [Atorvastatin] Other (comments)     Muscle cramps and weakness      Pcn [Penicillins] Angioedema       SOCIAL HISTORY    Social History     Socioeconomic History    Marital status: LEGALLY      Spouse name: Not on file    Number of children: 0    Years of education: 13    Highest education level: Not on file   Occupational History    Occupation: Unemployed   Social Needs    Financial resource strain: Not on file    Food insecurity:     Worry: Not on file     Inability: Not on file   [place] needs:     Medical: Not on file     Non-medical: Not on file   Tobacco Use    Smoking status: Never Smoker    Smokeless tobacco: Never Used   Substance and Sexual Activity    Alcohol use:  Yes     Alcohol/week: 0.0 standard drinks     Comment: occasional wine    Drug use: No    Sexual activity: Not Currently   Lifestyle    Physical activity:     Days per week: Not on file     Minutes per session: Not on file    Stress: Not on file   Relationships    Social connections:     Talks on phone: Not on file     Gets together: Not on file     Attends Restorationism service: Not on file     Active member of club or organization: Not on file     Attends meetings of clubs or organizations: Not on file     Relationship status: Not on file    Intimate partner violence:     Fear of current or ex partner: Not on file     Emotionally abused: Not on file     Physically abused: Not on file     Forced sexual activity: Not on file   Other Topics Concern     Service No    Blood Transfusions No    Caffeine Concern Yes     Comment: due to reflux    Occupational Exposure No    Hobby Hazards No    Sleep Concern Yes    Stress Concern Yes     Comment: wants a job    Weight Concern Yes    Special Diet No    Back Care No    Exercise Yes    Bike Helmet No    Seat Belt Yes    Self-Exams Yes   Social History Narrative    Patient lives with a friend, no pets. SUBJECTIVE        Pain Scale: 6/10    Pain Assessment  3/4/2020   Location of Pain Back   Location Modifiers Medial   Severity of Pain 6   Quality of Pain Throbbing   Quality of Pain Comment -   Duration of Pain Persistent   Frequency of Pain Constant   Aggravating Factors Other (Comment)   Aggravating Factors Comment anything    Limiting Behavior Yes   Relieving Factors Other (Comment)   Relieving Factors Comment leaning forward while sitting   Result of Injury No       Accompanied by self. REVIEW OF SYSTEMS  ROS    Constitutional: Negative for fever, chills, or weight change. Respiratory: Negative for cough or shortness of breath. Cardiovascular: Negative for chest pain or palpitations. Gastrointestinal: Negative for acid reflux, change in bowel habits, or constipation. Genitourinary: Negative for incontinence, dysuria and flank pain. Musculoskeletal: Positive for back and neck pain. Skin: Negative for rash. Neurological: Negative for headaches, dizziness, or numbness. Endo/Heme/Allergies: Negative . Psychiatric/Behavioral: Negative.        PHYSICAL EXAMINATION  Visit Vitals  /74 (BP 1 Location: Left arm, BP Patient Position: Sitting)   Pulse 79   Temp 97.9 °F (36.6 °C) (Oral)   Resp 16   Ht 5' 2\" (1.575 m)   LMP 03/30/2013   SpO2 98% Comment: RA   BMI 29.26 kg/m²       Constitutional: Well developed,  well nourished,  awake, alert, and in no acute distress. Neurological:  Sensation to light touch is intact. Psychiatric: Affect and mood are appropriate. Integumentary: No rashes or abrasions noted on exposed areas,  warm, dry and intact. Cardiovascular/Peripheral Vascular:  No peripheral edema is noted. Lymphatic:  No evidence of lymphedema. No cervical lymphadenopathy. SPINE/MUSCULOSKELETAL EXAM    Cervical spine:  Neck is midline. Normal muscle tone. No focal atrophy is noted. Shoulder ROM intact. Mild Tenderness to palpation to neck. Negative Spurling's sign. Negative Tinel's sign. Negative Kramer's sign. Lumbar spine:  No rash, ecchymosis, or gross obliquity. No fasciculations. No focal atrophy is noted. Range of motion is intact. Tenderness to palpation to low back. SI joints non-tender. Trochanters non tender. Musculoskeletal:  No pain with extension, axial loading, or forward flexion. No pain with internal or external rotation of her hips. MOTOR    Biceps  Triceps Deltoids Wrist Ext Wrist Flex Hand Intrin   Right +4/5 +4/5 +4/5 +4/5 +4/5 +4/5   Left +4/5 +4/5 +4/5 +4/5 +4/5 +4/5      Hip Flex  Quads Hamstrings Ankle DF EHL Ankle PF   Right +4/5 +4/5 +4/5 +4/5 +4/5 +4/5   Left +4/5 +4/5 +4/5 +4/5 +4/5 +4/5       Straight Leg raise - bilaterally. normal gait and station    Ambulation without assistive device. full weight bearing, non-antalgic gait.     Fran Becker NP

## 2020-03-04 NOTE — PATIENT INSTRUCTIONS
Back Pain: Care Instructions Your Care Instructions Back pain has many possible causes. It is often related to problems with muscles and ligaments of the back. It may also be related to problems with the nerves, discs, or bones of the back. Moving, lifting, standing, sitting, or sleeping in an awkward way can strain the back. Sometimes you don't notice the injury until later. Arthritis is another common cause of back pain. Although it may hurt a lot, back pain usually improves on its own within several weeks. Most people recover in 12 weeks or less. Using good home treatment and being careful not to stress your back can help you feel better sooner. Follow-up care is a key part of your treatment and safety. Be sure to make and go to all appointments, and call your doctor if you are having problems. It's also a good idea to know your test results and keep a list of the medicines you take. How can you care for yourself at home? · Sit or lie in positions that are most comfortable and reduce your pain. Try one of these positions when you lie down: ? Lie on your back with your knees bent and supported by large pillows. ? Lie on the floor with your legs on the seat of a sofa or chair. ? Lie on your side with your knees and hips bent and a pillow between your legs. ? Lie on your stomach if it does not make pain worse. · Do not sit up in bed, and avoid soft couches and twisted positions. Bed rest can help relieve pain at first, but it delays healing. Avoid bed rest after the first day of back pain. · Change positions every 30 minutes. If you must sit for long periods of time, take breaks from sitting. Get up and walk around, or lie in a comfortable position. · Try using a heating pad on a low or medium setting for 15 to 20 minutes every 2 or 3 hours. Try a warm shower in place of one session with the heating pad. · You can also try an ice pack for 10 to 15 minutes every 2 to 3 hours. Put a thin cloth between the ice pack and your skin. · Take pain medicines exactly as directed. ? If the doctor gave you a prescription medicine for pain, take it as prescribed. ? If you are not taking a prescription pain medicine, ask your doctor if you can take an over-the-counter medicine. · Take short walks several times a day. You can start with 5 to 10 minutes, 3 or 4 times a day, and work up to longer walks. Walk on level surfaces and avoid hills and stairs until your back is better. · Return to work and other activities as soon as you can. Continued rest without activity is usually not good for your back. · To prevent future back pain, do exercises to stretch and strengthen your back and stomach. Learn how to use good posture, safe lifting techniques, and proper body mechanics. When should you call for help? Call your doctor now or seek immediate medical care if: 
  · You have new or worsening numbness in your legs.  
  · You have new or worsening weakness in your legs. (This could make it hard to stand up.)  
  · You lose control of your bladder or bowels.  
 Watch closely for changes in your health, and be sure to contact your doctor if: 
  · You have a fever, lose weight, or don't feel well.  
  · You do not get better as expected. Where can you learn more? Go to http://ralph-fabian.info/. Enter K002 in the search box to learn more about \"Back Pain: Care Instructions. \" Current as of: June 26, 2019 Content Version: 12.2 © 9013-1756 [employer]. Care instructions adapted under license by [employer] (which disclaims liability or warranty for this information). If you have questions about a medical condition or this instruction, always ask your healthcare professional. [place] [address] any warranty or liability for your use of this information. Back Stretches: Exercises Introduction Here are some examples of exercises for stretching your back. Start each exercise slowly. Ease off the exercise if you start to have pain. Your doctor or physical therapist will tell you when you can start these exercises and which ones will work best for you. How to do the exercises Overhead stretch 1. Stand comfortably with your feet shoulder-width apart. 2. Looking straight ahead, raise both arms over your head and reach toward the ceiling. Do not allow your head to tilt back. 3. Hold for 15 to 30 seconds, then lower your arms to your sides. 4. Repeat 2 to 4 times. Side stretch 1. Stand comfortably with your feet shoulder-width apart. 2. Raise one arm over your head, and then lean to the other side. 3. Slide your hand down your leg as you let the weight of your arm gently stretch your side muscles. Hold for 15 to 30 seconds. 4. Repeat 2 to 4 times on each side. Press-up 1. Lie on your stomach, supporting your body with your forearms. 2. Press your elbows down into the floor to raise your upper back. As you do this, relax your stomach muscles and allow your back to arch without using your back muscles. As your press up, do not let your hips or pelvis come off the floor. 3. Hold for 15 to 30 seconds, then relax. 4. Repeat 2 to 4 times. Relax and rest 
 
1. Lie on your back with a rolled towel under your neck and a pillow under your knees. Extend your arms comfortably to your sides. 2. Relax and breathe normally. 3. Remain in this position for about 10 minutes. 4. If you can, do this 2 or 3 times each day. Follow-up care is a key part of your treatment and safety. Be sure to make and go to all appointments, and call your doctor if you are having problems. It's also a good idea to know your test results and keep a list of the medicines you take. Where can you learn more? Go to http://ralph-fabian.info/. Enter Q682 in the search box to learn more about \"Back Stretches: Exercises. \" Current as of: June 26, 2019 Content Version: 12.2 © 0341-3756 [employer]. Care instructions adapted under license by [employer] (which disclaims liability or warranty for this information). If you have questions about a medical condition or this instruction, always ask your healthcare professional. [place] [address] any warranty or liability for your use of this information.

## 2020-03-18 ENCOUNTER — HOSPITAL ENCOUNTER (OUTPATIENT)
Dept: PHYSICAL THERAPY | Age: 55
Discharge: HOME | End: 2020-03-18
Payer: MEDICAID

## 2020-03-18 PROCEDURE — 97535 SELF CARE MNGMENT TRAINING: CPT

## 2020-03-18 PROCEDURE — 97110 THERAPEUTIC EXERCISES: CPT

## 2020-03-18 PROCEDURE — 97161 PT EVAL LOW COMPLEX 20 MIN: CPT

## 2020-03-18 NOTE — PROGRESS NOTES
PT DAILY TREATMENT NOTE 10-18    Patient Name: Rexann Lennox  Date:3/18/2020  : 1965  [x]  Patient  Verified  Payor: [facility] MEDICAID / Plan: [address] [place] / Product Type: Managed Care Medicaid /    In time:10:28  Out time:11:05  Total Treatment Time (min): 37  Visit #: 1 of 8      Treatment Area: Low back pain [M54.5]    SUBJECTIVE  Pain Level (0-10 scale): 7  Any medication changes, allergies to medications, adverse drug reactions, diagnosis change, or new procedure performed?: [x] No    [] Yes (see summary sheet for update)  Subjective functional status/changes:   [] No changes reported  Pt reports that her back is constantly in some level of pain    OBJECTIVE    Modality rationale: PD to improve the patients ability to    Min Type Additional Details    [] Estim:  []Unatt       []IFC  []Premod                        []Other:  []w/ice   []w/heat  Position:  Location:    [] Estim: []Att    []TENS instruct  []NMES                    []Other:  []w/US   []w/ice   []w/heat  Position:  Location:    []  Traction: [] Cervical       []Lumbar                       [] Prone          []Supine                       []Intermittent   []Continuous Lbs:  [] before manual  [] after manual    []  Ultrasound: []Continuous   [] Pulsed                           []1MHz   []3MHz W/cm2:  Location:    []  Iontophoresis with dexamethasone         Location: [] Take home patch   [] In clinic    []  Ice     []  heat  []  Ice massage  []  Laser   []  Anodyne Position:  Location:    []  Laser with stim  []  Other:  Position:  Location:    []  Vasopneumatic Device Pressure:       [] lo [] med [] hi   Temperature: [] lo [] med [] hi   [] Skin assessment post-treatment:  []intact []redness- no adverse reaction    []redness  adverse reaction:     19 min [x]Eval                  []Re-Eval       10 min Therapeutic Exercise:  [] See flow sheet :   Rationale: increase ROM to improve the patients ability to improve ADL ease    8 min Therapeutic Activity:  []  See flow sheet :   Rationale: self care and pt education  to improve the patients ability to self manage condition and maximize therapeutic effecet             With   [] TE   [] TA   [] neuro   [] other: Patient Education: [x] Review HEP    [] Progressed/Changed HEP based on:   [] positioning   [] body mechanics   [] transfers   [] heat/ice application    [] other:      Other Objective/Functional Measures:      Pain Level (0-10 scale) post treatment: 9    ASSESSMENT/Changes in Function: see POC    Patient will continue to benefit from skilled PT services to modify and progress therapeutic interventions, address functional mobility deficits, address ROM deficits, address strength deficits, analyze and address soft tissue restrictions, analyze and cue movement patterns and analyze and modify body mechanics/ergonomics to attain remaining goals. [x]  See Plan of Care  []  See progress note/recertification  []  See Discharge Summary         Progress towards goals / Updated goals:  Short Term Goals: To be accomplished in 2 weeks:  1. Pt will demonstrate I and compliance with HEP to maximize therapeutic effect. IE: HEP issued  2. Pt will demonstrate SKC >90 deg on left without pain increase to improve lumbopelvic mechanics with ADLs. IE:pain with SKC at 90 deg   Long Term Goals: To be accomplished in 4 weeks:  1. Pt will demonstrate trunk flexion fingertips to floor <10\" without pain increase to improve ease of daily tasks. IE: 12\"  2. Pt will demonstrate 75% of WNL thoracic rotation without pain to improve rib mobility and ease of self care. IE: 60% without pain left L/S  3. Pt will demonstrate left hip strength 4+/5 without back pain to improve standing activity tolerance. IE: 4 to 4+/5 with pain  4. Pt will improve FOTO score to 46% to demonstrate improved function and quality of life.    IE: 29%    PLAN  []  Upgrade activities as tolerated     [x]  Continue plan of care  [] Update interventions per flow sheet       []  Discharge due to:_  []  Other:_      Hayes Reynoso DPT, CMTPT 3/18/2020  11:24 AM    Future Appointments   Date Time Provider [place]ette [place]   4/29/2020  9:50 AM Christian Coyne  [address]

## 2020-03-18 NOTE — PROGRESS NOTES
[facility] [employer]  [address]  [place], [address]  [phone] fax    Plan of Care/ Statement of Necessity for Physical Therapy Services    Patient name: Kenna Lino Start of Care: 3/18/2020   Referral source: Ruth Samson NP : 1965    Medical Diagnosis: Low back pain [M54.5]  Payor: [facility] MEDICAID / Plan: [employer] / Product Type: Managed Care Medicaid /  Onset Date:1-2 year exacerbation    Treatment Diagnosis: Back pain   Prior Hospitalization: see medical history Provider#: [id number]   Medications: Verified on Patient summary List    Comorbidities: Sarcoidosis, diabetes, asthma, depression   Prior Level of Function: Pt reports hx of back pain but increased ADL ease prior to past 1-2 years     The Plan of Care and following information is based on the information from the initial evaluation. Assessment/ key information: Pt is a 52 y/o F presenting with c/o diffuse back pain that will radiate from lumbar region into thoracic spine with increased activity. She denies LE symptoms at this time but does report hx of left hip pain. Pt demonstrates increased left sided back pain with SLR and hip flexion passively. Quite TTP over QL and angle region of lower rib cage on left. Also left sided rib TTP to lesser extent at mid thoracic spine. Pt does demonstrate pain with right thoracic rotation and left lumbar SB. No imaging to date as pt was not authorized by her insurance company. Signs and symptoms appear consistent with mechanical back pain. She would benefit from PT to improve pain, mobility and ADL ease.     Evaluation Complexity History HIGH Complexity :3+ comorbidities / personal factors will impact the outcome/ POC ; Examination LOW Complexity : 1-2 Standardized tests and measures addressing body structure, function, activity limitation and / or participation in recreation  ;Presentation LOW Complexity : Stable, uncomplicated  ;Clinical Decision Making MEDIUM Complexity : FOTO score of 26-74  Overall Complexity Rating: LOW   Problem List: pain affecting function, decrease ROM, decrease strength, decrease ADL/ functional abilitiies, decrease activity tolerance and decrease flexibility/ joint mobility   Treatment Plan may include any combination of the following: Therapeutic exercise, Therapeutic activities, Neuromuscular re-education, Physical agent/modality, Gait/balance training, Manual therapy, Patient education, Self Care training and Functional mobility training  Patient / Family readiness to learn indicated by: trying to perform skills and interest  Persons(s) to be included in education: patient (P)  Barriers to Learning/Limitations: yes;  other pain  Patient Goal (s): Relieve or stop the pain  Patient Self Reported Health Status: fair  Rehabilitation Potential: good    Short Term Goals: To be accomplished in 2 weeks:  1. Pt will demonstrate I and compliance with HEP to maximize therapeutic effect. 2. Pt will demonstrate SKC >90 deg on left without pain increase to improve lumbopelvic mechanics with ADLs. Long Term Goals: To be accomplished in 4 weeks:  1. Pt will demonstrate trunk flexion fingertips to floor <10\" without pain increase to improve ease of daily tasks. 2. Pt will demonstrate 75% of WNL thoracic rotation without pain to improve rib mobility and ease of self care. 3. Pt will demonstrate left hip strength 4+/5 without back pain to improve standing activity tolerance. 4. Pt will improve FOTO score to 46% to demonstrate improved function and quality of life. Frequency / Duration: Patient to be seen 2 times per week for 4 weeks.     Patient/ Caregiver education and instruction: Diagnosis, prognosis, self care, activity modification and exercises   [x]  Plan of care has been reviewed with EDYTA OWENT, CMTPT 3/18/2020 11:06 AM    ________________________________________________________________________    I certify that the above Therapy Services are being furnished while the patient is under my care. I agree with the treatment plan and certify that this therapy is necessary.     Physician's Signature:____________Date:_________TIME:________    ** Signature, Date and Time must be completed for valid certification **    Please sign and return to [facility] [address]  [address]  [place], [address]  [phone] fax

## 2020-03-23 ENCOUNTER — HOSPITAL ENCOUNTER (OUTPATIENT)
Dept: PHYSICAL THERAPY | Age: 55
Discharge: HOME | End: 2020-03-23
Payer: MEDICAID

## 2020-03-23 PROCEDURE — 97110 THERAPEUTIC EXERCISES: CPT

## 2020-03-23 PROCEDURE — 97112 NEUROMUSCULAR REEDUCATION: CPT

## 2020-03-23 NOTE — PROGRESS NOTES
PT DAILY TREATMENT NOTE 10-18    Patient Name: Elba Edouard  Date:3/23/2020  : 1965  [x]  Patient  Verified  Payor: [facility] MEDICAID / Plan: [address] [place] / Product Type: Managed Care Medicaid /    In time:10:01  Out time:10:37  Total Treatment Time (min): 36  Visit #: 2 of 8    Treatment Area: Low back pain [M54.5]    SUBJECTIVE  Pain Level (0-10 scale): 5-6  Any medication changes, allergies to medications, adverse drug reactions, diagnosis change, or new procedure performed?: [x] No    [] Yes (see summary sheet for update)  Subjective functional status/changes:   [] No changes reported  Pt reports she tried some of her HEP, not a lot though as she was sleeping a lot this weekend.  She reports no issues with HEP however    OBJECTIVE    Modality rationale: PD to improve the patients ability to    Min Type Additional Details    [] Estim:  []Unatt       []IFC  []Premod                        []Other:  []w/ice   []w/heat  Position:  Location:    [] Estim: []Att    []TENS instruct  []NMES                    []Other:  []w/US   []w/ice   []w/heat  Position:  Location:    []  Traction: [] Cervical       []Lumbar                       [] Prone          []Supine                       []Intermittent   []Continuous Lbs:  [] before manual  [] after manual    []  Ultrasound: []Continuous   [] Pulsed                           []1MHz   []3MHz W/cm2:  Location:    []  Iontophoresis with dexamethasone         Location: [] Take home patch   [] In clinic    []  Ice     []  heat  []  Ice massage  []  Laser   []  Anodyne Position:  Location:    []  Laser with stim  []  Other:  Position:  Location:    []  Vasopneumatic Device Pressure:       [] lo [] med [] hi   Temperature: [] lo [] med [] hi   [] Skin assessment post-treatment:  []intact []redness- no adverse reaction    []redness      28 min Therapeutic Exercise:  [] See flow sheet :   Rationale: increase ROM and increase strength to improve the patients ability to improve ease of ADL performance    8 min Neuromuscular Re-education:  []  See flow sheet : lumbopelvic dissociation ex's (1/2 prone, PPT, clams)   Rationale: improve coordination and increase proprioception  to improve the patients ability to improve mechanics with ADLs and reduce back pain       With   [] TE   [] TA   [] neuro   [] other: Patient Education: [x] Review HEP    [] Progressed/Changed HEP based on:   [] positioning   [] body mechanics   [] transfers   [] heat/ice application    [] other:      Other Objective/Functional Measures: trial hold of manual today to assess pt response to exercises     Pain Level (0-10 scale) post treatment: 4-5    ASSESSMENT/Changes in Function: Pt did have some pain increase with thoracic extension utilizing towel roll, advised to perform without towel roll at home. Also, still sensitive to stretch over left QL/thoracic region. Will continue with mobility based work and desensitization     Patient will continue to benefit from skilled PT services to modify and progress therapeutic interventions, address functional mobility deficits, address ROM deficits, address strength deficits, analyze and address soft tissue restrictions, analyze and cue movement patterns and analyze and modify body mechanics/ergonomics to attain remaining goals. []  See Plan of Care  []  See progress note/recertification  []  See Discharge Summary         Progress towards goals / Updated goals:  Short Term Goals: To be accomplished in 2 weeks:  1. Pt will demonstrate I and compliance with HEP to maximize therapeutic effect. IE: HEP issued   Current: pt reports partial compliance 3/23/2020  2. Pt will demonstrate SKC >90 deg on left without pain increase to improve lumbopelvic mechanics with ADLs. IE:pain with SKC at 90 deg   Long Term Goals: To be accomplished in 4 weeks:  1.  Pt will demonstrate trunk flexion fingertips to floor <10\" without pain increase to improve ease of daily tasks. IE: 12\"  2. Pt will demonstrate 75% of WNL thoracic rotation without pain to improve rib mobility and ease of self care. IE: 60% without pain left L/S  3. Pt will demonstrate left hip strength 4+/5 without back pain to improve standing activity tolerance. IE: 4 to 4+/5 with pain  4. Pt will improve FOTO score to 46% to demonstrate improved function and quality of life.               IE: 29%    PLAN  []  Upgrade activities as tolerated     []  Continue plan of care  []  Update interventions per flow sheet       []  Discharge due to:_  []  Other:_      Lito Pittman DPT, CMTPT 3/23/2020  10:01 AM    Future Appointments   Date Time Provider Claudette Cassidy   3/25/2020 12:30 PM Skip [address] [facility]   3/30/2020 10:30 AM Miguelito Dudley [facility]   4/1/2020 11:00 AM Sarahuecorry Balrashaad, PTA [facility]   4/6/2020 10:30 AM Sarahuecorry Sutherland, PTA [facility]   4/8/2020 11:00 AM Jessi Sutherland, PTA [facility]   4/13/2020 10:30 AM Miguelito Dudley [facility]   4/29/2020  9:50 AM Alex Pillai  [address]

## 2020-03-25 ENCOUNTER — HOSPITAL ENCOUNTER (OUTPATIENT)
Dept: PHYSICAL THERAPY | Age: 55
Discharge: HOME | End: 2020-03-25
Payer: MEDICAID

## 2020-03-25 PROCEDURE — 97110 THERAPEUTIC EXERCISES: CPT

## 2020-03-25 PROCEDURE — 97112 NEUROMUSCULAR REEDUCATION: CPT

## 2020-03-25 PROCEDURE — 97140 MANUAL THERAPY 1/> REGIONS: CPT

## 2020-03-25 NOTE — PROGRESS NOTES
PT DAILY TREATMENT NOTE 10-18    Patient Name: Bryn Heart  Date:3/25/2020  : 1965  [x]  Patient  Verified  Payor: [facility] MEDICAID / Plan: [address] / Product Type: Managed Care Medicaid /    In time: 12:26   Out time: 1:25  Total Treatment Time (min): 61  Visit #: 3 of 8    Treatment Area: Low back pain [M54.5]    SUBJECTIVE  Pain Level (0-10 scale): 6-7  Any medication changes, allergies to medications, adverse drug reactions, diagnosis change, or new procedure performed?: [x] No    [] Yes (see summary sheet for update)  Subjective functional status/changes:   [] No changes reported  Pt reports increased pain today and states she thinks it is from the weather. OBJECTIVE    35 min Therapeutic Exercise:  [x] See flow sheet :   Rationale: increase ROM and increase strength to improve the patients ability to improve ease of ADLs    14 min Neuromuscular Re-education:  [x]  See flow sheet : core and glute stability exercises per flow sheet   Rationale: improve coordination and increase proprioception  to improve the patients ability to improve mechanics with ADLs     10 min Manual Therapy: In right s/l: DTM/TPR/MFR to left QL, in supine: pelvic alignment and leg length assessment   Rationale: decrease pain, increase ROM, increase tissue extensibility, decrease trigger points and increase postural awareness to improve ease of mobility. With   [] TE   [] TA   [] neuro   [] other: Patient Education: [x] Review HEP    [] Progressed/Changed HEP based on:   [] positioning   [] body mechanics   [] transfers   [] heat/ice application    [] other:      Other Objective/Functional Measures: Pelvic alignment and leg length WNL today. Several trigger points noted to the left QL with manual interventions. Pain Level (0-10 scale) post treatment: 7-8    ASSESSMENT/Changes in Function: Reported increased pain post session today. Pt demonstrates soft tissue limitations to the left QL region.  Pt demonstrates fair TA contraction with PPT exercises but has some difficulty with lumbopelvic dissociation with the PPT. We will plan on continuing therapy to improve core stability and trunk flexibility. Continue POC as tolerated. Patient will continue to benefit from skilled PT services to modify and progress therapeutic interventions, address functional mobility deficits, address ROM deficits, address strength deficits, analyze and address soft tissue restrictions, analyze and cue movement patterns and analyze and modify body mechanics/ergonomics to attain remaining goals. []  See Plan of Care  []  See progress note/recertification  []  See Discharge Summary         Progress towards goals / Updated goals:  Short Term Goals: To be accomplished in 2 weeks:  1. Pt will demonstrate I and compliance with HEP to maximize therapeutic effect. IE: HEP issued   Current: pt reports partial compliance 3/23/2020  2. Pt will demonstrate SKC >90 deg on left without pain increase to improve lumbopelvic mechanics with ADLs. IE:pain with SKC at 90 deg   Long Term Goals: To be accomplished in 4 weeks:  1. Pt will demonstrate trunk flexion fingertips to floor <10\" without pain increase to improve ease of daily tasks. IE: 12\"  2. Pt will demonstrate 75% of WNL thoracic rotation without pain to improve rib mobility and ease of self care. IE: 60% without pain left L/S   Current: limited B t/s rotation noted with thread the needle exercise 3/25/2020  3. Pt will demonstrate left hip strength 4+/5 without back pain to improve standing activity tolerance. IE: 4 to 4+/5 with pain  4. Pt will improve FOTO score to 46% to demonstrate improved function and quality of life.               IE: 29%    PLAN  [x]  Upgrade activities as tolerated     [x]  Continue plan of care  [x]  Update interventions per flow sheet       []  Discharge due to:_  []  Other:_      Raheel Sewell PT 3/25/2020  12:30 PM    Future Appointments   Date Time Provider Claudette Benjamin   3/30/2020 10:30 AM Skip, [address] [facility]   4/1/2020 11:00 AM Louanna Reach, PTA [facility]   4/6/2020 10:30 AM Louanna Reach, PTA [facility]   4/8/2020 11:00 AM Louanna Reach, PTA [facility]   4/13/2020 10:30 AM Andreas Lepe [facility]   4/29/2020  9:50 AM Kenna Russ  [address]

## 2020-03-30 ENCOUNTER — APPOINTMENT (OUTPATIENT)
Dept: PHYSICAL THERAPY | Age: 55
End: 2020-03-30
Payer: MEDICAID

## 2020-04-01 ENCOUNTER — HOSPITAL ENCOUNTER (OUTPATIENT)
Dept: PHYSICAL THERAPY | Age: 55
Discharge: HOME | End: 2020-04-01
Payer: MEDICAID

## 2020-04-01 PROCEDURE — 97112 NEUROMUSCULAR REEDUCATION: CPT

## 2020-04-01 PROCEDURE — 97140 MANUAL THERAPY 1/> REGIONS: CPT

## 2020-04-01 PROCEDURE — 97110 THERAPEUTIC EXERCISES: CPT

## 2020-04-01 NOTE — PROGRESS NOTES
PT DAILY TREATMENT NOTE 10-18    Patient Name: Mariusz Castaneda  Date:2020  : 1965  [x]  Patient  Verified  Payor: [facility] MEDICAID / Plan: [address] [place] / Product Type: Managed Care Medicaid /    In time:11:00  Out time:11:48  Total Treatment Time (min): 48  Visit #: 4 of 8    Treatment Area: Low back pain [M54.5]    SUBJECTIVE  Pain Level (0-10 scale): 5  Any medication changes, allergies to medications, adverse drug reactions, diagnosis change, or new procedure performed?: [x] No    [] Yes (see summary sheet for update)  Subjective functional status/changes:   [] No changes reported  Pt reports her low back always hurts but its the left side that gives her the most problems. Pt states her left side hurts more than her right side coming into treatment today. OBJECTIVE:      30 min Therapeutic Exercise:  [x] See flow sheet :   Rationale: increase ROM and increase strength to improve the patients ability to perform ADL's with ease. 10 min Neuromuscular Re-education:  [x]  See flow sheet :   Rationale: improve coordination and increase proprioception  to improve the patients ability to improve mechanics with ADL's    8 min Manual Therapy: In right s/l: DTM/TPR/MFR to left QL   Rationale: decrease pain, increase ROM and increase tissue extensibility to improve functional mobility with ADL's. With   [] TE   [] TA   [] neuro   [] other: Patient Education: [x] Review HEP    [] Progressed/Changed HEP based on:   [] positioning   [] body mechanics   [] transfers   [] heat/ice application    [] other:      Other Objective/Functional Measures:   1/2 prone LE ext and DK- 10x ea     Pain Level (0-10 scale) post treatment: 5    ASSESSMENT/Changes in Function:   Empahsis on core engagement with therex this visit with pt noting no increased pain with exercises. Pt continues to be TTP in the left QL with manual but was able to tolerate full manual treatment.  Pt displays improved Lumbopelvic association with pelvic tilts and maintaining pelvic tilts with supine exercises. No increase in pain reported post treatment. Patient will continue to benefit from skilled PT services to modify and progress therapeutic interventions, address functional mobility deficits, address ROM deficits, address strength deficits, analyze and address soft tissue restrictions, analyze and cue movement patterns, analyze and modify body mechanics/ergonomics and assess and modify postural abnormalities to attain remaining goals. [x]  See Plan of Care  []  See progress note/recertification  []  See Discharge Summary         Progress towards goals / Updated goals:  Short Term Goals: To be accomplished in 2 weeks:  1. Pt will demonstrate I and compliance with HEP to maximize therapeutic effect.              RZ: HEP issued              Current: pt reports partial compliance 3/23/2020  2. Pt will demonstrate SKC >90 deg on left without pain increase to improve lumbopelvic mechanics with ADLs.             IE:pain with SKC at 90 deg   Long Term Goals: To be accomplished in 4 weeks:  1. Pt will demonstrate trunk flexion fingertips to floor <10\" without pain increase to improve ease of daily tasks.              IE: 12\"  2. Pt will demonstrate 75% of WNL thoracic rotation without pain to improve rib mobility and ease of self care.              IE: 60% without pain left L/S              Current: limited B t/s rotation noted with thread the needle exercise 3/25/2020  3. Pt will demonstrate left hip strength 4+/5 without back pain to improve standing activity tolerance.              IE: 4 to 4+/5 with pain  4.  Pt will improve FOTO score to 46% to demonstrate improved function and quality of life.              IE: 29%    PLAN  []  Upgrade activities as tolerated     [x]  Continue plan of care  []  Update interventions per flow sheet       []  Discharge due to:_  []  Other:_      Lillian [facility], PTA 4/1/2020  9:58 AM    Future Appointments   Date Time Provider Claudette Cassidy   4/1/2020 11:00 AM Edwin Ruiz PTA [facility]   4/2/2020 11:30 AM Edwin Ruiz PTA [facility]   4/6/2020 10:30 AM Darian Dwyer [facility]   4/8/2020 11:00 AM Astrid Correia PTA [facility]   4/13/2020 10:30 AM Darian Dwyer [facility]   4/29/2020  9:50 AM Cesario Bustillo  [address]

## 2020-04-02 ENCOUNTER — APPOINTMENT (OUTPATIENT)
Dept: PHYSICAL THERAPY | Age: 55
End: 2020-04-02
Payer: MEDICAID

## 2020-04-06 ENCOUNTER — HOSPITAL ENCOUNTER (OUTPATIENT)
Dept: PHYSICAL THERAPY | Age: 55
Discharge: HOME | End: 2020-04-06
Payer: MEDICAID

## 2020-04-06 PROCEDURE — 97112 NEUROMUSCULAR REEDUCATION: CPT

## 2020-04-06 PROCEDURE — 97140 MANUAL THERAPY 1/> REGIONS: CPT

## 2020-04-06 PROCEDURE — 97014 ELECTRIC STIMULATION THERAPY: CPT

## 2020-04-06 PROCEDURE — 97110 THERAPEUTIC EXERCISES: CPT
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
## 2020-04-08 ENCOUNTER — HOSPITAL ENCOUNTER (OUTPATIENT)
Dept: PHYSICAL THERAPY | Age: 55
Discharge: HOME | End: 2020-04-08
Payer: MEDICAID

## 2020-04-08 PROCEDURE — 97112 NEUROMUSCULAR REEDUCATION: CPT

## 2020-04-08 PROCEDURE — 97032 APPL MODALITY 1+ESTIM EA 15: CPT

## 2020-04-08 PROCEDURE — 97140 MANUAL THERAPY 1/> REGIONS: CPT

## 2020-04-08 PROCEDURE — 97110 THERAPEUTIC EXERCISES: CPT

## 2020-04-08 NOTE — PROGRESS NOTES
PT DAILY TREATMENT NOTE 10-18    Patient Name: Arun Dai  Date:2020  : 1965  [x]  Patient  Verified  Payor: [facility] MEDICAID / Plan: [address] [place] / Product Type: Managed Care Medicaid /    In time:10:30  Out time:11:23  Total Treatment Time (min): 48  Visit #: 6 of 8      Treatment Area: Low back pain [M54.5]    SUBJECTIVE  Pain Level (0-10 scale): 6  Any medication changes, allergies to medications, adverse drug reactions, diagnosis change, or new procedure performed?: [x] No    [] Yes (see summary sheet for update)  Subjective functional status/changes:   [] No changes reported  Pt reports she is doing a little better today. OBJECTIVE    Modality rationale: decrease inflammation and decrease pain to improve the patients ability to perform functional tasks   Min Type Additional Details   10 [x] Estim:  [x]Unatt       [x]IFC  []Premod                        []Other:  [x]w/ice   []w/heat  Position: seated  Location:left lower t/s /  L/S    [] Estim: []Att    []TENS instruct  []NMES                    []Other:  []w/US   []w/ice   []w/heat  Position:  Location:    []  Traction: [] Cervical       []Lumbar                       [] Prone          []Supine                       []Intermittent   []Continuous Lbs:  [] before manual  [] after manual    []  Ultrasound: []Continuous   [] Pulsed                           []1MHz   []3MHz W/cm2:  Location:    []  Iontophoresis with dexamethasone         Location: [] Take home patch   [] In clinic    []  Ice     []  heat  []  Ice massage  []  Laser   []  Anodyne Position:  Location:    []  Laser with stim  []  Other:  Position:  Location:    []  Vasopneumatic Device Pressure:       [] lo [] med [] hi   Temperature: [] lo [] med [] hi   [] Skin assessment post-treatment:  []intact []redness- no adverse reaction    []redness  adverse reaction:     27 min Therapeutic Exercise:  [x]?  See flow sheet :   Rationale: increase ROM and increase strength to improve the patients ability to improve ease of ADLs and self care     8 min Neuromuscular Re-education:  [x]? See flow sheet : PPT, 1/2 prone glute activation ex's   Rationale: improve coordination and increase proprioception  to improve the patients ability to activate core musculature to reduce stress on lumbar spine with functional tasks     8 min Manual Therapy:  Left QL STM in right sidelying   Rationale: decrease pain, increase ROM and increase tissue extensibility to improve ease of ADLs            With   [] TE   [] TA   [] neuro   [] other: Patient Education: [x] Review HEP    [] Progressed/Changed HEP based on:   [] positioning   [] body mechanics   [] transfers   [] heat/ice application    [] other:      Other Objective/Functional Measures:  Restarted therex per flow sheet     Pain Level (0-10 scale) post treatment: 6    ASSESSMENT/Changes in Function: pt with improved pain from yesterday. She does still exhibit 6/10 pain level and shows increased tenderness over the left QL. Lumbar flexion has improved as noted below. Patient will continue to benefit from skilled PT services to modify and progress therapeutic interventions, address functional mobility deficits, address ROM deficits, address strength deficits, analyze and address soft tissue restrictions, analyze and cue movement patterns, analyze and modify body mechanics/ergonomics and assess and modify postural abnormalities to attain remaining goals. []  See Plan of Care  []  See progress note/recertification  []  See Discharge Summary         Progress towards goals / Updated goals:  Short Term Goals: To be accomplished in 2 weeks:  1. Pt will demonstrate I and compliance with HEP to maximize therapeutic effect.              AP: HEP issued              [id number]: pt reports partial compliance 3/23/2020  2. Pt will demonstrate SKC >90 deg on left without pain increase to improve lumbopelvic mechanics with ADLs.               IE:pain with [facility] at 90 deg               Current: slow progress- pain >90 deg 4/6/2020  Long Term Goals: To be accomplished in 4 weeks:  1. Pt will demonstrate trunk flexion fingertips to floor <10\" without pain increase to improve ease of daily tasks.              IE: 12\"    Current: MET 4\" on 4-8-2020  2. Pt will demonstrate 75% of WNL thoracic rotation without pain to improve rib mobility and ease of self care.              IE: 60% without pain left L/S              Current: still limited right > left today with discomfort through left trunk 4/6/2020  3. Pt will demonstrate left hip strength 4+/5 without back pain to improve standing activity tolerance.              IE: 4 to 4+/5 with pain     4.  Pt will improve FOTO score to 46% to demonstrate improved function and quality of life.              IE: 29%    PLAN  []  Upgrade activities as tolerated     [x]  Continue plan of care  []  Update interventions per flow sheet       []  Discharge due to:_  []  Other:_      Mihaela Cabral, PT 4/8/2020  10:42 AM    Future Appointments   Date Time Provider Claudette Benjamin   4/13/2020 10:30 AM [facility] [address] [facility]   4/22/2020 11:30 AM MAGGY Goncalves

## 2020-04-13 ENCOUNTER — APPOINTMENT (OUTPATIENT)
Dept: PHYSICAL THERAPY | Age: 55
End: 2020-04-13
Payer: MEDICAID

## 2020-04-14 ENCOUNTER — HOSPITAL ENCOUNTER (OUTPATIENT)
Dept: PHYSICAL THERAPY | Age: 55
Discharge: HOME | End: 2020-04-14
Payer: MEDICAID

## 2020-04-14 PROCEDURE — 97110 THERAPEUTIC EXERCISES: CPT

## 2020-04-14 PROCEDURE — 97140 MANUAL THERAPY 1/> REGIONS: CPT

## 2020-04-14 PROCEDURE — 97112 NEUROMUSCULAR REEDUCATION: CPT

## 2020-04-14 PROCEDURE — 97014 ELECTRIC STIMULATION THERAPY: CPT

## 2020-04-14 NOTE — PROGRESS NOTES
PT DAILY TREATMENT NOTE 10-18    Patient Name: Dyana Unger  MXCK:  : 1965  [x]  Patient  Verified  Payor: [facility] MEDICAID / Plan: [facility]iver[facility] / Product Type: Managed Care Medicaid /    In time:10:29  Out time:11:23  Total Treatment Time (min): 54  Visit #: 7 of 8    Treatment Area: Low back pain [M54.5]    SUBJECTIVE  Pain Level (0-10 scale): 4-5  Any medication changes, allergies to medications, adverse drug reactions, diagnosis change, or new procedure performed?: [x] No    [] Yes (see summary sheet for update)  Subjective functional status/changes:   [] No changes reported  Pt reports that she does feel that ice helped her symptoms as she used it over the weekend also.  Reports she has not felt this good in some time    OBJECTIVE    Modality rationale: decrease inflammation and decrease pain to improve the patients ability to perform ADLs with less pain   Min Type Additional Details   10 [x] Estim:  []Unatt       [x]IFC  []Premod                        []Other:  [x]w/ice   []w/heat  Position:seated  Location: left T-L spine    [] Estim: []Att    []TENS instruct  []NMES                    []Other:  []w/US   []w/ice   []w/heat  Position:  Location:    []  Traction: [] Cervical       []Lumbar                       [] Prone          []Supine                       []Intermittent   []Continuous Lbs:  [] before manual  [] after manual    []  Ultrasound: []Continuous   [] Pulsed                           []1MHz   []3MHz W/cm2:  Location:    []  Iontophoresis with dexamethasone         Location: [] Take home patch   [] In clinic    []  Ice     []  heat  []  Ice massage  []  Laser   []  Anodyne Position:  Location:    []  Laser with stim  []  Other:  Position:  Location:    []  Vasopneumatic Device Pressure:       [] lo [] med [] hi   Temperature: [] lo [] med [] hi   [] Skin assessment post-treatment:  []intact []redness- no adverse reaction    []redness  adverse reaction:     28 min Therapeutic Exercise:  [] See flow sheet :   Rationale: increase ROM and increase strength to improve the patients ability to perform ADLs    8 min Neuromuscular Re-education:  [x]  See flow sheet : supine PPT and PPT w/ march, 1/2 prone hip extension   Rationale: improve coordination and increase proprioception  to improve the patients ability to improve core activation and reduced stress on T-L musculature with daily tasks    8 min Manual Therapy:  Left QL and proximal glute STM in right sidelying   Rationale: decrease pain and increase tissue extensibility to improve ease of ADL performance and self care        With   [] TE   [] TA   [] neuro   [] other: Patient Education: [x] Review HEP    [] Progressed/Changed HEP based on:   [] positioning   [] body mechanics   [] transfers   [] heat/ice application    [] other:      Other Objective/Functional Measures: 4+/5 left hip flexion and abduction without pain  4/5 extension with discomfort slightly in left trunk     Pain Level (0-10 scale) post treatment: 4    ASSESSMENT/Changes in Function: Pt demonstrates much improved mobility and exercise tolerance without pain increase compared to previous sessions. She reports feeling much improved of late. Will decide possible 2 week continuance of PT to progress standing core endurance vs D/C later this week. Pt to f/u with MD via telehealth next week. Patient will continue to benefit from skilled PT services to modify and progress therapeutic interventions, address functional mobility deficits, address ROM deficits, address strength deficits, analyze and address soft tissue restrictions, analyze and cue movement patterns and analyze and modify body mechanics/ergonomics to attain remaining goals. []  See Plan of Care  []  See progress note/recertification  []  See Discharge Summary         Progress towards goals / Updated goals:  Short Term Goals: To be accomplished in 2 weeks:  1.  Pt will demonstrate I and compliance with HEP to maximize therapeutic effect.              MY: HEP issued              [id number]: pt reports partial compliance 3/23/2020  2. Pt will demonstrate SKC >90 deg on left without pain increase to improve lumbopelvic mechanics with ADLs.             IE:pain with SKC at 90 deg               Current: slow progress- pain >90 deg 4/6/2020  Long Term Goals: To be accomplished in 4 weeks:  1. Pt will demonstrate trunk flexion fingertips to floor <10\" without pain increase to improve ease of daily tasks.              IE: 12\"               Current: MET 4\" on 4-8-2020  2. Pt will demonstrate 75% of WNL thoracic rotation without pain to improve rib mobility and ease of self care.              IE: 60% without pain left L/S              Current: met- 75% of WNL without pain 4/14/2020  3. Pt will demonstrate left hip strength 4+/5 without back pain to improve standing activity tolerance.              IE: 4 to 4+/5 with pain              Current: near met 4+/5 flexion and ABD without pain 4/5 extension with slight discomfort 4/14/2020  4.  Pt will improve FOTO score to 46% to demonstrate improved function and quality of life.              IE: 29%   Current: met- 48% 4/14/2020    PLAN  [x]  Upgrade activities as tolerated     []  Continue plan of care  []  Update interventions per flow sheet       []  Discharge due to:_  []  Other:_      Apryl Cartagena DPT, CMTPT 4/14/2020  10:29 AM    Future Appointments   Date Time Provider Claudette Beei   4/14/2020 10:30 AM Skip [address] [facility]   4/22/2020 11:30 AM MAGGY Ross

## 2020-04-15 ENCOUNTER — APPOINTMENT (OUTPATIENT)
Dept: PHYSICAL THERAPY | Age: 55
End: 2020-04-15
Payer: MEDICAID

## 2020-04-22 ENCOUNTER — VIRTUAL VISIT (OUTPATIENT)
Dept: ORTHOPEDIC SURGERY | Age: 55
End: 2020-04-22

## 2020-04-22 DIAGNOSIS — G89.29 CHRONIC LEFT-SIDED LOW BACK PAIN WITH LEFT-SIDED SCIATICA: Primary | ICD-10-CM

## 2020-04-22 DIAGNOSIS — M54.42 CHRONIC LEFT-SIDED LOW BACK PAIN WITH LEFT-SIDED SCIATICA: Primary | ICD-10-CM

## 2020-04-22 DIAGNOSIS — M54.50 LUMBAR PAIN: ICD-10-CM

## 2020-04-22 RX ORDER — TOPIRAMATE 25 MG/1
TABLET ORAL
Qty: 90 TAB | Refills: 1 | Status: SHIPPED | OUTPATIENT
Start: 2020-04-22 | End: 2021-03-24 | Stop reason: SDUPTHER

## 2020-04-22 NOTE — PATIENT INSTRUCTIONS
Back Stretches: Exercises Introduction Here are some examples of exercises for stretching your back. Start each exercise slowly. Ease off the exercise if you start to have pain. Your doctor or physical therapist will tell you when you can start these exercises and which ones will work best for you. How to do the exercises Overhead stretch 1. Stand comfortably with your feet shoulder-width apart. 2. Looking straight ahead, raise both arms over your head and reach toward the ceiling. Do not allow your head to tilt back. 3. Hold for 15 to 30 seconds, then lower your arms to your sides. 4. Repeat 2 to 4 times. Side stretch 1. Stand comfortably with your feet shoulder-width apart. 2. Raise one arm over your head, and then lean to the other side. 3. Slide your hand down your leg as you let the weight of your arm gently stretch your side muscles. Hold for 15 to 30 seconds. 4. Repeat 2 to 4 times on each side. Press-up 1. Lie on your stomach, supporting your body with your forearms. 2. Press your elbows down into the floor to raise your upper back. As you do this, relax your stomach muscles and allow your back to arch without using your back muscles. As your press up, do not let your hips or pelvis come off the floor. 3. Hold for 15 to 30 seconds, then relax. 4. Repeat 2 to 4 times. Relax and rest  
1. Lie on your back with a rolled towel under your neck and a pillow under your knees. Extend your arms comfortably to your sides. 2. Relax and breathe normally. 3. Remain in this position for about 10 minutes. 4. If you can, do this 2 or 3 times each day. Follow-up care is a key part of your treatment and safety. Be sure to make and go to all appointments, and call your doctor if you are having problems. It's also a good idea to know your test results and keep a list of the medicines you take. Where can you learn more? Go to http://ralph-fabian.info/ Enter K674 in the search box to learn more about \"Back Stretches: Exercises. \" Current as of: June 26, 2019Content Version: 12.4 © 8962-6762 [employer]. Care instructions adapted under license by [employer] (which disclaims liability or warranty for this information). If you have questions about a medical condition or this instruction, always ask your healthcare professional. [address] any warranty or liability for your use of this information. Back Pain: Care Instructions Your Care Instructions Back pain has many possible causes. It is often related to problems with muscles and ligaments of the back. It may also be related to problems with the nerves, discs, or bones of the back. Moving, lifting, standing, sitting, or sleeping in an awkward way can strain the back. Sometimes you don't notice the injury until later. Arthritis is another common cause of back pain. Although it may hurt a lot, back pain usually improves on its own within several weeks. Most people recover in 12 weeks or less. Using good home treatment and being careful not to stress your back can help you feel better sooner. Follow-up care is a key part of your treatment and safety. Be sure to make and go to all appointments, and call your doctor if you are having problems. It's also a good idea to know your test results and keep a list of the medicines you take. How can you care for yourself at home? · Sit or lie in positions that are most comfortable and reduce your pain. Try one of these positions when you lie down: ? Lie on your back with your knees bent and supported by large pillows. ? Lie on the floor with your legs on the seat of a sofa or chair. ? Lie on your side with your knees and hips bent and a pillow between your legs. ? Lie on your stomach if it does not make pain worse. · Do not sit up in bed, and avoid soft couches and twisted positions.  Bed rest can help relieve pain at first, but it delays healing. Avoid bed rest after the first day of back pain. · Change positions every 30 minutes. If you must sit for long periods of time, take breaks from sitting. Get up and walk around, or lie in a comfortable position. · Try using a heating pad on a low or medium setting for 15 to 20 minutes every 2 or 3 hours. Try a warm shower in place of one session with the heating pad. · You can also try an ice pack for 10 to 15 minutes every 2 to 3 hours. Put a thin cloth between the ice pack and your skin. · Take pain medicines exactly as directed. ? If the doctor gave you a prescription medicine for pain, take it as prescribed. ? If you are not taking a prescription pain medicine, ask your doctor if you can take an over-the-counter medicine. · Take short walks several times a day. You can start with 5 to 10 minutes, 3 or 4 times a day, and work up to longer walks. Walk on level surfaces and avoid hills and stairs until your back is better. · Return to work and other activities as soon as you can. Continued rest without activity is usually not good for your back. · To prevent future back pain, do exercises to stretch and strengthen your back and stomach. Learn how to use good posture, safe lifting techniques, and proper body mechanics. When should you call for help? Call your doctor now or seek immediate medical care if: 
  · You have new or worsening numbness in your legs.  
  · You have new or worsening weakness in your legs. (This could make it hard to stand up.)  
  · You lose control of your bladder or bowels.  
 Watch closely for changes in your health, and be sure to contact your doctor if: 
  · You have a fever, lose weight, or don't feel well.  
  · You do not get better as expected. Where can you learn more? Go to http://ralph-fabian.info/ Enter P778 in the search box to learn more about \"Back Pain: Care Instructions. \" 
 Current as of: June 26, 2019Content Version: 12.4 © 4929-8656 [employer]. Care instructions adapted under license by [employer] (which disclaims liability or warranty for this information). If you have questions about a medical condition or this instruction, always ask your healthcare professional. [address] any warranty or liability for your use of this information.

## 2020-04-22 NOTE — PROGRESS NOTES
Stephon Butt is a 53 y.o. female who was seen by synchronous (real-time) audio-video technology on 4/22/2020. She has a history of  neck and low back pain. She was seen with Dr. Avtar Win in 2019. He ordered a L and C MRI. These were denied due to no conservative treatment. I saw her last and she was here for progressive, worsening pain. She statesder back was really the worst but she had neck pain as well. She failed Lyrica. She was on gabapentin 900 mg TID with minimal relief. This was just increased last month by her PCP. She has been on this for some time. She was in PT for her back but had to stop because she was caring for her sister. I recommended she talk with her PCP about changing her anti-depressant to Cymbalta. She was given lidocaine patches, robaxin and was sent to PT. Today, she states she finished PT last week. She stated ESTIM with cold pack was helpful. She states now that she is not in PT, her pain has increased again. She is taking Robaxin but they aren't helpful. She is describing a shooting constant pain. She really is having back pain and is now having pain that radiates up. The gabapentin is not helpful either and it may be making her sleepy. Denies bladder/bowel dysfunction, saddle paresthesia, weakness, gait disturbance, or other neurological deficit. Pt at this time desires to continue with current care/proceed with medication evaluation. ASSESSMENT  53 y.o. female with back pain. Diagnoses and all orders for this visit:    1. Chronic left-sided low back pain with left-sided sciatica  -     topiramate (Topamax) 25 mg tablet; 1 tab nightly  x 5 days, then 2 tabs nightly x 5 day and then 3 tabs nightly  -     AMB SUPPLY ORDER  -     MRI LUMB SPINE WO CONT; Future    2. Lumbar pain           IMPRESSION/PLAN    1) Pt was given information on back exercises. 2) trial of Topamax. Failed gabapentin, lyrica  3) ESTIM  4) L MRI, failed conservative treatment   4) Ms. Joseph has a reminder for a \"due or due soon\" health maintenance. I have asked that she contact her primary care provider, Maureen Worrell MD, for follow-up on this health maintenance. 5) We have informed patient to notify us for immediate appointment if he has any worsening neurogical symptoms or if an emergency situation presents, then call 911  5) Pt will follow-up in June after MRI with physiatry     Risks and benefits of ongoing therapy have been reviewed with the patient.  is appropriate. No pain behaviors. Denies thoughts of harming self or others. Pt has a good risk to benefit ratio which allows the pt to function in a home environment without side effects. Assessment & Plan:   Diagnoses and all orders for this visit:    1. Chronic left-sided low back pain with left-sided sciatica  -     topiramate (Topamax) 25 mg tablet; 1 tab nightly  x 5 days, then 2 tabs nightly x 5 day and then 3 tabs nightly  -     AMB SUPPLY ORDER  -     MRI LUMB SPINE WO CONT; Future    2. Lumbar pain                  Subjective:   Didieremelia Beasley was seen for No chief complaint on file.         PAST MEDICAL HISTORY  Past Medical History:   Diagnosis Date    Asthma     Bilateral great toe fractures 2014    Chronic sinusitis     Dr. Stephanie Garcia in the past    Colon polyp 5/16    Dr Levin Hatchet    Depression 2019    Diabetes mellitus ([address]     DJD (degenerative joint disease) of cervical spine 2016    DJD (degenerative joint disease), lumbar 2013    MRI c spine w degen changes; Dr Talita Jacobo    Dyslipidemia     calculated 10 year risk score was 2.0% (12/13)    Edema of both ankles 8/28/2018    Environmental and seasonal allergies 8/28/2018    Eustachian tube dysfunction     Fibrocystic breast     Dr Luda Currie GERD (gastroesophageal reflux disease)     Hypertriglyceridemia 8/28/2018    Lateral epicondylitis of both elbows 2/6/2017    Macular degeneration of both eyes 8/28/2018    Mild intermittent asthma without complication 26/45/5453    Dr. Kevin Aviles;  ratio 68%, FEV1 78% w 6% inc postbd, TLC 77, RV 56, DLCO 61%    Morbid obesity (HCC)     peak weight 196 lbs, bmi 35.8 from 10/13    Neuropathy 8/28/2018    Osteopenia     Dr. Benita March; DEXA t score -0.7 spine, -0.2 hip (8/14)    Sarcoidosis     Dr Chan Hint Stage 4 chronic kidney disease ([facility] [address] 04/2019    Dr Elpidio Wei, nephro    Type 2 diabetes mellitus with hyperglycemia, with long-term current use of insulin ([facility] [address] 8/28/2018        MEDICATIONS  Current Outpatient Medications   Medication Sig Dispense Refill    topiramate (Topamax) 25 mg tablet 1 tab nightly  x 5 days, then 2 tabs nightly x 5 day and then 3 tabs nightly 90 Tab 1    methocarbamol (ROBAXIN) 500 mg tablet Take 1 Tab by mouth three (3) times daily as needed for Muscle Spasm(s). Indications: muscle spasm 90 Tab 1    lidocaine (LIDODERM) 5 % 1 Patch by TransDERmal route every twenty-four (24) hours. Apply patch to the affected area for 12 hours a day and remove for 12 hours a day. 30 Patch 1    aspirin delayed-release 81 mg tablet Take 1 Tab by mouth daily. For heart health 30 Tab 11    hydroCHLOROthiazide (HYDRODIURIL) 25 mg tablet Take 1 Tab by mouth daily. For swelling 30 Tab 3    lisinopril (PRINIVIL, ZESTRIL) 5 mg tablet Take 1 Tab by mouth daily. For kidney protection 30 Tab 3    metFORMIN (GLUCOPHAGE) 1,000 mg tablet Take 1 Tab by mouth daily. For diabetes 30 Tab 3    pravastatin (PRAVACHOL) 40 mg tablet Take 1 Tab by mouth daily. For cholesterol 30 Tab 3    SITagliptin (JANUVIA) 25 mg tablet Take 1 Tab by mouth daily. For diabetes 30 Tab 3    gabapentin (NEURONTIN) 100 mg capsule Take 1 Cap by mouth three (3) times daily. Max Daily Amount: 300 mg. (Patient taking differently: Take 300 mg by mouth three (3) times daily.) 90 Cap 1    Insulin Needles, Disposable, 30 gauge x 1/3\" Use 1-2 times daily.  Qty 100 1 Package 11    ondansetron hcl (ZOFRAN) 4 mg tablet Take 1 Tab by mouth every eight (8) hours as needed for Nausea. 8 Tab 0    insulin glargine (LANTUS,BASAGLAR) 100 unit/mL (3 mL) inpn 25 Units by SubCUTAneous route nightly. 5 Pen 1    albuterol (PROVENTIL HFA, VENTOLIN HFA, PROAIR HFA) 90 mcg/actuation inhaler Take  by inhalation.  fluticasone propion-salmeterol (ADVAIR DISKUS) 500-50 mcg/dose diskus inhaler Take 1 Puff by inhalation every twelve (12) hours.  esomeprazole (NEXIUM) 20 mg capsule Take 20 mg by mouth daily.  mometasone (NASONEX) 50 mcg/actuation nasal spray 2 Sprays by Both Nostrils route daily as needed. For allergies 3 Container 3    glucose blood VI test strips (ACCU-CHEK POOJA) strip Pt to test 5 times daily. Dx:E11.65 500 Strip 3    CETIRIZINE HCL (ZYRTEC PO) Take 1 Tab by mouth daily.  predniSONE (DELTASONE) 5 mg tablet TAKE ONE TABLET BY MOUTH DAILY WITH BREAKFAST 30 Tab 5    predniSONE (DELTASONE) 1 mg tablet TAKE THREE TABLETS BY MOUTH DAILY WITH BREAKFAST 90 Tab 5    FLUoxetine (PROZAC) 20 mg capsule Take 1 Cap by mouth daily.  For anxiety/depression 30 Cap 3       ALLERGIES  Allergies   Allergen Reactions    Pollen Extracts Runny Nose and Cough    Amoxicillin Hives, Shortness of Breath and Swelling    Crestor [Rosuvastatin] Myalgia    Ibuprofen Other (comments)     dont prescribe due to kidney function    Lipitor [Atorvastatin] Other (comments)     Muscle cramps and weakness      Pcn [Penicillins] Angioedema       SOCIAL HISTORY    Social History     Socioeconomic History    Marital status: LEGALLY      Spouse name: Not on file    Number of children: 0    Years of education: 13    Highest education level: Not on file   Occupational History    Occupation: Unemployed   Social Needs    Financial resource strain: Not on file    Food insecurity     Worry: Not on file     Inability: Not on file   [employer] needs     Medical: Not on file     Non-medical: Not on file   Tobacco Use    Smoking status: Never Smoker    Smokeless tobacco: Never Used   Substance and Sexual Activity    Alcohol use: Yes     Alcohol/week: 0.0 standard drinks     Comment: occasional wine    Drug use: No    Sexual activity: Not Currently   Lifestyle    Physical activity     Days per week: Not on file     Minutes per session: Not on file    Stress: Not on file   Relationships    Social connections     Talks on phone: Not on file     Gets together: Not on file     Attends Confucianism service: Not on file     Active member of club or organization: Not on file     Attends meetings of clubs or organizations: Not on file     Relationship status: Not on file    Intimate partner violence     Fear of current or ex partner: Not on file     Emotionally abused: Not on file     Physically abused: Not on file     Forced sexual activity: Not on file   Other Topics Concern     Service No    Blood Transfusions No    Caffeine Concern Yes     Comment: due to reflux    Occupational Exposure No    Hobby Hazards No    Sleep Concern Yes    Stress Concern Yes     Comment: wants a job    Weight Concern Yes    Special Diet No    Back Care No    Exercise Yes    Bike Helmet No    Seat Belt Yes    Self-Exams Yes   Social History Narrative    Patient lives with a friend, no pets. Pain Scale: /10    Pain Assessment  3/4/2020   Location of Pain Back   Location Modifiers Medial   Severity of Pain 6   Quality of Pain Throbbing   Quality of Pain Comment -   Duration of Pain Persistent   Frequency of Pain Constant   Aggravating Factors Other (Comment)   Aggravating Factors Comment anything    Limiting Behavior Yes   Relieving Factors Other (Comment)   Relieving Factors Comment leaning forward while sitting   Result of Injury No         ROS      Objective:       General: alert, cooperative, no distress, appears stated age  Constitutional:  Well developed, well nourished, in no acute distress. Psychiatric: Affect and mood are appropriate.    Integumentary: No rashes or abrasions noted on exposed areas. Due to this being a TeleHealth evaluation, many elements of the physical examination are unable to be assessed. We discussed the expected course, resolution and complications of the diagnosis(es) in detail. Medication risks, benefits, costs, interactions, and alternatives were discussed as indicated. I advised her to contact the office if her condition worsens, changes or fails to improve as anticipated. She expressed understanding with the diagnosis(es) and plan. Pursuant to the emergency declaration under the [address] [place] [address], [facility] waiver authority and the [facility] and [employer] Act, this Virtual  Visit was conducted, with patient's consent, to reduce the patient's risk of exposure to COVID-19 and provide continuity of care for an established patient. Services were provided through real time synchronous discussion virtually to substitute for in-person clinic visit. Patient verbally consented to this type of visit and that they might get a bill from the billing of this visit. This service was provided through [employer] me and telephone ,  the patient was located in the car and  the provider was located at home. There was only the patientparticipating in the service. Start time 1100  End [id number].      Brandy Curran NP

## 2020-05-12 NOTE — PROGRESS NOTES
[facility]  [address]  [place], [address]  [phone] fax    Physical Therapy Discharge Summary  Patient name: Arun Dai Start of Care: 3/18/2020   Referral source: Golden Kidd NP : 1965                Medical Diagnosis: Low back pain [M54.5]  Payor: [facility] MEDICAID / Plan: [id number]  / Product Type: Managed Care Medicaid /  Onset Date:1-2 year exacerbation                Treatment Diagnosis: Back pain   Prior Hospitalization: see medical history Provider#: [id number]   Medications: Verified on Patient summary List    Comorbidities: Sarcoidosis, diabetes, asthma, depression   Prior Level of Function: Pt reports hx of back pain but increased ADL ease prior to past 1-2 years  Visits from Start of Care: 7    Missed Visits: 2  Reporting Period : 3/18/2020 to 2020      Summary of Care:  Short Term Goals: To be accomplished in 2 weeks:  1. Pt will demonstrate I and compliance with HEP to maximize therapeutic effect.              BK: HEP issued              LGTBGZA: pt reports partial compliance 3/23/2020  2. Pt will demonstrate SKC >90 deg on left without pain increase to improve lumbopelvic mechanics with ADLs.             IE:pain with SKC at 90 deg               Current: slow progress- pain >90 deg 2020  Long Term Goals: To be accomplished in 4 weeks:  1. Pt will demonstrate trunk flexion fingertips to floor <10\" without pain increase to improve ease of daily tasks.              IE: 12\"               Current: MET 4\" on 2020  2. Pt will demonstrate 75% of WNL thoracic rotation without pain to improve rib mobility and ease of self care.              IE: 60% without pain left L/S              Current: met- 75% of WNL without pain 2020  3.  Pt will demonstrate left hip strength 4+/5 without back pain to improve standing activity tolerance.              IE: 4 to 4+/5 with pain              Current: near met 4+/5 flexion and ABD without pain 4/5 extension with slight discomfort 4/14/2020  4. Pt will improve FOTO score to 46% to demonstrate improved function and quality of life.              IE: 29%              Current: met- 48% 4/14/2020    ASSESSMENT/RECOMMENDATIONS: Pt did demonstrate some improved overall pain levels with PT. She mostly demonstrated improved mobility with still variable left sided back pain at T-L junction. Pt did not attend her final scheduled session and did not return call to schedule within 30 day window.  Will D/C at this time without ability to further assess goals     [x]Discontinue therapy: []Patient has reached or is progressing toward set goals      [x]Patient is non-compliant or has abdicated      []Due to lack of appreciable progress towards set goals    Mikey Gan DPT, CMTPT 5/12/2020 11:29 AM

## 2020-05-28 ENCOUNTER — TELEPHONE (OUTPATIENT)
Dept: ORTHOPEDIC SURGERY | Age: 55
End: 2020-05-28

## 2020-05-28 NOTE — TELEPHONE ENCOUNTER
Shima Tripp from [employer] called stating that the patient is scheduled on Monday for her MRI which was denied and placed into a peer to peer.     Ph: [phone]   Tracking number [id number]  Needing additional clinical notes   Shima Tripp [phone]

## 2020-06-01 ENCOUNTER — HOSPITAL ENCOUNTER (OUTPATIENT)
Age: 55
Discharge: HOME | End: 2020-06-01
Attending: NURSE PRACTITIONER
Payer: MEDICAID

## 2020-06-01 DIAGNOSIS — G89.29 CHRONIC LEFT-SIDED LOW BACK PAIN WITH LEFT-SIDED SCIATICA: ICD-10-CM

## 2020-06-01 DIAGNOSIS — M54.42 CHRONIC LEFT-SIDED LOW BACK PAIN WITH LEFT-SIDED SCIATICA: ICD-10-CM

## 2020-06-01 PROCEDURE — 72148 MRI LUMBAR SPINE W/O DYE: CPT

## 2020-06-02 RX ORDER — PREDNISONE 5 MG/1
TABLET ORAL
Qty: 30 TAB | Refills: 4 | Status: SHIPPED | OUTPATIENT
Start: 2020-06-02 | End: 2020-09-22 | Stop reason: SDUPTHER

## 2020-06-02 RX ORDER — PREDNISONE 1 MG/1
TABLET ORAL
Qty: 90 TAB | Refills: 4 | Status: SHIPPED | OUTPATIENT
Start: 2020-06-02 | End: 2020-09-22 | Stop reason: SDUPTHER
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
## 2020-08-31 RX ORDER — TRIAMCINOLONE ACETONIDE 1 MG/G
CREAM TOPICAL 2 TIMES DAILY
COMMUNITY
Start: 2020-07-13 | End: 2021-03-24 | Stop reason: SDUPTHER

## 2020-08-31 RX ORDER — PREGABALIN 50 MG/1
1 CAPSULE ORAL DAILY
COMMUNITY
Start: 2020-09-01 | End: 2020-09-01

## 2020-09-01 ENCOUNTER — OFFICE VISIT (OUTPATIENT)
Dept: PULMONOLOGY | Age: 55
End: 2020-09-01

## 2020-09-01 VITALS
WEIGHT: 152.4 LBS | RESPIRATION RATE: 18 BRPM | HEART RATE: 85 BPM | OXYGEN SATURATION: 96 % | SYSTOLIC BLOOD PRESSURE: 97 MMHG | BODY MASS INDEX: 28.05 KG/M2 | TEMPERATURE: 98.5 F | DIASTOLIC BLOOD PRESSURE: 68 MMHG | HEIGHT: 62 IN

## 2020-09-01 DIAGNOSIS — J32.9 SINUSITIS, UNSPECIFIED CHRONICITY, UNSPECIFIED LOCATION: ICD-10-CM

## 2020-09-01 DIAGNOSIS — J45.909 UNCOMPLICATED ASTHMA, UNSPECIFIED ASTHMA SEVERITY, UNSPECIFIED WHETHER PERSISTENT: ICD-10-CM

## 2020-09-01 DIAGNOSIS — D86.9 SARCOIDOSIS: Primary | ICD-10-CM

## 2020-09-01 RX ORDER — PREDNISONE 5 MG/1
TABLET ORAL
Qty: 90 TAB | Refills: 3 | Status: SHIPPED | OUTPATIENT
Start: 2020-09-01 | End: 2021-01-06 | Stop reason: SDUPTHER

## 2020-09-01 RX ORDER — PREDNISONE 1 MG/1
TABLET ORAL
Qty: 90 TAB | Refills: 3 | Status: SHIPPED | OUTPATIENT
Start: 2020-09-01 | End: 2021-01-06 | Stop reason: SDUPTHER

## 2020-09-01 NOTE — PROGRESS NOTES
[facility]  Pulmonary, Critical Care, and Sleep Medicine      Chief complaint:  Sarcoidosis asthma    HPI:    Екатерина Neri    is 63years old and returns the office today for follow-up and relates that she has no chest pain but has occasional shortness of breath with exertional activities and denies leg swelling significant cough and continues her albuterol on a as needed basis and Advair. She ran out of her prednisone about 3 weeks ago. The patient continues to have purulent nasal drainage and is to see ENT in the near future      Allergies   Allergen Reactions    Pollen Extracts Runny Nose and Cough    Amoxicillin Hives, Shortness of Breath and Swelling    Crestor [Rosuvastatin] Myalgia    Ibuprofen Other (comments)     dont prescribe due to kidney function    Lipitor [Atorvastatin] Other (comments)     Muscle cramps and weakness      Pcn [Penicillins] Angioedema     Current Outpatient Medications   Medication Sig    triamcinolone acetonide (KENALOG) 0.1 % topical cream Apply  to affected area two (2) times a day.  predniSONE (DELTASONE) 1 mg tablet TAKE THREE TABLETS BY MOUTH DAILY WITH BREAKFAST (Patient taking differently: Take 1 mg by mouth daily.)    predniSONE (DELTASONE) 5 mg tablet TAKE ONE TABLET BY MOUTH DAILY WITH BREAKFAST    topiramate (Topamax) 25 mg tablet 1 tab nightly  x 5 days, then 2 tabs nightly x 5 day and then 3 tabs nightly    methocarbamol (ROBAXIN) 500 mg tablet Take 1 Tab by mouth three (3) times daily as needed for Muscle Spasm(s). Indications: muscle spasm    lidocaine (LIDODERM) 5 % 1 Patch by TransDERmal route every twenty-four (24) hours. Apply patch to the affected area for 12 hours a day and remove for 12 hours a day.  aspirin delayed-release 81 mg tablet Take 1 Tab by mouth daily. For heart health    FLUoxetine (PROZAC) 20 mg capsule Take 1 Cap by mouth daily.  For anxiety/depression    hydroCHLOROthiazide (HYDRODIURIL) 25 mg tablet Take 1 Tab by mouth daily. For swelling    lisinopril (PRINIVIL, ZESTRIL) 5 mg tablet Take 1 Tab by mouth daily. For kidney protection    metFORMIN (GLUCOPHAGE) 1,000 mg tablet Take 1 Tab by mouth daily. For diabetes    pravastatin (PRAVACHOL) 40 mg tablet Take 1 Tab by mouth daily. For cholesterol    SITagliptin (JANUVIA) 25 mg tablet Take 1 Tab by mouth daily. For diabetes    Insulin Needles, Disposable, 30 gauge x 1/3\" Use 1-2 times daily. Qty 100    ondansetron hcl (ZOFRAN) 4 mg tablet Take 1 Tab by mouth every eight (8) hours as needed for Nausea.  insulin glargine (LANTUS,BASAGLAR) 100 unit/mL (3 mL) inpn 25 Units by SubCUTAneous route nightly.  albuterol (PROVENTIL HFA, VENTOLIN HFA, PROAIR HFA) 90 mcg/actuation inhaler Take 2 Puffs by inhalation every four (4) hours as needed.  fluticasone propion-salmeterol (ADVAIR DISKUS) 500-50 mcg/dose diskus inhaler Take 1 Puff by inhalation every twelve (12) hours.  esomeprazole (NEXIUM) 20 mg capsule Take 20 mg by mouth daily.  mometasone (NASONEX) 50 mcg/actuation nasal spray 2 Sprays by Both Nostrils route daily as needed. For allergies    glucose blood VI test strips (ACCU-CHEK POOJA) strip Pt to test 5 times daily. Dx:E11.65    CETIRIZINE HCL (ZYRTEC PO) Take 1 Tab by mouth daily.  aspirin-calcium carbonate 81 mg-300 mg calcium(777 mg) tab Take 1 Tab by mouth daily.  pregabalin (LYRICA) 50 mg capsule Take 1 Cap by mouth daily.  sAXagliptin-metFORMIN 2.5-1,000 mg TM24 Take 2 Tabs by mouth daily.  gabapentin (NEURONTIN) 100 mg capsule Take 1 Cap by mouth three (3) times daily. Max Daily Amount: 300 mg. (Patient taking differently: Take 300 mg by mouth three (3) times daily.)     No current facility-administered medications for this visit.       Past Medical History:   Diagnosis Date    Asthma     Bilateral great toe fractures 2014    Chronic sinusitis     Dr. Mia Lovell in the past    Colon polyp 5/16    Dr Yumiko Canela Depression 2019    Diabetes mellitus ([facility] [address]     DJD (degenerative joint disease) of cervical spine 2016    DJD (degenerative joint disease), lumbar 2013    MRI c spine w degen changes; Dr Chalrotte Chandler    Dyslipidemia     calculated 10 year risk score was 2.0% (12/13)    Edema of both ankles 8/28/2018    Environmental and seasonal allergies 8/28/2018    Eustachian tube dysfunction     Fibrocystic breast     Dr Clarence More GERD (gastroesophageal reflux disease)     Hypertriglyceridemia 8/28/2018    Lateral epicondylitis of both elbows 2/6/2017    Macular degeneration of both eyes 8/28/2018    Mild intermittent asthma without complication 13/77/2444    Dr. Hamlet Vasquez;  ratio 68%, FEV1 78% w 6% inc postbd, TLC 77, RV 56, DLCO 61%    Morbid obesity (HCC)     peak weight 196 lbs, bmi 35.8 from 10/13    Neuropathy 8/28/2018    Osteopenia     Dr. Prem Cabrera; DEXA t score -0.7 spine, -0.2 hip (8/14)    Sarcoidosis     Dr Nila Hoang 4 chronic kidney disease ([facility] [address] 04/2019    Dr Esdras Hopkins, nephro    Type 2 diabetes mellitus with hyperglycemia, with long-term current use of insulin ([facility] [address] 8/28/2018     Past Surgical History:   Procedure Laterality Date    CARDIAC SURG PROCEDURE UNLIST  9/10, 8/13    thallium negative ef 72%; negative ef 70%    CHEST SURGERY PROCEDURE UNLISTED  7/12     thyroid negative    CHEST SURGERY PROCEDURE UNLISTED  2012    pfts w mod restrictive defect     COLONOSCOPY N/A 5/26/2016    Dr Joe Gonzalez hyperplastic    Sari Organ      Dr. Macrina Clark HX HEENT      nasal polypectomy Dr. Mikhail Malin HX HEENT      tear duct surgery right Dr. Regla Bronson 2010; left Dr Rock Lindsay 2015    HX ORTHOPAEDIC      DEXA -0.7 spine, -0.2 hip 8/14    VASCULAR SURGERY PROCEDURE UNLIST  12/13    venous doppler negative     Social History     Socioeconomic History    Marital status: LEGALLY      Spouse name: Not on file    Number of children: 0    Years of education: 15    Highest education level: Not on file   Occupational History    Occupation: Unemployed   Social Needs    Financial resource strain: Not on file    Food insecurity     Worry: Not on file     Inability: Not on file   [employer] needs     Medical: Not on file     Non-medical: Not on file   Tobacco Use    Smoking status: Never Smoker    Smokeless tobacco: Never Used   Substance and Sexual Activity    Alcohol use: Yes     Alcohol/week: 0.0 standard drinks     Comment: occasional wine    Drug use: No    Sexual activity: Not Currently   Lifestyle    Physical activity     Days per week: Not on file     Minutes per session: Not on file    Stress: Not on file   Relationships    Social connections     Talks on phone: Not on file     Gets together: Not on file     Attends Hinduism service: Not on file     Active member of club or organization: Not on file     Attends meetings of clubs or organizations: Not on file     Relationship status: Not on file    Intimate partner violence     Fear of current or ex partner: Not on file     Emotionally abused: Not on file     Physically abused: Not on file     Forced sexual activity: Not on file   Other Topics Concern     Service No    Blood Transfusions No    Caffeine Concern Yes     Comment: due to reflux    Occupational Exposure No    Hobby Hazards No    Sleep Concern Yes    Stress Concern Yes     Comment: wants a job    Weight Concern Yes    Special Diet No    Back Care No    Exercise Yes    Bike Helmet No    Seat Belt Yes    Self-Exams Yes   Social History Narrative    Patient lives with a friend, no pets.      Family History   Problem Relation Age of Onset   [facility] Cancer Mother     Hypertension Mother     Cancer Father    [facility] Diabetes Father     Hypertension Father     Stroke Father     Diabetes Sister     Hypertension Sister     Heart Disease Sister     Colon Cancer Sister 52    Hypertension Brother     Cancer Maternal Aunt        Review of systems:  He denies fever chills poor appetite weight loss but is having some visual issues and will be seeing her ophthalmologist soon and continues to have arthritis pain including her back. She also says she has skin issues at times    Physical Exam:  Visit Vitals  BP 97/68 (BP 1 Location: Right arm, BP Patient Position: Sitting)   Pulse 85   Temp 98.5 °F (36.9 °C) (Oral)   Resp 18   Ht 5' 2\" (1.575 m)   Wt 69.1 kg (152 lb 6.4 oz)   LMP 03/30/2013   SpO2 96%   BMI 27.87 kg/m²       Well-developed well-nourished  HEENT: WNL  Lymph node exam: Supraclavicular cervical lymph nodes negative  Chest: Equal symmetrical expansion no dullness no wheezes rales rubs  Heart: Regular rhythm no gallop no murmur no JVD no peripheral edema  Extremities: No cyanosis clubbing  Skin: No significant rash noted  Neurologic: Alert and oriented    Labs:    O2 sat room air at rest 96%    Impression:     By history and physical exam sarcoidosis and asthma relatively stable    Plan:   Restart prednisone 60 mg daily immediately  Continue Advair PRN albuterol  Follow-up eye exam and sinus exam in the near future  Follow-up in 6 months or sooner if needed    Trista Reyna MD , [facility]    CC: Roseline Monique MD     [address] [facility], [address] [address]     P: [phone]     F: [phone]

## 2020-09-01 NOTE — PATIENT INSTRUCTIONS
Prednisone 6 mg (one 5 mg tablet and one 1 mg tablet) every morning with breakfast 
 
Continue Advair 1 inhalation twice daily and remember to exhale fully before inhaling and remember to wash mouth with water and spit it out after inhaling Albuterol 2 puffs every 4 hours as needed only if you require albuterol too often to control respiratory symptoms call the office for severe symptoms go to the emergency room Always call for worsening symptoms

## 2020-09-01 NOTE — PROGRESS NOTES
Farrah Bruner presents today for   Chief Complaint   Patient presents with    Asthma     follow up from 2/25/2020    Sarcoidosis    Pressure Behind the Eyes       Is someone accompanying this pt? No    Is the patient using any DME equipment during OV? No    -DME Company N/A    Depression Screening:  3 most recent PHQ Screens 9/1/2020   PHQ Not Done -   Little interest or pleasure in doing things Not at all   Feeling down, depressed, irritable, or hopeless Not at all   Total Score PHQ 2 0       Learning Assessment:  Learning Assessment 8/27/2018   PRIMARY LEARNER Patient   HIGHEST LEVEL OF EDUCATION - PRIMARY LEARNER  -   BARRIERS PRIMARY LEARNER -   CO-LEARNER CAREGIVER -   PRIMARY LANGUAGE ENGLISH   LEARNER PREFERENCE PRIMARY DEMONSTRATION     VIDEOS     READING   ANSWERED BY Patient   RELATIONSHIP SELF       Abuse Screening:  Abuse Screening Questionnaire 2/6/2017   Do you ever feel afraid of your partner? N   Are you in a relationship with someone who physically or mentally threatens you? N   Is it safe for you to go home? Y       Fall Risk  No flowsheet data found. Coordination of Care:  1. Have you been to the ER, urgent care clinic since your last visit? Hospitalized since your last visit? No    2. Have you seen or consulted any other health care providers outside of the [address] [place] [address] since your last visit? Include any pap smears or colon screening.  No

## 2020-09-22 ENCOUNTER — OFFICE VISIT (OUTPATIENT)
Dept: ORTHOPEDIC SURGERY | Age: 55
End: 2020-09-22
Payer: COMMERCIAL

## 2020-09-22 VITALS
WEIGHT: 150.4 LBS | HEIGHT: 62 IN | TEMPERATURE: 97.6 F | OXYGEN SATURATION: 97 % | BODY MASS INDEX: 27.68 KG/M2 | RESPIRATION RATE: 14 BRPM | SYSTOLIC BLOOD PRESSURE: 86 MMHG | DIASTOLIC BLOOD PRESSURE: 59 MMHG | HEART RATE: 89 BPM

## 2020-09-22 DIAGNOSIS — M54.59 MECHANICAL LOW BACK PAIN: ICD-10-CM

## 2020-09-22 DIAGNOSIS — M47.816 LUMBAR FACET ARTHROPATHY: ICD-10-CM

## 2020-09-22 DIAGNOSIS — G89.29 CHRONIC PRIMARY MUSCULOSKELETAL PAIN: Primary | ICD-10-CM

## 2020-09-22 DIAGNOSIS — M79.18 CHRONIC PRIMARY MUSCULOSKELETAL PAIN: Primary | ICD-10-CM

## 2020-09-22 DIAGNOSIS — M47.816 SPONDYLOSIS OF LUMBAR REGION WITHOUT MYELOPATHY OR RADICULOPATHY: ICD-10-CM

## 2020-09-22 DIAGNOSIS — M79.18 MYOFASCIAL PAIN: ICD-10-CM

## 2020-09-22 PROCEDURE — 99213 OFFICE O/P EST LOW 20 MIN: CPT | Performed by: PHYSICAL MEDICINE & REHABILITATION

## 2020-09-22 NOTE — PROGRESS NOTES
[address]  [address], [address]  [place] [address]  Phone: [phone]  Fax: [phone]        Immanuel Manrique  : 1965  PCP: Faith Gee MD  2020    PROGRESS NOTE      HISTORY OF PRESENT ILLNESS  Ajith Moore is a 63 y.o. female who was see in 2019 with c/o neck and low back pain. She was seen by me in 2016 for back pain. At the time, she was referred to PT but was unable to attend due to medical complications. At the time, she had recently been diagnosed with DM. She was prescribed Percocet and advised on a Thercane. She was seen by Neo Panchal NP 19 where she c/o neck and low back pain x 1 month. She was seen in the ED 18 for these complaints and she was prescribed Lidoderm patches and Robaxin with minimal relief. She was told to discontinue Ibuprofen due to renal function. She describes her pain in the posterior neck extending into her trapezius and left-sided back pain from her bra line to her low back. She denies any LE radicular symptoms, but has episodic LUE tingling. She has h/o sarcoidosis, renal dysfunction, and diabetes. She attended PT at [facility] for her neck and low back, but had to discontinue a few sessions early because of having to care for her terminally ill sister. She found some temporary relief from PT. She has some residual left neck and upper back pain radiating into her LUE where her hand occasional \"locks up like I'm having a stroke. \"     She returned 3/4/2020 to see NP Buttery for progressive, worsening neck and low back pain (back > neck). She failed Lyrica. She took Gabapentin 900 mg TID with minimal relief prescribed by her PCP. She was prescribed Robaxin and Lidoderm patches. She attended PT (3/; [facility]) with some benefit, especially with TENS unit and ice. However, her pain increased since she stopped PT. She did not find benefit from Robaxin or Gabapentin.  She continued to have LLE shooting pains. Isa Reddy comes in to the office today for f/u. Her pain remains unchanged. She reports having a flexed forward posture. Lumbar spine MRI dated 6/1/2020 reviewed. Per report, Chronic mild to moderate degenerative findings at L4-5, without high-grade spinal or foraminal stenosis. This and other levels of lesser degenerative findings as detailed above      Pain Score: 8/10. PmHx: DM, neuropathy    ASSESSMENT  This is a 63year-old female with history of chronic neck and low back pain, more progressive and radiating into the LLE. Her symptoms may be due to lumbar facet arthropathy and chronic primary musculoskeletal pain. We discussed options of: bilateral L4-5 facet injections    PLAN  1. Bilateral L4-5 facet injections  2. Maintain HEP. Pt will f/u in 2-4 weeks after injections VIRTUALLY or sooner as needed. Diagnoses and all orders for this visit:    1. Chronic primary musculoskeletal pain    2. Mechanical low back pain    3. Myofascial pain    4. Spondylosis of lumbar region without myelopathy or radiculopathy  -     SCHEDULE SURGERY    5.  Lumbar facet arthropathy  -     SCHEDULE SURGERY               PAST MEDICAL HISTORY   Past Medical History:   Diagnosis Date    Asthma     Bilateral great toe fractures 2014    Chronic sinusitis     Dr. Niko Duvall in the past    Colon polyp 5/16    Dr Uma Santiago    Depression 2019    Diabetes mellitus ([facility] [address]     DJD (degenerative joint disease) of cervical spine 2016    DJD (degenerative joint disease), lumbar 2013    MRI c spine w degen changes; Dr Sravani Lau    Dyslipidemia     calculated 10 year risk score was 2.0% (12/13)    Edema of both ankles 8/28/2018    Environmental and seasonal allergies 8/28/2018    Eustachian tube dysfunction     Fibrocystic breast     Dr Partha Sherman GERD (gastroesophageal reflux disease)     Hypertriglyceridemia 8/28/2018    Lateral epicondylitis of both elbows 2/6/2017    Macular degeneration of both eyes 8/28/2018    Mild intermittent asthma without complication 21/97/7553    Dr. Mary Padilla;  ratio 68%, FEV1 78% w 6% inc postbd, TLC 77, RV 56, DLCO 61%    Morbid obesity (HCC)     peak weight 196 lbs, bmi 35.8 from 10/13    Neuropathy 8/28/2018    Osteopenia     Dr. Aisha Montiel; DEXA t score -0.7 spine, -0.2 hip (8/14)    Sarcoidosis     Dr Joleen Chen 4 chronic kidney disease ([facility] [address] 04/2019    Dr Dontae Appiah, nephro    Type 2 diabetes mellitus with hyperglycemia, with long-term current use of insulin ([facility] [address] 8/28/2018       Past Surgical History:   Procedure Laterality Date    CARDIAC SURG PROCEDURE UNLIST  9/10, 8/13    thallium negative ef 72%; negative ef 70%    CHEST SURGERY PROCEDURE UNLISTED  7/12     thyroid negative    CHEST SURGERY PROCEDURE UNLISTED  2012    pfts w mod restrictive defect     COLONOSCOPY N/A 5/26/2016    Dr Sloan Colon hyperplastic    Jamse Mention      Dr. Chan Wise HX HEENT      nasal polypectomy Dr. Louie Coburn HX HEENT      tear duct surgery right Dr. Cristian Blum 2010; left Dr Susanne Knapp 2015    HX ORTHOPAEDIC      DEXA -0.7 spine, -0.2 hip 8/14    VASCULAR SURGERY PROCEDURE UNLIST  12/13    venous doppler negative   . MEDICATIONS    Current Outpatient Medications   Medication Sig Dispense Refill    predniSONE (DELTASONE) 5 mg tablet 1 tablet every morning with breakfast 90 Tab 3    predniSONE (DELTASONE) 1 mg tablet 1 tab every morning with breakfast 90 Tab 3    triamcinolone acetonide (KENALOG) 0.1 % topical cream Apply  to affected area two (2) times a day.       predniSONE (DELTASONE) 1 mg tablet TAKE THREE TABLETS BY MOUTH DAILY WITH BREAKFAST (Patient taking differently: Take 1 mg by mouth daily.) 90 Tab 4    predniSONE (DELTASONE) 5 mg tablet TAKE ONE TABLET BY MOUTH DAILY WITH BREAKFAST 30 Tab 4    topiramate (Topamax) 25 mg tablet 1 tab nightly  x 5 days, then 2 tabs nightly x 5 day and then 3 tabs nightly 90 Tab 1    methocarbamol (ROBAXIN) 500 mg tablet Take 1 Tab by mouth three (3) times daily as needed for Muscle Spasm(s). Indications: muscle spasm 90 Tab 1    lidocaine (LIDODERM) 5 % 1 Patch by TransDERmal route every twenty-four (24) hours. Apply patch to the affected area for 12 hours a day and remove for 12 hours a day. 30 Patch 1    aspirin delayed-release 81 mg tablet Take 1 Tab by mouth daily. For heart health 30 Tab 11    FLUoxetine (PROZAC) 20 mg capsule Take 1 Cap by mouth daily. For anxiety/depression 30 Cap 3    hydroCHLOROthiazide (HYDRODIURIL) 25 mg tablet Take 1 Tab by mouth daily. For swelling 30 Tab 3    lisinopril (PRINIVIL, ZESTRIL) 5 mg tablet Take 1 Tab by mouth daily. For kidney protection 30 Tab 3    metFORMIN (GLUCOPHAGE) 1,000 mg tablet Take 1 Tab by mouth daily. For diabetes 30 Tab 3    pravastatin (PRAVACHOL) 40 mg tablet Take 1 Tab by mouth daily. For cholesterol 30 Tab 3    SITagliptin (JANUVIA) 25 mg tablet Take 1 Tab by mouth daily. For diabetes 30 Tab 3    gabapentin (NEURONTIN) 100 mg capsule Take 1 Cap by mouth three (3) times daily. Max Daily Amount: 300 mg. (Patient taking differently: Take 300 mg by mouth three (3) times daily.) 90 Cap 1    Insulin Needles, Disposable, 30 gauge x 1/3\" Use 1-2 times daily. Qty 100 1 Package 11    ondansetron hcl (ZOFRAN) 4 mg tablet Take 1 Tab by mouth every eight (8) hours as needed for Nausea. 8 Tab 0    insulin glargine (LANTUS,BASAGLAR) 100 unit/mL (3 mL) inpn 25 Units by SubCUTAneous route nightly. 5 Pen 1    albuterol (PROVENTIL HFA, VENTOLIN HFA, PROAIR HFA) 90 mcg/actuation inhaler Take 2 Puffs by inhalation every four (4) hours as needed.  fluticasone propion-salmeterol (ADVAIR DISKUS) 500-50 mcg/dose diskus inhaler Take 1 Puff by inhalation every twelve (12) hours.  esomeprazole (NEXIUM) 20 mg capsule Take 20 mg by mouth daily.       mometasone (NASONEX) 50 mcg/actuation nasal spray 2 Sprays by Both Nostrils route daily as needed. For allergies 3 Container 3    glucose blood VI test strips (ACCU-CHEK POOJA) strip Pt to test 5 times daily. Dx:E11.65 500 Strip 3    CETIRIZINE HCL (ZYRTEC PO) Take 1 Tab by mouth daily. ALLERGIES  Allergies   Allergen Reactions    Pollen Extracts Runny Nose and Cough    Amoxicillin Hives, Shortness of Breath and Swelling    Crestor [Rosuvastatin] Myalgia    Ibuprofen Other (comments)     dont prescribe due to kidney function    Lipitor [Atorvastatin] Other (comments)     Muscle cramps and weakness      Pcn [Penicillins] Angioedema          SOCIAL HISTORY    Social History     Socioeconomic History    Marital status: LEGALLY      Spouse name: Not on file    Number of children: 0    Years of education: 13    Highest education level: Not on file   Occupational History    Occupation: Unemployed   Tobacco Use    Smoking status: Never Smoker    Smokeless tobacco: Never Used   Substance and Sexual Activity    Alcohol use: Yes     Alcohol/week: 0.0 standard drinks     Comment: occasional wine    Drug use: No    Sexual activity: Not Currently   Other Topics Concern     Service No    Blood Transfusions No    Caffeine Concern Yes     Comment: due to reflux    Occupational Exposure No    Hobby Hazards No    Sleep Concern Yes    Stress Concern Yes     Comment: wants a job    Weight Concern Yes    Special Diet No    Back Care No    Exercise Yes    Bike Helmet No    Seat Belt Yes    Self-Exams Yes   Social History Narrative    Patient lives with a friend, no pets.        FAMILY HISTORY  Family History   Problem Relation Age of Onset   [address] Cancer Mother     Hypertension Mother     Cancer Father     Diabetes Father     Hypertension Father     Stroke Father     Diabetes Sister     Hypertension Sister     Heart Disease Sister     Colon Cancer Sister 52    Hypertension Brother     Cancer Maternal Aunt          REVIEW OF SYSTEMS  Review of Systems Musculoskeletal: Positive for back pain and neck pain. LLE pain          PHYSICAL EXAMINATION  Visit Vitals  BP (!) 86/59   Pulse 89   Temp 97.6 °F (36.4 °C) (Oral)   Resp 14   Ht 5' 2\" (1.575 m)   Wt 150 lb 6.4 oz (68.2 kg)   LMP 03/30/2013   SpO2 97%   BMI 27.51 kg/m²       Pain Assessment  9/22/2020   Location of Pain Back;Leg;Buttocks   Location Modifiers Right;Left   Severity of Pain 8   Quality of Pain Aching   Quality of Pain Comment -   Duration of Pain Persistent   Frequency of Pain Constant   Aggravating Factors -   Aggravating Factors Comment -   Limiting Behavior Yes   Relieving Factors -   Relieving Factors Comment -   Result of Injury No           Constitutional:  Well developed, well nourished, in no acute distress. Psychiatric: Affect and mood are appropriate. Integumentary: No rashes or abrasions noted on exposed areas. SPINE/MUSCULOSKELETAL EXAM    Per Monica Watson NP 1/7/19: She alert and oriented. Normal mood and affect. She has a full weightbearing, nonantalgic gait.  No assistive device.  She has 4/5 strength in bilateral upper and lower extremities.  Negative straight leg raise.  Negative Anam.  She is more tender in the muscles of the left lumbar region from her bra line down to her low back.       Cervical spine:  Neck is midline. Normal muscle tone. No focal atrophy is noted. ROM pain free. Shoulder ROM intact. Tenderness to palpation of cervical and thoracic paraspinals on the L. Negative Spurling's sign. Negative Tinel's sign. Negative Kramer's sign. Sensation in the bilateral arms grossly intact to light touch.      Lumbar spine:  No rash, ecchymosis, or gross obliquity. No fasciculations. No focal atrophy is noted. No pain with hip ROM. Full range of motion.   Tenderness to palpation of lumbar paraspinals and QL on the L.  No tenderness to palpation at the sciatic notch. SI joints non-tender. Trochanters non tender. Sensation in the bilateral legs grossly intact to light touch. Updates 3/4/2020 per NP Buttery:  Cervical spine:  Neck is midline. Normal muscle tone. No focal atrophy is noted. Shoulder ROM intact. Mild Tenderness to palpation to neck. Negative Spurling's sign. Negative Tinel's sign. Negative Kramer's sign.      Lumbar spine:  No rash, ecchymosis, or gross obliquity. No fasciculations. No focal atrophy is noted. Range of motion is intact. Tenderness to palpation to low back. SI joints non-tender. Trochanters non tender.       Musculoskeletal:  No pain with extension, axial loading, or forward flexion. No pain with internal or external rotation of her hips. MOTOR:      Biceps  Triceps Deltoids Wrist Ext Wrist Flex Hand Intrin   Right 5/5 5/5 5/5 5/5 5/5 5/5   Left 5/5 5/5 5/5 5/5 5/5 5/5             Hip Flex  Quads Hamstrings Ankle DF EHL Ankle PF   Right 5/5 5/5 5/5 5/5 5/5 5/5   Left 5/5 5/5 5/5 5/5 5/5 5/5     DTRs are 2+ biceps, triceps, brachioradialis, patella, and Achilles.     RADIOGRAPHS  Lumbar MRI images taken on 6/1/2020 personally reviewed with patient:  Alignment: Unchanged grade 1 degenerative anterolisthesis of L4  Vertebral body height: Normal  Marrow signal: Unremarkable  Disc spaces: Moderately pronounced disc space narrowing disc desiccation at L4-5  Conus: Terminates at T12-L1     Axial imaging correlation:     T12-L1: Patent canal and foramina.      L1-2: Patent canal and foramina.     L2-3: Patent canal and foramina.     L3-4: Minor facet arthropathy. Patent canal and foramina.     L4-5: Uncovering of the disc. Mild disc bulge. Moderately pronounced facet  arthropathy with low-grade inflammation. There is some bulging of the posterior  epidural fat. Combined factors creates a mild central spinal stenosis.  Mild  narrowing at the lateral recesses with transient abutment of the crossing nerves  but no overt impingement. Adequately patent foramina.     L5-S1: Bilateral facet arthropathy. Patent canal and foramina.     Other structures: Unremarkable.        IMPRESSION  IMPRESSION:     1. Chronic mild to moderate degenerative findings at L4-5, without high-grade  spinal or foraminal stenosis  -This and other levels of lesser degenerative findings as detailed above    Lumbar XR images taken on 12/21/18 personally reviewed with patient:  The vertebra are normal in height. . There is 5 mm of anterior offset of L4 on L5  demonstrating slight increase compared to the previous study. Disc space at L4/5 is moderately narrowed also increased from the previous exam.  The pedicles are intact. There is no evidence of fracture or dislocation. Mineralization is normal.     IMPRESSION  IMPRESSION:     Progression of degenerative disc disease and grade 1 spondylolisthesis L4/5.     Cervical XR images taken on 12/21/18 personally reviewed with patient:  The lateral view demonstrates from skull base to T3. The vertebra are normal in  height. . There is 2 mm anterior offset of C3 on C4 which has increased from the  previous exam. 1 to 2 mm of anterior offset of C4 on C5 unchanged. 2 mm anterior  offset of C5 on C6 unchanged. Disc spaces at C5/6 and C6/7 are markedly narrowed  with spurring.  Progressed from the previous exam. There is mild disc space  narrowing at C3/4 and C4/5.  The prevertebral soft tissues are normal.  There is  no evidence of fracture or dislocation.     IMPRESSION  IMPRESSION:     Progression of multilevel degenerative disc disease and multilevel mild AP offset    Lumbar x-ray films from 2/16/2016 personally reviewed with patient:  -Mild degenerative disc disease.  -Minimal grade 1 spondylolisthesis L4/5.     Thoracic x-ray films from 2/16/2016 personally reviewed with patient  -Degenerative change of the lower cervical spine.  -Degenerative change of the lower thoracic spine.     Cervical x-ray films from 2/16/2016 personally reviewed with patient:  -Multilevel degenerative changes of the cervical spine, most marked C6/C7.:    15 minutes of face-to-face contact were spent with the patient during today's visit extensively discussing symptoms and treatment plan. All questions were answered. More than half of this visit today was spent on counseling.      Written by Alejandrina Pepper as dictated by Holly Solis MD

## 2020-11-05 ENCOUNTER — OFFICE VISIT (OUTPATIENT)
Dept: ORTHOPEDIC SURGERY | Age: 55
End: 2020-11-05
Payer: MEDICAID

## 2020-11-05 VITALS
WEIGHT: 147 LBS | HEIGHT: 62 IN | HEART RATE: 88 BPM | TEMPERATURE: 97.4 F | SYSTOLIC BLOOD PRESSURE: 117 MMHG | BODY MASS INDEX: 27.05 KG/M2 | RESPIRATION RATE: 18 BRPM | DIASTOLIC BLOOD PRESSURE: 72 MMHG

## 2020-11-05 DIAGNOSIS — M54.59 MECHANICAL LOW BACK PAIN: ICD-10-CM

## 2020-11-05 DIAGNOSIS — M47.816 LUMBAR FACET ARTHROPATHY: ICD-10-CM

## 2020-11-05 DIAGNOSIS — M79.18 CHRONIC PRIMARY MUSCULOSKELETAL PAIN: Primary | ICD-10-CM

## 2020-11-05 DIAGNOSIS — M47.816 SPONDYLOSIS OF LUMBAR REGION WITHOUT MYELOPATHY OR RADICULOPATHY: ICD-10-CM

## 2020-11-05 DIAGNOSIS — M54.50 LUMBAR PAIN: ICD-10-CM

## 2020-11-05 DIAGNOSIS — M79.18 MYOFASCIAL PAIN: ICD-10-CM

## 2020-11-05 DIAGNOSIS — M54.2 NECK PAIN: ICD-10-CM

## 2020-11-05 DIAGNOSIS — G89.29 CHRONIC PRIMARY MUSCULOSKELETAL PAIN: Primary | ICD-10-CM

## 2020-11-05 PROCEDURE — 99213 OFFICE O/P EST LOW 20 MIN: CPT | Performed by: PHYSICAL MEDICINE & REHABILITATION

## 2020-11-05 RX ORDER — LIDOCAINE 50 MG/G
PATCH TOPICAL
Qty: 30 PATCH | Refills: 5 | Status: SHIPPED | OUTPATIENT
Start: 2020-11-05 | End: 2021-03-24

## 2020-11-05 NOTE — PROGRESS NOTES
[address]  [address], [address]  [place], [facility]  Phone: [phone]  Fax: [phone]        Luci Morgand  : 1965  PCP: Nadja Ann MD  2020    PROGRESS NOTE      HISTORY OF PRESENT ILLNESS  Yisel Ibrahim is a 63 y.o. female who was see in 2019 with c/o neck and low back pain. She was seen by me in 2016 for back pain. At the time, she was referred to PT but was unable to attend due to medical complications. At the time, she had recently been diagnosed with DM. She was prescribed Percocet and advised on a Thercane. She was seen by Octavia Vazquez NP 19 where she c/o neck and low back pain x 1 month. She was seen in the ED 18 for these complaints and she was prescribed Lidoderm patches and Robaxin with minimal relief. She was told to discontinue Ibuprofen due to renal function. She describes her pain in the posterior neck extending into her trapezius and left-sided back pain from her bra line to her low back. She denies any LE radicular symptoms, but has episodic LUE tingling. She has h/o sarcoidosis, renal dysfunction, and diabetes. She attended PT at \[facility]\"" for her neck and low back, but had to discontinue a few sessions early because of having to care for her terminally ill sister. She found some temporary relief from PT. She has some residual left neck and upper back pain radiating into her LUE where her hand occasional \"locks up like I'm having a stroke. \"     She returned 3/4/2020 to see NP Buttery for progressive, worsening neck and low back pain (back > neck). She failed Lyrica. She took Gabapentin 900 mg TID with minimal relief prescribed by her PCP. She was prescribed Robaxin and Lidoderm patches. She attended PT (3/; \[facility]\"") with some benefit, especially with TENS unit and ice. However, her pain increased since she stopped PT. She did not find benefit from Robaxin or Gabapentin.  She continued to have LLE shooting pains. Her pain remains unchanged. She reports having a flexed forward posture. Lumbar spine MRI dated 6/1/2020 reviewed. Per report, Chronic mild to moderate degenerative findings at L4-5, without high-grade spinal or foraminal stenosis. This and other levels of lesser degenerative findings as detailed above    Yisel Craig comes in to the office today for f/u. Her insurance did not approve the bilateral L4-5 facet injections. Pt notes that she has difficulty sleeping at night due to pain. Pain Score: 5-6/10. PmHx: DM, neuropathy    ASSESSMENT  This is a 63year-old female with history of chronic neck and low back pain, more progressive and radiating into the LLE. Her symptoms may be due to lumbar facet arthropathy and chronic primary musculoskeletal pain. Last creatinine level (1/15/2020) - 1.5 avoid NSAIDs    PLAN  1. 5% Lidocaine Patches. 2. May consider bilateral L4-5 facet injections once she changes insurance carriers. Pt will f/u PRN (after getting new insurance coverage)      Diagnoses and all orders for this visit:    1. Chronic primary musculoskeletal pain  -     lidocaine (LIDODERM) 5 %; Apply patch to the affected area for 12 hours a day and remove for 12 hours a day. 2. Mechanical low back pain  -     lidocaine (LIDODERM) 5 %; Apply patch to the affected area for 12 hours a day and remove for 12 hours a day. 3. Myofascial pain  -     lidocaine (LIDODERM) 5 %; Apply patch to the affected area for 12 hours a day and remove for 12 hours a day. 4. Spondylosis of lumbar region without myelopathy or radiculopathy    5. Lumbar facet arthropathy    6. Lumbar pain  -     lidocaine (LIDODERM) 5 %; Apply patch to the affected area for 12 hours a day and remove for 12 hours a day. 7. Neck pain  -     lidocaine (LIDODERM) 5 %; Apply patch to the affected area for 12 hours a day and remove for 12 hours a day.          PAST MEDICAL HISTORY   Past Medical History:   Diagnosis Date    Asthma     Bilateral great toe fractures 2014    Chronic sinusitis     Dr. Leeta Soulier in the past    Colon polyp 5/16    Dr Tania Patel    Depression 2019    Diabetes mellitus ([facility] [address]     DJD (degenerative joint disease) of cervical spine 2016    DJD (degenerative joint disease), lumbar 2013    MRI c spine w degen changes; Dr Jaimee Jaramillo    Dyslipidemia     calculated 10 year risk score was 2.0% (12/13)    Edema of both ankles 8/28/2018    Environmental and seasonal allergies 8/28/2018    Eustachian tube dysfunction     Fibrocystic breast     Dr Elvira Blackmon GERD (gastroesophageal reflux disease)     Hypertriglyceridemia 8/28/2018    Lateral epicondylitis of both elbows 2/6/2017    Macular degeneration of both eyes 8/28/2018    Mild intermittent asthma without complication 65/29/5563    Dr. Emily Valdovinos;  ratio 68%, FEV1 78% w 6% inc postbd, TLC 77, RV 56, DLCO 61%    Morbid obesity (HCC)     peak weight 196 lbs, bmi 35.8 from 10/13    Neuropathy 8/28/2018    Osteopenia     Dr. Roger Tan; DEXA t score -0.7 spine, -0.2 hip (8/14)    Sarcoidosis     Dr Chery Monroe 4 chronic kidney disease ([facility] [address] 04/2019    Dr Stephanie Andrade, nephro    Type 2 diabetes mellitus with hyperglycemia, with long-term current use of insulin ([facility] [address] 8/28/2018       Past Surgical History:   Procedure Laterality Date    CARDIAC SURG PROCEDURE UNLIST  9/10, 8/13    thallium negative ef 72%; negative ef 70%    CHEST SURGERY PROCEDURE UNLISTED  7/12     thyroid negative    CHEST SURGERY PROCEDURE UNLISTED  2012    pfts w mod restrictive defect     COLONOSCOPY N/A 5/26/2016    Dr Tania Patel hyperplastic    Clayestrella Shivers      Dr. Shree Hernandez HX HEENT      nasal polypectomy Dr. Va Craig HX HEENT      tear duct surgery right Dr. Karla Pizarro 2010; left Dr Kendra Fountain 2015    HX ORTHOPAEDIC      DEXA -0.7 spine, -0.2 hip 8/14    VASCULAR SURGERY PROCEDURE UNLIST  12/13    venous doppler negative   . MEDICATIONS      Current Outpatient Medications   Medication Sig Dispense Refill    predniSONE (DELTASONE) 5 mg tablet 1 tablet every morning with breakfast 90 Tab 3    predniSONE (DELTASONE) 1 mg tablet 1 tab every morning with breakfast 90 Tab 3    triamcinolone acetonide (KENALOG) 0.1 % topical cream Apply  to affected area two (2) times a day.  topiramate (Topamax) 25 mg tablet 1 tab nightly  x 5 days, then 2 tabs nightly x 5 day and then 3 tabs nightly 90 Tab 1    methocarbamol (ROBAXIN) 500 mg tablet Take 1 Tab by mouth three (3) times daily as needed for Muscle Spasm(s). Indications: muscle spasm 90 Tab 1    lidocaine (LIDODERM) 5 % 1 Patch by TransDERmal route every twenty-four (24) hours. Apply patch to the affected area for 12 hours a day and remove for 12 hours a day. 30 Patch 1    aspirin delayed-release 81 mg tablet Take 1 Tab by mouth daily. For heart health 30 Tab 11    FLUoxetine (PROZAC) 20 mg capsule Take 1 Cap by mouth daily. For anxiety/depression 30 Cap 3    hydroCHLOROthiazide (HYDRODIURIL) 25 mg tablet Take 1 Tab by mouth daily. For swelling 30 Tab 3    lisinopril (PRINIVIL, ZESTRIL) 5 mg tablet Take 1 Tab by mouth daily. For kidney protection 30 Tab 3    metFORMIN (GLUCOPHAGE) 1,000 mg tablet Take 1 Tab by mouth daily. For diabetes 30 Tab 3    pravastatin (PRAVACHOL) 40 mg tablet Take 1 Tab by mouth daily. For cholesterol 30 Tab 3    SITagliptin (JANUVIA) 25 mg tablet Take 1 Tab by mouth daily. For diabetes 30 Tab 3    gabapentin (NEURONTIN) 100 mg capsule Take 1 Cap by mouth three (3) times daily. Max Daily Amount: 300 mg. (Patient taking differently: Take 300 mg by mouth three (3) times daily.) 90 Cap 1    Insulin Needles, Disposable, 30 gauge x 1/3\" Use 1-2 times daily. Qty 100 1 Package 11    ondansetron hcl (ZOFRAN) 4 mg tablet Take 1 Tab by mouth every eight (8) hours as needed for Nausea.  8 Tab 0    insulin glargine (LANTUS,BASAGLAR) 100 unit/mL (3 mL) inpn 25 Units by SubCUTAneous route nightly. 5 Pen 1    albuterol (PROVENTIL HFA, VENTOLIN HFA, PROAIR HFA) 90 mcg/actuation inhaler Take 2 Puffs by inhalation every four (4) hours as needed.  fluticasone propion-salmeterol (ADVAIR DISKUS) 500-50 mcg/dose diskus inhaler Take 1 Puff by inhalation every twelve (12) hours.  esomeprazole (NEXIUM) 20 mg capsule Take 20 mg by mouth daily.  mometasone (NASONEX) 50 mcg/actuation nasal spray 2 Sprays by Both Nostrils route daily as needed. For allergies 3 Container 3    glucose blood VI test strips (ACCU-CHEK POOJA) strip Pt to test 5 times daily. Dx:E11.65 500 Strip 3    CETIRIZINE HCL (ZYRTEC PO) Take 1 Tab by mouth daily. ALLERGIES  Allergies   Allergen Reactions    Pollen Extracts Runny Nose and Cough    Amoxicillin Hives, Shortness of Breath and Swelling    Crestor [Rosuvastatin] Myalgia    Ibuprofen Other (comments)     dont prescribe due to kidney function    Lipitor [Atorvastatin] Other (comments)     Muscle cramps and weakness      Pcn [Penicillins] Angioedema          SOCIAL HISTORY    Social History     Socioeconomic History    Marital status: LEGALLY      Spouse name: Not on file    Number of children: 0    Years of education: 13    Highest education level: Not on file   Occupational History    Occupation: Unemployed   Tobacco Use    Smoking status: Never Smoker    Smokeless tobacco: Never Used   Substance and Sexual Activity    Alcohol use:  Yes     Alcohol/week: 0.0 standard drinks     Comment: occasional wine    Drug use: No    Sexual activity: Not Currently   Other Topics Concern     Service No    Blood Transfusions No    Caffeine Concern Yes     Comment: due to reflux    Occupational Exposure No    Hobby Hazards No    Sleep Concern Yes    Stress Concern Yes     Comment: wants a job    Weight Concern Yes    Special Diet No    Back Care No    Exercise Yes    Bike Helmet No    Seat Belt Yes    Self-Exams Yes   Social History Narrative    Patient lives with a friend, no pets. FAMILY HISTORY  Family History   Problem Relation Age of Onset    Cancer Mother     Hypertension Mother     Cancer Father    Radha Piltaras Diabetes Father     Hypertension Father     Stroke Father     Diabetes Sister     Hypertension Sister     Heart Disease Sister     Colon Cancer Sister 52    Hypertension Brother     Cancer Maternal Aunt          REVIEW OF SYSTEMS  Review of Systems   Musculoskeletal: Positive for back pain and neck pain. LLE pain           PHYSICAL EXAMINATION  Visit Vitals  LMP 03/30/2013       Pain Assessment  9/22/2020   Location of Pain Back;Leg;Buttocks   Location Modifiers Right;Left   Severity of Pain 8   Quality of Pain Aching   Quality of Pain Comment -   Duration of Pain Persistent   Frequency of Pain Constant   Aggravating Factors -   Aggravating Factors Comment -   Limiting Behavior Yes   Relieving Factors -   Relieving Factors Comment -   Result of Injury No           Constitutional:  Well developed, well nourished, in no acute distress. Psychiatric: Affect and mood are appropriate. Integumentary: No rashes or abrasions noted on exposed areas. SPINE/MUSCULOSKELETAL EXAM    Per Betty Cain NP 1/7/19: She alert and oriented. Normal mood and affect. She has a full weightbearing, nonantalgic gait.  No assistive device.  She has 4/5 strength in bilateral upper and lower extremities.  Negative straight leg raise.  Negative Anam.  She is more tender in the muscles of the left lumbar region from her bra line down to her low back.       Cervical spine:  Neck is midline. Normal muscle tone. No focal atrophy is noted. ROM pain free. Shoulder ROM intact.   Tenderness to palpation of cervical and thoracic paraspinals on the L. Negative Spurling's sign. Negative Tinel's sign. Negative Kramer's sign.                                                                                                                           Sensation in the bilateral arms grossly intact to light touch.      Lumbar spine:  No rash, ecchymosis, or gross obliquity. No fasciculations. No focal atrophy is noted. No pain with hip ROM. Full range of motion. Tenderness to palpation of lumbar paraspinals and QL on the L. No tenderness to palpation at the sciatic notch. SI joints non-tender. Trochanters non tender.     Sensation in the bilateral legs grossly intact to light touch.     Updates 3/4/2020 per NP Buttery:  Cervical spine:  Neck is midline. Normal muscle tone. No focal atrophy is noted. Shoulder ROM intact. Mild Tenderness to palpation to neck. Negative Spurling's sign. Negative Tinel's sign. Negative Kramer's sign.      Lumbar spine:  No rash, ecchymosis, or gross obliquity. No fasciculations. No focal atrophy is noted.  Range of motion is intact. Tenderness to palpation to low back. SI joints non-tender. Trochanters non tender.       Musculoskeletal:  No pain with extension, axial loading, or forward flexion. No pain with internal or external rotation of her hips. MOTOR:      Biceps  Triceps Deltoids Wrist Ext Wrist Flex Hand Intrin   Right 5/5 5/5 5/5 5/5 5/5 5/5   Left 5/5 5/5 5/5 5/5 5/5 5/5             Hip Flex  Quads Hamstrings Ankle DF EHL Ankle PF   Right 5/5 5/5 5/5 5/5 5/5 5/5   Left 5/5 5/5 5/5 5/5 5/5 5/5     DTRs are 2+ biceps, triceps, brachioradialis, patella, and Achilles.     RADIOGRAPHS  Lumbar MRI images taken on 6/1/2020 personally reviewed with patient:  Alignment: Unchanged grade 1 degenerative anterolisthesis of L4  Vertebral body height: Normal  Marrow signal: Unremarkable  Disc spaces:  Moderately pronounced disc space narrowing disc desiccation at L4-5  Conus: Terminates at T12-L1     Axial imaging correlation:     T12-L1: Patent canal and foramina.      L1-2: Patent canal and foramina.     L2-3: Patent canal and foramina.     L3-4: Minor facet arthropathy. Patent canal and foramina.     L4-5: Uncovering of the disc. Mild disc bulge. Moderately pronounced facet  arthropathy with low-grade inflammation. There is some bulging of the posterior  epidural fat. Combined factors creates a mild central spinal stenosis. Mild  narrowing at the lateral recesses with transient abutment of the crossing nerves  but no overt impingement. Adequately patent foramina.     L5-S1: Bilateral facet arthropathy. Patent canal and foramina.     Other structures: Unremarkable.        IMPRESSION  IMPRESSION:     1. Chronic mild to moderate degenerative findings at L4-5, without high-grade  spinal or foraminal stenosis  -This and other levels of lesser degenerative findings as detailed above     Lumbar XR images taken on 12/21/18 personally reviewed with patient:  The vertebra are normal in height. . There is 5 mm of anterior offset of L4 on L5  demonstrating slight increase compared to the previous study. Disc space at L4/5 is moderately narrowed also increased from the previous exam.  The pedicles are intact. There is no evidence of fracture or dislocation. Mineralization is normal.     IMPRESSION  IMPRESSION:     Progression of degenerative disc disease and grade 1 spondylolisthesis L4/5.     Cervical XR images taken on 12/21/18 personally reviewed with patient:  The lateral view demonstrates from skull base to T3. The vertebra are normal in  height. . There is 2 mm anterior offset of C3 on C4 which has increased from the  previous exam. 1 to 2 mm of anterior offset of C4 on C5 unchanged. 2 mm anterior  offset of C5 on C6 unchanged. Disc spaces at C5/6 and C6/7 are markedly narrowed  with spurring.  Progressed from the previous exam. There is mild disc space  narrowing at C3/4 and C4/5.  The prevertebral soft tissues are normal.  There is  no evidence of fracture or dislocation.     IMPRESSION  IMPRESSION:     Progression of multilevel degenerative disc disease and multilevel mild AP offset    Lumbar x-ray films from 2/16/2016 personally reviewed with patient:  -Mild degenerative disc disease.  -Minimal grade 1 spondylolisthesis L4/5.     Thoracic x-ray films from 2/16/2016 personally reviewed with patient  -Degenerative change of the lower cervical spine.  -Degenerative change of the lower thoracic spine.     Cervical x-ray films from 2/16/2016 personally reviewed with patient:  -Multilevel degenerative changes of the cervical spine, most marked C6/C7.:    15 minutes of face-to-face contact were spent with the patient during today's visit extensively discussing symptoms and treatment plan. All questions were answered. More than half of this visit today was spent on counseling.      Written by Sameer Mccracken as dictated by Андрей Vu MD normal...

## 2021-01-06 RX ORDER — PREDNISONE 1 MG/1
TABLET ORAL
Qty: 90 TAB | Refills: 3 | Status: SHIPPED | OUTPATIENT
Start: 2021-01-06 | End: 2021-03-24

## 2021-01-06 RX ORDER — PREDNISONE 5 MG/1
TABLET ORAL
Qty: 90 TAB | Refills: 3 | Status: SHIPPED | OUTPATIENT
Start: 2021-01-06 | End: 2021-03-24

## 2021-01-07 ENCOUNTER — HOSPITAL ENCOUNTER (OUTPATIENT)
Dept: LAB | Age: 56
Discharge: HOME | End: 2021-01-07

## 2021-01-07 LAB — SENTARA SPECIMEN COL,SENBCF: NORMAL

## 2021-01-07 PROCEDURE — 99001 SPECIMEN HANDLING PT-LAB: CPT

## 2021-02-25 ENCOUNTER — TELEPHONE (OUTPATIENT)
Dept: PULMONOLOGY | Age: 56
End: 2021-02-25

## 2021-02-25 NOTE — TELEPHONE ENCOUNTER
Patient has been seen by her PCP Dr. Sandra Zepeda and tx with Prednisone 40mg daily x 5 days. Still on med. They advised to follow up with Dr. Stephanie Mak and if the Prednisone doesn't clear to go to ER per patient. Pt aware Dr. Stephanie Mak out of office. Will schedule with another provider to follow until his return.  to call patient to schedule appt.  Appt scheduled for 4/14 woth Dr. Jarek Richter

## 2021-03-15 ENCOUNTER — TELEPHONE (OUTPATIENT)
Dept: INTERNAL MEDICINE CLINIC | Age: 56
End: 2021-03-15

## 2021-03-15 DIAGNOSIS — E11.65 TYPE 2 DIABETES MELLITUS WITH HYPERGLYCEMIA, WITH LONG-TERM CURRENT USE OF INSULIN (HCC): Primary | ICD-10-CM

## 2021-03-15 DIAGNOSIS — E78.1 HYPERTRIGLYCERIDEMIA: ICD-10-CM

## 2021-03-15 DIAGNOSIS — R94.4 DECREASED GFR: ICD-10-CM

## 2021-03-15 DIAGNOSIS — Z79.4 TYPE 2 DIABETES MELLITUS WITH HYPERGLYCEMIA, WITH LONG-TERM CURRENT USE OF INSULIN (HCC): Primary | ICD-10-CM

## 2021-03-15 NOTE — TELEPHONE ENCOUNTER
----- Message from Gricelda Gay DNP sent at 3/13/2021  3:01 PM EST -----  Regarding: RE: reestablishing care with MAGGY Khan  Yes I will accept. She will need labs 1 week prior to appt. thank you  ----- Message -----  From: Osito Salvadoramina  Sent: 3/11/2021  11:07 AM EST  To: Gricelda Gay DNP  Subject: FW: reestablishing care with MAG[facility] Khan         Will you accept this patient?      ----- Message -----  From: Phong Partha  Sent: 3/11/2021  10:29 AM EST  To: [address]  Subject: reestablishing care with MAGGY Khan             Prior pt of MAGGY Khan at [place] last seen by her in 2019. She has been being seen at [facility], but now would like to return to  [facility] and see NP [facility] [phone].

## 2021-03-17 ENCOUNTER — TRANSCRIBE ORDER (OUTPATIENT)
Dept: SCHEDULING | Age: 56
End: 2021-03-17

## 2021-03-17 DIAGNOSIS — Z12.31 VISIT FOR SCREENING MAMMOGRAM: Primary | ICD-10-CM

## 2021-03-18 ENCOUNTER — HOSPITAL ENCOUNTER (OUTPATIENT)
Dept: MAMMOGRAPHY | Age: 56
Discharge: HOME | End: 2021-03-18
Attending: NURSE PRACTITIONER
Payer: MEDICARE

## 2021-03-18 ENCOUNTER — HOSPITAL ENCOUNTER (OUTPATIENT)
Dept: LAB | Age: 56
Discharge: HOME | End: 2021-03-18
Payer: MEDICARE

## 2021-03-18 DIAGNOSIS — Z12.31 VISIT FOR SCREENING MAMMOGRAM: ICD-10-CM

## 2021-03-18 DIAGNOSIS — R94.4 DECREASED GFR: ICD-10-CM

## 2021-03-18 DIAGNOSIS — Z79.4 TYPE 2 DIABETES MELLITUS WITH HYPERGLYCEMIA, WITH LONG-TERM CURRENT USE OF INSULIN (HCC): ICD-10-CM

## 2021-03-18 DIAGNOSIS — E11.65 TYPE 2 DIABETES MELLITUS WITH HYPERGLYCEMIA, WITH LONG-TERM CURRENT USE OF INSULIN (HCC): ICD-10-CM

## 2021-03-18 DIAGNOSIS — E78.1 HYPERTRIGLYCERIDEMIA: ICD-10-CM

## 2021-03-18 LAB
ALBUMIN SERPL-MCNC: 3.1 G/DL (ref 3.4–5)
ALBUMIN/GLOB SERPL: 1.1 {RATIO} (ref 0.8–1.7)
ALP SERPL-CCNC: 66 U/L (ref 45–117)
ALT SERPL-CCNC: 23 U/L (ref 13–56)
ANION GAP SERPL CALC-SCNC: 7 MMOL/L (ref 3–18)
AST SERPL-CCNC: 17 U/L (ref 10–38)
BILIRUB SERPL-MCNC: 0.3 MG/DL (ref 0.2–1)
BUN SERPL-MCNC: 33 MG/DL (ref 7–18)
BUN/CREAT SERPL: 20 (ref 12–20)
CALCIUM SERPL-MCNC: 8.5 MG/DL (ref 8.5–10.1)
CHLORIDE SERPL-SCNC: 110 MMOL/L (ref 100–111)
CHOLEST SERPL-MCNC: 232 MG/DL
CO2 SERPL-SCNC: 25 MMOL/L (ref 21–32)
CREAT SERPL-MCNC: 1.68 MG/DL (ref 0.6–1.3)
CREAT UR-MCNC: 159 MG/DL (ref 30–125)
EST. AVERAGE GLUCOSE BLD GHB EST-MCNC: 169 MG/DL
GLOBULIN SER CALC-MCNC: 2.8 G/DL (ref 2–4)
GLUCOSE SERPL-MCNC: 161 MG/DL (ref 74–99)
HBA1C MFR BLD: 7.5 % (ref 4.2–5.6)
HDLC SERPL-MCNC: 68 MG/DL (ref 40–60)
HDLC SERPL: 3.4 {RATIO} (ref 0–5)
LDLC SERPL CALC-MCNC: 132.2 MG/DL (ref 0–100)
LIPID PROFILE,FLP: ABNORMAL
MICROALBUMIN UR-MCNC: 0.59 MG/DL (ref 0–3)
MICROALBUMIN/CREAT UR-RTO: 4 MG/G (ref 0–30)
POTASSIUM SERPL-SCNC: 4 MMOL/L (ref 3.5–5.5)
PROT SERPL-MCNC: 5.9 G/DL (ref 6.4–8.2)
SODIUM SERPL-SCNC: 142 MMOL/L (ref 136–145)
TRIGL SERPL-MCNC: 159 MG/DL (ref ?–150)
VLDLC SERPL CALC-MCNC: 31.8 MG/DL

## 2021-03-18 PROCEDURE — 80053 COMPREHEN METABOLIC PANEL: CPT

## 2021-03-18 PROCEDURE — 77067 SCR MAMMO BI INCL CAD: CPT

## 2021-03-18 PROCEDURE — 36415 COLL VENOUS BLD VENIPUNCTURE: CPT

## 2021-03-18 PROCEDURE — 82043 UR ALBUMIN QUANTITATIVE: CPT

## 2021-03-18 PROCEDURE — 80061 LIPID PANEL: CPT

## 2021-03-18 PROCEDURE — 83036 HEMOGLOBIN GLYCOSYLATED A1C: CPT

## 2021-03-18 NOTE — PROGRESS NOTES
[facility] nurses: Please notify pt that her mammogram was normal. Pt will need yearly mammograms.  Thank you

## 2021-03-18 NOTE — PROGRESS NOTES
appt 3/24/21  tg 159; ldl 132  GFR chronic low readings (27 to 37)  Creatinine chronic elevated readings (1.4 to 1.6)  Hypoalbuminemia 3.1  A1 7.5

## 2021-03-24 ENCOUNTER — OFFICE VISIT (OUTPATIENT)
Dept: INTERNAL MEDICINE CLINIC | Age: 56
End: 2021-03-24
Payer: MEDICARE

## 2021-03-24 VITALS
WEIGHT: 140.4 LBS | HEIGHT: 62 IN | TEMPERATURE: 97.7 F | RESPIRATION RATE: 16 BRPM | SYSTOLIC BLOOD PRESSURE: 138 MMHG | OXYGEN SATURATION: 95 % | BODY MASS INDEX: 25.83 KG/M2 | DIASTOLIC BLOOD PRESSURE: 68 MMHG | HEART RATE: 94 BPM

## 2021-03-24 DIAGNOSIS — Z00.00 WELCOME TO MEDICARE PREVENTIVE VISIT: Primary | ICD-10-CM

## 2021-03-24 DIAGNOSIS — E78.2 MIXED HYPERLIPIDEMIA: ICD-10-CM

## 2021-03-24 DIAGNOSIS — Z79.4 TYPE 2 DIABETES MELLITUS WITH HYPERGLYCEMIA, WITH LONG-TERM CURRENT USE OF INSULIN (HCC): ICD-10-CM

## 2021-03-24 DIAGNOSIS — J18.9 COMMUNITY ACQUIRED PNEUMONIA, UNSPECIFIED LATERALITY: ICD-10-CM

## 2021-03-24 DIAGNOSIS — Z71.89 ADVANCED DIRECTIVES, COUNSELING/DISCUSSION: ICD-10-CM

## 2021-03-24 DIAGNOSIS — L30.9 ECZEMA, UNSPECIFIED TYPE: ICD-10-CM

## 2021-03-24 DIAGNOSIS — Z79.899 MEDICATION MANAGEMENT: ICD-10-CM

## 2021-03-24 DIAGNOSIS — K21.9 GASTROESOPHAGEAL REFLUX DISEASE WITHOUT ESOPHAGITIS: ICD-10-CM

## 2021-03-24 DIAGNOSIS — E88.09 HYPOALBUMINEMIA: ICD-10-CM

## 2021-03-24 DIAGNOSIS — J45.20 MILD INTERMITTENT ASTHMA WITHOUT COMPLICATION: ICD-10-CM

## 2021-03-24 DIAGNOSIS — D86.9 SARCOIDOSIS: ICD-10-CM

## 2021-03-24 DIAGNOSIS — M25.472 EDEMA OF BOTH ANKLES: ICD-10-CM

## 2021-03-24 DIAGNOSIS — N18.30 STAGE 3 CHRONIC KIDNEY DISEASE, UNSPECIFIED WHETHER STAGE 3A OR 3B CKD (HCC): ICD-10-CM

## 2021-03-24 DIAGNOSIS — Z13.31 POSITIVE DEPRESSION SCREENING: ICD-10-CM

## 2021-03-24 DIAGNOSIS — Z13.6 SCREENING FOR ISCHEMIC HEART DISEASE: ICD-10-CM

## 2021-03-24 DIAGNOSIS — E11.40 DIABETIC NEUROPATHY, PAINFUL (HCC): ICD-10-CM

## 2021-03-24 DIAGNOSIS — M25.471 EDEMA OF BOTH ANKLES: ICD-10-CM

## 2021-03-24 DIAGNOSIS — E11.65 TYPE 2 DIABETES MELLITUS WITH HYPERGLYCEMIA, WITH LONG-TERM CURRENT USE OF INSULIN (HCC): ICD-10-CM

## 2021-03-24 DIAGNOSIS — Z76.89 ENCOUNTER TO ESTABLISH CARE WITH NEW DOCTOR: ICD-10-CM

## 2021-03-24 DIAGNOSIS — F41.8 DEPRESSION WITH ANXIETY: ICD-10-CM

## 2021-03-24 PROBLEM — E78.1 HYPERTRIGLYCERIDEMIA: Status: RESOLVED | Noted: 2018-08-28 | Resolved: 2021-03-24

## 2021-03-24 PROBLEM — R79.89 ELEVATED SERUM CREATININE: Status: RESOLVED | Noted: 2018-08-28 | Resolved: 2021-03-24

## 2021-03-24 PROBLEM — M77.12 LATERAL EPICONDYLITIS OF BOTH ELBOWS: Status: RESOLVED | Noted: 2017-02-06 | Resolved: 2021-03-24

## 2021-03-24 PROBLEM — R94.4 DECREASED GFR: Status: RESOLVED | Noted: 2018-08-28 | Resolved: 2021-03-24

## 2021-03-24 PROBLEM — M77.11 LATERAL EPICONDYLITIS OF BOTH ELBOWS: Status: RESOLVED | Noted: 2017-02-06 | Resolved: 2021-03-24

## 2021-03-24 PROCEDURE — 2022F DILAT RTA XM EVC RTNOPTHY: CPT | Performed by: NURSE PRACTITIONER

## 2021-03-24 PROCEDURE — G8419 CALC BMI OUT NRM PARAM NOF/U: HCPCS | Performed by: NURSE PRACTITIONER

## 2021-03-24 PROCEDURE — 99497 ADVNCD CARE PLAN 30 MIN: CPT | Performed by: NURSE PRACTITIONER

## 2021-03-24 PROCEDURE — G9899 SCRN MAM PERF RSLTS DOC: HCPCS | Performed by: NURSE PRACTITIONER

## 2021-03-24 PROCEDURE — G8427 DOCREV CUR MEDS BY ELIG CLIN: HCPCS | Performed by: NURSE PRACTITIONER

## 2021-03-24 PROCEDURE — 3017F COLORECTAL CA SCREEN DOC REV: CPT | Performed by: NURSE PRACTITIONER

## 2021-03-24 PROCEDURE — 3051F HG A1C>EQUAL 7.0%<8.0%: CPT | Performed by: NURSE PRACTITIONER

## 2021-03-24 PROCEDURE — 93000 ELECTROCARDIOGRAM COMPLETE: CPT | Performed by: NURSE PRACTITIONER

## 2021-03-24 PROCEDURE — G0402 INITIAL PREVENTIVE EXAM: HCPCS | Performed by: NURSE PRACTITIONER

## 2021-03-24 PROCEDURE — 99215 OFFICE O/P EST HI 40 MIN: CPT | Performed by: NURSE PRACTITIONER

## 2021-03-24 PROCEDURE — G8432 DEP SCR NOT DOC, RNG: HCPCS | Performed by: NURSE PRACTITIONER

## 2021-03-24 RX ORDER — FLUTICASONE PROPIONATE AND SALMETEROL 500; 50 UG/1; UG/1
1 POWDER RESPIRATORY (INHALATION) EVERY 12 HOURS
Qty: 3 INHALER | Refills: 0 | Status: SHIPPED | OUTPATIENT
Start: 2021-03-24 | End: 2021-06-16 | Stop reason: SDUPTHER

## 2021-03-24 RX ORDER — TRIAMCINOLONE ACETONIDE 1 MG/G
CREAM TOPICAL
Qty: 15 G | Refills: 1 | Status: SHIPPED | OUTPATIENT
Start: 2021-03-24

## 2021-03-24 RX ORDER — VENLAFAXINE HYDROCHLORIDE 75 MG/1
75 CAPSULE, EXTENDED RELEASE ORAL DAILY
Qty: 90 CAP | Refills: 0 | Status: SHIPPED | OUTPATIENT
Start: 2021-03-24 | End: 2021-06-16 | Stop reason: SDUPTHER

## 2021-03-24 RX ORDER — ZOSTER VACCINE RECOMBINANT, ADJUVANTED 50 MCG/0.5
KIT INTRAMUSCULAR
Qty: 0.5 ML | Refills: 1 | Status: SHIPPED | OUTPATIENT
Start: 2021-03-24 | End: 2021-06-16

## 2021-03-24 RX ORDER — PRAVASTATIN SODIUM 80 MG/1
80 TABLET ORAL
Qty: 90 TAB | Refills: 0 | Status: SHIPPED | OUTPATIENT
Start: 2021-03-24 | End: 2021-06-16 | Stop reason: SDUPTHER

## 2021-03-24 RX ORDER — ALBUTEROL SULFATE 90 UG/1
2 AEROSOL, METERED RESPIRATORY (INHALATION)
Qty: 1 INHALER | Refills: 5 | Status: SHIPPED | OUTPATIENT
Start: 2021-03-24 | End: 2021-06-16 | Stop reason: SDUPTHER

## 2021-03-24 RX ORDER — HYDROCHLOROTHIAZIDE 25 MG/1
25 TABLET ORAL DAILY
Qty: 90 TAB | Refills: 0 | Status: SHIPPED | OUTPATIENT
Start: 2021-03-24 | End: 2021-06-16 | Stop reason: SDUPTHER

## 2021-03-24 RX ORDER — FLUTICASONE PROPIONATE 50 MCG
2 SPRAY, SUSPENSION (ML) NASAL DAILY
COMMUNITY
End: 2021-03-24

## 2021-03-24 RX ORDER — TOPIRAMATE 25 MG/1
25 TABLET ORAL 2 TIMES DAILY WITH MEALS
Qty: 180 TAB | Refills: 0 | Status: SHIPPED | OUTPATIENT
Start: 2021-03-24 | End: 2021-06-16 | Stop reason: SDUPTHER

## 2021-03-24 RX ORDER — INSULIN GLARGINE 100 [IU]/ML
25 INJECTION, SOLUTION SUBCUTANEOUS
Qty: 5 PEN | Refills: 1 | Status: SHIPPED | OUTPATIENT
Start: 2021-03-24 | End: 2021-06-16 | Stop reason: SDUPTHER

## 2021-03-24 RX ORDER — VENLAFAXINE HYDROCHLORIDE 37.5 MG/1
CAPSULE, EXTENDED RELEASE ORAL
Qty: 30 CAP | Refills: 0 | Status: SHIPPED
Start: 2021-03-24 | End: 2021-06-16

## 2021-03-24 NOTE — PROGRESS NOTES
Chief Complaint   Patient presents with   [facility].    Welcome To Medicare    Labs     done 3/18/2021

## 2021-03-24 NOTE — Clinical Note
Will you please reach out to [address] [facility] [address], nephrology and Dr. Lesely Travis, optometry to obtain last office notes and labs.   Thank you

## 2021-03-24 NOTE — PROGRESS NOTES
[facility] [place] [address] [address] [facility], [address]  [phone] EOSB/[phone] fax    3/24/2021    HPI:   Angel Yuan 1965 is a pleasant BLACK/ female who presents today to establish care and for routine physical exam.  Patient was seen in [facility] for primary care. CKD 3: Dr. Lynne Arreaga, nephrology. Her last appointment was January 2021. While inpatient in March 2021, hospitalist discontinued patient's Metformin and lisinopril therapies. She is to call and schedule an updated appointment. Mixed hyperlipidemia: She is currently taking pravastatin 40 mg daily and tolerating well. Sarcoidosis and asthma: Dr. Olivera, pulmonary. Patient was recently in the hospital for double pneumonia. She is currently taking prednisone 20 mg daily with taper. She is decreasing her milligrams by 10 every week. She has a follow-up appointment with Dr. Wang Staff (Dr. Olivera is out of the office) April 14 for follow-up on her pneumonia. Depression with anxiety: Patient was taken Prozac and stopped this therapy 3 weeks ago as she felt it was not effective. Her depression was so bad that she was not eating, bathing, leaving her room. The symptoms started in January 2021. She lost her brother in February 2021. She has family support and feels like her depression and anxiety has improved some but she is seeking another therapy to possibly help. She is not seeking psychiatry/counseling at this time. IDDM: While inpatient in March 2021, hospitalist discontinued patient's Metformin and lisinopril therapies. She continues Januvia 25 mg daily and tolerates well. She takes Lantus 25 units daily along with Humalog sliding scale. Her sliding scale: 150200 2units, 201-250 4 units, 251-300 6 units, 301-350 8 units, 351-400 10 units, over 400 12 units. In the last 30 days in the morning her lowest blood sugars 73 and her highest blood sugars 200.   In the evening in the last 30 days her lowest blood sugar is 140 with her highest blood sugar being 400. She obtains ReliOn strips from over-the-counter and she utilizes pen needles nanos 4 mL/32-gauge. She checks her blood sugars 1-3 times a day. She is willing to speak with Pharm. D. in reference to DM management in relation to CKD. DM neuropathy: She suffers from neuropathy to bilateral feet. Topamax 25 mg twice daily is very effective for her discomforts. She has requested a referral to podiatry. Eczema: She continues triamcinolone cream as needed to the back of her neck for her eczema. She only uses this as needed and most of the time just during the summer time. Meds patient is not taking: Prednisone 5 mg, prednisone 1 mg, Zofran, Nasonex, Robaxin, Metformin, lisinopril, lidocaine patches, gabapentin, Flonase, Prozac, Zyrtec.     Past Medical History:   Diagnosis Date    Asthma     Bilateral great toe fractures 2014    Chronic sinusitis     Dr. Reinier Prescott in the past    Colon polyp 5/16    Dr Henri Bailey    Depression 2019    Diabetes mellitus ([facility] [address]     DJD (degenerative joint disease) of cervical spine 2016    DJD (degenerative joint disease), lumbar 2013    MRI c spine w degen changes; Dr Corina Millan    Dyslipidemia     calculated 10 year risk score was 2.0% (12/13)    Edema of both ankles 8/28/2018    Environmental and seasonal allergies 8/28/2018    Eustachian tube dysfunction     Fibrocystic breast     Dr Mayte Minaya GERD (gastroesophageal reflux disease)     Hypertriglyceridemia 8/28/2018    Lateral epicondylitis of both elbows 2/6/2017    Macular degeneration of both eyes 8/28/2018    Mild intermittent asthma without complication 91/20/7510    Dr. Umair Roach;  ratio 68%, FEV1 78% w 6% inc postbd, TLC 77, RV 56, DLCO 61%    Morbid obesity (HCC)     peak weight 196 lbs, bmi 35.8 from 10/13    Neuropathy 8/28/2018    Osteopenia     Dr. Mik Beltre; DEXA t score -0.7 spine, -0.2 hip (8/14)    Sarcoidosis     Dr Deloras Kawasaki 4 chronic kidney disease ([facility] [address] 04/2019    Dr Enrique Kramer, nephro    Type 2 diabetes mellitus with hyperglycemia, with long-term current use of insulin ([facility] 8/28/2018     Past Surgical History:   Procedure Laterality Date    COLONOSCOPY N/A 5/26/2016    Dr Tr Bernardo hyperplastic    Gwyndolyn Buba Dr. Suzann Essex HX HEENT      nasal polypectomy Dr. Kandice Michel HX HEENT      tear duct surgery right Dr. Verito Cho 2010; left Dr Iavn Colón 2015    HX ORTHOPAEDIC      DEXA -0.7 spine, -0.2 hip 8/14    [place] CARDIAC SURG PROCEDURE UNLIST  9/10, 8/13    thallium negative ef 72%; negative ef 70%    [place] CHEST SURGERY PROCEDURE UNLISTED  7/12    US thyroid negative    WI CHEST SURGERY PROCEDURE UNLISTED  2012    pfts w mod restrictive defect     VASCULAR SURGERY PROCEDURE UNLIST  12/13    venous doppler negative     Current Outpatient Medications   Medication Sig    insulin lispro (HUMALOG KWIKPEN INSULIN SC) by SubCUTAneous route.  varicella-zoster recombinant, PF, (Shingrix, PF,) 50 mcg/0.5 mL susr injection 0.5mL by IntraMUSCular route once now and then repeat in 2-6 months    pravastatin (PRAVACHOL) 80 mg tablet Take 1 Tab by mouth nightly. Indications: high cholesterol and high triglycerides    triamcinolone acetonide (KENALOG) 0.1 % topical cream Apply  to affected area two (2) times daily as needed for Skin Irritation.  topiramate (Topamax) 25 mg tablet Take 1 Tab by mouth two (2) times daily (with meals). For neuropathy    SITagliptin (JANUVIA) 25 mg tablet Take 1 Tab by mouth daily. For diabetes    venlafaxine-SR (EFFEXOR-XR) 37.5 mg capsule Take 1 capsule for 7 days then take 2 capsules daily til all gone then start Effexor XR 75mg daily.  venlafaxine-SR (EFFEXOR-XR) 75 mg capsule Take 1 Cap by mouth daily.  fluticasone propion-salmeteroL (ADVAIR/WIXELA) 500-50 mcg/dose diskus inhaler Take 1 Puff by inhalation every twelve (12) hours.     albuterol (PROVENTIL HFA, VENTOLIN HFA, PROAIR HFA) 90 mcg/actuation inhaler Take 2 Puffs by inhalation every four (4) hours as needed for Shortness of Breath. Indications: asthma attack    hydroCHLOROthiazide (HYDRODIURIL) 25 mg tablet Take 1 Tab by mouth daily. For swelling    insulin glargine (LANTUS,BASAGLAR) 100 unit/mL (3 mL) inpn 25 Units by SubCUTAneous route nightly.  aspirin delayed-release 81 mg tablet Take 1 Tab by mouth daily. For heart health    Insulin Needles, Disposable, 30 gauge x 1/3\" Use 1-2 times daily. Qty 100    esomeprazole (NEXIUM) 20 mg capsule Take 20 mg by mouth daily.  glucose blood VI test strips (ACCU-CHEK POOJA) strip Pt to test 5 times daily. Dx:E11.65     No current facility-administered medications for this visit. Allergies and Intolerances: Allergies   Allergen Reactions    Pollen Extracts Runny Nose and Cough    Amoxicillin Hives, Shortness of Breath and Swelling    Crestor [Rosuvastatin] Myalgia    Ibuprofen Other (comments)     dont prescribe due to kidney function    Lipitor [Atorvastatin] Other (comments)     Muscle cramps and weakness      Pcn [Penicillins] Angioedema     Family History:   Family History   Problem Relation Age of Onset    Cancer Mother     Hypertension Mother     Cancer Father     Diabetes Father     Hypertension Father     Stroke Father     Diabetes Sister     Hypertension Sister     Heart Disease Sister     Colon Cancer Sister 52    Hypertension Brother     Cancer Maternal Aunt      Social History:   She  reports that she has never smoked.  She has never used smokeless tobacco.   Social History     Substance and Sexual Activity   Alcohol Use Yes    Alcohol/week: 0.0 standard drinks    Comment: occasional wine     Immunization History:  Immunization History   Administered Date(s) Administered    Influenza Vaccine VinPerfect) PF (>6 Mo Flulaval, Fluarix, and >3 Yrs Frederic, Fluzone [id number]) 09/28/2017, 11/26/2018    Pneumococcal Polysaccharide (PPSV-23) 09/28/2017    Tdap 04/24/2013       Review of Systems:   As above included in HPI. Otherwise 11 point review of systems negative including constitutional, skin, HENT, eyes, respiratory, cardiovascular, gastrointestinal, genitourinary, musculoskeletal, endocrine, hematologic, allergy, and neurologic. Physical:   Visit Vitals  /68   Pulse 94   Temp 97.7 °F (36.5 °C) (Temporal)   Resp 16   Ht 5' 2\" (1.575 m)   Wt 140 lb 6.4 oz (63.7 kg)   LMP 03/30/2013   SpO2 95%   BMI 25.68 kg/m²      Wt Readings from Last 3 Encounters:   03/24/21 140 lb 6.4 oz (63.7 kg)   11/05/20 147 lb (66.7 kg)   09/22/20 150 lb 6.4 oz (68.2 kg)         Exam:   Physical Exam  Vitals signs and nursing note reviewed. Constitutional:       Appearance: Normal appearance. HENT:      Head: Normocephalic and atraumatic. Right Ear: External ear normal.      Nose: Nose normal.      Mouth/Throat:      Mouth: Mucous membranes are moist.   Eyes:      Extraocular Movements: Extraocular movements intact. Pupils: Pupils are equal, round, and reactive to light. Neck:      Musculoskeletal: Normal range of motion and neck supple. Vascular: No carotid bruit. Cardiovascular:      Rate and Rhythm: Normal rate and regular rhythm. Heart sounds: Normal heart sounds. Comments: No edema noted  Pulmonary:      Effort: Pulmonary effort is normal. No respiratory distress. Breath sounds: Normal breath sounds. No wheezing. Abdominal:      General: Abdomen is flat. Bowel sounds are normal.      Palpations: Abdomen is soft. Musculoskeletal: Normal range of motion. Skin:     General: Skin is warm and dry. Neurological:      General: No focal deficit present. Mental Status: She is alert and oriented to person, place, and time. Psychiatric:         Mood and Affect: Mood normal.         Behavior: Behavior normal.         Thought Content:  Thought content normal.         Judgment: Judgment normal.      Comments: Much engaged in conversation. Health Maintenance:  Screening: (Yes/No means wether completed or not)   Mammogram: 3/2021 (normal)   PAP smear: 11/2018 (Due 11/2021)   Colorectal: 5/2016 (due 5/2021)   Depression: Yes   DM (HbA1c/FPG): 3/2021 (7.5)   Hepatitis C: 2010 (neg)   Falls: Yes   DEXA: 9/2014 (normal)   Eye Exam: regular eye exams with Dr. Phi Reed (last 4/2021 )   Smoking: Never   Vitamin D: No   Medicare Wellness: 3/24/21 (Initial)      Review of Data:  Labs reviewed: yes  tg 159; ldl 132  GFR chronic low readings (27 to 37)- dc metformin  Creatinine chronic elevated readings (1.4 to 1.6)  Hypoalbuminemia 3.1  A1 7.5    Impression:  Patient Active Problem List   Diagnosis Code    Sarcoidosis Dr. Stephanie Mak on low dose steroid D86.9    Asthma Dr. Stephanie Mak J45.909    Gastroesophageal reflux disease without esophagitis K21.9    Type 2 diabetes mellitus with hyperglycemia, with long-term current use of insulin (HCC) E11.65, Z79.4    Neuropathy G62.9    Mild intermittent asthma without complication Q45.27    Edema of both ankles M25.471, M25.472    Environmental and seasonal allergies J30.89    Macular degeneration of both eyes H35.30    Diabetic neuropathy, painful (HCC) E11.40    Type 2 diabetes mellitus with proliferative retinopathy ([facility] [address] D70.1996    Mixed hyperlipidemia E78.2    Eczema L30.9    Stage 3 chronic kidney disease N18.30    Depression with anxiety F41.8    Hypoalbuminemia E88.09       Plan:    ICD-10-CM ICD-9-CM    1. Welcome to Medicare preventive visit  Z00.00 V70.0 AMB POC EKG ROUTINE W/ 12 LEADS, INTER & REP   2. Advanced directives, counseling/discussion  Z71.89 V65.49 FULL CODE   3. Screening for ischemic heart disease  Z13.6 V81.0 LIPID PANEL   4. Encounter to establish care with new doctor  Z76.89 V65.8    5. Community acquired pneumonia, unspecified laterality  J18.9 486    6.  Mixed hyperlipidemia  E78.2 272.2 pravastatin (PRAVACHOL) 80 mg tablet LIPID PANEL   7. Eczema, unspecified type  L30.9 692.9 triamcinolone acetonide (KENALOG) 0.1 % topical cream   8. Type 2 diabetes mellitus with hyperglycemia, with long-term current use of insulin (HCC)  E11.65 250.00 SITagliptin (JANUVIA) 25 mg tablet    Z79.4 790.29 insulin glargine (LANTUS,BASAGLAR) 100 unit/mL (3 mL) in     V58.67 HEMOGLOBIN A1C WITH EAG   9. Diabetic neuropathy, painful (HCC)  E11.40 250.60 topiramate (Topamax) 25 mg tablet     357.2 REFERRAL TO PODIATRY   10. Stage 3 chronic kidney disease, unspecified whether stage 3a or 3b CKD  F83.07 622.2 METABOLIC PANEL, COMPREHENSIVE   11. Hypoalbuminemia  G42.19 777.0 METABOLIC PANEL, COMPREHENSIVE   12. Positive depression screening  Z13.31 796.4    13. Depression with anxiety  F41.8 300.4 venlafaxine-SR (EFFEXOR-XR) 37.5 mg capsule      venlafaxine-SR (EFFEXOR-XR) 75 mg capsule   14. Sarcoidosis Dr. Gurrola Share on low dose steroid  D86.9 135    15. Mild intermittent asthma without complication  Y40.86 191.50 fluticasone propion-salmeteroL (ADVAIR/WIXELA) 500-50 mcg/dose diskus inhaler      albuterol (PROVENTIL HFA, VENTOLIN HFA, PROAIR HFA) 90 mcg/actuation inhaler   16. Edema of both ankles  M25.471 719.07 hydroCHLOROthiazide (HYDRODIURIL) 25 mg tablet    M25.472     17. Gastroesophageal reflux disease without esophagitis  K21.9 530.81    18. Medication management  Z79.899 V58.69      1. Welcome to [facility] completed. EKG reviewed. See scanned media. 2.  Encounter to establish care with new provider. Patient is transferring to me from their previous provider. I spent no less than 30 minutes reviewing and updating the chart to include previous labs, diagnostics, and specialty notes. 3.  Community-acquired pneumonia. Patient was recently discharged from [facility] with double pneumonia in March 2021. She continues prednisone therapy with taper. She will be following up with pulmonology mid April. 4.  Mixed hyperlipidemia. ; . Reviewed current value and goal related to prevention level, discussed dietary changes such as low saturated fats and low simple carbohydrates and choosing lean proteins such grilled/broiled/baked fish, chicken or turkey. Medications reviewed with the correct way to take them and side effects that should be reported such as muscle aches, pain, rash or shortness of breath. Patient was taking pravastatin 40 mg daily and tolerating treatment well. Due to patient's elevated LDL, will increase pravastatin to 80 mg and recheck level in 3 months. 5.  Eczema. Patient has seasonal flareups in the summertime to the back of her neck. The only treatment she has found to be effective is triamcinolone cream.  Educated patient this was a steroid and she need to use it only in moderation and as needed. Patient verbalized understanding. 6.  Type 2 diabetes with hyperglycemia long-term insulin use. A1c 7.5. Patient to continue Lantus 25 units daily and sliding scale 3 times a day along with Januvia 25 mg. Metformin and lisinopril were both discontinued while patient was in the hospital in March assuming due to her CKD. Will refer patient to Pharm. D. for DM management with goal of A1c less than 7.  7.  Diabetic neuropathy. Patient to continue Topamax 25 mg twice daily as this has been effective treatment for her. 8.  Stage III CKD. GFR chronic low readings (27 to 37)- dc metformin, Creatinine chronic elevated readings (1.4 to 1.6), Hypoalbuminemia 3.1. Patient follows [facility] neph[facility], with her last visit being January 2021. Encourage patient to see her nephrologist at least every 6 months if not sooner. 9.  Depression and anxiety. Patient was on Prozac 20 mg but decided to discontinue this therapy on her own approximately 3 weeks ago as she did not believe this medication was working.   Back in January 2021 she was in a terrible stupor to where she was not eating, losing weight, not bathing or caring for herself, she was isolating herself. After much discussion patient is wanting to try a different therapy. She is not wanting to seek psychiatry at this time. She denies SI/HI. Depression screen positive, PHQ 9 Score: 18,C-SSRS completed and medication was prescribed, drug therapy education given - patient will call for any significant medication side effects or worsening symptoms of depression. 10.  Sarcoidosis and asthma. Patient follows Dr. Kevin Aviles, pulmonology, and is currently taking Advair and albuterol. She is also on a high-dose prednisone with 10 mg taper every week. Prior to this patient was on low-dose prednisone maintenance due to her sarcoidosis. She does have a follow-up appointment in April for further instruction on medication. 11.  Edema to both ankles. Patient continues HCTZ and tolerates therapy well. She does not have any edema noted today. 12.  GERD. Patient continues Nexium daily as needed. Instructed her to avoid foods and other habits that may worsen her reflux such as laying down while eating, alcohol, spicy foods. Patient verbalized understanding. 13.  Medication management. Reviewed medication and completed the medication reconciliation with the patient. Reviewed side effects of medications with the patient. Questions were answered and patient verb understanding. Informed patient chronic medication refills will not be given between their scheduled appointments; all refills will be given at the time of their scheduled follow-up appointments. Patient verbalized understanding      Follow up 3 months  Labs needed 1 week prior to appt: Yes  Lipid, CMP, A1c    Dr. Kenneth Willis, AGNP-C, DNP  [facility]       Level 5:   60 minutes with the patient on counseling, answering questions, and/or coordination of care. Complex medical review of medical history, lab results, and testing. Complicated management plan formulated. This does not include AWV or ACP time.

## 2021-03-24 NOTE — ACP (ADVANCE CARE PLANNING)
Advance Care Planning     General Advance Care Planning (ACP) Conversation      Date of Conversation: 3/24/2021  Conducted with: Patient with Decision Making Capacity    Healthcare Decision Maker:     Click here to complete [address] including selection of the [address] Relationship (ie \"Primary\")  Today we documented Decision Maker(s) consistent with Legal Next of Kin hierarchy. Content/Action Overview:    Has ACP document(s) NOT on file - requested patient to provide  Reviewed DNR/DNI and patient elects Full Code (Attempt Resuscitation)  Topics discussed: ventilation preferences, hospitalization preferences and resuscitation preferences       Length of Voluntary ACP Conversation in minutes:  16 minutes    Patience Huerta, DNP

## 2021-03-24 NOTE — PATIENT INSTRUCTIONS
Medicare Wellness Visit, Female The best way to live healthy is to have a lifestyle where you eat a well-balanced diet, exercise regularly, limit alcohol use, and quit all forms of tobacco/nicotine, if applicable. Regular preventive services are another way to keep healthy. Preventive services (vaccines, screening tests, monitoring & exams) can help personalize your care plan, which helps you manage your own care. Screening tests can find health problems at the earliest stages, when they are easiest to treat. [address] follows the current, evidence-based guidelines published by the [facility] Kaplan ([facility]) when recommending preventive services for our patients. Because we follow these guidelines, sometimes recommendations change over time as research supports it. (For example, mammograms used to be recommended annually. Even though Medicare will still pay for an annual mammogram, the newer guidelines recommend a mammogram every two years for women of average risk). Of course, you and your doctor may decide to screen more often for some diseases, based on your risk and your co-morbidities (chronic disease you are already diagnosed with). Preventive services for you include: - Medicare offers their members a free annual wellness visit, which is time for you and your primary care provider to discuss and plan for your preventive service needs. Take advantage of this benefit every year! 
-All adults over the age of 72 should receive the recommended pneumonia vaccines. Current USPSTF guidelines recommend a series of two vaccines for the best pneumonia protection.  
-All adults should have a flu vaccine yearly and a tetanus vaccine every 10 years.  
-All adults age 48 and older should receive the shingles vaccines (series of two vaccines).      
-All adults age 38-68 who are overweight should have a diabetes screening test once every three years.  
-All adults born between [birth year] and 1966 should be screened once for Hepatitis C. 
-Other screening tests and preventive services for persons with diabetes include: an eye exam to screen for diabetic retinopathy, a kidney function test, a foot exam, and stricter control over your cholesterol.  
-Cardiovascular screening for adults with routine risk involves an electrocardiogram (ECG) at intervals determined by your doctor.  
-Colorectal cancer screenings should be done for adults age 54-65 with no increased risk factors for colorectal cancer. There are a number of acceptable methods of screening for this type of cancer. Each test has its own benefits and drawbacks. Discuss with your doctor what is most appropriate for you during your annual wellness visit. The different tests include: colonoscopy (considered the best screening method), a fecal occult blood test, a fecal DNA test, and sigmoidoscopy. 
 
-A bone mass density test is recommended when a woman turns 65 to screen for osteoporosis. This test is only recommended one time, as a screening. Some providers will use this same test as a disease monitoring tool if you already have osteoporosis. -Breast cancer screenings are recommended every other year for women of normal risk, age 54-69. 
-Cervical cancer screenings for women over age 72 are only recommended with certain risk factors. Here is a list of your current Health Maintenance items (your personalized list of preventive services) with a due date: 
Health Maintenance Due Topic Date Due  
 COVID-19 Vaccine (1) Never done  Shingles Vaccine (1 of 2) Never done [facility]  11/20/2019  Yearly Flu Vaccine (1) 09/01/2020 [facility]  09/03/2020 Medicare Wellness Visit, Female The best way to live healthy is to have a lifestyle where you eat a well-balanced diet, exercise regularly, limit alcohol use, and quit all forms of tobacco/nicotine, if applicable.  
  
Regular preventive services are another way to keep healthy. Preventive services (vaccines, screening tests, monitoring & exams) can help personalize your care plan, which helps you manage your own care. Screening tests can find health problems at the earliest stages, when they are easiest to treat. [facility] follows the current, evidence-based guidelines published by the [facility] ([facility]) when recommending preventive services for our patients. Because we follow these guidelines, sometimes recommendations change over time as research supports it. (For example, mammograms used to be recommended annually. Even though Medicare will still pay for an annual mammogram, the newer guidelines recommend a mammogram every two years for women of average risk). Of course, you and your doctor may decide to screen more often for some diseases, based on your risk and your co-morbidities (chronic disease you are already diagnosed with). Preventive services for you include: - Medicare offers their members a free annual wellness visit, which is time for you and your primary care provider to discuss and plan for your preventive service needs. Take advantage of this benefit every year! 
-All adults over the age of 72 should receive the recommended pneumonia vaccines. Current USPSTF guidelines recommend a series of two vaccines for the best pneumonia protection.  
-All adults should have a flu vaccine yearly and a tetanus vaccine every 10 years.  
-All adults age 48 and older should receive the shingles vaccines (series of two vaccines).      
-All adults age 38-68 who are overweight should have a diabetes screening test once every three years.  
-All adults born between 80 and 1965 should be screened once for Hepatitis C. 
-Other screening tests and preventive services for persons with diabetes include: an eye exam to screen for diabetic retinopathy, a kidney function test, a foot exam, and stricter control over your cholesterol.  
-Cardiovascular screening for adults with routine risk involves an electrocardiogram (ECG) at intervals determined by your doctor.  
-Colorectal cancer screenings should be done for adults age 54-65 with no increased risk factors for colorectal cancer. There are a number of acceptable methods of screening for this type of cancer. Each test has its own benefits and drawbacks. Discuss with your doctor what is most appropriate for you during your annual wellness visit. The different tests include: colonoscopy (considered the best screening method), a fecal occult blood test, a fecal DNA test, and sigmoidoscopy. 
 
-A bone mass density test is recommended when a woman turns 65 to screen for osteoporosis. This test is only recommended one time, as a screening. Some providers will use this same test as a disease monitoring tool if you already have osteoporosis. -Breast cancer screenings are recommended every other year for women of normal risk, age 54-69. 
-Cervical cancer screenings for women over age 72 are only recommended with certain risk factors. Here is a list of your current Health Maintenance items (your personalized list of preventive services) with a due date: 
Health Maintenance Due Topic Date Due  
 COVID-19 Vaccine (1) Never done  Shingles Vaccine (1 of 2) Never done [facility]  11/20/2019  Yearly Flu Vaccine (1) 09/01/2020 [facility]  09/03/2020

## 2021-03-24 NOTE — PROGRESS NOTES
This is a \"Welcome to [employer]"  Initial Preventive Physical Examination (IPPE) providing Personalized Prevention Plan Services (Performed in the first 12 months of enrollment)    I have reviewed the patient's medical history in detail and updated the computerized patient record. Depression Risk Screen     3 most recent PHQ Screens 3/24/2021   PHQ Not Done -   Little interest or pleasure in doing things More than half the days   Feeling down, depressed, irritable, or hopeless More than half the days   Total Score PHQ 2 4   Trouble falling or staying asleep, or sleeping too much More than half the days   Feeling tired or having little energy More than half the days   Poor appetite, weight loss, or overeating More than half the days   Feeling bad about yourself - or that you are a failure or have let yourself or your family down More than half the days   Trouble concentrating on things such as school, work, reading, or watching TV More than half the days   Moving or speaking so slowly that other people could have noticed; or the opposite being so fidgety that others notice More than half the days   Thoughts of being better off dead, or hurting yourself in some way More than half the days   PHQ 9 Score 18   How difficult have these problems made it for you to do your work, take care of your home and get along with others Somewhat difficult       Alcohol Risk Screen    Do you average more than 1 drink per night or more than 7 drinks a week:  No    On any one occasion in the past three months have you have had more than 3 drinks containing alcohol:  No          Functional Ability and Level of Safety    Diet: No special diet      Hearing: Hearing is good. Vision Screening:  Vision is good. Visual Acuity Screening    Right eye Left eye Both eyes   Without correction:      With correction: 20/25 20/25 20/20         Activities of Daily Living: The home contains: no safety equipment.   Patient does total self care      Ambulation: with no difficulty      Exercise level: sedentary     Fall Risk Screen:  Fall Risk Assessment, last 12 mths 3/24/2021   Able to walk? Yes   Fall in past 12 months? 1   Do you feel unsteady? 1   Are you worried about falling 1   Is TUG test greater than 12 seconds? 0   Is the gait abnormal? 0   Number of falls in past 12 months 2      Abuse Screen:  Patient is not abused       Screening EKG   EKG order placed: Yes    End of Life Planning   Advanced care planning directives were discussed with the patient and /or family/caregiver. Assessment/Plan   Education and counseling provided:  Are appropriate based on today's review and evaluation  Pneumococcal Vaccine  Influenza Vaccine  Screening Pap and pelvic (covered once every 2 years)  Colorectal cancer screening tests  Cardiovascular screening blood test  Screening for glaucoma    Diagnoses and all orders for this visit:    1. Welcome to Medicare preventive visit  -     AMB POC EKG ROUTINE W/ 12 LEADS, INTER & REP    2. Advanced directives, counseling/discussion  -     FULL CODE    3. Screening for ischemic heart disease  -     LIPID PANEL; Future    4. Encounter to establish care with new doctor    5. Community acquired pneumonia, unspecified laterality    6. Mixed hyperlipidemia  -     pravastatin (PRAVACHOL) 80 mg tablet; Take 1 Tab by mouth nightly. Indications: high cholesterol and high triglycerides  -     LIPID PANEL; Future    7. Eczema, unspecified type  -     triamcinolone acetonide (KENALOG) 0.1 % topical cream; Apply  to affected area two (2) times daily as needed for Skin Irritation. 8. Type 2 diabetes mellitus with hyperglycemia, with long-term current use of insulin (HCC)  -     SITagliptin (JANUVIA) 25 mg tablet; Take 1 Tab by mouth daily. For diabetes  -     insulin glargine (LANTUS,BASAGLAR) 100 unit/mL (3 mL) inpn; 25 Units by SubCUTAneous route nightly.  -     HEMOGLOBIN A1C WITH EAG; Future    9.  Diabetic neuropathy, painful (HCC)  -     topiramate (Topamax) 25 mg tablet; Take 1 Tab by mouth two (2) times daily (with meals). For neuropathy  -     REFERRAL TO PODIATRY    10. Stage 3 chronic kidney disease, unspecified whether stage 3a or 3b CKD  -     METABOLIC PANEL, COMPREHENSIVE; Future    11. Hypoalbuminemia  -     METABOLIC PANEL, COMPREHENSIVE; Future    12. Positive depression screening    13. Depression with anxiety  -     venlafaxine-SR (EFFEXOR-XR) 37.5 mg capsule; Take 1 capsule for 7 days then take 2 capsules daily til all gone then start Effexor XR 75mg daily. -     venlafaxine-SR (EFFEXOR-XR) 75 mg capsule; Take 1 Cap by mouth daily. 15. Sarcoidosis Dr. Ricardo Bañuelos on low dose steroid    15. Mild intermittent asthma without complication  -     fluticasone propion-salmeteroL (ADVAIR/WIXELA) 500-50 mcg/dose diskus inhaler; Take 1 Puff by inhalation every twelve (12) hours. -     albuterol (PROVENTIL HFA, VENTOLIN HFA, PROAIR HFA) 90 mcg/actuation inhaler; Take 2 Puffs by inhalation every four (4) hours as needed for Shortness of Breath. Indications: asthma attack    16. Edema of both ankles  -     hydroCHLOROthiazide (HYDRODIURIL) 25 mg tablet; Take 1 Tab by mouth daily. For swelling    17. Gastroesophageal reflux disease without esophagitis    18.  Medication management    Other orders  -     varicella-zoster recombinant, PF, (Shingrix, PF,) 50 mcg/0.5 mL susr injection; 0.5mL by IntraMUSCular route once now and then repeat in 2-6 months        Health Maintenance Due     Health Maintenance Due   Topic Date Due    COVID-19 Vaccine (1) Never done    Shingrix Vaccine Age 50> (1 of 2) Never done    Eye Exam Retinal or Dilated  11/20/2019    Flu Vaccine (1) 09/01/2020    Foot Exam Q1  09/03/2020       Patient Care Team   Patient Care Team:  Efren Minor DNP as PCP - General (Nurse Practitioner)  Efren Minor DNP as PCP - [facility]  Sarah José MD (Pulmonary Disease)  Alix Davis MD ERIKA (Orthopedic Surgery)  Tierney Oliveros (Otolaryngology)  Courtney Delarosa MD (Ophthalmology)  Danielle Rodriguez MD (Nephrology)    History     Past Medical History:   Diagnosis Date    Asthma     Bilateral great toe fractures 2014    Chronic sinusitis     Dr. William Reno in the past    Colon polyp 5/16    Dr Zi Gerardo    Depression 2019    Diabetes mellitus ([facility] [address]     DJD (degenerative joint disease) of cervical spine 2016    DJD (degenerative joint disease), lumbar 2013    MRI c spine w degen changes; Dr Yu Gave    Dyslipidemia     calculated 10 year risk score was 2.0% (12/13)    Edema of both ankles 8/28/2018    Environmental and seasonal allergies 8/28/2018    Eustachian tube dysfunction     Fibrocystic breast     Dr Chante Jung GERD (gastroesophageal reflux disease)     Hypertriglyceridemia 8/28/2018    Lateral epicondylitis of both elbows 2/6/2017    Macular degeneration of both eyes 8/28/2018    Mild intermittent asthma without complication 76/10/2826    Dr. Melanie Williamson;  ratio 68%, FEV1 78% w 6% inc postbd, TLC 77, RV 56, DLCO 61%    Morbid obesity (HCC)     peak weight 196 lbs, bmi 35.8 from 10/13    Neuropathy 8/28/2018    Osteopenia     Dr. Soraida Ferrell; DEXA t score -0.7 spine, -0.2 hip (8/14)    Sarcoidosis     Dr Tiffany Leos 4 chronic kidney disease ([facility] [address] 04/2019    Dr Adrianna Brandt, nephro    Type 2 diabetes mellitus with hyperglycemia, with long-term current use of insulin ([facility] [address] 8/28/2018      Past Surgical History:   Procedure Laterality Date    COLONOSCOPY N/A 5/26/2016    Dr Zi Gerardo hyperplastic    Yuniel Rooney      Dr. Gustabo Reynolds HX HEENT      nasal polypectomy Dr. Shilpa Dorsey HX HEENT      tear duct surgery right Dr. Misty Ardon 2010; left Dr Jessica Fischer 2015    HX ORTHOPAEDIC      DEXA -0.7 spine, -0.2 hip 8/14    MS CARDIAC SURG PROCEDURE UNLIST  9/10, 8/13    thallium negative ef 72%; negative ef 70%    MS CHEST SURGERY PROCEDURE UNLISTED  7/12    US thyroid negative    AL CHEST SURGERY PROCEDURE UNLISTED  2012    pfts w mod restrictive defect     VASCULAR SURGERY PROCEDURE UNLIST  12/13    venous doppler negative     Current Outpatient Medications   Medication Sig Dispense Refill    insulin lispro (HUMALOG KWIKPEN INSULIN SC) by SubCUTAneous route.  varicella-zoster recombinant, PF, (Shingrix, PF,) 50 mcg/0.5 mL susr injection 0.5mL by IntraMUSCular route once now and then repeat in 2-6 months 0.5 mL 1    pravastatin (PRAVACHOL) 80 mg tablet Take 1 Tab by mouth nightly. Indications: high cholesterol and high triglycerides 90 Tab 0    triamcinolone acetonide (KENALOG) 0.1 % topical cream Apply  to affected area two (2) times daily as needed for Skin Irritation. 15 g 1    topiramate (Topamax) 25 mg tablet Take 1 Tab by mouth two (2) times daily (with meals). For neuropathy 180 Tab 0    SITagliptin (JANUVIA) 25 mg tablet Take 1 Tab by mouth daily. For diabetes 90 Tab 0    venlafaxine-SR (EFFEXOR-XR) 37.5 mg capsule Take 1 capsule for 7 days then take 2 capsules daily til all gone then start Effexor XR 75mg daily. 30 Cap 0    venlafaxine-SR (EFFEXOR-XR) 75 mg capsule Take 1 Cap by mouth daily. 90 Cap 0    fluticasone propion-salmeteroL (ADVAIR/WIXELA) 500-50 mcg/dose diskus inhaler Take 1 Puff by inhalation every twelve (12) hours. 3 Inhaler 0    albuterol (PROVENTIL HFA, VENTOLIN HFA, PROAIR HFA) 90 mcg/actuation inhaler Take 2 Puffs by inhalation every four (4) hours as needed for Shortness of Breath. Indications: asthma attack 1 Inhaler 5    hydroCHLOROthiazide (HYDRODIURIL) 25 mg tablet Take 1 Tab by mouth daily. For swelling 90 Tab 0    insulin glargine (LANTUS,BASAGLAR) 100 unit/mL (3 mL) inpn 25 Units by SubCUTAneous route nightly. 5 Pen 1    aspirin delayed-release 81 mg tablet Take 1 Tab by mouth daily. For heart health 30 Tab 11    Insulin Needles, Disposable, 30 gauge x 1/3\" Use 1-2 times daily.  Qty 100 1 Package 11    esomeprazole (NEXIUM) 20 mg capsule Take 20 mg by mouth daily.  glucose blood VI test strips (ACCU-CHEK POOJA) strip Pt to test 5 times daily. Dx:E11.65 500 Strip 3     Allergies   Allergen Reactions    Pollen Extracts Runny Nose and Cough    Amoxicillin Hives, Shortness of Breath and Swelling    Crestor [Rosuvastatin] Myalgia    Ibuprofen Other (comments)     dont prescribe due to kidney function    Lipitor [Atorvastatin] Other (comments)     Muscle cramps and weakness      Pcn [Penicillins] Angioedema       Family History   Problem Relation Age of Onset    Cancer Mother     Hypertension Mother     Cancer Father     Diabetes Father     Hypertension Father     Stroke Father     Diabetes Sister     Hypertension Sister     Heart Disease Sister     Colon Cancer Sister 52    Hypertension Brother     Cancer Maternal Aunt      Social History     Tobacco Use    Smoking status: Never Smoker    Smokeless tobacco: Never Used   Substance Use Topics    Alcohol use: Yes     Alcohol/week: 0.0 standard drinks     Comment: occasional wine       Depression screen positive, PHQ 9 Score: 18, C-SSRS completed.

## 2021-03-26 DIAGNOSIS — Z79.4 TYPE 2 DIABETES MELLITUS WITH HYPERGLYCEMIA, WITH LONG-TERM CURRENT USE OF INSULIN (HCC): ICD-10-CM

## 2021-03-26 DIAGNOSIS — E11.65 TYPE 2 DIABETES MELLITUS WITH HYPERGLYCEMIA, WITH LONG-TERM CURRENT USE OF INSULIN (HCC): ICD-10-CM

## 2021-03-26 RX ORDER — INSULIN LISPRO 200 [IU]/ML
INJECTION, SOLUTION SUBCUTANEOUS
Qty: 5 PEN | Refills: 5 | Status: SHIPPED | OUTPATIENT
Start: 2021-03-26 | End: 2021-06-16 | Stop reason: SDUPTHER

## 2021-03-26 NOTE — TELEPHONE ENCOUNTER
Patient is also requesting Humalog. Please advise and pend new Rx if appropriate. Last Visit: 3/24/21 with MAGGY Khan  Next Appointment: 6/16/21 with NP [facility]    Requested Prescriptions     Pending Prescriptions Disp Refills    Insulin Needles, Disposable, 30 gauge x 1/3\" 1 Package 11     Sig: Use 1-2 times daily.  Qty 100

## 2021-04-02 ENCOUNTER — TELEPHONE (OUTPATIENT)
Dept: INTERNAL MEDICINE CLINIC | Age: 56
End: 2021-04-02

## 2021-04-02 DIAGNOSIS — D86.9 SARCOIDOSIS: Primary | ICD-10-CM

## 2021-04-02 PROBLEM — Z20.822 PERSON UNDER INVESTIGATION FOR COVID-19: Status: ACTIVE | Noted: 2021-03-04

## 2021-04-02 PROBLEM — E44.0 MODERATE MALNUTRITION (HCC): Status: ACTIVE | Noted: 2021-03-05

## 2021-04-02 PROBLEM — Z13.5 SCREENING FOR EYE CONDITION: Status: ACTIVE | Noted: 2021-04-02

## 2021-04-02 PROBLEM — E55.9 VITAMIN D DEFICIENCY: Status: ACTIVE | Noted: 2021-01-12

## 2021-04-02 PROBLEM — I12.9 MALIGNANT HYPERTENSIVE KIDNEY DISEASE WITH CHRONIC KIDNEY DISEASE STAGE I THROUGH STAGE IV, OR UNSPECIFIED: Status: ACTIVE | Noted: 2021-01-12

## 2021-04-02 PROBLEM — H35.369 RETINAL DRUSEN: Status: ACTIVE | Noted: 2021-04-02

## 2021-04-02 PROBLEM — I10 ESSENTIAL HYPERTENSION: Status: ACTIVE | Noted: 2021-04-02

## 2021-04-02 PROBLEM — Z77.22 CONTACT WITH AND (SUSPECTED) EXPOSURE TO ENVIRONMENTAL TOBACCO SMOKE (ACUTE) (CHRONIC): Status: ACTIVE | Noted: 2021-04-02

## 2021-04-02 PROBLEM — N28.1 ACQUIRED CYST OF KIDNEY: Status: ACTIVE | Noted: 2020-01-16

## 2021-04-02 PROBLEM — J44.1 ACUTE EXACERBATION OF CHRONIC OBSTRUCTIVE AIRWAYS DISEASE (HCC): Status: ACTIVE | Noted: 2021-04-02

## 2021-04-02 RX ORDER — OMEPRAZOLE 20 MG/1
CAPSULE, DELAYED RELEASE ORAL
COMMUNITY
Start: 2021-01-06 | End: 2021-04-14

## 2021-04-02 RX ORDER — BENZONATATE 100 MG/1
100 CAPSULE ORAL
COMMUNITY
Start: 2021-03-06 | End: 2021-04-14

## 2021-04-02 NOTE — TELEPHONE ENCOUNTER
Please advise and pend new Rx if appropriate.     Last visit:  3/24/21 with MAGGY Khan  Next appt:  6/16/21 with NP [facility]

## 2021-04-02 NOTE — TELEPHONE ENCOUNTER
Prednisone - 20 mg daily    Pt says she is supposed to be on the prednisone until she sees her pulmonologist on 4/14 per the hospital    She has 2 left    Bob on [place]

## 2021-04-05 RX ORDER — PREDNISONE 20 MG/1
20 TABLET ORAL
Qty: 10 TAB | Refills: 0 | Status: SHIPPED | OUTPATIENT
Start: 2021-04-05 | End: 2021-04-14

## 2021-04-05 NOTE — TELEPHONE ENCOUNTER
Pt to complete taper prednisone therapy as prescribed by [facility] ED and then continue prednisone 20mg every day until she sees pulm on 4/14/21 per ED. Pt will not have enough prednisone to hold her until 4/14/21, send in 10 tabs. Requested Prescriptions     Signed Prescriptions Disp Refills    predniSONE (DELTASONE) 20 mg tablet 10 Tab 0     Sig: Take 20 mg by mouth daily (with breakfast).      Authorizing Provider: Giovani Matos

## 2021-04-14 ENCOUNTER — OFFICE VISIT (OUTPATIENT)
Dept: PULMONOLOGY | Age: 56
End: 2021-04-14
Payer: MEDICARE

## 2021-04-14 VITALS
DIASTOLIC BLOOD PRESSURE: 85 MMHG | RESPIRATION RATE: 18 BRPM | BODY MASS INDEX: 26.94 KG/M2 | HEART RATE: 101 BPM | WEIGHT: 146.4 LBS | SYSTOLIC BLOOD PRESSURE: 143 MMHG | HEIGHT: 62 IN | TEMPERATURE: 98.1 F | OXYGEN SATURATION: 94 %

## 2021-04-14 DIAGNOSIS — D86.0 SARCOIDOSIS, LUNG (HCC): ICD-10-CM

## 2021-04-14 DIAGNOSIS — D86.89 SARCOID OF NOSE: ICD-10-CM

## 2021-04-14 DIAGNOSIS — D86.9 SARCOIDOSIS: Primary | ICD-10-CM

## 2021-04-14 DIAGNOSIS — Z79.52 CURRENT CHRONIC USE OF SYSTEMIC STEROIDS: ICD-10-CM

## 2021-04-14 DIAGNOSIS — R91.8 PULMONARY INFILTRATES: ICD-10-CM

## 2021-04-14 PROCEDURE — 3017F COLORECTAL CA SCREEN DOC REV: CPT | Performed by: INTERNAL MEDICINE

## 2021-04-14 PROCEDURE — G9717 DOC PT DX DEP/BP F/U NT REQ: HCPCS | Performed by: INTERNAL MEDICINE

## 2021-04-14 PROCEDURE — G9899 SCRN MAM PERF RSLTS DOC: HCPCS | Performed by: INTERNAL MEDICINE

## 2021-04-14 PROCEDURE — G8754 DIAS BP LESS 90: HCPCS | Performed by: INTERNAL MEDICINE

## 2021-04-14 PROCEDURE — G8753 SYS BP > OR = 140: HCPCS | Performed by: INTERNAL MEDICINE

## 2021-04-14 PROCEDURE — 99215 OFFICE O/P EST HI 40 MIN: CPT | Performed by: INTERNAL MEDICINE

## 2021-04-14 PROCEDURE — G8419 CALC BMI OUT NRM PARAM NOF/U: HCPCS | Performed by: INTERNAL MEDICINE

## 2021-04-14 PROCEDURE — G8427 DOCREV CUR MEDS BY ELIG CLIN: HCPCS | Performed by: INTERNAL MEDICINE

## 2021-04-14 RX ORDER — METFORMIN HYDROCHLORIDE 1000 MG/1
TABLET ORAL
COMMUNITY
Start: 2021-04-12 | End: 2021-04-14

## 2021-04-14 RX ORDER — LISINOPRIL 5 MG/1
TABLET ORAL
COMMUNITY
Start: 2021-04-12 | End: 2021-04-14

## 2021-04-14 RX ORDER — PREDNISONE 5 MG/1
TABLET ORAL
Qty: 210 TAB | Refills: 1 | Status: SHIPPED | OUTPATIENT
Start: 2021-04-14 | End: 2021-07-21

## 2021-04-14 NOTE — PROGRESS NOTES
[address] [place] [address] [facility]  Pulmonary, Critical Care, and Sleep Medicine    Pulmonary Office F/U  Name: Awais Zheng 63 y.o. female  MRN: [id number]  : 1965  Service Date: 21    Referring Provider: Aj Kincaid, [address] [facility],  [address]  Chief Complaint:   Chief Complaint   Patient presents with    Sarcoidosis     follow up from 2020 (last Seen Dr. Annmarie Lawson)    Asthma    Pressure Behind the Eyes    Results     COVID (-) 3/4/2021, Echo 3/6/2021, CT & CXR 3/4/2021         History of Present Illness:  Awais Zheng is a 63 y.o. female, who presents to Pulmonary clinic for follow-up of sarcoidosis. Patient was last seen in our clinic on 2020 by Dr. Vandana Diaz. In the interval, pt reports she was hopsltiaized at [facility] with pneumonia in March from 3/3 (night) to 3/6. Pt reports she was on 6mg prior to being hospitalized. Pt discharged on a taper. Pt now on 20mg for about 2 weeks now. Pt on chronic prednisone, had tapered to 6mg  Ophtalmology appointment next week.   Pt compliant with advair  Pt using PRN albuterol - 2x per day with exertion  Patient reports doing relatively well since hospitalization      Past Medical History:   Diagnosis Date    Asthma     Bilateral great toe fractures     Chronic kidney disease, stage 3b ([facility] 2021    Dr Deena Howard, nephro    Chronic sinusitis     Dr. Todd Cartagena in the past    Colon polyp     Dr Nick Pepper    Depression 2019    Diabetes mellitus ([facility]     DJD (degenerative joint disease) of cervical spine     DJD (degenerative joint disease), lumbar     MRI c spine w degen changes; Dr Celestine Shi    Dyslipidemia     calculated 10 year risk score was 2.0% ()    Edema of both ankles 2018    Environmental and seasonal allergies 2018    Eustachian tube dysfunction     Fibrocystic breast     Dr Jethro Bermudez GERD (gastroesophageal reflux disease)     Hypertriglyceridemia 8/28/2018    Lateral epicondylitis of both elbows 2/6/2017    Macular degeneration of both eyes 08/28/2018    Dr James Chavez, [facility]    Mild intermittent asthma without complication 62/22/0917    Dr. Vandana Diaz;  ratio 68%, FEV1 78% w 6% inc postbd, TLC 77, RV 56, DLCO 61%    Morbid obesity (HCC)     peak weight 196 lbs, bmi 35.8 from 10/13    Neuropathy 8/28/2018    Osteopenia     Dr. Elizabeth Taveras; DEXA t score -0.7 spine, -0.2 hip (8/14)    Pneumonia     Sarcoidosis     Dr Nate Mosher Type 2 diabetes mellitus with hyperglycemia, with long-term current use of insulin ([facility] 8/28/2018     Past Surgical History:   Procedure Laterality Date    COLONOSCOPY N/A 5/26/2016    Dr Nick Smith HX HEENT      nasal polypectomy Dr. Landon Acevedo HX HEENT      tear duct surgery right Dr. Haroldo Méndez 2010; left Dr Von You 2015    HX ORTHOPAEDIC      DEXA -0.7 spine, -0.2 hip 8/14    [place] CARDIAC SUR[place] PROCEDURE UNLIST  9/10, 8/13    thallium negative ef 72%; negative ef 70%    [place] CHEST SURGERY PROCEDURE UNLISTED  7/12    US thyroid negative    [place] CHEST SURG[place] PROCEDURE UNLISTED  2012    pfts w mod restrictive defect     VASCULAR SURGERY PROCEDURE UNLIST  12/13    venous doppler negative     Family History   Problem Relation Age of Onset    Cancer Mother     Hypertension Mother     Cancer Father     Diabetes Father     Hypertension Father     Stroke Father     Diabetes Sister     Hypertension Sister     Heart Disease Sister     Colon Cancer Sister 52    Hypertension Brother     Cancer Maternal Aunt      Social History     Socioeconomic History    Marital status: LEGALLY      Spouse name: Not on file    Number of children: 0    Years of education: 13    Highest education level: Not on file   Occupational History    Occupation: Unemployed   Social Needs  Financial resource strain: Not on file   [place] insecurity     Worry: Not on file     Inability: Not on file   [employer] needs     Medical: Not on file     Non-medical: Not on file   Tobacco Use    Smoking status: Never Smoker    Smokeless tobacco: Never Used   Substance and Sexual Activity    Alcohol use: Yes     Alcohol/week: 0.0 standard drinks     Comment: occasional wine    Drug use: No    Sexual activity: Not Currently   Lifestyle    Physical activity     Days per week: Not on file     Minutes per session: Not on file    Stress: Not on file   Relationships    Social connections     Talks on phone: Not on file     Gets together: Not on file     Attends Holiness service: Not on file     Active member of club or organization: Not on file     Attends meetings of clubs or organizations: Not on file     Relationship status: Not on file    Intimate partner violence     Fear of current or ex partner: Not on file     Emotionally abused: Not on file     Physically abused: Not on file     Forced sexual activity: Not on file   Other Topics Concern     Service No    Blood Transfusions No    Caffeine Concern Yes     Comment: due to reflux    Occupational Exposure No    Hobby Hazards No    Sleep Concern Yes    Stress Concern Yes     Comment: wants a job    Weight Concern Yes    Special Diet No    Back Care No    Exercise Yes    Bike Helmet No    Seat Belt Yes    Self-Exams Yes   Social History Narrative    Patient lives with a friend, no pets. Allergies   Allergen Reactions    Pollen Extracts Runny Nose and Cough    Amoxicillin Hives, Shortness of Breath and Swelling    Crestor [Rosuvastatin] Myalgia    Ibuprofen Other (comments)     dont prescribe due to kidney function    Lipitor [Atorvastatin] Other (comments)     Muscle cramps and weakness      Pcn [Penicillins] Angioedema       Prior to Admission medications    Medication Sig Start Date End Date Taking?  Authorizing Provider   predniSONE (DELTASONE) 20 mg tablet Take 20 mg by mouth daily (with breakfast). 4/5/21  Yes Abisai Khan DNP   therapeutic multivitamin-minerals [facility]) tablet Take 1 Tab by mouth daily. 3/7/21  Yes Provider, Historical   Insulin Needles, Disposable, 30 gauge x 1/3\" Check blood sugar 3 times daily. Qty 100 3/26/21  Yes Carlee Khan DNP   insulin lispro (HumaLOG KwikPen Insulin) 200 unit/mL (3 mL) inpn Sliding scale: 150-200 2u, 201-250 4u, 251-300 6u, 301-350 8u, 351-400 10u, > 400 12u. Indications: type 2 diabetes mellitus 3/26/21  Yes Carlee Khan [place]   pravastatin (PRAVACHOL) 80 mg tablet Take 1 Tab by mouth nightly. Indications: high cholesterol and high triglycerides 3/24/21  Yes Carlee Khan [place]   triamcinolone acetonide (KENALOG) 0.1 % topical cream Apply  to affected area two (2) times daily as needed for Skin Irritation. 3/24/21  Yes Ree Khan DNP   topiramate (Topamax) 25 mg tablet Take 1 Tab by mouth two (2) times daily (with meals). For neuropathy 3/24/21  Yes Carlee Khan DNP   SITagliptin (JANUVIA) 25 mg tablet Take 1 Tab by mouth daily. For diabetes 3/24/21  Yes Carlee Khan [place]   venlafaxine-SR [facility] 37.5 mg capsule Take 1 capsule for 7 days then take 2 capsules daily til all gone then start Effexor XR 75mg daily. 3/24/21  Yes Carlee Khan DNP   venlafaxine-SR [facility] 75 mg capsule Take 1 Cap by mouth daily. 3/24/21  Yes Carlee Khan DNP   fluticasone propion-salmeteroL (ADVAIR/WIXELA) 500-50 mcg/dose diskus inhaler Take 1 Puff by inhalation every twelve (12) hours. 3/24/21  Yes [place], Carlee M, DNP   albuterol (PROVENTIL HFA, VENTOLIN HFA, PROAIR HFA) 90 mcg/actuation inhaler Take 2 Puffs by inhalation every four (4) hours as needed for Shortness of Breath. Indications: asthma attack 3/24/21  Yes Ree Khan [place]   hydroCHLOROthiazide (HYDRODIURIL) 25 mg tablet Take 1 Tab by mouth daily.  For swelling 3/24/21  [facility] [place]   insulin glargine (LANTUS,BASAGLAR) 100 unit/mL (3 mL) inpn 25 Units by SubCUTAneous route nightly. 3/24/21  Yes [facility]   aspirin delayed-release 81 mg tablet Take 1 Tab by mouth daily. For heart health 9/3/19  Yes [place], [place]   esomeprazole (NEXIUM) 20 mg capsule Take 20 mg by mouth daily. Yes Provider, Historical   glucose blood VI test strips (ACCU-CHEK POOJA) strip Pt to test 5 times daily. Dx:E11.65 11/17/17  Yes Dereck Monreal MD   lisinopriL (PRINIVIL, ZESTRIL) 5 mg tablet  4/12/21 4/14/21  Provider, Historical   metFORMIN (GLUCOPHAGE) 1,000 mg tablet  4/12/21 4/14/21  Provider, Historical   benzonatate (TESSALON) 100 mg capsule Take 100 mg by mouth two (2) times daily as needed. 3/6/21 4/14/21  Provider, Historical   omeprazole (PRILOSEC) 20 mg capsule  1/6/21 4/14/21  Provider, Historical   varicella-zoster recombinant, PF, (Shingrix, PF,) 50 mcg/0.5 mL susr injection 0.5mL by IntraMUSCular route once now and then repeat in 2-6 months 3/24/21   [facility]     Immunization History   Administered Date(s) Administered    Influenza Vaccine ([address] [place] [address]) PF (>6 Mo Flulaval, Fluarix, and >3 Yrs [place], Fluzone [address]) 09/28/2017, 11/26/2018    Pneumococcal Polysaccharide (PPSV-23) 09/28/2017    Tdap 04/24/2013       Review of Systems:  A complete review of systems was performed as stated in the HPI, all others are negative.       Objective:    Physical Exam:  BP (!) 143/85 (BP 1 Location: Right upper arm, BP Patient Position: Sitting, BP Cuff Size: Adult)   Pulse (!) 101   Temp 98.1 °F (36.7 °C) (Oral)   Resp 18   Ht 5' 2\" (1.575 m)   Wt 66.4 kg (146 lb 6.4 oz)   LMP 03/30/2013   SpO2 94%   BMI 26.78 kg/m²   Vitals were personally reviewed  Gen: no acute distress, pleasant and cooperative, sitting up in chair  HEENT: normocephalic/atraumatic, no ocular drainage, EOMI, no scleral icterus, nasal bridge midline, unable to assess nasal and oral cavities due to patient wearing mask in the setting of COVID-19 pandemic  Neck: supple, trachea midline, no JVD, no cervical and supraclavicular adenopathy  CVS: regular rate rhythm, S1/S2, no murmurs/rubs/gallops  Lungs: good air entry B/L, CTA BL, no wheezes/rales/rhonchi  Back: no kyphosis or scoliosis  Abdomen: soft, nontender, bowel sounds present, no hepatosplenomegaly  Ext: no pitting edema B/L, no peripheral cyanosis or clubbing  Neuro: grossly normal, AAOx3, normal strength and coordination grossly, no focal deficits  Skin: no rashes, erythema, lesions  Psych: normal memory, thought content, and processing    Labs: I have reviewed the patient's available labs  Lab Results   Component Value Date/Time    WBC 5.2 08/16/2019 07:30 PM    Hemoglobin, POC 13.6 05/26/2016 10:49 AM    HGB 11.8 (L) 08/16/2019 07:30 PM    Hematocrit, POC 40 05/26/2016 10:49 AM    HCT 36.5 08/16/2019 07:30 PM    PLATELET 842 46/97/4582 07:30 PM    MCV 94.8 08/16/2019 07:30 PM     Lab Results   Component Value Date/Time    Sodium 142 03/18/2021 10:48 AM    Potassium 4.0 03/18/2021 10:48 AM    Chloride 110 03/18/2021 10:48 AM    CO2 25 03/18/2021 10:48 AM    Anion gap 7 03/18/2021 10:48 AM    Glucose 161 (H) 03/18/2021 10:48 AM    BUN 33 (H) 03/18/2021 10:48 AM    Creatinine 1.68 (H) 03/18/2021 10:48 AM    BUN/Creatinine ratio 20 03/18/2021 10:48 AM    GFR est AA 38 (L) 03/18/2021 10:48 AM    GFR est non-AA 32 (L) 03/18/2021 10:48 AM    Calcium 8.5 03/18/2021 10:48 AM    Bilirubin, total 0.3 03/18/2021 10:48 AM    Alk.  phosphatase 66 03/18/2021 10:48 AM    Protein, total 5.9 (L) 03/18/2021 10:48 AM    Albumin 3.1 (L) 03/18/2021 10:48 AM    Globulin 2.8 03/18/2021 10:48 AM    A-G Ratio 1.1 03/18/2021 10:48 AM    ALT (SGPT) 23 03/18/2021 10:48 AM    AST (SGOT) 17 03/18/2021 10:48 AM       Imaging:  I have personally reviewed patient's imaging as follows--chest x-ray PA and lateral from 3/5/2019 shows calcified hilar lymph nodes, as well as interstitial infiltrates consistent with fibrosis or fine nodular opacities from known history of sarcoidosis. Reviewed report from [facility], CTA from 3/4/2021  Result Impression     No evidence of pulmonary embolism. Numerous hilar and mediastinal calcific lymph nodes with chronic perihilar scarring/interstitial coarsening. . Faint basilar opacities/infiltrates could represent pneumonia. Please correlate with Covid 19 testing. Signed By: Macy Platt MD on 3/4/2021 5:45 AM   Result Narrative   EXAM: CT CTA CHEST PULMONARY    CLINICAL INDICATION/HISTORY: Chest pain, acute, PE suspected, high pretest prob    > Additional: None    COMPARISON: September 3, 2009. TECHNIQUE: Helical CT imaging from the thoracic inlet through the diaphragm with intravenous contrast. Coronal and axial MIP reformats were generated. One or more dose reduction techniques were used on this CT: automated exposure control, adjustment of the mAs and/or kVp according to patient size, and iterative reconstruction techniques. The specific techniques used on this CT exam have been documented in the patient's electronic medical record.  Digital Imaging and Communications in Medicine (DICOM) format image data are available to nonaffiliated external healthcare facilities or entities on a secure, media free, reciprocally searchable basis with patient authorization for at least a 12-month period after this study. _______________    FINDINGS:    EXAM QUALITY: Adequate. PULMONARY ARTERIES: No pulmonary artery filling defects. Normal diameter of the main PA.     MEDIASTINUM: Normal heart size. Aorta is unremarkable. No pericardial effusion. LUNGS: There is perihilar interstitial coarsening. There are faint basilar opacities/infiltrates. PLEURA: Normal.    AIRWAY: Normal.    LYMPH NODES:  No numerous hilar and mediastinal calcific lymph nodes are noted.     UPPER ABDOMEN: Unremarkable. OSSEOUS: No acute or aggressive osseous abnormalities identified. OTHER: None.    _______________   Other Result Information   Interface, [employer] Rad Res - 03/04/2021  5:47 AM EST  EXAM: CT CTA CHEST PULMONARY    CLINICAL INDICATION/HISTORY: Chest pain, acute, PE suspected, high pretest prob    > Additional: None    COMPARISON: September 3, 2009. TECHNIQUE: Helical CT imaging from the thoracic inlet through the diaphragm with intravenous contrast. Coronal and axial MIP reformats were generated. One or more dose reduction techniques were used on this CT: automated exposure control, adjustment of the mAs and/or kVp according to patient size, and iterative reconstruction techniques. The specific techniques used on this CT exam have been documented in the patient's electronic medical record. Digital Imaging and Communications in Medicine (DICOM) format image data are available to nonaffiliated external healthcare facilities or entities on a secure, media free, reciprocally searchable basis with patient authorization for at least a 12-month period after this study. _______________    FINDINGS:    EXAM QUALITY: Adequate. PULMONARY ARTERIES: No pulmonary artery filling defects. Normal diameter of the main PA.     MEDIASTINUM: Normal heart size. Aorta is unremarkable. No pericardial effusion. LUNGS: There is perihilar interstitial coarsening. There are faint basilar opacities/infiltrates. PLEURA: Normal.    AIRWAY: Normal.    LYMPH NODES:  No numerous hilar and mediastinal calcific lymph nodes are noted. UPPER ABDOMEN: Unremarkable. OSSEOUS: No acute or aggressive osseous abnormalities identified. OTHER: None.    _______________    IMPRESSION    No evidence of pulmonary embolism. Numerous hilar and mediastinal calcific lymph nodes with chronic perihilar scarring/interstitial coarsening. . Faint basilar opacities/infiltrates could represent pneumonia.  Please correlate with Covid 19 testing. Signed By: Santy Welch MD on 3/4/2021 5:45 AM       PFTs:  I have reviewed the patient's PFTs personally as follows: From 2020, spirometry is normal, lung volumes show a mild restriction, diffusion capacity is mildly reduced    TTE:  I have reviewed the patient's TTE results  Results for orders placed or performed during the hospital encounter of 14   2D ECHO COMPLETE ADULT (TTE)     Status: None    [address]  [place], [address]  [phone]    Transthoracic Echocardiogram    Patient: Mahendra Castle  MRN: [id number]  ACCT #: [de-identified]  : [id number]  Age: 51 years  Gender: Female  Height: 61 in  Weight: 192 lb  BSA: 1.86 m squared  Study date: 2014  BP: 118 / 70 mmHg  Status: Routine  Location: [facility] ACC #: [id number]    Allergies: NO KNOWN ALLERGIES    Referring:  Jim Danger  Interpreting Group:  [id number]  Interpreting Physician:  Naomi Stewart DO  Technologist:  Tomasa Patiño Yumiko Alpha  Referring:  Paulette Frazier. Judi Marley MD    SUMMARY:  Left ventricle: Size was normal. Systolic function was normal by EF  (biplane method of disks). Ejection fraction was estimated in the range of  55 % to 60 %. There were no regional wall motion abnormalities. Wall  thickness was normal. Doppler parameters were consistent with abnormal  left ventricular relaxation (grade 1 diastolic dysfunction). Right ventricle: Systolic pressure was at the upper limits of normal.  Estimated peak pressure was 30 mmHg. Pulmonary arteries: Systolic pressure was at the upper limits of normal.    INDICATIONS: Assess left ventricular function. Lower leg Edema. HISTORY: Prior history: Sarcoidosis/Asthma/GERD    PROCEDURE: This was a routine study. The study included complete 2D  imaging, M-mode, complete spectral Doppler, and color Doppler. The heart  rate was 93 bpm, at the start of the study.  Systolic blood pressure was  118 mmHg, at the start of the study. Diastolic blood pressure was 70 mmHg,  at the start of the study. Image quality was adequate. LEFT VENTRICLE: Size was normal. Systolic function was normal by EF  (biplane method of disks). Ejection fraction was estimated in the range of  55 % to 60 %. There were no regional wall motion abnormalities. Wall  thickness was normal. DOPPLER: Doppler parameters were consistent with  abnormal left ventricular relaxation (grade 1 diastolic dysfunction). RIGHT VENTRICLE: The size was normal. Systolic function was normal.  DOPPLER: Systolic pressure was at the upper limits of normal. Estimated  peak pressure was 30 mmHg. LEFT ATRIUM: Size was normal. LA volume index was 25 ml/m squared. RIGHT ATRIUM: Size was normal.    MITRAL VALVE: Normal valve structure. DOPPLER: There was no evidence for  stenosis. There was no significant regurgitation. AORTIC VALVE: The valve was trileaflet. Leaflets exhibited normal  thickness and normal cuspal separation. DOPPLER: There was no stenosis. There was no regurgitation. TRICUSPID VALVE: Normal valve structure. DOPPLER: There was no evidence  for tricuspid stenosis. There was trivial regurgitation. Tricuspid  regurgitation peak velocity: 225 m/sec. Right ventricular-right atrial  (RV-RA) gradient: 25 mmHg. PULMONIC VALVE: Leaflets exhibited normal thickness, no calcification, and  normal cuspal separation. DOPPLER: There was no regurgitation. AORTA: The root exhibited normal size. PULMONARY ARTERY: The size was normal. DOPPLER: Systolic pressure was at  the upper limits of normal.    SYSTEMIC VEINS: IVC: The inferior vena cava was normal in size. PERICARDIUM: There was no pericardial effusion.     MEASUREMENT TABLES    2D measurements  Left ventricle   (Reference normals)  LVID ed, base, PLAX   49 mm   (36-52)  LVID es, base, PLAX   31 mm   (23-39)  FS, base, PLAX   36.73 %   (18-42)  LVPW thickness ed   8 mm   (6-11)  Ratio, IVS/LVPW 1    (<1.3)  EF   55 %   (--)  Ventricular septum   (Reference normals)  IVS thickness ed   8 mm   (6-11)  Aorta   (Reference normals)  Root diam   31 mm   (--)  Left atrium   (Reference normals)  AP dim   31 mm   (--)  LA/Ao root ratio   1    (--)  Right ventricle   (Reference normals)  RVID ed, PLAX   34 mm   (19-38)    Doppler measurements  Left ventricle   (Reference normals)  Ea, lat pat, tiss DP   8 cm/s   (--)  E/Ea, lat pat, tiss DP   6.63    (--)  Ea, med pat, tiss DP   7 cm/s   (--)  E/Ea, med pat, tiss DP   7.57    (--)  Aortic valve   (Reference normals)  Peak tammy   119 cm/s   (--)  Mean gradient   3 mmHg   (--)  Valve area, cont   1.8 cm squared   (--)  Mitral valve   (Reference normals)  Peak E tammy   53 cm/s   (--)  Peak A tammy   71 cm/s   (--)  Peak E/A ratio   0.75    (--)    Prepared and E-signed by    Deandre Hess DO  Signed 11-Feb-2014 17:56:17       05/14/19   ECHO ADULT COMPLETE 05/15/2019 5/15/2019    Narrative · Left Ventricle: Estimated left ventricular ejection fraction is 61 -   65%. No regional wall motion abnormality noted. Age-appropriate left   ventricular diastolic function. Signed by: Ophelia Zamora DO         Assessment and Plan:  63 y.o. female with:    Impression:  1. Sarcoidosis of the lung: Patient has interstitial opacities as well as multiple calcified lymph nodes. Patient has been on chronic prednisone therapy for over 10 years. Patient was on 6 mg before recent hospitalization, now currently at 20 mg  2. Sarcoidosis of the nose: Patient reports she was diagnosed by biopsy about 10 years ago  3.   Pulmonary infiltrates: Seen on CT from recent hospitalization to [facility] on 3/2021    Plan:  -Reviewed imaging and lab work with the patient, advised patient that her sarcoidosis likely has burned itself out, and we would likely move to wean patient off of chronic prednisone therapy gradually over time based on diagnostics  -Full PFTs at next visit  -Repeat CT in 3 months, obtain high-resolution protocol  -Repeat lab work, CBC with differential, CMP, ACE level  -Wean prednisone in the following fashion, decrease to 15 mg for 2 weeks, then 12.5 mg for 4 weeks, followed by 10 mg for 4 weeks followed by 7.5 mg for 4 weeks, then 5 mg for 4 weeks. Plan will be to see patient in clinic to determine further tapering from there  -Advised patient to have yearly ophthalmology screening for ocular sarcoidosis as ocular sarcoidosis is a preventable cause of blindness/visual impairment  -Advised to remain active. Offered pulmonary rehab, patient declined  -Immunizations reviewed, influenza, pneumococcal vaccinations are up-to-date  -Counseled patient regarding lifestyle precautions in COVID-19 pandemic including wearing mask in public and confined spaces, social/physical distancing, frequent hand hygiene, etc.  Advised pt to receive COVID-19 vaccination when possible    Follow-up and Dispositions    · Return in about 2 months (around 6/14/2021).        Orders Placed This Encounter    AMB POC PFT COMPLETE W/BRONCHODILATOR    AMB POC PFT COMPLETE W/O BRONCHODILATOR    GAS DILUT/WASHOUT LUNG VOL W/WO DISTRIB VENT&VOL    DIFFUSING CAPACITY    CT CHEST WO CONT    CBC WITH AUTOMATED DIFF    METABOLIC PANEL, COMPREHENSIVE    ANGIOTENSIN CONVERTING ENZYME    predniSONE (DELTASONE) 5 mg tablet       Osman Oro MD/MPH     Pulmonary, Critical Care Medicine  [facility]

## 2021-04-14 NOTE — PROGRESS NOTES
Salima Parks presents today for   Chief Complaint   Patient presents with    Sarcoidosis     follow up from 9/1/2020 (last Seen Dr. Sharron Villarreal)    Asthma    Pressure Behind the Eyes    Results     COVID (-) 3/4/2021, Echo 3/6/2021, CT & CXR 3/4/2021       Is someone accompanying this pt? No    Is the patient using any DME equipment during OV? No   -DME Company N/A    Depression Screening:  3 most recent PHQ Screens 3/24/2021   PHQ Not Done -   Little interest or pleasure in doing things More than half the days   Feeling down, depressed, irritable, or hopeless More than half the days   Total Score PHQ 2 4   Trouble falling or staying asleep, or sleeping too much More than half the days   Feeling tired or having little energy More than half the days   Poor appetite, weight loss, or overeating More than half the days   Feeling bad about yourself - or that you are a failure or have let yourself or your family down More than half the days   Trouble concentrating on things such as school, work, reading, or watching TV More than half the days   Moving or speaking so slowly that other people could have noticed; or the opposite being so fidgety that others notice More than half the days   Thoughts of being better off dead, or hurting yourself in some way More than half the days   PHQ 9 Score 18   How difficult have these problems made it for you to do your work, take care of your home and get along with others Somewhat difficult       Learning Assessment:  Learning Assessment 8/27/2018   PRIMARY LEARNER Patient   HIGHEST LEVEL OF EDUCATION - PRIMARY LEARNER  -   BARRIERS PRIMARY LEARNER -   CO-LEARNER CAREGIVER -   PRIMARY LANGUAGE ENGLISH   LEARNER PREFERENCE PRIMARY DEMONSTRATION     VIDEOS     READING   ANSWERED BY Patient   RELATIONSHIP SELF       Abuse Screening:  Abuse Screening Questionnaire 3/24/2021   Do you ever feel afraid of your partner?  N   Are you in a relationship with someone who physically or mentally threatens you? N   Is it safe for you to go home? Y       Fall Risk  Fall Risk Assessment, last 12 mths 3/24/2021   Able to walk? Yes   Fall in past 12 months? 1   Do you feel unsteady? 1   Are you worried about falling 1   Is TUG test greater than 12 seconds? 0   Is the gait abnormal? 0   Number of falls in past 12 months 2         Coordination of Care:  1. Have you been to the ER, urgent care clinic since your last visit? Hospitalized since your last visit? Yes; [facility]: [facility], When: 3/4/2021-COVID-19 virus R/O & 3/4-3/6/2021-COVID-19 R/O    2. Have you seen or consulted any other health care providers outside of the [address] [place] [address] since your last visit? Include any pap smears or colon screening.  No

## 2021-04-14 NOTE — LETTER
4/22/2021 Patient: Cely Demarco YOB: 1965 Date of Visit: 4/14/2021 Cherelle Iqbal [place] 
[address] [address] [address] [employer] Dear Cherelle Iqbal DNP, Thank you for referring Ms. Yisel Joseph to [address] [place] [address] for evaluation. My notes for this consultation are attached. If you have questions, please do not hesitate to call me. I look forward to following your patient along with you. Sincerely, Mandy Gold MD

## 2021-04-15 DIAGNOSIS — R06.02 SOB (SHORTNESS OF BREATH): ICD-10-CM

## 2021-04-15 DIAGNOSIS — J40 BRONCHITIS: ICD-10-CM

## 2021-04-15 DIAGNOSIS — J45.909 UNCOMPLICATED ASTHMA, UNSPECIFIED ASTHMA SEVERITY, UNSPECIFIED WHETHER PERSISTENT: ICD-10-CM

## 2021-04-15 DIAGNOSIS — J32.9 SINUSITIS, UNSPECIFIED CHRONICITY, UNSPECIFIED LOCATION: ICD-10-CM

## 2021-04-15 DIAGNOSIS — D86.0 SARCOIDOSIS, LUNG (HCC): Primary | ICD-10-CM

## 2021-04-15 DIAGNOSIS — R91.8 PULMONARY INFILTRATES: ICD-10-CM

## 2021-04-15 NOTE — PROGRESS NOTES
Order placed for COVID-19 test, per Verbal Order from Dr. Amrita Miller on 4/15/2021. Last office visit: 4/14/2021  Follow up Visit: 6/24/2021    Provider is aware of last office visit and follow up. No further action requested from provider.

## 2021-05-02 PROBLEM — Z13.5 SCREENING FOR EYE CONDITION: Status: RESOLVED | Noted: 2021-04-02 | Resolved: 2021-05-02

## 2021-05-26 NOTE — LETTER
NOTIFICATION RETURN TO WORK / SCHOOL 
 
9/28/2017 3:54 PM 
 
Ms. Enzo Silva [address] To Whom It May Concern: 
 
Enzo Silva is currently under the care of Desiree Morelos. She will return to work/school on: 10/2/2017 If there are questions or concerns please have the patient contact our office. Sincerely, Srinath Fonseca NP 
 
                                
 
 23-May-2021 16:31 26-May-2021 13:27 24-May-2021 12:54 22-May-2021 15:46

## 2021-06-04 ENCOUNTER — DOCUMENTATION ONLY (OUTPATIENT)
Dept: PULMONOLOGY | Age: 56
End: 2021-06-04

## 2021-06-04 ENCOUNTER — TELEPHONE (OUTPATIENT)
Dept: PULMONOLOGY | Age: 56
End: 2021-06-04

## 2021-06-04 PROBLEM — H35.363 DRUSEN (DEGENERATIVE) OF MACULA, BILATERAL: Status: ACTIVE | Noted: 2021-06-04

## 2021-06-04 PROBLEM — H43.823 VITREOMACULAR ADHESION, BILATERAL: Status: ACTIVE | Noted: 2021-06-04

## 2021-06-04 PROBLEM — H43.392 OTHER VITREOUS OPACITIES, LEFT EYE: Status: ACTIVE | Noted: 2021-06-04

## 2021-06-04 PROBLEM — H25.13 AGE-RELATED NUCLEAR CATARACT, BILATERAL: Status: ACTIVE | Noted: 2021-06-04

## 2021-06-04 NOTE — PROGRESS NOTES
lf  for pt to beatriz pft on 6/24 d/t tech out-may need to beatriz to hosp or another day and beatriz apt w/HP as well-talk to Snap[employer]ointments

## 2021-06-04 NOTE — TELEPHONE ENCOUNTER
Called patient to remind her to have CT and labs done prior to her follow up appointment on 6/24/2021 with Dr. Melvin Luciano. Advised patient to call central scheduling to schedule her CT scan and have labs done when she gets her COVID test done for her PFT on 6/24/2021. Patient stated that she has it all written down and will get them all done before her appointment.

## 2021-06-15 DIAGNOSIS — D86.0 SARCOIDOSIS OF LUNG (HCC): Primary | ICD-10-CM

## 2021-06-16 ENCOUNTER — OFFICE VISIT (OUTPATIENT)
Dept: INTERNAL MEDICINE CLINIC | Age: 56
End: 2021-06-16
Payer: MEDICARE

## 2021-06-16 ENCOUNTER — TELEPHONE (OUTPATIENT)
Dept: INTERNAL MEDICINE CLINIC | Age: 56
End: 2021-06-16

## 2021-06-16 ENCOUNTER — DOCUMENTATION ONLY (OUTPATIENT)
Dept: PULMONOLOGY | Age: 56
End: 2021-06-16

## 2021-06-16 VITALS
TEMPERATURE: 96.5 F | HEART RATE: 88 BPM | WEIGHT: 166 LBS | OXYGEN SATURATION: 95 % | SYSTOLIC BLOOD PRESSURE: 129 MMHG | DIASTOLIC BLOOD PRESSURE: 88 MMHG | RESPIRATION RATE: 18 BRPM | BODY MASS INDEX: 30.55 KG/M2 | HEIGHT: 62 IN

## 2021-06-16 DIAGNOSIS — J45.20 MILD INTERMITTENT ASTHMA WITHOUT COMPLICATION: ICD-10-CM

## 2021-06-16 DIAGNOSIS — K21.9 GASTROESOPHAGEAL REFLUX DISEASE WITHOUT ESOPHAGITIS: ICD-10-CM

## 2021-06-16 DIAGNOSIS — F41.8 DEPRESSION WITH ANXIETY: ICD-10-CM

## 2021-06-16 DIAGNOSIS — E11.65 TYPE 2 DIABETES MELLITUS WITH HYPERGLYCEMIA, WITH LONG-TERM CURRENT USE OF INSULIN (HCC): Primary | ICD-10-CM

## 2021-06-16 DIAGNOSIS — Z79.4 TYPE 2 DIABETES MELLITUS WITH HYPERGLYCEMIA, WITH LONG-TERM CURRENT USE OF INSULIN (HCC): Primary | ICD-10-CM

## 2021-06-16 DIAGNOSIS — N18.30 STAGE 3 CHRONIC KIDNEY DISEASE, UNSPECIFIED WHETHER STAGE 3A OR 3B CKD (HCC): ICD-10-CM

## 2021-06-16 DIAGNOSIS — M25.472 EDEMA OF BOTH ANKLES: ICD-10-CM

## 2021-06-16 DIAGNOSIS — M25.471 EDEMA OF BOTH ANKLES: ICD-10-CM

## 2021-06-16 DIAGNOSIS — Z12.11 COLON CANCER SCREENING: ICD-10-CM

## 2021-06-16 DIAGNOSIS — E11.40 DIABETIC NEUROPATHY, PAINFUL (HCC): ICD-10-CM

## 2021-06-16 DIAGNOSIS — E78.2 MIXED HYPERLIPIDEMIA: ICD-10-CM

## 2021-06-16 LAB — HBA1C MFR BLD HPLC: 7.7 %

## 2021-06-16 PROCEDURE — 3051F HG A1C>EQUAL 7.0%<8.0%: CPT | Performed by: NURSE PRACTITIONER

## 2021-06-16 PROCEDURE — 83036 HEMOGLOBIN GLYCOSYLATED A1C: CPT | Performed by: NURSE PRACTITIONER

## 2021-06-16 PROCEDURE — G8754 DIAS BP LESS 90: HCPCS | Performed by: NURSE PRACTITIONER

## 2021-06-16 PROCEDURE — G9717 DOC PT DX DEP/BP F/U NT REQ: HCPCS | Performed by: NURSE PRACTITIONER

## 2021-06-16 PROCEDURE — 2022F DILAT RTA XM EVC RTNOPTHY: CPT | Performed by: NURSE PRACTITIONER

## 2021-06-16 PROCEDURE — 99214 OFFICE O/P EST MOD 30 MIN: CPT | Performed by: NURSE PRACTITIONER

## 2021-06-16 PROCEDURE — 3017F COLORECTAL CA SCREEN DOC REV: CPT | Performed by: NURSE PRACTITIONER

## 2021-06-16 PROCEDURE — G9899 SCRN MAM PERF RSLTS DOC: HCPCS | Performed by: NURSE PRACTITIONER

## 2021-06-16 PROCEDURE — G8417 CALC BMI ABV UP PARAM F/U: HCPCS | Performed by: NURSE PRACTITIONER

## 2021-06-16 PROCEDURE — G8427 DOCREV CUR MEDS BY ELIG CLIN: HCPCS | Performed by: NURSE PRACTITIONER

## 2021-06-16 PROCEDURE — G8752 SYS BP LESS 140: HCPCS | Performed by: NURSE PRACTITIONER

## 2021-06-16 RX ORDER — ESOMEPRAZOLE MAGNESIUM 40 MG/1
40 CAPSULE, DELAYED RELEASE ORAL
Qty: 90 CAPSULE | Refills: 1 | Status: SHIPPED | OUTPATIENT
Start: 2021-06-16 | End: 2021-12-16

## 2021-06-16 RX ORDER — ALBUTEROL SULFATE 90 UG/1
2 AEROSOL, METERED RESPIRATORY (INHALATION)
Qty: 1 INHALER | Refills: 5 | Status: SHIPPED | OUTPATIENT
Start: 2021-06-16

## 2021-06-16 RX ORDER — PRAVASTATIN SODIUM 80 MG/1
80 TABLET ORAL
Qty: 90 TABLET | Refills: 1 | Status: SHIPPED | OUTPATIENT
Start: 2021-06-16 | End: 2021-12-16

## 2021-06-16 RX ORDER — PEN NEEDLE, DIABETIC 31 GX3/16"
NEEDLE, DISPOSABLE MISCELLANEOUS
Qty: 100 PEN NEEDLE | Refills: 11 | Status: SHIPPED | OUTPATIENT
Start: 2021-06-16 | End: 2022-06-22 | Stop reason: SDUPTHER

## 2021-06-16 RX ORDER — INSULIN LISPRO 200 [IU]/ML
INJECTION, SOLUTION SUBCUTANEOUS
Qty: 5 PEN | Refills: 5 | Status: SHIPPED | OUTPATIENT
Start: 2021-06-16 | End: 2021-06-16 | Stop reason: SDUPTHER

## 2021-06-16 RX ORDER — VENLAFAXINE HYDROCHLORIDE 75 MG/1
75 CAPSULE, EXTENDED RELEASE ORAL DAILY
Qty: 90 CAPSULE | Refills: 1 | Status: SHIPPED | OUTPATIENT
Start: 2021-06-16 | End: 2021-12-16

## 2021-06-16 RX ORDER — FLUTICASONE PROPIONATE AND SALMETEROL 500; 50 UG/1; UG/1
1 POWDER RESPIRATORY (INHALATION) EVERY 12 HOURS
Qty: 3 EACH | Refills: 1 | Status: SHIPPED
Start: 2021-06-16 | End: 2021-09-15

## 2021-06-16 RX ORDER — INSULIN GLARGINE 100 [IU]/ML
25 INJECTION, SOLUTION SUBCUTANEOUS
Qty: 5 PEN | Refills: 1 | Status: SHIPPED | OUTPATIENT
Start: 2021-06-16 | End: 2022-01-03 | Stop reason: SDUPTHER

## 2021-06-16 RX ORDER — INSULIN LISPRO 200 [IU]/ML
INJECTION, SOLUTION SUBCUTANEOUS
Qty: 5 PEN | Refills: 5 | Status: SHIPPED | OUTPATIENT
Start: 2021-06-16 | End: 2022-06-23

## 2021-06-16 RX ORDER — HYDROCHLOROTHIAZIDE 25 MG/1
25 TABLET ORAL DAILY
Qty: 90 TABLET | Refills: 1 | Status: SHIPPED | OUTPATIENT
Start: 2021-06-16 | End: 2022-01-03 | Stop reason: SDUPTHER

## 2021-06-16 RX ORDER — TOPIRAMATE 25 MG/1
25 TABLET ORAL 2 TIMES DAILY WITH MEALS
Qty: 180 TABLET | Refills: 1 | Status: SHIPPED | OUTPATIENT
Start: 2021-06-16 | End: 2021-12-16

## 2021-06-16 NOTE — PROGRESS NOTES
[facility] [address], [address]  [phone] [id number]/[phone] fax    6/16/2021    HPI:   Delia Pierre 1965 is a pleasant BLACK/ female who presents today for routine physical exam.      CKD 3: Dr. Roseline Up, nephrology. Her last appointment was January 2021. She is to call and schedule an updated appointment. Mixed hyperlipidemia: She is taking pravastatin 40 mg daily and tolerating well. Sarcoidosis and asthma: Dr. Edwar Connelly, pulmonary. Back in March 2021she  was  hospitalized with double pneumonia. She has since recovered. She continues all resp medications as prescribed. Depression with anxiety: Used to take prozac but not effective. Started Effexor in March 2021 and rose mary well. She is not seeking psychiatry/counseling at this time. IDDM:  She continues Metformin and Januvia 25 mg; tolerates well. She takes Lantus 25 units daily along with Humalog sliding scale. In the last 30 days in the morning her lowest blood sugars 73 and her highest blood sugars 200. In the evening in the last 30 days her lowest blood sugar is 140 with her highest blood sugar being 400. She checks her blood sugars 1-3 times a day. DM neuropathy: She suffers from neuropathy to bilateral feet. Topamax 25 mg twice daily is very effective for her discomforts. She sees podiatry every 3 months. Eczema: She continues triamcinolone cream as needed to the back of her neck for her eczema. She only uses this as needed and most of the time just during the summer time.       Past Medical History:   Diagnosis Date    Asthma     Bilateral great toe fractures 2014    Chronic kidney disease, stage 3b ([facility] 01/2021    Dr Roseline Up, nephro    Chronic sinusitis     Dr. Ana Small in the past    Colon polyp 5/16    Dr Jordan Dwoney    Depression 2019    Diabetes mellitus ([facility]     DJD (degenerative joint disease) of cervical spine 2016    DJD (degenerative joint disease), lumbar 2013    MRI c spine w degen changes; Dr Vernell Thompson    Dyslipidemia     calculated 10 year risk score was 2.0% (12/13)    Edema of both ankles 8/28/2018    Environmental and seasonal allergies 8/28/2018    Eustachian tube dysfunction     Fibrocystic breast     Dr Lee Ann Mercado GERD (gastroesophageal reflux disease)     Hypertriglyceridemia 8/28/2018    Lateral epicondylitis of both elbows 2/6/2017    Macular degeneration of both eyes 08/28/2018    Dr Rosi Moore, [facility]    Mild intermittent asthma without complication 56/51/4445    Dr. Kaila Brooks;  ratio 68%, FEV1 78% w 6% inc postbd, TLC 77, RV 56, DLCO 61%    Morbid obesity (HCC)     peak weight 196 lbs, bmi 35.8 from 10/13    Neuropathy 8/28/2018    Osteopenia     Dr. Lew Kearney; DEXA t score -0.7 spine, -0.2 hip (8/14)    Pneumonia     Sarcoidosis     Dr Dyan Cole Type 2 diabetes mellitus with hyperglycemia, with long-term current use of insulin ([facility] 8/28/2018     Past Surgical History:   Procedure Laterality Date    COLONOSCOPY N/A 5/26/2016    Dr Bryce Ibarra hyperplastic    Chalmer Em      Dr. Charlotte Davis HX HEENT      nasal polypectomy Dr. Natacha Barbosa HX HEENT      tear duct surgery right Dr. Segun Fuller 2010; left Dr Danilo Walker 2015    HX ORTHOPAEDIC      DEXA -0.7 spine, -0.2 hip 8/14    TX CARDIAC SURG PROCEDURE UNLIST  9/10, 8/13    thallium negative ef 72%; negative ef 70%    TX CHEST SURGERY PROCEDURE UNLISTED  7/12    US thyroid negative    TX CHEST SURGERY PROCEDURE UNLISTED  2012    pfts w mod restrictive defect     VASCULAR SURGERY PROCEDURE UNLIST  12/13    venous doppler negative     Current Outpatient Medications   Medication Sig    esomeprazole (NEXIUM) 40 mg capsule Take 1 Capsule by mouth daily as needed for Gastroesophageal Reflux Disease (GERD). Indications: gastroesophageal reflux disease    venlafaxine-SR (EFFEXOR-XR) 75 mg capsule Take 1 Capsule by mouth daily.     topiramate (Topamax) 25 mg tablet Take 1 Tablet by mouth two (2) times daily (with meals). For neuropathy    SITagliptin (JANUVIA) 25 mg tablet Take 1 Tablet by mouth daily. For diabetes    pravastatin (PRAVACHOL) 80 mg tablet Take 1 Tablet by mouth nightly. Indications: high cholesterol and high triglycerides    hydroCHLOROthiazide (HYDRODIURIL) 25 mg tablet Take 1 Tablet by mouth daily. For swelling    fluticasone propion-salmeteroL (ADVAIR/WIXELA) 500-50 mcg/dose diskus inhaler Take 1 Puff by inhalation every twelve (12) hours.  albuterol (PROVENTIL HFA, VENTOLIN HFA, PROAIR HFA) 90 mcg/actuation inhaler Take 2 Puffs by inhalation every four (4) hours as needed for Shortness of Breath. Indications: asthma attack    Insulin Needles, Disposable, (Prerna Pen Needle) 32 gauge x 5/32\" ndle Check blood sugars three times a day    insulin glargine (LANTUS,BASAGLAR) 100 unit/mL (3 mL) inpn 25 Units by SubCUTAneous route nightly.  insulin lispro (HumaLOG KwikPen Insulin) 200 unit/mL (3 mL) inpn 3 times per day: 150-200 2u, 201-250 4u, 251-300 6u, 301-350 8u, 351-400 10u, > 400 12u. Indications: type 2 diabetes mellitus    predniSONE (DELTASONE) 5 mg tablet Take 3 Tabs by mouth daily for 14 days, THEN 2.5 Tabs daily for 28 days, THEN 2 Tabs daily for 28 days, THEN 1.5 Tabs daily for 28 days.  therapeutic multivitamin-minerals (THERAGRAN-M) tablet Take 1 Tab by mouth daily.  triamcinolone acetonide (KENALOG) 0.1 % topical cream Apply  to affected area two (2) times daily as needed for Skin Irritation.  aspirin delayed-release 81 mg tablet Take 1 Tab by mouth daily. For heart health    glucose blood VI test strips (ACCU-CHEK POOJA) strip Pt to test 5 times daily. Dx:E11.65     No current facility-administered medications for this visit. Allergies and Intolerances:    Allergies   Allergen Reactions    Pollen Extracts Runny Nose and Cough    Amoxicillin Hives, Shortness of Breath and Swelling    Crestor [Rosuvastatin] Myalgia    Ibuprofen Other (comments)     dont prescribe due to kidney function    Lipitor [Atorvastatin] Other (comments)     Muscle cramps and weakness      Pcn [Penicillins] Angioedema     Family History:   Family History   Problem Relation Age of Onset    Cancer Mother     Hypertension Mother     Cancer Father     Diabetes Father     Hypertension Father     Stroke Father     Diabetes Sister     Hypertension Sister     Heart Disease Sister     Colon Cancer Sister 52    Hypertension Brother     Cancer Maternal Aunt      Social History:   She  reports that she has never smoked. She has never used smokeless tobacco.   Social History     Substance and Sexual Activity   Alcohol Use Yes    Alcohol/week: 0.0 standard drinks    Comment: occasional wine     Immunization History:  Immunization History   Administered Date(s) Administered    Influenza Vaccine Kivra) PF (>6 Mo Flulaval, Fluarix, and >3 Yrs Afluria, Fluzone 60340) 09/28/2017, 11/26/2018    Pneumococcal Polysaccharide (PPSV-23) 09/28/2017    Tdap 04/24/2013       Review of Systems:   As above included in HPI. Otherwise 11 point review of systems negative including constitutional, skin, HENT, eyes, respiratory, cardiovascular, gastrointestinal, genitourinary, musculoskeletal, endocrine, hematologic, allergy, and neurologic. Physical:   Visit Vitals  /88   Pulse 88   Temp (!) 96.5 °F (35.8 °C) (Temporal)   Resp 18   Ht 5' 2\" (1.575 m)   Wt 166 lb (75.3 kg)   LMP 03/30/2013   SpO2 95%   BMI 30.36 kg/m²      Wt Readings from Last 3 Encounters:   06/16/21 166 lb (75.3 kg)   04/14/21 146 lb 6.4 oz (66.4 kg)   03/24/21 140 lb 6.4 oz (63.7 kg)         Exam:   Physical Exam  Vitals and nursing note reviewed. Constitutional:       Appearance: Normal appearance. HENT:      Head: Normocephalic and atraumatic.       Right Ear: External ear normal.      Nose: Nose normal.      Mouth/Throat:      Mouth: Mucous membranes are moist.   Eyes: Extraocular Movements: Extraocular movements intact. Pupils: Pupils are equal, round, and reactive to light. Neck:      Vascular: No carotid bruit. Cardiovascular:      Rate and Rhythm: Normal rate and regular rhythm. Heart sounds: Normal heart sounds. Comments: No edema noted  Pulmonary:      Effort: Pulmonary effort is normal. No respiratory distress. Breath sounds: Normal breath sounds. No wheezing. Abdominal:      General: Abdomen is flat. Bowel sounds are normal.      Palpations: Abdomen is soft. Musculoskeletal:         General: Normal range of motion. Cervical back: Normal range of motion and neck supple. Skin:     General: Skin is warm and dry. Neurological:      General: No focal deficit present. Mental Status: She is alert and oriented to person, place, and time. Psychiatric:         Mood and Affect: Mood normal.         Behavior: Behavior normal.         Thought Content: Thought content normal.         Judgment: Judgment normal.      Comments: Much engaged in conversation. Health Maintenance:  Screening: (Yes/No means wether completed or not)   Mammogram: 3/2021 (normal)   PAP smear: 11/2018 (Due 11/2021)   Colorectal: 5/2016 (due 5/2021) -referral placed 6/2021   Hepatitis C: 2010 (neg)   DEXA: 9/2014 (normal)   Eye Exam: regular eye exams with Dr. Mariah Huynh (last 4/2021)   Smoking: Never   Vitamin D: No   Medicare Wellness: 3/24/21 (Initial)      Review of Data:  Labs reviewed: yes  POC A1c 7.7  She did not have CMP and lipid drawn prior to todays appt. Will obtain prior to 3m follow up.      Impression:  Patient Active Problem List   Diagnosis Code    Sarcoidosis Dr. Douglas Jasso on low dose steroid D86.9    Asthma Dr. Douglas Jasso J45.909    Gastroesophageal reflux disease without esophagitis K21.9    Type 2 diabetes mellitus with hyperglycemia, with long-term current use of insulin ([facility]) E11.65, Z79.4    Neuropathy G62.9    Mild intermittent asthma without complication V11.91    Edema of both ankles M25.471, M25.472    Environmental and seasonal allergies J30.89    Macular degeneration of both eyes H35.30    Diabetic neuropathy, painful ([facility]) E11.40    Type 2 diabetes mellitus with proliferative retinopathy ([facility] O11.7498    Mixed hyperlipidemia E78.2    Eczema L30.9    Stage 3 chronic kidney disease (HCC) N18.30    Depression with anxiety F41.8    Hypoalbuminemia E88.09    Acquired cyst of kidney N28.1    Acute exacerbation of chronic obstructive airways disease (HCC) J44.1    Cellulitis and abscess of breast N61.1, N61.0    Contact with and (suspected) exposure to environmental tobacco smoke (acute) (chronic) Z77.22    Essential hypertension I10    Fat necrosis (segmental) of breast N64.1    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 Type 2 diabetes mellitus with hyperglycemia, with long-term current use of insulin (HCC)  E11.65 250.00 AMB POC HEMOGLOBIN A1C    Z79.4 790.29 SITagliptin (JANUVIA) 25 mg tablet     V58.67 Insulin Needles, Disposable, (Prerna Pen Needle) 32 gauge x 5/32\" ndle      insulin glargine (LANTUS,BASAGLAR) 100 unit/mL (3 mL) inpn      insulin lispro (HumaLOG KwikPen Insulin) 200 unit/mL (3 mL) inpn      REFERRAL TO PHARMACIST      METABOLIC PANEL, COMPREHENSIVE      HEMOGLOBIN A1C WITH EAG      DISCONTINUED: insulin lispro (HumaLOG KwikPen Insulin) 200 unit/mL (3 mL) inpn   2. Diabetic neuropathy, painful (HCC)  E11.40 250.60 topiramate (Topamax) 25 mg tablet     357.2    3.  Mixed hyperlipidemia  E78.2 272.2 pravastatin (PRAVACHOL) 80 mg tablet      LIPID PANEL   4. Edema of both ankles  M25.471 719.07 hydroCHLOROthiazide (HYDRODIURIL) 25 mg tablet    W76.073  METABOLIC PANEL, COMPREHENSIVE   5. Mild intermittent asthma without complication  X61.55 947.02 fluticasone propion-salmeteroL (ADVAIR/WIXELA) 500-50 mcg/dose diskus inhaler      albuterol (PROVENTIL HFA, VENTOLIN HFA, PROAIR HFA) 90 mcg/actuation inhaler   6. Stage 3 chronic kidney disease, unspecified whether stage 3a or 3b CKD ([facility])  F24.11 671.7 METABOLIC PANEL, COMPREHENSIVE   7. Depression with anxiety  F41.8 300.4 venlafaxine-SR (EFFEXOR-XR) 75 mg capsule   8. Gastroesophageal reflux disease without esophagitis  K21.9 530.81 esomeprazole (NEXIUM) 40 mg capsule   9. Colon cancer screening  Z12.11 V76.51 REFERRAL TO GASTROENTEROLOGY     -T2DM  POC A1c 7.7  Continue current therapies  She is not to take metformin d/t declining GFR  Referral placed to Dr Brian Campos, PharmD    -DM neuropathy  Continue Topamax  Continue to F/U with podiatry prn    -Mixed hyperlipidemia. Continue Pravastatin  Will assess lipid level 3 months    -Edema emerita ankles  Iris HCTZ    -Asthma  Continue Advair therapy  Continue to f/u with pulm as scheduled    -Stage III CKD. Schedule f/u with , nephrology    -Depression and anxiety. Continue Effexor therapy    -Edema to both ankles. Continue HCTZ     -GERD   Continue Nexium daily as needed.        Follow up 3 months  Labs needed 1 week prior to appt: Yes  Lipid, CMP, A1c        Dr. Ellyn Barry, AGNP-C, DNP  [facility] [address] [place] [address]

## 2021-06-16 NOTE — PROGRESS NOTES
Chief Complaint   Patient presents with    Cholesterol Problem     3 mo f/u    Diabetes    Chronic Kidney Disease    Depression       1. Have you been to the ER, urgent care clinic since your last visit? Hospitalized since your last visit? No    2. Have you seen or consulted any other health care providers outside of the [address] [place] [address] since your last visit? Include any pap smears or colon screening.  No

## 2021-06-19 DIAGNOSIS — F41.8 DEPRESSION WITH ANXIETY: ICD-10-CM

## 2021-06-19 DIAGNOSIS — J45.20 MILD INTERMITTENT ASTHMA WITHOUT COMPLICATION: ICD-10-CM

## 2021-06-19 DIAGNOSIS — E11.40 DIABETIC NEUROPATHY, PAINFUL (HCC): ICD-10-CM

## 2021-06-19 DIAGNOSIS — E78.2 MIXED HYPERLIPIDEMIA: ICD-10-CM

## 2021-06-21 RX ORDER — TOPIRAMATE 25 MG/1
TABLET ORAL
Qty: 180 TABLET | Refills: 0 | OUTPATIENT
Start: 2021-06-21

## 2021-06-21 RX ORDER — VENLAFAXINE HYDROCHLORIDE 75 MG/1
CAPSULE, EXTENDED RELEASE ORAL
Qty: 90 CAPSULE | Refills: 0 | OUTPATIENT
Start: 2021-06-21

## 2021-06-21 RX ORDER — FLUTICASONE PROPIONATE AND SALMETEROL 500; 50 UG/1; UG/1
POWDER RESPIRATORY (INHALATION)
Refills: 0 | OUTPATIENT
Start: 2021-06-21

## 2021-06-21 RX ORDER — PRAVASTATIN SODIUM 80 MG/1
TABLET ORAL
Qty: 90 TABLET | Refills: 0 | OUTPATIENT
Start: 2021-06-21

## 2021-06-30 ENCOUNTER — HOSPITAL ENCOUNTER (OUTPATIENT)
Dept: LAB | Age: 56
Discharge: HOME | End: 2021-06-30

## 2021-06-30 ENCOUNTER — TELEPHONE (OUTPATIENT)
Dept: INTERNAL MEDICINE CLINIC | Age: 56
End: 2021-06-30

## 2021-06-30 DIAGNOSIS — J32.9 SINUSITIS, UNSPECIFIED CHRONICITY, UNSPECIFIED LOCATION: ICD-10-CM

## 2021-06-30 DIAGNOSIS — D86.0 SARCOIDOSIS, LUNG (HCC): ICD-10-CM

## 2021-06-30 DIAGNOSIS — J40 BRONCHITIS: ICD-10-CM

## 2021-06-30 DIAGNOSIS — J45.909 UNCOMPLICATED ASTHMA, UNSPECIFIED ASTHMA SEVERITY, UNSPECIFIED WHETHER PERSISTENT: ICD-10-CM

## 2021-06-30 DIAGNOSIS — R06.02 SOB (SHORTNESS OF BREATH): ICD-10-CM

## 2021-06-30 DIAGNOSIS — R91.8 PULMONARY INFILTRATES: ICD-10-CM

## 2021-06-30 LAB
XX-LABCORP SPECIMEN COL,LCBCF: NORMAL

## 2021-06-30 PROCEDURE — 99001 SPECIMEN HANDLING PT-LAB: CPT

## 2021-07-01 LAB
25(OH)D3+25(OH)D2 SERPL-MCNC: 29.4 NG/ML (ref 30–100)
ACE SERPL-CCNC: 52 U/L (ref 14–82)
ALBUMIN SERPL-MCNC: 3.8 G/DL (ref 3.8–4.9)
ALBUMIN/GLOB SERPL: 1.4 {RATIO} (ref 1.2–2.2)
ALP SERPL-CCNC: 73 IU/L (ref 48–121)
ALT SERPL-CCNC: 17 IU/L (ref 0–32)
AST SERPL-CCNC: 21 IU/L (ref 0–40)
BASOPHILS # BLD AUTO: 0 X10E3/UL (ref 0–0.2)
BASOPHILS NFR BLD AUTO: 0 %
BILIRUB SERPL-MCNC: 0.4 MG/DL (ref 0–1.2)
BUN SERPL-MCNC: 23 MG/DL (ref 6–24)
BUN/CREAT SERPL: 13 (ref 9–23)
CALCIUM SERPL-MCNC: 9.4 MG/DL (ref 8.7–10.2)
CHLORIDE SERPL-SCNC: 106 MMOL/L (ref 96–106)
CO2 SERPL-SCNC: 24 MMOL/L (ref 20–29)
CREAT SERPL-MCNC: 1.75 MG/DL (ref 0.57–1)
CREAT UR-MCNC: 167.2 MG/DL
EOSINOPHIL # BLD AUTO: 0.4 X10E3/UL (ref 0–0.4)
EOSINOPHIL NFR BLD AUTO: 7 %
ERYTHROCYTE [DISTWIDTH] IN BLOOD BY AUTOMATED COUNT: 13 % (ref 11.7–15.4)
GLOBULIN SER CALC-MCNC: 2.7 G/DL (ref 1.5–4.5)
GLUCOSE SERPL-MCNC: 131 MG/DL (ref 65–99)
HCT VFR BLD AUTO: 40.3 % (ref 34–46.6)
HGB BLD-MCNC: 13.4 G/DL (ref 11.1–15.9)
IMM GRANULOCYTES # BLD AUTO: 0 X10E3/UL (ref 0–0.1)
IMM GRANULOCYTES NFR BLD AUTO: 0 %
LYMPHOCYTES # BLD AUTO: 1.6 X10E3/UL (ref 0.7–3.1)
LYMPHOCYTES NFR BLD AUTO: 28 %
MCH RBC QN AUTO: 31.5 PG (ref 26.6–33)
MCHC RBC AUTO-ENTMCNC: 33.3 G/DL (ref 31.5–35.7)
MCV RBC AUTO: 95 FL (ref 79–97)
MONOCYTES # BLD AUTO: 0.6 X10E3/UL (ref 0.1–0.9)
MONOCYTES NFR BLD AUTO: 11 %
NEUTROPHILS # BLD AUTO: 3.1 X10E3/UL (ref 1.4–7)
NEUTROPHILS NFR BLD AUTO: 54 %
PHOSPHATE SERPL-MCNC: 4.4 MG/DL (ref 3–4.3)
PLATELET # BLD AUTO: 159 X10E3/UL (ref 150–450)
POTASSIUM SERPL-SCNC: 3.9 MMOL/L (ref 3.5–5.2)
PROT SERPL-MCNC: 6.5 G/DL (ref 6–8.5)
PROT UR-MCNC: 7.3 MG/DL
PROT/CREAT UR: 44 MG/G CREAT (ref 0–200)
RBC # BLD AUTO: 4.25 X10E6/UL (ref 3.77–5.28)
SARS-COV-2, NAA 2 DAY TAT: NORMAL
SARS-COV-2, NAA: NOT DETECTED
SODIUM SERPL-SCNC: 144 MMOL/L (ref 134–144)
SPECIMEN STATUS REPORT, ROLRST: NORMAL
SPECIMEN STATUS REPORT, ROLRST: NORMAL
WBC # BLD AUTO: 5.8 X10E3/UL (ref 3.4–10.8)

## 2021-07-07 ENCOUNTER — HOSPITAL ENCOUNTER (OUTPATIENT)
Dept: CT IMAGING | Age: 56
Discharge: HOME | End: 2021-07-07
Attending: INTERNAL MEDICINE
Payer: MEDICARE

## 2021-07-07 ENCOUNTER — DOCUMENTATION ONLY (OUTPATIENT)
Dept: PULMONOLOGY | Age: 56
End: 2021-07-07

## 2021-07-07 DIAGNOSIS — D86.9 SARCOIDOSIS: ICD-10-CM

## 2021-07-07 DIAGNOSIS — D86.89 SARCOID OF NOSE: ICD-10-CM

## 2021-07-07 DIAGNOSIS — D86.0 SARCOIDOSIS, LUNG (HCC): ICD-10-CM

## 2021-07-07 DIAGNOSIS — R91.8 PULMONARY INFILTRATES: ICD-10-CM

## 2021-07-07 PROCEDURE — 71250 CT THORAX DX C-: CPT

## 2021-07-07 NOTE — PROGRESS NOTES
7/7/21 lm am for pt to call & rs pft & fu appt w/hp - pt cxd 7/6 pft dt death in family - offc cxd 7/9 appt dt needing pft prior

## 2021-07-14 ENCOUNTER — OFFICE VISIT (OUTPATIENT)
Dept: INTERNAL MEDICINE CLINIC | Age: 56
End: 2021-07-14

## 2021-07-14 DIAGNOSIS — Z79.4 TYPE 2 DIABETES MELLITUS WITH HYPERGLYCEMIA, WITH LONG-TERM CURRENT USE OF INSULIN (HCC): Primary | ICD-10-CM

## 2021-07-14 DIAGNOSIS — E11.65 TYPE 2 DIABETES MELLITUS WITH HYPERGLYCEMIA, WITH LONG-TERM CURRENT USE OF INSULIN (HCC): Primary | ICD-10-CM

## 2021-07-14 NOTE — PATIENT INSTRUCTIONS
Your Visit Summary:     Check and document your blood sugar first thing in the morning (fasting), before a meal, and before bedtime. Bring your meter/log to all future visits. Your blood sugar goals:  - Fasting (first thing in the morning)  blood sugar: 80 - 130   - 1 to 2 hours after a meal: less than 180     When you experience symptoms of low blood sugar (example: less than 70):  - Confirm low reading by checking your blood sugar.   - Then treat with 15 grams of carbohydrates (one-half cup of juice or regular soda, or 4-5 glucose tablets). - Wait 15 minutes to recheck blood sugar.   - Then eat a protein containing meal/snack to prevent another low blood sugar episode. (example: peanut butter + crackers)    Other recommendations:  - Schedule an annual eye exam.  - Check your feet daily for any signs of open wounds, cuts, or sores. - Given your risk factors, the following vaccines are recommended: annual flu shot, age-based pneumococcal vaccines (Pneumovax, Prevnar 13). In addition to taking your medications as directed, improving your blood sugar involves modifying your nutrition and maximizing the amount of physical activity. Nutrition:  - When reviewing a nutrition label, focus on the serving size, total calories, fat (and type of fats), total carbohydrates, sugar (and amount of added sugar), amount of fiber (good for your digestive), and amount of protein. Refer to your nutrition label guide for more information.  - For a meal : max 45 - 60 grams of carbohydrates  - For a snack: max 15 grams of carbohydrates  - Reduce amount of saturated and trans fat. Consider more unsaturated fat options as they are better for your heart health.    - Have at least 1 serving of lean fat protein with each meal.    - Increase fiber intake slowly to prevent constipation.   - Substitute fruit juices for the whole fruit    Low carb snack ideas (15 grams total carb or less):     String cheese or babybel with 6 crackers   4 peanut butter crackers   3 cups of popcorn   1 cup raw vegetables with hummus or ranch dip (just need to watch how much dip you use)   Nuts   2 rice cakes   Celery with peanut butter or cream cheese   String cheese with 1 serving of fruit   Greek yogurt (look at label to make sure < 15 gram carb)   Plain greek yogurt with fresh berries added   Nature valley protein bar   Whisps parmesan cheese crisps   Hard boiled egg   Cottage cheese   Tuna salad lettuce roll-ups   Deli meat roll-ups with slice of cheese   Sugar Free Jello   Glucerna shake (16 grams)    Glucerna hunger smart shake (16 grams)   Ensure protein max shake   Fruit (1 serving/15 grams)   1/2 banana, jenifer, or grapefruit    1/3 melon (small cantaloupe)   1 slice or 1 cup of honeydew melon   1 slice or 1 and 1/4 cups of watermelon    1 small apple, peach, orange or pear   2 small tangerines   1 cup of raspberries   3/4 cup of blackberries, blueberries or pineapples   1/2 cup of fruit juice, pears, applesauce, or mangos   17 small grapes   12 cherries    Be careful with the glucerna products as they differ in the total carbs depending on the product (some are intended as meal replacements not snacks). Make sure you look at the total carbs on the label as products can differ. Physical Activity:  - Aim for 30 minutes of consistent, moderately intensive, physical activity a day for 5 days or an average of 150 minutes per week. - Start slow, increase as tolerated. For example: Walk every day, working up to 30 minutes of brisk walking, 5 days a weekor split the 30 minutes into two 15-minute or three 10-minute walks. - If you sit for a long time, get up and move/stretch every 90 minutes.

## 2021-07-15 NOTE — PROGRESS NOTES
Pharmacy Progress Note - Diabetes Management    S/O: Ms. Flavia Major is a 61 y.o. female, referred by Dr. Delonte Leger, was seen today for diabetes management visit. Patient's last A1c was 7.7% in June 2021. This is a(n) increase from 7.5% in March 2021. Interim History: Patient states that she has had diabetes since 2016. She states that her A1c was 18 when she found out she was diagnosed and she was immediately started on insulin. She states that she was able to come off of insulin for about 1 year after improving her diet/lifestyle, but then had to be put back on insulin after blood sugars started to increase again. She was on metformin during this time period as well, but this was recently discontinued due to declining renal function. While A1c has improved greatly since she was first diagnosed, her blood sugars still remain above goals. She states that she does not currently have AC at home, so on very hot days it is difficult her to find energy to eat. She states that she has not eaten a full meals since Monday, but she does try to nibble on snacks/fruit. Current anti-hyperglycemic regimen include(s):    - Januvia 25 mg once daily  - Lantus 25 units once daily  - Humalog TID before meals per sliding scale (if eating)    Patient reports adherence with her medications.      ROS:  Today, Pt endorses:  - Symptoms of Hyperglycemia: none  - Symptoms of Hypoglycemia: none    Self Monitoring Blood Glucose (SMBG) or CGM:  - Brought in home glucometer/blood glucose log/CGM reader today:  no  - Conducts/scans: 4 times daily    - Patient states that blood sugars recently have been down to the 80s-90s because she hasn't been eating much  - However, when she is eating, she sees readings in the 200s (higest 260)     Nutrition/Lifestyle Modifications:  - Not discussed in detail today since patient hasn't been eating very much  - States overall she isn't a bread eater, but does love pasta like spaghetti (may be a weekly thing)  - Not drinking any sodas, but does like sweets and eats them often  - Likes fruit as a snack     Vitals:   Wt Readings from Last 3 Encounters:   06/16/21 166 lb (75.3 kg)   04/14/21 146 lb 6.4 oz (66.4 kg)   03/24/21 140 lb 6.4 oz (63.7 kg)     BP Readings from Last 3 Encounters:   06/16/21 129/88   04/14/21 (!) 143/85   03/24/21 138/68     Pulse Readings from Last 3 Encounters:   06/16/21 88   04/14/21 (!) 101   03/24/21 94       Past Medical History:   Diagnosis Date    Asthma     Bilateral great toe fractures 2014    Chronic kidney disease, stage 3b ([facility] 01/2021    Dr Alan Thompson, nephro    Chronic sinusitis     Dr. Ashley Villegas in the past    Colon polyp 5/16    Dr hBavna Kim    Depression 2019    Diabetes mellitus ([facility] [address] [facility]     DJD (degenerative joint disease) of cervical spine 2016    DJD (degenerative joint disease), lumbar 2013    MRI c spine w degen changes; Dr Keisha Ramirez    Dyslipidemia     calculated 10 year risk score was 2.0% (12/13)    Edema of both ankles 8/28/2018    Environmental and seasonal allergies 8/28/2018    Eustachian tube dysfunction     Fibrocystic breast     Dr Shivani Valentin GERD (gastroesophageal reflux disease)     Hypertriglyceridemia 8/28/2018    Lateral epicondylitis of both elbows 2/6/2017    Macular degeneration of both eyes 08/28/2018    [facility]    Mild intermittent asthma without complication 63/51/4057    Dr. Trena Constantino;  ratio 68%, FEV1 78% w 6% inc postbd, TLC 77, RV 56, DLCO 61%    Morbid obesity (HCC)     peak weight 196 lbs, bmi 35.8 from 10/13    Neuropathy 8/28/2018    Osteopenia     Dr. Lavelle Lorenzo; DEXA t score -0.7 spine, -0.2 hip (8/14)    Pneumonia     Sarcoidosis     Dr Modesto Kline Type 2 diabetes mellitus with hyperglycemia, with long-term current use of insulin ([facility] [address] 8/28/2018     Allergies   Allergen Reactions    Pollen Extracts Runny Nose and Cough    Amoxicillin Hives, Shortness of Breath and Swelling    Crestor [Rosuvastatin] Myalgia    Ibuprofen Other (comments)     dont prescribe due to kidney function    Lipitor [Atorvastatin] Other (comments)     Muscle cramps and weakness      Pcn [Penicillins] Angioedema       Current Outpatient Medications   Medication Sig    esomeprazole (NEXIUM) 40 mg capsule Take 1 Capsule by mouth daily as needed for Gastroesophageal Reflux Disease (GERD). Indications: gastroesophageal reflux disease    venlafaxine-SR (EFFEXOR-XR) 75 mg capsule Take 1 Capsule by mouth daily.  topiramate (Topamax) 25 mg tablet Take 1 Tablet by mouth two (2) times daily (with meals). For neuropathy    SITagliptin (JANUVIA) 25 mg tablet Take 1 Tablet by mouth daily. For diabetes    pravastatin (PRAVACHOL) 80 mg tablet Take 1 Tablet by mouth nightly. Indications: high cholesterol and high triglycerides    hydroCHLOROthiazide (HYDRODIURIL) 25 mg tablet Take 1 Tablet by mouth daily. For swelling    fluticasone propion-salmeteroL (ADVAIR/WIXELA) 500-50 mcg/dose diskus inhaler Take 1 Puff by inhalation every twelve (12) hours.  albuterol (PROVENTIL HFA, VENTOLIN HFA, PROAIR HFA) 90 mcg/actuation inhaler Take 2 Puffs by inhalation every four (4) hours as needed for Shortness of Breath. Indications: asthma attack    Insulin Needles, Disposable, (Prerna Pen Needle) 32 gauge x 5/32\" ndle Check blood sugars three times a day    insulin glargine (LANTUS,BASAGLAR) 100 unit/mL (3 mL) inpn 25 Units by SubCUTAneous route nightly.  insulin lispro (HumaLOG KwikPen Insulin) 200 unit/mL (3 mL) inpn 3 times per day: 150-200 2u, 201-250 4u, 251-300 6u, 301-350 8u, 351-400 10u, > 400 12u. Indications: type 2 diabetes mellitus    predniSONE (DELTASONE) 5 mg tablet Take 3 Tabs by mouth daily for 14 days, THEN 2.5 Tabs daily for 28 days, THEN 2 Tabs daily for 28 days, THEN 1.5 Tabs daily for 28 days.  therapeutic multivitamin-minerals (THERAGRAN-M) tablet Take 1 Tab by mouth daily.     triamcinolone acetonide (KENALOG) 0.1 % topical cream Apply  to affected area two (2) times daily as needed for Skin Irritation.  aspirin delayed-release 81 mg tablet Take 1 Tab by mouth daily. For heart health    glucose blood VI test strips (ACCU-CHEK POOJA) strip Pt to test 5 times daily. Dx:E11.65     No current facility-administered medications for this visit. Lab Results   Component Value Date/Time    Sodium 144 06/30/2021 12:00 AM    Potassium 3.9 06/30/2021 12:00 AM    Chloride 106 06/30/2021 12:00 AM    CO2 24 06/30/2021 12:00 AM    Anion gap 7 03/18/2021 10:48 AM    Glucose 131 (H) 06/30/2021 12:00 AM    BUN 23 06/30/2021 12:00 AM    Creatinine 1.75 (H) 06/30/2021 12:00 AM    BUN/Creatinine ratio 13 06/30/2021 12:00 AM    GFR est AA 37 (L) 06/30/2021 12:00 AM    GFR est non-AA 32 (L) 06/30/2021 12:00 AM    Calcium 9.4 06/30/2021 12:00 AM    Bilirubin, total 0.4 06/30/2021 12:00 AM    Alk.  phosphatase 73 06/30/2021 12:00 AM    Protein, total 6.5 06/30/2021 12:00 AM    Albumin 3.8 06/30/2021 12:00 AM    Globulin 2.8 03/18/2021 10:48 AM    A-G Ratio 1.4 06/30/2021 12:00 AM    ALT (SGPT) 17 06/30/2021 12:00 AM       Lab Results   Component Value Date/Time    Cholesterol, total 232 (H) 03/18/2021 10:48 AM    HDL Cholesterol 68 (H) 03/18/2021 10:48 AM    LDL, calculated 132.2 (H) 03/18/2021 10:48 AM    VLDL, calculated 31.8 03/18/2021 10:48 AM    Triglyceride 159 (H) 03/18/2021 10:48 AM    CHOL/HDL Ratio 3.4 03/18/2021 10:48 AM       Lab Results   Component Value Date/Time    WBC 5.8 06/30/2021 12:00 AM    Hemoglobin, POC 13.6 05/26/2016 10:49 AM    HGB 13.4 06/30/2021 12:00 AM    Hematocrit, POC 40 05/26/2016 10:49 AM    HCT 40.3 06/30/2021 12:00 AM    PLATELET 600 61/08/7760 12:00 AM    MCV 95 06/30/2021 12:00 AM       Lab Results   Component Value Date/Time    Microalbumin/Creat ratio (mg/g creat) 4 03/18/2021 10:48 AM    Microalbumin,urine random 0.59 03/18/2021 10:48 AM       HbA1c:  Lab Results   Component Value Date/Time    Hemoglobin A1c 7.5 (H) 03/18/2021 10:48 AM    Hemoglobin A1c (POC) 7.7 06/16/2021 10:28 AM    Hemoglobin A1c, External 6.7 09/28/2016 12:00 AM     No components found for: 2     Last Point of Care HGB A1C  Hemoglobin A1c (POC)   Date Value Ref Range Status   06/16/2021 7.7 % Final        CrCl cannot be calculated (Unknown ideal weight. ). A/P:    Diabetes Management:  - Per ADA guidelines, Pt's A1c is not at goal of < 7%. - Current SMBG(s)/CGM trend appears to vary per patient report, with frequent occurrences of low blood sugars   - Will hold off on making adjustments for now as patient has not been able to eat a consistent diet  - Discussed getting her set up with a CGM device and she is in agreement to try it if covered by insurance   - Continue current medications for now. Patient may be a good candidate for a GLP-1 agonist once blood sugars reviewed more in detail   - Will follow up regarding CGM coverage decision in 1-2 weeks and have patient return for education    Nutrition/Lifestyle Modifications:  - Not discussed in detail this visit as patient has not been eating a consistent diet  - Encouraged her to try to get a few meals in per day (even if small). Also can consider protein shakes if possible     Patient verbalized understanding of the information presented and all of the patients questions were answered. AVS was handed to the patient. Patient advised to call the office with any additional questions or concerns. Notifications of recommendations will be sent to Dr. Bree Wells DNP for review. Patient will return to clinic in 2 week(s) for follow up. Thank you,  Versa . FELICIA Coyne      For Pharmacy Admin Tracking Only     CPA in place:  Yes   Recommendation Provided To: Patient/Caregiver: 1 via In person   Time Spent (min): 45

## 2021-07-30 ENCOUNTER — TRANSCRIBE ORDER (OUTPATIENT)
Dept: SCHEDULING | Age: 56
End: 2021-07-30

## 2021-07-30 DIAGNOSIS — J32.0 CHRONIC MAXILLARY SINUSITIS: Primary | ICD-10-CM

## 2021-08-12 ENCOUNTER — HOSPITAL ENCOUNTER (OUTPATIENT)
Dept: CT IMAGING | Age: 56
Discharge: HOME | End: 2021-08-12
Payer: MEDICARE

## 2021-08-12 DIAGNOSIS — J32.0 CHRONIC MAXILLARY SINUSITIS: ICD-10-CM

## 2021-08-12 PROCEDURE — 70486 CT MAXILLOFACIAL W/O DYE: CPT

## 2021-08-31 ENCOUNTER — TELEPHONE (OUTPATIENT)
Dept: INTERNAL MEDICINE CLINIC | Age: 56
End: 2021-08-31

## 2021-08-31 NOTE — TELEPHONE ENCOUNTER
Deny Garrison with [facility] is calling regarding a detailed written order request and addendum notes that were faxed over previously for this patient.   Please advise her at [phone]

## 2021-09-01 DIAGNOSIS — J45.909 UNCOMPLICATED ASTHMA, UNSPECIFIED ASTHMA SEVERITY, UNSPECIFIED WHETHER PERSISTENT: ICD-10-CM

## 2021-09-01 DIAGNOSIS — J32.9 SINUSITIS, UNSPECIFIED CHRONICITY, UNSPECIFIED LOCATION: ICD-10-CM

## 2021-09-01 DIAGNOSIS — D86.9 SARCOIDOSIS: Primary | ICD-10-CM

## 2021-09-01 DIAGNOSIS — J40 BRONCHITIS: ICD-10-CM

## 2021-09-01 DIAGNOSIS — R91.8 PULMONARY INFILTRATES: ICD-10-CM

## 2021-09-01 NOTE — PROGRESS NOTES
Order placed for covid test, per Verbal Order from Dr. Coco Greer on 9/1/2021. Last office visit: 4/14/2021  Follow up Visit: 9/7/2021    Provider is aware of last office visit and follow up. No further action requested from provider.

## 2021-09-03 ENCOUNTER — HOSPITAL ENCOUNTER (OUTPATIENT)
Dept: LAB | Age: 56
Discharge: HOME | End: 2021-09-03

## 2021-09-03 LAB — XX-LABCORP SPECIMEN COL,LCBCF: NORMAL

## 2021-09-03 PROCEDURE — 99001 SPECIMEN HANDLING PT-LAB: CPT

## 2021-09-07 ENCOUNTER — OFFICE VISIT (OUTPATIENT)
Dept: PULMONOLOGY | Age: 56
End: 2021-09-07
Payer: MEDICARE

## 2021-09-07 VITALS — HEIGHT: 62 IN | BODY MASS INDEX: 32.35 KG/M2 | WEIGHT: 175.8 LBS

## 2021-09-07 DIAGNOSIS — D86.9 SARCOIDOSIS: ICD-10-CM

## 2021-09-07 DIAGNOSIS — J32.9 SINUSITIS, UNSPECIFIED CHRONICITY, UNSPECIFIED LOCATION: ICD-10-CM

## 2021-09-07 DIAGNOSIS — J40 BRONCHITIS: ICD-10-CM

## 2021-09-07 DIAGNOSIS — J45.909 UNCOMPLICATED ASTHMA, UNSPECIFIED ASTHMA SEVERITY, UNSPECIFIED WHETHER PERSISTENT: ICD-10-CM

## 2021-09-07 DIAGNOSIS — R91.8 PULMONARY INFILTRATES: ICD-10-CM

## 2021-09-07 PROCEDURE — 94727 GAS DIL/WSHOT DETER LNG VOL: CPT | Performed by: INTERNAL MEDICINE

## 2021-09-07 PROCEDURE — 94729 DIFFUSING CAPACITY: CPT | Performed by: INTERNAL MEDICINE

## 2021-09-07 PROCEDURE — 94060 EVALUATION OF WHEEZING: CPT | Performed by: INTERNAL MEDICINE

## 2021-09-08 ENCOUNTER — HOSPITAL ENCOUNTER (OUTPATIENT)
Dept: LAB | Age: 56
Discharge: HOME | End: 2021-09-08
Payer: MEDICARE

## 2021-09-08 ENCOUNTER — APPOINTMENT (OUTPATIENT)
Dept: INTERNAL MEDICINE CLINIC | Age: 56
End: 2021-09-08

## 2021-09-08 DIAGNOSIS — E78.2 MIXED HYPERLIPIDEMIA: ICD-10-CM

## 2021-09-08 DIAGNOSIS — E11.65 TYPE 2 DIABETES MELLITUS WITH HYPERGLYCEMIA, WITH LONG-TERM CURRENT USE OF INSULIN (HCC): ICD-10-CM

## 2021-09-08 DIAGNOSIS — Z79.4 TYPE 2 DIABETES MELLITUS WITH HYPERGLYCEMIA, WITH LONG-TERM CURRENT USE OF INSULIN (HCC): ICD-10-CM

## 2021-09-08 DIAGNOSIS — N18.30 STAGE 3 CHRONIC KIDNEY DISEASE, UNSPECIFIED WHETHER STAGE 3A OR 3B CKD (HCC): ICD-10-CM

## 2021-09-08 DIAGNOSIS — M25.471 EDEMA OF BOTH ANKLES: ICD-10-CM

## 2021-09-08 DIAGNOSIS — M25.472 EDEMA OF BOTH ANKLES: ICD-10-CM

## 2021-09-08 LAB
ALBUMIN SERPL-MCNC: 3.1 G/DL (ref 3.4–5)
ALBUMIN/GLOB SERPL: 0.9 {RATIO} (ref 0.8–1.7)
ALP SERPL-CCNC: 104 U/L (ref 45–117)
ALT SERPL-CCNC: 34 U/L (ref 13–56)
ANION GAP SERPL CALC-SCNC: 8 MMOL/L (ref 3–18)
AST SERPL-CCNC: 19 U/L (ref 10–38)
BILIRUB SERPL-MCNC: 0.3 MG/DL (ref 0.2–1)
BUN SERPL-MCNC: 26 MG/DL (ref 7–18)
BUN/CREAT SERPL: 14 (ref 12–20)
CALCIUM SERPL-MCNC: 8.6 MG/DL (ref 8.5–10.1)
CHLORIDE SERPL-SCNC: 105 MMOL/L (ref 100–111)
CHOLEST SERPL-MCNC: 229 MG/DL
CO2 SERPL-SCNC: 27 MMOL/L (ref 21–32)
CREAT SERPL-MCNC: 1.9 MG/DL (ref 0.6–1.3)
EST. AVERAGE GLUCOSE BLD GHB EST-MCNC: 163 MG/DL
GLOBULIN SER CALC-MCNC: 3.5 G/DL (ref 2–4)
GLUCOSE SERPL-MCNC: 264 MG/DL (ref 74–99)
HBA1C MFR BLD: 7.3 % (ref 4.2–5.6)
HDLC SERPL-MCNC: 54 MG/DL (ref 40–60)
HDLC SERPL: 4.2 {RATIO} (ref 0–5)
LDLC SERPL CALC-MCNC: 95.8 MG/DL (ref 0–100)
LIPID PROFILE,FLP: ABNORMAL
POTASSIUM SERPL-SCNC: 4.7 MMOL/L (ref 3.5–5.5)
PROT SERPL-MCNC: 6.6 G/DL (ref 6.4–8.2)
SODIUM SERPL-SCNC: 140 MMOL/L (ref 136–145)
TRIGL SERPL-MCNC: 396 MG/DL (ref ?–150)
VLDLC SERPL CALC-MCNC: 79.2 MG/DL

## 2021-09-08 PROCEDURE — 83036 HEMOGLOBIN GLYCOSYLATED A1C: CPT

## 2021-09-08 PROCEDURE — 36415 COLL VENOUS BLD VENIPUNCTURE: CPT

## 2021-09-08 PROCEDURE — 80061 LIPID PANEL: CPT

## 2021-09-08 PROCEDURE — 80053 COMPREHEN METABOLIC PANEL: CPT

## 2021-09-10 RX ORDER — UREA 10 %
50 LOTION (ML) TOPICAL DAILY
COMMUNITY

## 2021-09-10 RX ORDER — GLUCOSAMINE SULFATE 1500 MG
1000 POWDER IN PACKET (EA) ORAL DAILY
Status: ON HOLD | COMMUNITY
End: 2022-02-02 | Stop reason: SDUPTHER

## 2021-09-14 ENCOUNTER — TELEPHONE (OUTPATIENT)
Dept: INTERNAL MEDICINE CLINIC | Age: 56
End: 2021-09-14

## 2021-09-14 NOTE — TELEPHONE ENCOUNTER
Minda Mckeon, sorry no I have not rec'd an order for a lindy.   Please let me know if there is anything I can do to help. thank you

## 2021-09-15 ENCOUNTER — OFFICE VISIT (OUTPATIENT)
Dept: PULMONOLOGY | Age: 56
End: 2021-09-15
Payer: MEDICARE

## 2021-09-15 VITALS
HEIGHT: 62 IN | RESPIRATION RATE: 16 BRPM | TEMPERATURE: 98.2 F | DIASTOLIC BLOOD PRESSURE: 76 MMHG | OXYGEN SATURATION: 94 % | HEART RATE: 77 BPM | WEIGHT: 177.2 LBS | BODY MASS INDEX: 32.61 KG/M2 | SYSTOLIC BLOOD PRESSURE: 132 MMHG

## 2021-09-15 DIAGNOSIS — J84.9 ILD (INTERSTITIAL LUNG DISEASE) (HCC): ICD-10-CM

## 2021-09-15 DIAGNOSIS — D86.9 SARCOIDOSIS: Primary | ICD-10-CM

## 2021-09-15 DIAGNOSIS — R06.09 DYSPNEA ON EXERTION: ICD-10-CM

## 2021-09-15 DIAGNOSIS — Z79.52 CURRENT CHRONIC USE OF SYSTEMIC STEROIDS: ICD-10-CM

## 2021-09-15 PROCEDURE — G8752 SYS BP LESS 140: HCPCS | Performed by: INTERNAL MEDICINE

## 2021-09-15 PROCEDURE — G9899 SCRN MAM PERF RSLTS DOC: HCPCS | Performed by: INTERNAL MEDICINE

## 2021-09-15 PROCEDURE — G8417 CALC BMI ABV UP PARAM F/U: HCPCS | Performed by: INTERNAL MEDICINE

## 2021-09-15 PROCEDURE — G9717 DOC PT DX DEP/BP F/U NT REQ: HCPCS | Performed by: INTERNAL MEDICINE

## 2021-09-15 PROCEDURE — G8754 DIAS BP LESS 90: HCPCS | Performed by: INTERNAL MEDICINE

## 2021-09-15 PROCEDURE — 3017F COLORECTAL CA SCREEN DOC REV: CPT | Performed by: INTERNAL MEDICINE

## 2021-09-15 PROCEDURE — G8427 DOCREV CUR MEDS BY ELIG CLIN: HCPCS | Performed by: INTERNAL MEDICINE

## 2021-09-15 PROCEDURE — 99215 OFFICE O/P EST HI 40 MIN: CPT | Performed by: INTERNAL MEDICINE

## 2021-09-15 RX ORDER — FLUTICASONE FUROATE, UMECLIDINIUM BROMIDE AND VILANTEROL TRIFENATATE 200; 62.5; 25 UG/1; UG/1; UG/1
1 POWDER RESPIRATORY (INHALATION) DAILY
Qty: 1 EACH | Refills: 0 | Status: SHIPPED | COMMUNITY
Start: 2021-09-15 | End: 2021-09-27 | Stop reason: SDUPTHER

## 2021-09-15 RX ORDER — LISINOPRIL 5 MG/1
TABLET ORAL
COMMUNITY
Start: 2021-06-19 | End: 2021-09-15

## 2021-09-15 RX ORDER — MONTELUKAST SODIUM 10 MG/1
10 TABLET ORAL DAILY
COMMUNITY
Start: 2021-09-07 | End: 2022-01-03 | Stop reason: SDUPTHER

## 2021-09-15 RX ORDER — PREDNISONE 5 MG/1
5 TABLET ORAL DAILY
COMMUNITY
Start: 2021-09-01 | End: 2022-01-03

## 2021-09-15 RX ORDER — FLUTICASONE PROPIONATE 250 UG/1
1 POWDER, METERED RESPIRATORY (INHALATION) 2 TIMES DAILY
Qty: 3 EACH | Refills: 3 | Status: SHIPPED
Start: 2021-09-15 | End: 2022-08-25 | Stop reason: CLARIF

## 2021-09-15 RX ORDER — METFORMIN HYDROCHLORIDE 1000 MG/1
TABLET ORAL
COMMUNITY
Start: 2021-06-19 | End: 2021-09-15

## 2021-09-15 RX ORDER — FLUTICASONE FUROATE, UMECLIDINIUM BROMIDE AND VILANTEROL TRIFENATATE 200; 62.5; 25 UG/1; UG/1; UG/1
1 POWDER RESPIRATORY (INHALATION) DAILY
Qty: 3 EACH | Refills: 3 | Status: SHIPPED | OUTPATIENT
Start: 2021-09-15 | End: 2022-10-13

## 2021-09-15 NOTE — PROGRESS NOTES
Marquise Ellisonblanco presents today for   Chief Complaint   Patient presents with    Sarcoidosis     follow up from 4/14/2021    Other     pulmonary infiltrate, chronic use of systemic steroids    Results     labs 6/30/2021, PFt 9/7/2021, CT 7/7/2021, COVID (-) 9/3/2021       Is someone accompanying this pt? No    Is the patient using any DME equipment during OV? No    -DME Company N/a    Depression Screening:  3 most recent PHQ Screens 3/24/2021   PHQ Not Done -   Little interest or pleasure in doing things More than half the days   Feeling down, depressed, irritable, or hopeless More than half the days   Total Score PHQ 2 4   Trouble falling or staying asleep, or sleeping too much More than half the days   Feeling tired or having little energy More than half the days   Poor appetite, weight loss, or overeating More than half the days   Feeling bad about yourself - or that you are a failure or have let yourself or your family down More than half the days   Trouble concentrating on things such as school, work, reading, or watching TV More than half the days   Moving or speaking so slowly that other people could have noticed; or the opposite being so fidgety that others notice More than half the days   Thoughts of being better off dead, or hurting yourself in some way More than half the days   PHQ 9 Score 18   How difficult have these problems made it for you to do your work, take care of your home and get along with others Somewhat difficult       Learning Assessment:  Learning Assessment 8/27/2018   PRIMARY LEARNER Patient   HIGHEST LEVEL OF EDUCATION - PRIMARY LEARNER  -   BARRIERS PRIMARY LEARNER -   CO-LEARNER CAREGIVER -   PRIMARY LANGUAGE ENGLISH   LEARNER PREFERENCE PRIMARY DEMONSTRATION     VIDEOS     READING   ANSWERED BY Patient   RELATIONSHIP SELF       Abuse Screening:  Abuse Screening Questionnaire 3/24/2021   Do you ever feel afraid of your partner?  N   Are you in a relationship with someone who physically or mentally threatens you? N   Is it safe for you to go home? Y       Fall Risk  Fall Risk Assessment, last 12 mths 3/24/2021   Able to walk? Yes   Fall in past 12 months? 1   Do you feel unsteady? 1   Are you worried about falling 1   Is TUG test greater than 12 seconds? 0   Is the gait abnormal? 0   Number of falls in past 12 months 2         Coordination of Care:  1. Have you been to the ER, urgent care clinic since your last visit? Hospitalized since your last visit? No    2. Have you seen or consulted any other health care providers outside of the [address] [place] [address] since your last visit? Include any pap smears or colon screening. Yes. NP Deidre Meyers, PCP    Per patient did not received COVID vaccine. Influenza vaccine received from PCP's office on February 2021 per patient. Immunization record updated.

## 2021-09-15 NOTE — PATIENT INSTRUCTIONS
With regards to your prednisone, continue to taper as follows:  -2.5mg (1/2 tab) every day (with breakfast) for 30 days  Followed by  -2.5mg (1/2 tab) every other day (with breakfast) for 30 days  Then stop      Inhalers:  -Advair 500/50mcg 1 puff TWICE A DAY  -Flovent 250mcg 1 puff TWICE A DAY  Please rinse and gargle mouth after use

## 2021-09-15 NOTE — LETTER
9/21/2021    Patient: Khloe Mujica   YOB: 1965   Date of Visit: 9/15/2021     Tammie Ellsworth DNP  [address] [facility]  [address] [facility] [place] [address]  [address] [address]  Via Fax: [address], [place]  [address] [facility] [address]  Via Fax: [phone]     Marycarmen Campuzano MD  [address] [address]  Via Fax: [phone]    Dear Tammie Ellsworth DNP,      Thank you for referring Ms. Yisel Joseph to [address] [place] [address] for evaluation. My notes for this consultation are attached. If you have questions, please do not hesitate to call me. I look forward to following your patient along with you.       Sincerely,    Delilah Blandon MD

## 2021-09-15 NOTE — PROGRESS NOTES
[address] [place] [address] [facility]  Pulmonary, Critical Care, and Sleep Medicine    Pulmonary Office F/U  Name: Nasra Porter 61 y.o. female  MRN: [id number]  : 1965  Service Date: 09/15/21  Chief Complaint:   Chief Complaint   Patient presents with    Sarcoidosis     follow up from 2021    Other     pulmonary infiltrate, chronic use of systemic steroids    Results     labs 2021, PFt 2021, CT 2021, COVID (-) 9/3/2021         History of Present Illness:  Jessica Meyers is a 61 y.o. female, who presents to Pulmonary clinic for follow-up of sarcoidosis. Patient was last seen in our clinic on 2021. In the interval, patient tapered prednisone and she reports she is down to prednisone 5mg daily. Pt reports that her breathing is more labored. Pt reports increased sinus mucus thickening -saw ENT, started on Singulair, switched to nasacort from flonase, and started on astelin, as well as nasal saline. Pt using Advair using about 5 days a week, usually 1-2x per day. Pt saw Kurt Stinson last month, saw some retinal issues but no ocular sarcoidosis.   Using PRN albuterol when she's out of the house running errands  No exacerbations in the interval.      Past Medical History:   Diagnosis Date    Asthma     Bilateral great toe fractures     Chronic kidney disease, stage 3b ([address] 2021    Dr Tim Martínez, nephro    Chronic sinusitis     Dr. Geraldo Jo in the past    Colon polyp     Dr Yue Sosa    Depression     Diabetes mellitus ([address]     DJD (degenerative joint disease) of cervical spine     DJD (degenerative joint disease), lumbar     MRI c spine w degen changes;  [facility]    Dyslipidemia     calculated 10 year risk score was 2.0% ()    Edema of both ankles 2018    Environmental and seasonal allergies 2018    Eustachian tube dysfunction     Fibrocystic breast     Dr Papo Heart GERD (gastroesophageal reflux disease)     Hypertriglyceridemia 8/28/2018    Lateral epicondylitis of both elbows 2/6/2017    Macular degeneration of both eyes 08/28/2018    [facility]    Mild intermittent asthma without complication 52/06/7545    Dr. Perry ;  ratio 68%, FEV1 78% w 6% inc postbd, TLC 77, RV 56, DLCO 61%    Morbid obesity (HCC)     peak weight 196 lbs, bmi 35.8 from 10/13    Neuropathy 8/28/2018    Osteopenia     Dr. Jonna Childers; DEXA t score -0.7 spine, -0.2 hip (8/14)    Pneumonia     Sarcoidosis     Dr Teresa Lynn Type 2 diabetes mellitus with hyperglycemia, with long-term current use of insulin [facility] 8/28/2018     Past Surgical History:   Procedure Laterality Date    COLONOSCOPY N/A 5/26/2016    Dr Keri Worley hyperplastic    [place]      Dr. Isidro Castle HX HEENT      nasal polypectomy Dr. Gabby Montano HX HEENT      tear duct surgery right Dr. Cristina Lock 2010; left Dr Molina Issa 2015    HX ORTHOPAEDIC      DEXA -0.7 spine, -0.2 hip 8/14    [place] CARDIAC SURG PROCEDURE UNLIST  9/10, 8/13    thallium negative ef 72%; negative ef 70%    [place] CHEST SURGERY PROCEDURE UNLISTED  7/12    US thyroid negative    [place] CHEST SURGERY PROCEDURE UNLISTED  2012    pfts w mod restrictive defect     VASCULAR SURGERY PROCEDURE UNLIST  12/13    venous doppler negative     Family History   Problem Relation Age of Onset    Cancer Mother     Hypertension Mother     Cancer Father     Diabetes Father     Hypertension Father     Stroke Father     Diabetes Sister     Hypertension Sister     Heart Disease Sister     Colon Cancer Sister 52    Hypertension Brother     Cancer Maternal Aunt      Social History     Socioeconomic History    Marital status: LEGALLY      Spouse name: Not on file    Number of children: 0    Years of education: 13    Highest education level: Not on file   Occupational History    Occupation: Unemployed   Tobacco Use    Smoking status: Never Smoker    Smokeless tobacco: Never Used   Vaping Use    Vaping Use: Never used   Substance and Sexual Activity    Alcohol use: Yes     Alcohol/week: 0.0 standard drinks     Comment: occasional wine    Drug use: No    Sexual activity: Not Currently   Other Topics Concern     Service No    Blood Transfusions No    Caffeine Concern Yes     Comment: due to reflux    Occupational Exposure No    Hobby Hazards No    Sleep Concern Yes    Stress Concern Yes     Comment: wants a job    Weight Concern Yes    Special Diet No    Back Care No    Exercise Yes    Bike Helmet No    Seat Belt Yes    Self-Exams Yes   Social History Narrative    Patient lives with a friend, no pets. Social Determinants of Health     Financial Resource Strain:     Difficulty of Paying Living Expenses:    Food Insecurity:     Worried About Running Out of Food in the Last Year:     [address] in the Last Year:    Transportation Needs:     Lack of Transportation (Medical):  Lack of Transportation (Non-Medical):    Physical Activity:     Days of Exercise per Week:     Minutes of Exercise per Session:    Stress:     Feeling of Stress :    Social Connections:     Frequency of Communication with Friends and Family:     Frequency of Social Gatherings with Friends and Family:     Attends [employer] Services:     Active Member of Clubs or Organizations:     Attends Club or Organization Meetings:     Marital Status:    Intimate Partner Violence:     Fear of Current or Ex-Partner:     Emotionally Abused:     Physically Abused:     Sexually Abused:       Allergies   Allergen Reactions    Pollen Extracts Runny Nose and Cough    Amoxicillin Hives, Shortness of Breath and Swelling    Crestor [Rosuvastatin] Myalgia    Ibuprofen Other (comments)     dont prescribe due to kidney function    Lipitor [Atorvastatin] Other (comments)     Muscle cramps and weakness      Pcn [Penicillins] Angioedema       Prior to Admission medications    Medication Sig Start Date End Date Taking? Authorizing Provider   montelukast (SINGULAIR) 10 mg tablet Take 10 mg by mouth daily. 9/7/21  Yes Provider, Historical   predniSONE (DELTASONE) 5 mg tablet Take 5 mg by mouth daily. 9/1/21  Yes Provider, Historical   cholecalciferol (VITAMIN D3) 25 mcg (1,000 unit) cap Take 1,000 Units by mouth daily. Yes Provider, Historical   cyanocobalamin (VITAMIN B12) 100 mcg tablet Take 50 mcg by mouth daily. Yes Provider, Historical   esomeprazole (NEXIUM) 40 mg capsule Take 1 Capsule by mouth daily as needed for Gastroesophageal Reflux Disease (GERD). Indications: gastroesophageal reflux disease 6/16/21  Yes [facility] [place], [place]   venlafaxine-SR [facility]) 75 mg capsule Take 1 Capsule by mouth daily. 6/16/21  Yes Selvin Khan DNP   topiramate (Topamax) 25 mg tablet Take 1 Tablet by mouth two (2) times daily (with meals). For neuropathy 6/16/21  Yes Carlee Khan DNP   SITagliptin (JANUVIA) 25 mg tablet Take 1 Tablet by mouth daily. For diabetes 6/16/21  Yes Carlee Khan [place]   pravastatin (PRAVACHOL) 80 mg tablet Take 1 Tablet by mouth nightly. Indications: high cholesterol and high triglycerides 6/16/21  Yes Carlee Khan [facility]   hydroCHLOROthiazide (HYDRODIURIL) 25 mg tablet Take 1 Tablet by mouth daily. For swelling 6/16/21  Yes Carlee Khan [place]   fluticasone propion-salmeteroL (ADVAIR/WIXELA) 500-50 mcg/dose diskus inhaler Take 1 Puff by inhalation every twelve (12) hours. 6/16/21  Yes Carlee Khan DNP   albuterol (PROVENTIL HFA, VENTOLIN HFA, PROAIR HFA) 90 mcg/actuation inhaler Take 2 Puffs by inhalation every four (4) hours as needed for Shortness of Breath.  Indications: asthma attack 6/16/21  Yes [facility] [place], [place]   Insulin Needles, Disposable, ([place] Pen Needle) 32 gauge x 5/32\" ndle Check blood sugars three times a day 6/16/21  Yes [facility] [place], [place]   insulin glargine (LANTUS,BASAGLAR) 100 unit/mL (3 mL) inpn 25 Units by SubCUTAneous route nightly. 6/16/21  Yes Carlee Khan DNP   insulin lispro (HumaLOG KwikPen Insulin) 200 unit/mL (3 mL) inpn 3 times per day: 150-200 2u, 201-250 4u, 251-300 6u, 301-350 8u, 351-400 10u, > 400 12u. Indications: type 2 diabetes mellitus 6/16/21  Yes [facility], [place]   therapeutic multivitamin-minerals [facility]) tablet Take 1 Tab by mouth daily. 3/7/21  Yes Provider, Historical   triamcinolone acetonide (KENALOG) 0.1 % topical cream Apply  to affected area two (2) times daily as needed for Skin Irritation. 3/24/21  Yes [facility] Jacquetta [facility], DNP   aspirin delayed-release 81 mg tablet Take 1 Tab by mouth daily. For heart health 9/3/19  Yes [facility], [place]   glucose blood VI test strips (ACCU-CHEK POOJA) strip Pt to test 5 times daily. Dx:E11.65 11/17/17  Yes Dat Cline MD   lisinopriL (PRINIVIL, ZESTRIL) 5 mg tablet  6/19/21 9/15/21  Provider, Historical   metFORMIN (GLUCOPHAGE) 1,000 mg tablet  6/19/21 9/15/21  Provider, Historical     Immunization History   Administered Date(s) Administered    Influenza Vaccine 02/15/2021    Influenza Vaccine (Quad) PF (>6 Mo Flulaval, Fluarix, and >3 Yrs Afluria, Fluzone 90611) 09/28/2017, 11/26/2018    Pneumococcal Polysaccharide (PPSV-23) 09/28/2017    Tdap 04/24/2013       Review of Systems:  A complete review of systems was performed as stated in the HPI, all others are negative.       Objective:    Physical Exam:  /76 (BP 1 Location: Left upper arm, BP Patient Position: Sitting, BP Cuff Size: Adult long)   Pulse 77   Temp 98.2 °F (36.8 °C) (Temporal)   Resp 16   Ht 5' 2\" (1.575 m)   Wt 80.4 kg (177 lb 3.2 oz)   LMP 03/30/2013   SpO2 94%   BMI 32.41 kg/m²   Vitals were personally reviewed  Gen: no acute distress, pleasant and cooperative, sitting up in chair, ambulates without difficulty  HEENT: normocephalic/atraumatic, no ocular drainage, EOMI, no scleral icterus, nasal bridge midline, unable to assess nasal and oral cavities due to patient wearing mask in the setting of COVID-19 pandemic  Neck: supple, trachea midline, no JVD, no cervical and supraclavicular adenopathy  CVS: regular rate rhythm, S1/S2, no murmurs/rubs/gallops  Lungs: good air entry B/L, CTA BL, no wheezes/rales/rhonchi  Ext: no pitting edema B/L, no peripheral cyanosis or clubbing  Skin: no rashes, erythema, lesions  Psych: normal memory, thought content, and processing    Labs: I have reviewed the patient's available labs  Lab Results   Component Value Date/Time    WBC 5.8 06/30/2021 12:00 AM    Hemoglobin, POC 13.6 05/26/2016 10:49 AM    HGB 13.4 06/30/2021 12:00 AM    Hematocrit, POC 40 05/26/2016 10:49 AM    HCT 40.3 06/30/2021 12:00 AM    PLATELET 823 69/48/6036 12:00 AM    MCV 95 06/30/2021 12:00 AM     Lab Results   Component Value Date/Time    Sodium 140 09/08/2021 11:05 AM    Potassium 4.7 09/08/2021 11:05 AM    Chloride 105 09/08/2021 11:05 AM    CO2 27 09/08/2021 11:05 AM    Anion gap 8 09/08/2021 11:05 AM    Glucose 264 (H) 09/08/2021 11:05 AM    BUN 26 (H) 09/08/2021 11:05 AM    Creatinine 1.90 (H) 09/08/2021 11:05 AM    BUN/Creatinine ratio 14 09/08/2021 11:05 AM    GFR est AA 33 (L) 09/08/2021 11:05 AM    GFR est non-AA 27 (L) 09/08/2021 11:05 AM    Calcium 8.6 09/08/2021 11:05 AM    Bilirubin, total 0.3 09/08/2021 11:05 AM    Alk. phosphatase 104 09/08/2021 11:05 AM    Protein, total 6.6 09/08/2021 11:05 AM    Albumin 3.1 (L) 09/08/2021 11:05 AM    Globulin 3.5 09/08/2021 11:05 AM    A-G Ratio 0.9 09/08/2021 11:05 AM    ALT (SGPT) 34 09/08/2021 11:05 AM    AST (SGOT) 19 09/08/2021 11:05 AM   Last ACE level was 52 on 6/30/2021      Imaging:  I have personally reviewed patient's imaging as follows--CT chest without contrast from 7/7/2021, shows scattered linear fibrotic opacities apically extending from the hilum, otherwise no infiltrates or effusions.   Patient also has some scattered bilateral reticular changes and fibrotic changes in the bases. No nodules or active inflammation seen. PFTs:  I have reviewed the patient's PFTs personally as follows: From 2021, spirometry is normal without BD response, lung volumes show a mild restriction with a TLC of 73%, and diffusion capacity which is moderately reduced with a DLCO 45%    TTE:  I have reviewed the patient's TTE results  Results for orders placed or performed during the hospital encounter of 14   2D ECHO COMPLETE ADULT (TTE)     Status: None    [facility]  [facility], [address]  [phone]    Transthoracic Echocardiogram    Patient: Jeremie Aguirre  MRN: [id number]  ACCT #: [de-identified]  : [id number]  Age: 51 years  Gender: Female  Height: 61 in  Weight: 192 lb  BSA: 1.86 m squared  Study date: 2014  BP: 118 / 70 mmHg  Status: Routine  Location: [facility] ACC #: [id number]    Allergies: NO KNOWN ALLERGIES    Referring:  Jack Brush  [facility]  Interpreting Physician:  Luis Carlos Mejia DO  Technologist:  Fabio Gill  Referring:  Husam Bashir. Jarrod Dawkins MD    SUMMARY:  Left ventricle: Size was normal. Systolic function was normal by EF  (biplane method of disks). Ejection fraction was estimated in the range of  55 % to 60 %. There were no regional wall motion abnormalities. Wall  thickness was normal. Doppler parameters were consistent with abnormal  left ventricular relaxation (grade 1 diastolic dysfunction). Right ventricle: Systolic pressure was at the upper limits of normal.  Estimated peak pressure was 30 mmHg. Pulmonary arteries: Systolic pressure was at the upper limits of normal.    INDICATIONS: Assess left ventricular function. Lower leg Edema. HISTORY: Prior history: Sarcoidosis/Asthma/GERD    PROCEDURE: This was a routine study. The study included complete 2D  imaging, M-mode, complete spectral Doppler, and color Doppler.  The heart  rate was 93 bpm, at the start of the study. Systolic blood pressure was  118 mmHg, at the start of the study. Diastolic blood pressure was 70 mmHg,  at the start of the study. Image quality was adequate. LEFT VENTRICLE: Size was normal. Systolic function was normal by EF  (biplane method of disks). Ejection fraction was estimated in the range of  55 % to 60 %. There were no regional wall motion abnormalities. Wall  thickness was normal. DOPPLER: Doppler parameters were consistent with  abnormal left ventricular relaxation (grade 1 diastolic dysfunction). RIGHT VENTRICLE: The size was normal. Systolic function was normal.  DOPPLER: Systolic pressure was at the upper limits of normal. Estimated  peak pressure was 30 mmHg. LEFT ATRIUM: Size was normal. LA volume index was 25 ml/m squared. RIGHT ATRIUM: Size was normal.    MITRAL VALVE: Normal valve structure. DOPPLER: There was no evidence for  stenosis. There was no significant regurgitation. AORTIC VALVE: The valve was trileaflet. Leaflets exhibited normal  thickness and normal cuspal separation. DOPPLER: There was no stenosis. There was no regurgitation. TRICUSPID VALVE: Normal valve structure. DOPPLER: There was no evidence  for tricuspid stenosis. There was trivial regurgitation. Tricuspid  regurgitation peak velocity: 225 m/sec. Right ventricular-right atrial  (RV-RA) gradient: 25 mmHg. PULMONIC VALVE: Leaflets exhibited normal thickness, no calcification, and  normal cuspal separation. DOPPLER: There was no regurgitation. AORTA: The root exhibited normal size. PULMONARY ARTERY: The size was normal. DOPPLER: Systolic pressure was at  the upper limits of normal.    SYSTEMIC VEINS: IVC: The inferior vena cava was normal in size. PERICARDIUM: There was no pericardial effusion.     MEASUREMENT TABLES    2D measurements  Left ventricle   (Reference normals)  LVID ed, base, PLAX   49 mm   (36-52)  LVID es, base, PLAX   31 mm   (23-39)  FS, base, PLAX   36.73 %   (18-42)  LVPW thickness ed   8 mm   (6-11)  Ratio, IVS/LVPW   1    (<1.3)  EF   55 %   (--)  Ventricular septum   (Reference normals)  IVS thickness ed   8 mm   (6-11)  Aorta   (Reference normals)  Root diam   31 mm   (--)  Left atrium   (Reference normals)  AP dim   31 mm   (--)  LA/Ao root ratio   1    (--)  Right ventricle   (Reference normals)  RVID ed, PLAX   34 mm   (19-38)    Doppler measurements  Left ventricle   (Reference normals)  Ea, lat pat, tiss DP   8 cm/s   (--)  E/Ea, lat pat, tiss DP   6.63    (--)  Ea, med pat, tiss DP   7 cm/s   (--)  E/Ea, med pat, tiss DP   7.57    (--)  Aortic valve   (Reference normals)  Peak tammy   119 cm/s   (--)  Mean gradient   3 mmHg   (--)  Valve area, cont   1.8 cm squared   (--)  Mitral valve   (Reference normals)  Peak E tammy   53 cm/s   (--)  Peak A tammy   71 cm/s   (--)  Peak E/A ratio   0.75    (--)    Prepared and E-signed by    Jeff Jimenez DO  Signed 11-Feb-2014 17:56:17       05/14/19   ECHO ADULT COMPLETE 05/15/2019 5/15/2019    Narrative · Left Ventricle: Estimated left ventricular ejection fraction is 61 -   65%. No regional wall motion abnormality noted. Age-appropriate left   ventricular diastolic function. Signed by: Rock Samira DO         Assessment and Plan:  61 y.o. female with:    Impression:  1. Sarcoidosis of the lung: Patient has interstitial opacities as well as multiple calcified lymph nodes. Patient has been on chronic prednisone therapy for over 10 years. Patient was on 6 mg before recent hospitalization in March 2021, then discharged at 20 mg. In the interval, patient has titrated down to 5 mg. Repeat CT scan shows no evidence of active inflammation from sarcoidosis, patient has fibrotic changes which likely represents burned-out disease. Patient likely does have ongoing reactive airway disease from sarcoidosis, worsened by poor adherence with controller inhaler  2.   Sarcoidosis of the nose: Patient reports she was diagnosed by biopsy about 10 years ago, follows with ENT, recent CT sinuses shows scattered sinus changes  3. ILD: Secondary to sarcoidosis as noted above  4. Dyspnea on exertion/shortness of breath: Due to above as well as deconditioning and possible steroid myopathy  5. Fatigue: Secondary to deconditioning versus chronic steroid therapy versus anemia from CKD    Plan:  -Reviewed imaging and lab work with the patient, advised patient that her sarcoidosis likely has burned itself out, and we would likely move to wean patient off of chronic prednisone therapy. We will continue to wean prednisone as follows: Wean down to 2.5 mg daily for 30 days followed by 2.5 mg every other day for 30 days  -We will provide dual ICS therapy in order to improve dyspnea from sarcoidosis. --Discontinue Advair and start Trelegy Ellipta 200 mcg 1 puff once daily. Counseled patient to rinse mouth thoroughly after each use. Sample given in clinic  --Start Flovent discus 250 mcg 1 puff twice daily. Counseled patient to rinse mouth thoroughly after each use  -Due to chronic prednisone therapy, ordered DEXA scan to rule out osteoporosis  -Management of sinusitis and nasal sarcoidosis per ENT  -Continue ongoing lab work and avoid nephrotoxic agents and management of anemia per nephrology  -Advised patient to have yearly ophthalmology screening for ocular sarcoidosis as ocular sarcoidosis is a preventable cause of blindness/visual impairment  -Advised to remain active. Offered pulmonary rehab, patient declined  -Immunizations reviewed, pneumococcal vaccinations are up-to-date  -Counseled patient regarding lifestyle precautions in COVID-19 pandemic including wearing mask in public and confined spaces, social/physical distancing, frequent hand hygiene, etc.  Advised pt to receive COVID-19 vaccination when possible       Follow-up and Dispositions    · Return in about 3 months (around 12/15/2021).        Orders Placed This Encounter    DEXA BONE DENSITY STUDY AXIAL    predniSONE (DELTASONE) 5 mg tablet    fluticasone propionate (Flovent Diskus) 250 mcg/actuation dsdv    fluticasone-umeclidin-vilanter (Trelegy Ellipta) 200-62.5-25 mcg dsdv   [facility] fluticasone-umeclidin-vilanter (Trelegy Ellipta) 200-62.5-25 mcg dsdv       Jerman Patel MD/MPH     Pulmonary, Critical Care Medicine  [facility]

## 2021-09-16 NOTE — TELEPHONE ENCOUNTER
Patient calling to follow up on status of [employer] [facility]da order. She states that [facility] Garcia is waiting on office to re-fax paperwork for missing information. Spoke with PCP and we completed and re-faxed paperwork back to [facility]ha on 9/15/21. Thank you,  Hamilton Martell. LUCIANA PalominoS      For Pharmacy Admin Tracking Only     CPA in place:  Yes   Recommendation Provided To: Patient/Caregiver: 1 via Telephone   Time Spent (min): 20

## 2021-09-20 ENCOUNTER — HOSPITAL ENCOUNTER (OUTPATIENT)
Dept: BONE DENSITY | Age: 56
Discharge: HOME | End: 2021-09-20
Attending: INTERNAL MEDICINE
Payer: MEDICARE

## 2021-09-20 DIAGNOSIS — J84.9 ILD (INTERSTITIAL LUNG DISEASE) (HCC): ICD-10-CM

## 2021-09-20 DIAGNOSIS — D86.9 SARCOIDOSIS: ICD-10-CM

## 2021-09-20 DIAGNOSIS — Z79.52 CURRENT CHRONIC USE OF SYSTEMIC STEROIDS: ICD-10-CM

## 2021-09-20 DIAGNOSIS — R06.09 DYSPNEA ON EXERTION: ICD-10-CM

## 2021-09-20 PROCEDURE — 77080 DXA BONE DENSITY AXIAL: CPT

## 2021-09-21 NOTE — PROGRESS NOTES
DEXA revealed osteopenia. Will review with patient at her 9/27/2021 appointment. Still seeing Dr. Tony Butler?

## 2021-09-27 ENCOUNTER — OFFICE VISIT (OUTPATIENT)
Dept: INTERNAL MEDICINE CLINIC | Age: 56
End: 2021-09-27
Payer: MEDICARE

## 2021-09-27 VITALS
WEIGHT: 179.6 LBS | TEMPERATURE: 97.1 F | OXYGEN SATURATION: 95 % | HEART RATE: 93 BPM | BODY MASS INDEX: 33.05 KG/M2 | RESPIRATION RATE: 18 BRPM | DIASTOLIC BLOOD PRESSURE: 74 MMHG | SYSTOLIC BLOOD PRESSURE: 133 MMHG | HEIGHT: 62 IN

## 2021-09-27 DIAGNOSIS — K21.9 GASTROESOPHAGEAL REFLUX DISEASE WITHOUT ESOPHAGITIS: ICD-10-CM

## 2021-09-27 DIAGNOSIS — M85.80 OSTEOPENIA, UNSPECIFIED LOCATION: ICD-10-CM

## 2021-09-27 DIAGNOSIS — L74.1 MILIARIA CRYSTALLINA: ICD-10-CM

## 2021-09-27 DIAGNOSIS — E11.65 TYPE 2 DIABETES MELLITUS WITH HYPERGLYCEMIA, WITH LONG-TERM CURRENT USE OF INSULIN (HCC): Primary | ICD-10-CM

## 2021-09-27 DIAGNOSIS — E66.9 OBESE BODY HABITUS: ICD-10-CM

## 2021-09-27 DIAGNOSIS — I10 ESSENTIAL HYPERTENSION: ICD-10-CM

## 2021-09-27 DIAGNOSIS — E11.40 DIABETIC NEUROPATHY, PAINFUL (HCC): ICD-10-CM

## 2021-09-27 DIAGNOSIS — N18.30 STAGE 3 CHRONIC KIDNEY DISEASE, UNSPECIFIED WHETHER STAGE 3A OR 3B CKD (HCC): ICD-10-CM

## 2021-09-27 DIAGNOSIS — Z79.4 TYPE 2 DIABETES MELLITUS WITH HYPERGLYCEMIA, WITH LONG-TERM CURRENT USE OF INSULIN (HCC): Primary | ICD-10-CM

## 2021-09-27 DIAGNOSIS — J30.89 ENVIRONMENTAL AND SEASONAL ALLERGIES: ICD-10-CM

## 2021-09-27 DIAGNOSIS — E78.2 MIXED HYPERLIPIDEMIA: ICD-10-CM

## 2021-09-27 PROBLEM — H35.363 DRUSEN (DEGENERATIVE) OF MACULA, BILATERAL: Status: RESOLVED | Noted: 2021-06-04 | Resolved: 2021-09-27

## 2021-09-27 PROBLEM — I12.9 MALIGNANT HYPERTENSIVE KIDNEY DISEASE WITH CHRONIC KIDNEY DISEASE STAGE I THROUGH STAGE IV, OR UNSPECIFIED: Status: RESOLVED | Noted: 2021-01-12 | Resolved: 2021-09-27

## 2021-09-27 PROBLEM — Z20.822 PERSON UNDER INVESTIGATION FOR COVID-19: Status: RESOLVED | Noted: 2021-03-04 | Resolved: 2021-09-27

## 2021-09-27 PROBLEM — H43.823 VITREOMACULAR ADHESION, BILATERAL: Status: RESOLVED | Noted: 2021-06-04 | Resolved: 2021-09-27

## 2021-09-27 PROBLEM — Z77.22 CONTACT WITH AND (SUSPECTED) EXPOSURE TO ENVIRONMENTAL TOBACCO SMOKE (ACUTE) (CHRONIC): Status: RESOLVED | Noted: 2021-04-02 | Resolved: 2021-09-27

## 2021-09-27 PROBLEM — N28.1 ACQUIRED CYST OF KIDNEY: Status: RESOLVED | Noted: 2020-01-16 | Resolved: 2021-09-27

## 2021-09-27 PROBLEM — E44.0 MODERATE MALNUTRITION (HCC): Status: RESOLVED | Noted: 2021-03-05 | Resolved: 2021-09-27

## 2021-09-27 PROBLEM — M25.471 EDEMA OF BOTH ANKLES: Status: RESOLVED | Noted: 2018-08-28 | Resolved: 2021-09-27

## 2021-09-27 PROBLEM — H43.392 OTHER VITREOUS OPACITIES, LEFT EYE: Status: RESOLVED | Noted: 2021-06-04 | Resolved: 2021-09-27

## 2021-09-27 PROBLEM — M25.472 EDEMA OF BOTH ANKLES: Status: RESOLVED | Noted: 2018-08-28 | Resolved: 2021-09-27

## 2021-09-27 PROBLEM — J44.1 ACUTE EXACERBATION OF CHRONIC OBSTRUCTIVE AIRWAYS DISEASE (HCC): Status: RESOLVED | Noted: 2021-04-02 | Resolved: 2021-09-27

## 2021-09-27 PROCEDURE — G9899 SCRN MAM PERF RSLTS DOC: HCPCS | Performed by: NURSE PRACTITIONER

## 2021-09-27 PROCEDURE — G8754 DIAS BP LESS 90: HCPCS | Performed by: NURSE PRACTITIONER

## 2021-09-27 PROCEDURE — G8427 DOCREV CUR MEDS BY ELIG CLIN: HCPCS | Performed by: NURSE PRACTITIONER

## 2021-09-27 PROCEDURE — G8417 CALC BMI ABV UP PARAM F/U: HCPCS | Performed by: NURSE PRACTITIONER

## 2021-09-27 PROCEDURE — 99214 OFFICE O/P EST MOD 30 MIN: CPT | Performed by: NURSE PRACTITIONER

## 2021-09-27 PROCEDURE — G8752 SYS BP LESS 140: HCPCS | Performed by: NURSE PRACTITIONER

## 2021-09-27 PROCEDURE — 3051F HG A1C>EQUAL 7.0%<8.0%: CPT | Performed by: NURSE PRACTITIONER

## 2021-09-27 PROCEDURE — G9717 DOC PT DX DEP/BP F/U NT REQ: HCPCS | Performed by: NURSE PRACTITIONER

## 2021-09-27 PROCEDURE — 2022F DILAT RTA XM EVC RTNOPTHY: CPT | Performed by: NURSE PRACTITIONER

## 2021-09-27 PROCEDURE — 3017F COLORECTAL CA SCREEN DOC REV: CPT | Performed by: NURSE PRACTITIONER

## 2021-09-27 RX ORDER — MULTIVITAMIN
1 TABLET ORAL DAILY
Qty: 30 TABLET | Refills: 0
Start: 2021-09-27 | End: 2022-02-02

## 2021-09-27 NOTE — PROGRESS NOTES
[facility] [address], [address]  [phone] DBUCXG/[phone] fax    9/27/2021    HPI:   Thomas Richards 1965 is a pleasant BLACK/ female who presents today for routine physical exam.      Today's concerns:  When she is out in the heat or after she gets out of a hot shower she notices multiple small fluid-filled blisters on her arms. She will wipe her arms and these blisters will burst.  She denies pain. Days blisters will go away after she cools off. Retinal tear (right eye): Scheduled for eye surgery under Dr. Juliet Winn, [facility], on 10/5/2021. Environmental/seasonal allergies: Sees Dr. Clary Kay with [facility]. She was prescribed Singulair which she continues daily and tolerates well. CKD 3: Dr. Alexa Kelley, nephrology. Mixed hyperlipidemia: Continues pravastatin daily and tolerating well. Sarcoidosis and asthma: Follows Dr. Rut Acevedo, pulmonary. She continues all resp medications as prescribed. Depression with anxiety: Used to take prozac but not effective. Started Effexor in March 2021 and rose mary well. She is not seeking psychiatry/counseling at this time. IDDM:  She continues Januvia 25 mg; tolerates well. She takes Lantus daily along with Humalog sliding scale. She checks her blood sugars 1-3 times a day. She continues to follow-up with Yulia Vargas, Pharm. D.    DM neuropathy: She suffers from neuropathy to bilateral feet. Topamax 25 mg twice daily is very effective for her discomforts. She sees podiatry every 3 months. Eczema: She continues triamcinolone cream as needed to the back of her neck for her eczema. She only uses this as needed and most of the time just during the summer time.       Past Medical History:   Diagnosis Date    Acquired cyst of kidney 1/16/2020    Asthma     Bilateral great toe fractures 2014    Chronic kidney disease, stage 3b ([facility] [address] 01/2021     Aileen Madrigal    Chronic sinusitis     Dr. Luciano Bustillo in the past    Colon polyp 5/16    Dr Ramona Mark    Depression 2019    Diabetes mellitus ([address]     DJD (degenerative joint disease) of cervical spine 2016    DJD (degenerative joint disease), lumbar 2013    MRI c spine w degen changes; Dr Matt Gonzalez    Dyslipidemia     calculated 10 year risk score was 2.0% (12/13)    Edema of both ankles 8/28/2018    Environmental and seasonal allergies 8/28/2018    Eustachian tube dysfunction     Fibrocystic breast     Dr Nain Isbell GERD (gastroesophageal reflux disease)     Hypertriglyceridemia 8/28/2018    Lateral epicondylitis of both elbows 2/6/2017    Macular degeneration of both eyes 08/28/2018    Dr Susie Guillaume, [facility]    Mild intermittent asthma without complication 40/27/6333    Dr. Ainbal Uriarte;  ratio 68%, FEV1 78% w 6% inc postbd, TLC 77, RV 56, DLCO 61%    Morbid obesity (HCC)     peak weight 196 lbs, bmi 35.8 from 10/13    Neuropathy 8/28/2018    Osteopenia     Dr. Roque John; DEXA t score -0.7 spine, -0.2 hip (8/14)    Pneumonia     Sarcoidosis     Dr Concepción Mccracken Type 2 diabetes mellitus with hyperglycemia, with long-term current use of insulin ([facility] [address] 8/28/2018     Past Surgical History:   Procedure Laterality Date    COLONOSCOPY N/A 5/26/2016    Dr Ramona Mark hyperplastic    Monia Base      Dr. Krissy Stockton HX HEENT      nasal polypectomy Dr. Evelyn Okeefe HX HEENT      tear duct surgery right Dr. Ramila Cerda 2010; left Dr Ady Fischer 2015    HX ORTHOPAEDIC      DEXA -0.7 spine, -0.2 hip 8/14    [id number]  9/10, 8/13    thallium negative ef 72%; negative ef 70%    CA CHEST SURGERY PROCEDURE UNLISTED  7/12    US thyroid negative    CA CHEST SURGERY PROCEDURE UNLISTED  2012    pfts w mod restrictive defect     VASCULAR SURGERY PROCEDURE UNLIST  12/13    venous doppler negative     Current Outpatient Medications   Medication Sig    calcium-cholecalciferol, D3, (CALTRATE 600+D) tablet Take 1 Tablet by mouth daily.  montelukast (SINGULAIR) 10 mg tablet Take 10 mg by mouth daily.  predniSONE (DELTASONE) 5 mg tablet Take 5 mg by mouth daily.  fluticasone propionate (Flovent Diskus) 250 mcg/actuation dsdv Take 1 Puff by inhalation two (2) times a day. Rinse gargle mouth thoroughly after each use. Disp: 3 inhalers    fluticasone-umeclidin-vilanter (Trelegy Ellipta) 200-62.5-25 mcg dsdv Take 1 Puff by inhalation daily. Rinse and gargle after each use    cholecalciferol (VITAMIN D3) 25 mcg (1,000 unit) cap Take 1,000 Units by mouth daily.  cyanocobalamin (VITAMIN B12) 100 mcg tablet Take 50 mcg by mouth daily.  esomeprazole (NEXIUM) 40 mg capsule Take 1 Capsule by mouth daily as needed for Gastroesophageal Reflux Disease (GERD). Indications: gastroesophageal reflux disease    venlafaxine-SR (EFFEXOR-XR) 75 mg capsule Take 1 Capsule by mouth daily.  topiramate (Topamax) 25 mg tablet Take 1 Tablet by mouth two (2) times daily (with meals). For neuropathy    SITagliptin (JANUVIA) 25 mg tablet Take 1 Tablet by mouth daily. For diabetes    pravastatin (PRAVACHOL) 80 mg tablet Take 1 Tablet by mouth nightly. Indications: high cholesterol and high triglycerides    hydroCHLOROthiazide (HYDRODIURIL) 25 mg tablet Take 1 Tablet by mouth daily. For swelling    albuterol (PROVENTIL HFA, VENTOLIN HFA, PROAIR HFA) 90 mcg/actuation inhaler Take 2 Puffs by inhalation every four (4) hours as needed for Shortness of Breath. Indications: asthma attack    Insulin Needles, Disposable, (Prerna Pen Needle) 32 gauge x 5/32\" ndle Check blood sugars three times a day    insulin glargine (LANTUS,BASAGLAR) 100 unit/mL (3 mL) inpn 25 Units by SubCUTAneous route nightly.  insulin lispro (HumaLOG KwikPen Insulin) 200 unit/mL (3 mL) inpn 3 times per day: 150-200 2u, 201-250 4u, 251-300 6u, 301-350 8u, 351-400 10u, > 400 12u.   Indications: type 2 diabetes mellitus    therapeutic multivitamin-minerals [facility]) tablet Take 1 Tab by mouth daily.  triamcinolone acetonide (KENALOG) 0.1 % topical cream Apply  to affected area two (2) times daily as needed for Skin Irritation.  aspirin delayed-release 81 mg tablet Take 1 Tab by mouth daily. For heart health    glucose blood VI test strips (ACCU-CHEK POOJA) strip Pt to test 5 times daily. Dx:E11.65     No current facility-administered medications for this visit. Allergies and Intolerances: Allergies   Allergen Reactions    Pollen Extracts Runny Nose and Cough    Amoxicillin Hives, Shortness of Breath and Swelling    Crestor [Rosuvastatin] Myalgia    Ibuprofen Other (comments)     dont prescribe due to kidney function    Lipitor [Atorvastatin] Other (comments)     Muscle cramps and weakness      Pcn [Penicillins] Angioedema     Family History:   Family History   Problem Relation Age of Onset    Cancer Mother     Hypertension Mother     Cancer Father     Diabetes Father     Hypertension Father     Stroke Father     Diabetes Sister     Hypertension Sister     Heart Disease Sister     Colon Cancer Sister 52    Hypertension Brother     Cancer Maternal Aunt      Social History:   She  reports that she has never smoked. She has never used smokeless tobacco.   Social History     Substance and Sexual Activity   Alcohol Use Yes    Alcohol/week: 0.0 standard drinks    Comment: occasional wine     Immunization History:  Immunization History   Administered Date(s) Administered    Influenza Vaccine 02/15/2021    Influenza Vaccine (Quad) PF (>6 Mo Flulaval, Fluarix, and >3 Yrs Afluria, Fluzone 07910) 09/28/2017, 11/26/2018    Pneumococcal Polysaccharide (PPSV-23) 09/28/2017    Tdap 04/24/2013       Review of Systems:   As above included in HPI.   Otherwise 11 point review of systems negative including constitutional, skin, HENT, eyes, respiratory, cardiovascular, gastrointestinal, genitourinary, musculoskeletal, endocrine, hematologic, allergy, and neurologic. Physical:   Visit Vitals  /74   Pulse 93   Temp 97.1 °F (36.2 °C) (Temporal)   Resp 18   Ht 5' 2\" (1.575 m)   Wt 179 lb 9.6 oz (81.5 kg)   LMP 03/30/2013   SpO2 95%   BMI 32.85 kg/m²      Wt Readings from Last 3 Encounters:   09/27/21 179 lb 9.6 oz (81.5 kg)   09/15/21 177 lb 3.2 oz (80.4 kg)   09/07/21 175 lb 12.8 oz (79.7 kg)         Exam:   Physical Exam  Vitals and nursing note reviewed. Constitutional:       Appearance: Normal appearance. HENT:      Head: Normocephalic and atraumatic. Right Ear: External ear normal.      Nose: Nose normal.      Mouth/Throat:      Mouth: Mucous membranes are moist.   Eyes:      Extraocular Movements: Extraocular movements intact. Pupils: Pupils are equal, round, and reactive to light. Neck:      Vascular: No carotid bruit. Cardiovascular:      Rate and Rhythm: Normal rate and regular rhythm. Heart sounds: Normal heart sounds. Comments: No edema noted  Pulmonary:      Effort: Pulmonary effort is normal. No respiratory distress. Breath sounds: Normal breath sounds. No wheezing. Abdominal:      General: Abdomen is flat. Bowel sounds are normal.      Palpations: Abdomen is soft. Musculoskeletal:         General: Normal range of motion. Cervical back: Normal range of motion and neck supple. Skin:     General: Skin is warm and dry. Comments: Skin Free of rashes   Neurological:      General: No focal deficit present. Mental Status: She is alert and oriented to person, place, and time. Psychiatric:         Mood and Affect: Mood normal.         Behavior: Behavior normal.         Thought Content: Thought content normal.         Judgment: Judgment normal.      Comments: Much engaged in conversation.           Review of Data:  Labs reviewed: yes   to 396  HDL 68   to 132  Creatinine 1.7 to 1.9  GFR 37 to 33  A1c 7.5 to 7.3    Impression:  Patient Active Problem List   Diagnosis Code    Sarcoidosis Dr. Sean Henderson on low dose steroid D86.9    Gastroesophageal reflux disease without esophagitis K21.9    Type 2 diabetes mellitus with hyperglycemia, with long-term current use of insulin ([facility]) E11.65, Z79.4    Neuropathy G62.9    Mild intermittent asthma without complication O90.43    Environmental and seasonal allergies J30.89    Macular degeneration of both eyes H35.30    Diabetic neuropathy, painful ([facility]) E11.40    Type 2 diabetes mellitus with proliferative retinopathy ([facility] [address] P00.7135    Mixed hyperlipidemia E78.2    Eczema L30.9    Stage 3 chronic kidney disease ([facility]) N18.30    Depression with anxiety F41.8    Hypoalbuminemia E88.09    Essential hypertension I10    Retinal drusen H35.369    Vitamin D deficiency E55.9    Age-related nuclear cataract, bilateral H25.13    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 Type 2 diabetes mellitus with hyperglycemia, with long-term current use of insulin ([facility])  E11.65 250.00     Z79.4 790.29      V58.67    2. Diabetic neuropathy, painful ([facility] [address]  E11.40 250.60      357.2    3. Essential hypertension  I10 401.9    4. Mixed hyperlipidemia  E78.2 272.2    5. Gastroesophageal reflux disease without esophagitis  K21.9 530.81    6. Environmental and seasonal allergies  J30.89 477.8    7. Stage 3 chronic kidney disease, unspecified whether stage 3a or 3b CKD ([facility])  N18.30 585.3    8. Osteopenia, unspecified location  M85.80 733.90 calcium-cholecalciferol, D3, (CALTRATE 600+D) tablet   9. Obese body habitus  E66.9 278.00    10.  Kevin Morel  L74.1 705.1      -Type 2 diabetes  A1c 7.3  A1c goal <7  Continue current therapies  Instructed patient to cut back on carbs and sugary foods  Increase exercise  Encourage weight loss  Increase water intake  Reviewed current value and goal related to age, discussed dietary changes including to select low sugar and low simple carbohydrates and eating stable protein throughout the day, medications with the correct way to take them and side effects that should be reported such as muscle pain, weakness, near syncope. She is not to take metformin d/t declining GFR    -DM neuropathy  Continue Topamax  Continue to F/U with podiatry prn    Hypertension  continue HCTZ 25 mg daily  Limit sodium in diet to 2g/d  Avoid pork  Initiate cardio exercise  Weight reduction  Increase water intake  Report BP readings greater than 139/89 to office    -Mixed hyperlipidemia  ;   continue Pravachol therapy  Reduce fried fatty or oily foods in diet  Limit red meats and alcohol. Limit fast food  Work on weight reduction  Increase water intake    -Asthma  Continue Advair therapy  Continue to f/u with pulm as scheduled    -Stage III CKD  Creatinine 1.9; GFR 33  Instructed patient on the importance of managing and controlling diabetes as this affects the kidneys  Schedule f/u with , nephrology    -Depression and anxiety    Continue Effexor therapy    -GERD   Continue Nexium daily as needed. Environmental/seasonal allergies  continue Singulair as prescribed by Dr. Ramon Arreola, ENT  Avoid allergens (strong smells, animals, cigarettes or carpeted areas)    Osteopenia  Recent DEXA revealed continued osteopenia  Pulmonary tapering patient off of prednisone  Recommended Caltrate therapy  Educated patient i.e. osteopenia (weak bones). Instructed on lifestyle measures she should adopt to reduce bone loss in postmenopausal women. Lifestyle measures include adequate calcium and vitamin D, exercise, smoking cessation,  limit caffeine to 1-2 servings per day,counseling on fall prevention, and avoidance of heavy alcohol use. 1200 mg of elemental calcium daily, total diet plus supplement, and 800 international units of vitamin D daily. Instructed her to inquire with pharmacist the best supplement to use. Obese body habitus  In the last 3 months patient has gained 13 pounds.   BMI is out of normal parameters and plan is as follows: Discussed in great detail on diet, portion control, exercise, avoiding foods high in sugar, carbs and starches, fatty/greasy foods and to eat until satisfied not til full. I have counseled this patient on diet and exercise regimens as well. Recommended weight watchers, Noom, and GOLO. Leticia Reyna  Instructed patient to avoid excessive heat  Avoid taking hot showers instead take lukewarm showers  When she is in a flare can apply cool compresses    Follow up 3 months  Labs needed 1 week prior to appt: No  POC A1c        Dr. Fred Higginbotham, AGNP-C, DNP  [facility]       Total time: 32 minutes spent with the patient on counseling, answering questions, and/or coordination of care.

## 2021-09-27 NOTE — PROGRESS NOTES
Chief Complaint   Patient presents with    Cholesterol Problem     3 mo f/u    Diabetes    GERD    Labs     completed 9/8/2021       1. Have you been to the ER, urgent care clinic since your last visit? Hospitalized since your last visit? No    2. Have you seen or consulted any other health care providers outside of the [address] [place] [address] since your last visit? Include any pap smears or colon screening.  No

## 2021-10-01 ENCOUNTER — OFFICE VISIT (OUTPATIENT)
Dept: INTERNAL MEDICINE CLINIC | Age: 56
End: 2021-10-01

## 2021-10-01 DIAGNOSIS — Z79.4 TYPE 2 DIABETES MELLITUS WITH HYPERGLYCEMIA, WITH LONG-TERM CURRENT USE OF INSULIN (HCC): Primary | ICD-10-CM

## 2021-10-01 DIAGNOSIS — E11.65 TYPE 2 DIABETES MELLITUS WITH HYPERGLYCEMIA, WITH LONG-TERM CURRENT USE OF INSULIN (HCC): Primary | ICD-10-CM

## 2021-10-01 NOTE — PROGRESS NOTES
Pharmacy Progress Note - Diabetes Management    Assessment / Plan:   Diabetes Management:  - Per ADA guidelines, Pt's A1c is not at goal of < 7%. However, it has improved to 7.3%. - Patient presents today for Freestyle Brenda CGM training  - Reviewed the following with patient:  · Target goal (>70% of time between )  · How to check and enter in notes for readings  · Difference between readings with brenda and glucometer  · When to check sugar with a glucometer (blood sugar changing quickly, when Dioni Wilder recommends, symptoms don't match the reading)  · Different reports to view readings and trends  · Tips to make sensor stay on  · What to do if senor falls off  - Verbally walked patient through applying the Freestyle sensor and it was successfully applied to the back of her arm  - Recommend checking/scanning at least once every 8 hours   - Continue current medications   - Follow up in 1 month to review blood sugars          S/O: Ms. Stephon Butt is a 61 y.o. female, referred by Dr. Lawyer Collins was seen today for diabetes management follow up. Patient's last A1c was 7.3% in September 2021. This is a decrease from 7.7% in June 2021. Interim update: Patient states that she has been doing well over the past few months. She states that her appetite has gotten better and maybe even led to eating more sugary snacks and carb-heavy foods than usual. She has since started to make some diet changes after meeting with PCP and added back Glucerna shakes and low sugar snacks into her routine. She did also finally receive Freestyle Brenda CGM and presents today for training. Current anti-hyperglycemic regimen include(s):    - Januvia 25 mg once daily  - Lantus 25 units once daily  - Humalog TID before meals per sliding scale     Patient reports adherence to her medication regimen.      ROS:  Today, Pt endorses:  - Symptoms of Hyperglycemia: none  - Symptoms of Hypoglycemia: none    Self Monitoring Blood Glucose (SMBG) or CGM:  - Brought in home glucometer/blood glucose log/CGM reader today:  no  - Presents today for CGM education  - Patient was checking blood sugars at home and they ranged in the mid-100s  - She did see a reading as high as 201 once     Nutrition/Lifestyle Modifications:  - Admits to getting off track with diet once appetite improved  - Has started to add back Glucerna shakes and low sugar snacks into her routine     Past Medical History:   Diagnosis Date    Acquired cyst of kidney 1/16/2020    Asthma     Bilateral great toe fractures 2014    Chronic kidney disease, stage 3b ([facility] 01/2021    Dr Sonu Elena, nephro    Chronic sinusitis     Dr. Jeannette Hutchins in the past    Colon polyp 5/16    Dr Wing Cleve    Depression 2019    Diabetes mellitus ([facility]     DJD (degenerative joint disease) of cervical spine 2016    DJD (degenerative joint disease), lumbar 2013    MRI c spine w degen changes; Dr Marisol Nava    Dyslipidemia     calculated 10 year risk score was 2.0% (12/13)    Edema of both ankles 8/28/2018    Environmental and seasonal allergies 8/28/2018    Eustachian tube dysfunction     Fibrocystic breast     Dr Connie Romano GERD (gastroesophageal reflux disease)     Hypertriglyceridemia 8/28/2018    Lateral epicondylitis of both elbows 2/6/2017    Macular degeneration of both eyes 08/28/2018    Dr Regulo Gutiérrez [facility]    Mild intermittent asthma without complication 30/19/2782    Dr. Dorothea Barclay;  ratio 68%, FEV1 78% w 6% inc postbd, TLC 77, RV 56, DLCO 61%    Morbid obesity (HCC)     peak weight 196 lbs, bmi 35.8 from 10/13    Neuropathy 8/28/2018    Osteopenia     Dr. Kellen Julien; DEXA t score -0.7 spine, -0.2 hip (8/14)    Pneumonia     Sarcoidosis     Dr Bessy Gauthier Type 2 diabetes mellitus with hyperglycemia, with long-term current use of insulin ([facility] 8/28/2018     Allergies   Allergen Reactions    Pollen Extracts Runny Nose and Cough    Amoxicillin Hives, Shortness of Breath and Swelling  Crestor [Rosuvastatin] Myalgia    Ibuprofen Other (comments)     dont prescribe due to kidney function    Lipitor [Atorvastatin] Other (comments)     Muscle cramps and weakness      Pcn [Penicillins] Angioedema       Current Outpatient Medications   Medication Sig    calcium-cholecalciferol, D3, (CALTRATE 600+D) tablet Take 1 Tablet by mouth daily.  montelukast (SINGULAIR) 10 mg tablet Take 10 mg by mouth daily.  predniSONE (DELTASONE) 5 mg tablet Take 5 mg by mouth daily.  fluticasone propionate (Flovent Diskus) 250 mcg/actuation dsdv Take 1 Puff by inhalation two (2) times a day. Rinse gargle mouth thoroughly after each use. Disp: 3 inhalers    fluticasone-umeclidin-vilanter (Trelegy Ellipta) 200-62.5-25 mcg dsdv Take 1 Puff by inhalation daily. Rinse and gargle after each use    cholecalciferol (VITAMIN D3) 25 mcg (1,000 unit) cap Take 1,000 Units by mouth daily.  cyanocobalamin (VITAMIN B12) 100 mcg tablet Take 50 mcg by mouth daily.  esomeprazole (NEXIUM) 40 mg capsule Take 1 Capsule by mouth daily as needed for Gastroesophageal Reflux Disease (GERD). Indications: gastroesophageal reflux disease    venlafaxine-SR (EFFEXOR-XR) 75 mg capsule Take 1 Capsule by mouth daily.  topiramate (Topamax) 25 mg tablet Take 1 Tablet by mouth two (2) times daily (with meals). For neuropathy    SITagliptin (JANUVIA) 25 mg tablet Take 1 Tablet by mouth daily. For diabetes    pravastatin (PRAVACHOL) 80 mg tablet Take 1 Tablet by mouth nightly. Indications: high cholesterol and high triglycerides    hydroCHLOROthiazide (HYDRODIURIL) 25 mg tablet Take 1 Tablet by mouth daily. For swelling    albuterol (PROVENTIL HFA, VENTOLIN HFA, PROAIR HFA) 90 mcg/actuation inhaler Take 2 Puffs by inhalation every four (4) hours as needed for Shortness of Breath.  Indications: asthma attack    Insulin Needles, Disposable, (Prerna Pen Needle) 32 gauge x 5/32\" ndle Check blood sugars three times a day    insulin glargine (LANTUS,BASAGLAR) 100 unit/mL (3 mL) inpn 25 Units by SubCUTAneous route nightly.  insulin lispro (HumaLOG KwikPen Insulin) 200 unit/mL (3 mL) inpn 3 times per day: 150-200 2u, 201-250 4u, 251-300 6u, 301-350 8u, 351-400 10u, > 400 12u. Indications: type 2 diabetes mellitus    therapeutic multivitamin-minerals (THERAGRAN-M) tablet Take 1 Tab by mouth daily.  triamcinolone acetonide (KENALOG) 0.1 % topical cream Apply  to affected area two (2) times daily as needed for Skin Irritation.  aspirin delayed-release 81 mg tablet Take 1 Tab by mouth daily. For heart health    glucose blood VI test strips (ACCU-CHEK POOJA) strip Pt to test 5 times daily. Dx:E11.65     No current facility-administered medications for this visit. Lab Results   Component Value Date/Time    Sodium 140 09/08/2021 11:05 AM    Potassium 4.7 09/08/2021 11:05 AM    Chloride 105 09/08/2021 11:05 AM    CO2 27 09/08/2021 11:05 AM    Anion gap 8 09/08/2021 11:05 AM    Glucose 264 (H) 09/08/2021 11:05 AM    BUN 26 (H) 09/08/2021 11:05 AM    Creatinine 1.90 (H) 09/08/2021 11:05 AM    BUN/Creatinine ratio 14 09/08/2021 11:05 AM    GFR est AA 33 (L) 09/08/2021 11:05 AM    GFR est non-AA 27 (L) 09/08/2021 11:05 AM    Calcium 8.6 09/08/2021 11:05 AM    Bilirubin, total 0.3 09/08/2021 11:05 AM    Alk.  phosphatase 104 09/08/2021 11:05 AM    Protein, total 6.6 09/08/2021 11:05 AM    Albumin 3.1 (L) 09/08/2021 11:05 AM    Globulin 3.5 09/08/2021 11:05 AM    A-G Ratio 0.9 09/08/2021 11:05 AM    ALT (SGPT) 34 09/08/2021 11:05 AM       Lab Results   Component Value Date/Time    Cholesterol, total 229 (H) 09/08/2021 11:05 AM    HDL Cholesterol 54 09/08/2021 11:05 AM    LDL, calculated 95.8 09/08/2021 11:05 AM    VLDL, calculated 79.2 09/08/2021 11:05 AM    Triglyceride 396 (H) 09/08/2021 11:05 AM    CHOL/HDL Ratio 4.2 09/08/2021 11:05 AM       Lab Results   Component Value Date/Time    WBC 5.8 06/30/2021 12:00 AM    Hemoglobin, POC 13.6 05/26/2016 10:49 AM    HGB 13.4 06/30/2021 12:00 AM    Hematocrit, POC 40 05/26/2016 10:49 AM    HCT 40.3 06/30/2021 12:00 AM    PLATELET 517 94/18/9527 12:00 AM    MCV 95 06/30/2021 12:00 AM       Lab Results   Component Value Date/Time    Microalbumin/Creat ratio (mg/g creat) 4 03/18/2021 10:48 AM    Microalbumin,urine random 0.59 03/18/2021 10:48 AM       HbA1c:  Lab Results   Component Value Date/Time    Hemoglobin A1c 7.3 (H) 09/08/2021 11:05 AM    Hemoglobin A1c (POC) 7.7 06/16/2021 10:28 AM    Hemoglobin A1c, External 6.7 09/28/2016 12:00 AM     No components found for: 2     Last Point of Care HGB A1C  Hemoglobin A1c (POC)   Date Value Ref Range Status   06/16/2021 7.7 % Final        CrCl cannot be calculated (Unknown ideal weight. ). Medication reconciliation was completed during the visit. There are no discontinued medications. Patient verbalized understanding of the information presented and all of the patients questions were answered. AVS was handed to the patient. Patient advised to call the office with any additional questions or concerns. Patient will return to clinic in 1 week(s) for follow up. For Christiano Chen in place:  Yes   Recommendation Provided To: Patient/Caregiver: 1 via In person   Intervention Detail: Device Training   Gap Closed?: No   Intervention Accepted By: Patient/Caregiver: 1   Time Spent (min): 30

## 2021-10-01 NOTE — PATIENT INSTRUCTIONS
Your Visit Summary:     Plan:  - Continue current medications     Freestyle Brenda 2 benny is available for Iphone if you would like to use that instead of the reader          Call me with any questions or concerns  [place]  [phone]    Check and document your blood sugar first thing in the morning (fasting), 1-2 hours after a meal, and/or before bedtime. Bring your meter/log to all future visits. Your blood sugar goals:  - Fasting (first thing in the morning)  blood sugar: 80 - 130   - 1 to 2 hours after a meal: less than 180   - 80 - 180 target range (greater than 70% of the time)    When you experience symptoms of low blood sugar (example: less than 70):  - Confirm low reading by checking your blood sugar.   - Then treat with 15 grams of carbohydrates (one-half cup of juice or regular soda, or 4-5 glucose tablets). - Wait 15 minutes to recheck blood sugar.   - Then eat a protein containing meal/snack to prevent another low blood sugar episode. (example: peanut butter + crackers)    Nutrition:  - When reviewing a nutrition label, focus on the serving size, total calories, fat (and type of fats), total carbohydrates, sugar (and amount of added sugar), amount of fiber (good for your digestive), and amount of protein. Refer to your nutrition label guide for more information.  - For a meal : max 45 - 60 grams of carbohydrates  - For a snack: max 15 grams of carbohydrates  - Reduce amount of saturated and trans fat. Consider more unsaturated fat options as they are better for your heart health.    - Have at least 1 serving of lean fat protein with each meal.    - Increase fiber intake slowly to prevent constipation.   - Substitute fruit juices for the whole fruit    Low carb snack ideas (15 grams total carb or less):     String cheese or babybel with 6 crackers   4 peanut butter crackers   3 cups of popcorn   1 cup raw vegetables with hummus or ranch dip (just need to watch how much dip you use)   Nuts   2 rice cakes   Celery with peanut butter or cream cheese   String cheese with 1 serving of fruit   Greek yogurt (look at label to make sure < 15 gram carb)   Plain greek yogurt with fresh berries added   Nature valley protein bar   Whisps parmesan cheese crisps   Hard boiled egg   Cottage cheese   Tuna salad lettuce roll-ups   Deli meat roll-ups with slice of cheese   Sugar Free Jello   Glucerna shake (16 grams)    Glucerna hunger smart shake (16 grams)   Ensure protein max shake   Fruit (1 serving/15 grams)   1/2 banana, jenifer, or grapefruit    1/3 melon (small cantaloupe)   1 slice or 1 cup of honeydew melon   1 slice or 1 and 1/4 cups of watermelon    1 small apple, peach, orange or pear   2 small tangerines   1 cup of raspberries   3/4 cup of blackberries, blueberries or pineapples   1/2 cup of fruit juice, pears, applesauce, or mangos   17 small grapes   12 cherries    Be careful with the glucerna products as they differ in the total carbs depending on the product (some are intended as meal replacements not snacks). Make sure you look at the total carbs on the label as products can differ. Physical Activity:  - Aim for 30 minutes of consistent, moderately intensive, physical activity a day for 5 days or an average of 150 minutes per week. - Start slow, increase as tolerated. For example: Walk every day, working up to 30 minutes of brisk walking, 5 days a weekor split the 30 minutes into two 15-minute or three 10-minute walks. - If you sit for a long time, get up and move/stretch every 90 minutes. Other recommendations:  - Schedule an annual eye exam.  - Check your feet daily for any signs of open wounds, cuts, or sores. - Given your risk factors, the following vaccines are recommended: annual flu shot, age-based pneumococcal vaccines (Pneumovax, Prevnar 13).     In addition to taking your medications as directed, improving your blood sugar involves modifying your nutrition and maximizing the amount of physical activity.

## 2021-10-07 ENCOUNTER — TELEPHONE (OUTPATIENT)
Dept: PULMONOLOGY | Age: 56
End: 2021-10-07

## 2021-10-07 NOTE — TELEPHONE ENCOUNTER
Pt called([phone]). She has been coughing up mucus and bringing it up through her nose. This has started since she has been taking the Trelegy. She wants to know if this is a side effect. Please check and call her back.

## 2021-10-14 ENCOUNTER — HOSPITAL ENCOUNTER (OUTPATIENT)
Dept: PREADMISSION TESTING | Age: 56
Discharge: HOME | End: 2021-10-14
Payer: MEDICARE

## 2021-10-14 ENCOUNTER — TRANSCRIBE ORDER (OUTPATIENT)
Dept: REGISTRATION | Age: 56
End: 2021-10-14

## 2021-10-14 DIAGNOSIS — Z20.828 EXPOSURE TO SARS-ASSOCIATED CORONAVIRUS: ICD-10-CM

## 2021-10-14 DIAGNOSIS — Z01.812 BLOOD TESTS PRIOR TO TREATMENT OR PROCEDURE: Primary | ICD-10-CM

## 2021-10-14 DIAGNOSIS — Z01.812 BLOOD TESTS PRIOR TO TREATMENT OR PROCEDURE: ICD-10-CM

## 2021-10-14 PROCEDURE — U0005 INFEC AGEN DETEC AMPLI PROBE: HCPCS

## 2021-10-15 LAB — SARS-COV-2, COV2NT: NOT DETECTED

## 2021-10-18 ENCOUNTER — ANESTHESIA EVENT (OUTPATIENT)
Dept: ENDOSCOPY | Age: 56
End: 2021-10-18
Payer: MEDICARE

## 2021-10-18 RX ORDER — AZELASTINE HCL 205.5 UG/1
2 SPRAY NASAL 2 TIMES DAILY
COMMUNITY
End: 2022-01-03

## 2021-10-18 RX ORDER — TRIAMCINOLONE ACETONIDE 55 UG/1
2 SPRAY, METERED NASAL DAILY
COMMUNITY
Start: 2022-05-25 | End: 2022-05-25

## 2021-10-18 NOTE — PERIOP NOTES
PRE-SURGICAL INSTRUCTIONS        Patient's Name:  Taylor Jenkins      [id number] Date:  10/18/2021            Covid Testing Date and Time:    Surgery Date:  10/19/2021                1. Do NOT eat or drink anything, including candy, gum, or ice chips after midnight on 10/18/21, unless you have specific instructions from your surgeon or anesthesia provider to do so.  2. You may brush your teeth before coming to the hospital.  3. No smoking 24 hours prior to the day of surgery. 4. No alcohol 24 hours prior to the day of surgery. 5. No recreational drugs for one week prior to the day of surgery. 6. Leave all valuables, including money/purse, at home. 7. Remove all jewelry, nail polish, acrylic nails, and makeup (including mascara); no lotions powders, deodorant, or perfume/cologne/after shave on the skin. 8. Follow instruction for Hibiclens washes and CHG wipes from surgeon's office. 9. Glasses/contact lenses and dentures may be worn to the hospital.  They will be removed prior to surgery. 10. Call your doctor if symptoms of a cold or illness develop within 24-48 hours prior to your surgery. 11.  If you are having an outpatient procedure, please make arrangements for a responsible ADULT TO [address] [place] [address] and stay with you for 24 hours after your surgery. 12. ONE VISITOR in the hospital at this time for outpatient procedures. Exceptions may be made for surgical admissions, per nursing unit guidelines      Special Instructions:      Bring list of CURRENT medications. Bring inhaler. Bring any pertinent legal medical records. Follow physician instructions about insulin. Follow physician instructions about stopping anticoagulants. Complete bowel prep per MD instructions. On the day of surgery, come in the main entrance of [facility]. Let the  at the desk know you are there for surgery.   A staff member will come escort you to the surgical area on the second floor.    If you have any questions or concerns, please do not hesitate to call:     (Prior to the day of surgery) PAT department:  [phone]   (Day of surgery) Pre-Op department:  [phone]    These surgical instructions were reviewed with PATIENT during the PAT phone call.

## 2021-10-19 ENCOUNTER — ANESTHESIA (OUTPATIENT)
Dept: ENDOSCOPY | Age: 56
End: 2021-10-19
Payer: MEDICARE

## 2021-10-19 ENCOUNTER — HOSPITAL ENCOUNTER (OUTPATIENT)
Age: 56
Setting detail: OUTPATIENT SURGERY
Discharge: HOME | End: 2021-10-19
Attending: INTERNAL MEDICINE | Admitting: INTERNAL MEDICINE
Payer: MEDICARE

## 2021-10-19 VITALS
HEIGHT: 62 IN | DIASTOLIC BLOOD PRESSURE: 62 MMHG | RESPIRATION RATE: 16 BRPM | WEIGHT: 170 LBS | TEMPERATURE: 97.3 F | OXYGEN SATURATION: 100 % | SYSTOLIC BLOOD PRESSURE: 130 MMHG | HEART RATE: 72 BPM | BODY MASS INDEX: 31.28 KG/M2

## 2021-10-19 LAB
GLUCOSE BLD STRIP.AUTO-MCNC: 163 MG/DL (ref 70–110)
GLUCOSE BLD STRIP.AUTO-MCNC: 174 MG/DL (ref 70–110)

## 2021-10-19 PROCEDURE — 74011636637 HC RX REV CODE- 636/637: Performed by: ANESTHESIOLOGY

## 2021-10-19 PROCEDURE — 00811 ANES LWR INTST NDSC NOS: CPT | Performed by: ANESTHESIOLOGY

## 2021-10-19 PROCEDURE — 76040000019: Performed by: INTERNAL MEDICINE

## 2021-10-19 PROCEDURE — 74011250637 HC RX REV CODE- 250/637: Performed by: NURSE ANESTHETIST, CERTIFIED REGISTERED

## 2021-10-19 PROCEDURE — 74011250636 HC RX REV CODE- 250/636: Performed by: NURSE ANESTHETIST, CERTIFIED REGISTERED

## 2021-10-19 PROCEDURE — 77030013992 HC SNR POLYP ENDOSC BSC -B: Performed by: INTERNAL MEDICINE

## 2021-10-19 PROCEDURE — 74011250637 HC RX REV CODE- 250/637: Performed by: INTERNAL MEDICINE

## 2021-10-19 PROCEDURE — 00811 ANES LWR INTST NDSC NOS: CPT | Performed by: NURSE ANESTHETIST, CERTIFIED REGISTERED

## 2021-10-19 PROCEDURE — 74011000250 HC RX REV CODE- 250: Performed by: NURSE ANESTHETIST, CERTIFIED REGISTERED

## 2021-10-19 PROCEDURE — 77030029384 HC SNR POLYP CAPTVR II BSC -B: Performed by: INTERNAL MEDICINE

## 2021-10-19 PROCEDURE — 77030021593 HC FCPS BIOP ENDOSC BSC -A: Performed by: INTERNAL MEDICINE

## 2021-10-19 PROCEDURE — 88305 TISSUE EXAM BY PATHOLOGIST: CPT

## 2021-10-19 PROCEDURE — 82962 GLUCOSE BLOOD TEST: CPT

## 2021-10-19 PROCEDURE — 2709999900 HC NON-CHARGEABLE SUPPLY: Performed by: INTERNAL MEDICINE

## 2021-10-19 PROCEDURE — 77030008565 HC TBNG SUC IRR ERBE -B: Performed by: INTERNAL MEDICINE

## 2021-10-19 PROCEDURE — 76060000031 HC ANESTHESIA FIRST 0.5 HR: Performed by: INTERNAL MEDICINE

## 2021-10-19 RX ORDER — SODIUM CHLORIDE 0.9 % (FLUSH) 0.9 %
5-40 SYRINGE (ML) INJECTION AS NEEDED
Status: DISCONTINUED | OUTPATIENT
Start: 2021-10-19 | End: 2021-10-19 | Stop reason: HOSPADM

## 2021-10-19 RX ORDER — INSULIN LISPRO 100 [IU]/ML
INJECTION, SOLUTION INTRAVENOUS; SUBCUTANEOUS ONCE
Status: COMPLETED | OUTPATIENT
Start: 2021-10-19 | End: 2021-10-19

## 2021-10-19 RX ORDER — SODIUM CHLORIDE 0.9 % (FLUSH) 0.9 %
5-40 SYRINGE (ML) INJECTION EVERY 8 HOURS
Status: DISCONTINUED | OUTPATIENT
Start: 2021-10-19 | End: 2021-10-19 | Stop reason: HOSPADM

## 2021-10-19 RX ORDER — LIDOCAINE HYDROCHLORIDE 20 MG/ML
INJECTION, SOLUTION EPIDURAL; INFILTRATION; INTRACAUDAL; PERINEURAL AS NEEDED
Status: DISCONTINUED | OUTPATIENT
Start: 2021-10-19 | End: 2021-10-19 | Stop reason: HOSPADM

## 2021-10-19 RX ORDER — INSULIN LISPRO 100 [IU]/ML
INJECTION, SOLUTION INTRAVENOUS; SUBCUTANEOUS ONCE
Status: DISCONTINUED | OUTPATIENT
Start: 2021-10-19 | End: 2021-10-19

## 2021-10-19 RX ORDER — FAMOTIDINE 20 MG/1
20 TABLET, FILM COATED ORAL ONCE
Status: COMPLETED | OUTPATIENT
Start: 2021-10-19 | End: 2021-10-19

## 2021-10-19 RX ORDER — SODIUM CHLORIDE, SODIUM LACTATE, POTASSIUM CHLORIDE, CALCIUM CHLORIDE 600; 310; 30; 20 MG/100ML; MG/100ML; MG/100ML; MG/100ML
25 INJECTION, SOLUTION INTRAVENOUS CONTINUOUS
Status: DISCONTINUED | OUTPATIENT
Start: 2021-10-19 | End: 2021-10-19 | Stop reason: HOSPADM

## 2021-10-19 RX ORDER — LIDOCAINE HYDROCHLORIDE 10 MG/ML
0.1 INJECTION, SOLUTION EPIDURAL; INFILTRATION; INTRACAUDAL; PERINEURAL AS NEEDED
Status: DISCONTINUED | OUTPATIENT
Start: 2021-10-19 | End: 2021-10-19 | Stop reason: HOSPADM

## 2021-10-19 RX ORDER — DEXTROSE 50 % IN WATER (D50W) INTRAVENOUS SYRINGE
25-50 AS NEEDED
Status: DISCONTINUED | OUTPATIENT
Start: 2021-10-19 | End: 2021-10-19 | Stop reason: HOSPADM

## 2021-10-19 RX ORDER — DEXTROMETHORPHAN/PSEUDOEPHED 2.5-7.5/.8
DROPS ORAL AS NEEDED
Status: DISCONTINUED | OUTPATIENT
Start: 2021-10-19 | End: 2021-10-19 | Stop reason: HOSPADM

## 2021-10-19 RX ORDER — EPINEPHRINE 0.1 MG/ML
1 INJECTION INTRACARDIAC; INTRAVENOUS
Status: DISCONTINUED | OUTPATIENT
Start: 2021-10-19 | End: 2021-10-19 | Stop reason: HOSPADM

## 2021-10-19 RX ORDER — INSULIN LISPRO 100 [IU]/ML
INJECTION, SOLUTION INTRAVENOUS; SUBCUTANEOUS ONCE
Status: DISCONTINUED | OUTPATIENT
Start: 2021-10-19 | End: 2021-10-19 | Stop reason: HOSPADM

## 2021-10-19 RX ORDER — MAGNESIUM SULFATE 100 %
4 CRYSTALS MISCELLANEOUS AS NEEDED
Status: DISCONTINUED | OUTPATIENT
Start: 2021-10-19 | End: 2021-10-19 | Stop reason: HOSPADM

## 2021-10-19 RX ORDER — FLUMAZENIL 0.1 MG/ML
0.2 INJECTION INTRAVENOUS
Status: DISCONTINUED | OUTPATIENT
Start: 2021-10-19 | End: 2021-10-19 | Stop reason: HOSPADM

## 2021-10-19 RX ORDER — NALOXONE HYDROCHLORIDE 0.4 MG/ML
0.4 INJECTION, SOLUTION INTRAMUSCULAR; INTRAVENOUS; SUBCUTANEOUS
Status: DISCONTINUED | OUTPATIENT
Start: 2021-10-19 | End: 2021-10-19 | Stop reason: HOSPADM

## 2021-10-19 RX ORDER — PROPOFOL 10 MG/ML
INJECTION, EMULSION INTRAVENOUS AS NEEDED
Status: DISCONTINUED | OUTPATIENT
Start: 2021-10-19 | End: 2021-10-19 | Stop reason: HOSPADM

## 2021-10-19 RX ORDER — DEXTROMETHORPHAN/PSEUDOEPHED 2.5-7.5/.8
1.2 DROPS ORAL
Status: DISCONTINUED | OUTPATIENT
Start: 2021-10-19 | End: 2021-10-19 | Stop reason: HOSPADM

## 2021-10-19 RX ORDER — ATROPINE SULFATE 0.1 MG/ML
0.5 INJECTION INTRAVENOUS
Status: DISCONTINUED | OUTPATIENT
Start: 2021-10-19 | End: 2021-10-19 | Stop reason: HOSPADM

## 2021-10-19 RX ADMIN — SODIUM CHLORIDE, SODIUM LACTATE, POTASSIUM CHLORIDE, AND CALCIUM CHLORIDE 25 ML/HR: 600; 310; 30; 20 INJECTION, SOLUTION INTRAVENOUS at 08:28

## 2021-10-19 RX ADMIN — PROPOFOL 30 MG: 10 INJECTION, EMULSION INTRAVENOUS at 09:05

## 2021-10-19 RX ADMIN — INSULIN LISPRO 3 UNITS: 100 INJECTION, SOLUTION INTRAVENOUS; SUBCUTANEOUS at 08:57

## 2021-10-19 RX ADMIN — PROPOFOL 30 MG: 10 INJECTION, EMULSION INTRAVENOUS at 09:08

## 2021-10-19 RX ADMIN — LIDOCAINE HYDROCHLORIDE 50 MG: 20 INJECTION, SOLUTION EPIDURAL; INFILTRATION; INTRACAUDAL; PERINEURAL at 09:02

## 2021-10-19 RX ADMIN — PROPOFOL 80 MG: 10 INJECTION, EMULSION INTRAVENOUS at 09:02

## 2021-10-19 RX ADMIN — FAMOTIDINE 20 MG: 20 TABLET ORAL at 08:33

## 2021-10-19 RX ADMIN — PROPOFOL 30 MG: 10 INJECTION, EMULSION INTRAVENOUS at 09:12

## 2021-10-19 RX ADMIN — PROPOFOL 30 MG: 10 INJECTION, EMULSION INTRAVENOUS at 09:16

## 2021-10-19 NOTE — DISCHARGE INSTRUCTIONS
Colonoscopy: What to Expect at [address] [place] [address]  After you have a colonoscopy, you will stay at the clinic for 1 to 2 hours until the medicines wear off. Then you can go home. But you will need to arrange for a ride. Your doctor will tell you when you can eat and do your other usual activities. Your doctor will talk to you about when you will need your next colonoscopy. Your doctor can help you decide how often you need to be checked. This will depend on the results of your test and your risk for colorectal cancer. After the test, you may be bloated or have gas pains. You may need to pass gas. If a biopsy was done or a polyp was removed, you may have streaks of blood in your stool (feces) for a few days. This care sheet gives you a general idea about how long it will take for you to recover. But each person recovers at a different pace. Follow the steps below to get better as quickly as possible. How can you care for yourself at home? Activity  · Rest when you feel tired. · You can do your normal activities when it feels okay to do so. Diet  · Follow your doctor's directions for eating. · Unless your doctor has told you not to, drink plenty of fluids. This helps to replace the fluids that were lost during the colon prep. · Do not drink alcohol. Medicines  · If polyps were removed or a biopsy was done during the test, your doctor may tell you not to take aspirin or other anti-inflammatory medicines for a few days. These include ibuprofen (Advil, Motrin) and naproxen (Aleve). Other instructions  · For your safety, do not drive or operate machinery until the medicine wears off and you can think clearly. Your doctor may tell you not to drive or operate machinery until the day after your test.  · Do not sign legal documents or make major decisions until the medicine wears off and you can think clearly. The anesthesia can make it hard for you to fully understand what you are agreeing to.   Follow-up care is a key part of your treatment and safety. Be sure to make and go to all appointments, and call your doctor if you are having problems. It's also a good idea to know your test results and keep a list of the medicines you take. When should you call for help? Call 911 anytime you think you may need emergency care. For example, call if:  · You passed out (lost consciousness). · You pass maroon or bloody stools. · You have severe belly pain. Call your doctor now or seek immediate medical care if:  · Your stools are black and tarlike. · Your stools have streaks of blood, but you did not have a biopsy or any polyps removed. · You have belly pain, or your belly is swollen and firm. · You vomit. · You have a fever. · You are very dizzy. Watch closely for changes in your health, and be sure to contact your doctor if you have any problems. Where can you learn more? Go to NotesFirst.be  Enter E264 in the search box to learn more about \"Colonoscopy: What to Expect at Home. \"   © 9689-3577 [employer]. Care instructions adapted under license by [employer] (which disclaims liability or warranty for this information). This care instruction is for use with your licensed healthcare professional. If you have questions about a medical condition or this instruction, always ask your healthcare professional. [address] any warranty or liability for your use of this information. Content Version: 07.9.062999; Current as of: November 14, 2014    Patient Education        Colon Polyps: Care Instructions  Your Care Instructions     Colon polyps are growths in the colon or the rectum. The cause of most colon polyps is not known, and most people who get them do not have any problems. But a certain kind can turn into cancer. For this reason, regular testing for colon polyps is important for people as they get older.  It is also important for anyone who has an increased risk for colon cancer. Polyps are usually found through routine colon cancer screening tests. Although most colon polyps are not cancerous, they are usually removed and then tested for cancer. Screening for colon cancer saves lives because the cancer can usually be cured if it is caught early. If you have a polyp that is the type that can turn into cancer, you may need more tests to examine your entire colon. The doctor will remove any other polyps that he or she finds, and you will be tested more often. Follow-up care is a key part of your treatment and safety. Be sure to make and go to all appointments, and call your doctor if you are having problems. It's also a good idea to know your test results and keep a list of the medicines you take. How can you care for yourself at home? Regular exams to look for colon polyps are the best way to prevent polyps from turning into colon cancer. These can include stool tests, sigmoidoscopy, colonoscopy, and CT colonography. Talk with your doctor about a testing schedule that is right for you. To prevent polyps  There is no home treatment that can prevent colon polyps. But these steps may help lower your risk for cancer. · Stay active. Being active can help you get to and stay at a healthy weight. Try to exercise on most days of the week. Walking is a good choice. · Eat well. Choose a variety of vegetables, fruits, legumes (such as peas and beans), fish, poultry, and whole grains. · Do not smoke. If you need help quitting, talk to your doctor about stop-smoking programs and medicines. These can increase your chances of quitting for good. · If you drink alcohol, limit how much you drink. Limit alcohol to 2 drinks a day for men and 1 drink a day for women. When should you call for help? Call your doctor now or seek immediate medical care if:    · You have severe belly pain.     · Your stools are maroon or very bloody.    Watch closely for changes in your health, and be sure to contact your doctor if:    · You have a fever.     · You have nausea or vomiting.     · You have a change in bowel habits (new constipation or diarrhea).     · Your symptoms get worse or are not improving as expected. Where can you learn more? Go to http://www.myGreek.com/  Enter C571 in the search box to learn more about \"Colon Polyps: Care Instructions. \"  Current as of: December 17, 2020               Content Version: 13.0  © 2006-2021 [employer]. Care instructions adapted under license by [employer] (which disclaims liability or warranty for this information). If you have questions about a medical condition or this instruction, always ask your healthcare professional. [place] [address] any warranty or liability for your use of this information. Patient Education        Hemorrhoids: Care Instructions  Overview     Hemorrhoids are swollen veins that develop in the anal canal. Bleeding during bowel movements, itching, and rectal pain are the most common symptoms. Hemorrhoids can be uncomfortable at times, but rarely are they a serious problem. Most of the time, you can treat them with simple changes to your diet and bowel habits. These changes include eating more fiber and not straining to pass stools. Most hemorrhoids don't need surgery or other treatment unless they are very large and painful or bleed a lot. Follow-up care is a key part of your treatment and safety. Be sure to make and go to all appointments, and call your doctor if you are having problems. It's also a good idea to know your test results and keep a list of the medicines you take. How can you care for yourself at home? · Sit in a few inches of warm water (sitz bath) 3 times a day and after bowel movements. The warm water helps with pain and itching. · Put ice on your anal area several times a day for 10 minutes at a time.  Put a thin cloth between the ice and your skin. Follow this by placing a warm, wet towel on the area for another 10 to 20 minutes. · Take pain medicines exactly as directed. ? If the doctor gave you a prescription medicine for pain, take it as prescribed. ? If you are not taking a prescription pain medicine, ask your doctor if you can take an over-the-counter medicine. · Keep the anal area clean, but be gentle. Use water and a fragrance-free soap, or use baby wipes or medicated pads such as Tucks. · Wear cotton underwear and loose clothing to decrease moisture in the anal area. · Eat more fiber. Include foods such as whole-grain breads and cereals, raw vegetables, raw and dried fruits, and beans. · Drink plenty of fluids. If you have kidney, heart, or liver disease and have to limit fluids, talk with your doctor before you increase the amount of fluids you drink. · Use a stool softener that contains bran or psyllium. You can save money by buying bran or psyllium (available in bulk at most health food stores) and sprinkling it on foods or stirring it into fruit juice. Or you can use a product such as Metamucil or Hydrocil. · Practice healthy bowel habits. ? Go to the bathroom as soon as you have the urge. ? Avoid straining to pass stools. Relax and give yourself time to let things happen naturally. ? Do not hold your breath while passing stools. ? Do not read while sitting on the toilet. Get off the toilet as soon as you have finished. · Take your medicines exactly as prescribed. Call your doctor if you think you are having a problem with your medicine. When should you call for help? Call 911 anytime you think you may need emergency care. For example, call if:    · You pass maroon or very bloody stools. Call your doctor now or seek immediate medical care if:    · You have increased pain.     · You have increased bleeding.    Watch closely for changes in your health, and be sure to contact your doctor if:    · Your symptoms have not improved after 3 or 4 days. Where can you learn more? Go to http://www.InCorta.com/  Enter F228 in the search box to learn more about \"Hemorrhoids: Care Instructions. \"  Current as of: February 10, 2021               Content Version: 13.0  © 0633-6538 [employer]. Care instructions adapted under license by [employer] (which disclaims liability or warranty for this information). If you have questions about a medical condition or this instruction, always ask your healthcare professional. [place] [address] any warranty or liability for your use of this information. DISCHARGE SUMMARY from Nurse     POST-PROCEDURE INSTRUCTIONS:    Call your Physician if you:  ? Observe any excess bleeding. ? Develop a temperature over 100.5o F.  ? Experience abdominal, shoulder or chest pain. ? Notice any signs of decreased circulation or nerve impairment to an extremity such as a change in color, persistent numbness, tingling, coldness or increase in pain. ? Vomit blood or you have nausea and vomiting lasting longer than 4 hours. ? Are unable to take medications. ? Are unable to urinate within 8 hours after discharge following general anesthesia or intravenous sedation. For the next 24 hours after receiving general anesthesia or intravenous sedation, or while taking prescription Narcotics, limit your activities:  ? Do NOT drive a motor vehicle, operate hazard machinery or power tools, or perform tasks that require coordination. The medication you received during your procedure may have some effect on your mental awareness. ? Do NOT make important personal or business decisions. The medication you received during your procedure may have some effect on your mental awareness. ? Do NOT drink alcoholic beverages. These drinks do not mix well with the medications that have been given to you. ?  Upon discharge from the hospital, you must be accompanied by a responsible adult. ? Resume your diet as directed by your physician. ? Resume medications as your physician has prescribed. ? Please give a list of your current medications to your Primary Care Provider. ? Please update this list whenever your medications are discontinued, doses are changed, or new medications (including over-the-counter products) are added. ? Please carry medication information at all times in case of emergency situations. These are general instructions for a healthy lifestyle:    No smoking/ No tobacco products/ Avoid exposure to second hand smoke.  Surgeon General's Warning:  Quitting smoking now greatly reduces serious risk to your health. Obesity, smoking, and a sedentary lifestyle greatly increase your risk for illness.  A healthy diet, regular physical exercise & weight monitoring are important for maintaining a healthy lifestyle   You may be retaining fluid if you have a history of heart failure or if you experience any of the following symptoms:  Weight gain of 3 pounds or more overnight or 5 pounds in a week, increased swelling in our hands or feet or shortness of breath while lying flat in bed. Please call your doctor as soon as you notice any of these symptoms; do not wait until your next office visit. Colorectal Screening   Colorectal cancer almost always develops from precancerous polyps (abnormal growths) in the colon or rectum. Screening tests can find precancerous polyps, so that they can be removed before they turn into cancer. Screening tests can also find colorectal cancer early, when treatment works best.  Ashvin Llanos Speak with your physician about when you should begin screening and how often you should be tested. Additional Information    Educational references and/or instructions provided during this visit included:    See attached. APPOINTMENTS:    Per MD Instruction. Discharge information has been reviewed with the patient. The patient verbalized understanding.

## 2021-10-19 NOTE — ANESTHESIA POSTPROCEDURE EVALUATION
Procedure(s):  COLONOSCOPY with polypectomy. MAC    Anesthesia Post Evaluation      Multimodal analgesia: multimodal analgesia used between 6 hours prior to anesthesia start to PACU discharge  Patient location during evaluation: bedside  Patient participation: complete - patient participated  Level of consciousness: awake  Pain score: 0  Pain management: adequate  Airway patency: patent  Anesthetic complications: no  Cardiovascular status: stable  Respiratory status: acceptable  Hydration status: acceptable  Post anesthesia nausea and vomiting:  none  Final Post Anesthesia Temperature Assessment:  Normothermia (36.0-37.5 degrees C)      INITIAL Post-op Vital signs:   Vitals Value Taken Time   /64 10/19/21 0947   Temp 36.3 °C (97.4 °F) 10/19/21 0930   Pulse 75 10/19/21 0953   Resp 21 10/19/21 0953   SpO2 100 % 10/19/21 0953   Vitals shown include unvalidated device data.

## 2021-10-19 NOTE — PROCEDURES
[email or web address]  [place], [address]      Brief Procedure Note    Ale Willis  1965  [id number]    Date of Procedure: 10/19/2021    Preoperative diagnosis: Colon  cancer Screening:  Z12.11    Postoperative diagnosis: diverticulosis, Ascending polyp, hemorrhoids    Type of Anesthesia: MAC (Monitored anesthesia care)    Description of findings: same as post op dx    Procedure: Procedure(s):  COLONOSCOPY with polypectomy    :  Dr. Alphonso Bergman MD    Assistant(s): Endoscopy Technician-1: Mary Ellen Hernandez  Endoscopy RN-1: Ilan Blue RN    EBL:None    Specimens:   ID Type Source Tests Collected by Time Destination   1 : Ascending polyp Preservative Colon, Ascending  Vernadine MD [place] 10/19/2021 [facility] Pathology       Findings: See printed and scanned procedure note    Complications: None    Dr. Alphonso Bergman MD  10/19/2021  9:27 AM

## 2021-10-19 NOTE — ANESTHESIA PREPROCEDURE EVALUATION
Relevant Problems   RESPIRATORY SYSTEM   (+) Mild intermittent asthma without complication      NEUROLOGY   (+) Depression with anxiety      CARDIOVASCULAR   (+) Essential hypertension      GASTROINTESTINAL   (+) Gastroesophageal reflux disease without esophagitis      RENAL FAILURE   (+) Stage 3 chronic kidney disease (HCC)      ENDOCRINE   (+) Type 2 diabetes mellitus with hyperglycemia, with long-term current use of insulin (HCC)   (+) Type 2 diabetes mellitus with proliferative retinopathy (HCC)       Anesthetic History   No history of anesthetic complications            Review of Systems / Medical History  Patient summary reviewed and pertinent labs reviewed    Pulmonary            Asthma        Neuro/Psych         Psychiatric history     Cardiovascular    Hypertension              Exercise tolerance: >4 METS     GI/Hepatic/Renal     GERD    Renal disease: CRI       Endo/Other    Diabetes: type 2    Arthritis     Other Findings   Comments: Sarcoid         Physical Exam    Airway  Mallampati: II  TM Distance: 4 - 6 cm  Neck ROM: normal range of motion   Mouth opening: Normal     Cardiovascular  Regular rate and rhythm,  S1 and S2 normal,  no murmur, click, rub, or gallop  Rhythm: regular  Rate: normal         Dental    Dentition: Lower dentition intact and Upper dentition intact     Pulmonary  Breath sounds clear to auscultation               Abdominal  GI exam deferred       Other Findings            Anesthetic Plan    ASA: 3  Anesthesia type: MAC          Induction: Intravenous  Anesthetic plan and risks discussed with: Patient

## 2021-10-19 NOTE — H&P
[email or web address]  [phone]    GASTROENTEROLOGY Pre-Procedure H and P      Impression/Plan:   1. This patient is consented for a colonoscopy for colon cancer screening and family h/o colon cancer. Chief Complaint: colon cancer screening and family h/o colon cancer. HPI:  Marko Tee is a 61 y.o. female who presents for a colonoscopy for colon cancer screening and family h/o colon cancer.     PMH:   Past Medical History:   Diagnosis Date    Acquired cyst of kidney 01/16/2020    [facility]    Asthma     Bilateral great toe fractures 2014    Chronic kidney disease, stage 3b ([facility] [address] 01/2021    Dr Jorje Macdonald, nephro    Chronic sinusitis     Dr. Mireya Mullins in the past    Colon polyp 5/16    Dr Faizan Crawford    Depression 2019    Diabetes mellitus ([facility] [address]     DJD (degenerative joint disease) of cervical spine 2016    DJD (degenerative joint disease), lumbar 2013    MRI c spine w degen changes; Dr Yessenia Cast    Dyslipidemia     calculated 10 year risk score was 2.0% (12/13)    Edema of both ankles 8/28/2018    Environmental and seasonal allergies 8/28/2018    Eustachian tube dysfunction     Fibrocystic breast     Dr Janice Lester GERD (gastroesophageal reflux disease)     Hypertriglyceridemia 8/28/2018    Lateral epicondylitis of both elbows 2/6/2017    Macular degeneration of both eyes 08/28/2018    Dr Osiris Jacobs [place]    Mild intermittent asthma without complication 68/31/3628    Dr. Miranda Hernandez;  ratio 68%, FEV1 78% w 6% inc postbd, TLC 77, RV 56, DLCO 61%    Morbid obesity (HCC)     peak weight 196 lbs, bmi 35.8 from 10/13    Neuropathy 8/28/2018    Osteopenia     Dr. Thorne Speaker; DEXA t score -0.7 spine, -0.2 hip (8/14)    Pneumonia     Sarcoidosis     Dr Jim Mg Type 2 diabetes mellitus with hyperglycemia, with long-term current use of insulin ([facility] [address] 8/28/2018       PSH:   Past Surgical History:   Procedure Laterality Date    COLONOSCOPY N/A 5/26/2016    Dr Faizan Crawford hyperplastic    HX BREAST REDUCTION Dr. Rakan Arevalo HX HEENT      nasal polypectomy Dr. Denise Guillen HX HEENT      tear duct surgery right Dr. Taveras [facility] 2010; left Dr Galina Corona 2015    HX ORTHOPAEDIC      DEXA -0.7 spine, -0.2 hip 8/14    MS CARDIAC SURG PROCEDURE UNLIST  9/10, 8/13    thallium negative ef 72%; negative ef 70%    MS CHEST SURGERY PROCEDURE UNLISTED  7/12    US thyroid negative    MS CHEST SURGERY PROCEDURE UNLISTED  2012    pfts w mod restrictive defect     VASCULAR SURGERY PROCEDURE UNLIST  12/13    venous doppler negative       Social HX:   Social History     Socioeconomic History    Marital status: LEGALLY      Spouse name: Not on file    Number of children: 0    Years of education: 13    Highest education level: Not on file   Occupational History    Occupation: Unemployed   Tobacco Use    Smoking status: Never Smoker    Smokeless tobacco: Never Used   Vaping Use    Vaping Use: Never used   Substance and Sexual Activity    Alcohol use: Yes     Alcohol/week: 0.0 standard drinks     Comment: occasional wine    Drug use: Never    Sexual activity: Not Currently   Other Topics Concern     Service No    Blood Transfusions No    Caffeine Concern Yes     Comment: due to reflux    Occupational Exposure No    Hobby Hazards No    Sleep Concern Yes    Stress Concern Yes     Comment: wants a job    Weight Concern Yes    Special Diet No    Back Care No    Exercise Yes    Bike Helmet No    Seat Belt Yes    Self-Exams Yes   Social History Narrative    Patient lives with a friend, no pets. Social Determinants of Health     Financial Resource Strain:     Difficulty of Paying Living Expenses:    Food Insecurity:     Worried About Running Out of Food in the Last Year:     [address] in the Last Year:    Transportation Needs:     Lack of Transportation (Medical):      Lack of Transportation (Non-Medical):    Physical Activity:     Days of Exercise per Week:     Minutes of Exercise per Session:    Stress:     Feeling of Stress :    Social Connections:     Frequency of Communication with Friends and Family:     Frequency of Social Gatherings with Friends and Family:     Attends Spiritism Services:     Active Member of Clubs or Organizations:     Attends Club or Organization Meetings:     Marital Status:    Intimate Partner Violence:     Fear of Current or Ex-Partner:     Emotionally Abused:     Physically Abused:     Sexually Abused:        FHX:   Family History   Problem Relation Age of Onset    Cancer Mother     Hypertension Mother     Cancer Father     Diabetes Father     Hypertension Father     Stroke Father     Diabetes Sister     Hypertension Sister     Heart Disease Sister     Colon Cancer Sister 52    Hypertension Brother     Cancer Maternal Aunt        Allergy:   Allergies   Allergen Reactions    Pollen Extracts Runny Nose and Cough    Amoxicillin Hives, Shortness of Breath and Swelling    Crestor [Rosuvastatin] Myalgia    Ibuprofen Other (comments)     dont prescribe due to kidney function    Lipitor [Atorvastatin] Other (comments)     Muscle cramps and weakness      Pcn [Penicillins] Angioedema       Home Medications:     Medications Prior to Admission   Medication Sig    azelastine (ASTEPRO) 205.5 mcg (0.15 %) 2 Sprays two (2) times a day.  triamcinolone (Nasacort) 55 mcg nasal inhaler 2 Sprays daily.  calcium-cholecalciferol, D3, (CALTRATE 600+D) tablet Take 1 Tablet by mouth daily.  montelukast (SINGULAIR) 10 mg tablet Take 10 mg by mouth daily.  predniSONE (DELTASONE) 5 mg tablet Take 5 mg by mouth daily. Taping down. . Currently taking 2.5 mg  Until end of oct. Then NOV. 2.5mg every other day.  fluticasone propionate (Flovent Diskus) 250 mcg/actuation dsdv Take 1 Puff by inhalation two (2) times a day. Rinse gargle mouth thoroughly after each use.   Disp: 3 inhalers    fluticasone-umeclidin-vilanter (Trelegy Ellipta) 200-62.5-25 mcg dsdv Take 1 Puff by inhalation daily. Rinse and gargle after each use    cholecalciferol (VITAMIN D3) 25 mcg (1,000 unit) cap Take 1,000 Units by mouth daily.  cyanocobalamin (VITAMIN B12) 100 mcg tablet Take 50 mcg by mouth daily.  esomeprazole (NEXIUM) 40 mg capsule Take 1 Capsule by mouth daily as needed for Gastroesophageal Reflux Disease (GERD). Indications: gastroesophageal reflux disease    venlafaxine-SR (EFFEXOR-XR) 75 mg capsule Take 1 Capsule by mouth daily.  topiramate (Topamax) 25 mg tablet Take 1 Tablet by mouth two (2) times daily (with meals). For neuropathy    SITagliptin (JANUVIA) 25 mg tablet Take 1 Tablet by mouth daily. For diabetes    pravastatin (PRAVACHOL) 80 mg tablet Take 1 Tablet by mouth nightly. Indications: high cholesterol and high triglycerides    hydroCHLOROthiazide (HYDRODIURIL) 25 mg tablet Take 1 Tablet by mouth daily. For swelling    albuterol (PROVENTIL HFA, VENTOLIN HFA, PROAIR HFA) 90 mcg/actuation inhaler Take 2 Puffs by inhalation every four (4) hours as needed for Shortness of Breath. Indications: asthma attack    insulin glargine (LANTUS,BASAGLAR) 100 unit/mL (3 mL) inpn 25 Units by SubCUTAneous route nightly.  insulin lispro (HumaLOG KwikPen Insulin) 200 unit/mL (3 mL) inpn 3 times per day: 150-200 2u, 201-250 4u, 251-300 6u, 301-350 8u, 351-400 10u, > 400 12u. Indications: type 2 diabetes mellitus    therapeutic multivitamin-minerals (THERAGRAN-M) tablet Take 1 Tab by mouth daily.  triamcinolone acetonide (KENALOG) 0.1 % topical cream Apply  to affected area two (2) times daily as needed for Skin Irritation.  aspirin delayed-release 81 mg tablet Take 1 Tab by mouth daily. For heart health    Insulin Needles, Disposable, (Prerna Pen Needle) 32 gauge x 5/32\" ndle Check blood sugars three times a day    glucose blood VI test strips (ACCU-CHEK POOJA) strip Pt to test 5 times daily.   Dx:E11.65       Review of Systems:     A complete 10 point review of systems was performed and pertinents are as per the HPI. Remainder of the review of systems was negative. Visit Vitals  /80 (BP 1 Location: Right upper arm, BP Patient Position: At rest)   Pulse 80   Resp 18   Ht 5' 2\" (1.575 m)   Wt 78 kg (172 lb)   LMP 03/30/2013   SpO2 98%   BMI 31.46 kg/m²       Physical Assessment:     General: alert, cooperative, no acute distress, appears stated age. HEENT: normocephalic, no scleral icterus, moist mucous membranes, EOMs intact, no neck masses noted. Respiratory: lungs clear to auscultation bilaterally. Cardiovascular: regular rate and rhythm, no murmurs, rubs or gallops. Abdomen: normal bowel sounds, soft, non-tender to palpation. Extremities: no lower extremity edema, no cyanosis or clubbing. Neuro: alert and oriented x 3; non-focal exam.  Skin: no rashes. Psych: normal mood and affect. Mabel Barboza MD  [facility]  [email or web address]  Phone: [phone]  Pager: [phone]  [email or web address]

## 2021-10-28 ENCOUNTER — OFFICE VISIT (OUTPATIENT)
Dept: ORTHOPEDIC SURGERY | Age: 56
End: 2021-10-28
Payer: MEDICARE

## 2021-10-28 VITALS
OXYGEN SATURATION: 98 % | BODY MASS INDEX: 32.72 KG/M2 | WEIGHT: 177.8 LBS | HEIGHT: 62 IN | RESPIRATION RATE: 18 BRPM | HEART RATE: 89 BPM | TEMPERATURE: 97.6 F

## 2021-10-28 DIAGNOSIS — M48.061 SPINAL STENOSIS OF LUMBAR REGION WITHOUT NEUROGENIC CLAUDICATION: Primary | ICD-10-CM

## 2021-10-28 DIAGNOSIS — M51.36 DDD (DEGENERATIVE DISC DISEASE), LUMBAR: ICD-10-CM

## 2021-10-28 DIAGNOSIS — M47.816 LUMBAR FACET ARTHROPATHY: ICD-10-CM

## 2021-10-28 DIAGNOSIS — M47.816 SPONDYLOSIS OF LUMBAR REGION WITHOUT MYELOPATHY OR RADICULOPATHY: ICD-10-CM

## 2021-10-28 PROCEDURE — G8427 DOCREV CUR MEDS BY ELIG CLIN: HCPCS | Performed by: NURSE PRACTITIONER

## 2021-10-28 PROCEDURE — G9899 SCRN MAM PERF RSLTS DOC: HCPCS | Performed by: NURSE PRACTITIONER

## 2021-10-28 PROCEDURE — G8417 CALC BMI ABV UP PARAM F/U: HCPCS | Performed by: NURSE PRACTITIONER

## 2021-10-28 PROCEDURE — G8756 NO BP MEASURE DOC: HCPCS | Performed by: NURSE PRACTITIONER

## 2021-10-28 PROCEDURE — 3017F COLORECTAL CA SCREEN DOC REV: CPT | Performed by: NURSE PRACTITIONER

## 2021-10-28 PROCEDURE — G9717 DOC PT DX DEP/BP F/U NT REQ: HCPCS | Performed by: NURSE PRACTITIONER

## 2021-10-28 PROCEDURE — 99204 OFFICE O/P NEW MOD 45 MIN: CPT | Performed by: NURSE PRACTITIONER

## 2021-10-28 NOTE — PROGRESS NOTES
Chief complaint   Chief Complaint   Patient presents with    Back Pain       History of Present Illness:  Amna Barriga is a  61 y.o.  female who comes in today after last being seen by Dr. Meghan David on November 5, 2020. At that time she was having back pain and he ordered facet blocks but her insurance would not pay for it. She states she continues to have pain and would like to have some injections. She states she does not necessarily have pain in her legs but she has pain in both her feet. She has been to a podiatrist who states there is nothing well with her feet she is also been checked for vascular issues and she has not had any others. They feel like it is coming from her spine. She has new insurance now. She is diabetic her blood sugar is about 129 when she checks it. She has done physical therapy twice in the past.  MRI last year showed L4-5 disc bulge with facet arthropathy and central canal stenosis. She is unable to have NSAIDs due to decreased kidney function. She denies fever bowel bladder dysfunction. Physical Exam: Patient is a 65-year-old female well-developed well-nourished who is alert and oriented with a normal mood and affect. She has a full weightbearing antalgic gait. She has 4-5 strength bilateral lower extremities. Negative straight leg raise. She did not use any assist device. She does not have pain with hyperextension of her spine. Assessment and Plan: This is a patient with spinal stenosis, facet arthropathy and disc bulge. I will get a L3-4 TITA. We will see her back following the block. Medications:  Current Outpatient Medications   Medication Sig Dispense Refill    triamcinolone (Nasacort) 55 mcg nasal inhaler 2 Sprays daily.  calcium-cholecalciferol, D3, (CALTRATE 600+D) tablet Take 1 Tablet by mouth daily. 30 Tablet 0    montelukast (SINGULAIR) 10 mg tablet Take 10 mg by mouth daily.       predniSONE (DELTASONE) 5 mg tablet Take 5 mg by mouth daily. Taping down. . Currently taking 2.5 mg  Until end of oct. Then NOV. 2.5mg every other day.  fluticasone propionate (Flovent Diskus) 250 mcg/actuation dsdv Take 1 Puff by inhalation two (2) times a day. Rinse gargle mouth thoroughly after each use. Disp: 3 inhalers 3 Each 3    fluticasone-umeclidin-vilanter (Trelegy Ellipta) 200-62.5-25 mcg dsdv Take 1 Puff by inhalation daily. Rinse and gargle after each use 3 Each 3    cholecalciferol (VITAMIN D3) 25 mcg (1,000 unit) cap Take 1,000 Units by mouth daily.  cyanocobalamin (VITAMIN B12) 100 mcg tablet Take 50 mcg by mouth daily.  esomeprazole (NEXIUM) 40 mg capsule Take 1 Capsule by mouth daily as needed for Gastroesophageal Reflux Disease (GERD). Indications: gastroesophageal reflux disease 90 Capsule 1    venlafaxine-SR (EFFEXOR-XR) 75 mg capsule Take 1 Capsule by mouth daily. 90 Capsule 1    topiramate (Topamax) 25 mg tablet Take 1 Tablet by mouth two (2) times daily (with meals). For neuropathy 180 Tablet 1    SITagliptin (JANUVIA) 25 mg tablet Take 1 Tablet by mouth daily. For diabetes 90 Tablet 1    pravastatin (PRAVACHOL) 80 mg tablet Take 1 Tablet by mouth nightly. Indications: high cholesterol and high triglycerides 90 Tablet 1    hydroCHLOROthiazide (HYDRODIURIL) 25 mg tablet Take 1 Tablet by mouth daily. For swelling 90 Tablet 1    albuterol (PROVENTIL HFA, VENTOLIN HFA, PROAIR HFA) 90 mcg/actuation inhaler Take 2 Puffs by inhalation every four (4) hours as needed for Shortness of Breath. Indications: asthma attack 1 Inhaler 5    Insulin Needles, Disposable, (Prerna Pen Needle) 32 gauge x 5/32\" ndle Check blood sugars three times a day 100 Pen Needle 11    insulin glargine (LANTUS,BASAGLAR) 100 unit/mL (3 mL) inpn 25 Units by SubCUTAneous route nightly. 5 Pen 1    insulin lispro (HumaLOG KwikPen Insulin) 200 unit/mL (3 mL) inpn 3 times per day: 150-200 2u, 201-250 4u, 251-300 6u, 301-350 8u, 351-400 10u, > 400 12u. Indications: type 2 diabetes mellitus 5 Pen 5    therapeutic multivitamin-minerals (THERAGRAN-M) tablet Take 1 Tab by mouth daily.  triamcinolone acetonide (KENALOG) 0.1 % topical cream Apply  to affected area two (2) times daily as needed for Skin Irritation. 15 g 1    aspirin delayed-release 81 mg tablet Take 1 Tab by mouth daily. For heart health 30 Tab 11    glucose blood VI test strips (ACCU-CHEK POOJA) strip Pt to test 5 times daily. Dx:E11.65 500 Strip 3    azelastine (ASTEPRO) 205.5 mcg (0.15 %) 2 Sprays two (2) times a day.  (Patient not taking: Reported on 10/28/2021)             Review of systems:    Past Medical History:   Diagnosis Date    Acquired cyst of kidney 01/16/2020    [facility]    Asthma     Bilateral great toe fractures 2014    Chronic kidney disease, stage 3b ([facility] [address] 01/2021    Dr Tierra Hendrickson, nephro    Chronic sinusitis     Dr. Kina Jaquez in the past    Colon polyp 5/16    Dr Fred Aguirre    Depression 2019    Diabetes mellitus ([facility] [address]     DJD (degenerative joint disease) of cervical spine 2016    DJD (degenerative joint disease), lumbar 2013    MRI c spine w degen changes; Dr Kaylene Colón    Dyslipidemia     calculated 10 year risk score was 2.0% (12/13)    Edema of both ankles 8/28/2018    Environmental and seasonal allergies 8/28/2018    Eustachian tube dysfunction     Fibrocystic breast     Dr Jus Skaggs GERD (gastroesophageal reflux disease)     Hypertriglyceridemia 8/28/2018    Lateral epicondylitis of both elbows 2/6/2017    Macular degeneration of both eyes 08/28/2018    Dr Stacey Alvarado [place]    Mild intermittent asthma without complication 10/09/1518    Dr. Yanira Caldwell;  ratio 68%, FEV1 78% w 6% inc postbd, TLC 77, RV 56, DLCO 61%    Morbid obesity (HCC)     peak weight 196 lbs, bmi 35.8 from 10/13    Neuropathy 8/28/2018    Osteopenia     Dr. Jak Orta; DEXA t score -0.7 spine, -0.2 hip (8/14)    Pneumonia     Sarcoidosis     Dr Omar Bronson Type 2 diabetes mellitus with hyperglycemia, with long-term current use of insulin ([facility] [address] 8/28/2018     Past Surgical History:   Procedure Laterality Date    COLONOSCOPY N/A 5/26/2016    Dr Wing [place] hyperplastic    COLONOSCOPY N/A 10/19/2021    [facility]      nasal polypectomy Dr. Carrion Rexford HX HEENT      tear duct surgery right Dr. Kerry Cruz 2010; left Dr Charlette Hicks 2015    HX ORTHOPAEDIC      DEXA -0.7 spine, -0.2 hip 8/14    [place] CARDIAC SURG PROCEDURE UNLIST  9/10, 8/13    thallium negative ef 72%; negative ef 70%    NV CHEST SURGERY PROCEDURE UNLISTED  7/12     thyroid negative    [place] CHEST SURGERY PROCEDURE UNLISTED  2012    pfts w mod restrictive defect     VASCULAR SURGERY PROCEDURE UNLIST  12/13    venous doppler negative     Social History     Socioeconomic History    Marital status: LEGALLY      Spouse name: Not on file    Number of children: 0    Years of education: 13    Highest education level: Not on file   Occupational History    Occupation: Unemployed   Tobacco Use    Smoking status: Never Smoker    Smokeless tobacco: Never Used   Vaping Use    Vaping Use: Never used   Substance and Sexual Activity    Alcohol use: Yes     Alcohol/week: 0.0 standard drinks     Comment: occasional wine    Drug use: Never    Sexual activity: Not Currently   Other Topics Concern     Service No    Blood Transfusions No    Caffeine Concern Yes     Comment: due to reflux    Occupational Exposure No    Hobby Hazards No    Sleep Concern Yes    Stress Concern Yes     Comment: wants a job    Weight Concern Yes    Special Diet No    Back Care No    Exercise Yes    Bike Helmet No    Seat Belt Yes    Self-Exams Yes   Social History Narrative    Patient lives with a friend, no pets.      Social Determinants of Health     Financial Resource Strain:     Difficulty of Paying Living Expenses:    Food Insecurity:     Worried About Running Out of Food in the Last Year:     [address] in the Last Year:    Transportation Needs:     Lack of Transportation (Medical):  Lack of Transportation (Non-Medical):    Physical Activity:     Days of Exercise per Week:     Minutes of Exercise per Session:    Stress:     Feeling of Stress :    Social Connections:     Frequency of Communication with Friends and Family:     Frequency of Social Gatherings with Friends and Family:     Attends Y[employer]ism Services:     Active Member of Clubs or Organizations:     Attends Club or Organization Meetings:     Marital Status:    Intimate Partner Violence:     Fear of Current or Ex-Partner:     Emotionally Abused:     Physically Abused:     Sexually Abused:      Family History   Problem Relation Age of Onset    Cancer Mother     Hypertension Mother     Cancer Father     Diabetes Father     Hypertension Father     Stroke Father     Diabetes Sister     Hypertension Sister     Heart Disease Sister     Colon Cancer Sister 52    Hypertension Brother     Cancer Maternal Aunt        Physical Exam:  Visit Vitals  Pulse 89   Temp 97.6 °F (36.4 °C) (Temporal)   Resp 18   Ht 5' 2\" (1.575 m)   Wt 177 lb 12.8 oz (80.6 kg)   LMP 03/30/2013   SpO2 98%   BMI 32.52 kg/m²     Pain Scale: 7/10       has been . reviewed and is appropriate          Diagnoses and all orders for this visit:    1. Spinal stenosis of lumbar region without neurogenic claudication  -     SCHEDULE SURGERY    2. Spondylosis of lumbar region without myelopathy or radiculopathy  -     SCHEDULE SURGERY    3. Lumbar facet arthropathy  -     SCHEDULE SURGERY    4. DDD (degenerative disc disease), lumbar  -     SCHEDULE SURGERY            Follow-up and Dispositions    · Return for After block.              We have informed Yisel [employer] to notify us for immediate appointment if she has any worsening neurogical symptoms or if an emergency situation presents, then call 911

## 2021-11-04 ENCOUNTER — TELEPHONE (OUTPATIENT)
Dept: INTERNAL MEDICINE CLINIC | Age: 56
End: 2021-11-04

## 2021-11-09 NOTE — TELEPHONE ENCOUNTER
Pharmacy Progress Note - Telephone Encounter    S/O: Ms. Mariusz Castaneda 61 y.o. female contacted office/me via an inbound telephone call to discuss diabetes management today. Verified patients identifiers (name & ) per HIPAA policy.     - Patient states that she has been dealing with a lot of back pain, and that is why she was unable to attend our follow up visit   - She is hoping to reschedule appointment after her upcoming procedure later this month     A/P:  - Follow up visit rescheduled for December   - Patient endorses understanding to the provided information. All questions answered at this time. There are no discontinued medications. No orders of the defined types were placed in this encounter. Thank you,  Yasmine Nguyen. FELICIA Sahu        For Pharmacy Admin Tracking Only     CPA in place:  Yes   Recommendation Provided To: Patient/Caregiver: 1 via Telephone   Intervention Detail: Scheduled Appointment   Gap Closed?: No   Intervention Accepted By: Patient/Caregiver: 1   Time Spent (min): 15

## 2021-11-22 ENCOUNTER — TELEPHONE (OUTPATIENT)
Dept: INTERNAL MEDICINE CLINIC | Age: 56
End: 2021-11-22

## 2021-11-23 NOTE — TELEPHONE ENCOUNTER
Patient notified PharmD that she is possibly having an allergic reaction to the [employer] sensor. She stated that this did not occur with previous sensors, but she is concerned because her most recent one was a little bloody once she removed it. She has not placed a new sensor and is wondering if she should continue with the device. Will have patient leave the sensor off until follow up in a few weeks. Will need to make sure that sensor was placed properly and confirm that patient's reaction improves. Will consider placing new sensor at that time if everything looks okay. Thank you,  Edilberto Westbrook. FELICIA Brunson        For Pharmacy Admin Tracking Only     CPA in place:  Yes   Recommendation Provided To: Patient/Caregiver: 0 via Telephone   Time Spent (min): 30

## 2021-12-14 ENCOUNTER — APPOINTMENT (OUTPATIENT)
Dept: GENERAL RADIOLOGY | Age: 56
End: 2021-12-14
Attending: PHYSICAL MEDICINE & REHABILITATION
Payer: MEDICARE

## 2021-12-14 ENCOUNTER — HOSPITAL ENCOUNTER (OUTPATIENT)
Age: 56
Setting detail: OUTPATIENT SURGERY
Discharge: HOME | End: 2021-12-14
Attending: PHYSICAL MEDICINE & REHABILITATION | Admitting: PHYSICAL MEDICINE & REHABILITATION
Payer: MEDICARE

## 2021-12-14 VITALS
TEMPERATURE: 98.5 F | HEART RATE: 97 BPM | RESPIRATION RATE: 16 BRPM | OXYGEN SATURATION: 98 % | SYSTOLIC BLOOD PRESSURE: 131 MMHG | DIASTOLIC BLOOD PRESSURE: 86 MMHG

## 2021-12-14 LAB
GLUCOSE BLD STRIP.AUTO-MCNC: 184 MG/DL (ref 70–110)
GLUCOSE BLD STRIP.AUTO-MCNC: 206 MG/DL (ref 70–110)

## 2021-12-14 PROCEDURE — 74011000250 HC RX REV CODE- 250: Performed by: PHYSICAL MEDICINE & REHABILITATION

## 2021-12-14 PROCEDURE — 62323 NJX INTERLAMINAR LMBR/SAC: CPT | Performed by: PHYSICAL MEDICINE & REHABILITATION

## 2021-12-14 PROCEDURE — 77030014124 HC TY EPDRL BBMI -A: Performed by: PHYSICAL MEDICINE & REHABILITATION

## 2021-12-14 PROCEDURE — 2709999900 HC NON-CHARGEABLE SUPPLY: Performed by: PHYSICAL MEDICINE & REHABILITATION

## 2021-12-14 PROCEDURE — 82962 GLUCOSE BLOOD TEST: CPT

## 2021-12-14 PROCEDURE — 76010000009 HC PAIN MGT 0 TO 30 MIN PROC: Performed by: PHYSICAL MEDICINE & REHABILITATION

## 2021-12-14 PROCEDURE — 74011000636 HC RX REV CODE- 636: Performed by: PHYSICAL MEDICINE & REHABILITATION

## 2021-12-14 PROCEDURE — 74011250637 HC RX REV CODE- 250/637: Performed by: PHYSICAL MEDICINE & REHABILITATION

## 2021-12-14 PROCEDURE — 74011250636 HC RX REV CODE- 250/636: Performed by: PHYSICAL MEDICINE & REHABILITATION

## 2021-12-14 RX ORDER — DEXAMETHASONE SODIUM PHOSPHATE 100 MG/10ML
INJECTION INTRAMUSCULAR; INTRAVENOUS AS NEEDED
Status: DISCONTINUED | OUTPATIENT
Start: 2021-12-14 | End: 2021-12-14 | Stop reason: HOSPADM

## 2021-12-14 RX ORDER — DIAZEPAM 5 MG/1
5-20 TABLET ORAL ONCE
Status: COMPLETED | OUTPATIENT
Start: 2021-12-14 | End: 2021-12-14

## 2021-12-14 RX ORDER — LIDOCAINE HYDROCHLORIDE 10 MG/ML
INJECTION, SOLUTION EPIDURAL; INFILTRATION; INTRACAUDAL; PERINEURAL AS NEEDED
Status: DISCONTINUED | OUTPATIENT
Start: 2021-12-14 | End: 2021-12-14 | Stop reason: HOSPADM

## 2021-12-14 RX ADMIN — DIAZEPAM 5 MG: 5 TABLET ORAL at 09:09

## 2021-12-14 NOTE — DISCHARGE INSTRUCTIONS
[facility]   ([facility])  Dr. Jose L Lennon, Dr. Lorraine Barragan, Dr. Meche Ag Spinal Procedure (Block) Instructions    * Do not drive a car, operate heavy machinery or dangerous equipment, or make important decisions for 12-24 hours. * Light activity as tolerated; may rest for the remainder of the day. * Resume pre-block medications including those from your other doctors. * Do not drink alcoholic beverages for 24 hours. Alcohol and the medications you have received may interact and cause an adverse reaction. * You may feel better this evening and worse tomorrow, as the numbing medications wears off and the steroid has yet to begin to work. After 48-72 hrs the steroid should begin to release bringing you relief. If you had a medial branch block, no steroids were used. The medial branch block is a test to see if you are a candidate for radiofrequency ablation (RFA). The anesthetic (numbing medicine)  will wear off by the next day. * You may shower this evening and remove any bandages. * Avoid hot tubs/pools/tub soaks and heating pads for 24 hours. You may use cold packs on the procedure site as tolerated for the first 24 hours. * If a headache develops, drink plenty of fluids and rest.  Take over the counter medications for headache if needed. If the headache continues longer than 24 hours, call MD at the [address] [place] [address] Avenue. [phone]    * Continue taking pain medications as needed. * You may resume your regular diet if tolerated. Otherwise, start with sips of water and advance slowly. * If Diabetic: check your blood sugar three times a day for the next 3 days. If your sugar is greater than 300 call your family doctor. If your sugar is greater than 400, have someone transport you to the nearest Emergency Room. * If you experience any of the following problems, Please Call the [address] [place] [address] Avenue at [phone].         * Excessive pain, swelling, redness or odor at or around the surgical area    * Fever of 101 or higher    * Nausea / Vomiting lasting longer than 4 hours or if unable to take medications. * Severe Headache    * Weakness or numbness in arms or legs that is not      resolving   * Any NEW signs of decreased circulation or nerve impairment in leg: change in color, swelling, persistent numbness, tingling                    * Prolonged increase in pain greater than 4 days      PATIENT INSTRUCTIONS:    After oral sedation, for 12-24 hours or while taking prescription Narcotics:  · Limit your activities  · Do not drive and operate hazardous machinery  · Do not make important personal or business decisions  · Do  not drink alcoholic beverages  · If you have not urinated within 8 hours after discharge, please contact your surgeon on call. *  Please give a list of your current medications to your Primary Care Provider. *  Please update this list whenever your medications are discontinued, doses are      changed, or new medications (including over-the-counter products) are added. *  Please carry medication information at all times in case of emergency situations. These are general instructions for a healthy lifestyle:    No smoking/ No tobacco products/ Avoid exposure to second hand smoke    Surgeon General's Warning:  Quitting smoking now greatly reduces serious risk to your health. Obesity, smoking, and sedentary lifestyle greatly increases your risk for illness    A healthy diet, regular physical exercise & weight monitoring are important for maintaining a healthy lifestyle    You may be retaining fluid if you have a history of heart failure or if you experience any of the following symptoms:  Weight gain of 3 pounds or more overnight or 5 pounds in a week, increased swelling in our hands or feet or shortness of breath while lying flat in bed.   Please call your doctor as soon as you notice any of these symptoms; do not wait until your next office visit. Recognize signs and symptoms of STROKE:    F-face looks uneven    A-arms unable to move or move unevenly    S-speech slurred or non-existent    T-time-call 911 as soon as signs and symptoms begin-DO NOT go       Back to bed or wait to see if you get better-TIME IS BRAIN.

## 2021-12-14 NOTE — INTERVAL H&P NOTE
Update History & Physical    The Patient's History and Physical of October 28, 2021 was reviewed with the patient. There was no change. The surgical site was confirmed by the patient and me. Plan:  The risk, benefits, expected outcome, and alternative to the recommended procedure have been discussed with the patient. Patient understands and wants to proceed with the procedure.     Electronically signed by Colletta Medico, MD on 12/14/2021 at 9:42 AM

## 2021-12-14 NOTE — H&P
Office Visit    10/28/2021  [facility]   Kristin Andrade NP      Nurse Practitioner  Spinal stenosis of lumbar region without neurogenic claudication +3 more      Dx  Back Pain; Referred by Mia Dodd DNP      Reason for Visit       Progress Notes  Kristin Andrade NP (Nurse Practitioner) Cathy Gutierrez Nurse Practitioner     Chief complaint       Chief Complaint   Patient presents with    Back Pain         History of Present Illness:  Vanessa Greenwood is a  61 y.o.  female who comes in today after last being seen by Dr. Fadia Diggs on November 5, 2020. At that time she was having back pain and he ordered facet blocks but her insurance would not pay for it. She states she continues to have pain and would like to have some injections. She states she does not necessarily have pain in her legs but she has pain in both her feet. She has been to a podiatrist who states there is nothing well with her feet she is also been checked for vascular issues and she has not had any others. They feel like it is coming from her spine. She has new insurance now. She is diabetic her blood sugar is about 129 when she checks it. She has done physical therapy twice in the past.  MRI last year showed L4-5 disc bulge with facet arthropathy and central canal stenosis. She is unable to have NSAIDs due to decreased kidney function. She denies fever bowel bladder dysfunction.        Physical Exam: Patient is a 18-year-old female well-developed well-nourished who is alert and oriented with a normal mood and affect. She has a full weightbearing antalgic gait. She has 4-5 strength bilateral lower extremities. Negative straight leg raise. She did not use any assist device. She does not have pain with hyperextension of her spine.        Assessment and Plan: This is a patient with spinal stenosis, facet arthropathy and disc bulge. I will get a L3-4 TITA.   We will see her back following the block.        Medications:         Current Outpatient Medications   Medication Sig Dispense Refill    triamcinolone (Nasacort) 55 mcg nasal inhaler 2 Sprays daily.        calcium-cholecalciferol, D3, (CALTRATE 600+D) tablet Take 1 Tablet by mouth daily. 30 Tablet 0    montelukast (SINGULAIR) 10 mg tablet Take 10 mg by mouth daily.        predniSONE (DELTASONE) 5 mg tablet Take 5 mg by mouth daily. Taping down. . Currently taking 2.5 mg  Until end of oct. Then NOV. 2.5mg every other day.        fluticasone propionate (Flovent Diskus) 250 mcg/actuation dsdv Take 1 Puff by inhalation two (2) times a day. Rinse gargle mouth thoroughly after each use. Disp: 3 inhalers 3 Each 3    fluticasone-umeclidin-vilanter (Trelegy Ellipta) 200-62.5-25 mcg dsdv Take 1 Puff by inhalation daily. Rinse and gargle after each use 3 Each 3    cholecalciferol (VITAMIN D3) 25 mcg (1,000 unit) cap Take 1,000 Units by mouth daily.        cyanocobalamin (VITAMIN B12) 100 mcg tablet Take 50 mcg by mouth daily.        esomeprazole (NEXIUM) 40 mg capsule Take 1 Capsule by mouth daily as needed for Gastroesophageal Reflux Disease (GERD). Indications: gastroesophageal reflux disease 90 Capsule 1    venlafaxine-SR (EFFEXOR-XR) 75 mg capsule Take 1 Capsule by mouth daily. 90 Capsule 1    topiramate (Topamax) 25 mg tablet Take 1 Tablet by mouth two (2) times daily (with meals). For neuropathy 180 Tablet 1    SITagliptin (JANUVIA) 25 mg tablet Take 1 Tablet by mouth daily. For diabetes 90 Tablet 1    pravastatin (PRAVACHOL) 80 mg tablet Take 1 Tablet by mouth nightly. Indications: high cholesterol and high triglycerides 90 Tablet 1    hydroCHLOROthiazide (HYDRODIURIL) 25 mg tablet Take 1 Tablet by mouth daily. For swelling 90 Tablet 1    albuterol (PROVENTIL HFA, VENTOLIN HFA, PROAIR HFA) 90 mcg/actuation inhaler Take 2 Puffs by inhalation every four (4) hours as needed for Shortness of Breath.  Indications: asthma attack 1 Inhaler 5    Insulin Needles, Disposable, ([place] Pen Needle) 32 gauge x 5/32\" ndle Check blood sugars three times a day 100 Pen Needle 11    insulin glargine (LANTUS,BASAGLAR) 100 unit/mL (3 mL) inpn 25 Units by SubCUTAneous route nightly. 5 Pen 1    insulin lispro (HumaLOG KwikPen Insulin) 200 unit/mL (3 mL) inpn 3 times per day: 150-200 2u, 201-250 4u, 251-300 6u, 301-350 8u, 351-400 10u, > 400 12u. Indications: type 2 diabetes mellitus 5 Pen 5    therapeutic multivitamin-minerals (THERAGRAN-M) tablet Take 1 Tab by mouth daily.        triamcinolone acetonide (KENALOG) 0.1 % topical cream Apply  to affected area two (2) times daily as needed for Skin Irritation. 15 g 1    aspirin delayed-release 81 mg tablet Take 1 Tab by mouth daily. For heart health 30 Tab 11    glucose blood VI test strips (ACCU-CHEK POOJA) strip Pt to test 5 times daily. Dx:E11.65 500 Strip 3    azelastine (ASTEPRO) 205.5 mcg (0.15 %) 2 Sprays two (2) times a day.  (Patient not taking: Reported on 10/28/2021)                   Review of systems:          Past Medical History:   Diagnosis Date    Acquired cyst of kidney 01/16/2020     emerita    Asthma      Bilateral great toe fractures 2014    Chronic kidney disease, stage 3b ([facility] [address] 01/2021     Dr Naif Mitchell, nephro    Chronic sinusitis       Dr. Tati Hogan in the past    Colon polyp 5/16     Dr Lacy Cooks    Depression 2019    Diabetes mellitus ([address]      DJD (degenerative joint disease) of cervical spine 2016    DJD (degenerative joint disease), lumbar 2013     MRI c spine w degen changes; Dr Greenwood [place] Dyslipidemia       calculated 10 year risk score was 2.0% (12/13)    Edema of both ankles 8/28/2018    Environmental and seasonal allergies 8/28/2018    Eustachian tube dysfunction      Fibrocystic breast       Dr Rojas Medina    GERD (gastroesophageal reflux disease)      Hypertriglyceridemia 8/28/2018    Lateral epicondylitis of both elbows 2/6/2017    Macular degeneration of both eyes 08/28/2018     Dr Rachael Galindo [facility]    Mild intermittent asthma without complication 64/91/4052     Dr. Kevin Aviles;  ratio 68%, FEV1 78% w 6% inc postbd, TLC 77, RV 56, DLCO 61%    Morbid obesity (HCC)       peak weight 196 lbs, bmi 35.8 from 10/13    Neuropathy 8/28/2018    Osteopenia       Dr. Benita March; DEXA t score -0.7 spine, -0.2 hip (8/14)    Pneumonia      Sarcoidosis       Dr Dustin Aguilar Type 2 diabetes mellitus with hyperglycemia, with long-term current use of insulin ([facility] [address] 8/28/2018            Past Surgical History:   Procedure Laterality Date    COLONOSCOPY N/A 5/26/2016     Dr Saintclair Grimmer hyperplastic    COLONOSCOPY N/A 10/19/2021     COLONOSCOPY with polypectomy performed by Sim Porter MD at [address] Anthony Aguirre HX HEENT         nasal polypectomy Dr. Oswaldo Rowan HEENT         tear duct surgery right Dr. Phuong Arenas 2010; left Dr Joo Long 2015    HX ORTHOPAEDIC         DEXA -0.7 spine, -0.2 hip 8/14    [place] CARDIAC SURG PROCEDURE UNLIST   9/10, 8/13     thallium negative ef 72%; negative ef 70%    [place] CHEST SURGERY PROCEDURE UNLISTED   7/12     US thyroid negative    [place] CHEST SURGERY PROCEDURE UNLISTED   2012     pfts w mod restrictive defect     VASCULAR SURGERY PROCEDURE UNLIST   12/13     venous doppler negative      Social History            Socioeconomic History    Marital status: LEGALLY        Spouse name: Not on file    Number of children: 0    Years of education: 13    Highest education level: Not on file   Occupational History    Occupation: Unemployed   Tobacco Use    Smoking status: Never Smoker    Smokeless tobacco: Never Used   Vaping Use    Vaping Use: Never used   Substance and Sexual Activity    Alcohol use:  Yes       Alcohol/week: 0.0 standard drinks       Comment: occasional wine    Drug use: Never    Sexual activity: Not Currently   Other Topics Concern     Service No    Blood Transfusions No    Caffeine Concern Yes       Comment: due to reflux    Occupational Exposure No    Hobby Hazards No    Sleep Concern Yes    Stress Concern Yes       Comment: wants a job    Weight Concern Yes    Special Diet No    Back Care No    Exercise Yes    Bike Helmet No    Seat Belt Yes    Self-Exams Yes   Social History Narrative     Patient lives with a friend, no pets.      Social Determinants of Health      Financial Resource Strain:     Difficulty of Paying Living Expenses:    Food Insecurity:     Worried About Running Out of Food in the Last Year:     [address] in the Last Year:    Transportation Needs:     Lack of Transportation (Medical):  Lack of Transportation (Non-Medical):    Physical Activity:     Days of Exercise per Week:     Minutes of Exercise per Session:    Stress:     Feeling of Stress :    Social Connections:     Frequency of Communication with Friends and Family:     Frequency of Social Gatherings with Friends and Family:     Attends [employer] Services:     Active Member of Clubs or Organizations:     Attends Club or Organization Meetings:     Marital Status:    Intimate Partner Violence:     Fear of Current or Ex-Partner:     Emotionally Abused:     Physically Abused:     Sexually Abused:             Family History   Problem Relation Age of Onset    Cancer Mother      Hypertension Mother      Cancer Father      Diabetes Father      Hypertension Father      Stroke Father      Diabetes Sister      Hypertension Sister      Heart Disease Sister      Colon Cancer Sister 52    Hypertension Brother      Cancer Maternal Aunt           Physical Exam:  Visit Vitals  Pulse 89   Temp 97.6 °F (36.4 °C) (Temporal)   Resp 18   Ht 5' 2\" (1.575 m)   Wt 177 lb 12.8 oz (80.6 kg)   LMP 03/30/2013   SpO2 98%   BMI 32.52 kg/m²      Pain Scale: 7/10         has been . reviewed and is appropriate              Diagnoses and all orders for this visit:     1. Spinal stenosis of lumbar region without neurogenic claudication  -     SCHEDULE SURGERY     2. Spondylosis of lumbar region without myelopathy or radiculopathy  -     SCHEDULE SURGERY     3. Lumbar facet arthropathy  -     SCHEDULE SURGERY     4. DDD (degenerative disc disease), lumbar  -     SCHEDULE SURGERY                 Follow-up and Dispositions  ·   Return for After block.                  We have informed Yisel Joseph to notify us for immediate appointment if she has any worsening neurogical symptoms or if an emergency situation presents, then call 911        Note Details     Other Notes         SCHEDULE SURGERY Procedure note        Level of Service Calculator Selections    Number and Complexity of Problems Addressed                    1 acute, uncomplicated illness or injury                1 or more chronic illness with exacerbation, progression, or side effects of treatment            Risk of Complications and/or Morbidity or Mortality of Patient Management                    Moderate                Please see the note for further information on patient assessment and treatment. Instructions         Return for After block.      To Take With You (Printed 10/28/2021)        Additional Documentation    Vitals:  Pulse 89     Temp 97.6 °F (36.4 °C) (Temporal)     Resp 18     Ht 5' 2\" (1.575 m)     Wt 177 lb 12.8 oz (80.6 kg)     LMP 03/30/2013     SpO2 98%     BMI 32.52 kg/m²     BSA 1.88 m²     Pain Sc   7 (Loc: Back) (Edu: Yes)            More Vitals     Flowsheets:  Fluid Management,     Pain Assessment        Encounter Info:  Billing Info,     History,     Allergies,     Detailed Report          Media  From this encounter  Scan on 11/3/2021 1530 by Sandy Job: Internal Documents/[facility] Mast One/Blue Sheet (Scheduling Instructions)/10-28-21     BestPractice Advisories    Click to view BestPractice Advisory history     Encounter Messages    No messages in this encounter       Orders Placed     SCHEDULE SURGERY      Outpatient Medications at End of Encounter as of 10/28/2021    triamcinolone (Nasacort) 55 mcg nasal inhaler (Taking) 2 Sprays daily. calcium-cholecalciferol, D3, (CALTRATE 600+D) tablet (Taking) Take 1 Tablet by mouth daily. montelukast (SINGULAIR) 10 mg tablet (Taking) Take 10 mg by mouth daily. predniSONE (DELTASONE) 5 mg tablet (Taking) Take 5 mg by mouth daily. Taping down. . Currently taking 2.5 mg  Until end of oct. Then NOV. 2.5mg every other day. fluticasone propionate (Flovent Diskus) 250 mcg/actuation dsdv (Taking) Take 1 Puff by inhalation two (2) times a day. Rinse gargle mouth thoroughly after each use.  Disp: 3 inhalers   fluticasone-umeclidin-vilanter (Trelegy Ellipta) 200-62.5-25 mcg dsdv (Taking) Take 1 Puff by inhalation daily. Rinse and gargle after each use   cholecalciferol (VITAMIN D3) 25 mcg (1,000 unit) cap (Taking) Take 1,000 Units by mouth daily. cyanocobalamin (VITAMIN B12) 100 mcg tablet (Taking) Take 50 mcg by mouth daily. esomeprazole (NEXIUM) 40 mg capsule (Taking) Take 1 Capsule by mouth daily as needed for Gastroesophageal Reflux Disease (GERD). Indications: gastroesophageal reflux disease   venlafaxine-SR (EFFEXOR-XR) 75 mg capsule (Taking) Take 1 Capsule by mouth daily. topiramate (Topamax) 25 mg tablet (Taking) Take 1 Tablet by mouth two (2) times daily (with meals). For neuropathy   SITagliptin (JANUVIA) 25 mg tablet (Taking) Take 1 Tablet by mouth daily. For diabetes   pravastatin (PRAVACHOL) 80 mg tablet (Taking) Take 1 Tablet by mouth nightly. Indications: high cholesterol and high triglycerides   hydroCHLOROthiazide (HYDRODIURIL) 25 mg tablet (Taking) Take 1 Tablet by mouth daily. For swelling   albuterol (PROVENTIL HFA, VENTOLIN HFA, PROAIR HFA) 90 mcg/actuation inhaler (Taking) Take 2 Puffs by inhalation every four (4) hours as needed for Shortness of Breath.  Indications: asthma attack   Insulin Needles, Disposable, (Prerna Pen Needle) 32 gauge x 5/32\" ndle (Taking) Check blood sugars three times a day   insulin glargine (LANTUS,BASAGLAR) 100 unit/mL (3 mL) inpn (Taking) 25 Units by SubCUTAneous route nightly. insulin lispro (HumaLOG KwikPen Insulin) 200 unit/mL (3 mL) inpn (Taking) 3 times per day: 150-200 2u, 201-250 4u, 251-300 6u, 301-350 8u, 351-400 10u, > 400 12u.  Indications: type 2 diabetes mellitus   therapeutic multivitamin-minerals (THERAGRAN-M) tablet (Taking) Take 1 Tab by mouth daily. triamcinolone acetonide (KENALOG) 0.1 % topical cream (Taking) Apply  to affected area two (2) times daily as needed for Skin Irritation. aspirin delayed-release 81 mg tablet (Taking) Take 1 Tab by mouth daily. For heart health   glucose blood VI test strips (ACCU-CHEK POOJA) strip (Taking) Pt to test 5 times daily.  Dx:E11.65   azelastine (ASTEPRO) 205.5 mcg (0.15 %) 2 Sprays two (2) times a day.      Visit Diagnoses         Spinal stenosis of lumbar region without neurogenic claudication         Spondylosis of lumbar region without myelopathy or radiculopathy         Lumbar facet arthropathy         DDD (degenerative disc disease), lumbar       Problem List

## 2021-12-14 NOTE — PERIOP NOTES
Patient tolerated procedure well. No complications noted. VSS. No redness, swelling, or bleeding from injection site. Dressing dry and intact. Armband removed and shredded. Patient  wheeled to main entrance by RN and discharged alive and well, in stable condition.

## 2021-12-14 NOTE — PROCEDURES
Intralaminar Epidural Steroid Procedure Note        Patient Name   Emerson Ayoub  Date of Procedure: December 14, 2021  Preoperative Diagnosis: Lumbar spinal stenosis  Postoperative Diagnosis: Same  Location [facility], [address]      Procedure:  Epidural Steroid Injection    Consent:  Informed consent was obtained prior to the procedure. In addition to the potential risks associated with the procedure itself, the patient was informed both verbally and in writing of the potential side effects of the use of glucocorticoid. The patient appeared to comprehend the informed consent and desired to have the procedure performed. Procedure in Detail:  The patient was taken to the procedure suite and placed in the prone position on the operating table on appropriate padding. The posterior lumbar region was prepped and draped in the usual sterile fashion. Intraoperative fluoroscopy was used to localize the L3-L4 interspace. The skin was infiltrated with 1% lidocaine. An 18-gauge standard spinal Tuohy needle was advanced into the epidural space at L3-L4 under fluoroscopic guidance using the loss of resistance technique. No cerebrospinal fluid was seen throughout the procedure. Yes  A small amount of Isovue was injected into the epidural space, confirming appropriated needle placement on fluoroscopy. No vascular uptake was identified. Next, 2ml of 1% Lidocaine and 10mg of preservative free Dexamethasone were injected via the Tuohy needle. The needle was removed from the patient. The patient tolerated the procedure well and was discharged home with designated  and care instructions. Patient reported khadar-procedural pain on Visual Analog Scale:  pre-10; post-0.       Signed By: Nava Duke MD                      December 14, 2021

## 2021-12-15 ENCOUNTER — TELEPHONE (OUTPATIENT)
Dept: INTERNAL MEDICINE CLINIC | Age: 56
End: 2021-12-15

## 2021-12-15 DIAGNOSIS — K21.9 GASTROESOPHAGEAL REFLUX DISEASE WITHOUT ESOPHAGITIS: ICD-10-CM

## 2021-12-15 DIAGNOSIS — E78.2 MIXED HYPERLIPIDEMIA: ICD-10-CM

## 2021-12-15 DIAGNOSIS — E11.40 DIABETIC NEUROPATHY, PAINFUL (HCC): ICD-10-CM

## 2021-12-15 DIAGNOSIS — F41.8 DEPRESSION WITH ANXIETY: ICD-10-CM

## 2021-12-16 ENCOUNTER — TELEPHONE (OUTPATIENT)
Dept: ORTHOPEDIC SURGERY | Age: 56
End: 2021-12-16

## 2021-12-16 RX ORDER — ESOMEPRAZOLE MAGNESIUM 40 MG/1
CAPSULE, DELAYED RELEASE ORAL
Qty: 90 CAPSULE | Refills: 0 | Status: SHIPPED | OUTPATIENT
Start: 2021-12-16 | End: 2022-03-18

## 2021-12-16 RX ORDER — VENLAFAXINE HYDROCHLORIDE 75 MG/1
CAPSULE, EXTENDED RELEASE ORAL
Qty: 90 CAPSULE | Refills: 0 | Status: SHIPPED | OUTPATIENT
Start: 2021-12-16 | End: 2022-01-03 | Stop reason: SDUPTHER

## 2021-12-16 RX ORDER — PRAVASTATIN SODIUM 80 MG/1
TABLET ORAL
Qty: 90 TABLET | Refills: 0 | Status: SHIPPED | OUTPATIENT
Start: 2021-12-16 | End: 2022-01-03 | Stop reason: SDUPTHER

## 2021-12-16 RX ORDER — TOPIRAMATE 25 MG/1
TABLET ORAL
Qty: 180 TABLET | Refills: 0 | Status: SHIPPED
Start: 2021-12-16 | End: 2022-01-03 | Stop reason: DRUGHIGH

## 2021-12-16 NOTE — TELEPHONE ENCOUNTER
Spoke with patient. Headache better. Denies LBP/paresthesias/fever/chills/nausea/blurred vision. Continue hydration.

## 2021-12-16 NOTE — TELEPHONE ENCOUNTER
Patient called reporting a headache that has been increasing in pain since her block injection on Tuesday 12/14/21. I was advised by clinical to put a message in but inform patient to increase her caffeine intake via [place]. Dew or whatever she has on hand. Patient does have [place]. Dew and will start drinking this in addition to water. I asked what she had so far to drink this morning and her reply was nothing because she just woke up having not been able to get to sleep until around 4 or 5 this morning due to this headache.   She will await further guidance from clinical.     Patient [phone]

## 2021-12-20 ENCOUNTER — TELEPHONE (OUTPATIENT)
Dept: ORTHOPEDIC SURGERY | Age: 56
End: 2021-12-20

## 2021-12-20 NOTE — TELEPHONE ENCOUNTER
Attempted to contact the pt again. She was not able to be reached. A message was left for the pt letting her know that the symptoms reported on the leg should not be related to the block and that the headache should not be there this long. She was advised to go to an urgent care as soon as possible to be evaluated, per Dr. Evita Escobar and Marilia Diaz. The number to the office was provided in case she has any questions or concerns or needs to speak to the office staff. There are no other numbers to reach the pt . There is a home number listed but it does not work.

## 2021-12-20 NOTE — TELEPHONE ENCOUNTER
I called and spoke to the pt. The pt was identified using 2 pt identifiers. She was advised of the provider's directions. The pt states that she has been doing this since 12/16 per Dr. Dina Manning and it has not helped. She is also reporting now that the headache is intense and around the whole head. She states that her head is feeling heavy. Ms. Rodriguez Ear states that there is a knot/bruise on the side/back of the left leg. She is reporting swelling in the left lower leg and foot. The pt also states she had a fever yesterday but could not tell me the number. No infection or drainage at the incision site that the pt can tell. Pt advised that this will be sent to Dr. Dina Manning and Izabel Cervantes for review. The NP will contact Dr. Dina Manning to see what needs to happen next. Then the pt will be contacted back. The pt verbalized understanding and has no other questions or concerns.

## 2021-12-20 NOTE — TELEPHONE ENCOUNTER
I attempted to reach the pt. She was not able to be reached. A message was left for the pt asking for a return call to the office at her earliest convenience. The number to the office was provided. If the pt does not return the call we will keep trying to reach her.

## 2021-12-20 NOTE — TELEPHONE ENCOUNTER
Patient called in stating \"she's been having a headache since Friday 12/16/2021, and is also experiencing tingling in her legs,belives this is related to her block in inection, and is unsure on what to do\"    Patient was advised an urgent message will be routed to the provider and clinical staff member and a return call will be placed.  Patient verbalized understanding and stated Sergio Zhong is getting ready to go to the ER for her symptoms\"    Patient's contact is [phone]

## 2021-12-20 NOTE — TELEPHONE ENCOUNTER
I called and spoke to Dr Anny Haywood. She states that a spinal headache would not last this long. Also the bruise/knot on the leg with swelling would not be related to the block, but she needs to get it checked out so she suggests that the pt go to urgent care for an evaluation.

## 2021-12-21 NOTE — TELEPHONE ENCOUNTER
Tried again to reach the pt regarding her issue. She was not able to be reached. Another message was left for the pt asking for a return call to the office to make sure she is ok and received the message yesterday to go to an urgent care to be evaluated. The number to the office was provided again. No pt information used in the message for HIPAA protection. Provider will be notified that we are still not able to reach the pt.

## 2021-12-21 NOTE — TELEPHONE ENCOUNTER
The pt called the office back. She states that she is currently admitted to [facility]. She states that she was told she had fluid collected around her spinal cord that had to be drained. She also had a blood patch done this afternoon. Ms. Galdino Hinojosa is now waiting to have a venous doppler study for her leg to rule out blood clot. Message will be forwarded to providers for notification. Pt will call the office with updates as needed.

## 2021-12-22 ENCOUNTER — TELEPHONE (OUTPATIENT)
Dept: INTERNAL MEDICINE CLINIC | Age: 56
End: 2021-12-22

## 2021-12-22 NOTE — TELEPHONE ENCOUNTER
Patient has 3 month follow up scheduled for 1/3/2022. Could we change that into hospital follow up? Please advise.

## 2021-12-22 NOTE — TELEPHONE ENCOUNTER
Message was sent from call center pt needs a hosp f/up she was seen 12/14- I don't see anythinbg until 02/2022

## 2022-01-02 ENCOUNTER — DOCUMENTATION ONLY (OUTPATIENT)
Dept: ORTHOPEDIC SURGERY | Age: 57
End: 2022-01-02

## 2022-01-03 ENCOUNTER — OFFICE VISIT (OUTPATIENT)
Dept: INTERNAL MEDICINE CLINIC | Age: 57
End: 2022-01-03
Payer: MEDICARE

## 2022-01-03 VITALS
TEMPERATURE: 97.9 F | OXYGEN SATURATION: 94 % | SYSTOLIC BLOOD PRESSURE: 128 MMHG | DIASTOLIC BLOOD PRESSURE: 86 MMHG | HEIGHT: 62 IN | RESPIRATION RATE: 16 BRPM | BODY MASS INDEX: 32.31 KG/M2 | WEIGHT: 175.6 LBS | HEART RATE: 97 BPM

## 2022-01-03 DIAGNOSIS — H43.391 FLOATERS, RIGHT: ICD-10-CM

## 2022-01-03 DIAGNOSIS — Z09 HOSPITAL DISCHARGE FOLLOW-UP: Primary | ICD-10-CM

## 2022-01-03 DIAGNOSIS — E78.2 MIXED HYPERLIPIDEMIA: ICD-10-CM

## 2022-01-03 DIAGNOSIS — G97.1 SPINAL HEADACHE: ICD-10-CM

## 2022-01-03 DIAGNOSIS — F41.8 DEPRESSION WITH ANXIETY: ICD-10-CM

## 2022-01-03 DIAGNOSIS — H57.11 PAIN IN RIGHT EYE: ICD-10-CM

## 2022-01-03 DIAGNOSIS — M25.471 EDEMA OF BOTH ANKLES: ICD-10-CM

## 2022-01-03 DIAGNOSIS — G97.0 CEREBROSPINAL FLUID LEAK FROM SPINAL PUNCTURE: ICD-10-CM

## 2022-01-03 DIAGNOSIS — M25.472 EDEMA OF BOTH ANKLES: ICD-10-CM

## 2022-01-03 DIAGNOSIS — Z79.4 TYPE 2 DIABETES MELLITUS WITH HYPERGLYCEMIA, WITH LONG-TERM CURRENT USE OF INSULIN (HCC): ICD-10-CM

## 2022-01-03 DIAGNOSIS — M48.061 SPINAL STENOSIS OF LUMBAR REGION, UNSPECIFIED WHETHER NEUROGENIC CLAUDICATION PRESENT: ICD-10-CM

## 2022-01-03 DIAGNOSIS — E11.65 TYPE 2 DIABETES MELLITUS WITH HYPERGLYCEMIA, WITH LONG-TERM CURRENT USE OF INSULIN (HCC): ICD-10-CM

## 2022-01-03 DIAGNOSIS — E11.40 DIABETIC NEUROPATHY, PAINFUL (HCC): ICD-10-CM

## 2022-01-03 LAB — HBA1C MFR BLD HPLC: 7.1 %

## 2022-01-03 PROCEDURE — 83036 HEMOGLOBIN GLYCOSYLATED A1C: CPT | Performed by: NURSE PRACTITIONER

## 2022-01-03 PROCEDURE — G9717 DOC PT DX DEP/BP F/U NT REQ: HCPCS | Performed by: NURSE PRACTITIONER

## 2022-01-03 PROCEDURE — 99214 OFFICE O/P EST MOD 30 MIN: CPT | Performed by: NURSE PRACTITIONER

## 2022-01-03 PROCEDURE — G8417 CALC BMI ABV UP PARAM F/U: HCPCS | Performed by: NURSE PRACTITIONER

## 2022-01-03 PROCEDURE — G9899 SCRN MAM PERF RSLTS DOC: HCPCS | Performed by: NURSE PRACTITIONER

## 2022-01-03 PROCEDURE — G8754 DIAS BP LESS 90: HCPCS | Performed by: NURSE PRACTITIONER

## 2022-01-03 PROCEDURE — G8427 DOCREV CUR MEDS BY ELIG CLIN: HCPCS | Performed by: NURSE PRACTITIONER

## 2022-01-03 PROCEDURE — G8752 SYS BP LESS 140: HCPCS | Performed by: NURSE PRACTITIONER

## 2022-01-03 PROCEDURE — 3017F COLORECTAL CA SCREEN DOC REV: CPT | Performed by: NURSE PRACTITIONER

## 2022-01-03 PROCEDURE — 2022F DILAT RTA XM EVC RTNOPTHY: CPT | Performed by: NURSE PRACTITIONER

## 2022-01-03 PROCEDURE — 3051F HG A1C>EQUAL 7.0%<8.0%: CPT | Performed by: NURSE PRACTITIONER

## 2022-01-03 RX ORDER — INSULIN GLARGINE 100 [IU]/ML
25 INJECTION, SOLUTION SUBCUTANEOUS
Qty: 5 PEN | Refills: 1 | Status: SHIPPED | OUTPATIENT
Start: 2022-01-03 | End: 2022-06-23 | Stop reason: SDUPTHER

## 2022-01-03 RX ORDER — MONTELUKAST SODIUM 10 MG/1
10 TABLET ORAL DAILY
Qty: 90 TABLET | Refills: 3 | Status: SHIPPED
Start: 2022-01-03 | End: 2022-06-22

## 2022-01-03 RX ORDER — LANOLIN ALCOHOL/MO/W.PET/CERES
400 CREAM (GRAM) TOPICAL 2 TIMES DAILY WITH MEALS
COMMUNITY
Start: 2021-12-22 | End: 2022-02-03 | Stop reason: SDUPTHER

## 2022-01-03 RX ORDER — HYDROCHLOROTHIAZIDE 25 MG/1
25 TABLET ORAL DAILY
Qty: 90 TABLET | Refills: 1 | Status: SHIPPED
Start: 2022-01-03 | End: 2022-02-02

## 2022-01-03 RX ORDER — PRAVASTATIN SODIUM 80 MG/1
80 TABLET ORAL DAILY
Qty: 90 TABLET | Refills: 1 | Status: SHIPPED | OUTPATIENT
Start: 2022-01-03 | End: 2022-06-23 | Stop reason: SDUPTHER

## 2022-01-03 RX ORDER — BUTALBITAL, ACETAMINOPHEN AND CAFFEINE 50; 325; 40 MG/1; MG/1; MG/1
1 TABLET ORAL
COMMUNITY
Start: 2021-12-22

## 2022-01-03 RX ORDER — TOPIRAMATE 50 MG/1
50 TABLET, FILM COATED ORAL 2 TIMES DAILY
Qty: 180 TABLET | Refills: 1 | Status: SHIPPED | OUTPATIENT
Start: 2022-01-03 | End: 2022-06-23 | Stop reason: SDUPTHER

## 2022-01-03 RX ORDER — VENLAFAXINE HYDROCHLORIDE 75 MG/1
75 CAPSULE, EXTENDED RELEASE ORAL DAILY
Qty: 90 CAPSULE | Refills: 1 | Status: SHIPPED | OUTPATIENT
Start: 2022-01-03 | End: 2022-06-23 | Stop reason: SDUPTHER

## 2022-01-03 NOTE — PROGRESS NOTES
[facility] [address] [facility]  [place], [address]  [phone] [facility][phone] fax    1/3/2022    HPI:   Lana Mitchell 1965 is a pleasant BLACK/ female. Todays concern:  1. Hospital discharge. She had a lower spinal injection by Dr. Licha Esquivel 12/14/2021. During the procedure patient developed severe headache and pain down her left side. She was discharged home. She went to the ED with complaints of severe headache. She was admitted into [facility] from 12/20/2021 to 12/22/2021 for spinal headache. She underwent a spinal tap and the placement of a blood patch on 12/21/2021. Her back pain is more severe now than it was prior to receiving the spinal injection. She has a follow-up appointment with Dr. Salome Irving 1/16/2022. Patient is seeking a second opinion. 2.  Constant headache. Developed after receiving her spinal injection on 12/14/2021. When these headaches develop she must lay flat on her back. Consuming caffeine will help at times. She is taking Topamax for diabetic neuropathy but this dose is not helping with her headaches. These headaches are achy with the occasional sharp pain. They are constant. She has Fioricet which she takes as needed. 3.  Right retinal tear. She underwent a surgical procedure for repair under Dr. Sherry Sims. Unfortunately she is now suffering floaters and pain in the corner of her right eye. She has a follow-up appointment with the ophthalmologist.    She is taking her medications as prescribed and tolerating well.     Past Medical History:   Diagnosis Date    Acquired cyst of kidney 01/16/2020    emerita    Asthma     Bilateral great toe fractures 2014    Chronic kidney disease, stage 3b ([facility] 01/2021    Dr Kathi Acuna, nephro    Chronic sinusitis     Dr. Tio Valerio in the past    Colon polyp 5/16    Dr Valiente Maplily    Depression 2019    Diabetes mellitus ([facility]     DJD (degenerative joint disease) of cervical spine 2016    DJD (degenerative joint disease), lumbar 2013    MRI c spine w degen changes; Dr Eric Harvey    Dyslipidemia     calculated 10 year risk score was 2.0% (12/13)    Edema of both ankles 8/28/2018    Environmental and seasonal allergies 8/28/2018    Eustachian tube dysfunction     Fibrocystic breast     Dr Eloy Mack GERD (gastroesophageal reflux disease)     Hypertriglyceridemia 8/28/2018    Lateral epicondylitis of both elbows 2/6/2017    Macular degeneration of both eyes 08/28/2018    Dr Cecelia Arana [facility]    Mild intermittent asthma without complication 76/97/4254    Dr. Keisha Bliss;  ratio 68%, FEV1 78% w 6% inc postbd, TLC 77, RV 56, DLCO 61%    Morbid obesity (HCC)     peak weight 196 lbs, bmi 35.8 from 10/13    Neuropathy 8/28/2018    Osteopenia     Dr. Janie Ferrera; DEXA t score -0.7 spine, -0.2 hip (8/14)    Pneumonia     Sarcoidosis     Dr Cameron Talley Type 2 diabetes mellitus with hyperglycemia, with long-term current use of insulin ([facility] 8/28/2018     Past Surgical History:   Procedure Laterality Date    COLONOSCOPY N/A 5/26/2016    Dr Radha Aguirre hyperplastic    COLONOSCOPY N/A 10/19/2021    COLONOSCOPY with polypectomy performed by Liane Buckley MD at [address] HX HEENT      nasal polypectomy Dr. Shahzad Ng HX HEENT      tear duct surgery right Dr. Jorden Peasron 2010; left Dr Shae Hansen 2015    HX ORTHOPAEDIC      DEXA -0.7 spine, -0.2 hip 8/14    KS CARDIAC SURG PROCEDURE UNLIST  9/10, 8/13    thallium negative ef 72%; negative ef 70%    [place] CHEST SURGERY PROCEDURE UNLISTED  7/12    US thyroid negative    KS CHEST SURGERY PROCEDURE UNLISTED  2012    pfts w mod restrictive defect     VASCULAR SURGERY PROCEDURE UNLIST  12/13    venous doppler negative     Current Outpatient Medications   Medication Sig    butalbital-acetaminophen-caffeine (FIORICET, ESGIC) -40 mg per tablet Take 1 Tablet by mouth every six (6) hours as needed.  magnesium oxide (MAG-OX) 400 mg tablet Take 400 mg by mouth two (2) times daily (with meals).  venlafaxine-SR (EFFEXOR-XR) 75 mg capsule Take 1 Capsule by mouth daily.  topiramate (TOPAMAX) 50 mg tablet Take 1 Tablet by mouth two (2) times a day.  SITagliptin (JANUVIA) 25 mg tablet Take 1 Tablet by mouth daily. For diabetes    pravastatin (PRAVACHOL) 80 mg tablet Take 1 Tablet by mouth daily.  montelukast (SINGULAIR) 10 mg tablet Take 1 Tablet by mouth daily.  insulin glargine (LANTUS,BASAGLAR) 100 unit/mL (3 mL) inpn 25 Units by SubCUTAneous route nightly.  hydroCHLOROthiazide (HYDRODIURIL) 25 mg tablet Take 1 Tablet by mouth daily. For swelling    esomeprazole (NEXIUM) 40 mg capsule TAKE ONE CAPSULE BY MOUTH DAILY AS NEEDED FOR REFLUX    triamcinolone (Nasacort) 55 mcg nasal inhaler 2 Sprays daily.  calcium-cholecalciferol, D3, (CALTRATE 600+D) tablet Take 1 Tablet by mouth daily.  fluticasone propionate (Flovent Diskus) 250 mcg/actuation dsdv Take 1 Puff by inhalation two (2) times a day. Rinse gargle mouth thoroughly after each use. Disp: 3 inhalers    fluticasone-umeclidin-vilanter (Trelegy Ellipta) 200-62.5-25 mcg dsdv Take 1 Puff by inhalation daily. Rinse and gargle after each use    cholecalciferol (VITAMIN D3) 25 mcg (1,000 unit) cap Take 1,000 Units by mouth daily.  cyanocobalamin (VITAMIN B12) 100 mcg tablet Take 50 mcg by mouth daily.  albuterol (PROVENTIL HFA, VENTOLIN HFA, PROAIR HFA) 90 mcg/actuation inhaler Take 2 Puffs by inhalation every four (4) hours as needed for Shortness of Breath. Indications: asthma attack    Insulin Needles, Disposable, (Prerna Pen Needle) 32 gauge x 5/32\" ndle Check blood sugars three times a day    insulin lispro (HumaLOG KwikPen Insulin) 200 unit/mL (3 mL) inpn 3 times per day: 150-200 2u, 201-250 4u, 251-300 6u, 301-350 8u, 351-400 10u, > 400 12u.   Indications: type 2 diabetes mellitus    therapeutic multivitamin-minerals (THERAGRAN-M) tablet Take 1 Tab by mouth daily.  triamcinolone acetonide (KENALOG) 0.1 % topical cream Apply  to affected area two (2) times daily as needed for Skin Irritation.  aspirin delayed-release 81 mg tablet Take 1 Tab by mouth daily. For heart health    glucose blood VI test strips (ACCU-CHEK POOJA) strip Pt to test 5 times daily. Dx:E11.65     No current facility-administered medications for this visit. Allergies and Intolerances: Allergies   Allergen Reactions    Pollen Extracts Runny Nose and Cough    Amoxicillin Hives, Shortness of Breath and Swelling    Crestor [Rosuvastatin] Myalgia    Ibuprofen Other (comments)     dont prescribe due to kidney function    Lipitor [Atorvastatin] Other (comments)     Muscle cramps and weakness      Pcn [Penicillins] Angioedema     Family History:   Family History   Problem Relation Age of Onset    Cancer Mother     Hypertension Mother     Cancer Father     Diabetes Father     Hypertension Father     Stroke Father     Diabetes Sister     Hypertension Sister     Heart Disease Sister     Colon Cancer Sister 52    Hypertension Brother     Cancer Maternal Aunt      Social History:   She  reports that she has never smoked. She has never used smokeless tobacco.   Social History     Substance and Sexual Activity   Alcohol Use Yes    Alcohol/week: 0.0 standard drinks    Comment: occasional wine     Immunization History:  Immunization History   Administered Date(s) Administered    Influenza Vaccine 02/15/2021    Influenza Vaccine (Quad) PF (>6 Mo Flulaval, Fluarix, and >3 Yrs Afluria, Fluzone 57893) 09/28/2017, 11/26/2018    Pneumococcal Polysaccharide (PPSV-23) 09/28/2017    Tdap 04/24/2013       Review of Systems:   As above included in HPI.   Otherwise 11 point review of systems negative including constitutional, skin, HENT, eyes, respiratory, cardiovascular, gastrointestinal, genitourinary, musculoskeletal, endocrine, hematologic, allergy, and neurologic. Physical:   Visit Vitals  /86   Pulse 97   Temp 97.9 °F (36.6 °C) (Temporal)   Resp 16   Ht 5' 2\" (1.575 m)   Wt 175 lb 9.6 oz (79.7 kg)   LMP 03/30/2013   SpO2 94%   BMI 32.12 kg/m²      Wt Readings from Last 3 Encounters:   01/03/22 175 lb 9.6 oz (79.7 kg)   10/28/21 177 lb 12.8 oz (80.6 kg)   10/19/21 170 lb (77.1 kg)         Exam:   Physical Exam  Constitutional:       Appearance: Normal appearance. HENT:      Head: Normocephalic and atraumatic. Right Ear: External ear normal.      Left Ear: External ear normal.   Eyes:      Extraocular Movements: Extraocular movements intact. Conjunctiva/sclera: Conjunctivae normal.   Neck:      Vascular: No carotid bruit. Cardiovascular:      Rate and Rhythm: Normal rate and regular rhythm. Heart sounds: Normal heart sounds. Comments: No edema noted to emerita LEs  Pulmonary:      Effort: Pulmonary effort is normal. No respiratory distress. Breath sounds: Normal breath sounds. No wheezing. Musculoskeletal:         General: Normal range of motion. Cervical back: Normal range of motion. Comments: Gait is stable, no limitations noted while ambulating. Skin:     General: Skin is warm and dry. Neurological:      General: No focal deficit present. Mental Status: She is alert and oriented to person, place, and time. Psychiatric:         Mood and Affect: Mood normal.         Behavior: Behavior normal.        Body mass index is 32.12 kg/m².      Review of Data:  Labs reviewed: yes  POC A1c 7.1    Impression:  Patient Active Problem List   Diagnosis Code    Sarcoidosis Dr. Olga Kaminski on low dose steroid D86.9    Gastroesophageal reflux disease without esophagitis K21.9    Type 2 diabetes mellitus with hyperglycemia, with long-term current use of insulin ([facility]) E11.65, Z79.4    Neuropathy G62.9    Mild intermittent asthma without complication Y85.68    Environmental and seasonal allergies J30.89    Macular degeneration of both eyes H35.30    Diabetic neuropathy, painful ([facility]) E11.40    Type 2 diabetes mellitus with proliferative retinopathy ([facility] D15.5316    Mixed hyperlipidemia E78.2    Eczema L30.9    Stage 3 chronic kidney disease ([facility]) N18.30    Depression with anxiety F41.8    Hypoalbuminemia E88.09    Essential hypertension I10    Retinal drusen H35.369    Vitamin D deficiency E55.9    Age-related nuclear cataract, bilateral H25.13    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 Hospital discharge follow-up  Z09 V67.59    2. Spinal headache  G97.1 349.0 REFERRAL TO ORTHOPEDICS   3. Cerebrospinal fluid leak from spinal puncture  G97.0 997.09 REFERRAL TO ORTHOPEDICS   4. Spinal stenosis of lumbar region, unspecified whether neurogenic claudication present  M48.061 724.02 REFERRAL TO ORTHOPEDICS   5. Type 2 diabetes mellitus with hyperglycemia, with long-term current use of insulin ([facility])  E11.65 250.00 AMB POC HEMOGLOBIN A1C    Z79.4 790.29 SITagliptin (JANUVIA) 25 mg tablet     V58.67 insulin glargine (LANTUS,BASAGLAR) 100 unit/mL (3 mL) inpn   6. Diabetic neuropathy, painful ([facility])  E11.40 250.60 topiramate (TOPAMAX) 50 mg tablet     357.2    7. Depression with anxiety  F41.8 300.4 venlafaxine-SR (EFFEXOR-XR) 75 mg capsule   8. Mixed hyperlipidemia  E78.2 272.2 pravastatin (PRAVACHOL) 80 mg tablet   9. Edema of both ankles  M25.471 719.07 hydroCHLOROthiazide (HYDRODIURIL) 25 mg tablet    M25.472     10. Pain in right eye  H57.11 379.91    11. Floaters, right  H43.391 [id number]      [facility] discharge follow-up  She had a lower spinal injection by Dr. Juan Carlos Fay 12/14/2021. During the procedure patient developed severe headache and pain down her left side. She was discharged home. She went to the ED with complaints of severe headache.   She was admitted into [facility] from 12/20/2021 to 12/22/2021 for spinal headache. She underwent a spinal tap and the placement of a blood patch on 12/21/2021. Her back pain is more severe now than it was prior to receiving the spinal injection. She has a follow-up appointment with Dr. Андрей Bernal 1/16/2022. Patient is seeking a second opinion.    -Spinal headache/cerebrospinal fluid leak from spinal puncture  Increase Topamax from 25 mg twice daily to 50 mg twice daily  May increase to 100 mg twice daily if necessary  Continue Fioricet as needed  Increase caffeine to help reduce headache symptoms  Placed referral to [facility] orthopedic per patient's request for second opinion    -Spinal stenosis of lumbar region  Placed referral to [facility] orthopedic per patient's request for second opinion    -Type 2 diabetes  POC A1c 7.1  Continue Januvia 25 mg daily, Lantus 25 units daily, Humalog per sliding scale. Follow-up with Shailesh Sinha, for DM management    -Diabetic neuropathy  Continue Topamax therapy  Continue to reduce A1c    -Depression with anxiety  Continue Effexor therapy  Patient not seeking counseling or psychiatry at this time    -Mixed hyperlipidemia  9/2021 LDL 79.2;   Continue pravastatin 80 mg daily    -Edema bilateral ankles  None present today  Continue HCTZ therapy  Elevate lower extremities when edema noted  Avoid sitting for long periods of time and having legs dependent    -Pain in right eye/floaters  Continue to follow-up with ophthalmology post retinal tear repair. Reviewed medication and completed medication reconciliation with the patient. Reviewed side effects of medications with the patient. Questions were answered and patient verb understanding. Follow up 3 months POC A1c day of appointment       Dr. Jorgito Franco, JAYP-C, DNP  [facility]       The total face time was 38 minutes. Greater than 50% of that time was spent in counseling and/or coordination of care.  My summary of patient counseling and coordination of care includes Reviewing medical record, assessing patient, placing orders, and discussing plan of care with patient.

## 2022-01-03 NOTE — PROGRESS NOTES
Reviewed events that took place during my absence over holidays. Patient admitted for spinal HA. LP negative for infection. Blood patch 12/21 and d/c.

## 2022-01-03 NOTE — PROGRESS NOTES
Chief Complaint   Patient presents with   [facility] Follow Up       1. \"Have you been to the ER, urgent care clinic since your last visit? Hospitalized since your last visit? Yes, [facility]. 2. \"Have you seen or consulted any other health care providers outside of the [address] [place] [address] since your last visit? \" No     3. For patients aged 39-70: Has the patient had a colonoscopy / FIT/ Cologuard? Yes, HM satisfied with blue hyperlink     If the patient is female:    4. For patients aged 41-77: Has the patient had a mammogram within the past 2 years? Yes, HM satisfied with blue hyperlink    5. For patients aged 21-65: Has the patient had a pap smear?  Yes, HM satisfied with blue hyperlink

## 2022-01-06 ENCOUNTER — TELEPHONE (OUTPATIENT)
Dept: INTERNAL MEDICINE CLINIC | Age: 57
End: 2022-01-06

## 2022-01-06 ENCOUNTER — TELEPHONE (OUTPATIENT)
Dept: ORTHOPEDIC SURGERY | Age: 57
End: 2022-01-06

## 2022-01-06 DIAGNOSIS — G97.1 SPINAL HEADACHE: ICD-10-CM

## 2022-01-06 DIAGNOSIS — M54.16 ACUTE LEFT LUMBAR RADICULOPATHY: Primary | ICD-10-CM

## 2022-01-06 NOTE — TELEPHONE ENCOUNTER
Spoke with patient. She reports worsening H/A, LBP, LLE pain, limping. TITA L3/4 12/14/21. Called 2 days post-procedure w/c/o headache, advised bed rest, hydration, caffeine. HA got better, then worse. 12/20 Advised pt to go to urgent care. Reviewed [facility] notes. Had blood patch 12/21, felt immediately better. 12/22 started to have recurrent ha, given Fioracet and dc home. Patient reports [facility] Now getting worse LBP with radiation into LLE L5/S1 distribution, reports limping LLE. Denies fever, drainage from blood patch site, B/B incontinence. HA is global, sharp at times, worse with standing, improved w/laying down. +blurred vision and nausea. Remote hx migraines 10-15 years ago. PCP, NP [facility] increased Topamax to 50 BID. Pt denies SE w/increase, slight benefit. Fioricet helps. A/P  59yo w/stenosis L4/5, DM, sarcoid, CKD, GERD with worsening Left L5 radiculopathy and recurrent progressive h/a post TITA and blood patch. Needs STAT MRI. Increase Topamax to 50 mg TID  Cont Fioracet prn.

## 2022-01-06 NOTE — TELEPHONE ENCOUNTER
Hina is calling from [address] [place] [address] in regards to the referral. Stating the referral mentions that this is for a 2nd opinion. She wanted to verify if this is truly a 2nd opinion. If so, we need to fax them records from the doctor who was previously treating her. The doctor will review the records. If the doctor agrees to see the patient, they will then contact her to schedule. If this is not a 2nd opinion, they still need us to fax then an office note.  All they got was the referral.    Fax: [phone]

## 2022-01-06 NOTE — TELEPHONE ENCOUNTER
Patient states that she is still in a lot of pain after having a steroid injection on 12/14/2021. Patient states she has a lot of back pain, and headaches. Patient is requesting to talk to a nurse.      Patient can be reached at [phone]

## 2022-01-06 NOTE — TELEPHONE ENCOUNTER
Call pt and see what is going and then inform Dr. David Mckeon. I see Dr. David Mckeon has tried to call her a couple times to check on her, but could not get through on the phone.

## 2022-01-07 ENCOUNTER — TELEPHONE (OUTPATIENT)
Dept: INTERNAL MEDICINE CLINIC | Age: 57
End: 2022-01-07

## 2022-01-07 NOTE — TELEPHONE ENCOUNTER
I called and spoke to Ms. Joseph about getting her MRI scheduled. She was advised to contact the scheduling dept as soon as possible to get her MRI scheduled. The pt states that she has the number to the scheduling dept and will call them. The pt states that she has been sleeping.

## 2022-01-07 NOTE — TELEPHONE ENCOUNTER
Pt called  Said , she was referred to [address], they called her and told her they can not see her for a second opinion .

## 2022-01-07 NOTE — TELEPHONE ENCOUNTER
I called the scheduling to get an update on the stat order that was placed yesterday for the lumbar MRI. I was told by the scheduling dept that they were not aware of the stat order and will need to be contacted next time so that they can get the pt scheduled. They do not get notification when a stat order is in the system. I was told that the pt will be contacted today by a live person and not a robo call to get this scheduled.

## 2022-01-08 ENCOUNTER — HOSPITAL ENCOUNTER (OUTPATIENT)
Age: 57
Discharge: HOME | End: 2022-01-08
Attending: PHYSICAL MEDICINE & REHABILITATION
Payer: MEDICARE

## 2022-01-08 ENCOUNTER — DOCUMENTATION ONLY (OUTPATIENT)
Dept: ORTHOPEDIC SURGERY | Age: 57
End: 2022-01-08

## 2022-01-08 DIAGNOSIS — G97.1 SPINAL HEADACHE: ICD-10-CM

## 2022-01-08 DIAGNOSIS — M54.16 ACUTE LEFT LUMBAR RADICULOPATHY: ICD-10-CM

## 2022-01-08 PROCEDURE — A9575 INJ GADOTERATE MEGLUMI 0.1ML: HCPCS | Performed by: PHYSICAL MEDICINE & REHABILITATION

## 2022-01-08 PROCEDURE — 74011250636 HC RX REV CODE- 250/636: Performed by: PHYSICAL MEDICINE & REHABILITATION

## 2022-01-08 PROCEDURE — 82565 ASSAY OF CREATININE: CPT

## 2022-01-08 PROCEDURE — 72158 MRI LUMBAR SPINE W/O & W/DYE: CPT

## 2022-01-08 RX ADMIN — GADOTERATE MEGLUMINE 17 ML: 376.9 INJECTION INTRAVENOUS at 16:30

## 2022-01-09 NOTE — PROGRESS NOTES
Reviewed STAT MRI images and report from earlier today. Pt has facet synovitis w/spondy at L4/5. No evidence of spinal fluid leak/infection. Spoke w/radiologist on call, signal consistent w/fat L3/4/5. They will  compare to MRI done at [facility] 12/2021 (prior to blood patch) an create an addendum if indicated. Called patient, left message.

## 2022-01-10 LAB — CREAT UR-MCNC: 2.8 MG/DL (ref 0.6–1.3)

## 2022-01-11 NOTE — PROGRESS NOTES
Reviewed MRI addendum. No bleed/CSF leak. Radiologist felt that posterior lumbar spinal canal signal is c/w epidural fat. This was present on prior MRIs, dating back to 6/2020. Sx eval if all conservative measures have failed.

## 2022-01-18 ENCOUNTER — OFFICE VISIT (OUTPATIENT)
Dept: ORTHOPEDIC SURGERY | Age: 57
End: 2022-01-18
Payer: MEDICARE

## 2022-01-18 VITALS
RESPIRATION RATE: 16 BRPM | WEIGHT: 173.4 LBS | BODY MASS INDEX: 31.91 KG/M2 | HEIGHT: 62 IN | HEART RATE: 98 BPM | TEMPERATURE: 97.9 F | OXYGEN SATURATION: 100 %

## 2022-01-18 DIAGNOSIS — M54.59 MECHANICAL LOW BACK PAIN: ICD-10-CM

## 2022-01-18 DIAGNOSIS — M25.552 LEFT HIP PAIN: ICD-10-CM

## 2022-01-18 DIAGNOSIS — M47.816 LUMBAR FACET ARTHROPATHY: ICD-10-CM

## 2022-01-18 DIAGNOSIS — M79.18 MYOFASCIAL PAIN: ICD-10-CM

## 2022-01-18 DIAGNOSIS — G89.29 CHRONIC PRIMARY MUSCULOSKELETAL PAIN: Primary | ICD-10-CM

## 2022-01-18 DIAGNOSIS — M79.18 CHRONIC PRIMARY MUSCULOSKELETAL PAIN: Primary | ICD-10-CM

## 2022-01-18 DIAGNOSIS — M53.3 PAIN OF LEFT SACROILIAC JOINT: ICD-10-CM

## 2022-01-18 PROCEDURE — G9717 DOC PT DX DEP/BP F/U NT REQ: HCPCS | Performed by: PHYSICAL MEDICINE & REHABILITATION

## 2022-01-18 PROCEDURE — G9899 SCRN MAM PERF RSLTS DOC: HCPCS | Performed by: PHYSICAL MEDICINE & REHABILITATION

## 2022-01-18 PROCEDURE — 99213 OFFICE O/P EST LOW 20 MIN: CPT | Performed by: PHYSICAL MEDICINE & REHABILITATION

## 2022-01-18 PROCEDURE — G8756 NO BP MEASURE DOC: HCPCS | Performed by: PHYSICAL MEDICINE & REHABILITATION

## 2022-01-18 PROCEDURE — G8417 CALC BMI ABV UP PARAM F/U: HCPCS | Performed by: PHYSICAL MEDICINE & REHABILITATION

## 2022-01-18 PROCEDURE — 3017F COLORECTAL CA SCREEN DOC REV: CPT | Performed by: PHYSICAL MEDICINE & REHABILITATION

## 2022-01-18 PROCEDURE — G8427 DOCREV CUR MEDS BY ELIG CLIN: HCPCS | Performed by: PHYSICAL MEDICINE & REHABILITATION

## 2022-01-18 NOTE — PROGRESS NOTES
[address]  [address], [address]  [place], [facility] [address]  Phone: [phone]  Fax: [phone]        Sapna Watkins  : 1965  PCP: José Miguel Syed DNP  2022    PROGRESS NOTE      HISTORY OF PRESENT ILLNESS  Yisel Sanchez is a 61 y.o. female who was see in 2019 with c/o neck and low back pain. She was seen by me in 2016 for back pain. At the time, she was referred to PT but was unable to attend due to medical complications. At the time, she had recently been diagnosed with DM. She was prescribed Percocet and advised on a Thercane. She was seen by Terri Spicer NP 19 where she c/o neck and low back pain x 1 month. She was seen in the ED 18 for these complaints and she was prescribed Lidoderm patches and Robaxin with minimal relief. She was told to discontinue Ibuprofen due to renal function. She describes her pain in the posterior neck extending into her trapezius and left-sided back pain from her bra line to her low back. She denies any LE radicular symptoms, but has episodic LUE tingling. She has h/o sarcoidosis, renal dysfunction, and diabetes. She attended PT at [facility] for her neck and low back, but had to discontinue a few sessions early because of having to care for her terminally ill sister. She found some temporary relief from PT. She has some residual left neck and upper back pain radiating into her LUE where her hand occasional \"locks up like I'm having a stroke. \" She returned 3/4/2020 to see NP Buttery for progressive, worsening neck and low back pain (back > neck). She failed Lyrica. She took Gabapentin 900 mg TID with minimal relief prescribed by her PCP. She was prescribed Robaxin and Lidoderm patches. She attended PT (3/; [facility]) with some benefit, especially with TENS unit and ice. However, her pain increased since she stopped PT. She did not find benefit from Robaxin or Gabapentin.  She continued to have LLE shooting pains. Her pain remains unchanged. She reports having a flexed forward posture. Lumbar spine MRI dated 6/1/2020 reviewed. Per report, Chronic mild to moderate degenerative findings at L4-5, without high-grade spinal or foraminal stenosis. This and other levels of lesser degenerative findings as detailed above. Her insurance did not approve the bilateral L4-5 facet injections. Pt notes that she has difficulty sleeping at night due to pain. She saw NP Davie for persistent low back pain. She did not have pain in her legs, but she had pain in her feet. She saw a podiatrist and a vascular specialist. She is unable to take NSAIDs due to decreased kidney function. Cori Rosa comes in to the office today for f/u. She underwent an L3-4 TITA (12/14/21; Dr. Reed Azul) without benefit. She experienced a severe headache following the injection, so she went to [facility] and had a blood patch. Her pain increased and began radiating into the LLE. She also c/o left buttock and left hip pain. Pain Score: 8/10. PmHx: DM, neuropathy    ASSESSMENT  Cori Rosa is a 61 y.o. female with c/o chronic neck and low back pain. Her symptoms likely remain due to chronic primary musculoskeletal pain and lumbar facet arthropathy. There is also likely a component of a left hip pathology given her pain with hip flexion and a potential left SI joint pain. However, SI joint testing was limited due to left hip pain. She is unable to take NSAIDs due to creatinine levels (2.8 on 1/8/22)    PLAN  1. SI belt  2. Referral to PT ([facility]) - aquatherapy if necessary   3. May consider LLE EMG if symptoms do not improve with PT    Pt will f/u in 6-8 weeks or sooner as needed. Diagnoses and all orders for this visit:    1. Chronic primary musculoskeletal pain    2. Myofascial pain    3. Mechanical low back pain    4. Lumbar facet arthropathy    5. Left hip pain    6.  Pain of left sacroiliac joint         PAST MEDICAL HISTORY   Past Medical History:   Diagnosis Date    Acquired cyst of kidney 01/16/2020    [facility]    Asthma     Bilateral great toe fractures 2014    Chronic kidney disease, stage 3b ([facility] [address] 01/2021    Dr Jesús Forrester, nephro    Chronic sinusitis     Dr. Glover Floor in the past    Colon polyp 5/16    Dr Andreina Conway    Depression 2019    Diabetes mellitus ([facility] [address]     DJD (degenerative joint disease) of cervical spine 2016    DJD (degenerative joint disease), lumbar 2013    MRI c spine w degen changes; Dr Sharron Ferreira    Dyslipidemia     calculated 10 year risk score was 2.0% (12/13)    Edema of both ankles 8/28/2018    Environmental and seasonal allergies 8/28/2018    Eustachian tube dysfunction     Fibrocystic breast     Dr Nicolette Forbes GERD (gastroesophageal reflux disease)     Hypertriglyceridemia 8/28/2018    Lateral epicondylitis of both elbows 2/6/2017    [facility] of both [facility]28/2018    [facility]    Mild intermittent asthma without complication 26/22/2315    Dr. Olivera;  ratio 68%, FEV1 78% w 6% inc postbd, TLC 77, RV 56, DLCO 61%    Morbid obesity (HCC)     peak weight 196 lbs, bmi 35.8 from 10/13    Neuropathy 8/28/2018    Osteopenia     Dr. Yari Ashley; DEXA t score -0.7 spine, -0.2 hip (8/14)    Pneumonia     Sarcoidosis     Dr Rick Lanier Type 2 diabetes mellitus with hyperglycemia, with long-term current use of insulin ([facility] [address] 8/28/2018       Past Surgical History:   Procedure Laterality Date    COLONOSCOPY N/A 5/26/2016    Dr Andreina Conway hyperplastic    COLONOSCOPY N/A 10/19/2021    COLONOSCOPY with polypectomy performed by Jacy Macias MD at [address]      Dr. Ashli Chase HX HEENT      nasal polypectomy Dr. Isabella Mcconnell HX HEENT      tear duct surgery right Dr. Tracey Reina 2010; left Dr Jonathan Hernandez 2015    HX ORTHOPAEDIC      DEXA -0.7 spine, -0.2 hip 8/14    PA CARDIAC SURG PROCEDURE UNLIST  9/10, 8/13    thallium negative ef 72%; negative ef 70%  [place] CHEST SURGERY PROCEDURE UNLISTED  7/12    US thyroid negative    [place] CHEST SURGERY PROCEDURE UNLISTED  2012    pfts w mod restrictive defect     VASCULAR SURGERY PROCEDURE UNLIST  12/13    venous doppler negative   . MEDICATIONS      Current Outpatient Medications   Medication Sig Dispense Refill    butalbital-acetaminophen-caffeine (FIORICET, ESGIC) -40 mg per tablet Take 1 Tablet by mouth every six (6) hours as needed.  magnesium oxide (MAG-OX) 400 mg tablet Take 400 mg by mouth two (2) times daily (with meals).  venlafaxine-SR (EFFEXOR-XR) 75 mg capsule Take 1 Capsule by mouth daily. 90 Capsule 1    topiramate (TOPAMAX) 50 mg tablet Take 1 Tablet by mouth two (2) times a day. 180 Tablet 1    SITagliptin (JANUVIA) 25 mg tablet Take 1 Tablet by mouth daily. For diabetes 90 Tablet 1    pravastatin (PRAVACHOL) 80 mg tablet Take 1 Tablet by mouth daily. 90 Tablet 1    montelukast (SINGULAIR) 10 mg tablet Take 1 Tablet by mouth daily. 90 Tablet 3    insulin glargine (LANTUS,BASAGLAR) 100 unit/mL (3 mL) inpn 25 Units by SubCUTAneous route nightly. 5 Pen 1    hydroCHLOROthiazide (HYDRODIURIL) 25 mg tablet Take 1 Tablet by mouth daily. For swelling 90 Tablet 1    esomeprazole (NEXIUM) 40 mg capsule TAKE ONE CAPSULE BY MOUTH DAILY AS NEEDED FOR REFLUX 90 Capsule 0    triamcinolone (Nasacort) 55 mcg nasal inhaler 2 Sprays daily.  calcium-cholecalciferol, D3, (CALTRATE 600+D) tablet Take 1 Tablet by mouth daily. 30 Tablet 0    fluticasone propionate (Flovent Diskus) 250 mcg/actuation dsdv Take 1 Puff by inhalation two (2) times a day. Rinse gargle mouth thoroughly after each use. Disp: 3 inhalers 3 Each 3    fluticasone-umeclidin-vilanter (Trelegy Ellipta) 200-62.5-25 mcg dsdv Take 1 Puff by inhalation daily. Rinse and gargle after each use 3 Each 3    cholecalciferol (VITAMIN D3) 25 mcg (1,000 unit) cap Take 1,000 Units by mouth daily.       cyanocobalamin (VITAMIN B12) 100 mcg tablet Take 50 mcg by mouth daily.  albuterol (PROVENTIL HFA, VENTOLIN HFA, PROAIR HFA) 90 mcg/actuation inhaler Take 2 Puffs by inhalation every four (4) hours as needed for Shortness of Breath. Indications: asthma attack 1 Inhaler 5    Insulin Needles, Disposable, ([place]na Pen Needle) 32 gauge x 5/32\" ndle Check blood sugars three times a day 100 Pen Needle 11    insulin lispro (HumaLOG KwikPen Insulin) 200 unit/mL (3 mL) inpn 3 times per day: 150-200 2u, 201-250 4u, 251-300 6u, 301-350 8u, 351-400 10u, > 400 12u. Indications: type 2 diabetes mellitus 5 Pen 5    therapeutic multivitamin-minerals (THERAGRAN-M) tablet Take 1 Tab by mouth daily.  triamcinolone acetonide (KENALOG) 0.1 % topical cream Apply  to affected area two (2) times daily as needed for Skin Irritation. 15 g 1    aspirin delayed-release 81 mg tablet Take 1 Tab by mouth daily. For heart health 30 Tab 11    glucose blood VI test strips (ACCU-CHEK POOJA) strip Pt to test 5 times daily. Dx:E11.65 500 Strip 3        ALLERGIES    Allergies   Allergen Reactions    Pollen Extracts Runny Nose and Cough    Amoxicillin Hives, Shortness of Breath and Swelling    Crestor [Rosuvastatin] Myalgia    Ibuprofen Other (comments)     dont prescribe due to kidney function    Lipitor [Atorvastatin] Other (comments)     Muscle cramps and weakness      Pcn [Penicillins] Angioedema          SOCIAL HISTORY    Social History     Socioeconomic History    Marital status: LEGALLY     Number of children: 0    Years of education: 13   Occupational History    Occupation: Unemployed   Tobacco Use    Smoking status: Never Smoker    Smokeless tobacco: Never Used   Vaping Use    Vaping Use: Never used   Substance and Sexual Activity    Alcohol use:  Yes     Alcohol/week: 0.0 standard drinks     Comment: occasional wine    Drug use: Never    Sexual activity: Not Currently   Other Topics Concern     Service No    Blood Transfusions No    Caffeine Concern Yes     Comment: due to reflux    Occupational Exposure No    Hobby Hazards No    Sleep Concern Yes    Stress Concern Yes     Comment: wants a job    Weight Concern Yes    Special Diet No    Back Care No    Exercise Yes    Bike Helmet No    Seat Belt Yes    Self-Exams Yes   Social History Narrative    Patient lives with a friend, no pets. FAMILY HISTORY    Family History   Problem Relation Age of Onset   Thomas Cancer Mother     Hypertension Mother     Cancer Father    Thomas Diabetes Father     Hypertension Father     Stroke Father     Diabetes Sister     Hypertension Sister     Heart Disease Sister     Colon Cancer Sister 52    Hypertension Brother     Cancer Maternal Aunt          REVIEW OF SYSTEMS  Review of Systems   Constitutional: Negative for chills, fever and weight loss. Respiratory: Negative for shortness of breath. Cardiovascular: Negative for chest pain. Gastrointestinal: Negative for constipation. Negative for fecal incontinence    Genitourinary: Negative for dysuria. Negative for urinary incontinence   Musculoskeletal: Positive for back pain and joint pain ( left hip). Left buttock pain   Skin: Negative for rash. Neurological: Positive for tingling ( LLE). Negative for dizziness, tremors, focal weakness and headaches. Endo/Heme/Allergies: Does not bruise/bleed easily. Psychiatric/Behavioral: The patient does not have insomnia. PHYSICAL EXAMINATION  Visit Vitals  Pulse 98   Temp 97.9 °F (36.6 °C) (Temporal)   Resp 16   Ht 5' 2\" (1.575 m)   Wt 173 lb 6.4 oz (78.7 kg)   LMP 03/30/2013   SpO2 100%   BMI 31.72 kg/m²       Pain Assessment  1/18/2022   Location of Pain Back   Location Modifiers (No Data)   Severity of Pain -   Quality of Pain Aching   Quality of Pain Comment -   Duration of Pain Persistent   Frequency of Pain Constant   Aggravating Factors Bending;Squatting; Walking; Other (Comment)   Aggravating Factors Comment standing   Limiting Behavior Yes   Relieving Factors (No Data)   Relieving Factors Comment pain medications   Result of Injury No           Constitutional:  Well developed, well nourished, in no acute distress. Psychiatric: Affect and mood are appropriate. Integumentary: No rashes or abrasions noted on exposed areas. SPINE/MUSCULOSKELETAL EXAM    Cervical spine:  Neck is midline. Normal muscle tone. No focal atrophy is noted. ROM pain free. Shoulder ROM intact.   Tenderness to palpation of cervical and thoracic paraspinals on the L. Negative Spurling's sign. Negative Tinel's sign. Negative Kramer's sign.                                                                                                                             Sensation in the bilateral arms grossly intact to light touch.      Lumbar spine:  No rash, ecchymosis, or gross obliquity. No fasciculations. No focal atrophy is noted. No pain with hip ROM. Full range of motion. Tenderness to palpation of lumbar paraspinals and QL on the L. No tenderness to palpation at the sciatic notch. SI joints non-tender. Trochanters non tender.     Sensation in the bilateral legs grossly intact to light touch. Updates 1/18/22:  No pain with internal hip rotation  Tenderness of left SI joint  Pain with hip flexion  Positive PRATEEK test on the R   SI joint testing limited due to other hip pains  Pain with lumbar extension> flexion  Mild tenderness to palpation lumbar paraspinals    MOTOR:      Biceps  Triceps Deltoids Wrist Ext Wrist Flex Hand Intrin   Right 5/5 5/5 5/5 5/5 5/5 5/5   Left 5/5 5/5 5/5 5/5 5/5 5/5             Hip Flex  Quads Hamstrings Ankle DF EHL Ankle PF   Right 5/5 5/5 5/5 5/5 5/5 5/5   Left 5/5 5/5 5/5 5/5 5/5 5/5     DTRs are 2+ biceps, triceps, brachioradialis, patella, and Achilles.     RADIOGRAPHS  Thoracic Spine MRI images taken on 12/21/21 at [facility]:   There is normal alignment with normal vertebral body height with no evidence of fracture. No marrow edema or neoplastic marrow signal. There is no abnormal intrinsic cord signal or cord enlargement or enhancement. Slight anterior displacement of the course of the lower thoracic cord with mildly prominent dorsal subarachnoid CSF space but without evidence of mass or abnormal circumscribed fluid collection. No abnormal enhancement, with the questionable enhancement on previous study likely representing normal vascular enhancement dorsal cord. There is bilateral fullness of the jose compatible with known calcified adenopathy with streaky perihilar densities. No evidence of significant thoracic disc bulge herniation or canal or foraminal stenosis. _______________     IMPRESSION:       1. No evidence of dorsal distal thoracic canal abscess. Mildly displaced anterior cord without circumscribed fluid collection, probably related to adhesions/mild arachnoid cyst. No intrinsic cord abnormality. 2. Otherwise unremarkable MRI thoracic spine. 3. Bilateral calcified hilar lymph nodes with perihilar atelectasis/scarring. Lumbar MRI images taken on 12/20/21 at [facility]: There is 5 mm anterior listhesis L4 on L5 with no evidence of fracture or spondylolysis. There is bilateral facet arthropathy L4/5 with facet joint effusions and mild enhancing STIR hyperintensity within the adjacent dorsal soft tissues without abnormal fluid collection. No marrow edema or neoplastic marrow signal.No abnormal signal discs endplates or enhancement to suggest discitis or osteomyelitis. The conus medullaris is located at the L1 level and has a normal appearance and signal. Within the lower thoracic canal, only included on sagittal imaging, there is mild anterior displacement of the visualized distal cord from T9 through the upper T11 levels with homogeneous appearing fluid signal in the dorsal aspect of the canal at this level.  There is questionable thin peripheral enhancement around this fluid signal on the sagittal post gadolinium sequence. Several tiny left renal cysts. No follow-up imaging is recommended as incidental lesions are likely benign. Remainder visualized retroperitoneum and upper sacrum unremarkable. L1/2 level: Unremarkable. L2/3 level: Unremarkable. L3/4 level: Unremarkable. L4/5 level: Severe facet arthropathy as above with mild anterior listhesis and prominent dorsal epidural fat with AP diameter canal 6 mm with residual CSF surrounding mildly crowded cauda equina. Mild to moderate bilateral lateral recess stenosis. Mild bilateral subarticular foraminal stenosis. L5/S1 level: Mild to moderate facet arthropathy with minimal degenerative spondylosis and minimal right-sided facet joint effusion. No significant stenosis. Contrast: There is no other abnormal enhancement.     ______________     IMPRESSION       1. No evidence of discitis or osteomyelitis. 2. Bilateral L4/5 facet arthropathy and nonspecific facet synovitis, mild anterior spondylolisthesis with moderate canal and mild to moderate bilateral lateral recess stenosis. 3. Facet arthropathy L5/S1 without significant stenosis. 4. Questionable abnormal dorsal spinal canal fluid collection visualized lower thoracic canal with mild anterior cord displacement, questionable peripheral enhancement. This could represent either abscess or simply benign arachnoid cyst/adhesions causing this anterior cord displacement. Further evaluation recommended with MRI with gadolinium dedicated to the thoracic spine. Lumbar MRI images taken on 6/1/2020 personally reviewed with patient:  Alignment: Unchanged grade 1 degenerative anterolisthesis of L4  Vertebral body height: Normal  Marrow signal: Unremarkable  Disc spaces:  Moderately pronounced disc space narrowing disc desiccation at L4-5  Conus: Terminates at T12-L1     Axial imaging correlation:     T12-L1: Patent canal and foramina.      L1-2: Patent canal and foramina.     L2-3: Patent canal and foramina.     L3-4: Minor facet arthropathy. Patent canal and foramina.     L4-5: Uncovering of the disc. Mild disc bulge. Moderately pronounced facet  arthropathy with low-grade inflammation. There is some bulging of the posterior  epidural fat. Combined factors creates a mild central spinal stenosis. Mild  narrowing at the lateral recesses with transient abutment of the crossing nerves  but no overt impingement. Adequately patent foramina.     L5-S1: Bilateral facet arthropathy. Patent canal and foramina.     Other structures: Unremarkable.        IMPRESSION  IMPRESSION:     1. Chronic mild to moderate degenerative findings at L4-5, without high-grade  spinal or foraminal stenosis  -This and other levels of lesser degenerative findings as detailed above     Lumbar XR images taken on 12/21/18 personally reviewed with patient:  The vertebra are normal in height. . There is 5 mm of anterior offset of L4 on L5  demonstrating slight increase compared to the previous study. Disc space at L4/5 is moderately narrowed also increased from the previous exam.  The pedicles are intact. There is no evidence of fracture or dislocation. Mineralization is normal.     IMPRESSION  IMPRESSION:     Progression of degenerative disc disease and grade 1 spondylolisthesis L4/5.     Cervical XR images taken on 12/21/18 personally reviewed with patient:  The lateral view demonstrates from skull base to T3. The vertebra are normal in  height. . There is 2 mm anterior offset of C3 on C4 which has increased from the  previous exam. 1 to 2 mm of anterior offset of C4 on C5 unchanged. 2 mm anterior  offset of C5 on C6 unchanged. Disc spaces at C5/6 and C6/7 are markedly narrowed  with spurring.  Progressed from the previous exam. There is mild disc space  narrowing at C3/4 and C4/5.  The prevertebral soft tissues are normal.  There is  no evidence of fracture or dislocation.     IMPRESSION  IMPRESSION:     Progression of multilevel degenerative disc disease and multilevel mild AP offset    Lumbar x-ray films from 2/16/2016 personally reviewed with patient:  -Mild degenerative disc disease.  -Minimal grade 1 spondylolisthesis L4/5.     Thoracic x-ray films from 2/16/2016 personally reviewed with patient  -Degenerative change of the lower cervical spine.  -Degenerative change of the lower thoracic spine.     Cervical x-ray films from 2/16/2016 personally reviewed with patient:  -Multilevel degenerative changes of the cervical spine, most marked C6/C7.:     20 minutes of face-to-face contact were spent with the patient during today's visit extensively discussing symptoms and treatment plan. All questions were answered. More than half of this visit today was spent on counseling.      Written by Laurene Phoenix as dictated by Papo Griggs MD

## 2022-01-28 ENCOUNTER — HOSPITAL ENCOUNTER (OUTPATIENT)
Dept: GENERAL RADIOLOGY | Age: 57
Discharge: HOME | DRG: 641 | End: 2022-01-28
Attending: EMERGENCY MEDICINE
Payer: MEDICARE

## 2022-01-28 ENCOUNTER — HOSPITAL ENCOUNTER (OUTPATIENT)
Dept: LAB | Age: 57
Discharge: HOME | DRG: 641 | End: 2022-01-28
Payer: MEDICARE

## 2022-01-28 ENCOUNTER — HOSPITAL ENCOUNTER (INPATIENT)
Age: 57
LOS: 5 days | Discharge: HOME | DRG: 641 | End: 2022-02-02
Attending: EMERGENCY MEDICINE | Admitting: STUDENT IN AN ORGANIZED HEALTH CARE EDUCATION/TRAINING PROGRAM
Payer: MEDICARE

## 2022-01-28 ENCOUNTER — HOSPITAL ENCOUNTER (OUTPATIENT)
Dept: PHYSICAL THERAPY | Age: 57
Discharge: HOME | DRG: 641 | End: 2022-01-28
Attending: PHYSICAL MEDICINE & REHABILITATION
Payer: MEDICARE

## 2022-01-28 DIAGNOSIS — D86.9 SARCOIDOSIS: ICD-10-CM

## 2022-01-28 DIAGNOSIS — E83.52 HYPERCALCEMIA: Primary | ICD-10-CM

## 2022-01-28 LAB
25(OH)D3 SERPL-MCNC: 52.5 NG/ML (ref 30–100)
ALBUMIN SERPL-MCNC: 3.6 G/DL (ref 3.4–5)
ALBUMIN SERPL-MCNC: 3.8 G/DL (ref 3.4–5)
ALBUMIN/GLOB SERPL: 0.9 {RATIO} (ref 0.8–1.7)
ALP SERPL-CCNC: 66 U/L (ref 45–117)
ALT SERPL-CCNC: 31 U/L (ref 13–56)
ANION GAP SERPL CALC-SCNC: 10 MMOL/L (ref 3–18)
ANION GAP SERPL CALC-SCNC: 9 MMOL/L (ref 3–18)
AST SERPL-CCNC: 25 U/L (ref 10–38)
BASOPHILS # BLD: 0 K/UL (ref 0–0.1)
BASOPHILS NFR BLD: 1 % (ref 0–2)
BILIRUB SERPL-MCNC: 0.3 MG/DL (ref 0.2–1)
BUN SERPL-MCNC: 38 MG/DL (ref 7–18)
BUN SERPL-MCNC: 40 MG/DL (ref 7–18)
BUN/CREAT SERPL: 12 (ref 12–20)
BUN/CREAT SERPL: 13 (ref 12–20)
CALCIUM SERPL-MCNC: 14.9 MG/DL (ref 8.5–10.1)
CALCIUM SERPL-MCNC: 14.9 MG/DL (ref 8.5–10.1)
CALCIUM SERPL-MCNC: 15.2 MG/DL (ref 8.5–10.1)
CHLORIDE SERPL-SCNC: 100 MMOL/L (ref 100–111)
CHLORIDE SERPL-SCNC: 101 MMOL/L (ref 100–111)
CO2 SERPL-SCNC: 29 MMOL/L (ref 21–32)
CO2 SERPL-SCNC: 30 MMOL/L (ref 21–32)
COVID-19 RAPID TEST, COVR: NOT DETECTED
CREAT SERPL-MCNC: 3.08 MG/DL (ref 0.6–1.3)
CREAT SERPL-MCNC: 3.16 MG/DL (ref 0.6–1.3)
CREAT UR-MCNC: 411 MG/DL (ref 30–125)
DIFFERENTIAL METHOD BLD: ABNORMAL
EOSINOPHIL # BLD: 0.6 K/UL (ref 0–0.4)
EOSINOPHIL NFR BLD: 10 % (ref 0–5)
ERYTHROCYTE [DISTWIDTH] IN BLOOD BY AUTOMATED COUNT: 12.9 % (ref 11.6–14.5)
ERYTHROCYTE [DISTWIDTH] IN BLOOD BY AUTOMATED COUNT: 13.1 % (ref 11.6–14.5)
GLOBULIN SER CALC-MCNC: 4.1 G/DL (ref 2–4)
GLUCOSE SERPL-MCNC: 144 MG/DL (ref 74–99)
GLUCOSE SERPL-MCNC: 144 MG/DL (ref 74–99)
HCT VFR BLD AUTO: 38 % (ref 35–45)
HCT VFR BLD AUTO: 39.6 % (ref 35–45)
HGB BLD-MCNC: 12.3 G/DL (ref 12–16)
HGB BLD-MCNC: 13 G/DL (ref 12–16)
IMM GRANULOCYTES # BLD AUTO: 0 K/UL (ref 0–0.04)
IMM GRANULOCYTES NFR BLD AUTO: 0 % (ref 0–0.5)
LYMPHOCYTES # BLD: 1.9 K/UL (ref 0.9–3.6)
LYMPHOCYTES NFR BLD: 29 % (ref 21–52)
MCH RBC QN AUTO: 29.6 PG (ref 24–34)
MCH RBC QN AUTO: 30.4 PG (ref 24–34)
MCHC RBC AUTO-ENTMCNC: 32.4 G/DL (ref 31–37)
MCHC RBC AUTO-ENTMCNC: 32.8 G/DL (ref 31–37)
MCV RBC AUTO: 91.6 FL (ref 78–100)
MCV RBC AUTO: 92.7 FL (ref 78–100)
MONOCYTES # BLD: 0.9 K/UL (ref 0.05–1.2)
MONOCYTES NFR BLD: 14 % (ref 3–10)
NEUTS SEG # BLD: 3.2 K/UL (ref 1.8–8)
NEUTS SEG NFR BLD: 47 % (ref 40–73)
NRBC # BLD: 0 K/UL (ref 0–0.01)
NRBC # BLD: 0 K/UL (ref 0–0.01)
NRBC BLD-RTO: 0 PER 100 WBC
NRBC BLD-RTO: 0 PER 100 WBC
PHOSPHATE SERPL-MCNC: 3 MG/DL (ref 2.5–4.9)
PLATELET # BLD AUTO: 185 K/UL (ref 135–420)
PLATELET # BLD AUTO: 186 K/UL (ref 135–420)
PMV BLD AUTO: 10.4 FL (ref 9.2–11.8)
PMV BLD AUTO: 11.3 FL (ref 9.2–11.8)
POTASSIUM SERPL-SCNC: 3.4 MMOL/L (ref 3.5–5.5)
POTASSIUM SERPL-SCNC: 3.6 MMOL/L (ref 3.5–5.5)
PROT SERPL-MCNC: 7.7 G/DL (ref 6.4–8.2)
PROT UR-MCNC: 28 MG/DL
PROT/CREAT UR-RTO: 0.1
PTH-INTACT SERPL-MCNC: 15.9 PG/ML (ref 18.4–88)
RBC # BLD AUTO: 4.15 M/UL (ref 4.2–5.3)
RBC # BLD AUTO: 4.27 M/UL (ref 4.2–5.3)
SODIUM SERPL-SCNC: 138 MMOL/L (ref 136–145)
SODIUM SERPL-SCNC: 141 MMOL/L (ref 136–145)
SOURCE, COVRS: NORMAL
WBC # BLD AUTO: 6.7 K/UL (ref 4.6–13.2)
WBC # BLD AUTO: 7 K/UL (ref 4.6–13.2)

## 2022-01-28 PROCEDURE — 97110 THERAPEUTIC EXERCISES: CPT

## 2022-01-28 PROCEDURE — 97535 SELF CARE MNGMENT TRAINING: CPT

## 2022-01-28 PROCEDURE — 71045 X-RAY EXAM CHEST 1 VIEW: CPT

## 2022-01-28 PROCEDURE — 85027 COMPLETE CBC AUTOMATED: CPT

## 2022-01-28 PROCEDURE — 84156 ASSAY OF PROTEIN URINE: CPT

## 2022-01-28 PROCEDURE — 83970 ASSAY OF PARATHORMONE: CPT

## 2022-01-28 PROCEDURE — 36415 COLL VENOUS BLD VENIPUNCTURE: CPT

## 2022-01-28 PROCEDURE — 97163 PT EVAL HIGH COMPLEX 45 MIN: CPT

## 2022-01-28 PROCEDURE — 99285 EMERGENCY DEPT VISIT HI MDM: CPT

## 2022-01-28 PROCEDURE — 93005 ELECTROCARDIOGRAM TRACING: CPT

## 2022-01-28 PROCEDURE — 74011250637 HC RX REV CODE- 250/637: Performed by: EMERGENCY MEDICINE

## 2022-01-28 PROCEDURE — 85025 COMPLETE CBC W/AUTO DIFF WBC: CPT

## 2022-01-28 PROCEDURE — 65660000000 HC RM CCU STEPDOWN

## 2022-01-28 PROCEDURE — 87635 SARS-COV-2 COVID-19 AMP PRB: CPT

## 2022-01-28 PROCEDURE — 96360 HYDRATION IV INFUSION INIT: CPT

## 2022-01-28 PROCEDURE — 80053 COMPREHEN METABOLIC PANEL: CPT

## 2022-01-28 PROCEDURE — 74011250636 HC RX REV CODE- 250/636: Performed by: EMERGENCY MEDICINE

## 2022-01-28 PROCEDURE — 80069 RENAL FUNCTION PANEL: CPT

## 2022-01-28 PROCEDURE — 82306 VITAMIN D 25 HYDROXY: CPT

## 2022-01-28 RX ORDER — SODIUM CHLORIDE 9 MG/ML
300 INJECTION, SOLUTION INTRAVENOUS ONCE
Status: COMPLETED | OUTPATIENT
Start: 2022-01-28 | End: 2022-01-29

## 2022-01-28 RX ADMIN — SODIUM CHLORIDE 300 ML/HR: 900 INJECTION, SOLUTION INTRAVENOUS at 21:16

## 2022-01-28 RX ADMIN — POTASSIUM BICARBONATE 40 MEQ: 782 TABLET, EFFERVESCENT ORAL at 21:18

## 2022-01-28 NOTE — Clinical Note
Status[de-identified] INPATIENT [101]   Type of Bed: Telemetry [19]   Cardiac Monitoring Required?: Yes   Inpatient Hospitalization Certified Necessary for the Following Reasons: 3. Patient receiving treatment that can only be provided in an inpatient setting (further clarification in H&P documentation)   Admitting Diagnosis: Hypercalcemia [275.42. ICD-9-CM]   Admitting Physician: Cate Ortiz   Attending Physician: Cecil West [[id number]]   Estimated Length of Stay: 2 Midnights   Discharge Plan[de-identified] Home with Office Follow-up

## 2022-01-28 NOTE — PROGRESS NOTES
[facility]  [address]  [place], [address]  [phone] fax    Plan of Care/ Statement of Necessity for Physical Therapy Services    Patient name: Luis Perea Start of Care: 2022   Referral source: Sacha Hogan MD : 1965    Medical Diagnosis: Low back pain [M54.50]  Myalgia, other site [M79.18]  Other chronic pain [G89.29]  Pain in left hip [M25.552]  Sacrococcygeal disorders, not elsewhere classified [M53.3]  Payor: Romulo Sic / Plan: [address] / Product Type: "[employer] Care Medicare /  Onset Date:Chronic with recent exacerbation one month ago    Treatment Diagnosis: LBP with left radiculopathy    Prior Hospitalization: see medical history Provider#: [id number]   Medications: Verified on Patient summary List    Comorbidities: Depression; Osteopenia; DM; OA; visually impaired; Asthma   Prior Level of Function: Chronic LBP; limited with daily activities      The Plan of Care and following information is based on the information from the initial evaluation. Assessment/ key information: 61y.o. year old female presents with CC of chronic LBP with left radiculopathy that has gotten worse since TITA last month. Impairments noted today: decreased T-L spine mobility with limitations in both L/S flexion and extension;poor TA recruitment and strength; decreased B LE strength, left > right. Patient will benefit from physical therapy to address deficits, and ultimately to return patient to prior level of function. Evaluation Complexity History HIGH Complexity :3+ comorbidities / personal factors will impact the outcome/ POC ; Examination HIGH Complexity : 4+ Standardized tests and measures addressing body structure, function, activity limitation and / or participation in recreation  ;Presentation MEDIUM Complexity : Evolving with changing characteristics  ; Clinical Decision Making HIGH Complexity : FOTO score of 1- 25   Overall Complexity Rating: HIGH   Problem List: pain affecting function, decrease ROM, decrease strength, impaired gait/ balance, decrease ADL/ functional abilitiies, decrease activity tolerance and decrease flexibility/ joint mobility   Treatment Plan may include any combination of the following: Therapeutic exercise, Therapeutic activities, Neuromuscular re-education, Aquatic therapy and Patient education  Patient / Family readiness to learn indicated by: asking questions, trying to perform skills and interest  Persons(s) to be included in education: patient (P)  Barriers to Learning/Limitations: None  Patient Goal (s): reduction in pain  Patient Self Reported Health Status: fair  Rehabilitation Potential: fair    Short Term Goals: To be accomplished in 2 weeks:  1. I and compliant with HEP for self management of symptoms. Long Term Goals: To be accomplished in 4 weeks:  1. Improve FOTO to 38 to indicate improved function with daily activities. 2. Increase B hip strength to grossly 4-/5 to improve stability for light cleaning at home. 3. Increase B hamstring strength to grossly 4-/5 to increase ease with sit<>stand transfers. 4. Patient will report 50% improvement with pain to increase ease with getting in/out of bed. Frequency / Duration: Patient to be seen 2 times per week for 4 weeks. Patient/ Caregiver education and instruction: Diagnosis, prognosis, exercises   [x]  Plan of care has been reviewed with PTA    Certification Period: 1/28/22-2/27/22  Ruth Boykin, PT 1/28/2022 11:59 AM    ________________________________________________________________________    I certify that the above Therapy Services are being furnished while the patient is under my care. I agree with the treatment plan and certify that this therapy is necessary.     Physician's Signature:____________________  Date:____________Time: _________     Mini Hernandez MD  Please sign and return to [facility]  [address] [facility] [address]  [place], [address]  [phone] fax

## 2022-01-28 NOTE — PROGRESS NOTES
PT DAILY TREATMENT NOTE 10-18    Patient Name: Virginia Flores  Date:2022  : 1965  [x]  Patient  Verified  Payor: [facility] MEDICARE COMPLETE / Plan: [facility] MEDICARE COMPLETE / Product Type: Managed Care Medicare /    In time:1115  Out time:1152  Total Treatment Time (min): 37  Visit #: 1 of 8    Medicare/BCBS Only   Total Timed Codes (min):  23 1:1 Treatment Time:  37       Treatment Area: Low back pain [M54.50]  Myalgia, other site [M79.18]  Other chronic pain [G89.29]  Pain in left hip [M25.552]  Sacrococcygeal disorders, not elsewhere classified [M53.3]    SUBJECTIVE  Pain Level (0-10 scale): 5-7  Any medication changes, allergies to medications, adverse drug reactions, diagnosis change, or new procedure performed?: [x] No    [] Yes (see summary sheet for update)  Subjective functional status/changes:   [] No changes reported  See eval    OBJECTIVE    14 min [x]Eval                  []Re-Eval       15 min Therapeutic Exercise:  [] See flow sheet :   Rationale: increase ROM and increase strength to improve the patients ability to recruit TA and improve mobility for ADLs     8 min Self Care/Home Management: HEP, pool progression   Rationale: increase ROM and increase strength  to improve the patients ability to perform ADLs    With   [] TE   [] TA   [] neuro   [] other: Patient Education: [x] Review HEP    [] Progressed/Changed HEP based on:   [] positioning   [] body mechanics   [] transfers   [] heat/ice application    [] other:      Other Objective/Functional Measures:      Pain Level (0-10 scale) post treatment: 7    ASSESSMENT/Changes in Function: see POC    Patient will continue to benefit from skilled PT services to modify and progress therapeutic interventions, address functional mobility deficits, address ROM deficits, address strength deficits, analyze and address soft tissue restrictions, analyze and cue movement patterns and assess and modify postural abnormalities to attain remaining goals. [x]  See Plan of Care  []  See progress note/recertification  []  See Discharge Summary         Progress towards goals / Updated goals:  Short Term Goals: To be accomplished in 2 weeks:  1. I and compliant with HEP for self management of symptoms. IE: issued HEP  Long Term Goals: To be accomplished in 4 weeks:  1. Improve FOTO to 38 to indicate improved function with daily activities. 2. Increase B hip strength to grossly 4-/5 to improve stability for light cleaning at home. 3. Increase B hamstring strength to grossly 4-/5 to increase ease with sit<>stand transfers.   4. Patient will report 50% improvement with pain to increase ease with getting in/out of     PLAN  []  Upgrade activities as tolerated     [x]  Continue plan of care  []  Update interventions per flow sheet       []  Discharge due to:_  []  Other:_      Dudley Mensah, MPT, CMTPT 1/28/2022  12:23 PM    Future Appointments   Date Time Provider Claudette Benjamin   2/1/2022 10:00 AM Ma Dk, PTA [facility]   2/4/2022  9:15 AM Ma Dk, PTA [facility]   2/8/2022 11:30 AM Ma Dk, PTA [facility]   2/10/2022 10:00 AM Cesar Jameson, PT [facility]   2/15/2022 10:45 AM Ma Dk, PTA [facility]   2/17/2022 10:00 AM Cesar Jameson, PT [facility]   2/22/2022 10:30 AM Cesar Jameson, PT [facility]   2/24/2022  9:15 AM Cesar Jameson, PT [facility]   3/15/2022 10:15 AM Madina Lugo MD [facility]   3/28/2022  9:30 AM Martinez Welch DNP [facility]

## 2022-01-29 LAB
ALBUMIN SERPL-MCNC: 2.9 G/DL (ref 3.4–5)
ALBUMIN/GLOB SERPL: 0.9 {RATIO} (ref 0.8–1.7)
ALP SERPL-CCNC: 52 U/L (ref 45–117)
ALT SERPL-CCNC: 25 U/L (ref 13–56)
ANION GAP SERPL CALC-SCNC: 6 MMOL/L (ref 3–18)
ANION GAP SERPL CALC-SCNC: 6 MMOL/L (ref 3–18)
AST SERPL-CCNC: 21 U/L (ref 10–38)
ATRIAL RATE: 106 BPM
BASOPHILS # BLD: 0 K/UL (ref 0–0.1)
BASOPHILS NFR BLD: 1 % (ref 0–2)
BILIRUB SERPL-MCNC: 0.3 MG/DL (ref 0.2–1)
BUN SERPL-MCNC: 32 MG/DL (ref 7–18)
BUN SERPL-MCNC: 34 MG/DL (ref 7–18)
BUN/CREAT SERPL: 12 (ref 12–20)
BUN/CREAT SERPL: 13 (ref 12–20)
CA-I BLD-SCNC: 1.68 MMOL/L (ref 1.12–1.32)
CA-I BLD-SCNC: 1.88 MMOL/L (ref 1.12–1.32)
CA-I SERPL-SCNC: 1.68 MMOL/L (ref 1.15–1.33)
CALCIUM SERPL-MCNC: 12.7 MG/DL (ref 8.5–10.1)
CALCIUM SERPL-MCNC: 13.7 MG/DL (ref 8.5–10.1)
CALCULATED P AXIS, ECG09: 47 DEGREES
CALCULATED R AXIS, ECG10: 25 DEGREES
CALCULATED T AXIS, ECG11: 39 DEGREES
CHLORIDE SERPL-SCNC: 106 MMOL/L (ref 100–111)
CHLORIDE SERPL-SCNC: 109 MMOL/L (ref 100–111)
CO2 SERPL-SCNC: 28 MMOL/L (ref 21–32)
CO2 SERPL-SCNC: 30 MMOL/L (ref 21–32)
CREAT SERPL-MCNC: 2.57 MG/DL (ref 0.6–1.3)
CREAT SERPL-MCNC: 2.72 MG/DL (ref 0.6–1.3)
DIAGNOSIS, 93000: NORMAL
DIFFERENTIAL METHOD BLD: ABNORMAL
EOSINOPHIL # BLD: 0.6 K/UL (ref 0–0.4)
EOSINOPHIL NFR BLD: 12 % (ref 0–5)
ERYTHROCYTE [DISTWIDTH] IN BLOOD BY AUTOMATED COUNT: 12.9 % (ref 11.6–14.5)
GLOBULIN SER CALC-MCNC: 3.3 G/DL (ref 2–4)
GLUCOSE BLD STRIP.AUTO-MCNC: 105 MG/DL (ref 70–110)
GLUCOSE BLD STRIP.AUTO-MCNC: 111 MG/DL (ref 70–110)
GLUCOSE BLD STRIP.AUTO-MCNC: 117 MG/DL (ref 70–110)
GLUCOSE SERPL-MCNC: 117 MG/DL (ref 74–99)
GLUCOSE SERPL-MCNC: 136 MG/DL (ref 74–99)
HCT VFR BLD AUTO: 30.4 % (ref 35–45)
HGB BLD-MCNC: 9.8 G/DL (ref 12–16)
IMM GRANULOCYTES # BLD AUTO: 0 K/UL (ref 0–0.04)
IMM GRANULOCYTES NFR BLD AUTO: 0 % (ref 0–0.5)
LYMPHOCYTES # BLD: 1.5 K/UL (ref 0.9–3.6)
LYMPHOCYTES NFR BLD: 32 % (ref 21–52)
MAGNESIUM SERPL-MCNC: 1.4 MG/DL (ref 1.6–2.6)
MCH RBC QN AUTO: 29.9 PG (ref 24–34)
MCHC RBC AUTO-ENTMCNC: 32.2 G/DL (ref 31–37)
MCV RBC AUTO: 92.7 FL (ref 78–100)
MONOCYTES # BLD: 0.7 K/UL (ref 0.05–1.2)
MONOCYTES NFR BLD: 15 % (ref 3–10)
NEUTS SEG # BLD: 1.9 K/UL (ref 1.8–8)
NEUTS SEG NFR BLD: 41 % (ref 40–73)
NRBC # BLD: 0 K/UL (ref 0–0.01)
NRBC BLD-RTO: 0 PER 100 WBC
P-R INTERVAL, ECG05: 192 MS
PLATELET # BLD AUTO: 147 K/UL (ref 135–420)
PMV BLD AUTO: 10.7 FL (ref 9.2–11.8)
POTASSIUM SERPL-SCNC: 3.4 MMOL/L (ref 3.5–5.5)
POTASSIUM SERPL-SCNC: 3.8 MMOL/L (ref 3.5–5.5)
PROT SERPL-MCNC: 6.2 G/DL (ref 6.4–8.2)
Q-T INTERVAL, ECG07: 302 MS
QRS DURATION, ECG06: 82 MS
QTC CALCULATION (BEZET), ECG08: 401 MS
RBC # BLD AUTO: 3.28 M/UL (ref 4.2–5.3)
SERVICE CMNT-IMP: ABNORMAL
SODIUM SERPL-SCNC: 142 MMOL/L (ref 136–145)
SODIUM SERPL-SCNC: 143 MMOL/L (ref 136–145)
VENTRICULAR RATE, ECG03: 106 BPM
WBC # BLD AUTO: 4.7 K/UL (ref 4.6–13.2)

## 2022-01-29 PROCEDURE — 65660000000 HC RM CCU STEPDOWN

## 2022-01-29 PROCEDURE — 82962 GLUCOSE BLOOD TEST: CPT

## 2022-01-29 PROCEDURE — 74011000250 HC RX REV CODE- 250: Performed by: EMERGENCY MEDICINE

## 2022-01-29 PROCEDURE — 74011250636 HC RX REV CODE- 250/636: Performed by: INTERNAL MEDICINE

## 2022-01-29 PROCEDURE — 74011636637 HC RX REV CODE- 636/637: Performed by: EMERGENCY MEDICINE

## 2022-01-29 PROCEDURE — 74011250636 HC RX REV CODE- 250/636: Performed by: EMERGENCY MEDICINE

## 2022-01-29 PROCEDURE — 82330 ASSAY OF CALCIUM: CPT

## 2022-01-29 PROCEDURE — 74011250637 HC RX REV CODE- 250/637: Performed by: EMERGENCY MEDICINE

## 2022-01-29 PROCEDURE — 83735 ASSAY OF MAGNESIUM: CPT

## 2022-01-29 PROCEDURE — 80048 BASIC METABOLIC PNL TOTAL CA: CPT

## 2022-01-29 PROCEDURE — 80053 COMPREHEN METABOLIC PANEL: CPT

## 2022-01-29 PROCEDURE — 85025 COMPLETE CBC W/AUTO DIFF WBC: CPT

## 2022-01-29 RX ORDER — INSULIN GLARGINE 100 [IU]/ML
25 INJECTION, SOLUTION SUBCUTANEOUS
Status: DISCONTINUED | OUTPATIENT
Start: 2022-01-29 | End: 2022-02-02 | Stop reason: HOSPADM

## 2022-01-29 RX ORDER — UREA 10 %
50 LOTION (ML) TOPICAL DAILY
Status: DISCONTINUED | OUTPATIENT
Start: 2022-01-29 | End: 2022-02-02 | Stop reason: HOSPADM

## 2022-01-29 RX ORDER — SODIUM CHLORIDE 9 MG/ML
150 INJECTION, SOLUTION INTRAVENOUS CONTINUOUS
Status: DISCONTINUED | OUTPATIENT
Start: 2022-01-29 | End: 2022-01-31

## 2022-01-29 RX ORDER — IPRATROPIUM BROMIDE 0.5 MG/2.5ML
0.5 SOLUTION RESPIRATORY (INHALATION)
Status: DISCONTINUED | OUTPATIENT
Start: 2022-01-29 | End: 2022-02-02 | Stop reason: HOSPADM

## 2022-01-29 RX ORDER — INSULIN LISPRO 100 [IU]/ML
INJECTION, SOLUTION INTRAVENOUS; SUBCUTANEOUS
Status: DISPENSED | OUTPATIENT
Start: 2022-01-29 | End: 2022-02-01

## 2022-01-29 RX ORDER — VENLAFAXINE HYDROCHLORIDE 75 MG/1
75 CAPSULE, EXTENDED RELEASE ORAL DAILY
Status: DISCONTINUED | OUTPATIENT
Start: 2022-01-29 | End: 2022-02-02 | Stop reason: HOSPADM

## 2022-01-29 RX ORDER — DEXTROSE MONOHYDRATE 100 MG/ML
125-250 INJECTION, SOLUTION INTRAVENOUS AS NEEDED
Status: DISCONTINUED | OUTPATIENT
Start: 2022-01-29 | End: 2022-02-02 | Stop reason: HOSPADM

## 2022-01-29 RX ORDER — PANTOPRAZOLE SODIUM 40 MG/1
40 TABLET, DELAYED RELEASE ORAL
Status: DISCONTINUED | OUTPATIENT
Start: 2022-01-29 | End: 2022-02-02 | Stop reason: HOSPADM

## 2022-01-29 RX ORDER — ALBUTEROL SULFATE 0.83 MG/ML
2.5 SOLUTION RESPIRATORY (INHALATION)
Status: DISCONTINUED | OUTPATIENT
Start: 2022-01-29 | End: 2022-02-02 | Stop reason: HOSPADM

## 2022-01-29 RX ORDER — INSULIN LISPRO 100 [IU]/ML
INJECTION, SOLUTION INTRAVENOUS; SUBCUTANEOUS
Status: DISCONTINUED | OUTPATIENT
Start: 2022-01-29 | End: 2022-01-29

## 2022-01-29 RX ORDER — ASPIRIN 81 MG/1
81 TABLET ORAL DAILY
Status: DISCONTINUED | OUTPATIENT
Start: 2022-01-29 | End: 2022-02-02 | Stop reason: HOSPADM

## 2022-01-29 RX ORDER — TOPIRAMATE 25 MG/1
50 TABLET ORAL 2 TIMES DAILY
Status: DISCONTINUED | OUTPATIENT
Start: 2022-01-29 | End: 2022-02-02 | Stop reason: HOSPADM

## 2022-01-29 RX ORDER — ENOXAPARIN SODIUM 100 MG/ML
30 INJECTION SUBCUTANEOUS EVERY 24 HOURS
Status: DISCONTINUED | OUTPATIENT
Start: 2022-01-29 | End: 2022-02-02 | Stop reason: HOSPADM

## 2022-01-29 RX ORDER — MAGNESIUM SULFATE 100 %
4 CRYSTALS MISCELLANEOUS AS NEEDED
Status: DISCONTINUED | OUTPATIENT
Start: 2022-01-29 | End: 2022-02-02 | Stop reason: HOSPADM

## 2022-01-29 RX ORDER — MONTELUKAST SODIUM 10 MG/1
10 TABLET ORAL DAILY
Status: DISCONTINUED | OUTPATIENT
Start: 2022-01-29 | End: 2022-02-02 | Stop reason: HOSPADM

## 2022-01-29 RX ADMIN — SODIUM CHLORIDE 300 ML/HR: 900 INJECTION, SOLUTION INTRAVENOUS at 14:47

## 2022-01-29 RX ADMIN — IPRATROPIUM BROMIDE 0.5 MG: 0.5 SOLUTION RESPIRATORY (INHALATION) at 09:38

## 2022-01-29 RX ADMIN — MONTELUKAST 10 MG: 10 TABLET, FILM COATED ORAL at 08:17

## 2022-01-29 RX ADMIN — ASPIRIN 81 MG: 81 TABLET, COATED ORAL at 08:17

## 2022-01-29 RX ADMIN — SODIUM CHLORIDE 300 ML/HR: 900 INJECTION, SOLUTION INTRAVENOUS at 01:00

## 2022-01-29 RX ADMIN — SODIUM CHLORIDE 300 ML/HR: 900 INJECTION, SOLUTION INTRAVENOUS at 04:23

## 2022-01-29 RX ADMIN — TOPIRAMATE 50 MG: 25 TABLET, FILM COATED ORAL at 20:36

## 2022-01-29 RX ADMIN — Medication 25 UNITS: at 22:49

## 2022-01-29 RX ADMIN — ARFORMOTEROL TARTRATE: 15 SOLUTION RESPIRATORY (INHALATION) at 09:37

## 2022-01-29 RX ADMIN — SODIUM CHLORIDE 200 ML/HR: 900 INJECTION, SOLUTION INTRAVENOUS at 20:00

## 2022-01-29 RX ADMIN — PANTOPRAZOLE 40 MG: 40 TABLET, DELAYED RELEASE ORAL at 08:17

## 2022-01-29 RX ADMIN — SODIUM CHLORIDE 300 ML/HR: 900 INJECTION, SOLUTION INTRAVENOUS at 11:14

## 2022-01-29 RX ADMIN — TOPIRAMATE 50 MG: 25 TABLET, FILM COATED ORAL at 08:51

## 2022-01-29 RX ADMIN — ENOXAPARIN SODIUM 30 MG: 100 INJECTION SUBCUTANEOUS at 09:37

## 2022-01-29 RX ADMIN — SODIUM CHLORIDE 300 ML/HR: 900 INJECTION, SOLUTION INTRAVENOUS at 07:30

## 2022-01-29 RX ADMIN — VENLAFAXINE HYDROCHLORIDE 75 MG: 75 CAPSULE, EXTENDED RELEASE ORAL at 08:51

## 2022-01-29 RX ADMIN — VITAM B12 50 MCG: 100 TAB at 13:33

## 2022-01-29 RX ADMIN — ARFORMOTEROL TARTRATE: 15 SOLUTION RESPIRATORY (INHALATION) at 20:36

## 2022-01-29 NOTE — ED NOTES
Shift change report given to Ji Pérez RN     Visit Vitals  BP (!) 122/59   Pulse 82   Temp 97.9 °F (36.6 °C)   Resp 24   Ht 5' 2\" (1.575 m)   Wt 79.4 kg (175 lb)   SpO2 95%   BMI 32.01 kg/m²   ;

## 2022-01-29 NOTE — PROGRESS NOTES
Spoke with   Agree with aggressive IV NS at this point  Don't see any particular role for calcitonin at this point. Will hold off on this.   Needs malignancy work up like CT scan of chest ,abdomen,pelvis, x ray or MRI LS spine etc when able  Will do full consult once she hits [facility]

## 2022-01-29 NOTE — ED NOTES
Patient is holding in her ED here while awaiting bed placement at [facility]. Patient here for elevated calcium. I reviewed the history with the patient and reviewed her work-up so far. Care was discussed with me by Dr. Jennifer Ley. Patient is taking both cholecalciferol and Caltrate. This could be a portion of the etiology. There is also her history of sarcoidosis for which she is not being treated with steroids currently. I ordered her home medications excluding hydrochlorothiazide and her D3 and calcium supplementation and insulin orders. We will place a consult to pharmacy for DVT prophylaxis given acute renal failure. ED Course as of 01/30/22 0834   Sat Jan 29, 2022   0820 Spoke with nephrologist on-call, Dr. Hilaria Back. Agrees with current treatment plan with continued IV fluids hospital mission. He will review the chart and discuss whether or not to treat with calcitonin.  [BERT]      ED Course User Index  [BERT] DO Kenya Greene DO

## 2022-01-29 NOTE — PROGRESS NOTES
Substitution Information per the P&T Committee approved Therapeutic Interchanges Policy    Nonformulary Medication Formulary Interchange   Fluticasone-umeclidinium-vilanterol (TRELEGY ELLIPTA) 264-08.2-16 mcg/actuation inhaler arformoterol (BROVANA) 15 mcg/2 mL nebulizer BID   +  budesonide (PULMICORT RESPULES)  0.5 mg/2 mL nebulizer BID  +  Ipratropium (ATROVENT) 0.5 mg/2.5 mL nebulizer q8h

## 2022-01-29 NOTE — ED NOTES
Assumed care of this pt at this time. Pt returning from the restroom. Pt hooked back up to fluids & the monitor. Pt resting back comfortably in the bed. No complaints at this time. Call bell within reach.

## 2022-01-29 NOTE — ED NOTES
Pt arrived to ED Room: 4  Introduced self to Patient, Assessment completed,reviewed pt concerns, monitor connected call bell given to patient, will continue to monitor pending MD assessment & orders to be placed. Pt stated she recently had a back injection from [address] [place] [address], pt was called today regarding lqab results and then told to come to the ER.   Pt has no acute complaints at this time       Pt had Steroid injection 12/14/21 followed by LP then required a blood patch

## 2022-01-29 NOTE — ED PROVIDER NOTES
HPI patient is a 79-year-old female with chronic lower back pain. She was scheduled to see a neurologist in 2 to 3 days and had lab tests drawn this a.m. prior to her scheduled appointment. She will call lab and told to follow-up in ER immediately because of having abnormal lab tests. Patient has no complaints except chronic low back pain that she has been follow-up with her neurologist for. Patient states she has had there was injection in her back in the past she had given no resolution her lower back pain.     Past Medical History:   Diagnosis Date    Acquired cyst of kidney 01/16/2020    [facility]    Asthma     Bilateral great toe fractures 2014    Chronic kidney disease, stage 3b ([facility] [address] 01/2021    Dr Amos Gil, nephro    Chronic sinusitis     Dr. Mary Ag in the past    Colon polyp 5/16    Dr Faheem Guillaume    Depression 2019    Diabetes mellitus ([facility] [address]     DJD (degenerative joint disease) of cervical spine 2016    DJD (degenerative joint disease), lumbar 2013    MRI c spine w degen changes; Dr Jose Riley    Dyslipidemia     calculated 10 year risk score was 2.0% (12/13)    Edema of both ankles 8/28/2018    Environmental and seasonal allergies 8/28/2018    Eustachian tube dysfunction     Fibrocystic breast     Dr Melissa Montiel GERD (gastroesophageal reflux disease)     Hypertriglyceridemia 8/28/2018    Lateral epicondylitis of both elbows 2/6/2017    Macular degeneration of both eyes 08/28/2018    Dr Peggy Saavedra [facility]    Mild intermittent asthma without complication 34/36/6521    Dr. Bo Hallman;  ratio 68%, FEV1 78% w 6% inc postbd, TLC 77, RV 56, DLCO 61%    Morbid obesity (HCC)     peak weight 196 lbs, bmi 35.8 from 10/13    Neuropathy 8/28/2018    Osteopenia     Dr. Denny Byers; DEXA t score -0.7 spine, -0.2 hip (8/14)    Pneumonia     Sarcoidosis     Dr Danielito Monzon Type 2 diabetes mellitus with hyperglycemia, with long-term current use of insulin ([facility] [address] 8/28/2018       Past Surgical History:   Procedure Laterality Date    COLONOSCOPY N/A 5/26/2016    Dr Mary Simpson hyperplastic    COLONOSCOPY N/A 10/19/2021    COLONOSCOPY with polypectomy performed by Laura Lozada MD at [address] HX HEENT      nasal polypectomy Dr. Tiffanie Holt HX HEENT      tear duct surgery right Dr. Kiana Monzon 2010; left Dr Ramy Diaz 2015    HX ORTHOPAEDIC      DEXA -0.7 spine, -0.2 hip 8/14    ND CARDIAC SURG PROCEDURE UNLIST  9/10, 8/13    thallium negative ef 72%; negative ef 70%    ND CHEST SURGERY PROCEDURE UNLISTED  7/12    US thyroid negative    ND CHEST SURGERY PROCEDURE UNLISTED  2012    pfts w mod restrictive defect     VASCULAR SURGERY PROCEDURE UNLIST  12/13    venous doppler negative         Family History:   Problem Relation Age of Onset    Cancer Mother     Hypertension Mother     Cancer Father     Diabetes Father     Hypertension Father     Stroke Father     Diabetes Sister     Hypertension Sister     Heart Disease Sister     Colon Cancer Sister 52    Hypertension Brother     Cancer Maternal Aunt        Social History     Socioeconomic History    Marital status: LEGALLY      Spouse name: Not on file    Number of children: 0    Years of education: 13    Highest education level: Not on file   Occupational History    Occupation: Unemployed   Tobacco Use    Smoking status: Never Smoker    Smokeless tobacco: Never Used   Vaping Use    Vaping Use: Never used   Substance and Sexual Activity    Alcohol use:  Yes     Alcohol/week: 0.0 standard drinks     Comment: occasional wine    Drug use: Never    Sexual activity: Not Currently   Other Topics Concern     Service No    Blood Transfusions No    Caffeine Concern Yes     Comment: due to reflux    Occupational Exposure No    Hobby Hazards No    Sleep Concern Yes    Stress Concern Yes     Comment: wants a job    Weight Concern Yes    Special Diet No    Back Care No    Exercise Yes    Bike Helmet No    Seat Belt Yes    Self-Exams Yes   Social History Narrative    Patient lives with a friend, no pets. Social Determinants of Health     Financial Resource Strain:     Difficulty of Paying Living Expenses: Not on file   Food Insecurity:     Worried About Running Out of Food in the Last Year: Not on file    Pee of Food in the Last Year: Not on file   Transportation Needs:     Lack of Transportation (Medical): Not on file    Lack of Transportation (Non-Medical): Not on file   Physical Activity:     Days of Exercise per Week: Not on file    Minutes of Exercise per Session: Not on file   Stress:     Feeling of Stress : Not on file   Social Connections:     Frequency of Communication with Friends and Family: Not on file    Frequency of Social Gatherings with Friends and Family: Not on file    Attends Religion Services: Not on file    Active Member of [address] [place] [address] [employer] or Organizations: Not on file    Attends Club or Organization Meetings: Not on file    Marital Status: Not on file   Intimate Partner Violence:     Fear of Current or Ex-Partner: Not on file    Emotionally Abused: Not on file    Physically Abused: Not on file    Sexually Abused: Not on file   Housing Stability:     Unable to Pay for Housing in the Last Year: Not on file    Number of [place] in the Last Year: Not on file    Unstable Housing in the Last Year: Not on file         ALLERGIES: Pollen extracts, Amoxicillin, Crestor [rosuvastatin], Ibuprofen, Lipitor [atorvastatin], and Pcn [penicillins]    Review of Systems   Constitutional: Negative. HENT: Negative. Eyes: Negative. Respiratory: Negative. Cardiovascular: Negative. Gastrointestinal: Negative. Endocrine: Negative. Genitourinary: Negative. Musculoskeletal: Positive for back pain (chronic). Skin: Negative. Allergic/Immunologic: Negative. Neurological: Negative. Hematological: Negative. Psychiatric/Behavioral: Negative.     All other systems reviewed and are negative. Vitals:    01/28/22 1903   BP: (!) 125/93   Pulse: (!) 119   Resp: 16   Temp: 97.9 °F (36.6 °C)   SpO2: 98%   Weight: 79.4 kg (175 lb)   Height: 5' 2\" (1.575 m)            Physical Exam  Vitals and nursing note reviewed. Constitutional:       General: She is not in acute distress. Appearance: She is well-developed. She is not diaphoretic. HENT:      Head: Normocephalic. Right Ear: External ear normal.      Left Ear: External ear normal.      Mouth/Throat:      Pharynx: No oropharyngeal exudate. Eyes:      General: No scleral icterus. Right eye: No discharge. Left eye: No discharge. Conjunctiva/sclera: Conjunctivae normal.      Pupils: Pupils are equal, round, and reactive to light. Neck:      Thyroid: No thyromegaly. Vascular: No JVD. Trachea: No tracheal deviation. Cardiovascular:      Rate and Rhythm: Normal rate and regular rhythm. Heart sounds: Normal heart sounds. No murmur heard. No friction rub. No gallop. Pulmonary:      Effort: Pulmonary effort is normal. No respiratory distress. Breath sounds: Normal breath sounds. No stridor. No wheezing or rales. Chest:      Chest wall: No tenderness. Abdominal:      General: Bowel sounds are normal. There is no distension. Palpations: Abdomen is soft. There is no mass. Tenderness: There is no abdominal tenderness. There is no guarding or rebound. Musculoskeletal:         General: No tenderness. Normal range of motion. Cervical back: Normal range of motion and neck supple. Lymphadenopathy:      Cervical: No cervical adenopathy. Skin:     General: Skin is warm and dry. Coloration: Skin is not pale. Findings: No erythema or rash. Neurological:      Mental Status: She is alert and oriented to person, place, and time. Cranial Nerves: No cranial nerve deficit. Motor: No abnormal muscle tone.       Coordination: Coordination normal.      Deep Tendon Reflexes: Reflexes normal.          [facility]     EKG: interpreted by me    CxR: interpreted by me    Lab tests: interpreted by me       Procedures    Differential diagnosis: Anemia, hyperkalemia, hypocalcemia, hypercalcemia    Dx: acute hyperkalemia, acute renal failure    Hospital course: initiation of IV NS infusion of 300 ml/h    Consulted: hospitalist: Dr. Oscar Cheek accepted patient for admission    Disposition: admit    Dictation disclaimer:  Please note that this dictation was completed with Hangtime, the computer voice recognition software. Quite often unanticipated grammatical, syntax, homophones, and other interpretive errors are inadvertently transcribed by the computer software. Please disregard these errors. Please excuse any errors that have escaped final proofreading.

## 2022-01-29 NOTE — PROGRESS NOTES
Rx to choose Tx (SC Heparin vs Enoxaparin) and dose for DVT prophylaxis  Patient meets criteria for SC Enoxaparin with renal dosing       Reason for Renal Dosing:  Prescriber consult    Patient clinical status and labs ordered/reviewed. Pt Weight Weight: 79.4 kg (175 lb)   Serum Creatinine Lab Results   Component Value Date/Time    Creatinine 2.72 (H) 01/29/2022 03:55 AM    Creatinine, POC 2.8 (H) 01/08/2022 04:26 PM       Creatinine Clearance Estimated Creatinine Clearance: 22.3 mL/min (A) (based on SCr of 2.72 mg/dL (H)). BUN Lab Results   Component Value Date/Time    BUN 34 (H) 01/29/2022 03:55 AM    BUN, POC 12 05/26/2016 10:49 AM       WBC Lab Results   Component Value Date/Time    WBC 4.7 01/29/2022 07:15 AM      Temperature Temp: 97.9 °F (36.6 °C)   HR Pulse (Heart Rate): 89     BP BP: (!) 159/74           Drug type: Non-Antibiotic      Drug/dose: for naïve patient was initiated at: 30 mg SC Enoxaparin for DVT Prophylaxis    Continue to monitor.     Signed Layla Meléndez [facility]  Date 1/29/2022  Time 8:21 AM

## 2022-01-29 NOTE — PROGRESS NOTES
D/w dr. Vickie Spurling: he has talked to dr. Juanita Saucedo and patient will be receiving calcitonin soon. Further plan per dr. Aurelio Claros recommendations.

## 2022-01-29 NOTE — ED NOTES
Pt offered lunch at this time. Pt stated she does not want to eat at this time, she would like to wait for a few more hours before eating. Pt resting comfortably in the bed, call bell within reach, no complaints at this time.

## 2022-01-30 ENCOUNTER — APPOINTMENT (OUTPATIENT)
Dept: CT IMAGING | Age: 57
DRG: 641 | End: 2022-01-30
Attending: STUDENT IN AN ORGANIZED HEALTH CARE EDUCATION/TRAINING PROGRAM
Payer: MEDICARE

## 2022-01-30 LAB
25(OH)D3 SERPL-MCNC: 47.4 NG/ML (ref 30–100)
ALBUMIN SERPL-MCNC: 2.7 G/DL (ref 3.4–5)
ANION GAP SERPL CALC-SCNC: 4 MMOL/L (ref 3–18)
ANION GAP SERPL CALC-SCNC: 5 MMOL/L (ref 3–18)
ANION GAP SERPL CALC-SCNC: 9 MMOL/L (ref 3–18)
APPEARANCE UR: CLEAR
BACTERIA URNS QL MICRO: NEGATIVE /HPF
BILIRUB UR QL: NEGATIVE
BUN SERPL-MCNC: 24 MG/DL (ref 7–18)
BUN SERPL-MCNC: 24 MG/DL (ref 7–18)
BUN SERPL-MCNC: 25 MG/DL (ref 7–18)
BUN/CREAT SERPL: 11 (ref 12–20)
BUN/CREAT SERPL: 11 (ref 12–20)
BUN/CREAT SERPL: 12 (ref 12–20)
CA-I SERPL-SCNC: 1.7 MMOL/L (ref 1.15–1.33)
CALCIUM SERPL-MCNC: 11.6 MG/DL (ref 8.5–10.1)
CALCIUM SERPL-MCNC: 11.7 MG/DL (ref 8.5–10.1)
CALCIUM SERPL-MCNC: 11.8 MG/DL (ref 8.5–10.1)
CHLORIDE SERPL-SCNC: 114 MMOL/L (ref 100–111)
CHLORIDE SERPL-SCNC: 115 MMOL/L (ref 100–111)
CHLORIDE SERPL-SCNC: 117 MMOL/L (ref 100–111)
CO2 SERPL-SCNC: 22 MMOL/L (ref 21–32)
CO2 SERPL-SCNC: 22 MMOL/L (ref 21–32)
CO2 SERPL-SCNC: 23 MMOL/L (ref 21–32)
COLOR UR: YELLOW
CREAT SERPL-MCNC: 2.07 MG/DL (ref 0.6–1.3)
CREAT SERPL-MCNC: 2.21 MG/DL (ref 0.6–1.3)
CREAT SERPL-MCNC: 2.27 MG/DL (ref 0.6–1.3)
EPITH CASTS URNS QL MICRO: NEGATIVE /LPF (ref 0–5)
GLUCOSE BLD STRIP.AUTO-MCNC: 151 MG/DL (ref 70–110)
GLUCOSE BLD STRIP.AUTO-MCNC: 193 MG/DL (ref 70–110)
GLUCOSE BLD STRIP.AUTO-MCNC: 88 MG/DL (ref 70–110)
GLUCOSE SERPL-MCNC: 116 MG/DL (ref 74–99)
GLUCOSE SERPL-MCNC: 190 MG/DL (ref 74–99)
GLUCOSE SERPL-MCNC: 89 MG/DL (ref 74–99)
GLUCOSE UR STRIP.AUTO-MCNC: NEGATIVE MG/DL
HGB UR QL STRIP: NEGATIVE
KETONES UR QL STRIP.AUTO: NEGATIVE MG/DL
LEUKOCYTE ESTERASE UR QL STRIP.AUTO: NEGATIVE
NITRITE UR QL STRIP.AUTO: NEGATIVE
PH UR STRIP: 7 [PH] (ref 5–8)
PHOSPHATE SERPL-MCNC: 1.7 MG/DL (ref 2.5–4.9)
POTASSIUM SERPL-SCNC: 3.5 MMOL/L (ref 3.5–5.5)
POTASSIUM SERPL-SCNC: 3.6 MMOL/L (ref 3.5–5.5)
POTASSIUM SERPL-SCNC: 4.4 MMOL/L (ref 3.5–5.5)
PROT UR STRIP-MCNC: NEGATIVE MG/DL
RBC #/AREA URNS HPF: NEGATIVE /HPF (ref 0–5)
SODIUM SERPL-SCNC: 141 MMOL/L (ref 136–145)
SODIUM SERPL-SCNC: 145 MMOL/L (ref 136–145)
SODIUM SERPL-SCNC: 145 MMOL/L (ref 136–145)
SP GR UR REFRACTOMETRY: 1.01 (ref 1–1.03)
UROBILINOGEN UR QL STRIP.AUTO: 0.2 EU/DL (ref 0.2–1)
WBC URNS QL MICRO: NEGATIVE /HPF (ref 0–4)

## 2022-01-30 PROCEDURE — 83970 ASSAY OF PARATHORMONE: CPT

## 2022-01-30 PROCEDURE — 77030038269 HC DRN EXT URIN PURWCK BARD -A

## 2022-01-30 PROCEDURE — 2709999900 HC NON-CHARGEABLE SUPPLY

## 2022-01-30 PROCEDURE — 80069 RENAL FUNCTION PANEL: CPT

## 2022-01-30 PROCEDURE — 82306 VITAMIN D 25 HYDROXY: CPT

## 2022-01-30 PROCEDURE — 74011250637 HC RX REV CODE- 250/637: Performed by: STUDENT IN AN ORGANIZED HEALTH CARE EDUCATION/TRAINING PROGRAM

## 2022-01-30 PROCEDURE — 94640 AIRWAY INHALATION TREATMENT: CPT

## 2022-01-30 PROCEDURE — 74011636637 HC RX REV CODE- 636/637: Performed by: INTERNAL MEDICINE

## 2022-01-30 PROCEDURE — 74011250636 HC RX REV CODE- 250/636: Performed by: STUDENT IN AN ORGANIZED HEALTH CARE EDUCATION/TRAINING PROGRAM

## 2022-01-30 PROCEDURE — 81001 URINALYSIS AUTO W/SCOPE: CPT

## 2022-01-30 PROCEDURE — 74011000250 HC RX REV CODE- 250: Performed by: STUDENT IN AN ORGANIZED HEALTH CARE EDUCATION/TRAINING PROGRAM

## 2022-01-30 PROCEDURE — 82397 CHEMILUMINESCENT ASSAY: CPT

## 2022-01-30 PROCEDURE — 65660000000 HC RM CCU STEPDOWN

## 2022-01-30 PROCEDURE — 74011636637 HC RX REV CODE- 636/637: Performed by: STUDENT IN AN ORGANIZED HEALTH CARE EDUCATION/TRAINING PROGRAM

## 2022-01-30 PROCEDURE — 99221 1ST HOSP IP/OBS SF/LOW 40: CPT | Performed by: STUDENT IN AN ORGANIZED HEALTH CARE EDUCATION/TRAINING PROGRAM

## 2022-01-30 PROCEDURE — 80048 BASIC METABOLIC PNL TOTAL CA: CPT

## 2022-01-30 PROCEDURE — 74011000250 HC RX REV CODE- 250: Performed by: EMERGENCY MEDICINE

## 2022-01-30 PROCEDURE — 71250 CT THORAX DX C-: CPT

## 2022-01-30 PROCEDURE — 99222 1ST HOSP IP/OBS MODERATE 55: CPT | Performed by: INTERNAL MEDICINE

## 2022-01-30 PROCEDURE — 82330 ASSAY OF CALCIUM: CPT

## 2022-01-30 PROCEDURE — 36415 COLL VENOUS BLD VENIPUNCTURE: CPT

## 2022-01-30 PROCEDURE — 82962 GLUCOSE BLOOD TEST: CPT

## 2022-01-30 PROCEDURE — 74011250636 HC RX REV CODE- 250/636: Performed by: INTERNAL MEDICINE

## 2022-01-30 PROCEDURE — 82652 VIT D 1 25-DIHYDROXY: CPT

## 2022-01-30 RX ORDER — ONDANSETRON 2 MG/ML
4 INJECTION INTRAMUSCULAR; INTRAVENOUS
Status: DISCONTINUED | OUTPATIENT
Start: 2022-01-30 | End: 2022-02-02 | Stop reason: HOSPADM

## 2022-01-30 RX ORDER — PREDNISONE 20 MG/1
20 TABLET ORAL
Status: DISCONTINUED | OUTPATIENT
Start: 2022-01-30 | End: 2022-02-02 | Stop reason: HOSPADM

## 2022-01-30 RX ADMIN — PREDNISONE 20 MG: 20 TABLET ORAL at 11:45

## 2022-01-30 RX ADMIN — SODIUM CHLORIDE 200 ML/HR: 900 INJECTION, SOLUTION INTRAVENOUS at 03:24

## 2022-01-30 RX ADMIN — IPRATROPIUM BROMIDE 0.5 MG: 0.5 SOLUTION RESPIRATORY (INHALATION) at 00:13

## 2022-01-30 RX ADMIN — IPRATROPIUM BROMIDE 0.5 MG: 0.5 SOLUTION RESPIRATORY (INHALATION) at 16:37

## 2022-01-30 RX ADMIN — TOPIRAMATE 50 MG: 25 TABLET, FILM COATED ORAL at 10:55

## 2022-01-30 RX ADMIN — SODIUM CHLORIDE 150 ML/HR: 900 INJECTION, SOLUTION INTRAVENOUS at 22:02

## 2022-01-30 RX ADMIN — ENOXAPARIN SODIUM 30 MG: 100 INJECTION SUBCUTANEOUS at 10:56

## 2022-01-30 RX ADMIN — Medication 25 UNITS: at 22:13

## 2022-01-30 RX ADMIN — TOPIRAMATE 50 MG: 25 TABLET, FILM COATED ORAL at 18:27

## 2022-01-30 RX ADMIN — IPRATROPIUM BROMIDE 0.5 MG: 0.5 SOLUTION RESPIRATORY (INHALATION) at 11:49

## 2022-01-30 RX ADMIN — ARFORMOTEROL TARTRATE: 15 SOLUTION RESPIRATORY (INHALATION) at 21:37

## 2022-01-30 RX ADMIN — SODIUM CHLORIDE 200 ML/HR: 900 INJECTION, SOLUTION INTRAVENOUS at 00:17

## 2022-01-30 RX ADMIN — ASPIRIN 81 MG: 81 TABLET, COATED ORAL at 10:54

## 2022-01-30 RX ADMIN — VENLAFAXINE HYDROCHLORIDE 75 MG: 75 CAPSULE, EXTENDED RELEASE ORAL at 10:54

## 2022-01-30 RX ADMIN — VITAM B12 50 MCG: 100 TAB at 10:55

## 2022-01-30 RX ADMIN — MONTELUKAST 10 MG: 10 TABLET, FILM COATED ORAL at 10:54

## 2022-01-30 RX ADMIN — ARFORMOTEROL TARTRATE: 15 SOLUTION RESPIRATORY (INHALATION) at 10:30

## 2022-01-30 RX ADMIN — PANTOPRAZOLE 40 MG: 40 TABLET, DELAYED RELEASE ORAL at 07:30

## 2022-01-30 NOTE — CONSULTS
[facility]   Pulmonary, Critical Care, and Sleep Medicine    Initial Patient Consult    Name: Alan Segovia MRN: [id number]   : 1965 Hospital: [facility]   Date: 2022        IMPRESSION:   · Severe Hypercalcemia- 15.2 on admission  · - unclear cause, possible 2/2 sarcoidosis and the setting of Ca/vit D supplementation  · - CT C/A/P without concern for malignancy  · - treated only with IVFs  · Hx of Sarcoidosis  · - follow with Juliann Cordova in clinic- on trelegy and flovent  · - known lung disease- ILD, appears stable  · - recently weaned off prednisone  · - CT stable on admisssion  · SRIDHAR on CKD  · - 2/2 dehydration from hyperCa  · - nephro following     Patient Active Problem List   Diagnosis Code    Sarcoidosis Dr. Sushila Johnson on low dose steroid D86.9    Gastroesophageal reflux disease without esophagitis K21.9    Type 2 diabetes mellitus with hyperglycemia, with long-term current use of insulin ([address] E11.65, Z79.4    Neuropathy G62.9    Mild intermittent asthma without complication K80.49    Environmental and seasonal allergies J30.89    Macular degeneration of both eyes H35.30    Diabetic neuropathy, painful (HCC) E11.40    Type 2 diabetes mellitus with proliferative retinopathy ([address] N60.8194    Mixed hyperlipidemia E78.2    Eczema L30.9    Stage 3 chronic kidney disease (HCC) N18.30    Depression with anxiety F41.8    Hypoalbuminemia E88.09    Essential hypertension I10    Retinal drusen H35.369    Vitamin D deficiency E55.9    Age-related nuclear cataract, bilateral H25.13    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 Jennifer Pulido for sarcoidosis. Patient is a 61 y.o. female with PMHx of lung sarcoidosis who follows with us in clinic. She was recently weaned off prednisone with stable lung disease. She came to the ED after a lab value came back with extremely high calcium. She had been taking vit D/calcium supplements at home. In the ED, she was given IVFs. Ca is improving. CT chest showed stable disease from 7/ 21 CT scan. She has no respiratory complaints. No cough. She endorses taking her inhalers. She is on RA, no distress. She was started on 20mg of prednisone by nephrology.        Past Medical History:   Diagnosis Date    Acquired cyst of kidney 01/16/2020    [facility]    Asthma     Bilateral great toe fractures 2014    Chronic kidney disease, stage 3b ([facility] 01/2021    Dr Giselle Broderick, nephro    Chronic sinusitis     Dr. Ajay Weeks in the past    Colon polyp 5/16    Dr Anita Vásquez    Depression 2019    Diabetes mellitus ([address]     DJD (degenerative joint disease) of cervical spine 2016    DJD (degenerative joint disease), lumbar 2013    MRI c spine w degen changes; Dr Bradly Lopez    Dyslipidemia     calculated 10 year risk score was 2.0% (12/13)    Edema of both ankles 8/28/2018    Environmental and seasonal allergies 8/28/2018    Eustachian tube dysfunction     Fibrocystic breast     Dr Ottis Gaucher GERD (gastroesophageal reflux disease)     Hypertriglyceridemia 8/28/2018    Lateral epicondylitis of both elbows 2/6/2017    Macular degeneration of both eyes 08/28/2018    Dr Iris Kahn [facility]    Mild intermittent asthma without complication 45/98/9103    Dr. Anayeli Broderick;  ratio 68%, FEV1 78% w 6% inc postbd, TLC 77, RV 56, DLCO 61%    Morbid obesity (HCC)     peak weight 196 lbs, bmi 35.8 from 10/13    Neuropathy 8/28/2018    Osteopenia     Dr. Lupillo Weiner; DEXA t score -0.7 spine, -0.2 hip (8/14)    Pneumonia     Sarcoidosis     Dr Cale Meng    Type 2 diabetes mellitus with hyperglycemia, with long-term current use of insulin ([facility] [address] 8/28/2018      Past Surgical History:   Procedure Laterality Date    COLONOSCOPY N/A 5/26/2016    Dr Mabel Santiago hyperplastic    COLONOSCOPY N/A 10/19/2021    COLONOSCOPY with polypectomy performed by Aimee Williamson MD [address]      Dr. Quirino Graham HX HEENT      nasal polypectomy Dr. Gladis Eagle HX HEENT      tear duct surgery right Dr. Abdoulaye Rodriguez 2010; left Dr Kaia Jerry 2015    HX ORTHOPAEDIC      DEXA -0.7 spine, -0.2 hip 8/14    NH CARDIAC SURG PROCEDURE UNLIST  9/10, 8/13    thallium negative ef 72%; negative ef 70%    NH CHEST SURGERY PROCEDURE UNLISTED  7/12    US thyroid negative    NH CHEST SURGERY PROCEDURE UNLISTED  2012    pfts w mod restrictive defect     VASCULAR SURGERY PROCEDURE UNLIST  12/13    venous doppler negative      Prior to Admission medications    Medication Sig Start Date End Date Taking? Authorizing Provider   butalbital-acetaminophen-caffeine (FIORICET, ESGIC) -40 mg per tablet Take 1 Tablet by mouth every six (6) hours as needed. 12/22/21   Provider, Historical   magnesium oxide (MAG-OX) 400 mg tablet Take 400 mg by mouth two (2) times daily (with meals). 12/22/21   Provider, Historical   venlafaxine-SR (EFFEXOR-XR) 75 mg capsule Take 1 Capsule by mouth daily. 1/3/22   Brenna Antonio DNP   topiramate (TOPAMAX) 50 mg tablet Take 1 Tablet by mouth two (2) times a day. 1/3/22   Tiburcio Khan DNP   SITagliptin (JANUVIA) 25 mg tablet Take 1 Tablet by mouth daily. For diabetes 1/3/22   Jenni LOMBARDO DNP   pravastatin (PRAVACHOL) 80 mg tablet Take 1 Tablet by mouth daily. 1/3/22   Tiburcio Khan DNP   montelukast (SINGULAIR) 10 mg tablet Take 1 Tablet by mouth daily.  1/3/22   Tiburcio Khan DNP   insulin glargine (LANTUS,BASAGLAR) 100 unit/mL (3 mL) inpn 25 Units by SubCUTAneous route nightly. 1/3/22   Teodoro Khan DNP   hydroCHLOROthiazide (HYDRODIURIL) 25 mg tablet Take 1 Tablet by mouth daily. For swelling 1/3/22   Conchita Khan DNP   esomeprazole (NEXIUM) 40 mg capsule TAKE ONE CAPSULE BY MOUTH DAILY AS NEEDED FOR REFLUX 12/16/21   Daryle Margarita M, DNP   triamcinolone (Nasacort) 55 mcg nasal inhaler 2 Sprays daily. Provider, Historical   calcium-cholecalciferol, D3, (CALTRATE 600+D) tablet Take 1 Tablet by mouth daily. 9/27/21   Teodoro Khan DNP   fluticasone propionate (Flovent Diskus) 250 mcg/actuation dsdv Take 1 Puff by inhalation two (2) times a day. Rinse gargle mouth thoroughly after each use. Disp: 3 inhalers 9/15/21   Claudetta Rainbow, MD   fluticasone-umeclidin-vilanter (Trelegy Ellipta) 706-16.9-48 mcg dsdv Take 1 Puff by inhalation daily. Rinse and gargle after each use 9/15/21   Claudetta Rainbow, MD   cholecalciferol (VITAMIN D3) 25 mcg (1,000 unit) cap Take 1,000 Units by mouth daily. Provider, Historical   cyanocobalamin (VITAMIN B12) 100 mcg tablet Take 50 mcg by mouth daily. Provider, Historical   albuterol (PROVENTIL HFA, VENTOLIN HFA, PROAIR HFA) 90 mcg/actuation inhaler Take 2 Puffs by inhalation every four (4) hours as needed for Shortness of Breath. Indications: asthma attack 6/16/21   Bronwyn Fernandes DNP   Insulin Needles, Disposable, ([place] Pen Needle) 32 gauge x 5/32\" ndle Check blood sugars three times a day 6/16/21   Conchita Khan DNP   insulin lispro (HumaLOG KwikPen Insulin) 200 unit/mL (3 mL) inpn 3 times per day: 150-200 2u, 201-250 4u, 251-300 6u, 301-350 8u, 351-400 10u, > 400 12u. Indications: type 2 diabetes mellitus 6/16/21   Bronwyn Fernandes DNP   therapeutic multivitamin-minerals [facility]) tablet Take 1 Tab by mouth daily. 3/7/21   Provider, Historical   triamcinolone acetonide (KENALOG) 0.1 % topical cream Apply  to affected area two (2) times daily as needed for Skin Irritation.  3/24/21 [place], [place]   aspirin delayed-release 81 mg tablet Take 1 Tab by mouth daily. For heart health 9/3/19   Jaylin LOMBARDO DNP   glucose blood VI test strips (ACCU-CHEK POOJA) strip Pt to test 5 times daily. Dx:E11.65 11/17/17   Martha Krueger MD     Allergies   Allergen Reactions    Pollen Extracts Runny Nose and Cough    Amoxicillin Hives, Shortness of Breath and Swelling    Crestor [Rosuvastatin] Myalgia    Ibuprofen Other (comments)     dont prescribe due to kidney function    Lipitor [Atorvastatin] Other (comments)     Muscle cramps and weakness      Pcn [Penicillins] Angioedema      Social History     Tobacco Use    Smoking status: Never Smoker    Smokeless tobacco: Never Used   Substance Use Topics    Alcohol use:  Yes     Alcohol/week: 0.0 standard drinks     Comment: occasional wine      Family History   Problem Relation Age of Onset    Cancer Mother     Hypertension Mother     Cancer Father     Diabetes Father     Hypertension Father     Stroke Father     Diabetes Sister     Hypertension Sister     Heart Disease Sister     Colon Cancer Sister 52    Hypertension Brother     Cancer Maternal Aunt         Current Facility-Administered Medications   Medication Dose Route Frequency    predniSONE (DELTASONE) tablet 20 mg  20 mg Oral DAILY WITH BREAKFAST    0.9% sodium chloride infusion  150 mL/hr IntraVENous CONTINUOUS    insulin lispro (HUMALOG) injection   SubCUTAneous AC&HS    pantoprazole (PROTONIX) tablet 40 mg  40 mg Oral ACB    insulin glargine (LANTUS) injection 25 Units  25 Units SubCUTAneous QHS    montelukast (SINGULAIR) tablet 10 mg  10 mg Oral DAILY    topiramate (TOPAMAX) tablet 50 mg  50 mg Oral BID    venlafaxine-SR (EFFEXOR-XR) capsule 75 mg  75 mg Oral DAILY    aspirin delayed-release tablet 81 mg  81 mg Oral DAILY    cyanocobalamin (VITAMIN B12) tablet 50 mcg  50 mcg Oral DAILY    arformoterol 15 mcg/budesonide 0.5 mg neb solution   Nebulization BID RT    ipratropium (ATROVENT) 0.02 % nebulizer solution 0.5 mg  0.5 mg Nebulization Q8H RT    enoxaparin (LOVENOX) injection 30 mg  30 mg SubCUTAneous Q24H       Review of Systems:  Pertinent items are noted in HPI. ROS    Objective:   Vital Signs:    Visit Vitals  BP (!) 150/82   Pulse 84   Temp 98.5 °F (36.9 °C)   Resp 20   Ht 5' 2\" (1.575 m)   Wt 81.6 kg (180 lb)   LMP 2013   SpO2 94%   BMI 32.92 kg/m²               Temp (24hrs), Av.7 °F (37.1 °C), Min:98.1 °F (36.7 °C), Max:99.9 °F (37.7 °C)       Intake/Output:   Last shift:      No intake/output data recorded. Last 3 shifts:  1901 -  0700  In: 3480 [P.O.:480; I.V.:3000]  Out: -     Intake/Output Summary (Last 24 hours) at 2022 1608  Last data filed at 2022 0001  Gross per 24 hour   Intake 1480 ml   Output --   Net 1480 ml      Physical Exam:   General:  Alert, cooperative, no distress, appears stated age. Head:  Normocephalic, without obvious abnormality, atraumatic. Eyes:  Conjunctivae/corneas clear. ANicteric   Lungs:   Bilateral auscultation mostly clear, no wheezes or rales. Chest wall:  No tenderness or deformity. NO CREPITUS   Heart:  Regular rate and rhythm, S1, S2 normal, no murmur, click, rub or gallop. Abdomen:   Soft, non-tender. Bowel sounds normal. No masses,  No organomegaly. No paradox   Extremities: normal, atraumatic, no cyanosis or edema.    Lymph nodes: Cervical, supraclavicular, and axillary nodes normal.   Neurologic: Grossly nonfocal          Data review:   Labs:  Recent Results (from the past 24 hour(s))   GLUCOSE, POC    Collection Time: 22  4:38 PM   Result Value Ref Range    Glucose (POC) 105 70 - 110 mg/dL   GLUCOSE, POC    Collection Time: 22 10:47 PM   Result Value Ref Range    Glucose (POC) 117 (H) 70 - 043 mg/dL   METABOLIC PANEL, BASIC    Collection Time: 22 11:25 PM   Result Value Ref Range    Sodium 145 136 - 145 mmol/L    Potassium 3.6 3.5 - 5.5 mmol/L    Chloride 114 (H) 100 - 111 mmol/L    CO2 22 21 - 32 mmol/L    Anion gap 9 3.0 - 18 mmol/L    Glucose 116 (H) 74 - 99 mg/dL    BUN 24 (H) 7.0 - 18 MG/DL    Creatinine 2.21 (H) 0.6 - 1.3 MG/DL    BUN/Creatinine ratio 11 (L) 12 - 20      GFR est AA 28 (L) >60 ml/min/1.73m2    GFR est non-AA 23 (L) >60 ml/min/1.73m2    Calcium 11.7 (H) 8.5 - 10.1 MG/DL   CALCIUM, IONIZED    Collection Time: 01/29/22 11:25 PM   Result Value Ref Range    Ionized Calcium 1.68 (HH) 1.15 - 1.33 MMOL/L   IONIZED CALCIUM    Collection Time: 01/29/22 11:46 PM   Result Value Ref Range    Calcium, ionized 1.68 (HH) 1.12 - 1.32 mmol/L   PTH INTACT    Collection Time: 01/30/22  6:01 AM   Result Value Ref Range    Calcium PENDING MG/DL    PTH, Intact 12.5 (L) 18.4 - 88.0 pg/mL   VITAMIN D, 25 HYDROXY    Collection Time: 01/30/22  6:01 AM   Result Value Ref Range    Vitamin D 25-Hydroxy 47.4 30 - 100 ng/mL   RENAL FUNCTION PANEL    Collection Time: 01/30/22  6:01 AM   Result Value Ref Range    Sodium PENDING mmol/L    Potassium PENDING mmol/L    Chloride PENDING mmol/L    CO2 PENDING mmol/L    Anion gap PENDING mmol/L    Glucose PENDING mg/dL    BUN PENDING MG/DL    Creatinine PENDING MG/DL    BUN/Creatinine ratio PENDING     GFR est AA PENDING ml/min/1.73m2    GFR est non-AA PENDING ml/min/1.73m2    Calcium PENDING MG/DL    Phosphorus 1.8 (L) 2.5 - 4.9 MG/DL    Albumin PENDING g/dL   METABOLIC PANEL, BASIC    Collection Time: 01/30/22  6:01 AM   Result Value Ref Range    Sodium 145 136 - 145 mmol/L    Potassium 3.5 3.5 - 5.5 mmol/L    Chloride 117 (H) 100 - 111 mmol/L    CO2 23 21 - 32 mmol/L    Anion gap 5 3.0 - 18 mmol/L    Glucose 89 74 - 99 mg/dL    BUN 25 (H) 7.0 - 18 MG/DL    Creatinine 2.07 (H) 0.6 - 1.3 MG/DL    BUN/Creatinine ratio 12 12 - 20      GFR est AA 30 (L) >60 ml/min/1.73m2    GFR est non-AA 25 (L) >60 ml/min/1.73m2    Calcium 11.8 (H) 8.5 - 10.1 MG/DL   URINALYSIS W/MICROSCOPIC    Collection Time: 01/30/22  8:00 AM   Result Value Ref Range Color YELLOW      Appearance CLEAR      Specific gravity 1.008 1.005 - 1.030      pH (UA) 7.0 5.0 - 8.0      Protein Negative NEG mg/dL    Glucose Negative NEG mg/dL    Ketone Negative NEG mg/dL    Bilirubin Negative NEG      Blood Negative NEG      Urobilinogen 0.2 0.2 - 1.0 EU/dL    Nitrites Negative NEG      Leukocyte Esterase Negative NEG      WBC Negative 0 - 4 /hpf    RBC Negative 0 - 5 /hpf    Epithelial cells Negative 0 - 5 /lpf    Bacteria Negative NEG /hpf   GLUCOSE, POC    Collection Time: 01/30/22  8:21 AM   Result Value Ref Range    Glucose (POC) 88 70 - 110 mg/dL   CALCIUM, IONIZED    Collection Time: 01/30/22 10:38 AM   Result Value Ref Range    Ionized Calcium 1.70 (HH) 1.15 - 1.33 MMOL/L     Imaging:  I have personally reviewed the patients radiographs and have reviewed the reports:  CXR Results  (Last 48 hours)               01/28/22 2137  XR CHEST PORT Final result    Impression:  1. Hypoinflated lungs with scarring in the mid to lower lung fields. Narrative:  EXAM: Chest Radiograph       INDICATION:  hypokalemia       TECHNIQUE: AP view of the chest       COMPARISON: 3/5/2019, 10/26/2016, 10/26/2016       FINDINGS: No pneumothorax identified. The lungs are hypoinflated. Chronic   scarring is noted in the mid to lower lung fields. This is progressed since most   recent prior. No effusions identified. Cardiac silhouette set the upper limits   normal. Extensive calcified hilar lymph nodes are noted. The pulmonary   vasculature is unremarkable. Degenerative changes noted in the spine. CT Results  (Last 48 hours)               01/30/22 0931  CT CHEST ABD PELV WO CONT Final result    Impression:      There is no CT finding for occult malignancy or metastatic disease on this   noncontrast examination. Calcified mediastinal and hilar lymph nodes compatible with history of   sarcoidosis.        Relatively perihilar and septal thickening and of atelectasis/scarring which may   be reflective of pulmonary sarcoid. Narrative:  CT Chest, Abdomen, And Pelvis Without Contrast       TECHNIQUE: 5 mm axial images from the thoracic inlet to issue tuberosities were   obtained without intravenous contrast.  Oral contrast was not administered. Coronal and sagittal reformations. All CT scans are performed using dose optimization techniques as appropriate to   the performed exam including the following: Automated exposure control,   adjustment of mA and/or kV according to patient size, and use of iterative   reconstructive technique. HISTORY: Hypercalcemia. Question malignancy. History of sarcoidosis. FINDINGS:    CHEST:   Visualized thyroid gland without definitive nodule. No axillary adenopathy. Calcified mediastinal and bilateral hilar nodes similar to previous   atherosclerotic calcification aorta and coronary arteries. Thoracic aorta is not   aneurysmal. No pericardial effusion. Some progressive perihilar and upper lobe atelectasis right greater than left. Trace effusion on the right. No discrete lung nodule or mass. No definite new   consolidations       Nonspecific fatty nodules of the right and left breast which may correspond with   lateral cyst on mammogram 3/2021. Continued mammographic follow-up suggested. ABDOMEN/PELVIS:   Noncontrast liver, spleen and pancreas without focal abnormality. Nondilated   gallbladder. No biliary duct dilatation. No adrenal nodule or mass. No contour   deforming mass of the right or left kidney. No hydronephrosis. Tiny   nonobstructing stone lower pole left kidney measuring a few millimeters. No dilated stomach, small bowel, or colon. Appendix is not inflamed. Sigmoid   diverticulosis. No gross adenopathy. No ascites. Uterus and adnexa are unremarkable. Underdistended bladder. OSSEOUS STRUCTURES:   No focal suspicious bone lesions.                    High complexity decision making was performed during the evaluation of this patient at high risk for decompensation with multiple organ involvement     Above mentioned total time spent on reviewing the case/medical record/data/notes/EMR/patient examination/documentation/coordinating care with nurse/consultants, exclusive of procedures with complex decision making performed and > 50% time spent in face to face evaluation.      Yudi Merrill MD

## 2022-01-30 NOTE — PROGRESS NOTES
Patient was admitted earlier today by my colleague. I saw patient in follow-up. Pulmonary consulted. Nephrology input noted. IV fluids per nephrology. Monitor calcium and electrolytes. Discussed with patient.   PT OT

## 2022-01-30 NOTE — PROGRESS NOTES
Problem: Diabetes Self-Management  Goal: *Disease process and treatment process  Description: Define diabetes and identify own type of diabetes; list 3 options for treating diabetes. Outcome: Progressing Towards Goal  Goal: *Incorporating nutritional management into lifestyle  Description: Describe effect of type, amount and timing of food on blood glucose; list 3 methods for planning meals. Outcome: Progressing Towards Goal  Goal: *Incorporating physical activity into lifestyle  Description: State effect of exercise on blood glucose levels. Outcome: Progressing Towards Goal  Goal: *Developing strategies to promote health/change behavior  Description: Define the ABC's of diabetes; identify appropriate screenings, schedule and personal plan for screenings. Outcome: Progressing Towards Goal  Goal: *Using medications safely  Description: State effect of diabetes medications on diabetes; name diabetes medication taking, action and side effects. Outcome: Progressing Towards Goal  Goal: *Monitoring blood glucose, interpreting and using results  Description: Identify recommended blood glucose targets  and personal targets. Outcome: Progressing Towards Goal  Goal: *Prevention, detection, treatment of acute complications  Description: List symptoms of hyper- and hypoglycemia; describe how to treat low blood sugar and actions for lowering  high blood glucose level. Outcome: Progressing Towards Goal  Goal: *Prevention, detection and treatment of chronic complications  Description: Define the natural course of diabetes and describe the relationship of blood glucose levels to long term complications of diabetes.   Outcome: Progressing Towards Goal  Goal: *Developing strategies to address psychosocial issues  Description: Describe feelings about living with diabetes; identify support needed and support network  Outcome: Progressing Towards Goal  Goal: *Insulin pump training  Outcome: Progressing Towards Goal  Goal: *Sick day guidelines  Outcome: Progressing Towards Goal  Goal: *Patient Specific Goal (EDIT GOAL, INSERT TEXT)  Outcome: Progressing Towards Goal     Problem: Patient Education: Go to Patient Education Activity  Goal: Patient/Family Education  Outcome: Progressing Towards Goal     Problem: Falls - Risk of  Goal: *Absence of Falls  Description: Document Ciera Marking Fall Risk and appropriate interventions in the flowsheet.   Outcome: Progressing Towards Goal  Note: Fall Risk Interventions:  Mobility Interventions: Patient to call before getting OOB,OT consult for ADLs,PT Consult for mobility concerns,PT Consult for assist device competence,Utilize walker, cane, or other assistive device         Medication Interventions: Evaluate medications/consider consulting pharmacy,Patient to call before getting OOB,Teach patient to arise slowly                   Problem: Patient Education: Go to Patient Education Activity  Goal: Patient/Family Education  Outcome: Progressing Towards Goal     Problem: General Medical Care Plan  Goal: *Vital signs within specified parameters  Outcome: Progressing Towards Goal  Goal: *Labs within defined limits  Outcome: Progressing Towards Goal  Goal: *Absence of infection signs and symptoms  Outcome: Progressing Towards Goal  Goal: *Optimal pain control at patient's stated goal  Outcome: Progressing Towards Goal  Goal: *Skin integrity maintained  Outcome: Progressing Towards Goal  Goal: *Fluid volume balance  Outcome: Progressing Towards Goal  Goal: *Optimize nutritional status  Outcome: Progressing Towards Goal  Goal: *Anxiety reduced or absent  Outcome: Progressing Towards Goal  Goal: *Progressive mobility and function (eg: ADL's)  Outcome: Progressing Towards Goal     Problem: Patient Education: Go to Patient Education Activity  Goal: Patient/Family Education  Outcome: Progressing Towards Goal     Problem: Pain  Goal: *Control of Pain  Outcome: Progressing Towards Goal     Problem: Patient Education: Go to Patient Education Activity  Goal: Patient/Family Education  Outcome: Progressing Towards Goal     Problem: Nutrition Deficit  Goal: *Optimize nutritional status  Outcome: Progressing Towards Goal     Problem: Patient Education: Go to Patient Education Activity  Goal: Patient/Family Education  Outcome: Progressing Towards Goal

## 2022-01-30 NOTE — H&P
History and Physical    Patient: Sherryle Congo               Sex: female          DOA: 1/28/2022       YOB: 1965      Age:  61 y.o.        LOS:  LOS: 2 days        HPI:     Sherryle Congo is a 61 y.o. woman with diabetes mellitus, chronic kidney disease stage IIIb, asthma, and sarcoidosis of the lungs and nose who was admitted for hypercalcemia. Patient had lab tests drawn for a pending neurology appointment. After the labs resulted, patient was called and told to go to the emergency department immediately due to abnormalities. Initial outpatient calcium was 14.9 mg/dL. Patient had normal calcium lab work in September 2021. In the ER, initial calcium was found to be 15.2 mg/dL. Patient also found to have an SRIDHAR with BUN/creatinine 38/3.16 (historically 26/1.9). EKG showed sinus tachycardia with nonspecific T wave abnormalities. Hospitalist service was consulted and patient was initiated on normal saline infusion at 300 mL an hour. Nephrology was consulted: No role for calcitonin at the time of consultation; Continued with aggressive IV normal saline. Conflicting EMR notes stating that patient was to receive calcitonin. However, it does not appear that patient received calcitonin. Blood calcium level has slowly trended down and was most recently 11.7. BUN/creatinine has also been improving. Patient tells me that she has had symptoms of nausea/vomiting, fatigue, palpitations, and decreased appetite for the past couple of weeks. This is coincided with worsening night sweats, but patient does state that she has had night sweats since she was 26years old. Patient denies any lumps or bumps or new onset of pain. She endorses she is up-to-date with her colonoscopy and mammography, reportedly obtaining both last year. Ms. Tamiko Jarquin also tells me that she has had increasing episodes of plaque-like skin changes that occur when she becomes hot.   These have coincided with increasingly ashy skin.     Initial notable ER labs: WBC 6.7, hemoglobin 12.3, potassium 3.4, glucose 144, BUN/creatinine 38/3.16, calcium 15.2, total bili 0.3, total protein 7.7, albumin 3.6, ALT/AST 31/25, alk phos 66    Past Medical History:   Diagnosis Date    Acquired cyst of kidney 01/16/2020    [facility]    Asthma     Bilateral great toe fractures 2014    Chronic kidney disease, stage 3b ([address] 01/2021    Dr Hampton Fleischer, nephro    Chronic sinusitis     Dr. Tu Pepper in the past    Colon polyp 5/16    Dr Mabel Santiago    Depression 2019    Diabetes mellitus ([address]     DJD (degenerative joint disease) of cervical spine 2016    DJD (degenerative joint disease), lumbar 2013    MRI c spine w degen changes; Dr Gilberto Emery    Dyslipidemia     calculated 10 year risk score was 2.0% (12/13)    Edema of both ankles 8/28/2018    Environmental and seasonal allergies 8/28/2018    Eustachian tube dysfunction     Fibrocystic breast     Dr Ashleigh Brown GERD (gastroesophageal reflux disease)     Hypertriglyceridemia 8/28/2018    Lateral epicondylitis of both elbows 2/6/2017    Macular degeneration of both eyes 08/28/2018    Dr Eduar Arrieta Cori [facility]    Mild intermittent asthma without complication 34/99/7518    Dr. Douglas Villagomez;  ratio 68%, FEV1 78% w 6% inc postbd, TLC 77, RV 56, DLCO 61%    Morbid obesity (HCC)     peak weight 196 lbs, bmi 35.8 from 10/13    Neuropathy 8/28/2018    Osteopenia     Dr. Jomar Friedman; DEXA t score -0.7 spine, -0.2 hip (8/14)    Pneumonia     Sarcoidosis     Dr Lupe Ackerman Type 2 diabetes mellitus with hyperglycemia, with long-term current use of insulin ([facility] [address] 8/28/2018     Past Surgical History:   Procedure Laterality Date    COLONOSCOPY N/A 5/26/2016    Dr Mabel Santiago hyperplastic    COLONOSCOPY N/A 10/19/2021    COLONOSCOPY with polypectomy performed by Aimee Williamson MD at [address] [place]      Dr. Reggie Walters    HX HEENT      nasal polypectomy Dr. Gladis Eagle HX HEENT      tear duct surgery right Dr. Mando Garcia 2010; left Dr Magda Gallego 2015    HX ORTHOPAEDIC      DEXA -0.7 spine, -0.2 hip 8/14    MO CARDIAC SURG PROCEDURE UNLIST  9/10, 8/13    thallium negative ef 72%; negative ef 70%    MO CHEST SURGERY PROCEDURE UNLISTED  7/12    US thyroid negative    MO CHEST SURGERY PROCEDURE UNLISTED  2012    pfts w mod restrictive defect     VASCULAR SURGERY PROCEDURE UNLIST  12/13    venous doppler negative      Family History   Problem Relation Age of Onset    Cancer Mother     Hypertension Mother     Cancer Father     Diabetes Father     Hypertension Father     Stroke Father     Diabetes Sister     Hypertension Sister     Heart Disease Sister     Colon Cancer Sister 52    Hypertension Brother     Cancer Maternal Aunt      Social History     Tobacco Use    Smoking status: Never Smoker    Smokeless tobacco: Never Used   Substance Use Topics    Alcohol use: Yes     Alcohol/week: 0.0 standard drinks     Comment: occasional wine      Prior to Admission medications    Medication Sig Start Date End Date Taking? Authorizing Provider   butalbital-acetaminophen-caffeine (FIORICET, ESGIC) -40 mg per tablet Take 1 Tablet by mouth every six (6) hours as needed. 12/22/21   Provider, Historical   magnesium oxide (MAG-OX) 400 mg tablet Take 400 mg by mouth two (2) times daily (with meals). 12/22/21   Provider, Historical   venlafaxine-SR (EFFEXOR-XR) 75 mg capsule Take 1 Capsule by mouth daily. 1/3/22   Margarite SauceVINH muñoz   topiramate (TOPAMAX) 50 mg tablet Take 1 Tablet by mouth two (2) times a day. 1/3/22   Julita Khan DNP   SITagliptin (JANUVIA) 25 mg tablet Take 1 Tablet by mouth daily. For diabetes 1/3/22   Shaista LOMBARDO DNP   pravastatin (PRAVACHOL) 80 mg tablet Take 1 Tablet by mouth daily. 1/3/22   Julita Khan DNP   montelukast (SINGULAIR) 10 mg tablet Take 1 Tablet by mouth daily.  1/3/22   Julita Khan DNP   insulin glargine (LANTUS,BASAGLAR) 100 unit/mL (3 mL) inpn 25 Units by SubCUTAneous route nightly. 1/3/22   Teodoro Khan DNP   hydroCHLOROthiazide (HYDRODIURIL) 25 mg tablet Take 1 Tablet by mouth daily. For swelling 1/3/22   Conchita Khan DNP   esomeprazole (NEXIUM) 40 mg capsule TAKE ONE CAPSULE BY MOUTH DAILY AS NEEDED FOR REFLUX 12/16/21   Daryle Margarita M, DNP   triamcinolone (Nasacort) 55 mcg nasal inhaler 2 Sprays daily. Provider, Historical   calcium-cholecalciferol, D3, (CALTRATE 600+D) tablet Take 1 Tablet by mouth daily. 9/27/21   Teodoro Khan DNP   fluticasone propionate (Flovent Diskus) 250 mcg/actuation dsdv Take 1 Puff by inhalation two (2) times a day. Rinse gargle mouth thoroughly after each use. Disp: 3 inhalers 9/15/21   Claudetta Rainbow, MD   fluticasone-umeclidin-vilanter (Trelegy Ellipta) 120-38.4-71 mcg dsdv Take 1 Puff by inhalation daily. Rinse and gargle after each use 9/15/21   Claudetta Rainbow, MD   cholecalciferol (VITAMIN D3) 25 mcg (1,000 unit) cap Take 1,000 Units by mouth daily. Provider, Historical   cyanocobalamin (VITAMIN B12) 100 mcg tablet Take 50 mcg by mouth daily. Provider, Historical   albuterol (PROVENTIL HFA, VENTOLIN HFA, PROAIR HFA) 90 mcg/actuation inhaler Take 2 Puffs by inhalation every four (4) hours as needed for Shortness of Breath. Indications: asthma attack 6/16/21   Bronwyn Fernandes DNP   Insulin Needles, Disposable, ([place] Pen Needle) 32 gauge x 5/32\" ndle Check blood sugars three times a day 6/16/21   Conchita Khan DNP   insulin lispro (HumaLOG KwikPen Insulin) 200 unit/mL (3 mL) inpn 3 times per day: 150-200 2u, 201-250 4u, 251-300 6u, 301-350 8u, 351-400 10u, > 400 12u. Indications: type 2 diabetes mellitus 6/16/21   Bronwyn Fernandes DNP   therapeutic multivitamin-minerals [facility]) tablet Take 1 Tab by mouth daily.  3/7/21   Provider, Historical   triamcinolone acetonide (KENALOG) 0.1 % topical cream Apply  to affected area two (2) times daily as needed for Skin Irritation. 3/24/21   Christel Mccracken DNP   aspirin delayed-release 81 mg tablet Take 1 Tab by mouth daily. For heart health 9/3/19   Regi LOMBARDO DNP   glucose blood VI test strips (ACCU-CHEK POOJA) strip Pt to test 5 times daily. Dx:E11.65 17   Malaika Moreno MD        Allergies   Allergen Reactions    Pollen Extracts Runny Nose and Cough    Amoxicillin Hives, Shortness of Breath and Swelling    Crestor [Rosuvastatin] Myalgia    Ibuprofen Other (comments)     dont prescribe due to kidney function    Lipitor [Atorvastatin] Other (comments)     Muscle cramps and weakness      Pcn [Penicillins] Angioedema       Review of Systems:    Negative Unless BOLDED    Constitutional: Fever, chills,diaphoresis. Decreased appetite, worsening baseline night sweats  HENT: Negative for congestion, rhinorrhea, sore throat and trouble swallowing. Eyes: Negative for visual disturbance. Respiratory: Cough,shortness of breath, wheezing. Cardiovascular: Chest pain, palpitations. Gastrointestinal: Abdominal pain, blood in stool, constipation, diarrhea, nausea and vomiting. Endocrine: Polyuria. Genitourinary: Difficulty urinating and dysuria. Musculoskeletal: Arthralgias, myalgias, and neck stiffness. Skin: Pallor, rash. Neurological: Dizziness, weakness, numbness and headaches. Hematological: Bruise/bleed easily   Psychiatric/Behavioral: Confusion, dysphoric mood, hallucinations  All other systems reviewed and are negative.     Physical Exam:      Vitals:    22 1130 22 2000 22 2215 22 0014   BP:  (!) 143/83 (!) 142/68 119/87   Pulse: 83 82  82   Resp: 26 20  16   Temp:  98.9 °F (37.2 °C)  98.1 °F (36.7 °C)   SpO2: 95% 99% 99% 99%   Weight:       Height:          Temp (24hrs), Av.5 °F (36.9 °C), Min:98.1 °F (36.7 °C), Max:98.9 °F (37.2 °C)      General:   awake alert and oriented   Skin:   Skin is dry, rough patch of skin on anterior left thigh circular roughly 1 inch in diameter   HEENT:  Normocephalic, atraumatic, PERRL, EOMI, no scleral icterus or pallor; no conjunctival hemmohage       Lungs:   non-labored, bilaterally clear to auscultation - no crackles wheezes rales or rhonchi   Heart:  RRR, s1 and s2; no murmurs rubs or gallops, no edema, radial pulses intact bilaterally   Abdomen: soft, non-distended, active bowel sounds, no hepatomegaly, no splenomegaly. Non-tender to palpation   Genitourinary:  deferred   Extremities:   Grossly full ROM of all large joints to the upper and lower extremities; muscle mass appropriate for age   Neurologic:  No gross focal sensory abnormalities; 5/5 muscle strength to upper and lower extremities. Speech appropirate. Cranial nerves grossly intact   Psychiatric:   appropriate and interactive. Very pleasant in conversation       Labs Reviewed:    Recent Results (from the past 24 hour(s))   METABOLIC PANEL, COMPREHENSIVE    Collection Time: 01/29/22  3:55 AM   Result Value Ref Range    Sodium 142 136 - 145 mmol/L    Potassium 3.8 3.5 - 5.5 mmol/L    Chloride 106 100 - 111 mmol/L    CO2 30 21 - 32 mmol/L    Anion gap 6 3.0 - 18 mmol/L    Glucose 136 (H) 74 - 99 mg/dL    BUN 34 (H) 7.0 - 18 MG/DL    Creatinine 2.72 (H) 0.6 - 1.3 MG/DL    BUN/Creatinine ratio 13 12 - 20      GFR est AA 22 (L) >60 ml/min/1.73m2    GFR est non-AA 18 (L) >60 ml/min/1.73m2    Calcium 13.7 (HH) 8.5 - 10.1 MG/DL    Bilirubin, total 0.3 0.2 - 1.0 MG/DL    ALT (SGPT) 25 13 - 56 U/L    AST (SGOT) 21 10 - 38 U/L    Alk.  phosphatase 52 45 - 117 U/L    Protein, total 6.2 (L) 6.4 - 8.2 g/dL    Albumin 2.9 (L) 3.4 - 5.0 g/dL    Globulin 3.3 2.0 - 4.0 g/dL    A-G Ratio 0.9 0.8 - 1.7     CBC WITH AUTOMATED DIFF    Collection Time: 01/29/22  7:15 AM   Result Value Ref Range    WBC 4.7 4.6 - 13.2 K/uL    RBC 3.28 (L) 4.20 - 5.30 M/uL    HGB 9.8 (L) 12.0 - 16.0 g/dL    HCT 30.4 (L) 35.0 - 45.0 %    MCV 92.7 78.0 - 100.0 FL    MCH 29.9 24.0 - 34.0 PG    MCHC 32.2 31.0 - 37.0 g/dL    RDW 12.9 11.6 - 14.5 %    PLATELET 655 207 - 719 K/uL    MPV 10.7 9.2 - 11.8 FL    NRBC 0.0 0  WBC    ABSOLUTE NRBC 0.00 0.00 - 0.01 K/uL    NEUTROPHILS 41 40 - 73 %    LYMPHOCYTES 32 21 - 52 %    MONOCYTES 15 (H) 3 - 10 %    EOSINOPHILS 12 (H) 0 - 5 %    BASOPHILS 1 0 - 2 %    IMMATURE GRANULOCYTES 0 0.0 - 0.5 %    ABS. NEUTROPHILS 1.9 1.8 - 8.0 K/UL    ABS. LYMPHOCYTES 1.5 0.9 - 3.6 K/UL    ABS. MONOCYTES 0.7 0.05 - 1.2 K/UL    ABS. EOSINOPHILS 0.6 (H) 0.0 - 0.4 K/UL    ABS. BASOPHILS 0.0 0.0 - 0.1 K/UL    ABS. IMM.  GRANS. 0.0 0.00 - 0.04 K/UL    DF AUTOMATED     METABOLIC PANEL, BASIC    Collection Time: 01/29/22  7:15 AM   Result Value Ref Range    Sodium 143 136 - 145 mmol/L    Potassium 3.4 (L) 3.5 - 5.5 mmol/L    Chloride 109 100 - 111 mmol/L    CO2 28 21 - 32 mmol/L    Anion gap 6 3.0 - 18 mmol/L    Glucose 117 (H) 74 - 99 mg/dL    BUN 32 (H) 7.0 - 18 MG/DL    Creatinine 2.57 (H) 0.6 - 1.3 MG/DL    BUN/Creatinine ratio 12 12 - 20      GFR est AA 23 (L) >60 ml/min/1.73m2    GFR est non-AA 19 (L) >60 ml/min/1.73m2    Calcium 12.7 (H) 8.5 - 10.1 MG/DL   MAGNESIUM    Collection Time: 01/29/22  7:15 AM   Result Value Ref Range    Magnesium 1.4 (L) 1.6 - 2.6 mg/dL   IONIZED CALCIUM    Collection Time: 01/29/22  7:59 AM   Result Value Ref Range    Calcium, ionized 1.88 (HH) 1.12 - 1.32 mmol/L    Critical value read back ROBERT GILLIS    GLUCOSE, POC    Collection Time: 01/29/22 11:48 AM   Result Value Ref Range    Glucose (POC) 111 (H) 70 - 110 mg/dL   GLUCOSE, POC    Collection Time: 01/29/22  4:38 PM   Result Value Ref Range    Glucose (POC) 105 70 - 110 mg/dL   GLUCOSE, POC    Collection Time: 01/29/22 10:47 PM   Result Value Ref Range    Glucose (POC) 117 (H) 70 - 148 mg/dL   METABOLIC PANEL, BASIC    Collection Time: 01/29/22 11:25 PM   Result Value Ref Range    Sodium 145 136 - 145 mmol/L    Potassium 3.6 3.5 - 5.5 mmol/L    Chloride 114 (H) 100 - 111 mmol/L    CO2 22 21 - 32 mmol/L    Anion gap 9 3.0 - 18 mmol/L    Glucose 116 (H) 74 - 99 mg/dL    BUN 24 (H) 7.0 - 18 MG/DL    Creatinine 2.21 (H) 0.6 - 1.3 MG/DL    BUN/Creatinine ratio 11 (L) 12 - 20      GFR est AA 28 (L) >60 ml/min/1.73m2    GFR est non-AA 23 (L) >60 ml/min/1.73m2    Calcium 11.7 (H) 8.5 - 10.1 MG/DL   CALCIUM, IONIZED    Collection Time: 01/29/22 11:25 PM   Result Value Ref Range    Ionized Calcium 1.68 (HH) 1.15 - 1.33 MMOL/L   IONIZED CALCIUM    Collection Time: 01/29/22 11:46 PM   Result Value Ref Range    Calcium, ionized 1.68 (HH) 1.12 - 1.32 mmol/L        Imaging:  CT Results  (Last 48 hours)    None           CXR Results  (Last 48 hours)               01/28/22 2137  XR CHEST PORT Final result    Impression:  1. Hypoinflated lungs with scarring in the mid to lower lung fields. Narrative:  EXAM: Chest Radiograph       INDICATION:  hypokalemia       TECHNIQUE: AP view of the chest       COMPARISON: 3/5/2019, 10/26/2016, 10/26/2016       FINDINGS: No pneumothorax identified. The lungs are hypoinflated. Chronic   scarring is noted in the mid to lower lung fields. This is progressed since most   recent prior. No effusions identified. Cardiac silhouette set the upper limits   normal. Extensive calcified hilar lymph nodes are noted. The pulmonary   vasculature is unremarkable. Degenerative changes noted in the spine.                   Assessment/Plan     -Hypercalcemia - improving s/p continued IV fluid resuscitation -suspect presentation is 2/2 granulomatous disease in the setting of supplemental exogenous calcium as there is no elevation in alk phos that would point towards a metastatic malignancy    -Sarcoidosis of the lung and of the nose- Documented on outpatient pulmonology records; patient has been on outpatient steroids for over 10 years, most recently on prednisone 5 mg daily in September 2021; looks outpatient steroids were weaned at the end of 2021    -Acute on chronic kidney disease -improving with fluid resuscitation -could this represent renal sarcoidosis? ...or is this merely due to poor fluid intake over the past few weeks; urinalysis pending; protein/creatinine urine ratio 0.1    -Diabetes mellitus type 2 -A1c 7.1 and January 2022    -Nausea/vomiting - resolved at this time    -Obesity -BMI 32.92    PLAN:    -Pulmonary consult, please call in the a.m., I have placed the IP consult in the EMR  -May need to restart systemic steroid therapy? -Appreciate nephrology's recommendations in this case  -Continue IV normal saline at 200 mL an hour  -CT chest abdomen and pelvis without contrast to search for metastatic disease  -Urinalysis with microscopic - to look for stones  -Continue home aspirin, montelukast, PPI, topiramate, venlafaxine  -Arformoterol/budesonide, ipratropium  -Lantus 25 units nightly  -Correctional insulin  -Ionized calcium every 8 hours  -Repeat BMP at 0715 this a.m.  -PTH, PTH related peptide, vitamin D 1-25, vitamin D 25 pending  -As needed Zofran    Activity: As tolerated with assistance  Diet: Regular  Antibiotics: None  DVT prophylaxis: Lovenox 30 mg every 24 hours, per pharmacy consult  CODE status: Full    Disposition: Remain inpatient for hypercalcemia work-up    Signed By: Anju Robledo MD   [facility]    January 30, 2022      Dragon voice recognition software was used for parts of this note. Unintended errors may have occurred.

## 2022-01-30 NOTE — ED NOTES
Report given to [facility], Pts belongings placed in transport bags w/ labels, pt walked to stretcher, Called [facility] informed [place] Pt was in route

## 2022-01-30 NOTE — CONSULTS
Consult Note  Consult requested by: Sorin Jernigan is a 61 y.o. female BLACK/ who is being seen on consult for Hypercalcemia, SRIDHAR. Chief Complaint   Patient presents with    Abnormal Lab Results     hypercalcemia, increased creatinine         HPI:  61 yr old with hx of CKD stage 3 ,followed by my partner Ankita Najera as OP with baseline around 1.7 to 2,sarcoidosis(previously on steroids, had lung biopsy), osteopenia(started on Caltrate plus vitamin 2 months ago),diabetes who comes in to ED for abnl labs. Lab called me as OP for high calcium, I asked her to come to nearest ED which was [facility]. She was started on Fluids at  cc per hr with good response. Transferred to [facility] for further care. Had CT chest,abdomen , pelvis r/o malignancy which was negative. Cr improved with iv fluids along with improvement of calcium, Has chronic back pain for which she has PT.   No chest pain or sob, abdominal pain reported  Recent polyuria noted    Past Medical History:   Diagnosis Date    Acquired cyst of kidney 01/16/2020    emerita    Asthma     Bilateral great toe fractures 2014    Chronic kidney disease, stage 3b ([address] 01/2021    Dr Marin Mina, nephro    Chronic sinusitis     Dr. Mauro Baker in the past    Colon polyp 5/16    Dr Karlos Monique    Depression 2019    Diabetes mellitus ([address]     DJD (degenerative joint disease) of cervical spine 2016    DJD (degenerative joint disease), lumbar 2013    MRI c spine w degen changes; Dr Harjinder Umanzor    Dyslipidemia     calculated 10 year risk score was 2.0% (12/13)    Edema of both ankles 8/28/2018    Environmental and seasonal allergies 8/28/2018    Eustachian tube dysfunction     Fibrocystic breast     Dr Vannessa Arriaga GERD (gastroesophageal reflux disease)     Hypertriglyceridemia 8/28/2018    Lateral epicondylitis of both elbows 2/6/2017    Macular degeneration of both eyes 08/28/2018    Dr Evangelina [facility]el, [facility]    Mild intermittent asthma without complication 51/09/8911    Dr. Rina Villalobos;  ratio 68%, FEV1 78% w 6% inc postbd, TLC 77, RV 56, DLCO 61%    Morbid obesity (HCC)     peak weight 196 lbs, bmi 35.8 from 10/13    Neuropathy 8/28/2018    Osteopenia     Dr. Josefina Arias; DEXA t score -0.7 spine, -0.2 hip (8/14)    Pneumonia     Sarcoidosis     Dr Deshaun Tejeda Type 2 diabetes mellitus with hyperglycemia, with long-term current use of insulin ([facility] 8/28/2018      Past Surgical History:   Procedure Laterality Date    COLONOSCOPY N/A 5/26/2016    Dr Mendy Sutherland hyperplastic    COLONOSCOPY N/A 10/19/2021    COLONOSCOPY with polypectomy performed by Vandana Pennington MD at [address] [facility]      nasal polypectomy Dr. Maryam Olmstead HX HEENT      tear duct surgery right Dr. Oren Sung 2010; left Dr Brian Delgado 2015    HX ORTHOPAEDIC      DEXA -0.7 spine, -0.2 hip 8/14    HI CARDIAC SURG PROCEDURE UNLIST  9/10, 8/13    thallium negative ef 72%; negative ef 70%    HI CHEST SURGERY PROCEDURE UNLISTED  7/12    US thyroid negative    HI CHEST SURGERY PROCEDURE UNLISTED  2012    pfts w mod restrictive defect     VASCULAR SURGERY PROCEDURE UNLIST  12/13    venous doppler negative       Social History     Socioeconomic History    Marital status: LEGALLY      Spouse name: Not on file    Number of children: 0    Years of education: 13    Highest education level: Not on file   Occupational History    Occupation: Unemployed   Tobacco Use    Smoking status: Never Smoker    Smokeless tobacco: Never Used   Vaping Use    Vaping Use: Never used   Substance and Sexual Activity    Alcohol use:  Yes     Alcohol/week: 0.0 standard drinks     Comment: occasional wine    Drug use: Never    Sexual activity: Not Currently   Other Topics Concern     Service No    Blood Transfusions No    Caffeine Concern Yes     Comment: due to reflux    Occupational Exposure No    Hobby Hazards No    Sleep Concern Yes    Stress Concern Yes     Comment: wants a job    Weight Concern Yes    Special Diet No    Back Care No    Exercise Yes    Bike Helmet No    Seat Belt Yes    Self-Exams Yes   Social History Narrative    Patient lives with a friend, no pets. Social Determinants of Health     Financial Resource Strain:     Difficulty of Paying Living Expenses: Not on file   Food Insecurity:     Worried About Running Out of Food in the Last Year: Not on file    Pee of Food in the Last Year: Not on file   Transportation Needs:     Lack of Transportation (Medical): Not on file    Lack of Transportation (Non-Medical):  Not on file   Physical Activity:     Days of Exercise per Week: Not on file    Minutes of Exercise per Session: Not on file   Stress:     Feeling of Stress : Not on file   Social Connections:     Frequency of Communication with Friends and Family: Not on file    Frequency of Social Gatherings with Friends and Family: Not on file    Attends Yazidism Services: Not on file    Active Member of [address] [place] [address] or Organizations: Not on file    Attends Club or Organization Meetings: Not on file    Marital Status: Not on file   Intimate Partner Violence:     Fear of Current or Ex-Partner: Not on file    Emotionally Abused: Not on file    Physically Abused: Not on file    Sexually Abused: Not on file   Housing Stability:     Unable to Pay for Housing in the Last Year: Not on file    Number of [place] in the Last Year: Not on file    Unstable Housing in the Last Year: Not on file       Family History   Problem Relation Age of Onset    Cancer Mother     Hypertension Mother     Cancer Father     Diabetes Father     Hypertension Father     Stroke Father     Diabetes Sister     Hypertension Sister     Heart Disease Sister     Colon Cancer Sister 52    Hypertension Brother     Cancer Maternal Aunt      Allergies   Allergen Reactions    Pollen Extracts Runny Nose and Cough    Amoxicillin Hives, Shortness of Breath and Swelling    Crestor [Rosuvastatin] Myalgia    Ibuprofen Other (comments)     dont prescribe due to kidney function    Lipitor [Atorvastatin] Other (comments)     Muscle cramps and weakness      Pcn [Penicillins] Angioedema        Home Medications:     Prior to Admission Medications   Prescriptions Last Dose Informant Patient Reported? Taking? Insulin Needles, Disposable, (Prerna Pen Needle) 32 gauge x 5/32\" ndle   No No   Sig: Check blood sugars three times a day   SITagliptin (JANUVIA) 25 mg tablet   No No   Sig: Take 1 Tablet by mouth daily. For diabetes   albuterol (PROVENTIL HFA, VENTOLIN HFA, PROAIR HFA) 90 mcg/actuation inhaler   No No   Sig: Take 2 Puffs by inhalation every four (4) hours as needed for Shortness of Breath. Indications: asthma attack   aspirin delayed-release 81 mg tablet   No No   Sig: Take 1 Tab by mouth daily. For heart health   butalbital-acetaminophen-caffeine (FIORICET, ESGIC) -40 mg per tablet   Yes No   Sig: Take 1 Tablet by mouth every six (6) hours as needed. calcium-cholecalciferol, D3, (CALTRATE 600+D) tablet   No No   Sig: Take 1 Tablet by mouth daily. cholecalciferol (VITAMIN D3) 25 mcg (1,000 unit) cap   Yes No   Sig: Take 1,000 Units by mouth daily. cyanocobalamin (VITAMIN B12) 100 mcg tablet   Yes No   Sig: Take 50 mcg by mouth daily. esomeprazole (NEXIUM) 40 mg capsule   No No   Sig: TAKE ONE CAPSULE BY MOUTH DAILY AS NEEDED FOR REFLUX   fluticasone propionate (Flovent Diskus) 250 mcg/actuation dsdv   No No   Sig: Take 1 Puff by inhalation two (2) times a day. Rinse gargle mouth thoroughly after each use. Disp: 3 inhalers   fluticasone-umeclidin-vilanter (Trelegy Ellipta) 200-62.5-25 mcg dsdv   No No   Sig: Take 1 Puff by inhalation daily. Rinse and gargle after each use   glucose blood VI test strips (ACCU-CHEK POOJA) strip   No No   Sig: Pt to test 5 times daily.   Dx:E11.65   hydroCHLOROthiazide (HYDRODIURIL) 25 mg tablet   No No Sig: Take 1 Tablet by mouth daily. For swelling   insulin glargine (LANTUS,BASAGLAR) 100 unit/mL (3 mL) inpn   No No   Si Units by SubCUTAneous route nightly. insulin lispro (HumaLOG KwikPen Insulin) 200 unit/mL (3 mL) inpn   No No   Sig: 3 times per day: 150-200 2u, 201-250 4u, 251-300 6u, 301-350 8u, 351-400 10u, > 400 12u. Indications: type 2 diabetes mellitus   magnesium oxide (MAG-OX) 400 mg tablet   Yes No   Sig: Take 400 mg by mouth two (2) times daily (with meals). montelukast (SINGULAIR) 10 mg tablet   No No   Sig: Take 1 Tablet by mouth daily. pravastatin (PRAVACHOL) 80 mg tablet   No No   Sig: Take 1 Tablet by mouth daily. therapeutic multivitamin-minerals (THERAGRAN-M) tablet   Yes No   Sig: Take 1 Tab by mouth daily. topiramate (TOPAMAX) 50 mg tablet   No No   Sig: Take 1 Tablet by mouth two (2) times a day. triamcinolone (Nasacort) 55 mcg nasal inhaler   Yes No   Si Sprays daily. triamcinolone acetonide (KENALOG) 0.1 % topical cream   No No   Sig: Apply  to affected area two (2) times daily as needed for Skin Irritation. venlafaxine-SR (EFFEXOR-XR) 75 mg capsule   No No   Sig: Take 1 Capsule by mouth daily.       Facility-Administered Medications: None       Current Facility-Administered Medications   Medication Dose Route Frequency    ondansetron (ZOFRAN) injection 4 mg  4 mg IntraVENous Q6H PRN    predniSONE (DELTASONE) tablet 20 mg  20 mg Oral DAILY WITH BREAKFAST    0.9% sodium chloride infusion  200 mL/hr IntraVENous CONTINUOUS    insulin lispro (HUMALOG) injection   SubCUTAneous AC&HS    glucose chewable tablet 16 g  4 Tablet Oral PRN    glucagon (GLUCAGEN) injection 1 mg  1 mg IntraMUSCular PRN    dextrose 10% infusion 125-250 mL  125-250 mL IntraVENous PRN    pantoprazole (PROTONIX) tablet 40 mg  40 mg Oral ACB    insulin glargine (LANTUS) injection 25 Units  25 Units SubCUTAneous QHS    montelukast (SINGULAIR) tablet 10 mg  10 mg Oral DAILY    topiramate (TOPAMAX) tablet 50 mg  50 mg Oral BID    venlafaxine-SR (EFFEXOR-XR) capsule 75 mg  75 mg Oral DAILY    albuterol (PROVENTIL VENTOLIN) nebulizer solution 2.5 mg  2.5 mg Nebulization Q4H PRN    aspirin delayed-release tablet 81 mg  81 mg Oral DAILY    cyanocobalamin (VITAMIN B12) tablet 50 mcg  50 mcg Oral DAILY    arformoterol 15 mcg/budesonide 0.5 mg neb solution   Nebulization BID RT    ipratropium (ATROVENT) 0.02 % nebulizer solution 0.5 mg  0.5 mg Nebulization Q8H RT    enoxaparin (LOVENOX) injection 30 mg  30 mg SubCUTAneous Q24H       Review of Systems:     Complete 10-point review of systems were obtained and discussed in length  with the patient. Complete review of systems was negative/unremarkable  except as mentioned in HPI section. Data Review:    Labs: Results:       Chemistry Recent Labs     01/30/22  0601 01/29/22  2325 01/29/22  0715 01/29/22  0355 01/29/22  0355 01/28/22 2010 01/28/22 2010   GLU 89  PENDING 116* 117*   < > 136*   < > 144*     PENDING 145 143   < > 142   < > 141   K 3.5  PENDING 3.6 3.4*   < > 3.8   < > 3.4*   *  PENDING 114* 109   < > 106   < > 101   CO2 23  PENDING 22 28   < > 30   < > 30   BUN 25*  PENDING 24* 32*   < > 34*   < > 38*   CREA 2.07*  PENDING 2.21* 2.57*   < > 2.72*   < > 3.16*   CA PENDING  11.8*  PENDING 11.7* 12.7*   < > 13.7*   < > 15.2*   AGAP 5  PENDING 9 6   < > 6   < > 10   BUCR 12  PENDING 11* 12   < > 13   < > 12   AP  --   --   --   --  52  --  66   TP  --   --   --   --  6.2*  --  7.7   ALB PENDING  --   --   --  2.9*  --  3.6   GLOB  --   --   --   --  3.3  --  4.1*   AGRAT  --   --   --   --  0.9  --  0.9    < > = values in this interval not displayed.       CBC w/Diff Recent Labs     01/29/22  0715 01/28/22 2010 01/28/22  1244   WBC 4.7 6.7 7.0   RBC 3.28* 4.15* 4.27   HGB 9.8* 12.3 13.0   HCT 30.4* 38.0 39.6    185 186   GRANS 41 47  --    LYMPH 32 29  --    EOS 12* 10*  --       Coagulation No results for input(s): PTP, INR, APTT, INREXT in the last 72 hours. Iron/Ferritin No results for input(s): IRON in the last 72 hours. No lab exists for component: TIBCCALC   BNP No results for input(s): BNPP in the last 72 hours. Cardiac Enzymes No results for input(s): CPK, CKND1, DOLLY in the last 72 hours. No lab exists for component: CKRMB, TROIP   Liver Enzymes Recent Labs     01/30/22  0601 01/29/22  0355 01/29/22  0355   TP  --   --  6.2*   ALB PENDING   < > 2.9*   AP  --   --  52    < > = values in this interval not displayed. Thyroid Studies Lab Results   Component Value Date/Time    TSH 2.33 08/27/2018 09:30 AM             Physical Assessment:     Visit Vitals  BP (!) 163/77   Pulse 93   Temp 98.3 °F (36.8 °C)   Resp 20   Ht 5' 2\" (1.575 m)   Wt 81.6 kg (180 lb)   SpO2 97%   BMI 32.92 kg/m²     Weight change: 2.268 kg (5 lb)    Intake/Output Summary (Last 24 hours) at 1/30/2022 1110  Last data filed at 1/30/2022 0001  Gross per 24 hour   Intake 2480 ml   Output --   Net 2480 ml     Physical Exam:   General: comfortable, no acute distress   HEENT sclera anicteric, supple neck, no thyromegaly  CVS: S1S2 heard,  no rub  RS: + air entry b/l,   Abd: Soft, Non tender, Not distended, Positive bowel sounds, no organomegaly, no CVA / supra pubic tenderness  Neuro: non focal, awake, alert , CN II-XII are grossly intact  Extrm:no  edema, no cyanosis, clubbing   Skin: no visible  Rash  Musculoskeletal: No gross joints or bone deformities     Procedures/imaging: see electronic medical records for all procedures, Xrays and details which were not copied into this note but were reviewed prior to creation of Plan      Impression & Plan:   IMPRESSION:   SRIDHAR due to polyuria, hypercalcemia induced DI?   Hypercalcemia due to sarcoidosis, harmeet/vitamin d supplementation, HCTZ use  CKD stage 3 b due to diabetes  Sarcoidosis  Diabetes   PLAN:    Prednisone 20 mg po daily  Decrease iv fluids to 150 cc per hr NS  Stop hctz, calcium,vitamin d  Await PTH rp, 1/25 vitamin d levels  PTH is appropriately low  I and O  Daily weights  Follow daily labs         Manuel Whitten MD  January 30, 2022  [facility]  Office [phone]

## 2022-01-30 NOTE — ED NOTES
TRANSFER - OUT REPORT:    Verbal report given to Eneida Pedraza(name) on Dorathy M Bottoms  being transferred to [facility] Room 463(unit) for routine progression of care       Report consisted of patients Situation, Background, Assessment and   Recommendations(SBAR). Information from the following report(s) SBAR, Kardex, ED Summary and Intake/Output was reviewed with the receiving nurse. Lines:   Peripheral IV 01/28/22 Right Hand (Active)   Site Assessment Clean, dry, & intact 01/29/22 0400   Phlebitis Assessment 0 01/29/22 0400   Infiltration Assessment 0 01/29/22 0400   Dressing Status Clean, dry, & intact 01/29/22 0400   Dressing Type Transparent 01/29/22 0400   Hub Color/Line Status Blue 01/29/22 0400        Opportunity for questions and clarification was provided.       Patient transported with:  [facility]     Visit Vitals  /87   Pulse 82   Temp 98.1 °F (36.7 °C)   Resp 16   Ht 5' 2\" (1.575 m)   Wt 79.4 kg (175 lb)   SpO2 99%   BMI 32.01 kg/m²

## 2022-01-31 LAB
1,25(OH)2D3 SERPL-MCNC: 78.3 PG/ML (ref 19.9–79.3)
ALBUMIN SERPL-MCNC: 2.5 G/DL (ref 3.4–5)
ALBUMIN SERPL-MCNC: 2.7 G/DL (ref 3.4–5)
ANION GAP SERPL CALC-SCNC: 5 MMOL/L (ref 3–18)
ANION GAP SERPL CALC-SCNC: 8 MMOL/L (ref 3–18)
BASOPHILS # BLD: 0 K/UL (ref 0–0.1)
BASOPHILS NFR BLD: 0 % (ref 0–2)
BUN SERPL-MCNC: 24 MG/DL (ref 7–18)
BUN SERPL-MCNC: 26 MG/DL (ref 7–18)
BUN/CREAT SERPL: 12 (ref 12–20)
BUN/CREAT SERPL: 12 (ref 12–20)
CALCIUM SERPL-MCNC: 11.1 MG/DL (ref 8.5–10.1)
CALCIUM SERPL-MCNC: 11.8 MG/DL (ref 8.5–10.1)
CALCIUM SERPL-MCNC: 11.8 MG/DL (ref 8.5–10.1)
CHLORIDE SERPL-SCNC: 116 MMOL/L (ref 100–111)
CHLORIDE SERPL-SCNC: 120 MMOL/L (ref 100–111)
CO2 SERPL-SCNC: 20 MMOL/L (ref 21–32)
CO2 SERPL-SCNC: 23 MMOL/L (ref 21–32)
CREAT SERPL-MCNC: 2.07 MG/DL (ref 0.6–1.3)
CREAT SERPL-MCNC: 2.15 MG/DL (ref 0.6–1.3)
DIFFERENTIAL METHOD BLD: ABNORMAL
EOSINOPHIL # BLD: 0 K/UL (ref 0–0.4)
EOSINOPHIL NFR BLD: 1 % (ref 0–5)
ERYTHROCYTE [DISTWIDTH] IN BLOOD BY AUTOMATED COUNT: 13.1 % (ref 11.6–14.5)
GLUCOSE BLD STRIP.AUTO-MCNC: 117 MG/DL (ref 70–110)
GLUCOSE BLD STRIP.AUTO-MCNC: 150 MG/DL (ref 70–110)
GLUCOSE BLD STRIP.AUTO-MCNC: 81 MG/DL (ref 70–110)
GLUCOSE BLD STRIP.AUTO-MCNC: 90 MG/DL (ref 70–110)
GLUCOSE SERPL-MCNC: 87 MG/DL (ref 74–99)
GLUCOSE SERPL-MCNC: 88 MG/DL (ref 74–99)
HCT VFR BLD AUTO: 27.2 % (ref 35–45)
HGB BLD-MCNC: 8.8 G/DL (ref 12–16)
IMM GRANULOCYTES # BLD AUTO: 0 K/UL (ref 0–0.04)
IMM GRANULOCYTES NFR BLD AUTO: 0 % (ref 0–0.5)
LYMPHOCYTES # BLD: 1.5 K/UL (ref 0.9–3.6)
LYMPHOCYTES NFR BLD: 31 % (ref 21–52)
MAGNESIUM SERPL-MCNC: 1.4 MG/DL (ref 1.6–2.6)
MCH RBC QN AUTO: 29.9 PG (ref 24–34)
MCHC RBC AUTO-ENTMCNC: 32.4 G/DL (ref 31–37)
MCV RBC AUTO: 92.5 FL (ref 78–100)
MONOCYTES # BLD: 0.7 K/UL (ref 0.05–1.2)
MONOCYTES NFR BLD: 15 % (ref 3–10)
NEUTS SEG # BLD: 2.5 K/UL (ref 1.8–8)
NEUTS SEG NFR BLD: 53 % (ref 40–73)
NRBC # BLD: 0 K/UL (ref 0–0.01)
NRBC BLD-RTO: 0 PER 100 WBC
PHOSPHATE SERPL-MCNC: 1.7 MG/DL (ref 2.5–4.9)
PHOSPHATE SERPL-MCNC: 1.8 MG/DL (ref 2.5–4.9)
PLATELET # BLD AUTO: 144 K/UL (ref 135–420)
PMV BLD AUTO: 10.8 FL (ref 9.2–11.8)
POTASSIUM SERPL-SCNC: 3.6 MMOL/L (ref 3.5–5.5)
POTASSIUM SERPL-SCNC: 3.7 MMOL/L (ref 3.5–5.5)
PTH-INTACT SERPL-MCNC: 12.5 PG/ML (ref 18.4–88)
RBC # BLD AUTO: 2.94 M/UL (ref 4.2–5.3)
SODIUM SERPL-SCNC: 144 MMOL/L (ref 136–145)
SODIUM SERPL-SCNC: 148 MMOL/L (ref 136–145)
WBC # BLD AUTO: 4.8 K/UL (ref 4.6–13.2)

## 2022-01-31 PROCEDURE — 65660000000 HC RM CCU STEPDOWN

## 2022-01-31 PROCEDURE — 94760 N-INVAS EAR/PLS OXIMETRY 1: CPT

## 2022-01-31 PROCEDURE — 80069 RENAL FUNCTION PANEL: CPT

## 2022-01-31 PROCEDURE — 74011250637 HC RX REV CODE- 250/637: Performed by: STUDENT IN AN ORGANIZED HEALTH CARE EDUCATION/TRAINING PROGRAM

## 2022-01-31 PROCEDURE — 74011250636 HC RX REV CODE- 250/636: Performed by: INTERNAL MEDICINE

## 2022-01-31 PROCEDURE — 36415 COLL VENOUS BLD VENIPUNCTURE: CPT

## 2022-01-31 PROCEDURE — 99232 SBSQ HOSP IP/OBS MODERATE 35: CPT | Performed by: INTERNAL MEDICINE

## 2022-01-31 PROCEDURE — 74011636637 HC RX REV CODE- 636/637: Performed by: INTERNAL MEDICINE

## 2022-01-31 PROCEDURE — 2709999900 HC NON-CHARGEABLE SUPPLY

## 2022-01-31 PROCEDURE — 74011000250 HC RX REV CODE- 250: Performed by: STUDENT IN AN ORGANIZED HEALTH CARE EDUCATION/TRAINING PROGRAM

## 2022-01-31 PROCEDURE — 74011250636 HC RX REV CODE- 250/636: Performed by: STUDENT IN AN ORGANIZED HEALTH CARE EDUCATION/TRAINING PROGRAM

## 2022-01-31 PROCEDURE — 85025 COMPLETE CBC W/AUTO DIFF WBC: CPT

## 2022-01-31 PROCEDURE — 97162 PT EVAL MOD COMPLEX 30 MIN: CPT

## 2022-01-31 PROCEDURE — 97165 OT EVAL LOW COMPLEX 30 MIN: CPT

## 2022-01-31 PROCEDURE — 99233 SBSQ HOSP IP/OBS HIGH 50: CPT | Performed by: INTERNAL MEDICINE

## 2022-01-31 PROCEDURE — 94640 AIRWAY INHALATION TREATMENT: CPT

## 2022-01-31 PROCEDURE — 82962 GLUCOSE BLOOD TEST: CPT

## 2022-01-31 PROCEDURE — 83735 ASSAY OF MAGNESIUM: CPT

## 2022-01-31 PROCEDURE — 74011250637 HC RX REV CODE- 250/637: Performed by: INTERNAL MEDICINE

## 2022-01-31 RX ORDER — CALCITONIN SALMON 200 [USP'U]/ML
4 INJECTION, SOLUTION INTRAMUSCULAR; SUBCUTANEOUS EVERY 12 HOURS
Status: COMPLETED | OUTPATIENT
Start: 2022-01-31 | End: 2022-02-01

## 2022-01-31 RX ORDER — LANOLIN ALCOHOL/MO/W.PET/CERES
400 CREAM (GRAM) TOPICAL 2 TIMES DAILY
Status: DISCONTINUED | OUTPATIENT
Start: 2022-01-31 | End: 2022-02-02 | Stop reason: HOSPADM

## 2022-01-31 RX ORDER — MAGNESIUM SULFATE HEPTAHYDRATE 40 MG/ML
2 INJECTION, SOLUTION INTRAVENOUS ONCE
Status: COMPLETED | OUTPATIENT
Start: 2022-01-31 | End: 2022-01-31

## 2022-01-31 RX ORDER — FUROSEMIDE 10 MG/ML
40 INJECTION INTRAMUSCULAR; INTRAVENOUS ONCE
Status: COMPLETED | OUTPATIENT
Start: 2022-01-31 | End: 2022-01-31

## 2022-01-31 RX ORDER — SODIUM,POTASSIUM PHOSPHATES 280-250MG
1 POWDER IN PACKET (EA) ORAL 2 TIMES DAILY
Status: DISCONTINUED | OUTPATIENT
Start: 2022-01-31 | End: 2022-02-01

## 2022-01-31 RX ADMIN — POTASSIUM & SODIUM PHOSPHATES POWDER PACK 280-160-250 MG 1 PACKET: 280-160-250 PACK at 10:00

## 2022-01-31 RX ADMIN — MONTELUKAST 10 MG: 10 TABLET, FILM COATED ORAL at 10:00

## 2022-01-31 RX ADMIN — SODIUM CHLORIDE 150 ML/HR: 900 INJECTION, SOLUTION INTRAVENOUS at 04:32

## 2022-01-31 RX ADMIN — ASPIRIN 81 MG: 81 TABLET, COATED ORAL at 10:00

## 2022-01-31 RX ADMIN — VENLAFAXINE HYDROCHLORIDE 75 MG: 75 CAPSULE, EXTENDED RELEASE ORAL at 10:00

## 2022-01-31 RX ADMIN — IPRATROPIUM BROMIDE 0.5 MG: 0.5 SOLUTION RESPIRATORY (INHALATION) at 16:59

## 2022-01-31 RX ADMIN — FUROSEMIDE 40 MG: 10 INJECTION, SOLUTION INTRAMUSCULAR; INTRAVENOUS at 11:14

## 2022-01-31 RX ADMIN — MAGNESIUM SULFATE 2 G: 2 INJECTION INTRAVENOUS at 11:14

## 2022-01-31 RX ADMIN — CALCITONIN SALMON 326 INT'L UNITS: 200 INJECTION, SOLUTION INTRAMUSCULAR; SUBCUTANEOUS at 17:32

## 2022-01-31 RX ADMIN — PREDNISONE 20 MG: 20 TABLET ORAL at 10:00

## 2022-01-31 RX ADMIN — Medication 400 MG: at 11:14

## 2022-01-31 RX ADMIN — ENOXAPARIN SODIUM 30 MG: 100 INJECTION SUBCUTANEOUS at 10:00

## 2022-01-31 RX ADMIN — PANTOPRAZOLE 40 MG: 40 TABLET, DELAYED RELEASE ORAL at 09:59

## 2022-01-31 RX ADMIN — POTASSIUM & SODIUM PHOSPHATES POWDER PACK 280-160-250 MG 1 PACKET: 280-160-250 PACK at 17:32

## 2022-01-31 RX ADMIN — Medication 400 MG: at 17:32

## 2022-01-31 RX ADMIN — TOPIRAMATE 50 MG: 25 TABLET, FILM COATED ORAL at 10:00

## 2022-01-31 RX ADMIN — TOPIRAMATE 50 MG: 25 TABLET, FILM COATED ORAL at 17:32

## 2022-01-31 RX ADMIN — VITAM B12 50 MCG: 100 TAB at 09:59

## 2022-01-31 RX ADMIN — IPRATROPIUM BROMIDE 0.5 MG: 0.5 SOLUTION RESPIRATORY (INHALATION) at 08:00

## 2022-01-31 RX ADMIN — ARFORMOTEROL TARTRATE: 15 SOLUTION RESPIRATORY (INHALATION) at 08:00

## 2022-01-31 NOTE — PROGRESS NOTES
Problem: Mobility Impaired (Adult and Pediatric)  Goal: *Acute Goals and Plan of Care (Insert Text)  Outcome: Resolved/Met     PHYSICAL THERAPY EVALUATION AND DISCHARGE    Patient: Harriett Cesar (71 y.o. female)  Date: 1/31/2022  Primary Diagnosis: Hypercalcemia [E83.52]        Precautions:   Fall,Aspiration  WBAT  PLOF: pt mod ind with SPC PTA, lives with nephew in a 1  with 3-4 JOY    ASSESSMENT :  Based on the objective data described below, the patient presents with baseline functional mobility. Pt has hx of chronic back pain and endorses L sided radicular symptoms and that she uses a cane at baseline. Pt presents with ROM to [facility] however strength being generally decreased. Pt ambulates to the bathroom with no AD however slow and decreased step clearance noted. After toileting with mod ind, pt given RW to increase safety with gait and pt stated it felt better and agreeable to use it. Pt has no further acute deficits warranting further PT, will sign off with no discharge needs noted. Pt left sitting EOB and all needs met. Recommend return to her planned OP PT when discharged. Patient does not require further skilled intervention at this level of care. PLAN :  Recommendations and Planned Interventions:   No formal PT needs identified at this time. Discharge Recommendations: Outpatient PT (pt was supposed to already be going)  Further Equipment Recommendations for Discharge: rolling walker- pt owns      SUBJECTIVE:   Patient stated Gresham Rota was supposed to start my PT tomorrow.     OBJECTIVE DATA SUMMARY:     Past Medical History:   Diagnosis Date    Acquired cyst of kidney 01/16/2020    emerita    Asthma     Bilateral great toe fractures 2014    Chronic kidney disease, stage 3b ([facility] [address] 01/2021    Dr Shelby Stanford, nephro    Chronic sinusitis     Dr. Uzair Patel in the past    Colon polyp 5/16    Dr Mary Simpson    Depression 2019    Diabetes mellitus ([facility] [address]     DJD (degenerative joint disease) of cervical spine 2016 DJD (degenerative joint disease), lumbar 2013    MRI c spine w degen changes; Dr Gilberto Emery    Dyslipidemia     calculated 10 year risk score was 2.0% (12/13)    Edema of both ankles 8/28/2018    Environmental and seasonal allergies 8/28/2018    Eustachian tube dysfunction     Fibrocystic breast     Dr Patience Wilkins    GERD (gastroesophageal reflux disease)     Hypertriglyceridemia 8/28/2018    Lateral epicondylitis of both elbows 2/6/2017    Macular degeneration of both eyes 08/28/2018    Dr Eduar Arrieta Cori Genera, [facility]    Mild intermittent asthma without complication 08/79/6742    Dr. Douglas Villagomez;  ratio 68%, FEV1 78% w 6% inc postbd, TLC 77, RV 56, DLCO 61%    Morbid obesity (HCC)     peak weight 196 lbs, bmi 35.8 from 10/13    Neuropathy 8/28/2018    Osteopenia     Dr. Jomar Friedman; DEXA t score -0.7 spine, -0.2 hip (8/14)    Pneumonia     Sarcoidosis     Dr Cale Meng    Type 2 diabetes mellitus with hyperglycemia, with long-term current use of insulin ([facility] [address] 8/28/2018     Past Surgical History:   Procedure Laterality Date    COLONOSCOPY N/A 5/26/2016    Dr Mabel Santiago hyperplastic    COLONOSCOPY N/A 10/19/2021    COLONOSCOPY with polypectomy performed by Aimee Williamson MD at [address]    [facility]      Dr. Remy [facility]      nasal polypectomy Dr. Fran Whyte [facility]      tear duct surgery right Dr. Abdoulaye Rodriguez 2010; left Dr Kaia Jerry 2015    HX ORTHOPAEDIC      DEXA -0.7 spine, -0.2 hip 8/14    RI CARDIAC SURG PROCEDURE UNLIST  9/10, 8/13    thallium negative ef 72%; negative ef 70%    RI CHEST SURGERY PROCEDURE UNLISTED  7/12    US thyroid negative    RI CHEST SURGERY PROCEDURE UNLISTED  2012    pfts w mod restrictive defect     VASCULAR SURGERY PROCEDURE UNLIST  12/13    venous doppler negative     Barriers to Learning/Limitations: yes;  physical  Compensate with: Visual Cues, Verbal Cues, and Tactile Cues  Home Situation:   Home Situation  Home Environment: Private residence  # Steps to Enter: 3  One/Two Story Residence: One story  Living Alone: No  Support Systems: Other Family Member(s)  Patient Expects to be Discharged to[de-identified] Home with outpatient services  Current DME Used/Available at Home: Cane, straight,Walker, rolling  Tub or Shower Type: Tub/Shower combination  Critical Behavior:  Neurologic State: Alert  Orientation Level: Oriented X4  Cognition: Follows commands  Safety/Judgement: Fall prevention  Psychosocial  Patient Behaviors: Calm; Cooperative  Purposeful Interaction: Yes  Pt Identified Daily Priority: Clinical issues (comment)  Caritas Process: Create healing environment; Attend basic human needs; Teaching/learning  Caring Interventions: Reassure; Therapeutic modalities  Reassure: Caring rounds; Informing  Therapeutic Modalities: Intentional therapeutic touch;Humor  Skin Condition/Temp: Warm;Dry     Skin Integrity: Intact  Skin Integumentary  Skin Color: Appropriate for ethnicity  Skin Condition/Temp: Warm;Dry  Skin Integrity: Intact  Turgor: Non-tenting  Hair Growth: Absent  Varicosities: Absent  Nails: Exceptions to WDL  Exceptions to WDL: Thick     Strength:    Strength: Generally decreased, functional                    Tone & Sensation:   Tone: Normal       Sensation: Intact               Range Of Motion:  AROM: Within functional limits        Functional Mobility:  Bed Mobility:     Supine to Sit: Modified independent     Scooting: Modified independent  Transfers:  Sit to Stand: Modified independent  Stand to Sit: Modified independent            Balance:   Sitting: Intact  Standing: Impaired; Without support  Standing - Static: Fair  Standing - Dynamic : Fair    Ambulation/Gait Training:  Distance (ft): 20 Feet (ft) (x2)  Assistive Device: Walker, rolling (20 ft with and without RW )           Gait Abnormalities: Decreased step clearance       Speed/Mary: Pace decreased (<100 feet/min)  Step Length: Left shortened;Right shortened    Pain:  Pain level pre-treatment: 0/10   Pain level post-treatment: 0/10  Pain Intervention(s): Medication (see MAR); Rest, Ice, Repositioning   Response to intervention: Nurse notified    Activity Tolerance:   Good    Please refer to the flowsheet for vital signs taken during this treatment. After treatment:   []         Patient left in no apparent distress sitting up in chair  [x]         Patient left in no apparent distress in bed  [x]         Call bell left within reach  [x]         Nursing notified  []         Caregiver present  []         Bed alarm activated  []         SCDs applied    COMMUNICATION/EDUCATION:   [x]         Role of Physical Therapy in the acute care setting. [x]         Fall prevention education was provided and the patient/caregiver indicated understanding. [x]         Patient/family have participated as able in goal setting and plan of care. [x]         Patient/family agree to work toward stated goals and plan of care. []         Patient understands intent and goals of therapy, but is neutral about his/her participation. []         Patient is unable to participate in goal setting/plan of care: ongoing with therapy staff.  []         Other:     Thank you for this referral.  Stiven Light   Time Calculation: 11 mins      Eval Complexity: History: MEDIUM  Complexity : 1-2 comorbidities / personal factors will impact the outcome/ POC Exam:MEDIUM Complexity : 3 Standardized tests and measures addressing body structure, function, activity limitation and / or participation in recreation  Presentation: MEDIUM Complexity : Evolving with changing characteristics  Clinical Decision Making:Medium Complexity    Overall Complexity:MEDIUM

## 2022-01-31 NOTE — PROGRESS NOTES
Hypercalcemia Calcitonin Dosing and Monitoring    Corrected calcium = 12.3    Recent Labs     01/31/22  0302   CA 11.1*   ALB 2.5*       Hypercalcemia Severity: Symptomatic - Moderate: Corrected calcium <12-14 mg/dL    [place] P&T Approved Criteria for Use/Restrictions  Calcitonin injection will be reserved for use in patients with corrected calcium > 12mg/dL with severe symptoms such as changes in sensorium   May also be used in the treatment of bone pain in Pagets Disease regardless of calcium value  Calcitonin dosing (may be given intramuscularly or subcutaneously)  Starting dose:  4 units/kg every 12 hours x 2 doses   All orders will have an automatic 24-hour expiration date  Two frequency options: Once and every 12 hours Q12h  Monitor serum calcium and albumin levels after 24 hours of therapy (to determine corrected calcium)  The physician/provider may order an additional 2 doses if warranted upon patient examination (corrected calcium has not dropped by > 1 mg/dL)  and patient remains symptomatic  Maximum duration = 48 hours (4 TOTAL doses)      Impression/Plan: Adjusted order to end after 24 hours and reevaluate     Thank you,  Marisa Williamson, PHARMD

## 2022-01-31 NOTE — PROGRESS NOTES
Problem: Self Care Deficits Care Plan (Adult)  Goal: *Acute Goals and Plan of Care (Insert Text)  Outcome: Resolved/Met   OCCUPATIONAL THERAPY EVALUATION/DISCHARGE    Patient: Brisa Johnson (6 y.o. female)  Date: 1/31/2022  Primary Diagnosis: Hypercalcemia [E83.52]        Precautions:   Fall,Aspiration  PLOF: Pt was MI for self care and functional mobility excluding stairs which she had live-in nephew assist with     ASSESSMENT AND RECOMMENDATIONS:  Based on the objective data described below, the patient presents with close to baseline functional independence in self care. Pt demonstrates good knowledge of safety precautions and reports use of shower chair and grab bars in shower as well as AD for functional ambulation. Pt reports she has been able to use BSC in room independently since start of hospitalization. Pt educated on having socks on feet for safety in room. Pt reports LBP with LB ADLs. Pt educated on use of reacher and sock aide for LB dressing. Pt performed donning sock with use of sock aide MI. Pt has not further self care concerns at this time. Pt with CNA at end of session. Skilled occupational therapy is not indicated at this time. Discharge Recommendations: home with family support  Further Equipment Recommendations for Discharge: HH shower head      SUBJECTIVE:   Patient stated I usually try to have all my appointments on the same day so I only have to put pants on one day.     OBJECTIVE DATA SUMMARY:     Past Medical History:   Diagnosis Date    Acquired cyst of kidney 01/16/2020    [facility]    Asthma     Bilateral great toe fractures 2014    Chronic kidney disease, stage 3b ([facility] [address] 01/2021    Dr Lamont Luna, nephro    Chronic sinusitis     Dr. Martha Hardin in the past    Colon polyp 5/16    Dr Natacha Solomon    Depression 2019    Diabetes mellitus ([facility] [address]     DJD (degenerative joint disease) of cervical spine 2016    DJD (degenerative joint disease), lumbar 2013    MRI c spine w degen changes; Dr Hamlet Serna Dyslipidemia     calculated 10 year risk score was 2.0% (12/13)    Edema of both ankles 8/28/2018    Environmental and seasonal allergies 8/28/2018    Eustachian tube dysfunction     Fibrocystic breast     Dr David Carter    GERD (gastroesophageal reflux disease)     Hypertriglyceridemia 8/28/2018    Lateral epicondylitis of both elbows 2/6/2017    Macular degeneration of both eyes 08/28/2018    Dr Ave Soriano Jodi Clear, [facility]    Mild intermittent asthma without complication 66/56/4348    Dr. Denny Ybarra;  ratio 68%, FEV1 78% w 6% inc postbd, TLC 77, RV 56, DLCO 61%    Morbid obesity (HCC)     peak weight 196 lbs, bmi 35.8 from 10/13    Neuropathy 8/28/2018    Osteopenia     Dr. Avery Pedro; DEXA t score -0.7 spine, -0.2 hip (8/14)    Pneumonia     Sarcoidosis     Dr Hernandez Oneal    Type 2 diabetes mellitus with hyperglycemia, with long-term current use of insulin ([facility] [address] 8/28/2018     Past Surgical History:   Procedure Laterality Date    COLONOSCOPY N/A 5/26/2016    Dr Romana Phi hyperplastic    COLONOSCOPY N/A 10/19/2021    COLONOSCOPY with polypectomy performed by Radha Gary MD at [address] [place] [address] Dr Dr. Jagdish GALDAMEZ      nasal polypectomy Dr. Reyes GALDAMEZ      tear duct surgery right Dr. Terell Kelly 2010; left Dr Joo Liu 2015    HX ORTHOPAEDIC      DEXA -0.7 spine, -0.2 hip 8/14    [place] CARDIAC SURG PROCEDURE UNLIST  9/10, 8/13    thallium negative ef 72%; negative ef 70%    [place] CHEST SURGERY PROCEDURE UNLISTED  7/12    US thyroid negative    [place] CHEST SURGERY PROCEDURE UNLISTED  2012    pfts w mod restrictive defect     VASCULAR SURGERY PROCEDURE UNLIST  12/13    venous doppler negative     Barriers to Learning/Limitations: None  Compensate with: visual, verbal, tactile, kinesthetic cues/model    Home Situation:   Home Situation  Home Environment: Private residence  # Steps to Enter: 3  One/Two Story Residence: One story  Living Alone: No  Support Systems: Other Family Member(s)  Patient Expects to be Discharged to[de-identified] Home  Current DME Used/Available at Home: Cane, straight,Commode, bedside,Shower chair  Tub or Shower Type: Tub/Shower combination  [x]     Right hand dominant   []     Left hand dominant    Cognitive/Behavioral Status:  Neurologic State: Alert  Orientation Level: Oriented X4  Cognition: Appropriate decision making; Follows commands  Safety/Judgement: Fall prevention;Home safety    Skin: intact BUE  Edema: none noted     Vision/Perceptual:       Appears intact          Coordination: BUE     Fine Motor Skills-Upper: Left Intact; Right Intact    Gross Motor Skills-Upper: Left Intact; Right Intact  Balance:  Sitting: Intact  Strength: BUE  Strength: Within functional limits   Tone & Sensation: BUE  Normal and intact      Range of Motion: BUE  AROM: Within functional limits     Functional Mobility and Transfers for ADLs:  Bed Mobility:  Supine to Sit: Modified independent  ADL Assessment:   Lower Body Dressing: Modified independent    Toileting: Modified independent (per pt report)     ADL Intervention:      Cognitive Retraining  Safety/Judgement: Fall prevention;Home safety  Pain:  Pain level pre-treatment: 0/10   Pain level post-treatment: 0/10   Pain Intervention(s): Medication (see MAR); Rest,, Repositioning   Response to intervention: Nurse notified, See doc flow    Activity Tolerance:   good  Please refer to the flowsheet for vital signs taken during this treatment. After treatment:   []  Patient left in no apparent distress sitting up in chair  [x]  Patient left in no apparent distress in bed  [x]  Call bell left within reach  [x]  Nursing notified  []  Caregiver present  []  Bed alarm activated    COMMUNICATION/EDUCATION:   [x]      Role of Occupational Therapy in the acute care setting  [x]      Home safety education was provided and the patient/caregiver indicated understanding. [x]      Patient/family have participated as able and agree with findings and recommendations.   []      Patient is unable to participate in plan of care at this time. Thank you for this referral.  Mateo Membreno, OT  Time Calculation: 21 mins      Eval Complexity: History: LOW Complexity : Brief history review ; Examination: LOW Complexity : 1-3 performance deficits relating to physical, cognitive , or psychosocial skils that result in activity limitations and / or participation restrictions ;    Decision Making:LOW Complexity : No comorbidities that affect functional and no verbal or physical assistance needed to complete eval tasks

## 2022-01-31 NOTE — PROGRESS NOTES
Problem: Diabetes Self-Management  Goal: *Disease process and treatment process  Description: Define diabetes and identify own type of diabetes; list 3 options for treating diabetes. Outcome: Progressing Towards Goal  Goal: *Incorporating nutritional management into lifestyle  Description: Describe effect of type, amount and timing of food on blood glucose; list 3 methods for planning meals. Outcome: Progressing Towards Goal  Goal: *Incorporating physical activity into lifestyle  Description: State effect of exercise on blood glucose levels. Outcome: Progressing Towards Goal  Goal: *Developing strategies to promote health/change behavior  Description: Define the ABC's of diabetes; identify appropriate screenings, schedule and personal plan for screenings. Outcome: Progressing Towards Goal  Goal: *Using medications safely  Description: State effect of diabetes medications on diabetes; name diabetes medication taking, action and side effects. Outcome: Progressing Towards Goal  Goal: *Monitoring blood glucose, interpreting and using results  Description: Identify recommended blood glucose targets  and personal targets. Outcome: Progressing Towards Goal  Goal: *Prevention, detection, treatment of acute complications  Description: List symptoms of hyper- and hypoglycemia; describe how to treat low blood sugar and actions for lowering  high blood glucose level. Outcome: Progressing Towards Goal  Goal: *Prevention, detection and treatment of chronic complications  Description: Define the natural course of diabetes and describe the relationship of blood glucose levels to long term complications of diabetes.   Outcome: Progressing Towards Goal  Goal: *Developing strategies to address psychosocial issues  Description: Describe feelings about living with diabetes; identify support needed and support network  Outcome: Progressing Towards Goal  Goal: *Insulin pump training  Outcome: Progressing Towards Goal  Goal: *Sick day guidelines  Outcome: Progressing Towards Goal  Goal: *Patient Specific Goal (EDIT GOAL, INSERT TEXT)  Outcome: Progressing Towards Goal     Problem: Patient Education: Go to Patient Education Activity  Goal: Patient/Family Education  Outcome: Progressing Towards Goal     Problem: Falls - Risk of  Goal: *Absence of Falls  Description: Document Luciana Thomas Fall Risk and appropriate interventions in the flowsheet.   Outcome: Progressing Towards Goal  Note: Fall Risk Interventions:  Mobility Interventions: PT Consult for mobility concerns         Medication Interventions: Patient to call before getting OOB,Teach patient to arise slowly                   Problem: Patient Education: Go to Patient Education Activity  Goal: Patient/Family Education  Outcome: Progressing Towards Goal     Problem: General Medical Care Plan  Goal: *Vital signs within specified parameters  Outcome: Progressing Towards Goal  Goal: *Labs within defined limits  Outcome: Progressing Towards Goal  Goal: *Absence of infection signs and symptoms  Outcome: Progressing Towards Goal  Goal: *Optimal pain control at patient's stated goal  Outcome: Progressing Towards Goal  Goal: *Skin integrity maintained  Outcome: Progressing Towards Goal  Goal: *Fluid volume balance  Outcome: Progressing Towards Goal  Goal: *Optimize nutritional status  Outcome: Progressing Towards Goal  Goal: *Anxiety reduced or absent  Outcome: Progressing Towards Goal  Goal: *Progressive mobility and function (eg: ADL's)  Outcome: Progressing Towards Goal     Problem: Patient Education: Go to Patient Education Activity  Goal: Patient/Family Education  Outcome: Progressing Towards Goal     Problem: Pain  Goal: *Control of Pain  Outcome: Progressing Towards Goal     Problem: Patient Education: Go to Patient Education Activity  Goal: Patient/Family Education  Outcome: Progressing Towards Goal     Problem: Nutrition Deficit  Goal: *Optimize nutritional status  Outcome: Progressing Towards Goal     Problem: Patient Education: Go to Patient Education Activity  Goal: Patient/Family Education  Outcome: Progressing Towards Goal

## 2022-01-31 NOTE — PROGRESS NOTES
Hospitalist Progress Note    Subjective:   Daily Progress Note: 1/31/2022     Patient was feeling much better. Continues to have generalized weakness. She stated she was taking vitamin D and calcium supplement at home. She also mentioned that she got steroid injection in her back due to chronic back pain by Dr. Winston Ceron. No chest pain or shortness of breath or cough. No nausea or vomiting. No abdominal pain. No headaches or dizziness.     Current Facility-Administered Medications   Medication Dose Route Frequency    potassium, sodium phosphates (NEUTRA-PHOS) packet 1 Packet  1 Packet Oral BID    magnesium sulfate 2 g/50 ml IVPB (premix or compounded)  2 g IntraVENous ONCE    ondansetron (ZOFRAN) injection 4 mg  4 mg IntraVENous Q6H PRN    predniSONE (DELTASONE) tablet 20 mg  20 mg Oral DAILY WITH BREAKFAST    0.9% sodium chloride infusion  150 mL/hr IntraVENous CONTINUOUS    insulin lispro (HUMALOG) injection   SubCUTAneous AC&HS    glucose chewable tablet 16 g  4 Tablet Oral PRN    glucagon (GLUCAGEN) injection 1 mg  1 mg IntraMUSCular PRN    dextrose 10% infusion 125-250 mL  125-250 mL IntraVENous PRN    pantoprazole (PROTONIX) tablet 40 mg  40 mg Oral ACB    insulin glargine (LANTUS) injection 25 Units  25 Units SubCUTAneous QHS    montelukast (SINGULAIR) tablet 10 mg  10 mg Oral DAILY    topiramate (TOPAMAX) tablet 50 mg  50 mg Oral BID    venlafaxine-SR (EFFEXOR-XR) capsule 75 mg  75 mg Oral DAILY    albuterol (PROVENTIL VENTOLIN) nebulizer solution 2.5 mg  2.5 mg Nebulization Q4H PRN    aspirin delayed-release tablet 81 mg  81 mg Oral DAILY    cyanocobalamin (VITAMIN B12) tablet 50 mcg  50 mcg Oral DAILY    arformoterol 15 mcg/budesonide 0.5 mg neb solution   Nebulization BID RT    ipratropium (ATROVENT) 0.02 % nebulizer solution 0.5 mg  0.5 mg Nebulization Q8H RT    enoxaparin (LOVENOX) injection 30 mg  30 mg SubCUTAneous Q24H        Review of Systems  Pertinent items are noted in HPI.    Objective:     Visit Vitals  BP (!) 147/75 (BP 1 Location: Left upper arm, BP Patient Position: At rest)   Pulse 74   Temp 99.6 °F (37.6 °C)   Resp 18   Ht 5' 2\" (1.575 m)   Wt 81.6 kg (180 lb)   LMP 2013   SpO2 98%   BMI 32.92 kg/m²      O2 Device: None (Room air)    Temp (24hrs), Av.8 °F (37.1 °C), Min:98.5 °F (36.9 °C), Max:99.6 °F (37.6 °C)      No intake/output data recorded.  1901 -  0700  In: 7959.9 [P.O.:720; I.V.:7239.9]  Out: 2550 [Urine:2550]      General appearance -awake, follows commands appropriately, not in acute distress. Chest -clear air entry noted in bases, no wheezes  Heart - S1 and S2 normal  Abdomen - soft, nontender, nondistended, Bowel sounds present  Neurological -awake, follows commands appropriately, motor strength 5-5 in all 4 extremities, no tremors noted.   Sensation to touch normal.  Extremities - no pedal edema noted    Data Review    Recent Results (from the past 24 hour(s))   CALCIUM, IONIZED    Collection Time: 22 10:38 AM   Result Value Ref Range    Ionized Calcium 1.70 (HH) 1.15 - 1.33 MMOL/L   GLUCOSE, POC    Collection Time: 22  6:27 PM   Result Value Ref Range    Glucose (POC) 193 (H) 70 - 110 mg/dL   RENAL FUNCTION PANEL    Collection Time: 22  8:35 PM   Result Value Ref Range    Sodium 141 136 - 145 mmol/L    Potassium 4.4 3.5 - 5.5 mmol/L    Chloride 115 (H) 100 - 111 mmol/L    CO2 22 21 - 32 mmol/L    Anion gap 4 3.0 - 18 mmol/L    Glucose 190 (H) 74 - 99 mg/dL    BUN 24 (H) 7.0 - 18 MG/DL    Creatinine 2.27 (H) 0.6 - 1.3 MG/DL    BUN/Creatinine ratio 11 (L) 12 - 20      GFR est AA 27 (L) >60 ml/min/1.73m2    GFR est non-AA 22 (L) >60 ml/min/1.73m2    Calcium 11.6 (H) 8.5 - 10.1 MG/DL    Phosphorus 1.7 (L) 2.5 - 4.9 MG/DL    Albumin 2.7 (L) 3.4 - 5.0 g/dL   GLUCOSE, POC    Collection Time: 22 10:00 PM   Result Value Ref Range    Glucose (POC) 151 (H) 70 - 110 mg/dL   CBC WITH AUTOMATED DIFF    Collection Time: 22 3:02 AM   Result Value Ref Range    WBC 4.8 4.6 - 13.2 K/uL    RBC 2.94 (L) 4.20 - 5.30 M/uL    HGB 8.8 (L) 12.0 - 16.0 g/dL    HCT 27.2 (L) 35.0 - 45.0 %    MCV 92.5 78.0 - 100.0 FL    MCH 29.9 24.0 - 34.0 PG    MCHC 32.4 31.0 - 37.0 g/dL    RDW 13.1 11.6 - 14.5 %    PLATELET 882 168 - 402 K/uL    MPV 10.8 9.2 - 11.8 FL    NRBC 0.0 0  WBC    ABSOLUTE NRBC 0.00 0.00 - 0.01 K/uL    NEUTROPHILS 53 40 - 73 %    LYMPHOCYTES 31 21 - 52 %    MONOCYTES 15 (H) 3 - 10 %    EOSINOPHILS 1 0 - 5 %    BASOPHILS 0 0 - 2 %    IMMATURE GRANULOCYTES 0 0.0 - 0.5 %    ABS. NEUTROPHILS 2.5 1.8 - 8.0 K/UL    ABS. LYMPHOCYTES 1.5 0.9 - 3.6 K/UL    ABS. MONOCYTES 0.7 0.05 - 1.2 K/UL    ABS. EOSINOPHILS 0.0 0.0 - 0.4 K/UL    ABS. BASOPHILS 0.0 0.0 - 0.1 K/UL    ABS. IMM. GRANS. 0.0 0.00 - 0.04 K/UL    DF AUTOMATED     MAGNESIUM    Collection Time: 01/31/22  3:02 AM   Result Value Ref Range    Magnesium 1.4 (L) 1.6 - 2.6 mg/dL   RENAL FUNCTION PANEL    Collection Time: 01/31/22  3:02 AM   Result Value Ref Range    Sodium 144 136 - 145 mmol/L    Potassium 3.6 3.5 - 5.5 mmol/L    Chloride 116 (H) 100 - 111 mmol/L    CO2 23 21 - 32 mmol/L    Anion gap 5 3.0 - 18 mmol/L    Glucose 88 74 - 99 mg/dL    BUN 24 (H) 7.0 - 18 MG/DL    Creatinine 2.07 (H) 0.6 - 1.3 MG/DL    BUN/Creatinine ratio 12 12 - 20      GFR est AA 30 (L) >60 ml/min/1.73m2    GFR est non-AA 25 (L) >60 ml/min/1.73m2    Calcium 11.1 (H) 8.5 - 10.1 MG/DL    Phosphorus 1.7 (L) 2.5 - 4.9 MG/DL    Albumin 2.5 (L) 3.4 - 5.0 g/dL   GLUCOSE, POC    Collection Time: 01/31/22  7:38 AM   Result Value Ref Range    Glucose (POC) 81 70 - 110 mg/dL         Assessment/Plan:     Active Problems:    Hypercalcemia (1/28/2022)      ASSESSMENT:    1. Hypercalcemia of unknown etiology at this point. 2.  History of sarcoidosis  3. Acute on stage III chronic kidney disease, improving  4. Electrolyte disturbances  5. Diabetes mellitus  6.   Nausea and vomiting, resolved    PLAN:    Continue current treatment for hypercalcemia recommended by nephrologist.  Replace electrolytes  Continue insulin for diabetes  PT and OT to follow this patient  Pulmonary input noted about starting patient on prednisone    Anticipated of discharge: Hopefully on February 2, 2022 if cleared by nephrologist    Total time to take care of this patient was 30 minutes and more than 50% of time was spent counseling and coordinating care. Disclaimer: Sections of this note are dictated using utilizing voice recognition software, which may have resulted in some phonetic based errors in grammar and contents. Even though attempts were made to correct all the mistakes, some may have been missed, and remained in the body of the document. If questions arise, please contact our department.

## 2022-01-31 NOTE — PROGRESS NOTES
Problem: Diabetes Self-Management  Goal: *Disease process and treatment process  Description: Define diabetes and identify own type of diabetes; list 3 options for treating diabetes. Outcome: Progressing Towards Goal  Goal: *Incorporating nutritional management into lifestyle  Description: Describe effect of type, amount and timing of food on blood glucose; list 3 methods for planning meals. Outcome: Progressing Towards Goal  Goal: *Incorporating physical activity into lifestyle  Description: State effect of exercise on blood glucose levels. Outcome: Progressing Towards Goal  Goal: *Developing strategies to promote health/change behavior  Description: Define the ABC's of diabetes; identify appropriate screenings, schedule and personal plan for screenings. Outcome: Progressing Towards Goal  Goal: *Using medications safely  Description: State effect of diabetes medications on diabetes; name diabetes medication taking, action and side effects. Outcome: Progressing Towards Goal  Goal: *Monitoring blood glucose, interpreting and using results  Description: Identify recommended blood glucose targets  and personal targets. Outcome: Progressing Towards Goal  Goal: *Prevention, detection, treatment of acute complications  Description: List symptoms of hyper- and hypoglycemia; describe how to treat low blood sugar and actions for lowering  high blood glucose level. Outcome: Progressing Towards Goal  Goal: *Prevention, detection and treatment of chronic complications  Description: Define the natural course of diabetes and describe the relationship of blood glucose levels to long term complications of diabetes.   Outcome: Progressing Towards Goal  Goal: *Developing strategies to address psychosocial issues  Description: Describe feelings about living with diabetes; identify support needed and support network  Outcome: Progressing Towards Goal  Goal: *Insulin pump training  Outcome: Progressing Towards Goal  Goal: *Sick day guidelines  Outcome: Progressing Towards Goal  Goal: *Patient Specific Goal (EDIT GOAL, INSERT TEXT)  Outcome: Progressing Towards Goal     Problem: Patient Education: Go to Patient Education Activity  Goal: Patient/Family Education  Outcome: Progressing Towards Goal     Problem: Falls - Risk of  Goal: *Absence of Falls  Description: Document Shereen Alaniz Fall Risk and appropriate interventions in the flowsheet.   Outcome: Progressing Towards Goal  Note: Fall Risk Interventions:  Mobility Interventions: PT Consult for assist device competence,PT Consult for mobility concerns,Patient to call before getting OOB         Medication Interventions: Evaluate medications/consider consulting pharmacy,Teach patient to arise slowly                   Problem: Patient Education: Go to Patient Education Activity  Goal: Patient/Family Education  Outcome: Progressing Towards Goal     Problem: General Medical Care Plan  Goal: *Vital signs within specified parameters  Outcome: Progressing Towards Goal  Goal: *Labs within defined limits  Outcome: Progressing Towards Goal  Goal: *Absence of infection signs and symptoms  Outcome: Progressing Towards Goal  Goal: *Optimal pain control at patient's stated goal  Outcome: Progressing Towards Goal  Goal: *Skin integrity maintained  Outcome: Progressing Towards Goal  Goal: *Fluid volume balance  Outcome: Progressing Towards Goal  Goal: *Optimize nutritional status  Outcome: Progressing Towards Goal  Goal: *Anxiety reduced or absent  Outcome: Progressing Towards Goal  Goal: *Progressive mobility and function (eg: ADL's)  Outcome: Progressing Towards Goal     Problem: Patient Education: Go to Patient Education Activity  Goal: Patient/Family Education  Outcome: Progressing Towards Goal     Problem: Pain  Goal: *Control of Pain  Outcome: Progressing Towards Goal     Problem: Patient Education: Go to Patient Education Activity  Goal: Patient/Family Education  Outcome: Progressing Towards Goal Problem: Nutrition Deficit  Goal: *Optimize nutritional status  Outcome: Progressing Towards Goal     Problem: Patient Education: Go to Patient Education Activity  Goal: Patient/Family Education  Outcome: Progressing Towards Goal

## 2022-01-31 NOTE — PROGRESS NOTES
Reason for Admission:  Hypercalcemia [E83.52]                 RUR Score:  15%           Plan for utilizing home health:   No- out patient rehab, [facility]                    Likelihood of Readmission:   Moderate                         Do you (patient/family) have any concerns for transition/discharge?  no    Transition of Care Plan:       Initial assessment completed with patient. Cognitive status of patient: oriented to time, place, person and situation. Face sheet information confirmed:  yes. The patient designates brother, Prince Rizo to participate in her discharge plan and to receive any needed information. This patient lives in a single family home with other:  nephew. Patient is able to navigate steps as needed. Prior to hospitalization, patient was considered to be independent with ADLs/IADLS : yes . I  Patient has a current ACP document on file: no      Healthcare Decision Maker:   Primary Decision Maker: Makayla Kiran Sister - [phone]    The patient and brother will be available to transport patient home upon discharge. The patient already has no,  medical equipment available in the home. Patient is not currently active with home health. Patient has not stayed in a skilled nursing facility or rehab. Was  stay within last 60 days : no. This patient is on dialysis :no    Currently, the discharge plan is Home. Outpatient rehab, [facility] 2/1/2022    The patient states that she can obtain her medications from the pharmacy, and take her medications as directed. Patient's current insurance is [employer] and [place] [employer]      Care Management Interventions  PCP Verified by CM: Yes (appointment a month ago)  Mode of Transport at Discharge: Self (Brother)  Transition of Care Consult (CM Consult):  Other (Out patient rehab [facility])  Physical Therapy Consult: Yes  Occupational Therapy Consult: Yes  Support Systems: Other Family Member(s)  Confirm Follow Up Transport: Self  Discharge Location  Patient Expects to be Discharged to[de-identified] Home with outpatient services         will continue to monitor and assist with transition of care needs.     KATHLEEN BurnsN, RN  Care Management  Pager # [phone]

## 2022-01-31 NOTE — PROGRESS NOTES
[facility]  Pulmonary, Critical Care, and Sleep Medicine    Pulmonary Medicine Progress Note    Name: Dar Cervantes MRN: [id number]  : 1965 Hospital: [address] [place] [address] Dr  Date: 2022       Subjective:  Pt is overall doing well. Ca still elevated but improved. No respiratory symptoms. Good UOP.      Patient Active Problem List   Diagnosis Code    Sarcoidosis Dr. Yue You on low dose steroid D86.9    Gastroesophageal reflux disease without esophagitis K21.9    Type 2 diabetes mellitus with hyperglycemia, with long-term current use of insulin ([facility]) E11.65, Z79.4    Neuropathy G62.9    Mild intermittent asthma without complication C89.34    Environmental and seasonal allergies J30.89    Macular degeneration of both eyes H35.30    Diabetic neuropathy, painful ([facility]) E11.40    Type 2 diabetes mellitus with proliferative retinopathy ([facility] E43.4557    Mixed hyperlipidemia E78.2    Eczema L30.9    Stage 3 chronic kidney disease ([facility]) N18.30    Depression with anxiety F41.8    Hypoalbuminemia E88.09    Essential hypertension I10    Retinal drusen H35.369    Vitamin D deficiency E55.9    Age-related nuclear cataract, bilateral H25.13    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    Spinal stenosis of lumbar region M48.061    Cerebrospinal fluid leak from spinal puncture G97.0    Hypercalcemia E83.52       Assessment:  · Severe Hypercalcemia- 15.2 on admission  · - unclear cause, possible 2/2 sarcoidosis and the setting of Ca/vit D supplementation  · - CT C/A/P without concern for malignancy  · - treated only with IVFs  · Hx of Sarcoidosis  · - follow with Leighann Day in clinic- on trelegy and flovent  · - known lung disease- ILD, appears stable  · - recently weaned off prednisone  · - CT stable on admisssion  · SRIDHAR on CKD  · - 2/2 dehydration from hyperCa  · - nephro following    Impression/Plan:  · Continue IVFs per nephrology  · Awaiting PTHrp  · Started on 20mg of prednisone per nephrology, will continue for 1-2 months per notes, may need renal biopsy if SRIDHAR fails to improve. · brovana and pulmicort while inpatient and singulair  · Will Follow      FiO2 to keep SpO2 >=92%, HOB >=30 degree, aspiration precautions, aggressive pulmonary toileting, incentive spirometry. Other issues management by primary team and respective consultants. Events and notes from last 24 hours reviewed. Discussed with patient and family, answered all questions to their satisfaction. Care plan discussed with nursing.      Labs and images personally seen and available reports reviewed  All current medicines are reviewed       Medications- Current:  Current Facility-Administered Medications   Medication Dose Route Frequency    potassium, sodium phosphates (NEUTRA-PHOS) packet 1 Packet  1 Packet Oral BID    magnesium sulfate 2 g/50 ml IVPB (premix or compounded)  2 g IntraVENous ONCE    calciTONIN (MIACALCIN) injection 326 Int'l Units  4 Int'l Units/kg IntraMUSCular Q12H    magnesium oxide (MAG-OX) tablet 400 mg  400 mg Oral BID    ondansetron (ZOFRAN) injection 4 mg  4 mg IntraVENous Q6H PRN    predniSONE (DELTASONE) tablet 20 mg  20 mg Oral DAILY WITH BREAKFAST    insulin lispro (HUMALOG) injection   SubCUTAneous AC&HS    glucose chewable tablet 16 g  4 Tablet Oral PRN    glucagon (GLUCAGEN) injection 1 mg  1 mg IntraMUSCular PRN    dextrose 10% infusion 125-250 mL  125-250 mL IntraVENous PRN    pantoprazole (PROTONIX) tablet 40 mg  40 mg Oral ACB    insulin glargine (LANTUS) injection 25 Units  25 Units SubCUTAneous QHS    montelukast (SINGULAIR) tablet 10 mg  10 mg Oral DAILY    topiramate (TOPAMAX) tablet 50 mg  50 mg Oral BID    venlafaxine-SR (EFFEXOR-XR) capsule 75 mg  75 mg Oral DAILY    albuterol (PROVENTIL VENTOLIN) nebulizer solution 2.5 mg  2.5 mg Nebulization Q4H PRN    aspirin delayed-release tablet 81 mg  81 mg Oral DAILY    cyanocobalamin (VITAMIN B12) tablet 50 mcg  50 mcg Oral DAILY    arformoterol 15 mcg/budesonide 0.5 mg neb solution   Nebulization BID RT    ipratropium (ATROVENT) 0.02 % nebulizer solution 0.5 mg  0.5 mg Nebulization Q8H RT    enoxaparin (LOVENOX) injection 30 mg  30 mg SubCUTAneous Q24H       Objective:  Vital Signs:    Visit Vitals  BP (!) 171/89   Pulse 88   Temp 98.1 °F (36.7 °C)   Resp 18   Ht 5' 2\" (1.575 m)   Wt 81.6 kg (180 lb)   LMP 2013   SpO2 96%   BMI 32.92 kg/m²      O2 Device: None (Room air)     Temp (24hrs), Av.7 °F (37.1 °C), Min:98.1 °F (36.7 °C), Max:99.6 °F (37.6 °C)      Intake/Output:   Last shift:      No intake/output data recorded. Last 3 shifts:  1901 -  0700  In: 7959.9 [P.O.:720; I.V.:7239.9]  Out: 2550 [Urine:2550]    Intake/Output Summary (Last 24 hours) at 2022 1214  Last data filed at 2022 0432  Gross per 24 hour   Intake 2327.5 ml   Output 2550 ml   Net -222.5 ml       Physical Exam:     General/Neurology: Alert, Awake  Head:   Normocephalic, without obvious abnormality  Eye:   PERRL, EOM intact  Oral:  Mucus membranes moist  Lung:   B/l air entry fair. No rales. No rhonchi. No wheezing  Heart:   S1 S2 present  Abdomen:  Soft, non tender, BS+nt   Extremities:  No pedal edema.    Skin:   Dry, intact  Data:      Recent Results (from the past 24 hour(s))   GLUCOSE, POC    Collection Time: 22  6:27 PM   Result Value Ref Range    Glucose (POC) 193 (H) 70 - 110 mg/dL   RENAL FUNCTION PANEL    Collection Time: 22  8:35 PM   Result Value Ref Range    Sodium 141 136 - 145 mmol/L    Potassium 4.4 3.5 - 5.5 mmol/L    Chloride 115 (H) 100 - 111 mmol/L    CO2 22 21 - 32 mmol/L    Anion gap 4 3.0 - 18 mmol/L    Glucose 190 (H) 74 - 99 mg/dL    BUN 24 (H) 7.0 - 18 MG/DL    Creatinine 2.27 (H) 0.6 - 1.3 MG/DL    BUN/Creatinine ratio 11 (L) 12 - 20      GFR est AA 27 (L) >60 ml/min/1.73m2    GFR est non-AA 22 (L) >60 ml/min/1.73m2    Calcium 11.6 (H) 8.5 - 10.1 MG/DL    Phosphorus 1.7 (L) 2.5 - 4.9 MG/DL Albumin 2.7 (L) 3.4 - 5.0 g/dL   GLUCOSE, POC    Collection Time: 01/30/22 10:00 PM   Result Value Ref Range    Glucose (POC) 151 (H) 70 - 110 mg/dL   CBC WITH AUTOMATED DIFF    Collection Time: 01/31/22  3:02 AM   Result Value Ref Range    WBC 4.8 4.6 - 13.2 K/uL    RBC 2.94 (L) 4.20 - 5.30 M/uL    HGB 8.8 (L) 12.0 - 16.0 g/dL    HCT 27.2 (L) 35.0 - 45.0 %    MCV 92.5 78.0 - 100.0 FL    MCH 29.9 24.0 - 34.0 PG    MCHC 32.4 31.0 - 37.0 g/dL    RDW 13.1 11.6 - 14.5 %    PLATELET 307 037 - 549 K/uL    MPV 10.8 9.2 - 11.8 FL    NRBC 0.0 0  WBC    ABSOLUTE NRBC 0.00 0.00 - 0.01 K/uL    NEUTROPHILS 53 40 - 73 %    LYMPHOCYTES 31 21 - 52 %    MONOCYTES 15 (H) 3 - 10 %    EOSINOPHILS 1 0 - 5 %    BASOPHILS 0 0 - 2 %    IMMATURE GRANULOCYTES 0 0.0 - 0.5 %    ABS. NEUTROPHILS 2.5 1.8 - 8.0 K/UL    ABS. LYMPHOCYTES 1.5 0.9 - 3.6 K/UL    ABS. MONOCYTES 0.7 0.05 - 1.2 K/UL    ABS. EOSINOPHILS 0.0 0.0 - 0.4 K/UL    ABS. BASOPHILS 0.0 0.0 - 0.1 K/UL    ABS. IMM.  GRANS. 0.0 0.00 - 0.04 K/UL    DF AUTOMATED     MAGNESIUM    Collection Time: 01/31/22  3:02 AM   Result Value Ref Range    Magnesium 1.4 (L) 1.6 - 2.6 mg/dL   RENAL FUNCTION PANEL    Collection Time: 01/31/22  3:02 AM   Result Value Ref Range    Sodium 144 136 - 145 mmol/L    Potassium 3.6 3.5 - 5.5 mmol/L    Chloride 116 (H) 100 - 111 mmol/L    CO2 23 21 - 32 mmol/L    Anion gap 5 3.0 - 18 mmol/L    Glucose 88 74 - 99 mg/dL    BUN 24 (H) 7.0 - 18 MG/DL    Creatinine 2.07 (H) 0.6 - 1.3 MG/DL    BUN/Creatinine ratio 12 12 - 20      GFR est AA 30 (L) >60 ml/min/1.73m2    GFR est non-AA 25 (L) >60 ml/min/1.73m2    Calcium 11.1 (H) 8.5 - 10.1 MG/DL    Phosphorus 1.7 (L) 2.5 - 4.9 MG/DL    Albumin 2.5 (L) 3.4 - 5.0 g/dL   GLUCOSE, POC    Collection Time: 01/31/22  7:38 AM   Result Value Ref Range    Glucose (POC) 81 70 - 110 mg/dL   GLUCOSE, POC    Collection Time: 01/31/22 11:15 AM   Result Value Ref Range    Glucose (POC) 90 70 - 110 mg/dL Chemistry   Recent Labs     01/31/22 0302 01/30/22 2035 01/30/22  0601 01/29/22  2325 01/29/22 0715 01/29/22 0355 01/29/22 0355 01/28/22 2010 01/28/22 2010   GLU 88 190* 87  89   < > 117*   < > 136*   < > 144*    141 148*  145   < > 143   < > 142   < > 141   K 3.6 4.4 3.7  3.5   < > 3.4*   < > 3.8   < > 3.4*   * 115* 120*  117*   < > 109   < > 106   < > 101   CO2 23 22 20*  23   < > 28   < > 30   < > 30   BUN 24* 24* 26*  25*   < > 32*   < > 34*   < > 38*   CREA 2.07* 2.27* 2.15*  2.07*   < > 2.57*   < > 2.72*   < > 3.16*   CA 11.1* 11.6* 11.8*  11.8*  11.8*   < > 12.7*   < > 13.7*   < > 15.2*   MG 1.4*  --   --   --  1.4*  --   --   --   --    PHOS 1.7* 1.7* 1.8*  --   --   --   --   --   --    AGAP 5 4 8  5   < > 6   < > 6   < > 10   BUCR 12 11* 12  12   < > 12   < > 13   < > 12   AP  --   --   --   --   --   --  52  --  66   TP  --   --   --   --   --   --  6.2*  --  7.7   ALB 2.5* 2.7* 2.7*  --   --    < > 2.9*   < > 3.6   GLOB  --   --   --   --   --   --  3.3  --  4.1*   AGRAT  --   --   --   --   --   --  0.9  --  0.9    < > = values in this interval not displayed. CBC w/Diff   Recent Labs     01/31/22 0302 01/29/22 0715 01/28/22 2010   WBC 4.8 4.7 6.7   RBC 2.94* 3.28* 4.15*   HGB 8.8* 9.8* 12.3   HCT 27.2* 30.4* 38.0    147 185   GRANS 53 41 47   LYMPH 31 32 29   EOS 1 12* 10*       ABG No results for input(s): PHI, PHI, POC2, PCO2I, PO2, PO2I, HCO3, HCO3I, FIO2, FIO2I in the last 72 hours. Micro  No results for input(s): SDES, CULT in the last 72 hours. No results for input(s): CULT in the last 72 hours. CT (Most Recent) Results from Hospital Encounter encounter on 01/28/22    CT CHEST ABD PELV WO CONT    Narrative  CT Chest, Abdomen, And Pelvis Without Contrast    TECHNIQUE: 5 mm axial images from the thoracic inlet to issue tuberosities were  obtained without intravenous contrast.  Oral contrast was not administered.   Coronal and sagittal reformations. All CT scans are performed using dose optimization techniques as appropriate to  the performed exam including the following: Automated exposure control,  adjustment of mA and/or kV according to patient size, and use of iterative  reconstructive technique. HISTORY: Hypercalcemia. Question malignancy. History of sarcoidosis. FINDINGS:  CHEST:  Visualized thyroid gland without definitive nodule. No axillary adenopathy. Calcified mediastinal and bilateral hilar nodes similar to previous  atherosclerotic calcification aorta and coronary arteries. Thoracic aorta is not  aneurysmal. No pericardial effusion. Some progressive perihilar and upper lobe atelectasis right greater than left. Trace effusion on the right. No discrete lung nodule or mass. No definite new  consolidations    Nonspecific fatty nodules of the right and left breast which may correspond with  lateral cyst on mammogram 3/2021. Continued mammographic follow-up suggested. ABDOMEN/PELVIS:  Noncontrast liver, spleen and pancreas without focal abnormality. Nondilated  gallbladder. No biliary duct dilatation. No adrenal nodule or mass. No contour  deforming mass of the right or left kidney. No hydronephrosis. Tiny  nonobstructing stone lower pole left kidney measuring a few millimeters. No dilated stomach, small bowel, or colon. Appendix is not inflamed. Sigmoid  diverticulosis. No gross adenopathy. No ascites. Uterus and adnexa are unremarkable. Underdistended bladder. OSSEOUS STRUCTURES:  No focal suspicious bone lesions. Impression  There is no CT finding for occult malignancy or metastatic disease on this  noncontrast examination. Calcified mediastinal and hilar lymph nodes compatible with history of  sarcoidosis. Relatively perihilar and septal thickening and of atelectasis/scarring which may  be reflective of pulmonary sarcoid. XR (Most Recent).  CXR reviewed by me and compared with previous CXR Results from [place] encounter on 01/28/22    XR CHEST PORT    Narrative  EXAM: Chest Radiograph    INDICATION:  hypokalemia    TECHNIQUE: AP view of the chest    COMPARISON: 3/5/2019, 10/26/2016, 10/26/2016    FINDINGS: No pneumothorax identified. The lungs are hypoinflated. Chronic  scarring is noted in the mid to lower lung fields. This is progressed since most  recent prior. No effusions identified. Cardiac silhouette set the upper limits  normal. Extensive calcified hilar lymph nodes are noted. The pulmonary  vasculature is unremarkable. Degenerative changes noted in the spine. Impression  1. Hypoinflated lungs with scarring in the mid to lower lung fields. See my orders for details     Total care time exclusive of procedures with complex decision making, coordination of care and counseling patient performed and > 50% time spent in face to face evaluation as mentioned above.     Jefe Galindo MD  Critical Care Medicine

## 2022-01-31 NOTE — PROGRESS NOTES
Problem: Diabetes Self-Management  Goal: *Disease process and treatment process  Description: Define diabetes and identify own type of diabetes; list 3 options for treating diabetes. Outcome: Progressing Towards Goal  Goal: *Incorporating nutritional management into lifestyle  Description: Describe effect of type, amount and timing of food on blood glucose; list 3 methods for planning meals. Outcome: Progressing Towards Goal  Goal: *Incorporating physical activity into lifestyle  Description: State effect of exercise on blood glucose levels. Outcome: Progressing Towards Goal  Goal: *Developing strategies to promote health/change behavior  Description: Define the ABC's of diabetes; identify appropriate screenings, schedule and personal plan for screenings. Outcome: Progressing Towards Goal  Goal: *Using medications safely  Description: State effect of diabetes medications on diabetes; name diabetes medication taking, action and side effects. Outcome: Progressing Towards Goal  Goal: *Monitoring blood glucose, interpreting and using results  Description: Identify recommended blood glucose targets  and personal targets. Outcome: Progressing Towards Goal  Goal: *Prevention, detection, treatment of acute complications  Description: List symptoms of hyper- and hypoglycemia; describe how to treat low blood sugar and actions for lowering  high blood glucose level. Outcome: Progressing Towards Goal  Goal: *Prevention, detection and treatment of chronic complications  Description: Define the natural course of diabetes and describe the relationship of blood glucose levels to long term complications of diabetes.   Outcome: Progressing Towards Goal  Goal: *Developing strategies to address psychosocial issues  Description: Describe feelings about living with diabetes; identify support needed and support network  Outcome: Progressing Towards Goal  Goal: *Insulin pump training  Outcome: Progressing Towards Goal  Goal: *Sick day guidelines  Outcome: Progressing Towards Goal  Goal: *Patient Specific Goal (EDIT GOAL, INSERT TEXT)  Outcome: Progressing Towards Goal     Problem: Patient Education: Go to Patient Education Activity  Goal: Patient/Family Education  Outcome: Progressing Towards Goal     Problem: Falls - Risk of  Goal: *Absence of Falls  Description: Document Amita Nap Fall Risk and appropriate interventions in the flowsheet.   Outcome: Progressing Towards Goal  Note: Fall Risk Interventions:  Mobility Interventions: Bed/chair exit alarm,Patient to call before getting OOB         Medication Interventions: Patient to call before getting OOB,Teach patient to arise slowly                   Problem: Patient Education: Go to Patient Education Activity  Goal: Patient/Family Education  Outcome: Progressing Towards Goal     Problem: General Medical Care Plan  Goal: *Vital signs within specified parameters  Outcome: Progressing Towards Goal  Goal: *Labs within defined limits  Outcome: Progressing Towards Goal  Goal: *Absence of infection signs and symptoms  Outcome: Progressing Towards Goal  Goal: *Optimal pain control at patient's stated goal  Outcome: Progressing Towards Goal  Goal: *Skin integrity maintained  Outcome: Progressing Towards Goal  Goal: *Fluid volume balance  Outcome: Progressing Towards Goal  Goal: *Optimize nutritional status  Outcome: Progressing Towards Goal  Goal: *Anxiety reduced or absent  Outcome: Progressing Towards Goal  Goal: *Progressive mobility and function (eg: ADL's)  Outcome: Progressing Towards Goal     Problem: Patient Education: Go to Patient Education Activity  Goal: Patient/Family Education  Outcome: Progressing Towards Goal     Problem: Pain  Goal: *Control of Pain  Outcome: Progressing Towards Goal     Problem: Patient Education: Go to Patient Education Activity  Goal: Patient/Family Education  Outcome: Progressing Towards Goal     Problem: Nutrition Deficit  Goal: *Optimize nutritional status  Outcome: Progressing Towards Goal     Problem: Patient Education: Go to Patient Education Activity  Goal: Patient/Family Education  Outcome: Progressing Towards Goal     Problem: Patient Education: Go to Patient Education Activity  Goal: Patient/Family Education  Outcome: Progressing Towards Goal

## 2022-01-31 NOTE — PROGRESS NOTES
conducted an initial consultation and Spiritual Assessment for Yisel Torres, who is a 61 y.o.,female. Patient's Primary Language is: [place]. According to the patient's EMR Christian Affiliation is: [facility].     The reason the Patient came to the hospital is:   Patient Active Problem List    Diagnosis Date Noted    Hypercalcemia 01/28/2022    Spinal stenosis of lumbar region 01/03/2022    Cerebrospinal fluid leak from spinal puncture 01/03/2022    Nada Hane 09/27/2021    Age-related nuclear cataract, bilateral 06/04/2021    Essential hypertension 04/02/2021    Retinal drusen 04/02/2021    Mixed hyperlipidemia 03/24/2021    Eczema 03/24/2021    Stage 3 chronic kidney disease ([address] 03/24/2021    Depression with anxiety 03/24/2021    Hypoalbuminemia 03/24/2021    Vitamin D deficiency 01/12/2021    Diabetic neuropathy, painful ([address] 06/10/2019    Type 2 diabetes mellitus with proliferative retinopathy ([address] 06/10/2019    Type 2 diabetes mellitus with hyperglycemia, with long-term current use of insulin ([address] 08/28/2018    Neuropathy 08/28/2018    Mild intermittent asthma without complication 18/57/3564    Environmental and seasonal allergies 08/28/2018    Macular degeneration of both eyes 08/28/2018    Gastroesophageal reflux disease without esophagitis 12/09/2015    Sarcoidosis Dr. Jonathan Rios on low dose steroid         The  provided the following Interventions:  Initiated a relationship of care and support through brief supportive dialogue. Explored issues of bertin, belief, spirituality and Yarsanism/ritual needs while hospitalized. Provided information about Spiritual Care Services and availability of chaplains. Chart reviewed. The following outcomes where achieved:  Patient expressed how she was coping within current hospitalization. Patient expressed gratitude for 's visit.     Assessment:  Patient does not have any Yarsanism/cultural needs that will affect patient's preferences in health care. There are no spiritual or Congregational issues which require intervention at this time. Plan:  Chaplains will continue to follow and will provide pastoral care on an as needed/requested basis.  recommends bedside caregivers page  on duty if patient shows signs of acute spiritual or emotional distress.     [address] [place]   [phone]

## 2022-01-31 NOTE — CONSULTS
Nutrition Assessment (Disaster Mode)    Type and Reason for Visit: Initial,Consult    Nutrition Recommendations/Plan:   - Update food allergies in pt chart  - Modify supplement: increase Glucerna Shake to TID  - Continue all other nutrition interventions. Encourage/ monitor po intake of meals and supplements. Malnutrition Assessment:  Malnutrition Status: At risk for malnutrition (specify) (variable appetite/ meal intake; this is the usual for pt for past several years PTA)    Nutrition Assessment:   Nutrition Assessment: Pt with variable/ fair appetite and meal intake PTA and since admission. Has nutrition supplements ordered; flavor preference discussed. Pt reported consuming Glucerna shakes at home as well. Noted, recent wt gain from 1/18/2022; pt edematous, question if wt gain related to fluid status. Nutrition Related Findings: BM 1/30.    +edema. Pertinent meds: vitamin B12, lantus, SSI, mag-ox, protonix, neutra-phos, steroid; Mg sulfate given this morning. Low Phos, and Mg; receiving replacement. IVF discontinued overnight (was NS at 150 mL/hr)     Nutrition History and Allergies: Past medical hx: CKD stage 3, colon polyp, depression, DM, dyslipidemia, GERD, hypertriglyceridemia, pneumonia. Variable appetite/ meal intake PTA for years. On average, pt usually eating about 2 meals daily most days and consuming glucerna shake. Weight fluctuation PTA per chart hx. UBW is 170-175 lb per pt report. Was 173 lb on 1/18/2022, but currently 180 lb. Wt rechecked by RD on 1/31/2022, pt weighing 180-181 lb via bed scale. Pt also edematous (per Nephrology note on 1/31/2022), question if wt gain is related to fluid. Per chart hx, pt did weigh 190 lb on 10/19/2012,  146 lb on 4/14/2021. Food allergies:  Pt reported \"tropical fruit\", but could not specify which fruits except for pineapple and jenifer.   Cultural/Buddhist/Ethnic Food Preference(s): None     Total Energy Requirements (kcals/day): [id number] Energy Requirements Based On: Kcal/kg (x25-30) Weight Used for Energy Requirements: Usual (78.4 kg)  Total Protein Requirements (g/day): 47-63 Weight Used for Protein Requirements: Usual (x0.6-0.8)  Total Fluid Requirements (ml/day): 3035-3787 Method Used for Fluid Requirements: 1 ml/kcal    Current Nutrition Therapies:  ADULT DIET Regular  ADULT ORAL NUTRITION SUPPLEMENT Breakfast, Lunch, Dinner; Diabetic Supplement     Anthropometric Measures:  Height: 5' 2\" (157.5 cm) Weight: 81.6 kg (180 lb) Body mass index is 32.92 kg/m². Admission Body Weight: 175 lb 0.7 oz (pt stated)  Ideal Body Weight (lbs) (Calculated): 110 lbs Ideal Body Weight (Kg) (Calculated): 50 kg % IBW (Calculated): 163.5 %  Usual Body Weight:  (170-175 lb)              Nutrition Diagnosis:   · Inadequate oral intake related to early satiety (variable appetite) as evidenced by intake 26-50%,intake 51-75% (variable meal intake PTA)     Nutrition Interventions:   Food and/or Nutrient Delivery: Continue current diet,Mineral supplement,Modify oral nutrition supplement  Nutrition Education/Counseling: Education not indicated,No recommendations at this time  Coordination of Nutrition Care: Continue to monitor while inpatient       Goals: PO nutrition intake will meet >75% of patient's estimated nutrition needs within the next follow up date    Nutrition Monitoring and Evaluation: Patient will be monitored per nutrition standards of care. Consult Dietitian if nutrition intervention essential to patient care is needed.    Behavioral-Environmental Outcomes: None identified  Food/Nutrient Intake Outcomes: Food and nutrient intake,Vitamin/mineral intake,Supplement intake  Physical Signs/Symptoms Outcomes: Biochemical data,Meal time behavior    Discharge Planning: Continue oral nutrition supplement,Continue current diet    Marley Alexander RD on 1/31/2022 at 2:35 PM  Contact: [phone]    Disaster Mode Active

## 2022-01-31 NOTE — PROGRESS NOTES
RENAL DAILY PROGRESS NOTE              Subjective:       Complaint: LE swelling  noted    Overnight events noted  no nausea, vomiting, chest pain,some chest fullness when lying     IMPRESSION:   IMPRESSION:   · SRIDHAR due to polyuria, hypercalcemia induced DI? · Hypercalcemia due to sarcoidosis, harmeet/vitamin d supplementation, HCTZ use  · CKD stage 3 b due to diabetes  · Sarcoidosis  · Diabetes   PLAN:   · Her eGFR is much improved but not completely back to baseline though. Calcium is trending down but not normal right now as corrected calcium is still above 12. Will give calcitonin. She is developing edema and fluid. Will d/c iv fluids and try some lasix. · If eGFR doesn't improve further and goes the other way there is a role for renal biopsy. I wouldn't do renal biopsy just for hypercalcemia if her eGFR continues to improve as it will be low yield in such a case. Granulomas or etiology of hypercalcemia cannot be established on renal biopsy as these are focal occurrences and can easily be missed. Even if she doesn't have these in kidney I suspect her hypercalcemia can be caused by granulomas in other tissues like lymph nodes etc. Hence in such scenario, kidney biopsy wouldn't change the Rx and would treat with steroids any way. I am in favor of treating with steroids for 1-2 months, then taper and stop it. Stay away from any vitamin d, calcium,hctz. UA is very clean. Very low likelihood of active interstitial nephritis at this point.  If hypercalcemia gets recurrent in future then other modalities can be considered-- like infliximab,ketoconazole etc  · Await PTH rp, 1/25 vitamin d levels  · PTH is appropriately low  · I and O  · Daily weights  · Magnesium,phos repletion to continue  · Follow daily labs            Current Facility-Administered Medications   Medication Dose Route Frequency    potassium, sodium phosphates (NEUTRA-PHOS) packet 1 Packet  1 Packet Oral BID    magnesium sulfate 2 g/50 ml IVPB (premix or compounded)  2 g IntraVENous ONCE    furosemide (LASIX) injection 40 mg  40 mg IntraVENous ONCE    calciTONIN (MIACALCIN) injection 326 Int'l Units  4 Int'l Units/kg IntraMUSCular Q12H    magnesium oxide (MAG-OX) tablet 400 mg  400 mg Oral BID    ondansetron (ZOFRAN) injection 4 mg  4 mg IntraVENous Q6H PRN    predniSONE (DELTASONE) tablet 20 mg  20 mg Oral DAILY WITH BREAKFAST    insulin lispro (HUMALOG) injection   SubCUTAneous AC&HS    glucose chewable tablet 16 g  4 Tablet Oral PRN    glucagon (GLUCAGEN) injection 1 mg  1 mg IntraMUSCular PRN    dextrose 10% infusion 125-250 mL  125-250 mL IntraVENous PRN    pantoprazole (PROTONIX) tablet 40 mg  40 mg Oral ACB    insulin glargine (LANTUS) injection 25 Units  25 Units SubCUTAneous QHS    montelukast (SINGULAIR) tablet 10 mg  10 mg Oral DAILY    topiramate (TOPAMAX) tablet 50 mg  50 mg Oral BID    venlafaxine-SR (EFFEXOR-XR) capsule 75 mg  75 mg Oral DAILY    albuterol (PROVENTIL VENTOLIN) nebulizer solution 2.5 mg  2.5 mg Nebulization Q4H PRN    aspirin delayed-release tablet 81 mg  81 mg Oral DAILY    cyanocobalamin (VITAMIN B12) tablet 50 mcg  50 mcg Oral DAILY    arformoterol 15 mcg/budesonide 0.5 mg neb solution   Nebulization BID RT    ipratropium (ATROVENT) 0.02 % nebulizer solution 0.5 mg  0.5 mg Nebulization Q8H RT    enoxaparin (LOVENOX) injection 30 mg  30 mg SubCUTAneous Q24H       Review of Symptoms: comprehensive ROS negative except above.    Objective:     Patient Vitals for the past 24 hrs:   Temp Pulse Resp BP SpO2   01/31/22 0750 -- -- -- -- 98 %   01/31/22 0734 99.6 °F (37.6 °C) 74 18 (!) 147/75 98 %   01/31/22 0432 98.5 °F (36.9 °C) 76 18 (!) 147/73 94 %   01/31/22 0000 98.5 °F (36.9 °C) 85 20 (!) 158/79 99 %   01/30/22 2138 -- -- -- -- 99 %   01/30/22 2000 98.9 °F (37.2 °C) 87 20 (!) 158/78 97 %   01/30/22 1545 98.5 °F (36.9 °C) 84 20 (!) 150/82 94 %   01/30/22 1209 98.6 °F (37 °C) 91 20 (!) 153/81 97 % Weight change:      01/29 1901 - 01/31 0700  In: 7959.9 [P.O.:720;  I.V.:7239.9]  Out: 2550 [Urine:2550]    Intake/Output Summary (Last 24 hours) at 1/31/2022 1001  Last data filed at 1/31/2022 0432  Gross per 24 hour   Intake 2327.5 ml   Output 2550 ml   Net -222.5 ml     Physical Exam:   General: comfortable, no acute distress   HEENT sclera anicteric, supple neck, no thyromegaly  CVS: S1S2 heard,  no rub  RS: + air entry b/l,   Abd: Soft, Non tender, Not distended, Positive bowel sounds, no organomegaly, no CVA / supra pubic tenderness  Neuro: non focal, awake, alert , CN II-XII are grossly intact  Extrm: plus 1-2 edema, no cyanosis, clubbing   Skin: no visible  Rash  Musculoskeletal: No gross joints or bone deformities         Data Review:     LABS:   Hematology:   Recent Labs     01/31/22  0302 01/29/22  0715 01/28/22 2010 01/28/22  1244   WBC 4.8 4.7 6.7 7.0   HGB 8.8* 9.8* 12.3 13.0   HCT 27.2* 30.4* 38.0 39.6     Chemistry:   Recent Labs     01/31/22  0302 01/30/22  2035 01/30/22  0601 01/29/22  2325 01/29/22  0715 01/29/22  0355 01/28/22 2010 01/28/22  1248 01/28/22  1244   BUN 24* 24* 26*  25* 24* 32* 34* 38* 40*  --    CREA 2.07* 2.27* 2.15*  2.07* 2.21* 2.57* 2.72* 3.16* 3.08*  --    CA 11.1* 11.6* 11.8*  11.8*  11.8* 11.7* 12.7* 13.7* 15.2* 14.9* 14.9*   ALB 2.5* 2.7* 2.7*  --   --  2.9* 3.6 3.8  --    K 3.6 4.4 3.7  3.5 3.6 3.4* 3.8 3.4* 3.6  --     141 148*  145 145 143 142 141 138  --    * 115* 120*  117* 114* 109 106 101 100  --    CO2 23 22 20*  23 22 28 30 30 29  --    PHOS 1.7* 1.7* 1.8*  --   --   --   --  3.0  --    GLU 88 190* 87  89 116* 117* 136* 144* 144*  --             Procedures/imaging: see electronic medical records for all procedures, Xrays and details which were not copied into this note but were reviewed prior to creation of Plan          Assessment & Plan:     See above        Douglas Pineda MD  1/31/2022  10:01 AM

## 2022-02-01 ENCOUNTER — APPOINTMENT (OUTPATIENT)
Dept: PHYSICAL THERAPY | Age: 57
End: 2022-02-01
Attending: PHYSICAL MEDICINE & REHABILITATION
Payer: MEDICARE

## 2022-02-01 LAB
ALBUMIN SERPL-MCNC: 2.9 G/DL (ref 3.4–5)
ANION GAP SERPL CALC-SCNC: 5 MMOL/L (ref 3–18)
BUN SERPL-MCNC: 24 MG/DL (ref 7–18)
BUN/CREAT SERPL: 12 (ref 12–20)
CALCIUM SERPL-MCNC: 11.3 MG/DL (ref 8.5–10.1)
CHLORIDE SERPL-SCNC: 112 MMOL/L (ref 100–111)
CO2 SERPL-SCNC: 25 MMOL/L (ref 21–32)
CREAT SERPL-MCNC: 1.95 MG/DL (ref 0.6–1.3)
GLUCOSE BLD STRIP.AUTO-MCNC: 117 MG/DL (ref 70–110)
GLUCOSE BLD STRIP.AUTO-MCNC: 145 MG/DL (ref 70–110)
GLUCOSE BLD STRIP.AUTO-MCNC: 152 MG/DL (ref 70–110)
GLUCOSE BLD STRIP.AUTO-MCNC: 84 MG/DL (ref 70–110)
GLUCOSE SERPL-MCNC: 76 MG/DL (ref 74–99)
MAGNESIUM SERPL-MCNC: 1.7 MG/DL (ref 1.6–2.6)
PHOSPHATE SERPL-MCNC: 2.6 MG/DL (ref 2.5–4.9)
POTASSIUM SERPL-SCNC: 3.3 MMOL/L (ref 3.5–5.5)
SODIUM SERPL-SCNC: 142 MMOL/L (ref 136–145)

## 2022-02-01 PROCEDURE — 83735 ASSAY OF MAGNESIUM: CPT

## 2022-02-01 PROCEDURE — 74011250637 HC RX REV CODE- 250/637: Performed by: STUDENT IN AN ORGANIZED HEALTH CARE EDUCATION/TRAINING PROGRAM

## 2022-02-01 PROCEDURE — 2709999900 HC NON-CHARGEABLE SUPPLY

## 2022-02-01 PROCEDURE — 74011250636 HC RX REV CODE- 250/636: Performed by: INTERNAL MEDICINE

## 2022-02-01 PROCEDURE — 36415 COLL VENOUS BLD VENIPUNCTURE: CPT

## 2022-02-01 PROCEDURE — 74011000250 HC RX REV CODE- 250: Performed by: STUDENT IN AN ORGANIZED HEALTH CARE EDUCATION/TRAINING PROGRAM

## 2022-02-01 PROCEDURE — 80069 RENAL FUNCTION PANEL: CPT

## 2022-02-01 PROCEDURE — 74011250636 HC RX REV CODE- 250/636: Performed by: STUDENT IN AN ORGANIZED HEALTH CARE EDUCATION/TRAINING PROGRAM

## 2022-02-01 PROCEDURE — 94640 AIRWAY INHALATION TREATMENT: CPT

## 2022-02-01 PROCEDURE — 74011250637 HC RX REV CODE- 250/637: Performed by: INTERNAL MEDICINE

## 2022-02-01 PROCEDURE — 74011000258 HC RX REV CODE- 258: Performed by: INTERNAL MEDICINE

## 2022-02-01 PROCEDURE — 65660000000 HC RM CCU STEPDOWN

## 2022-02-01 PROCEDURE — 99232 SBSQ HOSP IP/OBS MODERATE 35: CPT | Performed by: INTERNAL MEDICINE

## 2022-02-01 PROCEDURE — 82962 GLUCOSE BLOOD TEST: CPT

## 2022-02-01 PROCEDURE — 74011636637 HC RX REV CODE- 636/637: Performed by: INTERNAL MEDICINE

## 2022-02-01 RX ORDER — FAMOTIDINE 20 MG/1
20 TABLET, FILM COATED ORAL
Status: COMPLETED | OUTPATIENT
Start: 2022-02-01 | End: 2022-02-01

## 2022-02-01 RX ORDER — POTASSIUM CHLORIDE 20 MEQ/1
20 TABLET, EXTENDED RELEASE ORAL 2 TIMES DAILY
Status: DISCONTINUED | OUTPATIENT
Start: 2022-02-01 | End: 2022-02-01

## 2022-02-01 RX ORDER — POTASSIUM CHLORIDE 20 MEQ/1
40 TABLET, EXTENDED RELEASE ORAL
Status: COMPLETED | OUTPATIENT
Start: 2022-02-01 | End: 2022-02-01

## 2022-02-01 RX ORDER — SODIUM CHLORIDE 9 MG/ML
500 INJECTION, SOLUTION INTRAVENOUS ONCE
Status: COMPLETED | OUTPATIENT
Start: 2022-02-01 | End: 2022-02-01

## 2022-02-01 RX ORDER — SODIUM CHLORIDE 9 MG/ML
100 INJECTION, SOLUTION INTRAVENOUS CONTINUOUS
Status: DISCONTINUED | OUTPATIENT
Start: 2022-02-01 | End: 2022-02-02

## 2022-02-01 RX ORDER — PRAVASTATIN SODIUM 20 MG/1
80 TABLET ORAL
Status: DISCONTINUED | OUTPATIENT
Start: 2022-02-01 | End: 2022-02-02 | Stop reason: HOSPADM

## 2022-02-01 RX ADMIN — SODIUM CHLORIDE 500 ML: 9 INJECTION, SOLUTION INTRAVENOUS at 11:23

## 2022-02-01 RX ADMIN — SODIUM CHLORIDE 100 ML/HR: 9 INJECTION, SOLUTION INTRAVENOUS at 12:28

## 2022-02-01 RX ADMIN — VENLAFAXINE HYDROCHLORIDE 75 MG: 75 CAPSULE, EXTENDED RELEASE ORAL at 09:13

## 2022-02-01 RX ADMIN — ASPIRIN 81 MG: 81 TABLET, COATED ORAL at 09:13

## 2022-02-01 RX ADMIN — ENOXAPARIN SODIUM 30 MG: 100 INJECTION SUBCUTANEOUS at 09:13

## 2022-02-01 RX ADMIN — IPRATROPIUM BROMIDE 0.5 MG: 0.5 SOLUTION RESPIRATORY (INHALATION) at 08:15

## 2022-02-01 RX ADMIN — FAMOTIDINE 20 MG: 20 TABLET, FILM COATED ORAL at 23:44

## 2022-02-01 RX ADMIN — CALCITONIN SALMON 326 INT'L UNITS: 200 INJECTION, SOLUTION INTRAMUSCULAR; SUBCUTANEOUS at 09:13

## 2022-02-01 RX ADMIN — Medication 400 MG: at 09:12

## 2022-02-01 RX ADMIN — TOPIRAMATE 50 MG: 25 TABLET, FILM COATED ORAL at 18:01

## 2022-02-01 RX ADMIN — POTASSIUM CHLORIDE 40 MEQ: 1500 TABLET, EXTENDED RELEASE ORAL at 09:12

## 2022-02-01 RX ADMIN — ARFORMOTEROL TARTRATE: 15 SOLUTION RESPIRATORY (INHALATION) at 21:41

## 2022-02-01 RX ADMIN — MONTELUKAST 10 MG: 10 TABLET, FILM COATED ORAL at 09:12

## 2022-02-01 RX ADMIN — IPRATROPIUM BROMIDE 0.5 MG: 0.5 SOLUTION RESPIRATORY (INHALATION) at 15:55

## 2022-02-01 RX ADMIN — VITAM B12 50 MCG: 100 TAB at 09:13

## 2022-02-01 RX ADMIN — Medication 400 MG: at 18:01

## 2022-02-01 RX ADMIN — ZOLEDRONIC ACID 3 MG: 4 INJECTION, SOLUTION, CONCENTRATE INTRAVENOUS at 14:24

## 2022-02-01 RX ADMIN — POTASSIUM & SODIUM PHOSPHATES POWDER PACK 280-160-250 MG 1 PACKET: 280-160-250 PACK at 09:13

## 2022-02-01 RX ADMIN — ONDANSETRON 4 MG: 2 INJECTION INTRAMUSCULAR; INTRAVENOUS at 22:03

## 2022-02-01 RX ADMIN — PANTOPRAZOLE 40 MG: 40 TABLET, DELAYED RELEASE ORAL at 09:13

## 2022-02-01 RX ADMIN — ARFORMOTEROL TARTRATE: 15 SOLUTION RESPIRATORY (INHALATION) at 08:15

## 2022-02-01 RX ADMIN — TOPIRAMATE 50 MG: 25 TABLET, FILM COATED ORAL at 09:12

## 2022-02-01 RX ADMIN — PRAVASTATIN SODIUM 80 MG: 20 TABLET ORAL at 22:07

## 2022-02-01 RX ADMIN — SODIUM CHLORIDE 100 ML/HR: 9 INJECTION, SOLUTION INTRAVENOUS at 18:01

## 2022-02-01 RX ADMIN — PREDNISONE 20 MG: 20 TABLET ORAL at 09:12

## 2022-02-01 NOTE — PROGRESS NOTES
Hospitalist Progress Note    Subjective:   Daily Progress Note: 2/1/2022     Patient was feeling much better. Continues to have generalized weakness. Has had left hand tingling intermittently. No nausea or vomiting currently. No SOB or cough. States she takes pravastatin 80 mg at home. Current Facility-Administered Medications   Medication Dose Route Frequency    potassium chloride (K-DUR, KLOR-CON M20) SR tablet 40 mEq  40 mEq Oral NOW    potassium, sodium phosphates (NEUTRA-PHOS) packet 1 Packet  1 Packet Oral BID    calciTONIN (MIACALCIN) injection 326 Int'l Units  4 Int'l Units/kg IntraMUSCular Q12H    magnesium oxide (MAG-OX) tablet 400 mg  400 mg Oral BID    ondansetron (ZOFRAN) injection 4 mg  4 mg IntraVENous Q6H PRN    predniSONE (DELTASONE) tablet 20 mg  20 mg Oral DAILY WITH BREAKFAST    glucose chewable tablet 16 g  4 Tablet Oral PRN    glucagon (GLUCAGEN) injection 1 mg  1 mg IntraMUSCular PRN    dextrose 10% infusion 125-250 mL  125-250 mL IntraVENous PRN    pantoprazole (PROTONIX) tablet 40 mg  40 mg Oral ACB    insulin glargine (LANTUS) injection 25 Units  25 Units SubCUTAneous QHS    montelukast (SINGULAIR) tablet 10 mg  10 mg Oral DAILY    topiramate (TOPAMAX) tablet 50 mg  50 mg Oral BID    venlafaxine-SR (EFFEXOR-XR) capsule 75 mg  75 mg Oral DAILY    albuterol (PROVENTIL VENTOLIN) nebulizer solution 2.5 mg  2.5 mg Nebulization Q4H PRN    aspirin delayed-release tablet 81 mg  81 mg Oral DAILY    cyanocobalamin (VITAMIN B12) tablet 50 mcg  50 mcg Oral DAILY    arformoterol 15 mcg/budesonide 0.5 mg neb solution   Nebulization BID RT    ipratropium (ATROVENT) 0.02 % nebulizer solution 0.5 mg  0.5 mg Nebulization Q8H RT    enoxaparin (LOVENOX) injection 30 mg  30 mg SubCUTAneous Q24H        Review of Systems  Pertinent items are noted in HPI.     Objective:     Visit Vitals  BP (!) 146/72 (BP 1 Location: Left upper arm, BP Patient Position: At rest)   Pulse 72   Temp 98 °F (36.7 °C)   Resp 18   Ht 5' 2\" (1.575 m)   Wt 81.6 kg (180 lb)   LMP 2013   SpO2 96%   BMI 32.92 kg/m²      O2 Device: None (Room air)    Temp (24hrs), Av.3 °F (36.8 °C), Min:97.9 °F (36.6 °C), Max:98.8 °F (37.1 °C)      No intake/output data recorded.  1901 -  0700  In: 720 [P.O.:720]  Out: 2250 [Urine:2250]      General appearance -awake, follows commands appropriately, not in acute distress. Chest -clear air entry noted in bases, no wheezes  Heart - S1 and S2 normal  Abdomen - soft, nontender, nondistended, Bowel sounds present  Neurological -awake, follows commands appropriately, motor strength 5-5 in all 4 extremities, no tremors noted.   Sensation to touch normal.  Extremities - no pedal edema noted    Data Review    Recent Results (from the past 24 hour(s))   GLUCOSE, POC    Collection Time: 22 11:15 AM   Result Value Ref Range    Glucose (POC) 90 70 - 110 mg/dL   GLUCOSE, POC    Collection Time: 22  3:20 PM   Result Value Ref Range    Glucose (POC) 150 (H) 70 - 110 mg/dL   GLUCOSE, POC    Collection Time: 22 11:09 PM   Result Value Ref Range    Glucose (POC) 117 (H) 70 - 110 mg/dL   RENAL FUNCTION PANEL    Collection Time: 22  3:07 AM   Result Value Ref Range    Sodium 142 136 - 145 mmol/L    Potassium 3.3 (L) 3.5 - 5.5 mmol/L    Chloride 112 (H) 100 - 111 mmol/L    CO2 25 21 - 32 mmol/L    Anion gap 5 3.0 - 18 mmol/L    Glucose 76 74 - 99 mg/dL    BUN 24 (H) 7.0 - 18 MG/DL    Creatinine 1.95 (H) 0.6 - 1.3 MG/DL    BUN/Creatinine ratio 12 12 - 20      GFR est AA 32 (L) >60 ml/min/1.73m2    GFR est non-AA 26 (L) >60 ml/min/1.73m2    Calcium 11.3 (H) 8.5 - 10.1 MG/DL    Phosphorus 2.6 2.5 - 4.9 MG/DL    Albumin 2.9 (L) 3.4 - 5.0 g/dL   MAGNESIUM    Collection Time: 22  3:07 AM   Result Value Ref Range    Magnesium 1.7 1.6 - 2.6 mg/dL   GLUCOSE, POC    Collection Time: 22  7:33 AM   Result Value Ref Range    Glucose (POC) 84 70 - 110 mg/dL Assessment/Plan:     Active Problems:    Hypercalcemia (1/28/2022)      ASSESSMENT:    1. Hypercalcemia of unknown etiology at this point. 2.  History of sarcoidosis  3. Acute on stage III chronic kidney disease, improving  4. Electrolyte disturbances  5. Diabetes mellitus  6. Nausea and vomiting, resolved    PLAN:    Continue current treatment for hypercalcemia as recommended by nephrologist. D/w dr. Hilaria Back about Zometa. Replace electrolytes as needed  Continue insulin for diabetes  PT and OT to follow this patient  Pulmonary input noted about starting patient on prednisone  Restart Pravastatin    Anticipated of discharge: Hopefully on February 2, 2022 if cleared by nephrologist    Total time to take care of this patient was 30 minutes and more than 50% of time was spent counseling and coordinating care. Disclaimer: Sections of this note are dictated using utilizing voice recognition software, which may have resulted in some phonetic based errors in grammar and contents. Even though attempts were made to correct all the mistakes, some may have been missed, and remained in the body of the document. If questions arise, please contact our department.

## 2022-02-01 NOTE — PROGRESS NOTES
RENAL DAILY PROGRESS NOTE              Subjective:       Complaint:  Overnight events noted  no nausea, vomiting, chest pain    IMPRESSION:   · SRIDHAR due to polyuria, hypercalcemia induced DI? · Hypercalcemia due to sarcoidosis, harmeet/vitamin d supplementation, HCTZ use  · CKD stage 3 b due to diabetes  · Sarcoidosis  · Diabetes  · Hypokalemia   PLAN:   · S/p calcitonin times 2. Low dose zometa today. reinstitute fluids. Follow calcium  · eGFR is improving.  No role for kidney biopsy  · Replete K  · PTH rp is pending  · 1,25 vitamin d is on higher end of normal  · PTH is appropriately low  · I and O  · Daily weights  · Follow daily labs            Current Facility-Administered Medications   Medication Dose Route Frequency    potassium chloride (K-DUR, KLOR-CON M20) SR tablet 20 mEq  20 mEq Oral BID    0.9% sodium chloride infusion  100 mL/hr IntraVENous CONTINUOUS    zoledronic acid (ZOMETA) 3 mg in 0.9% sodium chloride 100 mL IVPB  3 mg IntraVENous NOW    0.9% sodium chloride infusion 500 mL  500 mL IntraVENous ONCE    magnesium oxide (MAG-OX) tablet 400 mg  400 mg Oral BID    ondansetron (ZOFRAN) injection 4 mg  4 mg IntraVENous Q6H PRN    predniSONE (DELTASONE) tablet 20 mg  20 mg Oral DAILY WITH BREAKFAST    glucose chewable tablet 16 g  4 Tablet Oral PRN    glucagon (GLUCAGEN) injection 1 mg  1 mg IntraMUSCular PRN    dextrose 10% infusion 125-250 mL  125-250 mL IntraVENous PRN    pantoprazole (PROTONIX) tablet 40 mg  40 mg Oral ACB    insulin glargine (LANTUS) injection 25 Units  25 Units SubCUTAneous QHS    montelukast (SINGULAIR) tablet 10 mg  10 mg Oral DAILY    topiramate (TOPAMAX) tablet 50 mg  50 mg Oral BID    venlafaxine-SR (EFFEXOR-XR) capsule 75 mg  75 mg Oral DAILY    albuterol (PROVENTIL VENTOLIN) nebulizer solution 2.5 mg  2.5 mg Nebulization Q4H PRN    aspirin delayed-release tablet 81 mg  81 mg Oral DAILY    cyanocobalamin (VITAMIN B12) tablet 50 mcg  50 mcg Oral DAILY  arformoterol 15 mcg/budesonide 0.5 mg neb solution   Nebulization BID RT    ipratropium (ATROVENT) 0.02 % nebulizer solution 0.5 mg  0.5 mg Nebulization Q8H RT    enoxaparin (LOVENOX) injection 30 mg  30 mg SubCUTAneous Q24H       Review of Symptoms: comprehensive ROS negative except above.    Objective:     Patient Vitals for the past 24 hrs:   Temp Pulse Resp BP SpO2   02/01/22 0822 -- -- -- -- 96 %   02/01/22 0729 98 °F (36.7 °C) 72 18 (!) 146/72 95 %   02/01/22 0401 98.4 °F (36.9 °C) 73 18 (!) 161/81 95 %   02/01/22 0043 98.8 °F (37.1 °C) 87 18 (!) 158/81 99 %   01/31/22 2050 98.4 °F (36.9 °C) 84 18 (!) 164/83 92 %   01/31/22 1701 -- -- -- -- 98 %   01/31/22 1509 97.9 °F (36.6 °C) 90 18 138/84 98 %   01/31/22 1113 98.1 °F (36.7 °C) 88 18 (!) 171/89 96 %        Weight change:      01/30 1901 - 02/01 0700  In: 720 [P.O.:720]  Out: 2250 [Urine:2250]    Intake/Output Summary (Last 24 hours) at 2/1/2022 1051  Last data filed at 2/1/2022 0530  Gross per 24 hour   Intake 480 ml   Output 1100 ml   Net -620 ml     Physical Exam:   General: comfortable, no acute distress   HEENT sclera anicteric, supple neck, no thyromegaly  CVS: S1S2 heard,  no rub  RS: + air entry b/l,   Abd: Soft, Non tender, Not distended, Positive bowel sounds, no organomegaly, no CVA / supra pubic tenderness  Neuro: non focal, awake, alert , CN II-XII are grossly intact  Extrm: no edema, no cyanosis, clubbing   Skin: no visible  Rash  Musculoskeletal: No gross joints or bone deformities         Data Review:     LABS:   Hematology:   Recent Labs     01/31/22  0302   WBC 4.8   HGB 8.8*   HCT 27.2*     Chemistry:   Recent Labs     02/01/22  0307 01/31/22  0302 01/30/22  2035 01/30/22  0601 01/29/22  2325   BUN 24* 24* 24* 26*  25* 24*   CREA 1.95* 2.07* 2.27* 2.15*  2.07* 2.21*   CA 11.3* 11.1* 11.6* 11.8*  11.8*  11.8* 11.7*   ALB 2.9* 2.5* 2.7* 2.7*  --    K 3.3* 3.6 4.4 3.7  3.5 3.6    144 141 148*  145 145   * 116* 115* 120* 117* 114*   CO2 25 23 22 20*  23 22   PHOS 2.6 1.7* 1.7* 1.8*  --    GLU 76 88 190* 87  89 116*            Procedures/imaging: see electronic medical records for all procedures, Xrays and details which were not copied into this note but were reviewed prior to creation of Plan          Assessment & Plan:     See above        Selvin Colindres MD  2/1/2022  10:01 AM

## 2022-02-01 NOTE — PROGRESS NOTES
Bedside shift change report given to JULIA Bains (oncoming nurse) by Valentino Narayanan RN (offgoing nurse). Report included the following information SBAR, Kardex, MAR and Recent Results.

## 2022-02-01 NOTE — PROGRESS NOTES
New OT order received and chart reviewed. Patient evaluated and discharged from skilled OT caseload today. Please see full note for details. Will acknowledge and complete the order.   Thank you for the referral.  Diane Goodell, MS OTR/L

## 2022-02-01 NOTE — PROGRESS NOTES
Problem: Diabetes Self-Management  Goal: *Disease process and treatment process  Description: Define diabetes and identify own type of diabetes; list 3 options for treating diabetes. Outcome: Progressing Towards Goal  Goal: *Incorporating nutritional management into lifestyle  Description: Describe effect of type, amount and timing of food on blood glucose; list 3 methods for planning meals. Outcome: Progressing Towards Goal  Goal: *Incorporating physical activity into lifestyle  Description: State effect of exercise on blood glucose levels. Outcome: Progressing Towards Goal  Goal: *Developing strategies to promote health/change behavior  Description: Define the ABC's of diabetes; identify appropriate screenings, schedule and personal plan for screenings. Outcome: Progressing Towards Goal  Goal: *Using medications safely  Description: State effect of diabetes medications on diabetes; name diabetes medication taking, action and side effects. Outcome: Progressing Towards Goal  Goal: *Monitoring blood glucose, interpreting and using results  Description: Identify recommended blood glucose targets  and personal targets. Outcome: Progressing Towards Goal  Goal: *Prevention, detection, treatment of acute complications  Description: List symptoms of hyper- and hypoglycemia; describe how to treat low blood sugar and actions for lowering  high blood glucose level. Outcome: Progressing Towards Goal  Goal: *Prevention, detection and treatment of chronic complications  Description: Define the natural course of diabetes and describe the relationship of blood glucose levels to long term complications of diabetes.   Outcome: Progressing Towards Goal  Goal: *Developing strategies to address psychosocial issues  Description: Describe feelings about living with diabetes; identify support needed and support network  Outcome: Progressing Towards Goal  Goal: *Insulin pump training  Outcome: Progressing Towards Goal  Goal: *Sick day guidelines  Outcome: Progressing Towards Goal  Goal: *Patient Specific Goal (EDIT GOAL, INSERT TEXT)  Outcome: Progressing Towards Goal     Problem: Patient Education: Go to Patient Education Activity  Goal: Patient/Family Education  Outcome: Progressing Towards Goal     Problem: Falls - Risk of  Goal: *Absence of Falls  Description: Document Shamika Wagoner Fall Risk and appropriate interventions in the flowsheet.   Outcome: Progressing Towards Goal  Note: Fall Risk Interventions:  Mobility Interventions: Patient to call before getting OOB,PT Consult for mobility concerns,PT Consult for assist device competence,Utilize walker, cane, or other assistive device         Medication Interventions: Evaluate medications/consider consulting pharmacy,Teach patient to arise slowly                   Problem: Patient Education: Go to Patient Education Activity  Goal: Patient/Family Education  Outcome: Progressing Towards Goal     Problem: General Medical Care Plan  Goal: *Vital signs within specified parameters  Outcome: Progressing Towards Goal  Goal: *Labs within defined limits  Outcome: Progressing Towards Goal  Goal: *Absence of infection signs and symptoms  Outcome: Progressing Towards Goal  Goal: *Optimal pain control at patient's stated goal  Outcome: Progressing Towards Goal  Goal: *Skin integrity maintained  Outcome: Progressing Towards Goal  Goal: *Fluid volume balance  Outcome: Progressing Towards Goal  Goal: *Optimize nutritional status  Outcome: Progressing Towards Goal  Goal: *Anxiety reduced or absent  Outcome: Progressing Towards Goal  Goal: *Progressive mobility and function (eg: ADL's)  Outcome: Progressing Towards Goal     Problem: Patient Education: Go to Patient Education Activity  Goal: Patient/Family Education  Outcome: Progressing Towards Goal     Problem: Pain  Goal: *Control of Pain  Outcome: Progressing Towards Goal     Problem: Patient Education: Go to Patient Education Activity  Goal: Patient/Family Education  Outcome: Progressing Towards Goal     Problem: Nutrition Deficit  Goal: *Optimize nutritional status  Outcome: Progressing Towards Goal     Problem: Patient Education: Go to Patient Education Activity  Goal: Patient/Family Education  Outcome: Progressing Towards Goal     Problem: Patient Education: Go to Patient Education Activity  Goal: Patient/Family Education  Outcome: Progressing Towards Goal

## 2022-02-01 NOTE — PROGRESS NOTES
Problem: Diabetes Self-Management  Goal: *Disease process and treatment process  Description: Define diabetes and identify own type of diabetes; list 3 options for treating diabetes. Outcome: Progressing Towards Goal  Goal: *Incorporating nutritional management into lifestyle  Description: Describe effect of type, amount and timing of food on blood glucose; list 3 methods for planning meals. Outcome: Progressing Towards Goal  Goal: *Incorporating physical activity into lifestyle  Description: State effect of exercise on blood glucose levels. Outcome: Progressing Towards Goal  Goal: *Developing strategies to promote health/change behavior  Description: Define the ABC's of diabetes; identify appropriate screenings, schedule and personal plan for screenings. Outcome: Progressing Towards Goal  Goal: *Using medications safely  Description: State effect of diabetes medications on diabetes; name diabetes medication taking, action and side effects. Outcome: Progressing Towards Goal  Goal: *Monitoring blood glucose, interpreting and using results  Description: Identify recommended blood glucose targets  and personal targets. Outcome: Progressing Towards Goal  Goal: *Prevention, detection, treatment of acute complications  Description: List symptoms of hyper- and hypoglycemia; describe how to treat low blood sugar and actions for lowering  high blood glucose level. Outcome: Progressing Towards Goal  Goal: *Prevention, detection and treatment of chronic complications  Description: Define the natural course of diabetes and describe the relationship of blood glucose levels to long term complications of diabetes.   Outcome: Progressing Towards Goal  Goal: *Developing strategies to address psychosocial issues  Description: Describe feelings about living with diabetes; identify support needed and support network  Outcome: Progressing Towards Goal  Goal: *Insulin pump training  Outcome: Progressing Towards Goal  Goal: *Sick day guidelines  Outcome: Progressing Towards Goal  Goal: *Patient Specific Goal (EDIT GOAL, INSERT TEXT)  Outcome: Progressing Towards Goal     Problem: Patient Education: Go to Patient Education Activity  Goal: Patient/Family Education  Outcome: Progressing Towards Goal     Problem: Falls - Risk of  Goal: *Absence of Falls  Description: Document Jessy Coleman Fall Risk and appropriate interventions in the flowsheet.   Outcome: Progressing Towards Goal  Note: Fall Risk Interventions:  Mobility Interventions: Utilize walker, cane, or other assistive device,Patient to call before getting OOB         Medication Interventions: Patient to call before getting OOB,Teach patient to arise slowly,Bed/chair exit alarm                   Problem: Patient Education: Go to Patient Education Activity  Goal: Patient/Family Education  Outcome: Progressing Towards Goal     Problem: General Medical Care Plan  Goal: *Vital signs within specified parameters  Outcome: Progressing Towards Goal  Goal: *Labs within defined limits  Outcome: Progressing Towards Goal  Goal: *Absence of infection signs and symptoms  Outcome: Progressing Towards Goal  Goal: *Optimal pain control at patient's stated goal  Outcome: Progressing Towards Goal  Goal: *Skin integrity maintained  Outcome: Progressing Towards Goal  Goal: *Fluid volume balance  Outcome: Progressing Towards Goal  Goal: *Optimize nutritional status  Outcome: Progressing Towards Goal  Goal: *Anxiety reduced or absent  Outcome: Progressing Towards Goal  Goal: *Progressive mobility and function (eg: ADL's)  Outcome: Progressing Towards Goal     Problem: Patient Education: Go to Patient Education Activity  Goal: Patient/Family Education  Outcome: Progressing Towards Goal     Problem: Pain  Goal: *Control of Pain  Outcome: Progressing Towards Goal     Problem: Patient Education: Go to Patient Education Activity  Goal: Patient/Family Education  Outcome: Progressing Towards Goal     Problem: Nutrition Deficit  Goal: *Optimize nutritional status  Outcome: Progressing Towards Goal     Problem: Patient Education: Go to Patient Education Activity  Goal: Patient/Family Education  Outcome: Progressing Towards Goal     Problem: Patient Education: Go to Patient Education Activity  Goal: Patient/Family Education  Outcome: Progressing Towards Goal     Problem: Patient Education: Go to Patient Education Activity  Goal: Patient/Family Education  Outcome: Progressing Towards Goal

## 2022-02-02 VITALS
OXYGEN SATURATION: 96 % | WEIGHT: 180 LBS | BODY MASS INDEX: 33.13 KG/M2 | SYSTOLIC BLOOD PRESSURE: 165 MMHG | RESPIRATION RATE: 20 BRPM | HEART RATE: 70 BPM | TEMPERATURE: 98.3 F | HEIGHT: 62 IN | DIASTOLIC BLOOD PRESSURE: 81 MMHG

## 2022-02-02 LAB
ALBUMIN SERPL-MCNC: 2.8 G/DL (ref 3.4–5)
ANION GAP SERPL CALC-SCNC: 4 MMOL/L (ref 3–18)
BUN SERPL-MCNC: 24 MG/DL (ref 7–18)
BUN/CREAT SERPL: 12 (ref 12–20)
CALCIUM SERPL-MCNC: 10.3 MG/DL (ref 8.5–10.1)
CHLORIDE SERPL-SCNC: 114 MMOL/L (ref 100–111)
CO2 SERPL-SCNC: 23 MMOL/L (ref 21–32)
CREAT SERPL-MCNC: 1.99 MG/DL (ref 0.6–1.3)
GLUCOSE BLD STRIP.AUTO-MCNC: 110 MG/DL (ref 70–110)
GLUCOSE SERPL-MCNC: 126 MG/DL (ref 74–99)
PHOSPHATE SERPL-MCNC: 3 MG/DL (ref 2.5–4.9)
POTASSIUM SERPL-SCNC: 4.2 MMOL/L (ref 3.5–5.5)
SODIUM SERPL-SCNC: 141 MMOL/L (ref 136–145)

## 2022-02-02 PROCEDURE — 74011250637 HC RX REV CODE- 250/637: Performed by: STUDENT IN AN ORGANIZED HEALTH CARE EDUCATION/TRAINING PROGRAM

## 2022-02-02 PROCEDURE — 74011636637 HC RX REV CODE- 636/637: Performed by: INTERNAL MEDICINE

## 2022-02-02 PROCEDURE — 74011250636 HC RX REV CODE- 250/636: Performed by: INTERNAL MEDICINE

## 2022-02-02 PROCEDURE — 99239 HOSP IP/OBS DSCHRG MGMT >30: CPT | Performed by: INTERNAL MEDICINE

## 2022-02-02 PROCEDURE — 82962 GLUCOSE BLOOD TEST: CPT

## 2022-02-02 PROCEDURE — 36415 COLL VENOUS BLD VENIPUNCTURE: CPT

## 2022-02-02 PROCEDURE — 74011250636 HC RX REV CODE- 250/636: Performed by: STUDENT IN AN ORGANIZED HEALTH CARE EDUCATION/TRAINING PROGRAM

## 2022-02-02 PROCEDURE — 74011250637 HC RX REV CODE- 250/637: Performed by: INTERNAL MEDICINE

## 2022-02-02 PROCEDURE — 2709999900 HC NON-CHARGEABLE SUPPLY

## 2022-02-02 PROCEDURE — 80069 RENAL FUNCTION PANEL: CPT

## 2022-02-02 RX ORDER — GLUCOSAMINE SULFATE 1500 MG
1000 POWDER IN PACKET (EA) ORAL DAILY
Qty: 30 CAPSULE | Refills: 0 | Status: SHIPPED
Start: 2022-02-12 | End: 2022-03-31

## 2022-02-02 RX ORDER — ONDANSETRON 4 MG/1
4 TABLET, FILM COATED ORAL
Qty: 20 TABLET | Refills: 0 | Status: SHIPPED | OUTPATIENT
Start: 2022-02-02

## 2022-02-02 RX ORDER — PREDNISONE 5 MG/1
TABLET ORAL
Qty: 70 TABLET | Refills: 0 | Status: SHIPPED | OUTPATIENT
Start: 2022-02-02 | End: 2022-08-25

## 2022-02-02 RX ADMIN — ENOXAPARIN SODIUM 30 MG: 100 INJECTION SUBCUTANEOUS at 09:28

## 2022-02-02 RX ADMIN — Medication 400 MG: at 09:28

## 2022-02-02 RX ADMIN — VITAM B12 50 MCG: 100 TAB at 09:28

## 2022-02-02 RX ADMIN — PREDNISONE 20 MG: 20 TABLET ORAL at 09:28

## 2022-02-02 RX ADMIN — VENLAFAXINE HYDROCHLORIDE 75 MG: 75 CAPSULE, EXTENDED RELEASE ORAL at 09:28

## 2022-02-02 RX ADMIN — MONTELUKAST 10 MG: 10 TABLET, FILM COATED ORAL at 09:28

## 2022-02-02 RX ADMIN — PANTOPRAZOLE 40 MG: 40 TABLET, DELAYED RELEASE ORAL at 09:28

## 2022-02-02 RX ADMIN — SODIUM CHLORIDE 100 ML/HR: 9 INJECTION, SOLUTION INTRAVENOUS at 04:23

## 2022-02-02 RX ADMIN — TOPIRAMATE 50 MG: 25 TABLET, FILM COATED ORAL at 09:28

## 2022-02-02 RX ADMIN — ASPIRIN 81 MG: 81 TABLET, COATED ORAL at 09:28

## 2022-02-02 NOTE — ROUTINE PROCESS
Bedside and Verbal shift change report given to Anaya Perdomo RN (oncoming nurse) by Tyree Ang RN (offgoing nurse). Report included the following information SBAR, Kardex, MAR and Recent Results.     SITUATION:  Code Status: Full Code  Reason for Admission: Hypercalcemia [E83.52]  Hospital day: 5  Problem List:       Hospital Problems  Date Reviewed: 1/18/2022          Codes Class Noted POA    Hypercalcemia ICD-10-CM: E83.52  ICD-9-CM: 275.42  1/28/2022 Unknown              BACKGROUND:   Past Medical History:   Past Medical History:   Diagnosis Date    Acquired cyst of kidney 01/16/2020    [facility]    Asthma     Bilateral great toe fractures 2014    Chronic kidney disease, stage 3b ([address] 01/2021    Dr Audrey Arana, nephro    Chronic sinusitis     Dr. Rufina Cortez in the past    Colon polyp 5/16    Dr Jamari Monroe    Depression 2019    Diabetes mellitus ([address]     DJD (degenerative joint disease) of cervical spine 2016    DJD (degenerative joint disease), lumbar 2013    MRI c spine w degen changes; Dr Zhang Case    Dyslipidemia     calculated 10 year risk score was 2.0% (12/13)    Edema of both ankles 8/28/2018    Environmental and seasonal allergies 8/28/2018    Eustachian tube dysfunction     Fibrocystic breast     Dr Angela Palomares GERD (gastroesophageal reflux disease)     Hypertriglyceridemia 8/28/2018    Lateral epicondylitis of both elbows 2/6/2017    Macular degeneration of both eyes 08/28/2018    Dr Meche Acosta [facility]    Mild intermittent asthma without complication 41/86/2937    Dr. Luba Holbrook;  ratio 68%, FEV1 78% w 6% inc postbd, TLC 77, RV 56, DLCO 61%    Morbid obesity (HCC)     peak weight 196 lbs, bmi 35.8 from 10/13    Neuropathy 8/28/2018    Osteopenia     Dr. Gardner Postal; DEXA t score -0.7 spine, -0.2 hip (8/14)    Pneumonia     Sarcoidosis     Dr Bishop Casey Type 2 diabetes mellitus with hyperglycemia, with long-term current use of insulin ([address] 8/28/2018      Patient taking anticoagulants yes Patient has a defibrillator: no    History of shots YES for example, flu, pneumonia, tetanus   Isolation History NO for example, MRSA, CDiff    ASSESSMENT:  Changes in Assessment Throughout Shift: NONE  Significant Changes in 24 hours (for example, RR/code, fall)  Patient has Central Line: no  Patient has Ley Cath: no  Mobility Issues  PT  IV Patency  OR Checklist  Pending Tests    Last Vitals:  Vitals w/ MEWS Score (last day)     Date/Time MEWS Score Pulse Resp Temp BP Level of Consciousness SpO2    02/02/22 0306 1 85 19 97.4 °F (36.3 °C) 122/57 Alert (0) 96 %    02/01/22 2341 1 89 18 96.8 °F (36 °C) 151/83 Alert (0) 98 %    02/01/22 2141 -- -- -- -- -- -- 95 %    02/01/22 1936 1 81 16 98.6 °F (37 °C) 160/85 Alert (0) 97 %    02/01/22 1557 -- -- -- -- -- -- 96 %    02/01/22 1506 1 96 18 97.9 °F (36.6 °C) 133/73 Alert (0) 98 %    02/01/22 1240 1 89 18 98.3 °F (36.8 °C) 143/78 Alert (0) 96 %    02/01/22 0822 -- -- -- -- -- -- 96 %    02/01/22 0729 1 72 18 98 °F (36.7 °C) 146/72 Alert (0) 95 %    02/01/22 0401 1 73 18 98.4 °F (36.9 °C) 161/81 Alert (0) 95 %    02/01/22 0043 1 87 18 98.8 °F (37.1 °C) 158/81 Alert (0) 99 %        PAIN    Pain Assessment    Pain Intensity 1: 0 (02/02/22 0306)    Pain Location 1: Hip    Pain Intervention(s) 1: Declines    Patient Stated Pain Goal: 0  Intervention effective: N/A  Time of last intervention: N/A Reassessment Completed: yes   Other actions taken for pain: Distraction    Last 3 Weights:  Last 3 Recorded Weights in this Encounter    01/28/22 1903 01/30/22 0352   Weight: 79.4 kg (175 lb) 81.6 kg (180 lb)   Weight change:     INTAKE/OUPUT    Current Shift: No intake/output data recorded. Last three shifts: 01/31 1901 - 02/02 0700  In: 1920 [P.O.:720;  I.V.:1200]  Out: 1800 [Urine:1800]    RECOMMENDATIONS AND DISCHARGE PLANNING  Patient needs and requests: NONE    Pending tests/procedures: labs     Discharge plan for patient: Home    Discharge planning Needs or Barriers: None    Estimated Discharge Date: 2/05/2022 Posted on Whiteboard in [facility]: yes       \"HEALS\" SAFETY CHECK  A safety check occurred in the patient's room between off going nurse and oncoming nurse listed above. The safety check included the below items:    H  High Alert Medications Verify all high alert medication drips (heparin, PCA, etc.)  E  Equipment Suction is set up for ALL patients (with denis)  Red plugs utilized for all equipment (IV pumps, etc.)  WOWs wiped down at end of shift. Room stocked with oxygen, suction, and other unit-specific supplies  A  Alarms Bed alarm is set for fall risk patients  Ensure chair alarm is in place and activated if patient is up in a chair  L  Lines Check IV for any infiltration  Ley bag is empty if patient has a Ley   Tubing and IV bags are labeled  S  Safety  Room is clean, patient is clean, and equipment is clean. Hallways are clear from equipment besides carts. Fall bracelet on for fall risk patients  Ensure room is clear and free of clutter  Suction is set up for ALL patients (with denis)  Hallways are clear from equipment besides carts.    Isolation precautions followed, supplies available outside room, sign posted    Leland Collins RN

## 2022-02-02 NOTE — PROGRESS NOTES
D/C order noted for today. Orders reviewed. No needs identified at this time. CM remains available if needed. Patients brother will transport home.       KATHLEEN TraoreN, RN  Care Management  Pager # [phone]

## 2022-02-02 NOTE — DISCHARGE INSTRUCTIONS
DISCHARGE SUMMARY from Nurse    PATIENT INSTRUCTIONS:    After general anesthesia or intravenous sedation, for 24 hours or while taking prescription Narcotics:  · Limit your activities  · Do not drive and operate hazardous machinery  · Do not make important personal or business decisions  · Do  not drink alcoholic beverages  · If you have not urinated within 8 hours after discharge, please contact your surgeon on call. Report the following to your surgeon:  · Excessive pain, swelling, redness or odor of or around the surgical area  · Temperature over 100.5  · Nausea and vomiting lasting longer than 4 hours or if unable to take medications  · Any signs of decreased circulation or nerve impairment to extremity: change in color, persistent  numbness, tingling, coldness or increase pain  · Any questions    What to do at Home:  Recommended activity: Activity as tolerated, ***    If you experience any of the following symptoms chest pain, shortness of breath, nausea, vomiting, fever greater than 100.5,pain unrelieved by medication, please follow up with [address] [place] [address]. *  Please give a list of your current medications to your Primary Care Provider. *  Please update this list whenever your medications are discontinued, doses are      changed, or new medications (including over-the-counter products) are added. *  Please carry medication information at all times in case of emergency situations. These are general instructions for a healthy lifestyle:    No smoking/ No tobacco products/ Avoid exposure to second hand smoke  Surgeon General's Warning:  Quitting smoking now greatly reduces serious risk to your health.     Obesity, smoking, and sedentary lifestyle greatly increases your risk for illness    A healthy diet, regular physical exercise & weight monitoring are important for maintaining a healthy lifestyle    You may be retaining fluid if you have a history of heart failure or if you experience any of the following symptoms:  Weight gain of 3 pounds or more overnight or 5 pounds in a week, increased swelling in our hands or feet or shortness of breath while lying flat in bed. Please call your doctor as soon as you notice any of these symptoms; do not wait until your next office visit. Patient armband removed and shredded  MyChart Activation    Thank you for requesting access to [employer]. Please follow the instructions below to securely access and download your online medical record. [facility] allows you to send messages to your doctor, view your test results, renew your prescriptions, schedule appointments, and more. How Do I Sign Up? 1. In your internet browser, go to [email or web address]  2. Click on the First Time User? Click Here link in the Sign In box. You will be redirect to the New Member Sign Up page. 3. Enter your [employer] Access Code exactly as it appears below. You will not need to use this code after youve completed the sign-up process. If you do not sign up before the expiration date, you must request a new code. [employer] Access Code: Activation code not generated  Current [facility] Status: Active (This is the date your [employer] access code will )    4. Enter the last four digits of your Social Security Number (xxxx) and Date of Birth (mm/dd/yyyy) as indicated and click Submit. You will be taken to the next sign-up page. 5. Create a [facility] ID. This will be your [employer] login ID and cannot be changed, so think of one that is secure and easy to remember. 6. Create a [employer] password. You can change your password at any time. 7. Enter your Password Reset Question and Answer. This can be used at a later time if you forget your password. 8. Enter your e-mail address. You will receive e-mail notification when new information is available in [address] 9. Click Sign Up. You can now view and download portions of your medical record.   10. Click the Download Summary menu link to download a portable copy of your medical information. Additional Information    If you have questions, please visit the Frequently Asked Questions section of the [employer] website at [email or web address] Remember, [employer] is NOT to be used for urgent needs. For medical emergencies, dial 911. The discharge information has been reviewed with the {PATIENT PARENT GUARDIAN:19481}. The {PATIENT PARENT GUARDIAN:23240} verbalized understanding. Discharge medications reviewed with the {Dishcarge meds reviewed PPHZ:21716} and appropriate educational materials and side effects teaching were provided.   ___________________________________________________________________________________________________________________________________

## 2022-02-02 NOTE — DISCHARGE SUMMARY
Physician Discharge Summary       Patient: Harriett Cesar MRN: [id number]  SSN: [id number]    YOB: 1965  Age: 61 y.o. Sex: female    PCP: Danitza Reed DNP    Allergies: Other food, Pollen extracts, Amoxicillin, Crestor [rosuvastatin], Ibuprofen, Lipitor [atorvastatin], Kechi flavor, Pcn [penicillins], and Pineapple    Admit date: 1/28/2022  Admitting Provider: Jacquelynn Dance, MD    Discharge date: 2/2/2022  Discharging Provider: Kassidy Thibodeaux MD    * Admission Diagnoses: Hypercalcemia [E83.52]    * Discharge Diagnoses:      1. Hypercalcemia of unknown etiology, improving. S/p Zometa x1  2. History of sarcoidosis  3. Acute on stage III chronic kidney disease, improving  4. Electrolyte disturbances, stable currently  5. Diabetes mellitus  6. Nausea and vomiting, resolved      [facility] Course: Patient was admitted to hospital on January 30, 2022 with complaint of hypercalcemia. Please refer hospital admission H&P for further detail. Patient was seen by nephrology service. Patient was started on IV fluid with improvement in her calcium but was still running on higher side. Patient received 1 dose of Zometa. The etiology of hypercalcemia was thought secondary to sarcoidosis and/oral medications including vitamin D, calcium supplement and hydrochlorothiazide. Patient was started on prednisone for sarcoidosis treatment after obtaining pulmonary consult. Patient was continued on her home medication for the comorbidities including Pravachol. Patient had CT chest abdomen pelvis done which was reported negative for any malignancy or metastatic lesion. Chest x-ray was also done. At this point patient will be discharged home on the following medications with outpatient follow-up with the nephrologist.  Patient is aware that the cause of hypercalcemia is not exactly found. She verbalized understanding about it.   She also understand that she does not need to stay in the hospital as further work-up and monitoring can be done in outpatient setting. * Procedures: None  * No surgery found *      Consults: Pulmonary/Intensive care and Nephrology    Significant Diagnostic Studies: Chest x-ray, CT chest abdomen pelvis    Discharge Exam:  Please refer my progress note from February 2, 2022 for further detail    * Discharge Condition: improved  * Disposition: Home    Discharge Medications:  Discharge Medication List as of 2/2/2022 10:25 AM      START taking these medications    Details   predniSONE (DELTASONE) 5 mg tablet 4 tablets po daily x week then 3 tablets po daily x 1 week then 2 tablets po daily x 1 week then 1 tablet po daily, Print, Disp-70 Tablet, R-0      ondansetron hcl (Zofran) 4 mg tablet Take 1 Tablet by mouth every eight (8) hours as needed for Nausea or Nausea or Vomiting., Print, Disp-20 Tablet, R-0      OTHER BMP on 2/5/22  Please call report to PCP and dr. Flower Few  Reason: hypercalcemia, Print, Disp-1 Each, R-0         CONTINUE these medications which have CHANGED    Details   cholecalciferol (VITAMIN D3) 25 mcg (1,000 unit) cap Take 1 Capsule by mouth daily. , No Print, Disp-30 Capsule, R-0         CONTINUE these medications which have NOT CHANGED    Details   butalbital-acetaminophen-caffeine (FIORICET, ESGIC) -40 mg per tablet Take 1 Tablet by mouth every six (6) hours as needed., Historical Med      magnesium oxide (MAG-OX) 400 mg tablet Take 400 mg by mouth two (2) times daily (with meals). , Historical Med      venlafaxine-SR [facility] 75 mg capsule Take 1 Capsule by mouth daily. , Normal, Disp-90 Capsule, R-1      topiramate (TOPAMAX) 50 mg tablet Take 1 Tablet by mouth two (2) times a day., Normal, Disp-180 Tablet, R-1      SITagliptin (JANUVIA) 25 mg tablet Take 1 Tablet by mouth daily. For diabetes, Normal, Disp-90 Tablet, R-1      pravastatin (PRAVACHOL) 80 mg tablet Take 1 Tablet by mouth daily. , Normal, Disp-90 Tablet, R-1      montelukast (SINGULAIR) 10 mg tablet Take 1 Tablet by mouth daily. , Normal, Disp-90 Tablet, R-3      insulin glargine (LANTUS,BASAGLAR) 100 unit/mL (3 mL) inpn 25 Units by SubCUTAneous route nightly., Normal, Disp-5 Pen, R-1      esomeprazole (NEXIUM) 40 mg capsule TAKE ONE CAPSULE BY MOUTH DAILY AS NEEDED FOR REFLUX, Normal, Disp-90 Capsule, R-0      triamcinolone (Nasacort) 55 mcg nasal inhaler 2 Sprays daily. , Historical Med      fluticasone propionate (Flovent Diskus) 250 mcg/actuation dsdv Take 1 Puff by inhalation two (2) times a day. Rinse gargle mouth thoroughly after each use. Disp: 3 inhalers, Normal, Disp-3 Each, R-3      fluticasone-umeclidin-vilanter (Trelegy Ellipta) 200-62.5-25 mcg dsdv Take 1 Puff by inhalation daily. Rinse and gargle after each use, Normal, Disp-3 Each, R-3      cyanocobalamin (VITAMIN B12) 100 mcg tablet Take 50 mcg by mouth daily. , Historical Med      albuterol (PROVENTIL HFA, VENTOLIN HFA, PROAIR HFA) 90 mcg/actuation inhaler Take 2 Puffs by inhalation every four (4) hours as needed for Shortness of Breath. Indications: asthma attack, Normal, Disp-1 Inhaler, R-5      Insulin Needles, Disposable, (Prerna Pen Needle) 32 gauge x 5/32\" ndle Check blood sugars three times a day, Normal, Disp-100 Pen Needle, R-11      insulin lispro (HumaLOG KwikPen Insulin) 200 unit/mL (3 mL) inpn 3 times per day: 150-200 2u, 201-250 4u, 251-300 6u, 301-350 8u, 351-400 10u, > 400 12u. Indications: type 2 diabetes mellitus, Normal, Disp-5 Pen, R-5      therapeutic multivitamin-minerals (THERAGRAN-M) tablet Take 1 Tab by mouth daily. , Historical Med      triamcinolone acetonide (KENALOG) 0.1 % topical cream Apply  to affected area two (2) times daily as needed for Skin Irritation. , Normal, Disp-15 g, R-1      aspirin delayed-release 81 mg tablet Take 1 Tab by mouth daily. For heart health, Normal, Disp-30 Tab, R-11      glucose blood VI test strips (ACCU-CHEK POOJA) strip Pt to test 5 times daily.   Dx:E11.65, Normal, Disp-500 Strip, R-3         STOP taking these medications       hydroCHLOROthiazide (HYDRODIURIL) 25 mg tablet Comments:   Reason for Stopping:         calcium-cholecalciferol, D3, (CALTRATE 600+D) tablet Comments:   Reason for Stopping:               * Follow-up Care/Patient Instructions: Activity: Activity as tolerated  Diet: Cardiac Diet and Diabetic Diet  Wound Care: None needed    Follow-up Information     Follow up With Specialties Details Why Contact [place]    Deepika Khan, [address] Nurse Practitioner On 2/3/2022 @ 12:45pm [address] [facility]  [address] [facility] [place], [place] [address] [facility]      Marivel Hernandez MD Nephrology On 2/10/2022 @ 2:40pm [address] [place] [address]      Ennice Metro, MD Pulmonary Disease, Internal Medicine On 3/7/2022 @ 9:00am [employer]  [phone]          Follow-up Appointments   Procedures    FOLLOW UP VISIT Appointment in: Other ([facility]) With PCP in 5 days With dr. Annabelle Kinsey [nephrology] in one week for hypercalcemia With dr. Ricky Muñoz in 2-3 weeks for sarcoidosis     With PCP in 5 days  With dr. Annabelle Kinsey [nephrology] in one week for hypercalcemia  With dr. Ricky Muñoz in 2-3 weeks for sarcoidosis     Standing Status:   Standing     Number of Occurrences:   1     Order Specific Question:   Appointment in     Answer: Other (Specify)     Total time of discharge greater than 35 minutes    Disclaimer: Sections of this note are dictated using utilizing voice recognition software, which may have resulted in some phonetic based errors in grammar and contents. Even though attempts were made to correct all the mistakes, some may have been missed, and remained in the body of the document. If questions arise, please contact our department.      Signed:  Ian Rios MD  2/2/2022  1:16 PM

## 2022-02-02 NOTE — PROGRESS NOTES
Bedside shift change report given to Christopher Navas RN (oncoming nurse) by Winston Vale RN (offgoing nurse).  Report included the following information Terrie CAMARA, MAR.

## 2022-02-02 NOTE — PROGRESS NOTES
RENAL DAILY PROGRESS NOTE              Subjective:       Complaint:  Overnight events noted  no nausea, vomiting, chest pain    IMPRESSION:   · SRIDHAR due to polyuria, hypercalcemia induced DI? · Hypercalcemia due to sarcoidosis, harmeet/vitamin d supplementation, HCTZ use  · CKD stage 3 b due to diabetes  · Sarcoidosis  · Diabetes   PLAN:   · Ok for discharge from renal standpoint. Will have my office call her with close follow up with Elsie Valladares which is planned in 10 days. Labs in a week. · If eGFR doesn't improve further and goes the other way there is a role for renal biopsy. I wouldn't do renal biopsy just for hypercalcemia if her eGFR continues to improve as it will be low yield in such a case. Granulomas or etiology of hypercalcemia cannot be established on renal biopsy as these are focal occurrences and can easily be missed. Even if she doesn't have these in kidney I suspect her hypercalcemia can be caused by granulomas in other tissues like lymph nodes etc. Hence in such scenario, kidney biopsy wouldn't change the Rx and would treat with steroids any way. I am in favor of treating with steroids for 1-2 months, then taper and stop it. Stay away from any vitamin d, calcium,hctz. UA is very clean. Very low likelihood of active interstitial nephritis at this point.  If hypercalcemia gets recurrent in future then other modalities can be considered-- like infliximab,ketoconazole etc  · PTH rp is pending  · 1,25 vitamin d is on higher end of normal  · PTH is appropriately low  · I and O  · Daily weights  · Follow daily labs  · D/w Dr.Vishwas Mila Holt            Current Facility-Administered Medications   Medication Dose Route Frequency    0.9% sodium chloride infusion  100 mL/hr IntraVENous CONTINUOUS    pravastatin (PRAVACHOL) tablet 80 mg  80 mg Oral QHS    magnesium oxide (MAG-OX) tablet 400 mg  400 mg Oral BID    ondansetron (ZOFRAN) injection 4 mg  4 mg IntraVENous Q6H PRN    predniSONE (DELTASONE) tablet 20 mg  20 mg Oral DAILY WITH BREAKFAST    glucose chewable tablet 16 g  4 Tablet Oral PRN    glucagon (GLUCAGEN) injection 1 mg  1 mg IntraMUSCular PRN    dextrose 10% infusion 125-250 mL  125-250 mL IntraVENous PRN    pantoprazole (PROTONIX) tablet 40 mg  40 mg Oral ACB    insulin glargine (LANTUS) injection 25 Units  25 Units SubCUTAneous QHS    montelukast (SINGULAIR) tablet 10 mg  10 mg Oral DAILY    topiramate (TOPAMAX) tablet 50 mg  50 mg Oral BID    venlafaxine-SR (EFFEXOR-XR) capsule 75 mg  75 mg Oral DAILY    albuterol (PROVENTIL VENTOLIN) nebulizer solution 2.5 mg  2.5 mg Nebulization Q4H PRN    aspirin delayed-release tablet 81 mg  81 mg Oral DAILY    cyanocobalamin (VITAMIN B12) tablet 50 mcg  50 mcg Oral DAILY    arformoterol 15 mcg/budesonide 0.5 mg neb solution   Nebulization BID RT    ipratropium (ATROVENT) 0.02 % nebulizer solution 0.5 mg  0.5 mg Nebulization Q8H RT    enoxaparin (LOVENOX) injection 30 mg  30 mg SubCUTAneous Q24H       Review of Symptoms: comprehensive ROS negative except above. Objective:     Patient Vitals for the past 24 hrs:   Temp Pulse Resp BP SpO2   02/02/22 0746 98.3 °F (36.8 °C) 70 20 (!) 165/81 96 %   02/02/22 0306 97.4 °F (36.3 °C) 85 19 (!) 122/57 96 %   02/01/22 2341 96.8 °F (36 °C) 89 18 (!) 151/83 98 %   02/01/22 2141 -- -- -- -- 95 %   02/01/22 1936 98.6 °F (37 °C) 81 16 (!) 160/85 97 %   02/01/22 1557 -- -- -- -- 96 %   02/01/22 1506 97.9 °F (36.6 °C) 96 18 133/73 98 %   02/01/22 1240 98.3 °F (36.8 °C) 89 18 (!) 143/78 96 %        Weight change:      01/31 1901 - 02/02 0700  In: 1920 [P.O.:720;  I.V.:1200]  Out: 1800 [Urine:1800]    Intake/Output Summary (Last 24 hours) at 2/2/2022 0939  Last data filed at 2/2/2022 0659  Gross per 24 hour   Intake 1920 ml   Output 700 ml   Net 1220 ml     Physical Exam:   General: comfortable, no acute distress   HEENT sclera anicteric, supple neck, no thyromegaly  CVS: S1S2 heard,  no rub  RS: + air entry b/l, Abd: Soft, Non tender, Not distended, Positive bowel sounds, no organomegaly, no CVA / supra pubic tenderness  Neuro: non focal, awake, alert , CN II-XII are grossly intact  Extrm: mild edema, no cyanosis, clubbing   Skin: no visible  Rash  Musculoskeletal: No gross joints or bone deformities         Data Review:     LABS:   Hematology:   Recent Labs     01/31/22 0302   WBC 4.8   HGB 8.8*   HCT 27.2*     Chemistry:   Recent Labs     02/02/22  0255 02/01/22 0307 01/31/22 0302 01/30/22 2035   BUN 24* 24* 24* 24*   CREA 1.99* 1.95* 2.07* 2.27*   CA 10.3* 11.3* 11.1* 11.6*   ALB 2.8* 2.9* 2.5* 2.7*   K 4.2 3.3* 3.6 4.4    142 144 141   * 112* 116* 115*   CO2 23 25 23 22   PHOS 3.0 2.6 1.7* 1.7*   * 76 88 190*            Procedures/imaging: see electronic medical records for all procedures, Xrays and details which were not copied into this note but were reviewed prior to creation of Plan          Assessment & Plan:     See above        Sergio Mesa MD  2/2/2022

## 2022-02-02 NOTE — PROGRESS NOTES
Problem: Diabetes Self-Management  Goal: *Disease process and treatment process  Description: Define diabetes and identify own type of diabetes; list 3 options for treating diabetes. Outcome: Progressing Towards Goal  Goal: *Incorporating nutritional management into lifestyle  Description: Describe effect of type, amount and timing of food on blood glucose; list 3 methods for planning meals. Outcome: Progressing Towards Goal  Goal: *Incorporating physical activity into lifestyle  Description: State effect of exercise on blood glucose levels. Outcome: Progressing Towards Goal  Goal: *Developing strategies to promote health/change behavior  Description: Define the ABC's of diabetes; identify appropriate screenings, schedule and personal plan for screenings. Outcome: Progressing Towards Goal  Goal: *Using medications safely  Description: State effect of diabetes medications on diabetes; name diabetes medication taking, action and side effects. Outcome: Progressing Towards Goal  Goal: *Monitoring blood glucose, interpreting and using results  Description: Identify recommended blood glucose targets  and personal targets. Outcome: Progressing Towards Goal  Goal: *Prevention, detection, treatment of acute complications  Description: List symptoms of hyper- and hypoglycemia; describe how to treat low blood sugar and actions for lowering  high blood glucose level. Outcome: Progressing Towards Goal  Goal: *Prevention, detection and treatment of chronic complications  Description: Define the natural course of diabetes and describe the relationship of blood glucose levels to long term complications of diabetes.   Outcome: Progressing Towards Goal  Goal: *Developing strategies to address psychosocial issues  Description: Describe feelings about living with diabetes; identify support needed and support network  Outcome: Progressing Towards Goal  Goal: *Sick day guidelines  Outcome: Progressing Towards Goal  Goal: *Patient Specific Goal (EDIT GOAL, INSERT TEXT)  Outcome: Progressing Towards Goal     Problem: Patient Education: Go to Patient Education Activity  Goal: Patient/Family Education  Outcome: Progressing Towards Goal     Problem: Falls - Risk of  Goal: *Absence of Falls  Description: Document Sameera Beasley Fall Risk and appropriate interventions in the flowsheet.   Outcome: Progressing Towards Goal  Note: Fall Risk Interventions:  Mobility Interventions: Utilize walker, cane, or other assistive device,Patient to call before getting OOB         Medication Interventions: Patient to call before getting OOB,Teach patient to arise slowly,Bed/chair exit alarm                   Problem: Patient Education: Go to Patient Education Activity  Goal: Patient/Family Education  Outcome: Progressing Towards Goal     Problem: General Medical Care Plan  Goal: *Vital signs within specified parameters  Outcome: Progressing Towards Goal  Goal: *Labs within defined limits  Outcome: Progressing Towards Goal  Goal: *Absence of infection signs and symptoms  Outcome: Progressing Towards Goal  Goal: *Optimal pain control at patient's stated goal  Outcome: Progressing Towards Goal  Goal: *Skin integrity maintained  Outcome: Progressing Towards Goal  Goal: *Fluid volume balance  Outcome: Progressing Towards Goal  Goal: *Optimize nutritional status  Outcome: Progressing Towards Goal  Goal: *Anxiety reduced or absent  Outcome: Progressing Towards Goal  Goal: *Progressive mobility and function (eg: ADL's)  Outcome: Progressing Towards Goal     Problem: Patient Education: Go to Patient Education Activity  Goal: Patient/Family Education  Outcome: Progressing Towards Goal     Problem: Pain  Goal: *Control of Pain  Outcome: Progressing Towards Goal     Problem: Patient Education: Go to Patient Education Activity  Goal: Patient/Family Education  Outcome: Progressing Towards Goal     Problem: Nutrition Deficit  Goal: *Optimize nutritional status  Outcome: Progressing Towards Goal     Problem: Patient Education: Go to Patient Education Activity  Goal: Patient/Family Education  Outcome: Progressing Towards Goal     Problem: Patient Education: Go to Patient Education Activity  Goal: Patient/Family Education  Outcome: Progressing Towards Goal     Problem: Patient Education: Go to Patient Education Activity  Goal: Patient/Family Education  Outcome: Progressing Towards Goal     Problem: Nausea/Vomiting (Adult)  Goal: *Absence of nausea/vomiting  Outcome: Progressing Towards Goal  Goal: *Palliation of nausea/vomiting (Palliative Care)  Outcome: Progressing Towards Goal     Problem: Patient Education: Go to Patient Education Activity  Goal: Patient/Family Education  Outcome: Progressing Towards Goal

## 2022-02-02 NOTE — PROGRESS NOTES
Hospitalist Progress Note    Subjective:   Daily Progress Note: 2022     Patient feels much better currently. Denies any chest pain or headaches or dizziness. No nausea vomiting. Walking in the room without any issues. Had some nausea last night but  able to eat today    Current Facility-Administered Medications   Medication Dose Route Frequency    0.9% sodium chloride infusion  100 mL/hr IntraVENous CONTINUOUS    pravastatin (PRAVACHOL) tablet 80 mg  80 mg Oral QHS    magnesium oxide (MAG-OX) tablet 400 mg  400 mg Oral BID    ondansetron (ZOFRAN) injection 4 mg  4 mg IntraVENous Q6H PRN    predniSONE (DELTASONE) tablet 20 mg  20 mg Oral DAILY WITH BREAKFAST    glucose chewable tablet 16 g  4 Tablet Oral PRN    glucagon (GLUCAGEN) injection 1 mg  1 mg IntraMUSCular PRN    dextrose 10% infusion 125-250 mL  125-250 mL IntraVENous PRN    pantoprazole (PROTONIX) tablet 40 mg  40 mg Oral ACB    insulin glargine (LANTUS) injection 25 Units  25 Units SubCUTAneous QHS    montelukast (SINGULAIR) tablet 10 mg  10 mg Oral DAILY    topiramate (TOPAMAX) tablet 50 mg  50 mg Oral BID    venlafaxine-SR (EFFEXOR-XR) capsule 75 mg  75 mg Oral DAILY    albuterol (PROVENTIL VENTOLIN) nebulizer solution 2.5 mg  2.5 mg Nebulization Q4H PRN    aspirin delayed-release tablet 81 mg  81 mg Oral DAILY    cyanocobalamin (VITAMIN B12) tablet 50 mcg  50 mcg Oral DAILY    arformoterol 15 mcg/budesonide 0.5 mg neb solution   Nebulization BID RT    ipratropium (ATROVENT) 0.02 % nebulizer solution 0.5 mg  0.5 mg Nebulization Q8H RT    enoxaparin (LOVENOX) injection 30 mg  30 mg SubCUTAneous Q24H        Review of Systems  Pertinent items are noted in HPI.     Objective:     Visit Vitals  BP (!) 165/81   Pulse 70   Temp 98.3 °F (36.8 °C)   Resp 20   Ht 5' 2\" (1.575 m)   Wt 81.6 kg (180 lb)   LMP 2013   SpO2 96%   BMI 32.92 kg/m²      O2 Device: None (Room air)    Temp (24hrs), Av.9 °F (36.6 °C), Min:96.8 °F (36 °C), Max:98.6 °F (37 °C)      No intake/output data recorded. 01/31 1901 - 02/02 0700  In: 1920 [P.O.:720; I.V.:1200]  Out: 1800 [Urine:1800]    General appearance -awake, follows commands appropriately, not in acute distress. Chest -clear air entry noted in bases, no wheezes  Heart - S1 and S2 normal  Abdomen - soft, nontender, nondistended, Bowel sounds present  Neurological -awake, follows commands appropriately, motor strength 5-5 in all 4 extremities, no tremors noted. Sensation to touch normal.  Extremities - no pedal edema noted    Data Review    Recent Results (from the past 24 hour(s))   GLUCOSE, POC    Collection Time: 02/01/22 12:43 PM   Result Value Ref Range    Glucose (POC) 117 (H) 70 - 110 mg/dL   GLUCOSE, POC    Collection Time: 02/01/22  3:13 PM   Result Value Ref Range    Glucose (POC) 145 (H) 70 - 110 mg/dL   GLUCOSE, POC    Collection Time: 02/01/22 10:02 PM   Result Value Ref Range    Glucose (POC) 152 (H) 70 - 110 mg/dL   RENAL FUNCTION PANEL    Collection Time: 02/02/22  2:55 AM   Result Value Ref Range    Sodium 141 136 - 145 mmol/L    Potassium 4.2 3.5 - 5.5 mmol/L    Chloride 114 (H) 100 - 111 mmol/L    CO2 23 21 - 32 mmol/L    Anion gap 4 3.0 - 18 mmol/L    Glucose 126 (H) 74 - 99 mg/dL    BUN 24 (H) 7.0 - 18 MG/DL    Creatinine 1.99 (H) 0.6 - 1.3 MG/DL    BUN/Creatinine ratio 12 12 - 20      GFR est AA 31 (L) >60 ml/min/1.73m2    GFR est non-AA 26 (L) >60 ml/min/1.73m2    Calcium 10.3 (H) 8.5 - 10.1 MG/DL    Phosphorus 3.0 2.5 - 4.9 MG/DL    Albumin 2.8 (L) 3.4 - 5.0 g/dL   GLUCOSE, POC    Collection Time: 02/02/22  7:45 AM   Result Value Ref Range    Glucose (POC) 110 70 - 110 mg/dL         Assessment/Plan:     Active Problems:    Hypercalcemia (1/28/2022)      ASSESSMENT:    1. Hypercalcemia of unknown etiology, improving. S/p Zometa x1  2. History of sarcoidosis  3. Acute on stage III chronic kidney disease, improving  4. Electrolyte disturbances, stable currently  5.   Diabetes mellitus  6. Nausea and vomiting, resolved    PLAN:    Discussed with nephrologist: He will follow this patient as an outpatient. Patient was advised to stop taking hydrochlorothiazide. Continue prednisone as prescribed by pulmonologist.  Continue insulin for diabetes  PT and OT to follow this patient  Continue pravastatin  Discharge patient home today. Anticipated of discharge: Today    Total time to take care of this patient was 30 minutes and more than 50% of time was spent counseling and coordinating care. Disclaimer: Sections of this note are dictated using utilizing voice recognition software, which may have resulted in some phonetic based errors in grammar and contents. Even though attempts were made to correct all the mistakes, some may have been missed, and remained in the body of the document. If questions arise, please contact our department.

## 2022-02-02 NOTE — PROGRESS NOTES
Discharge teaching completed at bedside with patient. Opportunity provided for clarifying questions. All answered to patient satisfaction. IV removed. EKG leads removed.  ID removed and shredded

## 2022-02-02 NOTE — PROGRESS NOTES
Patient c/o heartburn. She has unquantifiable  amount of emesis because it is mixed with urine. Dr. Brown is informed. He said to call him back if heartburn persists after emesis. At 2309, Dr. Brown is in the unit, is informed of patient continuing to c/o heartburn. A verbal order of Pedcid x 1 dose is received.

## 2022-02-03 ENCOUNTER — OFFICE VISIT (OUTPATIENT)
Dept: INTERNAL MEDICINE CLINIC | Age: 57
End: 2022-02-03
Payer: MEDICARE

## 2022-02-03 VITALS
OXYGEN SATURATION: 98 % | TEMPERATURE: 97.2 F | HEART RATE: 76 BPM | HEIGHT: 62 IN | DIASTOLIC BLOOD PRESSURE: 90 MMHG | BODY MASS INDEX: 31.76 KG/M2 | SYSTOLIC BLOOD PRESSURE: 148 MMHG | RESPIRATION RATE: 18 BRPM | WEIGHT: 172.6 LBS

## 2022-02-03 DIAGNOSIS — E83.42 HYPOMAGNESEMIA: ICD-10-CM

## 2022-02-03 DIAGNOSIS — I10 PRIMARY HYPERTENSION: ICD-10-CM

## 2022-02-03 DIAGNOSIS — Z09 HOSPITAL DISCHARGE FOLLOW-UP: Primary | ICD-10-CM

## 2022-02-03 DIAGNOSIS — D86.9 SARCOIDOSIS: ICD-10-CM

## 2022-02-03 DIAGNOSIS — R22.42 LOCALIZED SWELLING OF LEFT FOOT: ICD-10-CM

## 2022-02-03 DIAGNOSIS — E83.52 HYPERCALCEMIA: ICD-10-CM

## 2022-02-03 PROCEDURE — 99496 TRANSJ CARE MGMT HIGH F2F 7D: CPT | Performed by: NURSE PRACTITIONER

## 2022-02-03 PROCEDURE — G8427 DOCREV CUR MEDS BY ELIG CLIN: HCPCS | Performed by: NURSE PRACTITIONER

## 2022-02-03 PROCEDURE — G9899 SCRN MAM PERF RSLTS DOC: HCPCS | Performed by: NURSE PRACTITIONER

## 2022-02-03 PROCEDURE — G9717 DOC PT DX DEP/BP F/U NT REQ: HCPCS | Performed by: NURSE PRACTITIONER

## 2022-02-03 PROCEDURE — G8753 SYS BP > OR = 140: HCPCS | Performed by: NURSE PRACTITIONER

## 2022-02-03 PROCEDURE — 1111F DSCHRG MED/CURRENT MED MERGE: CPT | Performed by: NURSE PRACTITIONER

## 2022-02-03 PROCEDURE — G8755 DIAS BP > OR = 90: HCPCS | Performed by: NURSE PRACTITIONER

## 2022-02-03 PROCEDURE — G8417 CALC BMI ABV UP PARAM F/U: HCPCS | Performed by: NURSE PRACTITIONER

## 2022-02-03 PROCEDURE — 3017F COLORECTAL CA SCREEN DOC REV: CPT | Performed by: NURSE PRACTITIONER

## 2022-02-03 RX ORDER — LANOLIN ALCOHOL/MO/W.PET/CERES
400 CREAM (GRAM) TOPICAL 2 TIMES DAILY WITH MEALS
Qty: 120 TABLET | Refills: 0 | Status: SHIPPED | OUTPATIENT
Start: 2022-02-03 | End: 2022-06-22 | Stop reason: SDUPTHER

## 2022-02-03 RX ORDER — FUROSEMIDE 20 MG/1
20 TABLET ORAL DAILY
Qty: 60 TABLET | Refills: 0 | Status: SHIPPED | OUTPATIENT
Start: 2022-02-03 | End: 2022-03-31 | Stop reason: SDUPTHER

## 2022-02-03 NOTE — PROGRESS NOTES
Chief Complaint   Patient presents with   [facility] Follow Up     1/28/20222 Hypercalcemia     1. \"Have you been to the ER, urgent care clinic since your last visit? Hospitalized since your last visit? \" Yes, [facility] for Hypercalcemia 1/28/22    2. \"Have you seen or consulted any other health care providers outside of the [address] [place] [address] since your last visit? \" No

## 2022-02-03 NOTE — PROGRESS NOTES
Transitional Care Management Progress Note    Patient: Lila Hawkins  : 1965  PCP: Lauren Willis DNP    Date of office visit: 2/3/2022   Date of admission: 22  Date of discharge: 22  Hospital: [facility]    Call initiated w/i 2 business dates of discharge: *No response documented in the last 14 days   Date of the most recent call to the patient: *No documented post hospital discharge outreach found in the last 14 days        Assessment/Plan:     Diagnoses and all orders for this visit:    1. Hospital discharge follow-up  -     ME DISCHARGE MEDS RECONCILED W/ CURRENT OUTPATIENT MED LIST    2. Primary hypertension    3. Hypomagnesemia  -     magnesium oxide (MAG-OX) 400 mg tablet; Take 1 Tablet by mouth two (2) times daily (with meals). -     MAGNESIUM; Future    4. Hypercalcemia  -     furosemide (LASIX) 20 mg tablet; Take 1 Tablet by mouth daily. Indications: visible water retention, high blood pressure  -     METABOLIC PANEL, COMPREHENSIVE; Future    5. Localized swelling of left foot  -     furosemide (LASIX) 20 mg tablet; Take 1 Tablet by mouth daily. Indications: visible water retention, high blood pressure    6. Sarcoidosis      The complexity of medical decision making for this patient's transitional care is high  Follow-up and Dispositions    · Return if symptoms worsen or fail to improve. Subjective:   Lila Hawkins is a 61 y.o. female presenting today for follow-up after hospital discharge. This encounter and supporting documentation was reviewed. Medication reconciliation was performed today along with reviewing discharge instruction. The main problem requiring admission was hypercalcemia, acute on stage III chronic kidney disease, electrolyte disturbances, diabetes, sarcoidosis.    Complications during admission: none      Interval history/Current status: Improved    Admitting symptoms have: resolved      Medications marked \"taking\" at this time:  Prior to Admission medications    Medication Sig Start Date End Date Taking? Authorizing Provider   furosemide (LASIX) 20 mg tablet Take 1 Tablet by mouth daily. Indications: visible water retention, high blood pressure 2/3/22  Yes Carlee Khan [place]   magnesium oxide (MAG-OX) 400 mg tablet Take 1 Tablet by mouth two (2) times daily (with meals). 2/3/22  Yes Reynold Khan [place]   cholecalciferol (VITAMIN D3) 25 mcg (1,000 unit) cap Take 1 Capsule by mouth daily. 2/12/22  Yes Aris Elam MD   predniSONE (DELTASONE) 5 mg tablet 4 tablets po daily x week then 3 tablets po daily x 1 week then 2 tablets po daily x 1 week then 1 tablet po daily 2/2/22  Yes Aris Elam MD   ondansetron hcl (Zofran) 4 mg tablet Take 1 Tablet by mouth every eight (8) hours as needed for Nausea or Nausea or Vomiting. 2/2/22  Yes Aris Elam MD   OTHER [facility] on 2/5/22  Please call report to PCP and dr. Kady Rodriguez  Reason: hypercalcemia 2/2/22  Yes Aris Elam MD   butalbital-acetaminophen-caffeine (FIORICET, ESGIC) -40 mg per tablet Take 1 Tablet by mouth every six (6) hours as needed. 12/22/21  Yes Provider, Radha   venlafaxine-SR (EFFEXOR-XR) 75 mg capsule Take 1 Capsule by mouth daily. 1/3/22  Yes Carlee Khan DNP   topiramate (TOPAMAX) 50 mg tablet Take 1 Tablet by mouth two (2) times a day. 1/3/22  Yes Carlee Khan DNP   SITagliptin (JANUVIA) 25 mg tablet Take 1 Tablet by mouth daily. For diabetes 1/3/22  Yes Carlee Khan [place]   pravastatin (PRAVACHOL) 80 mg tablet Take 1 Tablet by mouth daily. 1/3/22  Yes Carlee Khan DNP   montelukast (SINGULAIR) 10 mg tablet Take 1 Tablet by mouth daily. 1/3/22  Yes Carlee Khan DNP   insulin glargine (LANTUS,BASAGLAR) 100 unit/mL (3 mL) inpn 25 Units by SubCUTAneous route nightly.  1/3/22  Yes Reynold Khan, DNP   esomeprazole (NEXIUM) 40 mg capsule TAKE ONE CAPSULE BY MOUTH DAILY AS NEEDED FOR REFLUX 12/16/21  Yes Min Khan DNP   triamcinolone (Nasacort) 55 mcg nasal inhaler 2 Sprays daily. Yes Provider, Historical   fluticasone propionate (Flovent Diskus) 250 mcg/actuation dsdv Take 1 Puff by inhalation two (2) times a day. Rinse gargle mouth thoroughly after each use. Disp: 3 inhalers 9/15/21  Yes Luis Carlos Mitchell MD   fluticasone-umeclidin-vilanter (Trelegy Ellipta) 184-87.6-37 mcg dsdv Take 1 Puff by inhalation daily. Rinse and gargle after each use 9/15/21  Yes Luis Carlos Mitchell MD   cyanocobalamin (VITAMIN B12) 100 mcg tablet Take 50 mcg by mouth daily. Yes Provider, Historical   albuterol (PROVENTIL HFA, VENTOLIN HFA, PROAIR HFA) 90 mcg/actuation inhaler Take 2 Puffs by inhalation every four (4) hours as needed for Shortness of Breath. Indications: asthma attack 6/16/21  Yes [facility] [place], [place]   Insulin Needles, Disposable, (Prerna Pen Needle) 32 gauge x 5/32\" ndle Check blood sugars three times a day 6/16/21  [facility] [place]   insulin lispro (HumaLOG KwikPen Insulin) 200 unit/mL (3 mL) inpn 3 times per day: 150-200 2u, 201-250 4u, 251-300 6u, 301-350 8u, 351-400 10u, > 400 12u. Indications: type 2 diabetes mellitus 6/16/21  Yes [facility] [place], [place]   therapeutic multivitamin-minerals [facility]) tablet Take 1 Tab by mouth daily. 3/7/21  Yes Provider, Historical   triamcinolone acetonide (KENALOG) 0.1 % topical cream Apply  to affected area two (2) times daily as needed for Skin Irritation. 3/24/21  Yes Magda Khan DNP   aspirin delayed-release 81 mg tablet Take 1 Tab by mouth daily. For heart health 9/3/19  Yes [facility] [place], [place]   glucose blood VI test strips (ACCU-CHEK POOJA) strip Pt to test 5 times daily. Dx:E11.65 11/17/17  Yes Venu Jay MD        Review of Systems   Constitutional: Negative. HENT: Negative. Eyes: Negative. Respiratory: Negative for cough, shortness of breath and wheezing.          Lungs clear throughout   Cardiovascular: Positive for leg swelling (Left lower extremity. 1-2+ nonpitting edema. Vearl Pippins ). Negative for chest pain and palpitations. Gastrointestinal: Negative. Genitourinary: Negative for dysuria, hematuria and urgency. Musculoskeletal: Negative for myalgias. Gait stable. No limitations noted. Skin: Negative. Neurological: Negative for dizziness, tingling, weakness and headaches. Endo/Heme/Allergies: Negative. Psychiatric/Behavioral: Negative for depression. Objective:   BP (!) 148/90   Pulse 76   Temp 97.2 °F (36.2 °C) (Temporal)   Resp 18   Ht 5' 2\" (1.575 m)   Wt 172 lb 9.6 oz (78.3 kg)   LMP 03/30/2013   SpO2 98%   BMI 31.57 kg/m²      Plan:  [facility] discharge follow-up  Patient being seen today for transitional care management. This encounter and supporting documentation was reviewed. Medication reconciliation was performed today along with reviewing discharge instruction. The main problem requiring admission was hypercalcemia, acute on stage III chronic kidney disease, electrolyte disturbances, diabetes, sarcoidosis. Complications during admission: none    Interval history/Current status: Improved    Admitting symptoms have: resolved    I thoroughly reviewed the discharge summary, notes, consults, labs and imaging studies in the electronic medical record. Pertinent details are summarized and the record has been updated to reflect recent events.      -Primary hypertension  /90  While inpatient HCTZ was discontinued without an alternative initiated. Initiate Lasix 20 mg daily. Obtain CMP week of 3/7/2022    -Hypomagnesemia  Mag level while inpatient 1.7. Refilled Mag-Ox 400 mg    -Hypercalcemia  Patient had CT chest abdomen pelvis done which was reported negative for any malignancy or metastatic lesion. Initiate Lasix to help reduce calcium level.   Follow-up with nephrology    -Localized swelling of left foot  start, Lasix 20 mg daily   Elevate lower extremities above heart  Avoid sitting with legs dependent more than 30 minutes at a time  Increase walking exercise  Wear compression socks while awake (15 to 30 mmHg)  Limit sodium in diet  Avoid pork  Increase water intake    -Sarcoidosis  Continue prednisone, Trelegy, Flovent therapies as prescribed  Follow-up with Dr. Serge Garces, pulmonology, as scheduled    We discussed the expected course, resolution and complications of the diagnosis(es) in detail. Medication risks, benefits, costs, interactions, and alternatives were discussed as indicated. I advised her to contact the office if her condition worsens, changes or fails to improve as anticipated. She expressed understanding with the diagnosis(es) and plan.          Dr Isabel Orta, JAYP-C, DNP  Internist of [facility]

## 2022-02-04 ENCOUNTER — APPOINTMENT (OUTPATIENT)
Dept: PHYSICAL THERAPY | Age: 57
End: 2022-02-04
Attending: PHYSICAL MEDICINE & REHABILITATION
Payer: MEDICARE

## 2022-02-05 LAB — PTH RELATED PROT SERPL-SCNC: <2 PMOL/L

## 2022-02-08 ENCOUNTER — HOSPITAL ENCOUNTER (OUTPATIENT)
Dept: LAB | Age: 57
Discharge: HOME | End: 2022-02-08
Payer: MEDICARE

## 2022-02-08 ENCOUNTER — APPOINTMENT (OUTPATIENT)
Dept: PHYSICAL THERAPY | Age: 57
End: 2022-02-08
Attending: PHYSICAL MEDICINE & REHABILITATION
Payer: MEDICARE

## 2022-02-08 LAB
25(OH)D3 SERPL-MCNC: 35.7 NG/ML (ref 30–100)
ALBUMIN SERPL-MCNC: 3.3 G/DL (ref 3.4–5)
ANION GAP SERPL CALC-SCNC: 6 MMOL/L (ref 3–18)
BUN SERPL-MCNC: 30 MG/DL (ref 7–18)
BUN/CREAT SERPL: 14 (ref 12–20)
CALCIUM SERPL-MCNC: 9.6 MG/DL (ref 8.5–10.1)
CALCIUM SERPL-MCNC: 9.8 MG/DL (ref 8.5–10.1)
CHLORIDE SERPL-SCNC: 110 MMOL/L (ref 100–111)
CO2 SERPL-SCNC: 27 MMOL/L (ref 21–32)
CREAT SERPL-MCNC: 2.14 MG/DL (ref 0.6–1.3)
CREAT UR-MCNC: 201 MG/DL (ref 30–125)
ERYTHROCYTE [DISTWIDTH] IN BLOOD BY AUTOMATED COUNT: 13.2 % (ref 11.6–14.5)
GLUCOSE SERPL-MCNC: 93 MG/DL (ref 74–99)
HCT VFR BLD AUTO: 35 % (ref 35–45)
HGB BLD-MCNC: 11.2 G/DL (ref 12–16)
MCH RBC QN AUTO: 29.7 PG (ref 24–34)
MCHC RBC AUTO-ENTMCNC: 32 G/DL (ref 31–37)
MCV RBC AUTO: 92.8 FL (ref 78–100)
NRBC # BLD: 0 K/UL (ref 0–0.01)
NRBC BLD-RTO: 0 PER 100 WBC
PHOSPHATE SERPL-MCNC: 3.4 MG/DL (ref 2.5–4.9)
PLATELET # BLD AUTO: 209 K/UL (ref 135–420)
PMV BLD AUTO: 10.4 FL (ref 9.2–11.8)
POTASSIUM SERPL-SCNC: 3.6 MMOL/L (ref 3.5–5.5)
PROT UR-MCNC: 105 MG/DL
PROT/CREAT UR-RTO: 0.5
PTH-INTACT SERPL-MCNC: 31.8 PG/ML (ref 18.4–88)
RBC # BLD AUTO: 3.77 M/UL (ref 4.2–5.3)
SODIUM SERPL-SCNC: 143 MMOL/L (ref 136–145)
WBC # BLD AUTO: 8.6 K/UL (ref 4.6–13.2)

## 2022-02-08 PROCEDURE — 83970 ASSAY OF PARATHORMONE: CPT

## 2022-02-08 PROCEDURE — 80069 RENAL FUNCTION PANEL: CPT

## 2022-02-08 PROCEDURE — 85027 COMPLETE CBC AUTOMATED: CPT

## 2022-02-08 PROCEDURE — 36415 COLL VENOUS BLD VENIPUNCTURE: CPT

## 2022-02-08 PROCEDURE — 82306 VITAMIN D 25 HYDROXY: CPT

## 2022-02-08 PROCEDURE — 84156 ASSAY OF PROTEIN URINE: CPT

## 2022-02-10 ENCOUNTER — APPOINTMENT (OUTPATIENT)
Dept: PHYSICAL THERAPY | Age: 57
End: 2022-02-10
Attending: PHYSICAL MEDICINE & REHABILITATION
Payer: MEDICARE

## 2022-02-15 ENCOUNTER — APPOINTMENT (OUTPATIENT)
Dept: PHYSICAL THERAPY | Age: 57
End: 2022-02-15
Attending: PHYSICAL MEDICINE & REHABILITATION
Payer: MEDICARE

## 2022-02-17 ENCOUNTER — APPOINTMENT (OUTPATIENT)
Dept: PHYSICAL THERAPY | Age: 57
End: 2022-02-17
Attending: PHYSICAL MEDICINE & REHABILITATION
Payer: MEDICARE

## 2022-02-22 ENCOUNTER — HOSPITAL ENCOUNTER (OUTPATIENT)
Dept: PHYSICAL THERAPY | Age: 57
Discharge: HOME | End: 2022-02-22
Attending: PHYSICAL MEDICINE & REHABILITATION
Payer: MEDICARE

## 2022-02-22 ENCOUNTER — APPOINTMENT (OUTPATIENT)
Dept: PHYSICAL THERAPY | Age: 57
End: 2022-02-22
Attending: PHYSICAL MEDICINE & REHABILITATION
Payer: MEDICARE

## 2022-02-22 PROCEDURE — 97113 AQUATIC THERAPY/EXERCISES: CPT

## 2022-02-22 NOTE — PROGRESS NOTES
PT DAILY TREATMENT NOTE     Patient Name: Dominick Coleman  Date:2022  : 1965  [x]  Patient  Verified  Payor: [facility] MEDICAID / [place] [address] / Product Type: Managed Care Medicaid /    In time:10:00  Out time:10:38  Total Treatment Time (min): 38  Visit #: 2 of 8    Medicare/BCBS Only   Total Timed Codes (min):  38 1:1 Treatment Time:  38       Treatment Area: Low back pain [M54.50]  Myalgia, other site [M79.18]  Other chronic pain [G89.29]  Pain in left hip [M25.552]  Sacrococcygeal disorders, not elsewhere classified [M53.3]    SUBJECTIVE  Pain Level (0-10 scale): 7/10  Any medication changes, allergies to medications, adverse drug reactions, diagnosis change, or new procedure performed?: [x] No    [] Yes (see summary sheet for update)  Subjective functional status/changes:   [] No changes reported  Pt reports she has no attended PT for a number of weeks due to having a hospital stay due to having an issue with blood work. Pt states she is stable now and is able to return to PT.     OBJECTIVE    38 min Therapeutic Exercise:  [x] See flow sheet : Aquatic therapy   Rationale: increase ROM and increase strength to improve the patients ability to perform ADLs with increased ease. With   [] TE   [] TA   [] neuro   [] other: Patient Education: [x] Review HEP    [] Progressed/Changed HEP based on:   [] positioning   [] body mechanics   [] transfers   [] heat/ice application    [] other:      Other Objective/Functional Measures: initiated aquatic based PT as per flow sheet. Pain Level (0-10 scale) post treatment: 0/10    ASSESSMENT/Changes in Function: Pt has had no progress toward goals due to other medical complications preventing her from attending PT. Pt does report she performed HEP once she was discharged from the hospital and did find some pain relief. Pt will benefit from continued aquatic based PT to help with symptom management.      Patient will continue to benefit from skilled PT services to modify and progress therapeutic interventions, address functional mobility deficits, address ROM deficits, address strength deficits, analyze and address soft tissue restrictions, analyze and cue movement patterns, analyze and modify body mechanics/ergonomics and assess and modify postural abnormalities to attain remaining goals. []  See Plan of Care  []  See progress note/recertification  []  See Discharge Summary         Progress towards goals / Updated goals:  Short Term Goals: To be accomplished in 2 weeks:  1. I and compliant with HEP for self management of symptoms.   IE: issued HEP  Current: pt reports partial compliance with HEP. Long Term Goals: To be accomplished in 4 weeks:  1. Improve FOTO to 38 to indicate improved function with daily activities. 2. Increase B hip strength to grossly 4-/5 to improve stability for light cleaning at home. Current: remain 4-/5. 2/22/2022  3. Increase B hamstring strength to grossly 4-/5 to increase ease with sit<>stand transfers. Current: remains 4-/5. 2/22/2022  4. Patient will report 50% improvement with pain to increase ease with getting in/out of bed. Current: pt reports no change in symptoms.  2/22/2022    PLAN  []  Upgrade activities as tolerated     [x]  Continue plan of care  []  Update interventions per flow sheet       []  Discharge due to:_  []  Other:_      Mook Tinoco, PTA 2/22/2022  10:32 AM    Future Appointments   Date Time Provider Claudette Benjamin   3/7/2022  9:00 AM Jomar Mullen MD [facility] BS AMB   3/15/2022 10:15 AM Dinorah Juáerz MD [facility] BS AMB   3/28/2022  9:30 AM Tressa Jimenez DNP [facility] BS AMB

## 2022-02-22 NOTE — PROGRESS NOTES
[facility]  [address]  [place], [address]  [phone] fax    Continued Plan of Care/ Re-certification for Physical Therapy Services    Patient name: Miguel Bradshaw Start of Care: 2022   Referral source: Marivel Marroquin MD : 1965               Medical Diagnosis: Low back pain [M54.50]  Myalgia, other site [M79.18]  Other chronic pain [G89.29]  Pain in left hip [M25.552]  Sacrococcygeal disorders, not elsewhere classified [M53.3]  Payor: [address] [place] [address] / Plan: [address] / Product Type: [employer] Medicare /  Onset Date:Chronic with recent exacerbation one month ago               Treatment Diagnosis: LBP with left radiculopathy    Prior Hospitalization: see medical history Provider#: [id number]   Medications: Verified on Patient summary List    Comorbidities: Depression; Osteopenia; DM; OA; visually impaired; Asthma   Prior Level of Function: Chronic LBP; limited with daily activities                              Visits from Start of Care: 2    Missed Visits: 0    The Plan of Care and following information is based on the patient's current status:  Short Term Goals: To be accomplished in 2 weeks:  1. I and compliant with HEP for self management of symptoms.   IE: issued HEP  Current: pt reports partial compliance with HEP. Long Term Goals: To be accomplished in 4 weeks:  1. Improve FOTO to 38 to indicate improved function with daily activities. Current: progressed to 32.  2. Increase B hip strength to grossly 4-/5 to improve stability for light cleaning at home. Current: remain 4-/5.   3. Increase B hamstring strength to grossly 4-/5 to increase ease with sit<>stand transfers. Current: remains 4-/5.   4. Patient will report 50% improvement with pain to increase ease with getting in/out of bed. Current: pt reports no change in symptoms.      Key functional changes: Pt has no change in symptoms due to recent hospital stay. Problems/ barriers to goal attainment: none     Problem List: pain affecting function, decrease ROM, decrease strength, impaired gait/ balance, decrease ADL/ functional abilitiies, decrease activity tolerance, decrease flexibility/ joint mobility and decrease transfer abilities    Treatment Plan: Therapeutic exercise, Therapeutic activities, Neuromuscular re-education, Physical agent/modality, Gait/balance training, Manual therapy, Aquatic therapy, Patient education, Self Care training, Functional mobility training, Home safety training and Stair training     Patient Goal (s) has been updated and includes: \"reduction in pain. \"     Goals for this certification period to be accomplished in 4 weeks:  Short Term Goals: To be accomplished in 2 weeks:  1. I and compliant with HEP for self management of symptoms.   recertt: pt reports partial compliance with HEP. Long Term Goals: To be accomplished in 4 weeks:  1. Improve FOTO to 38 to indicate improved function with daily activities. recertt: progressed to 32.  2. Increase B hip strength to grossly 4-/5 to improve stability for light cleaning at home. recert: remain 4-/5.   3. Increase B hamstring strength to grossly 4-/5 to increase ease with sit<>stand transfers. recert: remains 4-/5.   4. Patient will report 50% improvement with pain to increase ease with getting in/out of bed. Recert: pt reports no change in symptoms. Frequency / Duration: Patient to be seen 2 times per week for 4 weeks:    Assessment / Recommendations:Pt has had no progress toward goals due to other medical complications preventing her from attending PT. Pt does report she performed HEP once she was discharged from the hospital and did find some pain relief. Pt will benefit from continued aquatic based PT to help with symptom management and improve functional mobility for increased ease with transfers and ADLs.       Patient will continue to benefit from skilled PT services to modify and progress therapeutic interventions, address functional mobility deficits, address ROM deficits, address strength deficits, analyze and address soft tissue restrictions, analyze and cue movement patterns, analyze and modify body mechanics/ergonomics and assess and modify postural abnormalities to attain remaining goals. Certification Period: 2/24/2022-3/26/2022    Nona Cervantes, PTA 2/22/2022 11:03 AM    ________________________________________________________________________  I certify that the above Therapy Services are being furnished while the patient is under my care. I agree with the treatment plan and certify that this therapy is necessary. [] I have read the above and request that my patient continue as recommended.   [] I have read the above report and request that my patient continue therapy with the following changes/special instructions: _____________________________________________  [] I have read the above report and request that my patient be discharged from therapy    Physician's Signature:____________Date:_________TIME:________     Meenu Gonsales MD  ** Signature, Date and Time must be completed for valid certification **    Please sign and return to [facility] [address] [place] [address] [place] [address] [place] [address] [address]  [phone] fax

## 2022-02-24 ENCOUNTER — APPOINTMENT (OUTPATIENT)
Dept: PHYSICAL THERAPY | Age: 57
End: 2022-02-24
Attending: PHYSICAL MEDICINE & REHABILITATION
Payer: MEDICARE

## 2022-02-25 ENCOUNTER — HOSPITAL ENCOUNTER (OUTPATIENT)
Dept: PHYSICAL THERAPY | Age: 57
Discharge: HOME | End: 2022-02-25
Attending: PHYSICAL MEDICINE & REHABILITATION
Payer: MEDICARE

## 2022-02-25 PROCEDURE — 97113 AQUATIC THERAPY/EXERCISES: CPT

## 2022-02-25 NOTE — PROGRESS NOTES
PT DAILY TREATMENT NOTE     Patient Name: Mireya Riggins  Date:2022  : 1965  [x]  Patient  Verified  Payor: [facility] MEDICAID / [place] [facility] [address] / Product Type: Managed Care Medicaid /    In time:8:30  Out time:9:10  Total Treatment Time (min): 40  Visit #: 1 of 8    Medicare/BCBS Only   Total Timed Codes (min):  40 1:1 Treatment Time:  40       Treatment Area: Low back pain [M54.50]  Myalgia, other site [M79.18]  Other chronic pain [G89.29]  Pain in left hip [M25.552]  Sacrococcygeal disorders, not elsewhere classified [M53.3]    SUBJECTIVE  Pain Level (0-10 scale): 0/10  Any medication changes, allergies to medications, adverse drug reactions, diagnosis change, or new procedure performed?: [x] No    [] Yes (see summary sheet for update)  Subjective functional status/changes:   [] No changes reported  Pt reports no new complaints of pain. Pt states she felt good after her last session and was able to go shopping and hasn't felt the pain since. OBJECTIVE    40 min Therapeutic Exercise:  [x] See flow sheet :   Rationale: increase ROM and increase strength to improve the patients ability to perform ADLs with increased ease. With   [] TE   [] TA   [] neuro   [] other: Patient Education: [x] Review HEP    [] Progressed/Changed HEP based on:   [] positioning   [] body mechanics   [] transfers   [] heat/ice application    [] other:      Other Objective/Functional Measures: Added step ups hip circles and HR/TR. Pain Level (0-10 scale) post treatment: 0/10    ASSESSMENT/Changes in Function: Pt demonstrates good control with all aquatic based exercises, Pt has no adverse reaction to treatment.       Patient will continue to benefit from skilled PT services to modify and progress therapeutic interventions, address functional mobility deficits, address ROM deficits, address strength deficits, analyze and address soft tissue restrictions, analyze and cue movement patterns and analyze and modify body mechanics/ergonomics to attain remaining goals. []  See Plan of Care  []  See progress note/recertification  []  See Discharge Summary         Progress towards goals / Updated goals:  Short Term Goals: To be accomplished in 2 weeks:  1. I and compliant with HEP for self management of symptoms.   recert: pt reports partial compliance with HEP.     Long Term Goals: To be accomplished in 4 weeks:  1. Improve FOTO to 38 to indicate improved function with daily activities. recertt: progressed to 32.  2. Increase B hip strength to grossly 4-/5 to improve stability for light cleaning at home. recert: remain 4-/5.   3. Increase B hamstring strength to grossly 4-/5 to increase ease with sit<>stand transfers. recert: remains 4-/5.   4. Patient will report 50% improvement with pain to increase ease with getting in/out of bed. Recert: pt reports no change in symptoms.      PLAN  []  Upgrade activities as tolerated     [x]  Continue plan of care  []  Update interventions per flow sheet       []  Discharge due to:_  []  Other:_      Kiley Bee, PTA 2/25/2022  8:33 AM    Future Appointments   Date Time Provider Claudette Benjamin   3/1/2022 10:30 AM Gearldine Mor, PTA [facility]   3/4/2022 10:30 AM Gearldine Mor, PTA [facility]   3/7/2022  9:00 AM Sebastian Zaragoza MD [facility] BS AMB   3/8/2022  9:15 AM Gearldine Mor, PTA [facility]   3/11/2022 10:30 AM Gearldine Mor, PTA [facility]   3/15/2022  8:30 AM Gearldine Mor, PTA [facility]   3/15/2022 10:15 AM Mini Hernandez MD [facility] BS AMB   3/18/2022 10:30 AM Gearldine Mor, PTA [facility]   3/22/2022  9:15 AM Gearldine Mor, PTA [facility]   3/28/2022  9:30 AM Sisi Khan DNP [facility] BS AMB

## 2022-02-28 PROBLEM — H33.311 HORSESHOE TEAR OF RETINA WITHOUT DETACHMENT, RIGHT EYE: Status: ACTIVE | Noted: 2022-02-28

## 2022-02-28 PROBLEM — H31.001 CHORIORETINAL SCAR OF RIGHT EYE: Status: ACTIVE | Noted: 2022-02-28

## 2022-03-01 ENCOUNTER — HOSPITAL ENCOUNTER (OUTPATIENT)
Dept: PHYSICAL THERAPY | Age: 57
Discharge: HOME | End: 2022-03-01
Attending: PHYSICAL MEDICINE & REHABILITATION
Payer: MEDICARE

## 2022-03-01 PROCEDURE — 97113 AQUATIC THERAPY/EXERCISES: CPT

## 2022-03-01 NOTE — PROGRESS NOTES
PT DAILY TREATMENT NOTE     Patient Name: Cori Rosa  Date:3/1/2022  : 1965  [x]  Patient  Verified  Payor: [employer] MEDICARE COMPLETE / Plan: [facility] MEDICARE COMPLETE / Product Type: Managed Care Medicare /    In time:10:26  Out time:11:08  Total Treatment Time (min): 42  Visit #: 2 of 8    Medicare/BCBS Only   Total Timed Codes (min):  42 1:1 Treatment Time:  42       Treatment Area: Low back pain [M54.50]  Myalgia, other site [M79.18]  Other chronic pain [G89.29]  Pain in left hip [M25.552]  Sacrococcygeal disorders, not elsewhere classified [M53.3]    SUBJECTIVE  Pain Level (0-10 scale): 0/10  Any medication changes, allergies to medications, adverse drug reactions, diagnosis change, or new procedure performed?: [x] No    [] Yes (see summary sheet for update)  Subjective functional status/changes:   [] No changes reported  Pt reports no new complaints of pain. Pt reports compliance with HEP. Pt states she has been able to do much more since starting PT.     OBJECTIVE    42 min Therapeutic Exercise:  [x] See flow sheet :   Rationale: increase ROM and increase strength to improve the patients ability to perform ADLs with increased ease. With   [] TE   [] TA   [] neuro   [] other: Patient Education: [x] Review HEP    [] Progressed/Changed HEP based on:   [] positioning   [] body mechanics   [] transfers   [] heat/ice application    [] other:      Other Objective/Functional Measures: Increased speed on treadmill. Increased reps with hip x3, LAQ and Hamstring curls. Pain Level (0-10 scale) post treatment: 0/10    ASSESSMENT/Changes in Function: Pt continues to have  pain and increased function since starting aquatic based PT. Pt has no increased symptoms with progressed exercises.      Patient will continue to benefit from skilled PT services to modify and progress therapeutic interventions, address functional mobility deficits, address ROM deficits, address strength deficits, analyze and address soft tissue restrictions and analyze and cue movement patterns to attain remaining goals. []  See Plan of Care  []  See progress note/recertification  []  See Discharge Summary         Progress towards goals / Updated goals:  Short Term Goals: To be accomplished in 2 weeks:  1. I and compliant with HEP for self management of symptoms.   recert: pt reports partial compliance with HEP.     Current: met per patient report. 03/01/2022  Long Term Goals: To be accomplished in 4 weeks:  1. Improve FOTO to 38 to indicate improved function with daily activities.   recertt: progressed to 32.  2. Increase B hip strength to grossly 4-/5 to improve stability for light cleaning at home. recert: remain 4-/5.   3. Increase B hamstring strength to grossly 4-/5 to increase ease with sit<>stand transfers. recert: remains 4-/5.   4. Patient will report 50% improvement with pain to increase ease with getting in/out of bed.   Recert: pt reports no change in symptoms.     PLAN  []  Upgrade activities as tolerated     [x]  Continue plan of care  []  Update interventions per flow sheet       []  Discharge due to:_  []  Other:_      Heriberto Rui, PTA 3/1/2022  10:28 AM    Future Appointments   Date Time Provider Claudette Benjamin   3/1/2022 10:30 AM Romelia Wheeler, PTA [facility]   3/4/2022 10:30 AM Romelia Wheeler, PTA [facility]   3/7/2022  9:00 AM Corrinne Augusta, MD [facility] BS AMB   3/8/2022  9:15 AM Romelia Wheeler, PTA [facility]   3/11/2022 10:30 AM Romelia Wheeler, PTA [facility]   3/15/2022  8:30 AM Romelia Wheeler, PTA [facility]   3/15/2022 10:15 AM Chante Galvan MD [facility] BS AMB   3/18/2022 10:30 AM Romelia Wheeler, PTA [facility]   3/22/2022  9:15 AM Romelia Wheeler, PTA [facility]   3/28/2022  9:30 AM Karin Khan DNP [facility] BS AMB

## 2022-03-04 ENCOUNTER — APPOINTMENT (OUTPATIENT)
Dept: PHYSICAL THERAPY | Age: 57
End: 2022-03-04
Attending: PHYSICAL MEDICINE & REHABILITATION
Payer: MEDICARE

## 2022-03-04 ENCOUNTER — TELEPHONE (OUTPATIENT)
Dept: PHYSICAL THERAPY | Age: 57
End: 2022-03-04

## 2022-03-07 ENCOUNTER — HOSPITAL ENCOUNTER (OUTPATIENT)
Dept: LAB | Age: 57
Discharge: HOME | End: 2022-03-07
Payer: MEDICARE

## 2022-03-07 ENCOUNTER — OFFICE VISIT (OUTPATIENT)
Dept: PULMONOLOGY | Age: 57
End: 2022-03-07
Payer: MEDICARE

## 2022-03-07 VITALS
RESPIRATION RATE: 16 BRPM | SYSTOLIC BLOOD PRESSURE: 114 MMHG | WEIGHT: 173.8 LBS | HEIGHT: 62 IN | HEART RATE: 98 BPM | OXYGEN SATURATION: 96 % | DIASTOLIC BLOOD PRESSURE: 74 MMHG | BODY MASS INDEX: 31.98 KG/M2 | TEMPERATURE: 97.3 F

## 2022-03-07 DIAGNOSIS — E83.52 HYPERCALCEMIA: ICD-10-CM

## 2022-03-07 DIAGNOSIS — E66.01 SEVERE OBESITY (HCC): ICD-10-CM

## 2022-03-07 DIAGNOSIS — D86.89 SARCOIDOSIS OF OTHER SITES: ICD-10-CM

## 2022-03-07 DIAGNOSIS — R00.2 PALPITATIONS: ICD-10-CM

## 2022-03-07 DIAGNOSIS — J45.50 SEVERE PERSISTENT ASTHMA WITHOUT COMPLICATION: ICD-10-CM

## 2022-03-07 DIAGNOSIS — N18.32 STAGE 3B CHRONIC KIDNEY DISEASE (HCC): ICD-10-CM

## 2022-03-07 DIAGNOSIS — D86.89 SARCOIDOSIS OF OTHER SITES: Primary | ICD-10-CM

## 2022-03-07 DIAGNOSIS — J31.0 CHRONIC RHINITIS: ICD-10-CM

## 2022-03-07 DIAGNOSIS — Z79.52 CURRENT CHRONIC USE OF SYSTEMIC STEROIDS: ICD-10-CM

## 2022-03-07 LAB
25(OH)D3 SERPL-MCNC: 30.9 NG/ML (ref 30–100)
ALBUMIN SERPL-MCNC: 3.2 G/DL (ref 3.4–5)
ANION GAP SERPL CALC-SCNC: 5 MMOL/L (ref 3–18)
BASOPHILS # BLD: 0 K/UL (ref 0–0.1)
BASOPHILS NFR BLD: 1 % (ref 0–2)
BUN SERPL-MCNC: 28 MG/DL (ref 7–18)
BUN/CREAT SERPL: 14 (ref 12–20)
CALCIUM SERPL-MCNC: 9 MG/DL (ref 8.5–10.1)
CHLORIDE SERPL-SCNC: 112 MMOL/L (ref 100–111)
CO2 SERPL-SCNC: 25 MMOL/L (ref 21–32)
CREAT SERPL-MCNC: 2 MG/DL (ref 0.6–1.3)
DIFFERENTIAL METHOD BLD: ABNORMAL
EOSINOPHIL # BLD: 0.5 K/UL (ref 0–0.4)
EOSINOPHIL NFR BLD: 9 % (ref 0–5)
ERYTHROCYTE [DISTWIDTH] IN BLOOD BY AUTOMATED COUNT: 13.5 % (ref 11.6–14.5)
GLUCOSE SERPL-MCNC: 137 MG/DL (ref 74–99)
HCT VFR BLD AUTO: 35.5 % (ref 35–45)
HGB BLD-MCNC: 10.9 G/DL (ref 12–16)
IMM GRANULOCYTES # BLD AUTO: 0 K/UL (ref 0–0.04)
IMM GRANULOCYTES NFR BLD AUTO: 0 % (ref 0–0.5)
LYMPHOCYTES # BLD: 1.7 K/UL (ref 0.9–3.6)
LYMPHOCYTES NFR BLD: 31 % (ref 21–52)
MAGNESIUM SERPL-MCNC: 1.8 MG/DL (ref 1.6–2.6)
MCH RBC QN AUTO: 29.6 PG (ref 24–34)
MCHC RBC AUTO-ENTMCNC: 30.7 G/DL (ref 31–37)
MCV RBC AUTO: 96.5 FL (ref 78–100)
MONOCYTES # BLD: 0.7 K/UL (ref 0.05–1.2)
MONOCYTES NFR BLD: 13 % (ref 3–10)
NEUTS SEG # BLD: 2.5 K/UL (ref 1.8–8)
NEUTS SEG NFR BLD: 47 % (ref 40–73)
NRBC # BLD: 0 K/UL (ref 0–0.01)
NRBC BLD-RTO: 0 PER 100 WBC
PHOSPHATE SERPL-MCNC: 3.8 MG/DL (ref 2.5–4.9)
PLATELET # BLD AUTO: 151 K/UL (ref 135–420)
PMV BLD AUTO: 10.6 FL (ref 9.2–11.8)
POTASSIUM SERPL-SCNC: 4.2 MMOL/L (ref 3.5–5.5)
RBC # BLD AUTO: 3.68 M/UL (ref 4.2–5.3)
SODIUM SERPL-SCNC: 142 MMOL/L (ref 136–145)
WBC # BLD AUTO: 5.3 K/UL (ref 4.6–13.2)

## 2022-03-07 PROCEDURE — G8417 CALC BMI ABV UP PARAM F/U: HCPCS | Performed by: INTERNAL MEDICINE

## 2022-03-07 PROCEDURE — G9717 DOC PT DX DEP/BP F/U NT REQ: HCPCS | Performed by: INTERNAL MEDICINE

## 2022-03-07 PROCEDURE — 3017F COLORECTAL CA SCREEN DOC REV: CPT | Performed by: INTERNAL MEDICINE

## 2022-03-07 PROCEDURE — G8427 DOCREV CUR MEDS BY ELIG CLIN: HCPCS | Performed by: INTERNAL MEDICINE

## 2022-03-07 PROCEDURE — 80069 RENAL FUNCTION PANEL: CPT

## 2022-03-07 PROCEDURE — 83735 ASSAY OF MAGNESIUM: CPT

## 2022-03-07 PROCEDURE — 82785 ASSAY OF IGE: CPT

## 2022-03-07 PROCEDURE — 86003 ALLG SPEC IGE CRUDE XTRC EA: CPT

## 2022-03-07 PROCEDURE — G8752 SYS BP LESS 140: HCPCS | Performed by: INTERNAL MEDICINE

## 2022-03-07 PROCEDURE — G9899 SCRN MAM PERF RSLTS DOC: HCPCS | Performed by: INTERNAL MEDICINE

## 2022-03-07 PROCEDURE — 82164 ANGIOTENSIN I ENZYME TEST: CPT

## 2022-03-07 PROCEDURE — 82652 VIT D 1 25-DIHYDROXY: CPT

## 2022-03-07 PROCEDURE — 99215 OFFICE O/P EST HI 40 MIN: CPT | Performed by: INTERNAL MEDICINE

## 2022-03-07 PROCEDURE — 85025 COMPLETE CBC W/AUTO DIFF WBC: CPT

## 2022-03-07 PROCEDURE — G8754 DIAS BP LESS 90: HCPCS | Performed by: INTERNAL MEDICINE

## 2022-03-07 PROCEDURE — 82306 VITAMIN D 25 HYDROXY: CPT

## 2022-03-07 PROCEDURE — 36415 COLL VENOUS BLD VENIPUNCTURE: CPT

## 2022-03-07 RX ORDER — PREDNISONE 10 MG/1
TABLET ORAL
Qty: 30 TABLET | Refills: 0 | Status: SHIPPED | OUTPATIENT
Start: 2022-03-07 | End: 2022-03-19

## 2022-03-07 NOTE — PROGRESS NOTES
[address] [place] [address] [facility]  Pulmonary, Critical Care, and Sleep Medicine    Pulmonary Office F/U  Name: Alvin Ramirez 61 y.o. female  MRN: [id number]  : 1965  Service Date: 22  Chief Complaint:   Chief Complaint   Patient presents with   [facility] Follow Up     from [address] [place] [address] on -2022    Sarcoidosis     last seen 9/15/2021    Other     PATTON, ILD, chronic use of systemic sterorids    Results     COVID (-) 2022, CXR 2022, CT chest, abd, & pelv 2022         History of Present Illness:  Alvin Ramirez is a 61 y.o. female, who presents to Pulmonary clinic for follow-up of sarcoidosis. Patient was last seen in our clinic on 9/15/21. In the interval, pt was admitted to [address] [place] [address] for hypercalcemia. Pt reports she got more sleepy and eventually pt was diagnosed with hypercalcemia. Pt was seen by nephro and pulmonary and was treated with IVF, zometa, and restarted on steroids. Pt was discharged on prednisone taper. Pt was seen by Nephrology - Dr. Efrain Castro afterwards, pt was 20mg prednisone, tapered from there, plans to go to 5mg. Pt reports that she tapered down to 5mg in the interval.  Pt reports that she still feels fluttering in the chest.  Happens daily. Pt using Trelegy and Flovent  Pt still reporting congestion -- nasal congestion and chest congestion. Pt reports difficulty coughing up green mucus. Pt compliant with inhalers. Pt reports not using PRN albuterol for months. Pt reports that she was starting to get skin rashes when she was off of prednisone -- reported as small bumps on skin.   No exacerbations in the interval.      Past Medical History:   Diagnosis Date    Acquired cyst of kidney 2020    emerita    Asthma     Bilateral great toe fractures     Chronic kidney disease, stage 3b ([facility] [address] 2021    Dr Efrain Castro, nephro    Chronic sinusitis     Dr. Padmini Griffin in the past    Colon polyp 5/16    Dr Jorge Castillo    Depression 2019    Diabetes mellitus ([address]     DJD (degenerative joint disease) of cervical spine 2016    DJD (degenerative joint disease), lumbar 2013    MRI c spine w degen changes; Dr Krishna Mantilla    Dyslipidemia     calculated 10 year risk score was 2.0% (12/13)    Edema of both ankles 8/28/2018    Environmental and seasonal allergies 8/28/2018    Eustachian tube dysfunction     Fibrocystic breast     Dr Héctor Porter GERD (gastroesophageal reflux disease)     Hypertriglyceridemia 8/28/2018    Lateral epicondylitis of both elbows 2/6/2017    Macular degeneration of both eyes 08/28/2018    Dr Fuad Ty Libman, [facility]    Mild intermittent asthma without complication 56/42/4202    Dr. Yue You;  ratio 68%, FEV1 78% w 6% inc postbd, TLC 77, RV 56, DLCO 61%    Morbid obesity (HCC)     peak weight 196 lbs, bmi 35.8 from 10/13    Neuropathy 8/28/2018    Osteopenia     Dr. Lourdes Gaucher; DEXA t score -0.7 spine, -0.2 hip (8/14)    Pneumonia     Sarcoidosis     Dr Crawford Rank Type 2 diabetes mellitus with hyperglycemia, with long-term current use of insulin ([facility] [address] 8/28/2018     Past Surgical History:   Procedure Laterality Date    COLONOSCOPY N/A 5/26/2016    Dr Jorge Castillo hyperplastic    COLONOSCOPY N/A 10/19/2021    COLONOSCOPY with polypectomy performed by Lonnie Ya MD at [address] [facility]ENT      nasal polypectomy Dr. Tay Higgins HX HEENT      tear duct surgery right Dr. Lei Decker 2010; left Dr Leslye Jacinto 2015    HX ORTHOPAEDIC      DEXA -0.7 spine, -0.2 hip 8/14    [place] CARDIAC SURG PROCEDURE UNLIST  9/10, 8/13    thallium negative ef 72%; negative ef 70%    MN CHEST SURGERY PROCEDURE UNLISTED  7/12    US thyroid negative    [place] CHEST SURGERY PROCEDURE UNLISTED  2012    pfts w mod restrictive defect     VASCULAR SURGERY PROCEDURE UNLIST  12/13    venous doppler negative     Family History   Problem Relation Age of Onset    Cancer Mother  Hypertension Mother     Cancer Father     Diabetes Father     Hypertension Father     Stroke Father     Diabetes Sister     Hypertension Sister     Heart Disease Sister     Colon Cancer Sister 52    Hypertension Brother     Cancer Maternal Aunt      Social History     Socioeconomic History    Marital status: LEGALLY      Spouse name: Not on file    Number of children: 0    Years of education: 13    Highest education level: Not on file   Occupational History    Occupation: Unemployed   Tobacco Use    Smoking status: Never Smoker    Smokeless tobacco: Never Used   Vaping Use    Vaping Use: Never used   Substance and Sexual Activity    Alcohol use: Yes     Alcohol/week: 0.0 standard drinks     Comment: occasional wine    Drug use: Never    Sexual activity: Not Currently   Other Topics Concern     Service No    Blood Transfusions No    Caffeine Concern Yes     Comment: due to reflux    Occupational Exposure No    Hobby Hazards No    Sleep Concern Yes    Stress Concern Yes     Comment: wants a job    Weight Concern Yes    Special Diet No    Back Care No    Exercise Yes    Bike Helmet No    Seat Belt Yes    Self-Exams Yes   Social History Narrative    Patient lives with a friend, no pets. Social Determinants of Health     Financial Resource Strain:     Difficulty of Paying Living Expenses: Not on file   Food Insecurity:     Worried About Running Out of Food in the Last Year: Not on file    Pee of Food in the Last Year: Not on file   Transportation Needs:     Lack of Transportation (Medical): Not on file    Lack of Transportation (Non-Medical):  Not on file   Physical Activity:     Days of Exercise per Week: Not on file    Minutes of Exercise per Session: Not on file   Stress:     Feeling of Stress : Not on file   Social Connections:     Frequency of Communication with Friends and Family: Not on file    Frequency of Social Gatherings with Friends and Family: Not on file    Attends Jew Services: Not on file    Active Member of Clubs or Organizations: Not on file    Attends Club or Organization Meetings: Not on file    Marital Status: Not on file   Intimate Partner Violence:     Fear of Current or Ex-Partner: Not on file    Emotionally Abused: Not on file    Physically Abused: Not on file    Sexually Abused: Not on file   Housing Stability:     Unable to Pay for Housing in the Last Year: Not on file    Number of [place] in the Last Year: Not on file    Unstable Housing in the Last Year: Not on file     Allergies   Allergen Reactions    Other Food Itching     Pt reported food allergy to \"tropical fruits\", but could only specify pineapple and jenifer; pt was not able to recall any other fruits. Cracks in corner of mouth, irritation of tongue.  Pollen Extracts Runny Nose and Cough    Amoxicillin Hives, Shortness of Breath and Swelling    Crestor [Rosuvastatin] Myalgia    Ibuprofen Other (comments)     dont prescribe due to kidney function    Lipitor [Atorvastatin] Other (comments)     Muscle cramps and weakness      Farmland Flavor Itching     Allergy to Jenifer. Cracks in corner of mouth, irritation of tongue.  Pcn [Penicillins] Angioedema    Pineapple Itching and Other (comments)     Cracks in corner of mouth, irritation of tongue. Prior to Admission medications    Medication Sig Start Date End Date Taking? Authorizing Provider   furosemide (LASIX) 20 mg tablet Take 1 Tablet by mouth daily. Indications: visible water retention, high blood pressure 2/3/22   [place], [place]   magnesium oxide (MAG-OX) 400 mg tablet Take 1 Tablet by mouth two (2) times daily (with meals). 2/3/22   Marilyn Wilkins DNP   cholecalciferol (VITAMIN D3) 25 mcg (1,000 unit) cap Take 1 Capsule by mouth daily.  2/12/22   Malinda Aponte MD   predniSONE (DELTASONE) 5 mg tablet 4 tablets po daily x week then 3 tablets po daily x 1 week then 2 tablets po daily x 1 week then 1 tablet po daily 2/2/22   Marycarmen Godinez MD   ondansetron hcl (Zofran) 4 mg tablet Take 1 Tablet by mouth every eight (8) hours as needed for Nausea or Nausea or Vomiting. 2/2/22   Marycarmen Godinez MD   OTHER BMP on 2/5/22  Please call report to PCP and dr. Joe Minus  Reason: hypercalcemia 2/2/22   Marycarmen Godinez MD   butalbital-acetaminophen-caffeine (FIORICET, ESGIC) -40 mg per tablet Take 1 Tablet by mouth every six (6) hours as needed. 12/22/21   Provider, Historical   venlafaxine-SR (EFFEXOR-XR) 75 mg capsule Take 1 Capsule by mouth daily. 1/3/22   Drew Calle DNP   topiramate (TOPAMAX) 50 mg tablet Take 1 Tablet by mouth two (2) times a day. 1/3/22   Johann Khan DNP   SITagliptin (JANUVIA) 25 mg tablet Take 1 Tablet by mouth daily. For diabetes 1/3/22   Allison LOMBARDO DNP   pravastatin (PRAVACHOL) 80 mg tablet Take 1 Tablet by mouth daily. 1/3/22   Johann Khan DNP   montelukast (SINGULAIR) 10 mg tablet Take 1 Tablet by mouth daily. 1/3/22   Johann Khan DNP   insulin glargine (LANTUS,BASAGLAR) 100 unit/mL (3 mL) inpn 25 Units by SubCUTAneous route nightly. 1/3/22   Drew Calle DNP   esomeprazole (NEXIUM) 40 mg capsule TAKE ONE CAPSULE BY MOUTH DAILY AS NEEDED FOR REFLUX 12/16/21   Allison LOMBARDO DNP   triamcinolone (Nasacort) 55 mcg nasal inhaler 2 Sprays daily. Provider, Historical   fluticasone propionate (Flovent Diskus) 250 mcg/actuation dsdv Take 1 Puff by inhalation two (2) times a day. Rinse gargle mouth thoroughly after each use. Disp: 3 inhalers 9/15/21   Flakita Gauthier MD   fluticasone-umeclidin-vilanter (Trelegy Ellipta) 545-41.8-80 mcg dsdv Take 1 Puff by inhalation daily. Rinse and gargle after each use 9/15/21   Flakita Gauthier MD   cyanocobalamin (VITAMIN B12) 100 mcg tablet Take 50 mcg by mouth daily.     Provider, Historical   albuterol (PROVENTIL HFA, VENTOLIN HFA, PROAIR HFA) 90 mcg/actuation inhaler Take 2 Puffs by inhalation every four (4) hours as needed for Shortness of Breath. Indications: asthma attack 6/16/21   Moiz Arredondo DNP   Insulin Needles, Disposable, ([place] Pen Needle) 32 gauge x 5/32\" ndle Check blood sugars three times a day 6/16/21   Theresa Khan DNP   insulin lispro (HumaLOG KwikPen Insulin) 200 unit/mL (3 mL) inpn 3 times per day: 150-200 2u, 201-250 4u, 251-300 6u, 301-350 8u, 351-400 10u, > 400 12u. Indications: type 2 diabetes mellitus 6/16/21   Moiz Arredondo DNP   therapeutic multivitamin-minerals [facility]) tablet Take 1 Tab by mouth daily. 3/7/21   Provider, Historical   triamcinolone acetonide (KENALOG) 0.1 % topical cream Apply  to affected area two (2) times daily as needed for Skin Irritation. 3/24/21   Moiz Arredondo DNP   aspirin delayed-release 81 mg tablet Take 1 Tab by mouth daily. For heart health 9/3/19   Yadira LOMBARDO DNP   glucose blood VI test strips (ACCU-CHEK POOJA) strip Pt to test 5 times daily. Dx:E11.65 11/17/17   Farhan Ge MD     Immunization History   Administered Date(s) Administered    Influenza Vaccine 02/15/2021    Influenza Vaccine ([address] [place] [address]) PF (>6 Mo Flulaval, Fluarix, and >3 Yrs Afluria, Fluzone 66540) 09/28/2017, 11/26/2018    Pneumococcal Polysaccharide (PPSV-23) 09/28/2017    Tdap 04/24/2013       Review of Systems:  A complete review of systems was performed as stated in the HPI, all others are negative.       Objective:    Physical Exam:  BP (!) 144/87 (BP 1 Location: Left upper arm, BP Patient Position: Sitting, BP Cuff Size: Large adult)   Pulse 98   Temp 97.3 °F (36.3 °C) (Oral)   Resp 16   Ht 5' 2\" (1.575 m)   Wt 78.8 kg (173 lb 12.8 oz)   LMP 03/30/2013   SpO2 96% Comment: RA Rest  BMI 31.79 kg/m²   Vitals were personally reviewed  Gen: no acute distress, pleasant and cooperative, sitting up in chair, ambulates without difficulty  HEENT: normocephalic/atraumatic, no ocular drainage, EOMI, no scleral icterus, nasal bridge midline, unable to assess nasal and oral cavities due to patient wearing mask in the setting of COVID-19 pandemic  Neck: supple, trachea midline, no JVD, no cervical and supraclavicular adenopathy  CVS: regular rate rhythm, S1/S2, no murmurs/rubs/gallops  Lungs: good air entry B/L, CTA BL, no wheezes/rales/rhonchi  Psych: normal memory, thought content, and processing    Labs: I have reviewed the patient's available labs  Lab Results   Component Value Date/Time    WBC 8.6 02/08/2022 01:22 PM    Hemoglobin, POC 13.6 05/26/2016 10:49 AM    HGB 11.2 (L) 02/08/2022 01:22 PM    Hematocrit, POC 40 05/26/2016 10:49 AM    HCT 35.0 02/08/2022 01:22 PM    PLATELET 841 14/18/8437 01:22 PM    MCV 92.8 02/08/2022 01:22 PM     Lab Results   Component Value Date/Time    Sodium 143 02/08/2022 01:23 PM    Potassium 3.6 02/08/2022 01:23 PM    Chloride 110 02/08/2022 01:23 PM    CO2 27 02/08/2022 01:23 PM    Anion gap 6 02/08/2022 01:23 PM    Glucose 93 02/08/2022 01:23 PM    BUN 30 (H) 02/08/2022 01:23 PM    Creatinine 2.14 (H) 02/08/2022 01:23 PM    BUN/Creatinine ratio 14 02/08/2022 01:23 PM    GFR est AA 29 (L) 02/08/2022 01:23 PM    GFR est non-AA 24 (L) 02/08/2022 01:23 PM    Calcium 9.6 02/08/2022 01:23 PM    Bilirubin, total 0.3 01/29/2022 03:55 AM    Alk. phosphatase 52 01/29/2022 03:55 AM    Protein, total 6.2 (L) 01/29/2022 03:55 AM    Albumin 3.3 (L) 02/08/2022 01:23 PM    Globulin 3.3 01/29/2022 03:55 AM    A-G Ratio 0.9 01/29/2022 03:55 AM    ALT (SGPT) 25 01/29/2022 03:55 AM    AST (SGOT) 21 01/29/2022 03:55 AM   Last ACE level was 52 on 6/30/2021      Imaging:  I have personally reviewed patient's imaging as follows--CT chest abdomen pelvis without contrast from 1/30/2022 shows scattered linear fibrotic opacities apically extending from the hilum, otherwise no infiltrates or effusions. Patient also has some scattered bilateral reticular changes and fibrotic changes in the bases.   No nodules or active inflammation seen. Official report per radiology:  CT Results (most recent):  Results from Hospital Encounter encounter on 01/28/22    CT CHEST ABD PELV WO CONT    Narrative  CT Chest, Abdomen, And Pelvis Without Contrast    TECHNIQUE: 5 mm axial images from the thoracic inlet to issue tuberosities were  obtained without intravenous contrast.  Oral contrast was not administered. Coronal and sagittal reformations. All CT scans are performed using dose optimization techniques as appropriate to  the performed exam including the following: Automated exposure control,  adjustment of mA and/or kV according to patient size, and use of iterative  reconstructive technique. HISTORY: Hypercalcemia. Question malignancy. History of sarcoidosis. FINDINGS:  CHEST:  Visualized thyroid gland without definitive nodule. No axillary adenopathy. Calcified mediastinal and bilateral hilar nodes similar to previous  atherosclerotic calcification aorta and coronary arteries. Thoracic aorta is not  aneurysmal. No pericardial effusion. Some progressive perihilar and upper lobe atelectasis right greater than left. Trace effusion on the right. No discrete lung nodule or mass. No definite new  consolidations    Nonspecific fatty nodules of the right and left breast which may correspond with  lateral cyst on mammogram 3/2021. Continued mammographic follow-up suggested. ABDOMEN/PELVIS:  Noncontrast liver, spleen and pancreas without focal abnormality. Nondilated  gallbladder. No biliary duct dilatation. No adrenal nodule or mass. No contour  deforming mass of the right or left kidney. No hydronephrosis. Tiny  nonobstructing stone lower pole left kidney measuring a few millimeters. No dilated stomach, small bowel, or colon. Appendix is not inflamed. Sigmoid  diverticulosis. No gross adenopathy. No ascites. Uterus and adnexa are unremarkable. Underdistended bladder.     OSSEOUS STRUCTURES:  No focal suspicious bone lesions. Impression  There is no CT finding for occult malignancy or metastatic disease on this  noncontrast examination. Calcified mediastinal and hilar lymph nodes compatible with history of  sarcoidosis. Relatively perihilar and septal thickening and of atelectasis/scarring which may  be reflective of pulmonary sarcoid. PFTs:  2021, spirometry is normal without BD response, lung volumes show a mild restriction with a TLC of 73%, and diffusion capacity which is moderately reduced with a DLCO 45%    TTE:  I have reviewed the patient's TTE results  Results for orders placed or performed during the hospital encounter of 14   2D ECHO COMPLETE ADULT (TTE)     Status: None    [address]  [place], [phone]    Transthoracic Echocardiogram    Patient: Felipe Saez  MRN: [id number]  ACCT #: [de-identified]  : [id number]  Age: 51 years  Gender: Female  Height: 61 in  Weight: 192 lb  BSA: 1.86 m squared  Study date: 2014  BP: 118 / 70 mmHg  Status: Routine  [facility] ACC #: [id number]    Allergies: NO KNOWN ALLERGIES    Referring:  Samreen Huggins  [facility]  Interpreting Physician:  Cipriano Pineda DO  Technologist:  Trae Kilgore. Carlota Collins  Referring:  Raymon Kincaid. Olga Kaminski MD    SUMMARY:  Left ventricle: Size was normal. Systolic function was normal by EF  (biplane method of disks). Ejection fraction was estimated in the range of  55 % to 60 %. There were no regional wall motion abnormalities. Wall  thickness was normal. Doppler parameters were consistent with abnormal  left ventricular relaxation (grade 1 diastolic dysfunction). Right ventricle: Systolic pressure was at the upper limits of normal.  Estimated peak pressure was 30 mmHg. Pulmonary arteries: Systolic pressure was at the upper limits of normal.    INDICATIONS: Assess left ventricular function.  Lower leg Edema.    HISTORY: Prior history: Sarcoidosis/Asthma/GERD    PROCEDURE: This was a routine study. The study included complete 2D  imaging, M-mode, complete spectral Doppler, and color Doppler. The heart  rate was 93 bpm, at the start of the study. Systolic blood pressure was  118 mmHg, at the start of the study. Diastolic blood pressure was 70 mmHg,  at the start of the study. Image quality was adequate. LEFT VENTRICLE: Size was normal. Systolic function was normal by EF  (biplane method of disks). Ejection fraction was estimated in the range of  55 % to 60 %. There were no regional wall motion abnormalities. Wall  thickness was normal. DOPPLER: Doppler parameters were consistent with  abnormal left ventricular relaxation (grade 1 diastolic dysfunction). RIGHT VENTRICLE: The size was normal. Systolic function was normal.  DOPPLER: Systolic pressure was at the upper limits of normal. Estimated  peak pressure was 30 mmHg. LEFT ATRIUM: Size was normal. LA volume index was 25 ml/m squared. RIGHT ATRIUM: Size was normal.    MITRAL VALVE: Normal valve structure. DOPPLER: There was no evidence for  stenosis. There was no significant regurgitation. AORTIC VALVE: The valve was trileaflet. Leaflets exhibited normal  thickness and normal cuspal separation. DOPPLER: There was no stenosis. There was no regurgitation. TRICUSPID VALVE: Normal valve structure. DOPPLER: There was no evidence  for tricuspid stenosis. There was trivial regurgitation. Tricuspid  regurgitation peak velocity: 225 m/sec. Right ventricular-right atrial  (RV-RA) gradient: 25 mmHg. PULMONIC VALVE: Leaflets exhibited normal thickness, no calcification, and  normal cuspal separation. DOPPLER: There was no regurgitation. AORTA: The root exhibited normal size.     PULMONARY ARTERY: The size was normal. DOPPLER: Systolic pressure was at  the upper limits of normal.    SYSTEMIC VEINS: IVC: The inferior vena cava was normal in size.    PERICARDIUM: There was no pericardial effusion. MEASUREMENT TABLES    2D measurements  Left ventricle   (Reference normals)  LVID ed, base, PLAX   49 mm   (36-52)  LVID es, base, PLAX   31 mm   (23-39)  FS, base, PLAX   36.73 %   (18-42)  LVPW thickness ed   8 mm   (6-11)  Ratio, IVS/LVPW   1    (<1.3)  EF   55 %   (--)  Ventricular septum   (Reference normals)  IVS thickness ed   8 mm   (6-11)  Aorta   (Reference normals)  Root diam   31 mm   (--)  Left atrium   (Reference normals)  AP dim   31 mm   (--)  LA/Ao root ratio   1    (--)  Right ventricle   (Reference normals)  RVID ed, PLAX   34 mm   (19-38)    Doppler measurements  Left ventricle   (Reference normals)  Ea, lat pat, tiss DP   8 cm/s   (--)  E/Ea, lat pat, tiss DP   6.63    (--)  Ea, med pat, tiss DP   7 cm/s   (--)  E/Ea, med pat, tiss DP   7.57    (--)  Aortic valve   (Reference normals)  Peak tammy   119 cm/s   (--)  Mean gradient   3 mmHg   (--)  Valve area, cont   1.8 cm squared   (--)  Mitral valve   (Reference normals)  Peak E tammy   53 cm/s   (--)  Peak A tammy   71 cm/s   (--)  Peak E/A ratio   0.75    (--)    Prepared and E-signed by    Bob Coronel DO  Signed 11-Feb-2014 17:56:17       05/14/19   ECHO ADULT COMPLETE 05/15/2019 5/15/2019    Narrative · Left Ventricle: Estimated left ventricular ejection fraction is 61 -   65%. No regional wall motion abnormality noted. Age-appropriate left   ventricular diastolic function. Signed by: Sandra Steele DO         Assessment and Plan:  61 y.o. female with:    Impression:  1. Hypercalcemia: Occurred in the interval, as high as 15.2, admitted to [facility] from 1/28 through 2/2/2022. Patient was seen by nephrology, thought to be combination of factors including sarcoidosis, although vitamin D1, 25 was within normal limits (high side of normal), vitamin D supplementation and CKD with diuretic use. 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   Treated with IVF, calcitonin, and Zometa   2. Sarcoidosis of the lung: Patient has interstitial opacities as well as multiple calcified lymph nodes. Patient has been on chronic prednisone therapy for over 10 years. Patient was on 6 mg before hospitalization in March 2021, then discharged at 20 mg and tapered off after last visit in September -- discontinued around Nov 2021. Patient admitted with hypercalcemia in late January 2022, placed back on prednisone. Patient now titrated down to 5 mg daily. CT scan shows no evidence of active inflammation from sarcoidosis, patient has fibrotic changes which likely represents burned-out disease. Patient likely does have ongoing reactive airway disease from sarcoidosis  3. Severe persistent asthma: This is likely caused by underlying sarcoidosis, patient also has significant peripheral eosinophilia (600 from 1/29/2022)  4. Sarcoidosis of the nose: Patient reports she was diagnosed by biopsy about 10 years ago, follows with ENT, recent CT sinuses shows scattered sinus changes  5. ILD: Secondary to sarcoidosis as noted above  6. 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 Chronic allergic rhinosinusitis  8. Obesity: Body mass index is 31.79 kg/m². 9.  Palpitations:  Unclear etiology, did not improve with correction of calcium    Plan:  -I called and discussed case with nephrology-Dr. Getachew Cobian, followed by Dr. Stephane Guidry. At this point, patient has no evidence of active pulmonary sarcoidosis. Thus, I will defer management of immunosuppression to nephrology. With regards to sarcoidosis, I agree with 5 mg prednisone chronically.   If DMARD therapy is needed, I believe that pt is appropriate from a pulmonary standpoint for a variety of agents - MTX, azathioprine, leflunomide, mycophenolate, etc.  -With regards to hypercalcemia and sarcoidosis, will repeat renal function panel, vitamin D 25; vitamin D1/25, ACE level  -Counseled patient to avoid calcium supplementation and vitamin D supplementation and will defer to nephrology for 1 patient may resume supplementation  -For palpitations, review repeat electrolytes, as well as Mg, and refer pt to Cardiology for further management  -Full PFTs at next visit  -Given poorly controlled symptoms of reactive airway disease with ongoing congestion, will give patient a burst of prednisone as follows: 40 mg x 3 days, 30 mg x 3 days, 20 mg x 3 days, 10 mg x 3 days, then down to 5 mg daily at chronic dose  -We will obtain CBC with differential, total IgE, regional allergy panel to rule out other contributors of patient's reactive airway disease/asthma given eosinophilia despite being on steroids. Pt may require asthma biologic if bronchospasm, chest congestion persists  -Continue dual ICS therapy due to severe asthma from sarcoidosis  -Continue Trelegy Ellipta 200 mcg 1 puff once daily. Counseled patient to rinse mouth thoroughly after each use. Sample given in clinic  -Continue Flovent discus 250 mcg 1 puff twice daily. Counseled patient to rinse mouth thoroughly after each use  -Due to chronic prednisone therapy, advised patient to follow-up with PCP with regards to DEXA scan--not needed until next year given that patient was given a dose of Zometa while hospitalized  -Management of sinusitis and nasal sarcoidosis per ENT -- referral to new provider placed  -Continue ongoing lab work and avoid nephrotoxic agents and management of anemia per nephrology  -Advised patient to have yearly ophthalmology screening for ocular sarcoidosis as ocular sarcoidosis is a preventable cause of blindness/visual impairment  -Advised to remain active.   Offered pulmonary rehab, patient declined  -Immunizations reviewed, pneumococcal vaccinations are up-to-date  -Counseled patient regarding lifestyle precautions in COVID-19 pandemic including wearing mask in public and confined spaces, social/physical distancing, frequent hand hygiene, etc.  Lon Ramirezdain pt to receive COVID-19 vaccination when possible      Follow-up and Dispositions    · Return in about 2 months (around 5/7/2022). Orders Placed This Encounter    AMB POC PFT COMPLETE W/BRONCHODILATOR    AMB POC PFT COMPLETE W/O BRONCHODILATOR    GAS DILUT/WASHOUT LUNG VOL W/WO DISTRIB VENT&VOL    DIFFUSING CAPACITY    CBC WITH AUTOMATED DIFF    IMMUNOGLOBULIN E, QT    ALLERGEN PROFILE, ZONE 2    RENAL FUNCTION PANEL    MAGNESIUM    ANGIOTENSIN CONVERTING ENZYME    VITAMIN D, 1, 25 DIHYDROXY    VITAMIN D, 25 HYDROXY    REFERRAL TO ENT-OTOLARYNGOLOGY    REFERRAL TO CARDIOLOGY    predniSONE (DELTASONE) 10 mg tablet     This patient has a high complexity chronic care condition   This Visit needed High complexity medically necessary decision making and management plans. Time spent in preparing for the visit-review of history, tests done prior to arrival, additional time reviewing clinical data, imaging, outside records and test results as well as time spent in ordering tests, treatments and referring patient for further care was a total of 47 minutes. Additional time-counseling with patient and family members regarding care plan 14 minutes.   Total aggregate time of 61 minutes      Mike Jackson MD/MPH     Pulmonary, Critical Care Medicine  [facility]

## 2022-03-07 NOTE — LETTER
3/7/2022    Patient: Gracie Frazier   YOB: 1965   Date of Visit: 3/7/2022     Sindy Pillai, DNP  [address] [facility]  [address] [place] [address]  [address]  [address] [address]  Via Fax: [phone]    Dear Ramsey Ragland MD,      Thank you for referring Ms. Yisel Joseph to [address] [place] [address] for evaluation. My notes for this consultation are attached. If you have questions, please do not hesitate to call me. I look forward to following your patient along with you.       Sincerely,    Corrine Patel MD/MPH     Pulmonary, Critical Care Medicine  [address] [place] [address] [facility]

## 2022-03-07 NOTE — PROGRESS NOTES
Joe Brazil presents today for   Chief Complaint   Patient presents with   [facility] Follow Up     from [facility] on 1/28-2/2/2022    Sarcoidosis     last seen 9/15/2021    Other     PATTON, ILD, chronic use of systemic sterorids    Results     COVID (-) 1/28/2022, CXR 1/28/2022, CT chest, abd, & pelv 1/30/2022       Is someone accompanying this pt? No    Is the patient using any DME equipment during OV? No   -DME Company N/A    Depression Screening:  3 most recent PHQ Screens 3/7/2022   PHQ Not Done -   Little interest or pleasure in doing things More than half the days   Feeling down, depressed, irritable, or hopeless More than half the days   Total Score PHQ 2 4   Trouble falling or staying asleep, or sleeping too much -   Feeling tired or having little energy -   Poor appetite, weight loss, or overeating -   Feeling bad about yourself - or that you are a failure or have let yourself or your family down -   Trouble concentrating on things such as school, work, reading, or watching TV -   Moving or speaking so slowly that other people could have noticed; or the opposite being so fidgety that others notice -   Thoughts of being better off dead, or hurting yourself in some way -   PHQ 9 Score -   How difficult have these problems made it for you to do your work, take care of your home and get along with others -       Learning Assessment:  Learning Assessment 8/27/2018   PRIMARY LEARNER Patient   HIGHEST LEVEL OF EDUCATION - PRIMARY LEARNER  -   BARRIERS PRIMARY LEARNER -   CO-LEARNER CAREGIVER -   PRIMARY LANGUAGE ENGLISH   LEARNER PREFERENCE PRIMARY DEMONSTRATION     VIDEOS     READING   ANSWERED BY Patient   RELATIONSHIP SELF       Abuse Screening:  Abuse Screening Questionnaire 3/24/2021   Do you ever feel afraid of your partner? N   Are you in a relationship with someone who physically or mentally threatens you? N   Is it safe for you to go home?  Y       Fall Risk  Fall Risk Assessment, last 12 mths 3/24/2021   Able to walk? Yes   Fall in past 12 months? 1   Do you feel unsteady? 1   Are you worried about falling 1   Is TUG test greater than 12 seconds? 0   Is the gait abnormal? 0   Number of falls in past 12 months 2         Coordination of Care:  1. Have you been to the ER, urgent care clinic since your last visit? Hospitalized since your last visit? Yes; Where: [facility], [facility] pain Center and [facility] ED, When: 10/19-colonoscopy withpolypectomies, 12/14/2021-epidural steroid injection  L3/4, 12/20/2021-Stage 3 chronic kidney disease, headac;he, meningitis after procedure    2. Have you seen or consulted any other health care providers outside of the [address] [place] [address] since your last visit? Include any pap smears or colon screening. YEs. ASHLI Zuñiga, ENT, Dr. Alok Santana, nephrologist    Influenza vaccine received from PCP's office n October 2021 per patient. Immunization record updated.

## 2022-03-08 ENCOUNTER — DOCUMENTATION ONLY (OUTPATIENT)
Dept: CARDIOLOGY CLINIC | Age: 57
End: 2022-03-08

## 2022-03-08 LAB
1,25(OH)2D3 SERPL-MCNC: 47.9 PG/ML (ref 19.9–79.3)
ACE SERPL-CCNC: 66 U/L (ref 14–82)

## 2022-03-08 NOTE — PROGRESS NOTES
Referral [facility]. Called patient and left message to call and schedule appointment.   Letter also mailed to address on file

## 2022-03-10 LAB
A ALTERNATA IGE QN: <0.1 KU/L
A FUMIGATUS IGE QN: <0.1 KU/L
AMER ROACH IGE QN: <0.1 KU/L
AMER SYCAMORE IGE QN: <0.1 KU/L
BAHIA GRASS IGE QN: <0.1 KU/L
BERMUDA GRASS IGE QN: <0.1 KU/L
BOXELDER IGE QN: <0.1 KU/L
C HERBARUM IGE QN: <0.1 KU/L
CAT DANDER IGG QN: <0.1 KU/L
CLASS DESCRIPTION, 600268: NORMAL
COMMON RAGWEED IGE QN: <0.1 KU/L
D FARINAE IGE QN: <0.1 KU/L
D PTERONYSS IGE QN: <0.1 KU/L
DEPRECATED IGE QN: <0.1 KU/L
DOG DANDER IGE QN: <0.1 KU/L
ENGL PLANTAIN IGE QN: <0.1 KU/L
IGE SERPL-ACNC: 68 IU/ML (ref 6–495)
JOHNSON GRASS IGE QN: <0.1 KU/L
M RACEMOSUS IGE QN: <0.1 KU/L
MT JUNIPER IGE QN: <0.1 KU/L
MUGWORT IGE QN: <0.1 KU/L
NETTLE IGE QN: <0.1 KU/L
P NOTATUM IGE QN: <0.1 KU/L
S BOTRYOSUM IGE QN: <0.1 KU/L
SHEEP SORREL IGE QN: <0.1 KU/L
SWEET GUM IGE QN: <0.1 KU/L
TIMOTHY IGE QN: <0.1 KU/L
WHITE BIRCH IGE QN: <0.1 KU/L
WHITE ELM IGG QN: <0.1 KU/L
WHITE HICKORY IGE QN: <0.1 KU/L
WHITE MULBERRY IGE QN: <0.1 KU/L
WHITE OAK IGE QN: <0.1 KU/L

## 2022-03-11 ENCOUNTER — HOSPITAL ENCOUNTER (OUTPATIENT)
Dept: PHYSICAL THERAPY | Age: 57
Discharge: HOME | End: 2022-03-11
Attending: PHYSICAL MEDICINE & REHABILITATION
Payer: MEDICARE

## 2022-03-11 PROCEDURE — 97113 AQUATIC THERAPY/EXERCISES: CPT

## 2022-03-11 NOTE — PROGRESS NOTES
PT DAILY TREATMENT NOTE     Patient Name: Virginia Flores  Date:3/11/2022  : 1965  [x]  Patient  Verified  Payor: [employer] MEDICARE COMPLETE / Plan: [employer] MEDICARE COMPLETE / Product Type: Managed Care Medicare /    In time:10:24  Out time:11:16  Total Treatment Time (min): 46  Visit #: 3 of 8    Medicare/BCBS Only   Total Timed Codes (min):  52 1:1 Treatment Time:  52       Treatment Area: Low back pain [M54.50]  Myalgia, other site [M79.18]  Other chronic pain [G89.29]  Pain in left hip [M25.552]  Sacrococcygeal disorders, not elsewhere classified [M53.3]    SUBJECTIVE  Pain Level (0-10 scale): 0/10  Any medication changes, allergies to medications, adverse drug reactions, diagnosis change, or new procedure performed?: [x] No    [] Yes (see summary sheet for update)  Subjective functional status/changes:   [] No changes reported  Pt reports compliance with HEP and states she is feeling well. OBJECTIVE    52 min Therapeutic Exercise:  [x] See flow sheet : Aquatic Therapy   Rationale: increase ROM and increase strength to improve the patients ability to perform ADLs with increased ease. With   [] TE   [] TA   [] neuro   [] other: Patient Education: [x] Review HEP    [] Progressed/Changed HEP based on:   [] positioning   [] body mechanics   [] transfers   [] heat/ice application    [] other:      Other Objective/Functional Measures: Increased reps as per flow sheet. Pain Level (0-10 scale) post treatment: 0/10    ASSESSMENT/Changes in Function: Pt has no adverse reaction to treatment. Pt has no increased symptoms post treatment.      Patient will continue to benefit from skilled PT services to modify and progress therapeutic interventions, address functional mobility deficits, address ROM deficits, address strength deficits, analyze and address soft tissue restrictions, analyze and cue movement patterns, analyze and modify body mechanics/ergonomics and assess and modify postural abnormalities to attain remaining goals. []  See Plan of Care  []  See progress note/recertification  []  See Discharge Summary         Progress towards goals / Updated goals:  Short Term Goals: To be accomplished in 2 weeks:  1. I and compliant with HEP for self management of symptoms.   recert: pt reports partial compliance with HEP.     Current: met per patient report. 03/01/2022  Long Term Goals: To be accomplished in 4 weeks:  1. Improve FOTO to 38 to indicate improved function with daily activities.   recert: progressed to 32.  2. Increase B hip strength to grossly 4-/5 to improve stability for light cleaning at home. recert: remain 4-/5.   3. Increase B hamstring strength to grossly 4-/5 to increase ease with sit<>stand transfers. recert: remains 4-/5.   4. Patient will report 50% improvement with pain to increase ease with getting in/out of bed.   Recert: pt reports no change in symptoms.     PLAN  []  Upgrade activities as tolerated     [x]  Continue plan of care  []  Update interventions per flow sheet       []  Discharge due to:_  []  Other:_      Brandt Fraser PTA 3/11/2022  10:26 AM    Future Appointments   Date Time Provider Claudette Cassidy   3/11/2022 10:30 AM Colleen Goel PTA [facility]   3/15/2022  8:30 AM Colleen Goel PTA [facility]   3/15/2022 10:15 AM Mansi Church MD [facility]   3/18/2022 10:30 AM Colleen Goel PTA [facility]   3/22/2022  9:45 AM Colleen Goel PTA [facility]   3/28/2022  9:30 AM Evelio Khan DNP [facility]

## 2022-03-15 ENCOUNTER — HOSPITAL ENCOUNTER (OUTPATIENT)
Dept: PHYSICAL THERAPY | Age: 57
Discharge: HOME | End: 2022-03-15
Attending: PHYSICAL MEDICINE & REHABILITATION
Payer: MEDICARE

## 2022-03-15 ENCOUNTER — OFFICE VISIT (OUTPATIENT)
Dept: ORTHOPEDIC SURGERY | Age: 57
End: 2022-03-15
Payer: MEDICAID

## 2022-03-15 VITALS
BODY MASS INDEX: 31.98 KG/M2 | WEIGHT: 173.8 LBS | HEART RATE: 93 BPM | OXYGEN SATURATION: 98 % | TEMPERATURE: 97.8 F | HEIGHT: 62 IN | RESPIRATION RATE: 16 BRPM

## 2022-03-15 DIAGNOSIS — M79.18 MYOFASCIAL PAIN: ICD-10-CM

## 2022-03-15 DIAGNOSIS — G89.29 CHRONIC PRIMARY MUSCULOSKELETAL PAIN: Primary | ICD-10-CM

## 2022-03-15 DIAGNOSIS — M47.816 LUMBAR FACET ARTHROPATHY: ICD-10-CM

## 2022-03-15 DIAGNOSIS — M79.18 CHRONIC PRIMARY MUSCULOSKELETAL PAIN: Primary | ICD-10-CM

## 2022-03-15 DIAGNOSIS — M54.59 MECHANICAL LOW BACK PAIN: ICD-10-CM

## 2022-03-15 PROCEDURE — G9717 DOC PT DX DEP/BP F/U NT REQ: HCPCS | Performed by: PHYSICAL MEDICINE & REHABILITATION

## 2022-03-15 PROCEDURE — G9899 SCRN MAM PERF RSLTS DOC: HCPCS | Performed by: PHYSICAL MEDICINE & REHABILITATION

## 2022-03-15 PROCEDURE — G8417 CALC BMI ABV UP PARAM F/U: HCPCS | Performed by: PHYSICAL MEDICINE & REHABILITATION

## 2022-03-15 PROCEDURE — 3017F COLORECTAL CA SCREEN DOC REV: CPT | Performed by: PHYSICAL MEDICINE & REHABILITATION

## 2022-03-15 PROCEDURE — G8427 DOCREV CUR MEDS BY ELIG CLIN: HCPCS | Performed by: PHYSICAL MEDICINE & REHABILITATION

## 2022-03-15 PROCEDURE — 99213 OFFICE O/P EST LOW 20 MIN: CPT | Performed by: PHYSICAL MEDICINE & REHABILITATION

## 2022-03-15 PROCEDURE — G8756 NO BP MEASURE DOC: HCPCS | Performed by: PHYSICAL MEDICINE & REHABILITATION

## 2022-03-15 NOTE — LETTER
3/16/2022    Patient: Leonid Barnhart   YOB: 1965   Date of Visit: 3/15/2022     [place], [address] [address]  [address] [place]    Dear Robbie Posey,      Thank you for referring Ms. Yisel Joseph to [facility] for evaluation. My notes for this consultation are attached. If you have questions, please do not hesitate to call me. I look forward to following your patient along with you.       Sincerely,    Lei Briscoe MD

## 2022-03-15 NOTE — PROGRESS NOTES
[address]  [address], [address]  [place], [facility]  Phone: [phone]  Fax: [phone]        Zuleika Brice  : 1965  PCP: Tressa Jimenez DNP  3/15/2022    PROGRESS NOTE      HISTORY OF PRESENT ILLNESS  Pranav Peterson is a 61 y.o. female who was see in 2019 with c/o neck and low back pain. She was seen by me in 2016 for back pain. At the time, she was referred to PT but was unable to attend due to medical complications. At the time, she had recently been diagnosed with DM. She was prescribed Percocet and advised on a Thercane. She was seen by Marielena Griffith NP 19 where she c/o neck and low back pain x 1 month. She was seen in the ED 18 for these complaints and she was prescribed Lidoderm patches and Robaxin with minimal relief. She was told to discontinue Ibuprofen due to renal function. She describes her pain in the posterior neck extending into her trapezius and left-sided back pain from her bra line to her low back. She denies any LE radicular symptoms, but has episodic LUE tingling. She has h/o sarcoidosis, renal dysfunction, and diabetes. She attended PT at [facility] for her neck and low back, but had to discontinue a few sessions early because of having to care for her terminally ill sister. She found some temporary relief from PT. She has some residual left neck and upper back pain radiating into her LUE where her hand occasional \"locks up like I'm having a stroke. \" She returned 3/4/2020 to see NP But[place] for progressive, worsening neck and low back pain (back > neck). She failed Lyrica. She took Gabapentin 900 mg TID with minimal relief prescribed by her PCP. She was prescribed Robaxin and Lidoderm patches. She attended PT (3/; [facility]) with some benefit, especially with TENS unit and ice. However, her pain increased since she stopped PT. She did not find benefit from Robaxin or Gabapentin.  She continued to have LLE shooting pains. Her pain remains unchanged. She reports having a flexed forward posture. Lumbar spine MRI dated 6/1/2020 reviewed. Per report, Chronic mild to moderate degenerative findings at L4-5, without high-grade spinal or foraminal stenosis. This and other levels of lesser degenerative findings as detailed above. Her insurance did not approve the bilateral L4-5 facet injections. Pt notes that she has difficulty sleeping at night due to pain. She saw NP [place] for persistent low back pain. She did not have pain in her legs, but she had pain in her feet. She saw a podiatrist and a vascular specialist. She is unable to take NSAIDs due to decreased kidney function. She underwent an L3-4 TITA (12/14/21; Dr. Steven Onofre) without benefit. She experienced a severe headache following the injection, so she went to [facility] and had a blood patch. Her pain increased and began radiating into the LLE. She also c/o left buttock and left hip pain. Tyrese Hinojosa comes in to the office today for f/u. She was advised by her neurologist to go to the ED for abnormal lab results (increased calcium and increased creatinine) and she was hospitalized(1/28-2/3/22). She attended PT (1/28-3/15/22; [facility]) with significant benefit. She feels much better now that her electrolytes are balanced again and she has been attending PT. She notes that she is now able to walk up and down her stairs at home unassisted. Pain Score: 6/10. PmHx: DM, neuropathy    ASSESSMENT  Tyrese Hinojosa is a 61 y.o. female with c/o chronic neck and low back pain. Her symptoms likely remain due to chronic primary musculoskeletal pain and lumbar facet arthropathy. She is doing well in PT. PLAN  1. Continue/Complete PT. Pt will f/u in 6 months or sooner as needed. Diagnoses and all orders for this visit:    1. Chronic primary musculoskeletal pain    2. Mechanical low back pain    3. Myofascial pain    4.  Lumbar facet arthropathy PAST MEDICAL HISTORY   Past Medical History:   Diagnosis Date    Acquired cyst of kidney 01/16/2020    [facility]    Asthma     Bilateral great toe fractures 2014    Chronic kidney disease, stage 3b ([address] 01/2021    Dr Saira Baptiste, nephro    Chronic sinusitis     Dr. Mando Sears in the past    Colon polyp 5/16    Dr Salena Leung    Depression 2019    Diabetes mellitus ([facility] [address]     DJD (degenerative joint disease) of cervical spine 2016    DJD (degenerative joint disease), lumbar 2013    MRI c spine w degen changes; Dr Monica Haywood    Dyslipidemia     calculated 10 year risk score was 2.0% (12/13)    Edema of both ankles 8/28/2018    Environmental and seasonal allergies 8/28/2018    Eustachian tube dysfunction     Fibrocystic breast     Dr Yanick Zazueta GERD (gastroesophageal reflux disease)     Hypertriglyceridemia 8/28/2018    Lateral epicondylitis of both elbows 2/6/2017    Macular degeneration of both eyes 08/28/2018    [facility]    Mild intermittent asthma without complication 66/43/0074    Dr. Kumari Fresh;  ratio 68%, FEV1 78% w 6% inc postbd, TLC 77, RV 56, DLCO 61%    Morbid obesity (HCC)     peak weight 196 lbs, bmi 35.8 from 10/13    Neuropathy 8/28/2018    Osteopenia     Dr. Lila Vera; DEXA t score -0.7 spine, -0.2 hip (8/14)    Pneumonia     Sarcoidosis     Dr Michael Wilcox Type 2 diabetes mellitus with hyperglycemia, with long-term current use of insulin ([facility] [address] 8/28/2018       Past Surgical History:   Procedure Laterality Date    COLONOSCOPY N/A 5/26/2016    Dr Salena Leung hyperplastic    COLONOSCOPY N/A 10/19/2021    [facility]      Dr. Prachi Oliveira HX HEENT      nasal polypectomy Dr. Jose L Cruz HX HEENT      tear duct surgery right Dr. Dat Granado 2010; left Dr Rachel Pham 2015    HX ORTHOPAEDIC      DEXA -0.7 spine, -0.2 hip 8/14    PA CARDIAC SURG PROCEDURE UNLIST  9/10, 8/13    thallium negative ef 72%; negative ef 70%    CT CHEST SURGERY PROCEDURE UNLISTED  7/12    US thyroid negative    CT CHEST SURGERY PROCEDURE UNLISTED  2012    pfts w mod restrictive defect     VASCULAR SURGERY PROCEDURE UNLIST  12/13    venous doppler negative   . MEDICATIONS      Current Outpatient Medications   Medication Sig Dispense Refill    predniSONE (DELTASONE) 10 mg tablet Take 40 mg by mouth daily for 3 days, THEN 30 mg daily for 3 days, THEN 20 mg daily for 3 days, THEN 10 mg daily for 3 days. Take with breakfast 30 Tablet 0    furosemide (LASIX) 20 mg tablet Take 1 Tablet by mouth daily. Indications: visible water retention, high blood pressure 60 Tablet 0    magnesium oxide (MAG-OX) 400 mg tablet Take 1 Tablet by mouth two (2) times daily (with meals). 120 Tablet 0    cholecalciferol (VITAMIN D3) 25 mcg (1,000 unit) cap Take 1 Capsule by mouth daily. 30 Capsule 0    predniSONE (DELTASONE) 5 mg tablet 4 tablets po daily x week then 3 tablets po daily x 1 week then 2 tablets po daily x 1 week then 1 tablet po daily 70 Tablet 0    butalbital-acetaminophen-caffeine (FIORICET, ESGIC) -40 mg per tablet Take 1 Tablet by mouth every six (6) hours as needed.  venlafaxine-SR (EFFEXOR-XR) 75 mg capsule Take 1 Capsule by mouth daily. 90 Capsule 1    topiramate (TOPAMAX) 50 mg tablet Take 1 Tablet by mouth two (2) times a day. 180 Tablet 1    SITagliptin (JANUVIA) 25 mg tablet Take 1 Tablet by mouth daily. For diabetes 90 Tablet 1    pravastatin (PRAVACHOL) 80 mg tablet Take 1 Tablet by mouth daily. 90 Tablet 1    montelukast (SINGULAIR) 10 mg tablet Take 1 Tablet by mouth daily. 90 Tablet 3    insulin glargine (LANTUS,BASAGLAR) 100 unit/mL (3 mL) inpn 25 Units by SubCUTAneous route nightly.  5 Pen 1    esomeprazole (NEXIUM) 40 mg capsule TAKE ONE CAPSULE BY MOUTH DAILY AS NEEDED FOR REFLUX (Patient taking differently: Take 40 mg by mouth daily as needed.) 90 Capsule 0    fluticasone propionate (Flovent Diskus) 250 mcg/actuation dsdv Take 1 Puff by inhalation two (2) times a day. Rinse gargle mouth thoroughly after each use. Disp: 3 inhalers 3 Each 3    fluticasone-umeclidin-vilanter (Trelegy Ellipta) 200-62.5-25 mcg dsdv Take 1 Puff by inhalation daily. Rinse and gargle after each use 3 Each 3    cyanocobalamin (VITAMIN B12) 100 mcg tablet Take 50 mcg by mouth daily.  Insulin Needles, Disposable, ([place] Pen Needle) 32 gauge x 5/32\" ndle Check blood sugars three times a day 100 Pen Needle 11    insulin lispro (HumaLOG KwikPen Insulin) 200 unit/mL (3 mL) inpn 3 times per day: 150-200 2u, 201-250 4u, 251-300 6u, 301-350 8u, 351-400 10u, > 400 12u. Indications: type 2 diabetes mellitus 5 Pen 5    therapeutic multivitamin-minerals (THERAGRAN-M) tablet Take 1 Tab by mouth daily.  triamcinolone acetonide (KENALOG) 0.1 % topical cream Apply  to affected area two (2) times daily as needed for Skin Irritation. 15 g 1    aspirin delayed-release 81 mg tablet Take 1 Tab by mouth daily. For heart health 30 Tab 11    glucose blood VI test strips (ACCU-CHEK POOJA) strip Pt to test 5 times daily. Dx:E11.65 500 Strip 3    ondansetron hcl (Zofran) 4 mg tablet Take 1 Tablet by mouth every eight (8) hours as needed for Nausea or Nausea or Vomiting. 20 Tablet 0    OTHER BMP on 2/5/22  Please call report to PCP and dr. Getachew Cobian  Reason: hypercalcemia (Patient not taking: Reported on 3/15/2022) 1 Each 0    triamcinolone (Nasacort) 55 mcg nasal inhaler 2 Sprays by Both Nostrils route daily.  albuterol (PROVENTIL HFA, VENTOLIN HFA, PROAIR HFA) 90 mcg/actuation inhaler Take 2 Puffs by inhalation every four (4) hours as needed for Shortness of Breath. Indications: asthma attack 1 Inhaler 5        ALLERGIES    Allergies   Allergen Reactions    Other Food Itching     Pt reported food allergy to \"tropical fruits\", but could only specify pineapple and jenifer; pt was not able to recall any other fruits.  Cracks in corner of mouth, irritation of tongue.  Pollen Extracts Runny Nose and Cough    Amoxicillin Hives, Shortness of Breath and Swelling    Crestor [Rosuvastatin] Myalgia    Ibuprofen Other (comments)     dont prescribe due to kidney function    Lipitor [Atorvastatin] Other (comments)     Muscle cramps and weakness      [place] Flavor Itching     Allergy to Karthikeyan. Cracks in corner of mouth, irritation of tongue.  Pcn [Penicillins] Angioedema    Pineapple Itching and Other (comments)     Cracks in corner of mouth, irritation of tongue. SOCIAL HISTORY    Social History     Socioeconomic History    Marital status: LEGALLY     Number of children: 0    Years of education: 13   Occupational History    Occupation: Unemployed   Tobacco Use    Smoking status: Never Smoker    Smokeless tobacco: Never Used   Vaping Use    Vaping Use: Never used   Substance and Sexual Activity    Alcohol use: Yes     Alcohol/week: 0.0 standard drinks     Comment: occasional wine    Drug use: Never    Sexual activity: Not Currently   Other Topics Concern     Service No    Blood Transfusions No    Caffeine Concern Yes     Comment: due to reflux    Occupational Exposure No    Hobby Hazards No    Sleep Concern Yes    Stress Concern Yes     Comment: wants a job    Weight Concern Yes    Special Diet No    Back Care No    Exercise Yes    Bike Helmet No    Seat Belt Yes    Self-Exams Yes   Social History Narrative    Patient lives with a friend, no pets. FAMILY HISTORY    Family History   Problem Relation Age of Onset   Thomas Cancer Mother     Hypertension Mother     Cancer Father    Thomas Diabetes Father     Hypertension Father     Stroke Father     Diabetes Sister     Hypertension Sister     Heart Disease Sister     Colon Cancer Sister 52    Hypertension Brother     Cancer Maternal Aunt          REVIEW OF SYSTEMS  Review of Systems   Constitutional: Negative for chills, fever and weight loss. Respiratory: Negative for shortness of breath. Cardiovascular: Negative for chest pain. Gastrointestinal: Negative for constipation. Negative for fecal incontinence    Genitourinary: Negative for dysuria. Negative for urinary incontinence   Musculoskeletal: Positive for back pain. Skin: Negative for rash. Neurological: Negative for dizziness, tingling, tremors, focal weakness and headaches. Endo/Heme/Allergies: Does not bruise/bleed easily. Psychiatric/Behavioral: The patient does not have insomnia. PHYSICAL EXAMINATION  Visit Vitals  Pulse 93   Temp 97.8 °F (36.6 °C) (Temporal)   Resp 16   Ht 5' 2\" (1.575 m)   Wt 173 lb 12.8 oz (78.8 kg)   LMP 03/30/2013   SpO2 98%   BMI 31.79 kg/m²       Pain Assessment  3/15/2022   Location of Pain Back   Location Modifiers -   Severity of Pain 6   Quality of Pain Aching; Other (Comment)   Quality of Pain Comment tight, stiff   Duration of Pain Persistent   Frequency of Pain Constant   Aggravating Factors Standing;Walking   Aggravating Factors Comment -   Limiting Behavior Some   Relieving Factors Rest   Relieving Factors Comment -   Result of Injury -           Constitutional:  Well developed, well nourished, in no acute distress. Psychiatric: Affect and mood are appropriate. Integumentary: No rashes or abrasions noted on exposed areas. SPINE/MUSCULOSKELETAL EXAM    Cervical spine:  Neck is midline. Normal muscle tone. No focal atrophy is noted. ROM pain free. Shoulder ROM intact.   Tenderness to palpation of cervical and thoracic paraspinals on the L. Negative Spurling's sign. Negative Tinel's sign. Negative Kramer's sign.                                                                                                                             Sensation in the bilateral arms grossly intact to light touch.      Lumbar spine:  No rash, ecchymosis, or gross obliquity. No fasciculations. No focal atrophy is noted. No pain with hip ROM. Full range of motion. Tenderness to palpation of lumbar paraspinals and QL on the L. No tenderness to palpation at the sciatic notch. SI joints non-tender. Trochanters non tender.     Sensation in the bilateral legs grossly intact to light touch.     Updates 1/18/22:  No pain with internal hip rotation  Tenderness of left SI joint  Pain with hip flexion  Positive PRATEEK test on the R   SI joint testing limited due to other hip pains  Pain with lumbar extension> flexion  Mild tenderness to palpation lumbar paraspinals    MOTOR:      Biceps  Triceps Deltoids Wrist Ext Wrist Flex Hand Intrin   Right 5/5 5/5 5/5 5/5 5/5 5/5   Left 5/5 5/5 5/5 5/5 5/5 5/5             Hip Flex  Quads Hamstrings Ankle DF EHL Ankle PF   Right 5/5 5/5 5/5 5/5 5/5 5/5   Left 5/5 5/5 5/5 5/5 5/5 5/5     DTRs are 2+ biceps, triceps, brachioradialis, patella, and Achilles.     RADIOGRAPHS  Thoracic Spine MRI images taken on 12/21/21 at [facility]: There is normal alignment with normal vertebral body height with no evidence of fracture. No marrow edema or neoplastic marrow signal. There is no abnormal intrinsic cord signal or cord enlargement or enhancement. Slight anterior displacement of the course of the lower thoracic cord with mildly prominent dorsal subarachnoid CSF space but without evidence of mass or abnormal circumscribed fluid collection. No abnormal enhancement, with the questionable enhancement on previous study likely representing normal vascular enhancement dorsal cord. There is bilateral fullness of the jose compatible with known calcified adenopathy with streaky perihilar densities. No evidence of significant thoracic disc bulge herniation or canal or foraminal stenosis. _______________     IMPRESSION:       1. No evidence of dorsal distal thoracic canal abscess.  Mildly displaced anterior cord without circumscribed fluid collection, probably related to adhesions/mild arachnoid cyst. No intrinsic cord abnormality. 2. Otherwise unremarkable MRI thoracic spine. 3. Bilateral calcified hilar lymph nodes with perihilar atelectasis/scarring.      Lumbar MRI images taken on 12/20/21 at [facility]: There is 5 mm anterior listhesis L4 on L5 with no evidence of fracture or spondylolysis. There is bilateral facet arthropathy L4/5 with facet joint effusions and mild enhancing STIR hyperintensity within the adjacent dorsal soft tissues without abnormal fluid collection. No marrow edema or neoplastic marrow signal.No abnormal signal discs endplates or enhancement to suggest discitis or osteomyelitis. The conus medullaris is located at the L1 level and has a normal appearance and signal. Within the lower thoracic canal, only included on sagittal imaging, there is mild anterior displacement of the visualized distal cord from T9 through the upper T11 levels with homogeneous appearing fluid signal in the dorsal aspect of the canal at this level. There is questionable thin peripheral enhancement around this fluid signal on the sagittal post gadolinium sequence. Several tiny left renal cysts. No follow-up imaging is recommended as incidental lesions are likely benign. Remainder visualized retroperitoneum and upper sacrum unremarkable. L1/2 level: Unremarkable. L2/3 level: Unremarkable. L3/4 level: Unremarkable. L4/5 level: Severe facet arthropathy as above with mild anterior listhesis and prominent dorsal epidural fat with AP diameter canal 6 mm with residual CSF surrounding mildly crowded cauda equina. Mild to moderate bilateral lateral recess stenosis. Mild bilateral subarticular foraminal stenosis. L5/S1 level: Mild to moderate facet arthropathy with minimal degenerative spondylosis and minimal right-sided facet joint effusion. No significant stenosis. Contrast: There is no other abnormal enhancement.     ______________     IMPRESSION       1. No evidence of discitis or osteomyelitis. 2. Bilateral L4/5 facet arthropathy and nonspecific facet synovitis, mild anterior spondylolisthesis with moderate canal and mild to moderate bilateral lateral recess stenosis. 3. Facet arthropathy L5/S1 without significant stenosis. 4. Questionable abnormal dorsal spinal canal fluid collection visualized lower thoracic canal with mild anterior cord displacement, questionable peripheral enhancement. This could represent either abscess or simply benign arachnoid cyst/adhesions causing this anterior cord displacement. Further evaluation recommended with MRI with gadolinium dedicated to the thoracic spine.         Lumbar MRI images taken on 6/1/2020 personally reviewed with patient:  Alignment: Unchanged grade 1 degenerative anterolisthesis of L4  Vertebral body height: Normal  Marrow signal: Unremarkable  Disc spaces: Moderately pronounced disc space narrowing disc desiccation at L4-5  Conus: Terminates at T12-L1     Axial imaging correlation:     T12-L1: Patent canal and foramina.      L1-2: Patent canal and foramina.     L2-3: Patent canal and foramina.     L3-4: Minor facet arthropathy. Patent canal and foramina.     L4-5: Uncovering of the disc. Mild disc bulge. Moderately pronounced facet  arthropathy with low-grade inflammation. There is some bulging of the posterior  epidural fat. Combined factors creates a mild central spinal stenosis. Mild  narrowing at the lateral recesses with transient abutment of the crossing nerves  but no overt impingement. Adequately patent foramina.     L5-S1: Bilateral facet arthropathy. Patent canal and foramina.     Other structures: Unremarkable.        IMPRESSION  IMPRESSION:     1. Chronic mild to moderate degenerative findings at L4-5, without high-grade  spinal or foraminal stenosis  -This and other levels of lesser degenerative findings as detailed above     Lumbar XR images taken on 12/21/18 personally reviewed with patient:  The vertebra are normal in height. . There is 5 mm of anterior offset of L4 on L5  demonstrating slight increase compared to the previous study. Disc space at L4/5 is moderately narrowed also increased from the previous exam.  The pedicles are intact. There is no evidence of fracture or dislocation. Mineralization is normal.     IMPRESSION  IMPRESSION:     Progression of degenerative disc disease and grade 1 spondylolisthesis L4/5.     Cervical XR images taken on 12/21/18 personally reviewed with patient:  The lateral view demonstrates from skull base to T3. The vertebra are normal in  height. . There is 2 mm anterior offset of C3 on C4 which has increased from the  previous exam. 1 to 2 mm of anterior offset of C4 on C5 unchanged. 2 mm anterior  offset of C5 on C6 unchanged. Disc spaces at C5/6 and C6/7 are markedly narrowed  with spurring. Progressed from the previous exam. There is mild disc space  narrowing at C3/4 and C4/5.  The prevertebral soft tissues are normal.  There is  no evidence of fracture or dislocation.     IMPRESSION  IMPRESSION:     Progression of multilevel degenerative disc disease and multilevel mild AP offset    Lumbar x-ray films from 2/16/2016 personally reviewed with patient:  -Mild degenerative disc disease.  -Minimal grade 1 spondylolisthesis L4/5.     Thoracic x-ray films from 2/16/2016 personally reviewed with patient  -Degenerative change of the lower cervical spine.  -Degenerative change of the lower thoracic spine.     Cervical x-ray films from 2/16/2016 personally reviewed with patient:  -Multilevel degenerative changes of the cervical spine, most marked C6/C7.    8 minutes of face-to-face contact were spent with the patient during today's visit extensively discussing symptoms and treatment plan. All questions were answered. More than half of this visit today was spent on counseling.      Written by Josie Woo as dictated by Carmella Sales MD

## 2022-03-15 NOTE — PROGRESS NOTES
PT DAILY TREATMENT NOTE     Patient Name: Lynne Conteh  Date:3/15/2022  : 1965  [x]  Patient  Verified  Payor: [employer] MEDICARE COMPLETE / [facility] / Product Type: Managed Care Medicare /    In time:8:30  Out time:9:14  Total Treatment Time (min): 44  Visit #: 4 of 8    Medicare/BCBS Only   Total Timed Codes (min):  44 1:1 Treatment Time:  44       Treatment Area: Low back pain [M54.50]  Myalgia, other site [M79.18]  Other chronic pain [G89.29]  Pain in left hip [M25.552]  Sacrococcygeal disorders, not elsewhere classified [M53.3]    SUBJECTIVE  Pain Level (0-10 scale): 0/10  Any medication changes, allergies to medications, adverse drug reactions, diagnosis change, or new procedure performed?: [x] No    [] Yes (see summary sheet for update)  Subjective functional status/changes:   [] No changes reported  Pt reports no current c/o pain. Pt reports she is able to walk 30-45 minute before her symptoms flare up. Pt states she has a significant improvement in transfers in/out of bed. OBJECTIVE    44 min Therapeutic Exercise:  [x] See flow sheet : Aquatic Therapy   Rationale: increase ROM and increase strength to improve the patients ability to perform ADLs with increased ease. With   [] TE   [] TA   [] neuro   [] other: Patient Education: [x] Review HEP    [] Progressed/Changed HEP based on:   [] positioning   [] body mechanics   [] transfers   [] heat/ice application    [] other:      Other Objective/Functional Measures: Progressed side stepping to treadmill. Pain Level (0-10 scale) post treatment: 0/10    ASSESSMENT/Changes in Function: Pt has no adverse reaction to treatment. Pt has good control with aquatic therapy exercises.      Patient will continue to benefit from skilled PT services to modify and progress therapeutic interventions, address functional mobility deficits, address ROM deficits, address strength deficits, analyze and address soft tissue restrictions, analyze and cue movement patterns, analyze and modify body mechanics/ergonomics and assess and modify postural abnormalities to attain remaining goals. []  See Plan of Care  []  See progress note/recertification  []  See Discharge Summary         Progress towards goals / Updated goals:  Short Term Goals: To be accomplished in 2 weeks:  1. I and compliant with HEP for self management of symptoms.   recert: pt reports partial compliance with HEP.     Current: met per patient report.  03/01/2022  Long Term Goals: To be accomplished in 4 weeks:  1. Improve FOTO to 38 to indicate improved function with daily activities.   recert: progressed to 32.  2. Increase B hip strength to grossly 4-/5 to improve stability for light cleaning at home. recert: remain 4-/5.   3. Increase B hamstring strength to grossly 4-/5 to increase ease with sit<>stand transfers. recert: remains 4-/5.   4. Patient will report 50% improvement with pain to increase ease with getting in/out of bed. Recert: pt reports no change in symptoms.   Current: Met per patient report.  03/15/2022  PLAN  []  Upgrade activities as tolerated     [x]  Continue plan of care  []  Update interventions per flow sheet       []  Discharge due to:_  []  Other:_      Wilbur Goodrich PTA 3/15/2022  8:34 AM    Future Appointments   Date Time Provider Claudette Benjamin   3/15/2022 10:15 AM Nara Boone MD [facility] BS AMB   3/18/2022 10:30 AM Lynne Parker PTA [facility]   3/22/2022  9:45 AM Lynne Parker PTA [facility]   3/28/2022  9:30 AM Julita Khan DNP [facility] BS AMB

## 2022-03-18 ENCOUNTER — HOSPITAL ENCOUNTER (OUTPATIENT)
Dept: PHYSICAL THERAPY | Age: 57
Discharge: HOME | End: 2022-03-18
Attending: PHYSICAL MEDICINE & REHABILITATION
Payer: MEDICARE

## 2022-03-18 DIAGNOSIS — K21.9 GASTROESOPHAGEAL REFLUX DISEASE WITHOUT ESOPHAGITIS: ICD-10-CM

## 2022-03-18 PROBLEM — H33.311 HORSESHOE TEAR OF RETINA WITHOUT DETACHMENT, RIGHT EYE: Status: ACTIVE | Noted: 2022-02-28

## 2022-03-18 PROBLEM — N18.30 STAGE 3 CHRONIC KIDNEY DISEASE (HCC): Status: ACTIVE | Noted: 2021-03-24

## 2022-03-18 PROCEDURE — 97113 AQUATIC THERAPY/EXERCISES: CPT

## 2022-03-18 RX ORDER — ESOMEPRAZOLE MAGNESIUM 40 MG/1
CAPSULE, DELAYED RELEASE ORAL
Qty: 90 CAPSULE | Refills: 0 | Status: SHIPPED | OUTPATIENT
Start: 2022-03-18 | End: 2022-03-31 | Stop reason: SDUPTHER

## 2022-03-18 NOTE — PROGRESS NOTES
PT DAILY TREATMENT NOTE     Patient Name: Cathy Jordan  Date:3/18/2022  : 1965  [x]  Patient  Verified  Payor: [employer] MEDICARE COMPLETE / [facility] / Product Type: Managed Care Medicare /    In time:10:30  Out time:11:08  Total Treatment Time (min): 38  Visit #: 5 of 8    Medicare/BCBS Only   Total Timed Codes (min):  38 1:1 Treatment Time:  38       Treatment Area: Low back pain [M54.50]  Myalgia, other site [M79.18]  Other chronic pain [G89.29]  Pain in left hip [M25.552]  Sacrococcygeal disorders, not elsewhere classified [M53.3]    SUBJECTIVE  Pain Level (0-10 scale): 4/10  Any medication changes, allergies to medications, adverse drug reactions, diagnosis change, or new procedure performed?: [x] No    [] Yes (see summary sheet for update)  Subjective functional status/changes:   [] No changes reported  Pt reports slight increase in pain today but she is okay. OBJECTIVE    38 min Therapeutic Exercise:  [x] See flow sheet :   Rationale: increase ROM and increase strength to improve the patients ability to perform ADLs with increased ease. With   [] TE   [] TA   [] neuro   [] other: Patient Education: [x] Review HEP    [] Progressed/Changed HEP based on:   [] positioning   [] body mechanics   [] transfers   [] heat/ice application    [] other:      Other Objective/Functional Measures:      Pain Level (0-10 scale) post treatment: 0/10    ASSESSMENT/Changes in Function: Pt has no increased symptoms with aquatic exercises. Pt continues to demonstrate improved functional strength. Patient will continue to benefit from skilled PT services to modify and progress therapeutic interventions, address functional mobility deficits, address ROM deficits, address strength deficits, analyze and address soft tissue restrictions, analyze and cue movement patterns and analyze and modify body mechanics/ergonomics to attain remaining goals.      []  See Plan of Care  [] See progress note/recertification  []  See Discharge Summary         Progress towards goals / Updated goals:  Short Term Goals: To be accomplished in 2 weeks:  1. I and compliant with HEP for self management of symptoms.   recert: pt reports partial compliance with HEP.     Current: met per patient report.  03/01/2022  Long Term Goals: To be accomplished in 4 weeks:  1. Improve FOTO to 38 to indicate improved function with daily activities.   recert: progressed to 32.   2. Increase B hip strength to grossly 4-/5 to improve stability for light cleaning at home. recert: remain 4-/5.   3. Increase B hamstring strength to grossly 4-/5 to increase ease with sit<>stand transfers. recert: remains 4-/5.   4. Patient will report 50% improvement with pain to increase ease with getting in/out of bed. Recert: pt reports no change in symptoms.   Current: Met per patient report.  03/15/2022    PLAN  []  Upgrade activities as tolerated     [x]  Continue plan of care  []  Update interventions per flow sheet       []  Discharge due to:_  []  Other:_      Heriberto Fabian PTA 3/18/2022  10:32 AM    Future Appointments   Date Time Provider Claudette Benjamin   3/22/2022  9:45 AM Romelia Wheeler PTA [facility]   3/28/2022  9:30 AM Danitza [place], [facility] BS AMB   4/8/2022 10:20 AM Ayla Maria [facility] BS AMB   9/15/2022  3:45 PM Angela Cross MD [facility] BS AMB

## 2022-03-19 PROBLEM — E83.52 HYPERCALCEMIA: Status: ACTIVE | Noted: 2022-01-28

## 2022-03-19 PROBLEM — H35.30 MACULAR DEGENERATION OF BOTH EYES: Status: ACTIVE | Noted: 2018-08-28

## 2022-03-19 PROBLEM — H25.13 AGE-RELATED NUCLEAR CATARACT, BILATERAL: Status: ACTIVE | Noted: 2021-06-04

## 2022-03-19 PROBLEM — Z79.4 TYPE 2 DIABETES MELLITUS WITH HYPERGLYCEMIA, WITH LONG-TERM CURRENT USE OF INSULIN (HCC): Status: ACTIVE | Noted: 2018-08-28

## 2022-03-19 PROBLEM — H31.001 CHORIORETINAL SCAR OF RIGHT EYE: Status: ACTIVE | Noted: 2022-02-28

## 2022-03-19 PROBLEM — J45.20 MILD INTERMITTENT ASTHMA WITHOUT COMPLICATION: Status: ACTIVE | Noted: 2018-08-28

## 2022-03-19 PROBLEM — L74.1 MILIARIA CRYSTALLINA: Status: ACTIVE | Noted: 2021-09-27

## 2022-03-19 PROBLEM — E55.9 VITAMIN D DEFICIENCY: Status: ACTIVE | Noted: 2021-01-12

## 2022-03-19 PROBLEM — G97.0 CEREBROSPINAL FLUID LEAK FROM SPINAL PUNCTURE: Status: ACTIVE | Noted: 2022-01-03

## 2022-03-19 PROBLEM — H35.369 RETINAL DRUSEN: Status: ACTIVE | Noted: 2021-04-02

## 2022-03-19 PROBLEM — E11.3599 TYPE 2 DIABETES MELLITUS WITH PROLIFERATIVE RETINOPATHY (HCC): Status: ACTIVE | Noted: 2019-06-10

## 2022-03-19 PROBLEM — J30.89 ENVIRONMENTAL AND SEASONAL ALLERGIES: Status: ACTIVE | Noted: 2018-08-28

## 2022-03-19 PROBLEM — E11.65 TYPE 2 DIABETES MELLITUS WITH HYPERGLYCEMIA, WITH LONG-TERM CURRENT USE OF INSULIN (HCC): Status: ACTIVE | Noted: 2018-08-28

## 2022-03-19 PROBLEM — L30.9 ECZEMA: Status: ACTIVE | Noted: 2021-03-24

## 2022-03-19 PROBLEM — I10 PRIMARY HYPERTENSION: Status: ACTIVE | Noted: 2021-04-02

## 2022-03-19 PROBLEM — E11.40 DIABETIC NEUROPATHY, PAINFUL (HCC): Status: ACTIVE | Noted: 2019-06-10

## 2022-03-19 PROBLEM — E88.09 HYPOALBUMINEMIA: Status: ACTIVE | Noted: 2021-03-24

## 2022-03-19 PROBLEM — E78.2 MIXED HYPERLIPIDEMIA: Status: ACTIVE | Noted: 2021-03-24

## 2022-03-20 PROBLEM — M48.061 SPINAL STENOSIS OF LUMBAR REGION: Status: ACTIVE | Noted: 2022-01-03

## 2022-03-20 PROBLEM — F41.8 DEPRESSION WITH ANXIETY: Status: ACTIVE | Noted: 2021-03-24

## 2022-03-20 PROBLEM — G62.9 NEUROPATHY: Status: ACTIVE | Noted: 2018-08-28

## 2022-03-22 ENCOUNTER — HOSPITAL ENCOUNTER (OUTPATIENT)
Dept: PHYSICAL THERAPY | Age: 57
Discharge: HOME | End: 2022-03-22
Attending: PHYSICAL MEDICINE & REHABILITATION
Payer: MEDICARE

## 2022-03-22 ENCOUNTER — APPOINTMENT (OUTPATIENT)
Dept: PHYSICAL THERAPY | Age: 57
End: 2022-03-22
Attending: PHYSICAL MEDICINE & REHABILITATION
Payer: MEDICARE

## 2022-03-22 PROCEDURE — 97113 AQUATIC THERAPY/EXERCISES: CPT

## 2022-03-22 NOTE — PROGRESS NOTES
In [address]  [address]  [place], [address]  [phone] fax    Physical Therapy Discharge Summary  Patient name: Yisel Kramer Start of Care: 2022   Referral source: Becky Feng MD : 1965               Medical Diagnosis: Low back pain [M54.50]  Myalgia, other site [M79.18]  Other chronic pain [G89.29]  Pain in left hip [M25.552]  Sacrococcygeal disorders, not elsewhere classified [M53.3]  Payor: [address] [place] [address] / Plan: [facility] MEDICARE COMPLETE / Product Type: [employer] Care Medicare /  Onset Date:Chronic with recent exacerbation one month ago               Treatment Diagnosis: LBP with left radiculopathy    Prior Hospitalization: see medical history Provider#: [id number]   Medications: Verified on Patient summary List    Comorbidities: Depression; Osteopenia; DM; OA; visually impaired; Asthma   Prior Level of Function: Chronic LBP; limited with daily activities  Visits from Start of Care: 8    Missed Visits: 1  Reporting Period : 2022 to 3/22/2022      Summary of Care:  Short Term Goals: To be accomplished in 2 weeks:  1. I and compliant with HEP for self management of symptoms.   recert: pt reports partial compliance with HEP.     Current: met per patient report.    Long Term Goals: To be accomplished in 4 weeks:  1. Improve FOTO to 38 to indicate improved function with daily activities.   recert: progressed to 28.    Current: Met 48.   2. Increase B hip strength to grossly 4-/5 to improve stability for light cleaning at home. recert: remain 4-/5. Current: Met 4/5.   3. Increase B hamstring strength to grossly 4-/5 to increase ease with sit<>stand transfers. recert: remains 4-/5. Current: met 4/5.   4. Patient will report 50% improvement with pain to increase ease with getting in/out of bed.   Recert: pt reports no change in symptoms.   Current: Met per patient report.        ASSESSMENT/RECOMMENDATIONS: Pt has met all goals and made good progress with functional mobility. Pt plans to join the [facility] and continue to perform aquatic HEP.     [x]Discontinue therapy: [x]Patient has reached or is progressing toward set goals      []Patient is non-compliant or has abdicated      []Due to lack of appreciable progress towards set goals    Orval EDYTA Walters 3/22/2022 11:01 AM

## 2022-03-22 NOTE — PROGRESS NOTES
PT DAILY TREATMENT NOTE     Patient Name: Kiana Pennington  Date:3/22/2022  : 1965  [x]  Patient  Verified  Payor: [facility] MEDICARE COMPLETE / [facility] / Product Type: Managed Care Medicare /    In time:10:00  Out time:10:40  Total Treatment Time (min): 40  Visit #: 6 of 8    Medicare/BCBS Only   Total Timed Codes (min):  40 1:1 Treatment Time:  40       Treatment Area: Low back pain [M54.50]  Myalgia, other site [M79.18]  Other chronic pain [G89.29]  Pain in left hip [M25.552]  Sacrococcygeal disorders, not elsewhere classified [M53.3]    SUBJECTIVE  Pain Level (0-10 scale): 5/10  Any medication changes, allergies to medications, adverse drug reactions, diagnosis change, or new procedure performed?: [x] No    [] Yes (see summary sheet for update)  Subjective functional status/changes:   [] No changes reported  Pt reports her back is bothering her a bit today but her allergies are causing her to have headaches. OBJECTIVE    40 min Therapeutic Exercise:  [x] See flow sheet :   Rationale: increase ROM and increase strength to improve the patients ability to perform ADLs with increased ease. With   [] TE   [] TA   [] neuro   [] other: Patient Education: [x] Review HEP    [] Progressed/Changed HEP based on:   [] positioning   [] body mechanics   [] transfers   [] heat/ice application    [] other:      Other Objective/Functional Measures: FOTO:48     Pain Level (0-10 scale) post treatment: 0/10     ASSESSMENT/Changes in Function: Pt has met all goals and made good progress with functional mobility. Pt plans to join the [facility] and continue to perform aquatic HEP. []  See Plan of Care  []  See progress note/recertification  [x]  See Discharge Summary         Progress towards goals / Updated goals:  Short Term Goals: To be accomplished in 2 weeks:  1.  I and compliant with HEP for self management of symptoms.   recert: pt reports partial compliance with HEP.    Current: met per patient report.  03/01/2022  Long Term Goals: To be accomplished in 4 weeks:  1. Improve FOTO to 38 to indicate improved function with daily activities.   recert: progressed to 32. Current: Met 48. 03/22/2022  2. Increase B hip strength to grossly 4-/5 to improve stability for light cleaning at home. recert: remain 4-/5. Current: Met 4/5. 03/22/2022  3. Increase B hamstring strength to grossly 4-/5 to increase ease with sit<>stand transfers. recert: remains 4-/5. Current: met 4/5. 03/22/2022  4. Patient will report 50% improvement with pain to increase ease with getting in/out of bed. Recert: pt reports no change in symptoms.   Current: Met per patient report.  03/15/2022    PLAN  []  Upgrade activities as tolerated     []  Continue plan of care  []  Update interventions per flow sheet       [x]  Discharge due to:_  []  Other:_      Cheo Calvo, EDYTA 3/22/2022  10:02 AM    Future Appointments   Date Time Provider Claudette Benjamin   3/28/2022  9:30 AM Robbie Walden [facility] BS AMB   4/8/2022 10:20 AM Judge Louie DO [facility] BS AMB   9/15/2022  3:45 PM Yecenia Garcia MD [facility] BS AMB

## 2022-03-28 ENCOUNTER — TRANSCRIBE ORDER (OUTPATIENT)
Dept: SCHEDULING | Age: 57
End: 2022-03-28

## 2022-03-28 DIAGNOSIS — Z12.31 VISIT FOR SCREENING MAMMOGRAM: Primary | ICD-10-CM

## 2022-03-31 ENCOUNTER — OFFICE VISIT (OUTPATIENT)
Dept: INTERNAL MEDICINE CLINIC | Age: 57
End: 2022-03-31
Payer: MEDICARE

## 2022-03-31 VITALS
BODY MASS INDEX: 32.61 KG/M2 | RESPIRATION RATE: 16 BRPM | HEART RATE: 98 BPM | WEIGHT: 177.2 LBS | SYSTOLIC BLOOD PRESSURE: 129 MMHG | DIASTOLIC BLOOD PRESSURE: 76 MMHG | HEIGHT: 62 IN | TEMPERATURE: 96.5 F | OXYGEN SATURATION: 95 %

## 2022-03-31 DIAGNOSIS — N18.30 STAGE 3 CHRONIC KIDNEY DISEASE, UNSPECIFIED WHETHER STAGE 3A OR 3B CKD (HCC): ICD-10-CM

## 2022-03-31 DIAGNOSIS — L30.9 ECZEMA, UNSPECIFIED TYPE: ICD-10-CM

## 2022-03-31 DIAGNOSIS — M54.50 CHRONIC BILATERAL LOW BACK PAIN WITHOUT SCIATICA: ICD-10-CM

## 2022-03-31 DIAGNOSIS — E83.52 HYPERCALCEMIA: ICD-10-CM

## 2022-03-31 DIAGNOSIS — D86.9 SARCOIDOSIS: ICD-10-CM

## 2022-03-31 DIAGNOSIS — K21.9 GASTROESOPHAGEAL REFLUX DISEASE WITHOUT ESOPHAGITIS: ICD-10-CM

## 2022-03-31 DIAGNOSIS — E11.65 TYPE 2 DIABETES MELLITUS WITH HYPERGLYCEMIA, WITH LONG-TERM CURRENT USE OF INSULIN (HCC): ICD-10-CM

## 2022-03-31 DIAGNOSIS — Z79.4 TYPE 2 DIABETES MELLITUS WITH HYPERGLYCEMIA, WITH LONG-TERM CURRENT USE OF INSULIN (HCC): ICD-10-CM

## 2022-03-31 DIAGNOSIS — E11.40 DIABETIC NEUROPATHY, PAINFUL (HCC): ICD-10-CM

## 2022-03-31 DIAGNOSIS — Z00.00 MEDICARE ANNUAL WELLNESS VISIT, SUBSEQUENT: Primary | ICD-10-CM

## 2022-03-31 DIAGNOSIS — R60.0 EDEMA OF BOTH LOWER EXTREMITIES: ICD-10-CM

## 2022-03-31 DIAGNOSIS — G89.29 CHRONIC BILATERAL LOW BACK PAIN WITHOUT SCIATICA: ICD-10-CM

## 2022-03-31 DIAGNOSIS — E78.2 MIXED HYPERLIPIDEMIA: ICD-10-CM

## 2022-03-31 DIAGNOSIS — F41.8 DEPRESSION WITH ANXIETY: ICD-10-CM

## 2022-03-31 DIAGNOSIS — H33.311 RETINAL TEAR OF RIGHT EYE: ICD-10-CM

## 2022-03-31 DIAGNOSIS — I10 PRIMARY HYPERTENSION: ICD-10-CM

## 2022-03-31 DIAGNOSIS — Z71.89 ADVANCED DIRECTIVES, COUNSELING/DISCUSSION: ICD-10-CM

## 2022-03-31 PROCEDURE — G9717 DOC PT DX DEP/BP F/U NT REQ: HCPCS | Performed by: NURSE PRACTITIONER

## 2022-03-31 PROCEDURE — G8752 SYS BP LESS 140: HCPCS | Performed by: NURSE PRACTITIONER

## 2022-03-31 PROCEDURE — G8754 DIAS BP LESS 90: HCPCS | Performed by: NURSE PRACTITIONER

## 2022-03-31 PROCEDURE — G9899 SCRN MAM PERF RSLTS DOC: HCPCS | Performed by: NURSE PRACTITIONER

## 2022-03-31 PROCEDURE — 3051F HG A1C>EQUAL 7.0%<8.0%: CPT | Performed by: NURSE PRACTITIONER

## 2022-03-31 PROCEDURE — G8427 DOCREV CUR MEDS BY ELIG CLIN: HCPCS | Performed by: NURSE PRACTITIONER

## 2022-03-31 PROCEDURE — G0439 PPPS, SUBSEQ VISIT: HCPCS | Performed by: NURSE PRACTITIONER

## 2022-03-31 PROCEDURE — 99214 OFFICE O/P EST MOD 30 MIN: CPT | Performed by: NURSE PRACTITIONER

## 2022-03-31 PROCEDURE — 3017F COLORECTAL CA SCREEN DOC REV: CPT | Performed by: NURSE PRACTITIONER

## 2022-03-31 PROCEDURE — 99497 ADVNCD CARE PLAN 30 MIN: CPT | Performed by: NURSE PRACTITIONER

## 2022-03-31 PROCEDURE — G8417 CALC BMI ABV UP PARAM F/U: HCPCS | Performed by: NURSE PRACTITIONER

## 2022-03-31 PROCEDURE — 2022F DILAT RTA XM EVC RTNOPTHY: CPT | Performed by: NURSE PRACTITIONER

## 2022-03-31 RX ORDER — ESOMEPRAZOLE MAGNESIUM 40 MG/1
40 CAPSULE, DELAYED RELEASE ORAL
Qty: 90 CAPSULE | Refills: 0 | Status: SHIPPED | OUTPATIENT
Start: 2022-03-31 | End: 2022-06-22 | Stop reason: SDUPTHER

## 2022-03-31 RX ORDER — FUROSEMIDE 20 MG/1
20 TABLET ORAL DAILY
Qty: 60 TABLET | Refills: 1 | Status: SHIPPED | OUTPATIENT
Start: 2022-03-31 | End: 2022-06-22 | Stop reason: SDUPTHER

## 2022-03-31 NOTE — PATIENT INSTRUCTIONS
Medicare Wellness Visit, Female     The best way to live healthy is to have a lifestyle where you eat a well-balanced diet, exercise regularly, limit alcohol use, and quit all forms of tobacco/nicotine, if applicable. Regular preventive services are another way to keep healthy. Preventive services (vaccines, screening tests, monitoring & exams) can help personalize your care plan, which helps you manage your own care. Screening tests can find health problems at the earliest stages, when they are easiest to treat. [facility] follows the current, evidence-based guidelines published by the [facility] Kaplan ([facility]) when recommending preventive services for our patients. Because we follow these guidelines, sometimes recommendations change over time as research supports it. (For example, mammograms used to be recommended annually. Even though Medicare will still pay for an annual mammogram, the newer guidelines recommend a mammogram every two years for women of average risk). Of course, you and your doctor may decide to screen more often for some diseases, based on your risk and your co-morbidities (chronic disease you are already diagnosed with). Preventive services for you include:  - Medicare offers their members a free annual wellness visit, which is time for you and your primary care provider to discuss and plan for your preventive service needs. Take advantage of this benefit every year!  -All adults over the age of 72 should receive the recommended pneumonia vaccines. Current USPSTF guidelines recommend a series of two vaccines for the best pneumonia protection.   -All adults should have a flu vaccine yearly and a tetanus vaccine every 10 years.   -All adults age 48 and older should receive the shingles vaccines (series of two vaccines).       -All adults age 38-68 who are overweight should have a diabetes screening test once every three years.   -All adults born between [birth year] and 1966 should be screened once for Hepatitis C.  -Other screening tests and preventive services for persons with diabetes include: an eye exam to screen for diabetic retinopathy, a kidney function test, a foot exam, and stricter control over your cholesterol.   -Cardiovascular screening for adults with routine risk involves an electrocardiogram (ECG) at intervals determined by your doctor.   -Colorectal cancer screenings should be done for adults age 54-65 with no increased risk factors for colorectal cancer. There are a number of acceptable methods of screening for this type of cancer. Each test has its own benefits and drawbacks. Discuss with your doctor what is most appropriate for you during your annual wellness visit. The different tests include: colonoscopy (considered the best screening method), a fecal occult blood test, a fecal DNA test, and sigmoidoscopy.    -A bone mass density test is recommended when a woman turns 65 to screen for osteoporosis. This test is only recommended one time, as a screening. Some providers will use this same test as a disease monitoring tool if you already have osteoporosis. -Breast cancer screenings are recommended every other year for women of normal risk, age 54-69.  -Cervical cancer screenings for women over age 72 are only recommended with certain risk factors.      Here is a list of your current Health Maintenance items (your personalized list of preventive services) with a due date:  Health Maintenance Due   Topic Date Due    Pneumococcal Vaccine (2 of 3 - PCV13) 09/28/2018    Diabetic Foot Care  09/03/2020    Albumin Urine Test  03/18/2022

## 2022-03-31 NOTE — PROGRESS NOTES
This is the Subsequent Medicare Annual Wellness Exam, performed 12 months or more after the Initial AWV or the last Subsequent AWV    I have reviewed the patient's medical history in detail and updated the computerized patient record. Assessment/Plan   Education and counseling provided:  Are appropriate based on today's review and evaluation  Pneumococcal Vaccine  Influenza Vaccine  Screening Mammography  Colorectal cancer screening tests  Cardiovascular screening blood test  Bone mass measurement (DEXA)  Screening for glaucoma  Overall pt is doing well. 1. Medicare annual wellness visit, subsequent  -     AK BEHAVIOR  OBESITY 15M  2. Advanced directives, counseling/discussion  -     ADVANCE CARE PLANNING FIRST 30 MINS  -     FULL CODE  3. Body mass index 32.0-32.9, adult  -     AK BEHAVIOR  OBESITY 15M  4. Primary hypertension  -     METABOLIC PANEL, COMPREHENSIVE; Future  5. Edema of both lower extremities  6. Mixed hyperlipidemia  -     LIPID PANEL; Future  7. Gastroesophageal reflux disease without esophagitis  -     esomeprazole (NEXIUM) 40 mg capsule; Take 1 Capsule by mouth daily as needed for Gastroesophageal Reflux Disease (GERD). , Normal, Disp-90 Capsule, R-0  8. Type 2 diabetes mellitus with hyperglycemia, with long-term current use of insulin (HCC)  -     HEMOGLOBIN A1C WITH EAG; Future  -     MICROALBUMIN, UR, RAND W/ MICROALB/CREAT RATIO; Future  9. Diabetic neuropathy, painful ([address]  10. Depression with anxiety  11. Eczema, unspecified type  12. Chronic bilateral low back pain without sciatica  13. Stage 3 chronic kidney disease, unspecified whether stage 3a or 3b CKD ([address]  14. Hypercalcemia  -     furosemide (LASIX) 20 mg tablet; Take 1 Tablet by mouth daily. Indications: visible water retention, high blood pressure, Normal, Disp-60 Tablet, R-1  -     METABOLIC PANEL, COMPREHENSIVE; Future  15. Sarcoidosis  16.  Retinal tear of right eye       Depression Risk Factor Screening 3 most recent PHQ Screens 3/31/2022   PHQ Not Done -   Little interest or pleasure in doing things Not at all   Feeling down, depressed, irritable, or hopeless Not at all   Total Score PHQ 2 0   Trouble falling or staying asleep, or sleeping too much -   Feeling tired or having little energy -   Poor appetite, weight loss, or overeating -   Feeling bad about yourself - or that you are a failure or have let yourself or your family down -   Trouble concentrating on things such as school, work, reading, or watching TV -   Moving or speaking so slowly that other people could have noticed; or the opposite being so fidgety that others notice -   Thoughts of being better off dead, or hurting yourself in some way -   PHQ 9 Score -   How difficult have these problems made it for you to do your work, take care of your home and get along with others -       Alcohol & Drug Abuse Risk Screen    Do you average more than 1 drink per night or more than 7 drinks a week:  No    On any one occasion in the past three months have you have had more than 3 drinks containing alcohol:  No          Functional Ability and Level of Safety    Hearing: Hearing is good. Activities of Daily Living: The home contains: no safety equipment. Patient does total self care      Ambulation: with no difficulty     Fall Risk:  Fall Risk Assessment, last 12 mths 3/24/2021   Able to walk? Yes   Fall in past 12 months? 1   Do you feel unsteady? 1   Are you worried about falling 1   Is TUG test greater than 12 seconds? 0   Is the gait abnormal? 0   Number of falls in past 12 months 2      Abuse Screen:  Patient is not abused       Cognitive Screening    Has your family/caregiver stated any concerns about your memory: no     Cognitive Screening: Normal - Clock Drawing Test    Health Maintenance Due     There are no preventive care reminders to display for this patient.     Patient Care Team   Patient Care Team:  Robbie Haque as PCP - General (Nurse Practitioner)  Dayron Hernández DNP as PCP - [address] [place] [address] Dr Shabazz Provider  Reagan Huang MD (Orthopedic Surgery)  Fuad Mesa MD (Ophthalmology)  Rossi Fulton MD (Nephrology)  Amna Issa MD (Pulmonary Disease)  Ivania Fletcher PA-C (Allergy)    History     Patient Active Problem List   Diagnosis Code    Sarcoidosis Dr. Jess Jacobs on low dose steroid D86.9    Gastroesophageal reflux disease without esophagitis K21.9    Type 2 diabetes mellitus with hyperglycemia, with long-term current use of insulin ([address] E11.65, Z79.4    Neuropathy G62.9    Mild intermittent asthma without complication I16.27    Environmental and seasonal allergies J30.89    Macular degeneration of both eyes H35.30    Diabetic neuropathy, painful (HCC) E11.40    Type 2 diabetes mellitus with proliferative retinopathy ([address] O35.9689    Mixed hyperlipidemia E78.2    Eczema L30.9    Stage 3 chronic kidney disease (HCC) N18.30    Depression with anxiety F41.8    Hypoalbuminemia E88.09    Primary hypertension I10    Retinal drusen H35.369    Vitamin D deficiency E55.9    Age-related nuclear cataract, bilateral H25.13    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 Donal Osman in the past    Colon polyp 5/16    Dr Jsutin Armendariz    Depression 2019    Diabetes mellitus ([address]     DJD (degenerative joint disease) of cervical spine 2016    DJD (degenerative joint disease), lumbar 2013 MRI c spine w degen changes; Dr Guru Grullon    Dyslipidemia     calculated 10 year risk score was 2.0% (12/13)    Edema of both ankles 8/28/2018    Environmental and seasonal allergies 8/28/2018    Eustachian tube dysfunction     Fibrocystic breast     Dr Teodoro Lopez GERD (gastroesophageal reflux disease)     Hypertriglyceridemia 8/28/2018    Lateral epicondylitis of both elbows 2/6/2017    Macular degeneration of both eyes 08/28/2018    Dr Vivi Kingsley Janifer Sever, [facility]    Mild intermittent asthma without complication 48/98/0441    Dr. Deep Dickson;  ratio 68%, FEV1 78% w 6% inc postbd, TLC 77, RV 56, DLCO 61%    Morbid obesity (HCC)     peak weight 196 lbs, bmi 35.8 from 10/13    Neuropathy 8/28/2018    Osteopenia     Dr. Robert Avalos; DEXA t score -0.7 spine, -0.2 hip (8/14)    Sarcoidosis     Dr Kristen Esparza Type 2 diabetes mellitus with hyperglycemia, with long-term current use of insulin ([facility] 8/28/2018      Past Surgical History:   Procedure Laterality Date    COLONOSCOPY N/A 5/26/2016    Dr Carisa Byrne hyperplastic    COLONOSCOPY N/A 10/19/2021    COLONOSCOPY with polypectomy performed by Jean Carlos Cha MD at [address] [facility]      Dr. Castanon Courser HX HEENT      nasal polypectomy Dr. Turner Abler HX HEENT      tear duct surgery right Dr. Shasta Ho 2010; left Dr Karsten Gill 2015    HX ORTHOPAEDIC      DEXA -0.7 spine, -0.2 hip 8/14    IA CARDIAC SURG PROCEDURE UNLIST  9/10, 8/13    thallium negative ef 72%; negative ef 70%    IA CHEST SURGERY PROCEDURE UNLISTED  7/12    US thyroid negative    IA CHEST SURGERY PROCEDURE UNLISTED  2012    pfts w mod restrictive defect     VASCULAR SURGERY PROCEDURE UNLIST  12/13    venous doppler negative     Current Outpatient Medications   Medication Sig Dispense Refill    esomeprazole (NEXIUM) 40 mg capsule Take 1 Capsule by mouth daily as needed for Gastroesophageal Reflux Disease (GERD). 90 Capsule 0    furosemide (LASIX) 20 mg tablet Take 1 Tablet by mouth daily. Indications: visible water retention, high blood pressure 60 Tablet 1    magnesium oxide (MAG-OX) 400 mg tablet Take 1 Tablet by mouth two (2) times daily (with meals). 120 Tablet 0    predniSONE (DELTASONE) 5 mg tablet 4 tablets po daily x week then 3 tablets po daily x 1 week then 2 tablets po daily x 1 week then 1 tablet po daily 70 Tablet 0    ondansetron hcl (Zofran) 4 mg tablet Take 1 Tablet by mouth every eight (8) hours as needed for Nausea or Nausea or Vomiting. 20 Tablet 0    butalbital-acetaminophen-caffeine (FIORICET, ESGIC) -40 mg per tablet Take 1 Tablet by mouth every six (6) hours as needed.  venlafaxine-SR (EFFEXOR-XR) 75 mg capsule Take 1 Capsule by mouth daily. 90 Capsule 1    topiramate (TOPAMAX) 50 mg tablet Take 1 Tablet by mouth two (2) times a day. 180 Tablet 1    SITagliptin (JANUVIA) 25 mg tablet Take 1 Tablet by mouth daily. For diabetes 90 Tablet 1    montelukast (SINGULAIR) 10 mg tablet Take 1 Tablet by mouth daily. 90 Tablet 3    insulin glargine (LANTUS,BASAGLAR) 100 unit/mL (3 mL) inpn 25 Units by SubCUTAneous route nightly. 5 Pen 1    triamcinolone (Nasacort) 55 mcg nasal inhaler 2 Sprays by Both Nostrils route daily.  fluticasone propionate (Flovent Diskus) 250 mcg/actuation dsdv Take 1 Puff by inhalation two (2) times a day. Rinse gargle mouth thoroughly after each use. Disp: 3 inhalers 3 Each 3    fluticasone-umeclidin-vilanter (Trelegy Ellipta) 200-62.5-25 mcg dsdv Take 1 Puff by inhalation daily. Rinse and gargle after each use 3 Each 3    cyanocobalamin (VITAMIN B12) 100 mcg tablet Take 50 mcg by mouth daily.  albuterol (PROVENTIL HFA, VENTOLIN HFA, PROAIR HFA) 90 mcg/actuation inhaler Take 2 Puffs by inhalation every four (4) hours as needed for Shortness of Breath.  Indications: asthma attack 1 Inhaler 5    Insulin Needles, Disposable, ([place]na Pen Needle) 32 gauge x 5/32\" ndle Check blood sugars three times a day 100 Pen Needle 11    insulin lispro (HumaLOG KwikPen Insulin) 200 unit/mL (3 mL) inpn 3 times per day: 150-200 2u, 201-250 4u, 251-300 6u, 301-350 8u, 351-400 10u, > 400 12u. Indications: type 2 diabetes mellitus 5 Pen 5    therapeutic multivitamin-minerals (THERAGRAN-M) tablet Take 1 Tab by mouth daily.  triamcinolone acetonide (KENALOG) 0.1 % topical cream Apply  to affected area two (2) times daily as needed for Skin Irritation. 15 g 1    aspirin delayed-release 81 mg tablet Take 1 Tab by mouth daily. For heart health 30 Tab 11    glucose blood VI test strips (ACCU-CHEK POOJA) strip Pt to test 5 times daily. Dx:E11.65 500 Strip 3    pravastatin (PRAVACHOL) 80 mg tablet Take 1 Tablet by mouth daily. 90 Tablet 1     Allergies   Allergen Reactions    Other Food Itching     Pt reported food allergy to \"tropical fruits\", but could only specify pineapple and jenifer; pt was not able to recall any other fruits. Cracks in corner of mouth, irritation of tongue.  Pollen Extracts Runny Nose and Cough    Amoxicillin Hives, Shortness of Breath and Swelling    Crestor [Rosuvastatin] Myalgia    Ibuprofen Other (comments)     dont prescribe due to kidney function    Lipitor [Atorvastatin] Other (comments)     Muscle cramps and weakness      Jenifer Flavor Itching     Allergy to Jenifer. Cracks in corner of mouth, irritation of tongue.  Pcn [Penicillins] Angioedema    Pineapple Itching and Other (comments)     Cracks in corner of mouth, irritation of tongue. Family History   Problem Relation Age of Onset   Thomas Cancer Mother     Hypertension Mother     Cancer Father    Thomas Diabetes Father     Hypertension Father     Stroke Father     Diabetes Sister     Hypertension Sister     Heart Disease Sister     Colon Cancer Sister 52    Hypertension Brother     Cancer Maternal Aunt      Social History     Tobacco Use    Smoking status: Never Smoker    Smokeless tobacco: Never Used   Substance Use Topics    Alcohol use:  Yes Alcohol/week: 0.0 standard drinks     Comment: occasional wine         Stephanie Guidry, DNP

## 2022-03-31 NOTE — PROGRESS NOTES
Chief Complaint   Patient presents with   Pending sale to [facility] Annual Wellness Visit     1. \"Have you been to the ER, urgent care clinic since your last visit? Hospitalized since your last visit? \" No    2. \"Have you seen or consulted any other health care providers outside of the [address] [place] [address] since your last visit? \" No     3. For patients aged 39-70: Has the patient had a colonoscopy / FIT/ Cologuard? Yes - no Care Gap present      If the patient is female:    4. For patients aged 41-77: Has the patient had a mammogram within the past 2 years? Yes - no Care Gap present      5. For patients aged 21-65: Has the patient had a pap smear?  Yes - no Care Gap present

## 2022-03-31 NOTE — ACP (ADVANCE CARE PLANNING)
Advance Care Planning     Advance Care Planning (ACP) Physician/NP/PA Conversation      Date of Conversation: 3/31/2022  Conducted with: Patient with Decision Making Capacity    Healthcare Decision Maker:     Primary Decision Maker: Lynn Pacheco [phone]  Click here to complete [address] including selection of the Healthcare Decision Maker Relationship (ie \"Primary\")        Today we documented Decision Maker(s). The patient will provide ACP documents. Care Preferences:    Hospitalization: \"If your health worsens and it becomes clear that your chance of recovery is unlikely, what would be your preference regarding hospitalization? \"  The patient is unsure. Ventilation: \"If you were unable to breathe on your own and your chance of recovery was unlikely, what would be your preference about the use of a ventilator (breathing machine) if it was available to you? \"   The patient would NOT desire the use of a ventilator. Resuscitation: \"In the event your heart stopped as a result of an underlying serious health condition, would you want attempts to be made to restart your heart, or would you prefer a natural death? \"   The patient is unsure.     Additional topics discussed: ventilation preferences, hospitalization preferences and resuscitation preferences    Conversation Outcomes / Follow-Up Plan:   Referral to ACP specialist  ACP in process - completing/providing documents   Reviewed DNR/DNI and patient elects Full Code (Attempt Resuscitation)     Length of Voluntary ACP Conversation in minutes:  16 minutes    Arlen Monroe, DNP

## 2022-04-03 PROBLEM — R60.0 EDEMA OF BOTH LOWER EXTREMITIES: Status: ACTIVE | Noted: 2022-04-03

## 2022-04-03 PROBLEM — M54.50 CHRONIC BILATERAL LOW BACK PAIN WITHOUT SCIATICA: Status: ACTIVE | Noted: 2022-04-03

## 2022-04-03 PROBLEM — G89.29 CHRONIC BILATERAL LOW BACK PAIN WITHOUT SCIATICA: Status: ACTIVE | Noted: 2022-04-03

## 2022-04-03 PROBLEM — H33.311 RETINAL TEAR OF RIGHT EYE: Status: ACTIVE | Noted: 2022-02-28

## 2022-04-03 NOTE — PROGRESS NOTES
[facility] [address]   Dane kraft, [address]  [phone] [id number]/[phone] fax    3/31/2022    HPI:   Gracie Gay 1965 is a pleasant BLACK/ female.      Past Medical History:   Diagnosis Date    Acquired cyst of kidney 01/16/2020    [facility]    Asthma     Bilateral great toe fractures 2014    Chronic kidney disease, stage 3b ([address] 01/2021    Dr Vishal Wilkinson, nephro    Chronic sinusitis     Dr. Donal Osman in the past    Colon polyp 5/16    Dr Justin Armendariz    Depression 2019    Diabetes mellitus ([address]     DJD (degenerative joint disease) of cervical spine 2016    DJD (degenerative joint disease), lumbar 2013    MRI c spine w degen changes; Dr Mindy Pantoja    Dyslipidemia     calculated 10 year risk score was 2.0% (12/13)    Edema of both ankles 8/28/2018    Environmental and seasonal allergies 8/28/2018    Eustachian tube dysfunction     Fibrocystic breast     Dr Anders Langston GERD (gastroesophageal reflux disease)     Hypertriglyceridemia 8/28/2018    Lateral epicondylitis of both elbows 2/6/2017    Macular degeneration of both eyes 08/28/2018    Dr Magan Corea [facility]    Mild intermittent asthma without complication 62/94/7913    Dr. Jess Jacobs;  ratio 68%, FEV1 78% w 6% inc postbd, TLC 77, RV 56, DLCO 61%    Morbid obesity (HCC)     peak weight 196 lbs, bmi 35.8 from 10/13    Neuropathy 8/28/2018    Osteopenia     Dr. Ballard Comment; DEXA t score -0.7 spine, -0.2 hip (8/14)    Pneumonia     Sarcoidosis     Dr Jazz Palomares Type 2 diabetes mellitus with hyperglycemia, with long-term current use of insulin ([facility] 8/28/2018     Past Surgical History:   Procedure Laterality Date    COLONOSCOPY N/A 5/26/2016    Dr Justin Armendariz hyperplastic    COLONOSCOPY N/A 10/19/2021    COLONOSCOPY with polypectomy performed by Geo Lozano MD at [address] [place] [address]      Dr. Tomas Ramirez    HX HEENT      nasal polypectomy Dr. Olvin Chowdhury HX HEENT      tear duct surgery right Dr. Starla Fernández 2010; left Dr Charmaine Gonzalez 2015    HX ORTHOPAEDIC      DEXA -0.7 spine, -0.2 hip 8/14    [place] CARDIAC SURG PROCEDURE UNLIST  9/10, 8/13    thallium negative ef 72%; negative ef 70%    [place] CHEST SURGERY PROCEDURE UNLISTED  7/12    US thyroid negative    [place] CHEST SURGERY PROCEDURE UNLISTED  2012    pfts w mod restrictive defect     VASCULAR SURGERY PROCEDURE UNLIST  12/13    venous doppler negative     Current Outpatient Medications   Medication Sig    esomeprazole (NEXIUM) 40 mg capsule Take 1 Capsule by mouth daily as needed for Gastroesophageal Reflux Disease (GERD).  furosemide (LASIX) 20 mg tablet Take 1 Tablet by mouth daily. Indications: visible water retention, high blood pressure    magnesium oxide (MAG-OX) 400 mg tablet Take 1 Tablet by mouth two (2) times daily (with meals).  predniSONE (DELTASONE) 5 mg tablet 4 tablets po daily x week then 3 tablets po daily x 1 week then 2 tablets po daily x 1 week then 1 tablet po daily    ondansetron hcl (Zofran) 4 mg tablet Take 1 Tablet by mouth every eight (8) hours as needed for Nausea or Nausea or Vomiting.  butalbital-acetaminophen-caffeine (FIORICET, ESGIC) -40 mg per tablet Take 1 Tablet by mouth every six (6) hours as needed.  venlafaxine-SR (EFFEXOR-XR) 75 mg capsule Take 1 Capsule by mouth daily.  topiramate (TOPAMAX) 50 mg tablet Take 1 Tablet by mouth two (2) times a day.  SITagliptin (JANUVIA) 25 mg tablet Take 1 Tablet by mouth daily. For diabetes    montelukast (SINGULAIR) 10 mg tablet Take 1 Tablet by mouth daily.  insulin glargine (LANTUS,BASAGLAR) 100 unit/mL (3 mL) inpn 25 Units by SubCUTAneous route nightly.  triamcinolone (Nasacort) 55 mcg nasal inhaler 2 Sprays by Both Nostrils route daily.  fluticasone propionate (Flovent Diskus) 250 mcg/actuation dsdv Take 1 Puff by inhalation two (2) times a day. Rinse gargle mouth thoroughly after each use.   Disp: 3 inhalers    fluticasone-umeclidin-vilanter (Trelegy Ellipta) 200-62.5-25 mcg dsdv Take 1 Puff by inhalation daily. Rinse and gargle after each use    cyanocobalamin (VITAMIN B12) 100 mcg tablet Take 50 mcg by mouth daily.  albuterol (PROVENTIL HFA, VENTOLIN HFA, PROAIR HFA) 90 mcg/actuation inhaler Take 2 Puffs by inhalation every four (4) hours as needed for Shortness of Breath. Indications: asthma attack    Insulin Needles, Disposable, (Prerna Pen Needle) 32 gauge x 5/32\" ndle Check blood sugars three times a day    insulin lispro (HumaLOG KwikPen Insulin) 200 unit/mL (3 mL) inpn 3 times per day: 150-200 2u, 201-250 4u, 251-300 6u, 301-350 8u, 351-400 10u, > 400 12u. Indications: type 2 diabetes mellitus    therapeutic multivitamin-minerals (THERAGRAN-M) tablet Take 1 Tab by mouth daily.  triamcinolone acetonide (KENALOG) 0.1 % topical cream Apply  to affected area two (2) times daily as needed for Skin Irritation.  aspirin delayed-release 81 mg tablet Take 1 Tab by mouth daily. For heart health    glucose blood VI test strips (ACCU-CHEK POOJA) strip Pt to test 5 times daily. Dx:E11.65    pravastatin (PRAVACHOL) 80 mg tablet Take 1 Tablet by mouth daily. No current facility-administered medications for this visit. Allergies and Intolerances: Allergies   Allergen Reactions    Other Food Itching     Pt reported food allergy to \"tropical fruits\", but could only specify pineapple and jenifer; pt was not able to recall any other fruits. Cracks in corner of mouth, irritation of tongue.  Pollen Extracts Runny Nose and Cough    Amoxicillin Hives, Shortness of Breath and Swelling    Crestor [Rosuvastatin] Myalgia    Ibuprofen Other (comments)     dont prescribe due to kidney function    Lipitor [Atorvastatin] Other (comments)     Muscle cramps and weakness      Farber Flavor Itching     Allergy to Farber. Cracks in corner of mouth, irritation of tongue.      Pcn [Penicillins] Angioedema    Pineapple Itching and Other (comments)     Cracks in corner of mouth, irritation of tongue. Family History:   Family History   Problem Relation Age of Onset   Thomas Cancer Mother     Hypertension Mother     Cancer Father     Diabetes Father     Hypertension Father     Stroke Father     Diabetes Sister     Hypertension Sister     Heart Disease Sister     Colon Cancer Sister 52    Hypertension Brother     Cancer Maternal Aunt      Social History:   She  reports that she has never smoked. She has never used smokeless tobacco.   Social History     Substance and Sexual Activity   Alcohol Use Yes    Alcohol/week: 0.0 standard drinks    Comment: occasional wine     Immunization History:  Immunization History   Administered Date(s) Administered    Influenza Vaccine 02/15/2021, 10/07/2021    Influenza Vaccine (Quad) PF (>6 Mo Flulaval, Fluarix, and >3 Yrs Afluria, Fluzone [id number]) 09/28/2017, 11/26/2018    Pneumococcal Polysaccharide (PPSV-23) 09/28/2017    Tdap 04/24/2013     Retinal tear (right eye): Under care of Dr. Selvin Rubio, [place] [facility] . Environmental/seasonal allergies: Sees Dr. Prabha Leung with [facility]. Taking Singulair which she continues daily and tolerates well. CKD 3: Dr. Vishal Wilkinson, nephrology. Mixed hyperlipidemia: Continues statin daily and tolerating well. Sarcoidosis and asthma: Follows Dr. Renetta Marie, pulmonary. She continues all resp medications as prescribed. Depression with anxiety: Used to take prozac but not effective. Started Effexor in March 2021 and rose mary well. She is not seeking psychiatry/counseling at this time. IDDM:  She continues Januvia 25 mg; tolerates well. She takes Lantus daily along with Humalog sliding scale. She checks her blood sugars 1-3 times a day. DM neuropathy: Bilateral feet. Topamax is effective. Sees podiatry every 3 months. Eczema: Continues triamcinolone cream as needed to the back of her neck.   Uses as needed; mostly summer months. Review of Systems:   As above included in HPI. Otherwise 11 point review of systems negative including constitutional, skin, HENT, eyes, respiratory, cardiovascular, gastrointestinal, genitourinary, musculoskeletal, endocrine, hematologic, allergy, and neurologic. Physical:   Visit Vitals  /76   Pulse 98   Temp (!) 96.5 °F (35.8 °C) (Temporal)   Resp 16   Ht 5' 2\" (1.575 m)   Wt 177 lb 3.2 oz (80.4 kg)   LMP 03/30/2013   SpO2 95%   BMI 32.41 kg/m²      Wt Readings from Last 3 Encounters:   03/31/22 177 lb 3.2 oz (80.4 kg)   03/15/22 173 lb 12.8 oz (78.8 kg)   03/07/22 173 lb 12.8 oz (78.8 kg)         Exam:   Physical Exam  Vitals and nursing note reviewed. Constitutional:       Appearance: Normal appearance. She is obese. HENT:      Head: Normocephalic and atraumatic. Right Ear: Tympanic membrane, ear canal and external ear normal.      Left Ear: Tympanic membrane, ear canal and external ear normal.   Eyes:      Extraocular Movements: Extraocular movements intact. Conjunctiva/sclera: Conjunctivae normal.   Neck:      Vascular: No carotid bruit. Cardiovascular:      Rate and Rhythm: Normal rate and regular rhythm. Pulses: Normal pulses. Heart sounds: Normal heart sounds. Comments: 1+ nonpitting edema noted emerita LEs  Pulmonary:      Effort: Pulmonary effort is normal. No respiratory distress. Breath sounds: Normal breath sounds. No wheezing. Abdominal:      General: Abdomen is flat. Bowel sounds are normal. There is no distension. Palpations: Abdomen is soft. Tenderness: There is no abdominal tenderness. Genitourinary:     Comments: Deferred   Musculoskeletal:         General: Normal range of motion. Cervical back: Normal range of motion and neck supple. Comments: Gait stable   Skin:     General: Skin is warm and dry. Findings: No rash. Neurological:      General: No focal deficit present.       Mental Status: She is alert and oriented to person, place, and time. Psychiatric:         Mood and Affect: Mood normal.         Behavior: Behavior normal.       Review of Data:  n/a      Plan:    ICD-10-CM ICD-9-CM    1. Medicare annual wellness visit, subsequent  Z00.00 V70.0 LA BEHAVIOR  OBESITY 15M   2. Advanced directives, counseling/discussion  Z71.89 V65.49 ADVANCE CARE PLANNING FIRST 30 MINS      FULL CODE   3. Body mass index 32.0-32.9, adult  Z68.32 V85.32 LA BEHAVIOR  OBESITY 15M   4. Primary hypertension  V52 216.2 METABOLIC PANEL, COMPREHENSIVE   5. Edema of both lower extremities  R60.0 782.3    6. Mixed hyperlipidemia  E78.2 272.2 LIPID PANEL   7. Gastroesophageal reflux disease without esophagitis  K21.9 530.81 esomeprazole (NEXIUM) 40 mg capsule   8. Type 2 diabetes mellitus with hyperglycemia, with long-term current use of insulin ([facility])  E11.65 250.00 HEMOGLOBIN A1C WITH EAG    Z79.4 790.29 MICROALBUMIN, UR, RAND W/ MICROALB/CREAT RATIO     V58.67    9. Diabetic neuropathy, painful ([facility])  E11.40 250.60      357.2    10. Depression with anxiety  F41.8 300.4    11. Eczema, unspecified type  L30.9 692.9    12. Chronic bilateral low back pain without sciatica  M54.50 724.2     G89.29 338.29    13. Stage 3 chronic kidney disease, unspecified whether stage 3a or 3b CKD ([facility])  N18.30 585.3    14. Hypercalcemia  E83.52 275.42 furosemide (LASIX) 20 mg tablet      METABOLIC PANEL, COMPREHENSIVE   15. Sarcoidosis  D86.9 135    16. Retinal tear of right eye  H33.311 361.00      -MCR/ACP  MCR Wellness: Reviewed all HM and ordered tests/imaging appropriately. Reviewed care teams and medications with updates. Advanced Care Planning: Patient educated on the importance of an advanced care plan and paperwork was given to the patient today.  Asked patient to read over the information and bring back at next appointment.    -Body mass index 32.0  Weight management:  BMI is out of normal parameters and plan is as follows: Discussed in great detail on diet, portion control, exercise, avoiding foods high in sugar, carbs and starches, fatty/greasy foods and to eat until satisfied not til full. I have counseled this patient on diet and exercise regimens as well. Patient verbalized understanding. Discussion about modifying behaviors regarding diet and exercise undertaken. Increase activity as tolerated   -Pt is not motivated   Cut back on carbs and fatty foods  Calorie counting would be beneficial  Weight loss options: Weight Watchers, exercise stephany, Noom, GOLO, Silver sneakers   Discussed referral to nutritionist for further counseling              -Pt declined  Discussed weight loss target of 5% over 6-12 months being a realistic goal.  Will reevaluate milady loss and goals along with management at subsequent visits. Total time: 15 minutes     -Primary Hypertension  BP well controlled   Continue Lasix therapy    -Edema Jarad LEs  continue, Lasix therapy   Elevate lower extremities above heart  Avoid sitting with legs dependent more than 30 minutes at a time  Increase walking exercise  Wear compression socks while awake (15 to 30 mmHg)  Limit sodium in diet    -Mixed hyperlipidemia  9/2021 ; LDL 95  continue, Pravastatin 80mg qd. Reduce fried fatty or oily foods in diet  Limit red meats, processed foods, and alcohol. Limit fast food  Work on weight reduction    -GERD  continue, Nexium qd prn   Avoid gut irritants such as spicy foods  Avoid laying down for 30 to 45 minutes after eating    -Type 2 diabetes  1/2022 A1c 7.1  Pt left office before completing POC A1c. Continue Januvia  Continue Lantus and Humalog therapies.   Keep in touch with Dr Steph Limon, PharmD    Instructed patient to cut back on carbs and sugary foods  Increase exercise  Encourage weight loss    Reviewed current value and goal related to age, discussed dietary changes including to select low sugar and low simple carbohydrates and eating stable protein throughout the day, medications with the correct way to take them and side effects that should be reported such as muscle pain, weakness, near syncope. Discussed consequences of poor management with vascular stress, increased occurrence of neuropathy causing pain and disability, decreased circulation and increased occurrence of infection resulting in extremity amputation and general illness, and increased incidence of death by renal failure, stroke and MI.     Educated patient on the importance of maintaining proper skin/foot care as diabetics can have poor vascular circulation that could lead to ulcers. Educated patient on the importance of following up with a podiatrist and if unable to see a podiatrist then to make sure they cut the nails straight across to prevent any trauma to the skin. Educated patient on the importance of wearing proper fitting shoes, avoid soaking feet in hot water or Epson salt, and never walk barefoot as this exposes the feet to possible trauma. -Diabetic neuropathy  Continue Topamax therapy    -Depression and anxiety  Continue Effexor therapy  Pt declines Psych and/or counseling    -Eczema  Continue triamcinolone cream as needed  Keep skin moisturized      -Chronic low back pain  Continue Aqua therapy  Specialist managed condition is being evaluated/managed by Dr. Godfrey Beltran. No acute findings today meriting change in management plan. -Stage 3 CKD  Specialist managed condition is being evaluated/managed by Dr. Valarie Walls. No acute findings today meriting change in management plan. -Hypercalcemia  3/7/22 Level wnl 9.0    -Sarcoidosis  Continue all pukm medications  Specialist managed condition is being evaluated/managed by Dr. Mily Kapoor. No acute findings today meriting change in management plan. -Retinal tear (Right)  Specialist managed condition is being evaluated/managed by Dr. Benjamin Costa. No acute findings today meriting change in management plan.           Follow up 3 months with labs (A1c, microalbumin, lipid, CMP)      Dr. Lizbeth Cabral, AGNP-C, DNP  [facility]

## 2022-04-04 ENCOUNTER — HOSPITAL ENCOUNTER (OUTPATIENT)
Dept: MAMMOGRAPHY | Age: 57
Discharge: HOME | End: 2022-04-04
Attending: NURSE PRACTITIONER
Payer: MEDICARE

## 2022-04-04 DIAGNOSIS — Z12.31 VISIT FOR SCREENING MAMMOGRAM: ICD-10-CM

## 2022-04-04 PROCEDURE — 77063 BREAST TOMOSYNTHESIS BI: CPT

## 2022-04-05 NOTE — PROGRESS NOTES
[facility] nurses: Please notify pt that her mammogram was normal. Pt will need yearly mammograms.  Thank you

## 2022-04-11 ENCOUNTER — TELEPHONE (OUTPATIENT)
Dept: INTERNAL MEDICINE CLINIC | Age: 57
End: 2022-04-11

## 2022-04-11 NOTE — TELEPHONE ENCOUNTER
Bonnie with [facility]:    Asking to verify fax received/ and get status for incontinent supply request faxed 04/06/22

## 2022-04-12 NOTE — PROGRESS NOTES
Left message for patient to call the office. Re:  mammogram was normal. Pt will need yearly mammograms.

## 2022-04-12 NOTE — TELEPHONE ENCOUNTER
Pt has not complained of incontinence. Does she have incontinence when laughing or sneezing? Is the incontinence due to overactive bladder? How often does she have incontinence? Is this occurring during the night or only during waking hours?

## 2022-04-13 NOTE — TELEPHONE ENCOUNTER
Patient reached, she stated that she has issues with incontinence first thing in the morning, she states it happens when she stands up after waking, and also with sneezing. She states this happens at least two times a week. She stated that her insurance company called to do an assessment and asked about this, she noted that she had not brought it up before because it is a bit of an uncomfortable subject that did not occur to her during the office visits. She does not have incontinence during the night, only when awake.

## 2022-04-20 NOTE — TELEPHONE ENCOUNTER
Isabel with [facility] re: status for request for incontenient supplies order faxed 04/06/22     [phone]   [phone]

## 2022-04-27 ENCOUNTER — DOCUMENTATION ONLY (OUTPATIENT)
Dept: INTERNAL MEDICINE CLINIC | Age: 57
End: 2022-04-27

## 2022-04-27 NOTE — PROGRESS NOTES
Patient is seeking incontinence undergarment via [facility]. In order to prescribe incontinence undergarments, patient needs an appointment for documentation purposes.   I have availability today, reach out to patient and schedule a virtual.  Please thank you

## 2022-04-27 NOTE — PROGRESS NOTES
LVM for patient to return call    RE: pt needs appt to discuss incontinence supplies, this can be a VV.

## 2022-04-29 ENCOUNTER — OFFICE VISIT (OUTPATIENT)
Dept: CARDIOLOGY CLINIC | Age: 57
End: 2022-04-29
Payer: MEDICARE

## 2022-04-29 VITALS
BODY MASS INDEX: 32.2 KG/M2 | WEIGHT: 175 LBS | DIASTOLIC BLOOD PRESSURE: 84 MMHG | HEART RATE: 98 BPM | SYSTOLIC BLOOD PRESSURE: 118 MMHG | HEIGHT: 62 IN | OXYGEN SATURATION: 96 %

## 2022-04-29 DIAGNOSIS — R00.2 PALPITATIONS: Primary | ICD-10-CM

## 2022-04-29 PROCEDURE — 3017F COLORECTAL CA SCREEN DOC REV: CPT | Performed by: INTERNAL MEDICINE

## 2022-04-29 PROCEDURE — G8427 DOCREV CUR MEDS BY ELIG CLIN: HCPCS | Performed by: INTERNAL MEDICINE

## 2022-04-29 PROCEDURE — G9717 DOC PT DX DEP/BP F/U NT REQ: HCPCS | Performed by: INTERNAL MEDICINE

## 2022-04-29 PROCEDURE — G9899 SCRN MAM PERF RSLTS DOC: HCPCS | Performed by: INTERNAL MEDICINE

## 2022-04-29 PROCEDURE — G8752 SYS BP LESS 140: HCPCS | Performed by: INTERNAL MEDICINE

## 2022-04-29 PROCEDURE — 99204 OFFICE O/P NEW MOD 45 MIN: CPT | Performed by: INTERNAL MEDICINE

## 2022-04-29 PROCEDURE — G8417 CALC BMI ABV UP PARAM F/U: HCPCS | Performed by: INTERNAL MEDICINE

## 2022-04-29 PROCEDURE — G8754 DIAS BP LESS 90: HCPCS | Performed by: INTERNAL MEDICINE

## 2022-04-29 NOTE — LETTER
4/29/2022    Patient: Rosalino Khan   YOB: 1965   Date of Visit: 4/29/2022     Paty Arita, [address] [place] [address]  [place] [address] [facility], [address] [facility] [address]  [place]    Dear Paty Arita, Merline Fraise, MD,      Thank you for referring Ms. Yisel Joseph to [facility] for evaluation. My notes for this consultation are attached. If you have questions, please do not hesitate to call me. I look forward to following your patient along with you.       Sincerely,    Campbell Fuchs, DO

## 2022-04-29 NOTE — PROGRESS NOTES
Rosalino Khan presents today for   Chief Complaint   Patient presents with    New Patient     Ref by Dr. [facility] for Palpitations       Yisel King preferred language for health care discussion is english/other. Is someone accompanying this pt? no    Is the patient using any DME equipment during [address]? no    Depression Screening:  3 most recent PHQ Screens 4/29/2022   PHQ Not Done -   Little interest or pleasure in doing things Not at all   Feeling down, depressed, irritable, or hopeless Not at all   Total Score PHQ 2 0   Trouble falling or staying asleep, or sleeping too much -   Feeling tired or having little energy -   Poor appetite, weight loss, or overeating -   Feeling bad about yourself - or that you are a failure or have let yourself or your family down -   Trouble concentrating on things such as school, work, reading, or watching TV -   Moving or speaking so slowly that other people could have noticed; or the opposite being so fidgety that others notice -   Thoughts of being better off dead, or hurting yourself in some way -   PHQ 9 Score -   How difficult have these problems made it for you to do your work, take care of your home and get along with others -       Learning Assessment:  Learning Assessment 4/29/2022   PRIMARY LEARNER Patient   HIGHEST LEVEL OF EDUCATION - PRIMARY LEARNER  -   BARRIERS PRIMARY LEARNER -   CO-LEARNER CAREGIVER -   PRIMARY LANGUAGE ENGLISH   LEARNER PREFERENCE PRIMARY DEMONSTRATION     -     -   ANSWERED BY patient   RELATIONSHIP SELF       Abuse Screening:  Abuse Screening Questionnaire 4/29/2022   Do you ever feel afraid of your partner? N   Are you in a relationship with someone who physically or mentally threatens you? N   Is it safe for you to go home? Y       Fall Risk  Fall Risk Assessment, last 12 mths 3/24/2021   Able to walk? Yes   Fall in past 12 months? 1   Do you feel unsteady?  1   Are you worried about falling 1   Is TUG test greater than 12 seconds? 0   Is the gait abnormal? 0   Number of falls in past 12 months 2           Pt currently taking Anticoagulant therapy? no    Pt currently taking Antiplatelet therapy ? ASA 81 mg once a day      Coordination of Care:  1. Have you been to the ER, urgent care clinic since your last visit? Hospitalized since your last visit? yes    2. Have you seen or consulted any other health care providers outside of the [address] [place] [address] since your last visit? Include any pap smears or colon screening.  no

## 2022-04-29 NOTE — PROGRESS NOTES
Jeannine Khan    Chief Complaint   Patient presents with    New Patient     Ref by Dr. Eleno Leslie for Palpitations       HPI    Jeannine Khan is a 61 y.o. AAF with no known coronary disease referred by her pulmonologist for palpitations. Her records were obtained and reviewed in detail. She has had 2 prior echoes which I obtained and reviewed. Not having any chest pain but almost daily can feel her heart racing skipping fluttering in these sensations last for about 5 minutes at a time. At first it was thought this was correlated to electrolyte disturbance she is also been on varying doses of steroids etc. which she does we will impact the frequency.     Past Medical History:   Diagnosis Date    Acquired cyst of kidney 01/16/2020    [facility]    Asthma     Bilateral great toe fractures 2014    Chronic kidney disease, stage 3b ([facility] [address] 01/2021    Dr Christin Pena, nephro    Chronic sinusitis     Dr. Amol Harrison in the past    Colon polyp 5/16    Dr Wilmar Roque    Depression 2019    Diabetes mellitus ([facility] [address]     DJD (degenerative joint disease) of cervical spine 2016    DJD (degenerative joint disease), lumbar 2013    MRI c spine w degen changes; Dr Leopoldo Baize    Dyslipidemia     calculated 10 year risk score was 2.0% (12/13)    Edema of both ankles 8/28/2018    Environmental and seasonal allergies 8/28/2018    Eustachian tube dysfunction     Fibrocystic breast     Dr Giancarlo Moise GERD (gastroesophageal reflux disease)     Hypertriglyceridemia 8/28/2018    Lateral epicondylitis of both elbows 2/6/2017    Macular degeneration of both eyes 08/28/2018    Dr Angela Blunt [facility]    Mild intermittent asthma without complication 15/15/8243    Dr. Marley Screen;  ratio 68%, FEV1 78% w 6% inc postbd, TLC 77, RV 56, DLCO 61%    Morbid obesity (HCC)     peak weight 196 lbs, bmi 35.8 from 10/13    Neuropathy 8/28/2018    Osteopenia     Dr. Seth Patino; DEXA t score -0.7 spine, -0.2 hip (8/14)    Sarcoidosis     Dr Marquis Christiansen Type 2 diabetes mellitus with hyperglycemia, with long-term current use of insulin ([facility] [address] 8/28/2018       Past Surgical History:   Procedure Laterality Date    COLONOSCOPY N/A 5/26/2016    Dr Jesus Toth hyperplastic    COLONOSCOPY N/A 10/19/2021    COLONOSCOPY with polypectomy performed by Camilo Salvador MD at [address] [facility] HX HEENT      nasal polypectomy Dr. Floridalma Dotson HX HEENT      tear duct surgery right Dr. Issa Khoury 2010; left Dr Mane Barton 2015    HX ORTHOPAEDIC      DEXA -0.7 spine, -0.2 hip 8/14    [facility] CARDIAC SURG PROCEDURE UNLIST  9/10, 8/13    thallium negative ef 72%; negative ef 70%    NH CHEST SURGERY PROCEDURE UNLISTED  7/12    US thyroid negative    [facility] CHEST SURGERY PROCEDURE UNLISTED  2012    pfts w mod restrictive defect     VASCULAR SURGERY PROCEDURE UNLIST  12/13    venous doppler negative       Current Outpatient Medications   Medication Sig Dispense Refill    esomeprazole (NEXIUM) 40 mg capsule Take 1 Capsule by mouth daily as needed for Gastroesophageal Reflux Disease (GERD). 90 Capsule 0    furosemide (LASIX) 20 mg tablet Take 1 Tablet by mouth daily. Indications: visible water retention, high blood pressure 60 Tablet 1    magnesium oxide (MAG-OX) 400 mg tablet Take 1 Tablet by mouth two (2) times daily (with meals). 120 Tablet 0    predniSONE (DELTASONE) 5 mg tablet 4 tablets po daily x week then 3 tablets po daily x 1 week then 2 tablets po daily x 1 week then 1 tablet po daily 70 Tablet 0    ondansetron hcl (Zofran) 4 mg tablet Take 1 Tablet by mouth every eight (8) hours as needed for Nausea or Nausea or Vomiting. 20 Tablet 0    butalbital-acetaminophen-caffeine (FIORICET, ESGIC) -40 mg per tablet Take 1 Tablet by mouth every six (6) hours as needed.  venlafaxine-SR (EFFEXOR-XR) 75 mg capsule Take 1 Capsule by mouth daily. 90 Capsule 1    topiramate (TOPAMAX) 50 mg tablet Take 1 Tablet by mouth two (2) times a day.  180 Tablet 1    SITagliptin (JANUVIA) 25 mg tablet Take 1 Tablet by mouth daily. For diabetes 90 Tablet 1    pravastatin (PRAVACHOL) 80 mg tablet Take 1 Tablet by mouth daily. 90 Tablet 1    montelukast (SINGULAIR) 10 mg tablet Take 1 Tablet by mouth daily. 90 Tablet 3    insulin glargine (LANTUS,BASAGLAR) 100 unit/mL (3 mL) inpn 25 Units by SubCUTAneous route nightly. 5 Pen 1    triamcinolone (Nasacort) 55 mcg nasal inhaler 2 Sprays by Both Nostrils route daily.  fluticasone propionate (Flovent Diskus) 250 mcg/actuation dsdv Take 1 Puff by inhalation two (2) times a day. Rinse gargle mouth thoroughly after each use. Disp: 3 inhalers 3 Each 3    fluticasone-umeclidin-vilanter (Trelegy Ellipta) 200-62.5-25 mcg dsdv Take 1 Puff by inhalation daily. Rinse and gargle after each use 3 Each 3    cyanocobalamin (VITAMIN B12) 100 mcg tablet Take 50 mcg by mouth daily.  albuterol (PROVENTIL HFA, VENTOLIN HFA, PROAIR HFA) 90 mcg/actuation inhaler Take 2 Puffs by inhalation every four (4) hours as needed for Shortness of Breath. Indications: asthma attack 1 Inhaler 5    Insulin Needles, Disposable, ([place] Pen Needle) 32 gauge x 5/32\" ndle Check blood sugars three times a day 100 Pen Needle 11    insulin lispro (HumaLOG KwikPen Insulin) 200 unit/mL (3 mL) inpn 3 times per day: 150-200 2u, 201-250 4u, 251-300 6u, 301-350 8u, 351-400 10u, > 400 12u. Indications: type 2 diabetes mellitus 5 Pen 5    therapeutic multivitamin-minerals (THERAGRAN-M) tablet Take 1 Tab by mouth daily.  triamcinolone acetonide (KENALOG) 0.1 % topical cream Apply  to affected area two (2) times daily as needed for Skin Irritation. 15 g 1    aspirin delayed-release 81 mg tablet Take 1 Tab by mouth daily. For heart health 30 Tab 11    glucose blood VI test strips (ACCU-CHEK POOJA) strip Pt to test 5 times daily.   Dx:E11.65 500 Strip 3       Allergies   Allergen Reactions    Other Food Itching     Pt reported food allergy to \"tropical fruits\", but could only specify pineapple and jenifer; pt was not able to recall any other fruits. Cracks in corner of mouth, irritation of tongue.  Pollen Extracts Runny Nose and Cough    Amoxicillin Hives, Shortness of Breath and Swelling    Crestor [Rosuvastatin] Myalgia    Ibuprofen Other (comments)     dont prescribe due to kidney function    Lipitor [Atorvastatin] Other (comments)     Muscle cramps and weakness      Jenifer Flavor Itching     Allergy to [place]. Cracks in corner of mouth, irritation of tongue.  Pcn [Penicillins] Angioedema    Pineapple Itching and Other (comments)     Cracks in corner of mouth, irritation of tongue. Social History     Socioeconomic History    Marital status: LEGALLY      Spouse name: Not on file    Number of children: 0    Years of education: 13    Highest education level: Not on file   Occupational History    Occupation: Unemployed   Tobacco Use    Smoking status: Never Smoker    Smokeless tobacco: Never Used   Vaping Use    Vaping Use: Never used   Substance and Sexual Activity    Alcohol use: Yes     Alcohol/week: 0.0 standard drinks     Comment: occasional wine    Drug use: Never    Sexual activity: Not Currently   Other Topics Concern     Service No    Blood Transfusions No    Caffeine Concern Yes     Comment: due to reflux    Occupational Exposure No    Hobby Hazards No    Sleep Concern Yes    Stress Concern Yes     Comment: wants a job    Weight Concern Yes    Special Diet No    Back Care No    Exercise Yes    Bike Helmet No    Seat Belt Yes    Self-Exams Yes   Social History Narrative    Patient lives with a friend, no pets.      Social Determinants of Health     Financial Resource Strain:     Difficulty of Paying Living Expenses: Not on file   Food Insecurity:     Worried About Running Out of Food in the Last Year: Not on file    Pee of Food in the Last Year: Not on file   Transportation Needs:     Lack of Transportation (Medical): Not on file    Lack of Transportation (Non-Medical): Not on file   Physical Activity:     Days of Exercise per Week: Not on file    Minutes of Exercise per Session: Not on file   Stress:     Feeling of Stress : Not on file   Social Connections:     Frequency of Communication with Friends and Family: Not on file    Frequency of Social Gatherings with Friends and Family: Not on file    Attends Bahai Services: Not on file    Active Member of [address] [place] [address] or Organizations: Not on file    Attends Club or Organization Meetings: Not on file    Marital Status: Not on file   Intimate Partner Violence:     Fear of Current or Ex-Partner: Not on file    Emotionally Abused: Not on file    Physically Abused: Not on file    Sexually Abused: Not on file   Housing Stability:     Unable to Pay for Housing in the Last Year: Not on file    Number of [place] in the Last Year: Not on file    Unstable Housing in the Last Year: Not on file        FH: neg premature ASCVD, no SCD    Review of Systems    14 pt Review of Systems is negative unless otherwise mentioned in the HPI. Wt Readings from Last 3 Encounters:   03/31/22 80.4 kg (177 lb 3.2 oz)   03/15/22 78.8 kg (173 lb 12.8 oz)   03/07/22 78.8 kg (173 lb 12.8 oz)     Temp Readings from Last 3 Encounters:   03/31/22 (!) 96.5 °F (35.8 °C) (Temporal)   03/15/22 97.8 °F (36.6 °C) (Temporal)   03/07/22 97.3 °F (36.3 °C) (Oral)     BP Readings from Last 3 Encounters:   03/31/22 129/76   03/07/22 114/74   02/03/22 (!) 148/90     Pulse Readings from Last 3 Encounters:   03/31/22 98   03/15/22 93   03/07/22 98       05/14/19    ECHO ADULT COMPLETE 05/15/2019 5/15/2019    Interpretation Summary  · Left Ventricle: Estimated left ventricular ejection fraction is 61 - 65%. No regional wall motion abnormality noted. Age-appropriate left ventricular diastolic function.     Signed by: Melly Zarate DO on 5/15/2019  7:15 AM      Physical Exam:    Visit Vitals  Ht 5' 2\" (1.575 m)   LMP 03/30/2013   BMI 32.41 kg/m²      Physical Exam  HENT:      Head: Normocephalic and atraumatic. Eyes:      Pupils: Pupils are equal, round, and reactive to light. Cardiovascular:      Rate and Rhythm: Normal rate and regular rhythm. Heart sounds: Normal heart sounds. No murmur heard. No friction rub. No gallop. Pulmonary:      Effort: Pulmonary effort is normal. No respiratory distress. Breath sounds: Normal breath sounds. No wheezing or rales. Chest:      Chest wall: No tenderness. Abdominal:      General: Bowel sounds are normal.      Palpations: Abdomen is soft. Musculoskeletal:         General: No tenderness. Skin:     General: Skin is warm and dry. Neurological:      Mental Status: She is alert and oriented to person, place, and time. EKG  Sinus tachycardia   Nonspecific T wave abnormality   Abnormal ECG   When compared with ECG of 16-AUG-2019 19:41,   Vent. rate has increased BY  40 BPM   Nonspecific T wave abnormality now evident in Anterolateral leads   Confirmed by Yumiko Xiong ([id number]) on 1/29/2022 11:19:24 PMs    Lab Results   Component Value Date/Time    Sodium 142 03/07/2022 10:30 AM    Potassium 4.2 03/07/2022 10:30 AM    Chloride 112 (H) 03/07/2022 10:30 AM    CO2 25 03/07/2022 10:30 AM    Anion gap 5 03/07/2022 10:30 AM    Glucose 137 (H) 03/07/2022 10:30 AM    BUN 28 (H) 03/07/2022 10:30 AM    Creatinine 2.00 (H) 03/07/2022 10:30 AM    BUN/Creatinine ratio 14 03/07/2022 10:30 AM    GFR est AA 31 (L) 03/07/2022 10:30 AM    GFR est non-AA 26 (L) 03/07/2022 10:30 AM    Calcium 9.0 03/07/2022 10:30 AM    Bilirubin, total 0.3 01/29/2022 03:55 AM    Alk.  phosphatase 52 01/29/2022 03:55 AM    Protein, total 6.2 (L) 01/29/2022 03:55 AM    Albumin 3.2 (L) 03/07/2022 10:30 AM    Globulin 3.3 01/29/2022 03:55 AM    A-G Ratio 0.9 01/29/2022 03:55 AM    ALT (SGPT) 25 01/29/2022 03:55 AM    AST (SGOT) 21 01/29/2022 03:55 AM     Lab Results   Component Value Date/Time    WBC 5.3 03/07/2022 10:30 AM    Hemoglobin, POC 13.6 05/26/2016 10:49 AM    HGB 10.9 (L) 03/07/2022 10:30 AM    Hematocrit, POC 40 05/26/2016 10:49 AM    HCT 35.5 03/07/2022 10:30 AM    PLATELET 207 35/15/5798 10:30 AM    MCV 96.5 03/07/2022 10:30 AM     Lab Results   Component Value Date/Time    TSH 2.33 08/27/2018 09:30 AM     Magnesium   Date Value Ref Range Status   03/07/2022 1.8 1.6 - 2.6 mg/dL Final   02/01/2022 1.7 1.6 - 2.6 mg/dL Final   01/31/2022 1.4 (L) 1.6 - 2.6 mg/dL Final   01/29/2022 1.4 (L) 1.6 - 2.6 mg/dL Final   08/27/2018 1.9 1.6 - 2.6 mg/dL Final     Lab Results   Component Value Date/Time    Hemoglobin A1c 7.3 (H) 09/08/2021 11:05 AM    Hemoglobin A1c (POC) 7.1 01/03/2022 11:57 AM    Hemoglobin A1c, External 6.7 09/28/2016 12:00 AM       Impression and Plan:  Stu Pereira is a 61 y.o. with:    Palpitations  Sarcoidosis  Normal Echos, last 2019, no pulm HTN  DM2, known  HyperCa and renal dysfunction, ([place])  Asthma ([facility])    Repeat labs WNL, incl TSH  Echo WNL  7 day MCT, should r/o significant arrhyhtmia  RTC after testing ~ 2 months, call sooner if needed    Theres no evidence thus far of cardiac sarcoid or significant pulm HTN but pts with cardiac sarcoid can have very significant conduction system problems. She's having almost daily symptoms despite electrolyte correction. Will obtain a monitor to see what we're dealing with, further recs to follow    Thank you for allowing me to participate in the care of your patient, please do not hesitate to call with questions or concerns.     [address],    Ericka Sumter, DO

## 2022-05-04 ENCOUNTER — HOSPITAL ENCOUNTER (OUTPATIENT)
Dept: LAB | Age: 57
Discharge: HOME | End: 2022-05-04
Payer: MEDICARE

## 2022-05-04 LAB
25(OH)D3 SERPL-MCNC: 38.5 NG/ML (ref 30–100)
ALBUMIN SERPL-MCNC: 3.5 G/DL (ref 3.4–5)
ANION GAP SERPL CALC-SCNC: 5 MMOL/L (ref 3–18)
BUN SERPL-MCNC: 36 MG/DL (ref 7–18)
BUN/CREAT SERPL: 17 (ref 12–20)
CALCIUM SERPL-MCNC: 9.2 MG/DL (ref 8.5–10.1)
CALCIUM SERPL-MCNC: 9.2 MG/DL (ref 8.5–10.1)
CHLORIDE SERPL-SCNC: 109 MMOL/L (ref 100–111)
CO2 SERPL-SCNC: 27 MMOL/L (ref 21–32)
CREAT SERPL-MCNC: 2.11 MG/DL (ref 0.6–1.3)
CREAT UR-MCNC: 271 MG/DL (ref 30–125)
ERYTHROCYTE [DISTWIDTH] IN BLOOD BY AUTOMATED COUNT: 13.2 % (ref 11.6–14.5)
GLUCOSE SERPL-MCNC: 132 MG/DL (ref 74–99)
HCT VFR BLD AUTO: 38.8 % (ref 35–45)
HGB BLD-MCNC: 12 G/DL (ref 12–16)
MCH RBC QN AUTO: 29.9 PG (ref 24–34)
MCHC RBC AUTO-ENTMCNC: 30.9 G/DL (ref 31–37)
MCV RBC AUTO: 96.5 FL (ref 78–100)
NRBC # BLD: 0 K/UL (ref 0–0.01)
NRBC BLD-RTO: 0 PER 100 WBC
PHOSPHATE SERPL-MCNC: 4 MG/DL (ref 2.5–4.9)
PLATELET # BLD AUTO: 184 K/UL (ref 135–420)
PMV BLD AUTO: 11.7 FL (ref 9.2–11.8)
POTASSIUM SERPL-SCNC: 4 MMOL/L (ref 3.5–5.5)
PROT UR-MCNC: 16 MG/DL
PROT/CREAT UR-RTO: 0.1
PTH-INTACT SERPL-MCNC: 56 PG/ML (ref 18.4–88)
RBC # BLD AUTO: 4.02 M/UL (ref 4.2–5.3)
SODIUM SERPL-SCNC: 141 MMOL/L (ref 136–145)
WBC # BLD AUTO: 6.1 K/UL (ref 4.6–13.2)

## 2022-05-04 PROCEDURE — 80069 RENAL FUNCTION PANEL: CPT

## 2022-05-04 PROCEDURE — 36415 COLL VENOUS BLD VENIPUNCTURE: CPT

## 2022-05-04 PROCEDURE — 85027 COMPLETE CBC AUTOMATED: CPT

## 2022-05-04 PROCEDURE — 82306 VITAMIN D 25 HYDROXY: CPT

## 2022-05-04 PROCEDURE — 84156 ASSAY OF PROTEIN URINE: CPT

## 2022-05-04 PROCEDURE — 83970 ASSAY OF PARATHORMONE: CPT

## 2022-05-09 DIAGNOSIS — D86.9 SARCOIDOSIS: Primary | ICD-10-CM

## 2022-05-13 ENCOUNTER — DOCUMENTATION ONLY (OUTPATIENT)
Dept: PULMONOLOGY | Age: 57
End: 2022-05-13

## 2022-05-19 RX ORDER — MUPIROCIN 20 MG/G
OINTMENT TOPICAL DAILY
COMMUNITY
Start: 2022-04-20 | End: 2022-05-25

## 2022-05-19 RX ORDER — BUDESONIDE 0.25 MG/2ML
INHALANT ORAL
COMMUNITY
Start: 2022-04-20 | End: 2022-05-25

## 2022-05-19 NOTE — PATIENT INSTRUCTIONS
Prednisone 8 mg every morning with breakfast     continue Advair 1 inhalation twice daily approximately 12 hours apart and remember to exhale fully before inhaling and to wash mouth with water and spit it out after inhaling    Albuterol 2 inhalations every 4 hours as needed if you require albuterol too often to control respiratory symptoms call the office for severe symptoms go to the emergency room    Zithromax 2 tablets now then 1 tablet every day for 4 more days    Call if your cough does not improve and always call for symptoms such as worsening shortness [Currently In Menopause] : currently in menopause [Menopause Age: ____] : patient was [unfilled] years old at menopause [___] : Full Term: [unfilled]

## 2022-05-23 ENCOUNTER — HOSPITAL ENCOUNTER (OUTPATIENT)
Dept: RESPIRATORY THERAPY | Age: 57
Discharge: HOME | End: 2022-05-23
Attending: INTERNAL MEDICINE
Payer: MEDICARE

## 2022-05-23 DIAGNOSIS — D86.9 SARCOIDOSIS: ICD-10-CM

## 2022-05-23 PROCEDURE — 94060 EVALUATION OF WHEEZING: CPT

## 2022-05-23 PROCEDURE — 94729 DIFFUSING CAPACITY: CPT

## 2022-05-23 PROCEDURE — 94726 PLETHYSMOGRAPHY LUNG VOLUMES: CPT

## 2022-05-25 ENCOUNTER — OFFICE VISIT (OUTPATIENT)
Dept: PULMONOLOGY | Age: 57
End: 2022-05-25
Payer: MEDICARE

## 2022-05-25 ENCOUNTER — TELEPHONE (OUTPATIENT)
Dept: CARDIOLOGY CLINIC | Age: 57
End: 2022-05-25

## 2022-05-25 VITALS
HEIGHT: 62 IN | BODY MASS INDEX: 33.42 KG/M2 | RESPIRATION RATE: 16 BRPM | TEMPERATURE: 97.8 F | HEART RATE: 84 BPM | SYSTOLIC BLOOD PRESSURE: 149 MMHG | WEIGHT: 181.6 LBS | OXYGEN SATURATION: 97 % | DIASTOLIC BLOOD PRESSURE: 84 MMHG

## 2022-05-25 DIAGNOSIS — R06.09 DYSPNEA ON EXERTION: ICD-10-CM

## 2022-05-25 DIAGNOSIS — J84.9 ILD (INTERSTITIAL LUNG DISEASE) (HCC): ICD-10-CM

## 2022-05-25 DIAGNOSIS — Z79.52 CURRENT CHRONIC USE OF SYSTEMIC STEROIDS: ICD-10-CM

## 2022-05-25 DIAGNOSIS — J45.50 POORLY CONTROLLED SEVERE PERSISTENT ASTHMA WITHOUT COMPLICATION: Primary | ICD-10-CM

## 2022-05-25 DIAGNOSIS — D86.89 SARCOIDOSIS OF OTHER SITES: ICD-10-CM

## 2022-05-25 PROCEDURE — G9899 SCRN MAM PERF RSLTS DOC: HCPCS | Performed by: INTERNAL MEDICINE

## 2022-05-25 PROCEDURE — G8753 SYS BP > OR = 140: HCPCS | Performed by: INTERNAL MEDICINE

## 2022-05-25 PROCEDURE — G8427 DOCREV CUR MEDS BY ELIG CLIN: HCPCS | Performed by: INTERNAL MEDICINE

## 2022-05-25 PROCEDURE — 99214 OFFICE O/P EST MOD 30 MIN: CPT | Performed by: INTERNAL MEDICINE

## 2022-05-25 PROCEDURE — G9717 DOC PT DX DEP/BP F/U NT REQ: HCPCS | Performed by: INTERNAL MEDICINE

## 2022-05-25 PROCEDURE — 3017F COLORECTAL CA SCREEN DOC REV: CPT | Performed by: INTERNAL MEDICINE

## 2022-05-25 PROCEDURE — G8417 CALC BMI ABV UP PARAM F/U: HCPCS | Performed by: INTERNAL MEDICINE

## 2022-05-25 PROCEDURE — G8754 DIAS BP LESS 90: HCPCS | Performed by: INTERNAL MEDICINE

## 2022-05-25 RX ORDER — EPINEPHRINE 0.3 MG/.3ML
0.3 INJECTION SUBCUTANEOUS
Qty: 2 EACH | Refills: 3 | Status: SHIPPED | OUTPATIENT
Start: 2022-05-25 | End: 2022-05-25

## 2022-05-25 NOTE — TELEPHONE ENCOUNTER
Dr. Vee Gilliland reviewed end of service report on event monitor    Verbal order and read back per Temo Brady, DO  Patient did have some events   Could be SVT vs AT     Will review further at appointment on 6-15-22    Patient aware , had some issues with the adhesive tape burning skin

## 2022-05-25 NOTE — Clinical Note
You can send the fasenra order -- note is done    Julia Garcia MD/MPH     Pulmonary, [address] [facility]

## 2022-05-25 NOTE — PROGRESS NOTES
[address] [facility]  Pulmonary, Critical Care, and Sleep Medicine    Pulmonary Office F/U  Name: Jeannine Khan 61 y.o. female  MRN: [id number]  : 1965  Service Date: 22  Chief Complaint:   Chief Complaint   Patient presents with    Sarcoidosis     follow up from 3/7/2022    Asthma    Other     chronic rhinitis, current chronic use of systemic steroids    Results     PFT 2022, labs 3/7/20415         History of Present Illness:  Lisa Conrad is a 61 y.o. female, who presents to Pulmonary clinic for follow-up of sarcoidosis. Patient was last seen in our clinic on 3/7/22. Still on trelegy and flovent  Still having fluttering in the chest --- pt saw Cardiology, plans to do a monitor however skin developed hives to adhesive  Using PRN albuterol infrequently  Nocturia   Nocturnal awakenings: twice a week  Seeing ENT - noted dry nose.       Past Medical History:   Diagnosis Date    Acquired cyst of kidney 2020    emerita    Asthma     Bilateral great toe fractures     Chronic kidney disease, stage 3b ([address] 2021    Dr Christin Pena, nephro    Chronic sinusitis     Dr. Amol Harrison in the past    Colon polyp     Dr Urban Line    Depression     Diabetes mellitus ([address]     DJD (degenerative joint disease) of cervical spine     DJD (degenerative joint disease), lumbar     MRI c spine w degen changes; Dr Leopoldo Baize    Dyslipidemia     calculated 10 year risk score was 2.0% ()    Edema of both ankles 2018    Environmental and seasonal allergies 2018    Eustachian tube dysfunction     Fibrocystic breast     Dr Giancarlo Moise GERD (gastroesophageal reflux disease)     Hypertriglyceridemia 2018    Lateral epicondylitis of both elbows 2017    Macular degeneration of both eyes 2018    Dr Angela Rivas, [facility]    Mild intermittent asthma without complication  Dr. Vivien Han;  ratio 68%, FEV1 78% w 6% inc postbd, TLC 77, RV 56, DLCO 61%    Morbid obesity (HCC)     peak weight 196 lbs, bmi 35.8 from 10/13    Neuropathy 8/28/2018    Osteopenia     Dr. Socorro Lantigua; DEXA t score -0.7 spine, -0.2 hip (8/14)    Sarcoidosis     Dr Kizzy Mcdermott Type 2 diabetes mellitus with hyperglycemia, with long-term current use of insulin ([facility] 8/28/2018     Past Surgical History:   Procedure Laterality Date    COLONOSCOPY N/A 5/26/2016    Dr Johnie Delong hyperplastic    COLONOSCOPY N/A 10/19/2021    COLONOSCOPY with polypectomy performed by Kiana Miller MD at [address] [place]      Dr. Mcgregor Peak HX HEENT      nasal polypectomy Dr. Gorge Malave HX HEENT      tear duct surgery right Dr. Verner Guarneri 2010; left Dr Fabio Whitfield 2015    HX ORTHOPAEDIC      DEXA -0.7 spine, -0.2 hip 8/14    [place] CARDIAC SURG PROCEDURE UNLIST  9/10, 8/13    thallium negative ef 72%; negative ef 70%    [place] CHEST SURGERY PROCEDURE UNLISTED  7/12    US thyroid negative    [place] CHEST SURGERY PROCEDURE UNLISTED  2012    pfts w mod restrictive defect     VASCULAR SURGERY PROCEDURE UNLIST  12/13    venous doppler negative     Family History   Problem Relation Age of Onset    Cancer Mother     Hypertension Mother     Cancer Father     Diabetes Father     Hypertension Father     Stroke Father     Diabetes Sister     Hypertension Sister     Heart Disease Sister     Colon Cancer Sister 52    Cancer Sister     Hypertension Brother     Cancer Maternal Aunt      Social History     Socioeconomic History    Marital status: LEGALLY      Spouse name: Not on file    Number of children: 0    Years of education: 13    Highest education level: Not on file   Occupational History    Occupation: Unemployed   Tobacco Use    Smoking status: Never Smoker    Smokeless tobacco: Never Used   Vaping Use    Vaping Use: Never used   Substance and Sexual Activity    Alcohol use:  Yes     Alcohol/week: 0.0 standard drinks     Comment: occasional wine    Drug use: Never    Sexual activity: Not Currently   Other Topics Concern     Service No    Blood Transfusions No    Caffeine Concern Yes     Comment: due to reflux    Occupational Exposure No    Hobby Hazards No    Sleep Concern Yes    Stress Concern Yes     Comment: wants a job    Weight Concern Yes    Special Diet No    Back Care No    Exercise Yes    Bike Helmet No    Seat Belt Yes    Self-Exams Yes   Social History Narrative    Patient lives with a friend, no pets. Social Determinants of Health     Financial Resource Strain:     Difficulty of Paying Living Expenses: Not on file   Food Insecurity:     Worried About Running Out of Food in the Last Year: Not on file    Pee of Food in the Last Year: Not on file   Transportation Needs:     Lack of Transportation (Medical): Not on file    Lack of Transportation (Non-Medical):  Not on file   Physical Activity:     Days of Exercise per Week: Not on file    Minutes of Exercise per Session: Not on file   Stress:     Feeling of Stress : Not on file   Social Connections:     Frequency of Communication with Friends and Family: Not on file    Frequency of Social Gatherings with Friends and Family: Not on file    Attends Yazdanism Services: Not on file    Active Member of [address] [place] [address] [employer] or Organizations: Not on file    Attends Club or Organization Meetings: Not on file    Marital Status: Not on file   Intimate Partner Violence:     Fear of Current or Ex-Partner: Not on file    Emotionally Abused: Not on file    Physically Abused: Not on file    Sexually Abused: Not on file   Housing Stability:     Unable to Pay for Housing in the Last Year: Not on file    Number of [place] in the Last Year: Not on file    Unstable Housing in the Last Year: Not on file     Allergies   Allergen Reactions    Other Food Itching     Pt reported food allergy to \"tropical fruits\", but could only specify pineapple and karthikeyan; pt was not able to recall any other fruits. Cracks in corner of mouth, irritation of tongue.  Pollen Extracts Runny Nose and Cough    Amoxicillin Hives, Shortness of Breath and Swelling    Crestor [Rosuvastatin] Myalgia    Ibuprofen Other (comments)     dont prescribe due to kidney function    Lipitor [Atorvastatin] Other (comments)     Muscle cramps and weakness      [place] Flavor Itching     Allergy to Karthikeyan. Cracks in corner of mouth, irritation of tongue.  Pcn [Penicillins] Angioedema    Pineapple Itching and Other (comments)     Cracks in corner of mouth, irritation of tongue. Prior to Admission medications    Medication Sig Start Date End Date Taking? Authorizing Provider   mupirocin (BACTROBAN) 2 % ointment Apply  to affected area daily. 4/20/22 5/25/22 Yes Provider, Historical   esomeprazole (NEXIUM) 40 mg capsule Take 1 Capsule by mouth daily as needed for Gastroesophageal Reflux Disease (GERD). 3/31/22  Yes Areli Khan DNP   furosemide (LASIX) 20 mg tablet Take 1 Tablet by mouth daily. Indications: visible water retention, high blood pressure 3/31/22  Yes [facility] [place], [place]   magnesium oxide (MAG-OX) 400 mg tablet Take 1 Tablet by mouth two (2) times daily (with meals). 2/3/22  Yes Carlee Khan DNP   predniSONE (DELTASONE) 5 mg tablet 4 tablets po daily x week then 3 tablets po daily x 1 week then 2 tablets po daily x 1 week then 1 tablet po daily 2/2/22  Yes Dianne Han MD   ondansetron hcl (Zofran) 4 mg tablet Take 1 Tablet by mouth every eight (8) hours as needed for Nausea or Nausea or Vomiting. 2/2/22  Yes Dianne Han MD   butalbital-acetaminophen-caffeine (FIORICET, ESGIC) -40 mg per tablet Take 1 Tablet by mouth every six (6) hours as needed. 12/22/21  Yes Provider, Historical   venlafaxine-SR (EFFEXOR-XR) 75 mg capsule Take 1 Capsule by mouth daily.  1/3/22  Yes Areli Khan DNP   topiramate (TOPAMAX) 50 mg tablet Take 1 Tablet by mouth two (2) times a day. 1/3/22  Yes Carlee Khan DNP   SITagliptin (JANUVIA) 25 mg tablet Take 1 Tablet by mouth daily. For diabetes 1/3/22  Yes Carlee Khan [place]   pravastatin (PRAVACHOL) 80 mg tablet Take 1 Tablet by mouth daily. 1/3/22  Yes Carlee Khan DNP   insulin glargine (LANTUS,BASAGLAR) 100 unit/mL (3 mL) inpn 25 Units by SubCUTAneous route nightly. 1/3/22  Yes Carlee Khan DNP   fluticasone propionate (Flovent Diskus) 250 mcg/actuation dsdv Take 1 Puff by inhalation two (2) times a day. Rinse gargle mouth thoroughly after each use. Disp: 3 inhalers 9/15/21  Yes Kanika Colindres MD   fluticasone-umeclidin-vilanter (Trelegy Ellipta) 280-59.2-17 mcg dsdv Take 1 Puff by inhalation daily. Rinse and gargle after each use 9/15/21  Yes Kanika Colindres MD   cyanocobalamin (VITAMIN B12) 100 mcg tablet Take 50 mcg by mouth daily. Yes Provider, Historical   albuterol (PROVENTIL HFA, VENTOLIN HFA, PROAIR HFA) 90 mcg/actuation inhaler Take 2 Puffs by inhalation every four (4) hours as needed for Shortness of Breath. Indications: asthma attack 6/16/21  Yes [place], [place]   Insulin Needles, Disposable, ([place] Pen Needle) 32 gauge x 5/32\" ndle Check blood sugars three times a day 6/16/21  Yes [facility] [place]  [place]   insulin lispro (HumaLOG KwikPen Insulin) 200 unit/mL (3 mL) inpn 3 times per day: 150-200 2u, 201-250 4u, 251-300 6u, 301-350 8u, 351-400 10u, > 400 12u. Indications: type 2 diabetes mellitus 6/16/21  Yes [place], [place]   therapeutic multivitamin-minerals [facility]) tablet Take 1 Tab by mouth daily. 3/7/21  Yes Provider, Historical   triamcinolone acetonide (KENALOG) 0.1 % topical cream Apply  to affected area two (2) times daily as needed for Skin Irritation. 3/24/21  Yes Magda Khan DNP   aspirin delayed-release 81 mg tablet Take 1 Tab by mouth daily.  For heart health 9/3/19  Yes [place] [place]   glucose blood VI test strips (ACCU-CHEK POOJA) strip Pt to test 5 times daily. Dx:E11.65 11/17/17  Yes Lyndia Dandy, MD   budesonide (PULMICORT) 0.25 mg/2 mL nbsp  4/20/22 5/25/22  Provider, Historical   montelukast (SINGULAIR) 10 mg tablet Take 1 Tablet by mouth daily. Patient not taking: Reported on 5/25/2022 1/3/22   Gerald Stevens DNP   triamcinolone (Nasacort) 55 mcg nasal inhaler 2 Sprays by Both Nostrils route daily. Patient not taking: Reported on 5/25/2022 5/25/22 5/25/22  Provider, Historical     Immunization History   Administered Date(s) Administered    Influenza Vaccine 02/15/2021, 10/07/2021    Influenza Vaccine (Quad) PF (>6 Mo Flulaval, Fluarix, and >3 Yrs Afluria, Fluzone 94521) 09/28/2017, 11/26/2018    Pneumococcal Polysaccharide (PPSV-23) 09/28/2017    Tdap 04/24/2013       Review of Systems:  A complete review of systems was performed as stated in the HPI, all others are negative. Objective:    Physical Exam:  BP (!) 149/84 (BP 1 Location: Left upper arm, BP Patient Position: Sitting, BP Cuff Size: Adult long)   Pulse 84   Temp 97.8 °F (36.6 °C) (Temporal)   Resp 16   Ht 5' 2\" (1.575 m)   Wt 82.4 kg (181 lb 9.6 oz)   LMP 03/30/2013   SpO2 97%   BMI 33.22 kg/m²   Vitals were personally reviewed  Gen: no acute distress, pleasant and cooperative, sitting up in chair, ambulates without difficulty  HEENT: normocephalic/atraumatic, no ocular drainage, EOMI, no scleral icterus, nasal bridge midline, unable to assess nasal and oral cavities due to patient wearing mask in the setting of COVID-19 pandemic  Neck: supple, trachea midline, no JVD, no cervical and supraclavicular adenopathy  CVS: regular rate rhythm, S1/S2, no murmurs/rubs/gallops  Lungs: good air entry B/L, CTA BL, no wheezes/rales/rhonchi  Psych: normal memory, thought content, and processing    Labs:   I have reviewed the patient's available labs  Lab Results   Component Value Date/Time    WBC 6.1 05/04/2022 10:36 AM    Hemoglobin, POC 13.6 05/26/2016 10:49 AM    HGB 12.0 05/04/2022 10:36 AM    Hematocrit, POC 40 05/26/2016 10:49 AM    HCT 38.8 05/04/2022 10:36 AM    PLATELET 513 57/96/1897 10:36 AM    MCV 96.5 05/04/2022 10:36 AM     Lab Results   Component Value Date/Time    Sodium 141 05/04/2022 10:36 AM    Potassium 4.0 05/04/2022 10:36 AM    Chloride 109 05/04/2022 10:36 AM    CO2 27 05/04/2022 10:36 AM    Anion gap 5 05/04/2022 10:36 AM    Glucose 132 (H) 05/04/2022 10:36 AM    BUN 36 (H) 05/04/2022 10:36 AM    Creatinine 2.11 (H) 05/04/2022 10:36 AM    BUN/Creatinine ratio 17 05/04/2022 10:36 AM    GFR est AA 29 (L) 05/04/2022 10:36 AM    GFR est non-AA 24 (L) 05/04/2022 10:36 AM    Calcium 9.2 05/04/2022 10:49 AM    Bilirubin, total 0.3 01/29/2022 03:55 AM    Alk. phosphatase 52 01/29/2022 03:55 AM    Protein, total 6.2 (L) 01/29/2022 03:55 AM    Albumin 3.5 05/04/2022 10:36 AM    Globulin 3.3 01/29/2022 03:55 AM    A-G Ratio 0.9 01/29/2022 03:55 AM    ALT (SGPT) 25 01/29/2022 03:55 AM    AST (SGOT) 21 01/29/2022 03:55 AM   Last ACE level was 52 on 6/30/2021      Imaging:  I have personally reviewed patient's imaging as follows--No new imaging  **CT chest abdomen pelvis without contrast from 1/30/2022 shows scattered linear fibrotic opacities apically extending from the hilum, otherwise no infiltrates or effusions. Patient also has some scattered bilateral reticular changes and fibrotic changes in the bases. No nodules or active inflammation seen. Official report per radiology:  CT Results (most recent):  Results from Hospital Encounter encounter on 01/28/22    CT CHEST ABD PELV WO CONT    Narrative  CT Chest, Abdomen, And Pelvis Without Contrast    TECHNIQUE: 5 mm axial images from the thoracic inlet to issue tuberosities were  obtained without intravenous contrast.  Oral contrast was not administered. Coronal and sagittal reformations.     All CT scans are performed using dose optimization techniques as appropriate to  the performed exam including the following: Automated exposure control,  adjustment of mA and/or kV according to patient size, and use of iterative  reconstructive technique. HISTORY: Hypercalcemia. Question malignancy. History of sarcoidosis. FINDINGS:  CHEST:  Visualized thyroid gland without definitive nodule. No axillary adenopathy. Calcified mediastinal and bilateral hilar nodes similar to previous  atherosclerotic calcification aorta and coronary arteries. Thoracic aorta is not  aneurysmal. No pericardial effusion. Some progressive perihilar and upper lobe atelectasis right greater than left. Trace effusion on the right. No discrete lung nodule or mass. No definite new  consolidations    Nonspecific fatty nodules of the right and left breast which may correspond with  lateral cyst on mammogram 3/2021. Continued mammographic follow-up suggested. ABDOMEN/PELVIS:  Noncontrast liver, spleen and pancreas without focal abnormality. Nondilated  gallbladder. No biliary duct dilatation. No adrenal nodule or mass. No contour  deforming mass of the right or left kidney. No hydronephrosis. Tiny  nonobstructing stone lower pole left kidney measuring a few millimeters. No dilated stomach, small bowel, or colon. Appendix is not inflamed. Sigmoid  diverticulosis. No gross adenopathy. No ascites. Uterus and adnexa are unremarkable. Underdistended bladder. OSSEOUS STRUCTURES:  No focal suspicious bone lesions. Impression  There is no CT finding for occult malignancy or metastatic disease on this  noncontrast examination. Calcified mediastinal and hilar lymph nodes compatible with history of  sarcoidosis. Relatively perihilar and septal thickening and of atelectasis/scarring which may  be reflective of pulmonary sarcoid. PFTs: I have personally reviewed PFTs from 5/24/2022 as follows: Spirometry is normal, no BD response. Lung volumes show a mild restriction.   Diffusion capacity is moderately reduced    TTE:  I have reviewed the patient's TTE results  Results for orders placed or performed during the hospital encounter of 14   2D ECHO COMPLETE ADULT (TTE)     Status: None    [facility]  [facility], [address]  [phone]    Transthoracic Echocardiogram    Patient: Jordan Christie  MRN: [id number]  ACCT #: [de-identified]  : [id number]  Age: 51 years  Gender: Female  Height: 61 in  Weight: 192 lb  BSA: 1.86 m squared  Study date: 2014  BP: 118 / 70 mmHg  Status: Routine  Location: [facility] ACC #: [id number]    Allergies: NO KNOWN ALLERGIES    Referring:  Nadia Lenz  [facility]  Interpreting Physician:  Saurabh Villasenor DO  Technologist:  Jocelynn Villalobos. Gopi Avalos  Referring:  Edilberto Juarez. Eleazar Meyer MD    SUMMARY:  Left ventricle: Size was normal. Systolic function was normal by EF  (biplane method of disks). Ejection fraction was estimated in the range of  55 % to 60 %. There were no regional wall motion abnormalities. Wall  thickness was normal. Doppler parameters were consistent with abnormal  left ventricular relaxation (grade 1 diastolic dysfunction). Right ventricle: Systolic pressure was at the upper limits of normal.  Estimated peak pressure was 30 mmHg. Pulmonary arteries: Systolic pressure was at the upper limits of normal.    INDICATIONS: Assess left ventricular function. Lower leg Edema. HISTORY: Prior history: Sarcoidosis/Asthma/GERD    PROCEDURE: This was a routine study. The study included complete 2D  imaging, M-mode, complete spectral Doppler, and color Doppler. The heart  rate was 93 bpm, at the start of the study. Systolic blood pressure was  118 mmHg, at the start of the study. Diastolic blood pressure was 70 mmHg,  at the start of the study. Image quality was adequate. LEFT VENTRICLE: Size was normal. Systolic function was normal by EF  (biplane method of disks).  Ejection fraction was estimated in the range of  55 % to 60 %. There were no regional wall motion abnormalities. Wall  thickness was normal. DOPPLER: Doppler parameters were consistent with  abnormal left ventricular relaxation (grade 1 diastolic dysfunction). RIGHT VENTRICLE: The size was normal. Systolic function was normal.  DOPPLER: Systolic pressure was at the upper limits of normal. Estimated  peak pressure was 30 mmHg. LEFT ATRIUM: Size was normal. LA volume index was 25 ml/m squared. RIGHT ATRIUM: Size was normal.    MITRAL VALVE: Normal valve structure. DOPPLER: There was no evidence for  stenosis. There was no significant regurgitation. AORTIC VALVE: The valve was trileaflet. Leaflets exhibited normal  thickness and normal cuspal separation. DOPPLER: There was no stenosis. There was no regurgitation. TRICUSPID VALVE: Normal valve structure. DOPPLER: There was no evidence  for tricuspid stenosis. There was trivial regurgitation. Tricuspid  regurgitation peak velocity: 225 m/sec. Right ventricular-right atrial  (RV-RA) gradient: 25 mmHg. PULMONIC VALVE: Leaflets exhibited normal thickness, no calcification, and  normal cuspal separation. DOPPLER: There was no regurgitation. AORTA: The root exhibited normal size. PULMONARY ARTERY: The size was normal. DOPPLER: Systolic pressure was at  the upper limits of normal.    SYSTEMIC VEINS: IVC: The inferior vena cava was normal in size. PERICARDIUM: There was no pericardial effusion.     MEASUREMENT TABLES    2D measurements  Left ventricle   (Reference normals)  LVID ed, base, PLAX   49 mm   (36-52)  LVID es, base, PLAX   31 mm   (23-39)  FS, base, PLAX   36.73 %   (18-42)  LVPW thickness ed   8 mm   (6-11)  Ratio, IVS/LVPW   1    (<1.3)  EF   55 %   (--)  Ventricular septum   (Reference normals)  IVS thickness ed   8 mm   (6-11)  Aorta   (Reference normals)  Root diam   31 mm   (--)  Left atrium   (Reference normals)  AP dim   31 mm   (--)  LA/Ao root ratio   1 (--)  Right ventricle   (Reference normals)  RVID ed, PLAX   34 mm   (19-38)    Doppler measurements  Left ventricle   (Reference normals)  Ea, lat pat, tiss DP   8 cm/s   (--)  E/Ea, lat pat, tiss DP   6.63    (--)  Ea, med pat, tiss DP   7 cm/s   (--)  E/Ea, med pat, tiss DP   7.57    (--)  Aortic valve   (Reference normals)  Peak tammy   119 cm/s   (--)  Mean gradient   3 mmHg   (--)  Valve area, cont   1.8 cm squared   (--)  Mitral valve   (Reference normals)  Peak E tammy   53 cm/s   (--)  Peak A tammy   71 cm/s   (--)  Peak E/A ratio   0.75    (--)    Prepared and E-signed by    Ayse Tirado DO  Signed 11-Feb-2014 17:56:17       05/14/19   ECHO ADULT COMPLETE 05/15/2019 5/15/2019    Narrative · Left Ventricle: Estimated left ventricular ejection fraction is 61 -   65%. No regional wall motion abnormality noted. Age-appropriate left   ventricular diastolic function. Signed by: Jemal Marino DO         Assessment and Plan:  61 y.o. female with:    Impression:  1. Severe persistent asthma, poorly controlled:  Has significant peripheral eosinophilia (600 from 1/29/2022), with recurrent exacerbations (at least 2) and pt on chronic prednisone  2. Sarcoidosis of the lung: Patient has interstitial opacities as well as multiple calcified lymph nodes. Patient has been on chronic prednisone therapy for over 10 years. Patient was on 6 mg before hospitalization in March 2021, then discharged at 20 mg and tapered off after last visit in September -- discontinued around Nov 2021. Patient admitted with hypercalcemia in late January 2022, placed back on prednisone. Patient down to 5 mg daily -- remaining there chronically per Nephrology. CT scan shows no evidence of active inflammation from sarcoidosis, patient has fibrotic changes which likely represents burned-out disease. Patient likely does have ongoing reactive airway disease from sarcoidosis  3.   Hypercalcemia: Occurred in Jan 2022, as high as 15.2, admitted to  [facility] from 1/28 through 2/2/2022. Patient was seen by nephrology, thought to be combination of factors including sarcoidosis, although vitamin D1, 25 was within normal limits (high side of normal), vitamin D supplementation and CKD with diuretic use. 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 Treated with IVF, calcitonin, and Zometa. Now remains on chronic prednisone therapy 5mg by Dr. Sim Fulton    4. Sarcoidosis of the nose: Patient reports she was diagnosed by biopsy about 10 years ago, follows with ENT, recent CT sinuses shows scattered sinus changes  5. ILD: Secondary to sarcoidosis as noted above  6. 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 Chronic allergic rhinosinusitis  8. Obesity: Body mass index is 33.22 kg/m². 9.  Palpitations:  Unclear etiology, did not improve with correction of calcium    Plan:  -Due to recurrent exacerbations in the setting of poorly controlled, severe persistent asthma and patient on maximal medical therapy -- STEROID DEPENDENT, I strongly advised patient to consider biologic therapy. Discussed patient's lab work showing significant eosinophilia (500 from 3/7/2022), I discussed the possibility of benralizumab subcu every other month. I reviewed the risks and benefits including the risks of anaphylaxis/delayed anaphylaxis, injection site reaction, as well as side effects of headache and pharyngitis. Patient was agreeable to initiating therapy if approved by their insurance. Forms filled out in clinic and given ancillary staff for prior authorization.  -Start benralizumab 30 mg subcu every 4 weeks for the first 3 doses followed by every 8 weeks thereafter.  -Continue chronic prednisone (5mg) per Nephrology.   Management of hypercalcemia and Vit D levels per Nephrology  -Continue dual ICS therapy due to severe asthma from sarcoidosis  -Continue Tremelodie Pandata 200 mcg 1 puff once daily. Counseled patient to rinse mouth thoroughly after each use. Sample given in clinic  -Continue Flovent discus 250 mcg 1 puff twice daily. Counseled patient to rinse mouth thoroughly after each use  -Due to chronic prednisone therapy, advised patient to follow-up with PCP with regards to DEXA scan--not needed until next year given that patient was given a dose of Zometa while hospitalized  -Management of sinusitis and nasal sarcoidosis per ENT  -Continue ongoing lab work and avoid nephrotoxic agents and management of anemia per nephrology  -Advised patient to have yearly ophthalmology screening for ocular sarcoidosis as ocular sarcoidosis is a preventable cause of blindness/visual impairment  -Advised to remain active. Offered pulmonary rehab, patient declined  -EpiPen refilled  -Management of palpitations per cardiology  -Immunizations reviewed, pneumococcal vaccinations are up-to-date  -Counseled patient regarding lifestyle precautions in COVID-19 pandemic including wearing mask in public and confined spaces, social/physical distancing, frequent hand hygiene, etc.  Hattie Hernandez pt to receive COVID-19 vaccination when possible since she is high risk on chronic immunosuppression and severe asthma      Follow-up and Dispositions    · Return in about 3 months (around 8/25/2022).   Routing History       Orders Placed This Encounter    EPINEPHrine (EPIPEN) 0.3 mg/0.3 mL injection       Nichole Ro MD/MPH     Pulmonary, Critical Care Medicine  [address] [place] [address] [facility]

## 2022-05-25 NOTE — PROGRESS NOTES
Frannie Lr presents today for   Chief Complaint   Patient presents with    Sarcoidosis     follow up from 3/7/2022    Asthma    Other     chronic rhinitis, current chronic use of systemic steroids    Results     PFT 5/23/2022, labs 3/7/43505       Is someone accompanying this pt? No    Is the patient using any DME equipment during OV? No    -DME Company N/A    Depression Screening:  3 most recent PHQ Screens 4/29/2022   PHQ Not Done -   Little interest or pleasure in doing things Not at all   Feeling down, depressed, irritable, or hopeless Not at all   Total Score PHQ 2 0   Trouble falling or staying asleep, or sleeping too much -   Feeling tired or having little energy -   Poor appetite, weight loss, or overeating -   Feeling bad about yourself - or that you are a failure or have let yourself or your family down -   Trouble concentrating on things such as school, work, reading, or watching TV -   Moving or speaking so slowly that other people could have noticed; or the opposite being so fidgety that others notice -   Thoughts of being better off dead, or hurting yourself in some way -   PHQ 9 Score -   How difficult have these problems made it for you to do your work, take care of your home and get along with others -       Learning Assessment:  Learning Assessment 4/29/2022   PRIMARY LEARNER Patient   HIGHEST LEVEL OF EDUCATION - PRIMARY LEARNER  -   BARRIERS PRIMARY LEARNER -   CO-LEARNER CAREGIVER -   PRIMARY LANGUAGE ENGLISH   LEARNER PREFERENCE PRIMARY DEMONSTRATION     -     -   ANSWERED BY patient   RELATIONSHIP SELF       Abuse Screening:  Abuse Screening Questionnaire 4/29/2022   Do you ever feel afraid of your partner? N   Are you in a relationship with someone who physically or mentally threatens you? N   Is it safe for you to go home? Y       Fall Risk  Fall Risk Assessment, last 12 mths 3/24/2021   Able to walk? Yes   Fall in past 12 months? 1   Do you feel unsteady?  1   Are you worried about falling 1   Is TUG test greater than 12 seconds? 0   Is the gait abnormal? 0   Number of falls in past 12 months 2         Coordination of Care:  1. Have you been to the ER, urgent care clinic since your last visit? Hospitalized since your last visit? No    2. Have you seen or consulted any other health care providers outside of the [address] [place] [address] since your last visit? Include any pap smears or colon screening. Per patient, has not had COVID vaccine.

## 2022-06-15 ENCOUNTER — APPOINTMENT (OUTPATIENT)
Dept: INTERNAL MEDICINE CLINIC | Age: 57
End: 2022-06-15

## 2022-06-15 ENCOUNTER — OFFICE VISIT (OUTPATIENT)
Dept: CARDIOLOGY CLINIC | Age: 57
End: 2022-06-15
Payer: MEDICARE

## 2022-06-15 VITALS
OXYGEN SATURATION: 97 % | HEART RATE: 96 BPM | BODY MASS INDEX: 33.16 KG/M2 | SYSTOLIC BLOOD PRESSURE: 132 MMHG | DIASTOLIC BLOOD PRESSURE: 74 MMHG | HEIGHT: 62 IN | WEIGHT: 180.2 LBS

## 2022-06-15 DIAGNOSIS — R00.2 PALPITATIONS: Primary | ICD-10-CM

## 2022-06-15 DIAGNOSIS — I47.1 PAROXYSMAL SVT (SUPRAVENTRICULAR TACHYCARDIA) (HCC): ICD-10-CM

## 2022-06-15 PROCEDURE — 99214 OFFICE O/P EST MOD 30 MIN: CPT | Performed by: INTERNAL MEDICINE

## 2022-06-15 PROCEDURE — G8752 SYS BP LESS 140: HCPCS | Performed by: INTERNAL MEDICINE

## 2022-06-15 PROCEDURE — G8417 CALC BMI ABV UP PARAM F/U: HCPCS | Performed by: INTERNAL MEDICINE

## 2022-06-15 PROCEDURE — 3017F COLORECTAL CA SCREEN DOC REV: CPT | Performed by: INTERNAL MEDICINE

## 2022-06-15 PROCEDURE — G9717 DOC PT DX DEP/BP F/U NT REQ: HCPCS | Performed by: INTERNAL MEDICINE

## 2022-06-15 PROCEDURE — G8427 DOCREV CUR MEDS BY ELIG CLIN: HCPCS | Performed by: INTERNAL MEDICINE

## 2022-06-15 PROCEDURE — G9899 SCRN MAM PERF RSLTS DOC: HCPCS | Performed by: INTERNAL MEDICINE

## 2022-06-15 PROCEDURE — G8754 DIAS BP LESS 90: HCPCS | Performed by: INTERNAL MEDICINE

## 2022-06-15 RX ORDER — DILTIAZEM HYDROCHLORIDE 120 MG/1
120 CAPSULE, COATED, EXTENDED RELEASE ORAL DAILY
Qty: 30 CAPSULE | Refills: 5 | Status: SHIPPED | OUTPATIENT
Start: 2022-06-15

## 2022-06-15 NOTE — PROGRESS NOTES
Trev    Chief Complaint   Patient presents with    Follow-up     2 mth       HPI    Trev is a 61 y.o. AAF with no known coronary disease referred by her pulmonologist for palpitations. Her records were obtained and reviewed in detail. She has had 2 prior echoes which I obtained and reviewed. Not having any chest pain but almost daily can feel her heart racing skipping fluttering in these sensations last for about 5 minutes at a time. At first it was thought this was correlated to electrolyte disturbance she is also been on varying doses of steroids etc. which she does we will impact the frequency.     Past Medical History:   Diagnosis Date    Acquired cyst of kidney 01/16/2020    [facility]    Asthma     Bilateral great toe fractures 2014    Chronic kidney disease, stage 3b ([facility] [address] 01/2021    Dr Serge Stephenson, nephro    Chronic sinusitis     Dr. Benito Gifford in the past    Colon polyp 5/16    Dr Albert Badillo    Depression 2019    Diabetes mellitus ([facility] [address]     DJD (degenerative joint disease) of cervical spine 2016    DJD (degenerative joint disease), lumbar 2013    MRI c spine w degen changes; Dr Shaw Masters    Dyslipidemia     calculated 10 year risk score was 2.0% (12/13)    Edema of both ankles 8/28/2018    Environmental and seasonal allergies 8/28/2018    Eustachian tube dysfunction     Fibrocystic breast     Dr Bekah Del Toro GERD (gastroesophageal reflux disease)     Hypertriglyceridemia 8/28/2018    Lateral epicondylitis of both elbows 2/6/2017    Macular degeneration of both eyes 08/28/2018    [facility]    Mild intermittent asthma without complication 50/97/2211    Dr. Pleitez Core;  ratio 68%, FEV1 78% w 6% inc postbd, TLC 77, RV 56, DLCO 61%    Morbid obesity (HCC)     peak weight 196 lbs, bmi 35.8 from 10/13    Neuropathy 8/28/2018    Osteopenia     Dr. Terell Ya; DEXA t score -0.7 spine, -0.2 hip (8/14)    Sarcoidosis     Dr Lynn Mcghee    Type 2 diabetes mellitus with hyperglycemia, with long-term current use of insulin ([facility] [address] 8/28/2018       Past Surgical History:   Procedure Laterality Date    COLONOSCOPY N/A 5/26/2016    Dr Melonie Trujillo hyperplastic    COLONOSCOPY N/A 10/19/2021    [facility]      Dr. Landry Cos HX HEENT      nasal polypectomy Dr. Carmen Lopez HX HEENT      tear duct surgery right Dr. Tracy Waters 2010; left Dr Mireya Mchugh 2015    HX ORTHOPAEDIC      DEXA -0.7 spine, -0.2 hip 8/14    IA CARDIAC SURG PROCEDURE UNLIST  9/10, 8/13    thallium negative ef 72%; negative ef 70%    IA CHEST SURGERY PROCEDURE UNLISTED  7/12    US thyroid negative    IA CHEST SURGERY PROCEDURE UNLISTED  2012    pfts w mod restrictive defect     VASCULAR SURGERY PROCEDURE UNLIST  12/13    venous doppler negative       Current Outpatient Medications   Medication Sig Dispense Refill    dilTIAZem ER (CARDIZEM LA) 120 mg tablet Take 120 mg by mouth daily.  esomeprazole (NEXIUM) 40 mg capsule Take 1 Capsule by mouth daily as needed for Gastroesophageal Reflux Disease (GERD). 90 Capsule 0    furosemide (LASIX) 20 mg tablet Take 1 Tablet by mouth daily. Indications: visible water retention, high blood pressure 60 Tablet 1    magnesium oxide (MAG-OX) 400 mg tablet Take 1 Tablet by mouth two (2) times daily (with meals). 120 Tablet 0    predniSONE (DELTASONE) 5 mg tablet 4 tablets po daily x week then 3 tablets po daily x 1 week then 2 tablets po daily x 1 week then 1 tablet po daily 70 Tablet 0    ondansetron hcl (Zofran) 4 mg tablet Take 1 Tablet by mouth every eight (8) hours as needed for Nausea or Nausea or Vomiting. 20 Tablet 0    butalbital-acetaminophen-caffeine (FIORICET, ESGIC) -40 mg per tablet Take 1 Tablet by mouth every six (6) hours as needed.  venlafaxine-SR (EFFEXOR-XR) 75 mg capsule Take 1 Capsule by mouth daily.  90 Capsule 1    topiramate (TOPAMAX) 50 mg tablet Take 1 Tablet by mouth two (2) times a day. 180 Tablet 1    SITagliptin (JANUVIA) 25 mg tablet Take 1 Tablet by mouth daily. For diabetes 90 Tablet 1    pravastatin (PRAVACHOL) 80 mg tablet Take 1 Tablet by mouth daily. 90 Tablet 1    montelukast (SINGULAIR) 10 mg tablet Take 1 Tablet by mouth daily. 90 Tablet 3    insulin glargine (LANTUS,BASAGLAR) 100 unit/mL (3 mL) inpn 25 Units by SubCUTAneous route nightly. 5 Pen 1    fluticasone propionate (Flovent Diskus) 250 mcg/actuation dsdv Take 1 Puff by inhalation two (2) times a day. Rinse gargle mouth thoroughly after each use. Disp: 3 inhalers 3 Each 3    fluticasone-umeclidin-vilanter (Trelegy Ellipta) 200-62.5-25 mcg dsdv Take 1 Puff by inhalation daily. Rinse and gargle after each use 3 Each 3    cyanocobalamin (VITAMIN B12) 100 mcg tablet Take 50 mcg by mouth daily.  albuterol (PROVENTIL HFA, VENTOLIN HFA, PROAIR HFA) 90 mcg/actuation inhaler Take 2 Puffs by inhalation every four (4) hours as needed for Shortness of Breath. Indications: asthma attack 1 Inhaler 5    Insulin Needles, Disposable, (Prerna Pen Needle) 32 gauge x 5/32\" ndle Check blood sugars three times a day 100 Pen Needle 11    insulin lispro (HumaLOG KwikPen Insulin) 200 unit/mL (3 mL) inpn 3 times per day: 150-200 2u, 201-250 4u, 251-300 6u, 301-350 8u, 351-400 10u, > 400 12u. Indications: type 2 diabetes mellitus 5 Pen 5    therapeutic multivitamin-minerals (THERAGRAN-M) tablet Take 1 Tab by mouth daily.  triamcinolone acetonide (KENALOG) 0.1 % topical cream Apply  to affected area two (2) times daily as needed for Skin Irritation. 15 g 1    aspirin delayed-release 81 mg tablet Take 1 Tab by mouth daily. For heart health 30 Tab 11    glucose blood VI test strips (ACCU-CHEK POOJA) strip Pt to test 5 times daily.   Dx:E11.65 500 Strip 3       Allergies   Allergen Reactions    Other Food Itching     Pt reported food allergy to \"tropical fruits\", but could only specify pineapple and jenifer; pt was not able to recall any other fruits. Cracks in corner of mouth, irritation of tongue.  Pollen Extracts Runny Nose and Cough    Amoxicillin Hives, Shortness of Breath and Swelling    Crestor [Rosuvastatin] Myalgia    Ibuprofen Other (comments)     dont prescribe due to kidney function    Lipitor [Atorvastatin] Other (comments)     Muscle cramps and weakness      Jenifer Flavor Itching     Allergy to Pettibone. Cracks in corner of mouth, irritation of tongue.  Pcn [Penicillins] Angioedema    Pineapple Itching and Other (comments)     Cracks in corner of mouth, irritation of tongue. Social History     Socioeconomic History    Marital status: LEGALLY      Spouse name: Not on file    Number of children: 0    Years of education: 13    Highest education level: Not on file   Occupational History    Occupation: Unemployed   Tobacco Use    Smoking status: Never Smoker    Smokeless tobacco: Never Used   Vaping Use    Vaping Use: Never used   Substance and Sexual Activity    Alcohol use: Yes     Alcohol/week: 0.0 standard drinks     Comment: occasional wine    Drug use: Never    Sexual activity: Not Currently   Other Topics Concern     Service No    Blood Transfusions No    Caffeine Concern Yes     Comment: due to reflux    Occupational Exposure No    Hobby Hazards No    Sleep Concern Yes    Stress Concern Yes     Comment: wants a job    Weight Concern Yes    Special Diet No    Back Care No    Exercise Yes    Bike Helmet No    Seat Belt Yes    Self-Exams Yes   Social History Narrative    Patient lives with a friend, no pets. Social Determinants of Health     Financial Resource Strain:     Difficulty of Paying Living Expenses: Not on file   Food Insecurity:     Worried About Running Out of Food in the Last Year: Not on file    Pee of Food in the Last Year: Not on file   Transportation Needs:     Lack of Transportation (Medical):  Not on file    Lack of Transportation (Non-Medical): Not on file   Physical Activity:     Days of Exercise per Week: Not on file    Minutes of Exercise per Session: Not on file   Stress:     Feeling of Stress : Not on file   Social Connections:     Frequency of Communication with Friends and Family: Not on file    Frequency of Social Gatherings with Friends and Family: Not on file    Attends Christianity Services: Not on file    Active Member of [address] [place] [address] or Organizations: Not on file    Attends Club or Organization Meetings: Not on file    Marital Status: Not on file   Intimate Partner Violence:     Fear of Current or Ex-Partner: Not on file    Emotionally Abused: Not on file    Physically Abused: Not on file    Sexually Abused: Not on file   Housing Stability:     Unable to Pay for Housing in the Last Year: Not on file    Number of [place] in the Last Year: Not on file    Unstable Housing in the Last Year: Not on file        FH: neg premature ASCVD, no SCD    Review of Systems    14 pt Review of Systems is negative unless otherwise mentioned in the HPI. Wt Readings from Last 3 Encounters:   06/15/22 81.7 kg (180 lb 3.2 oz)   05/25/22 82.4 kg (181 lb 9.6 oz)   04/29/22 79.4 kg (175 lb)     Temp Readings from Last 3 Encounters:   05/25/22 97.8 °F (36.6 °C) (Temporal)   03/31/22 (!) 96.5 °F (35.8 °C) (Temporal)   03/15/22 97.8 °F (36.6 °C) (Temporal)     BP Readings from Last 3 Encounters:   06/15/22 132/74   05/25/22 (!) 149/84   04/29/22 118/84     Pulse Readings from Last 3 Encounters:   06/15/22 96   05/25/22 84   04/29/22 98       05/14/19    ECHO ADULT COMPLETE 05/15/2019 5/15/2019    Interpretation Summary  · Left Ventricle: Estimated left ventricular ejection fraction is 61 - 65%. No regional wall motion abnormality noted. Age-appropriate left ventricular diastolic function.     Signed by: Rozina Zimmer DO on 5/15/2019  7:15 AM      Physical Exam:    Visit Vitals  /74 (BP 1 Location: Left arm, BP Patient Position: Sitting)   Pulse 96   Ht 5' 2\" (1.575 m)   Wt 81.7 kg (180 lb 3.2 oz)   LMP 03/30/2013   SpO2 97%   BMI 32.96 kg/m²      Physical Exam  HENT:      Head: Normocephalic and atraumatic. Eyes:      Pupils: Pupils are equal, round, and reactive to light. Cardiovascular:      Rate and Rhythm: Normal rate and regular rhythm. Heart sounds: Normal heart sounds. No murmur heard. No friction rub. No gallop. Pulmonary:      Effort: Pulmonary effort is normal. No respiratory distress. Breath sounds: Normal breath sounds. No wheezing or rales. Chest:      Chest wall: No tenderness. Abdominal:      General: Bowel sounds are normal.      Palpations: Abdomen is soft. Musculoskeletal:         General: No tenderness. Skin:     General: Skin is warm and dry. Neurological:      Mental Status: She is alert and oriented to person, place, and time. EKG  Sinus tachycardia   Nonspecific T wave abnormality   Abnormal ECG   When compared with ECG of 16-AUG-2019 19:41,   Vent. rate has increased BY  40 BPM   Nonspecific T wave abnormality now evident in Anterolateral leads   Confirmed by Estrella Rosario ([id number]) on 1/29/2022 11:19:24 PMs    Lab Results   Component Value Date/Time    Sodium 141 05/04/2022 10:36 AM    Potassium 4.0 05/04/2022 10:36 AM    Chloride 109 05/04/2022 10:36 AM    CO2 27 05/04/2022 10:36 AM    Anion gap 5 05/04/2022 10:36 AM    Glucose 132 (H) 05/04/2022 10:36 AM    BUN 36 (H) 05/04/2022 10:36 AM    Creatinine 2.11 (H) 05/04/2022 10:36 AM    BUN/Creatinine ratio 17 05/04/2022 10:36 AM    GFR est AA 29 (L) 05/04/2022 10:36 AM    GFR est non-AA 24 (L) 05/04/2022 10:36 AM    Calcium 9.2 05/04/2022 10:49 AM    Bilirubin, total 0.3 01/29/2022 03:55 AM    Alk.  phosphatase 52 01/29/2022 03:55 AM    Protein, total 6.2 (L) 01/29/2022 03:55 AM    Albumin 3.5 05/04/2022 10:36 AM    Globulin 3.3 01/29/2022 03:55 AM    A-G Ratio 0.9 01/29/2022 03:55 AM    ALT (SGPT) 25 01/29/2022 03:55 AM    AST (SGOT) 21 01/29/2022 03:55 AM     Lab Results   Component Value Date/Time    WBC 6.1 05/04/2022 10:36 AM    Hemoglobin, POC 13.6 05/26/2016 10:49 AM    HGB 12.0 05/04/2022 10:36 AM    Hematocrit, POC 40 05/26/2016 10:49 AM    HCT 38.8 05/04/2022 10:36 AM    PLATELET 951 55/64/9211 10:36 AM    MCV 96.5 05/04/2022 10:36 AM     Lab Results   Component Value Date/Time    TSH 2.33 08/27/2018 09:30 AM     Magnesium   Date Value Ref Range Status   03/07/2022 1.8 1.6 - 2.6 mg/dL Final   02/01/2022 1.7 1.6 - 2.6 mg/dL Final   01/31/2022 1.4 (L) 1.6 - 2.6 mg/dL Final   01/29/2022 1.4 (L) 1.6 - 2.6 mg/dL Final   08/27/2018 1.9 1.6 - 2.6 mg/dL Final     Lab Results   Component Value Date/Time    Hemoglobin A1c 7.3 (H) 09/08/2021 11:05 AM    Hemoglobin A1c (POC) 7.1 01/03/2022 11:57 AM    Hemoglobin A1c, External 6.7 09/28/2016 12:00 AM       Impression and Plan:  Deanna Chiang is a 61 y.o. with:    Palpitations, SVT/AT on MCT  Sarcoidosis  Normal Echos, last 2019, no pulm HTN  DM2, known  HyperCa and renal dysfunction, ([place])  Asthma ([facility])    Repeat labs WNL, incl TSH  Echo WNL  7 day MCT, showed short SVT bursts c/w her symptoms  Will try Cardizem low dose and see if helps  RTC 6 months, indications risks and possible side effects dw pt and asked her to call me sooner if needed    Thank you for allowing me to participate in the care of your patient, please do not hesitate to call with questions or concerns.     [address],    Octavio Vergara, DO

## 2022-06-15 NOTE — PATIENT INSTRUCTIONS
Follow up with Dr. Adebayo Miller in 6 months or sooner if needed. Start Cardizem 120 mg once a day.

## 2022-06-15 NOTE — PROGRESS NOTES
Frannie Lr presents today for   Chief Complaint   Patient presents with    Follow-up     2 mth       Yisel Joseph preferred language for health care discussion is english/other. Is someone accompanying this pt? No    Is the patient using any DME equipment during OV? No    Depression Screening:  3 most recent PHQ Screens 4/29/2022   PHQ Not Done -   Little interest or pleasure in doing things Not at all   Feeling down, depressed, irritable, or hopeless Not at all   Total Score PHQ 2 0   Trouble falling or staying asleep, or sleeping too much -   Feeling tired or having little energy -   Poor appetite, weight loss, or overeating -   Feeling bad about yourself - or that you are a failure or have let yourself or your family down -   Trouble concentrating on things such as school, work, reading, or watching TV -   Moving or speaking so slowly that other people could have noticed; or the opposite being so fidgety that others notice -   Thoughts of being better off dead, or hurting yourself in some way -   PHQ 9 Score -   How difficult have these problems made it for you to do your work, take care of your home and get along with others -       Learning Assessment:  Learning Assessment 4/29/2022   PRIMARY LEARNER Patient   HIGHEST LEVEL OF EDUCATION - PRIMARY LEARNER  -   BARRIERS PRIMARY LEARNER -   CO-LEARNER CAREGIVER -   PRIMARY LANGUAGE ENGLISH   LEARNER PREFERENCE PRIMARY DEMONSTRATION     -     -   ANSWERED BY patient   RELATIONSHIP SELF       Abuse Screening:  Abuse Screening Questionnaire 4/29/2022   Do you ever feel afraid of your partner? N   Are you in a relationship with someone who physically or mentally threatens you? N   Is it safe for you to go home? Y       Fall Risk  Fall Risk Assessment, last 12 mths 3/24/2021   Able to walk? Yes   Fall in past 12 months? 1   Do you feel unsteady? 1   Are you worried about falling 1   Is TUG test greater than 12 seconds?  0   Is the gait abnormal? 0 Number of falls in past 12 months 2       Pt currently taking Anticoagulant therapy? ASA 81 mg daily    Coordination of Care:  1. Have you been to the ER, urgent care clinic since your last visit? Hospitalized since your last visit? No    2. Have you seen or consulted any other health care providers outside of the [address] [place] [address] since your last visit? Include any pap smears or colon screening.  No

## 2022-06-16 PROBLEM — N18.4 CHRONIC KIDNEY DISEASE (CKD), STAGE IV (SEVERE) (HCC): Status: ACTIVE | Noted: 2022-06-01

## 2022-06-16 LAB
A-G RATIO,AGRAT: 1.8 RATIO (ref 1.1–2.6)
ALBUMIN SERPL-MCNC: 4.4 G/DL (ref 3.5–5)
ALP SERPL-CCNC: 81 U/L (ref 25–115)
ALT SERPL-CCNC: 16 U/L (ref 5–40)
ANION GAP SERPL CALC-SCNC: 13 MMOL/L (ref 3–15)
AST SERPL W P-5'-P-CCNC: 23 U/L (ref 10–37)
AVG GLU, 10930: 166 MG/DL (ref 91–123)
BILIRUB SERPL-MCNC: 0.3 MG/DL (ref 0.2–1.2)
BUN SERPL-MCNC: 33 MG/DL (ref 6–22)
CALCIUM SERPL-MCNC: 10.1 MG/DL (ref 8.4–10.5)
CHLORIDE SERPL-SCNC: 104 MMOL/L (ref 98–110)
CHOLEST SERPL-MCNC: 258 MG/DL (ref 110–200)
CO2 SERPL-SCNC: 26 MMOL/L (ref 20–32)
CREAT SERPL-MCNC: 2 MG/DL (ref 0.5–1.2)
CREATININE, URINE: 204 MG/DL
GLOBULIN,GLOB: 2.5 G/DL (ref 2–4)
GLOMERULAR FILTRATION RATE: 28.1 ML/MIN/1.73 SQ.M.
GLUCOSE SERPL-MCNC: 134 MG/DL (ref 70–99)
HBA1C MFR BLD HPLC: 7.4 % (ref 4.8–5.6)
HDLC SERPL-MCNC: 4.4 MG/DL (ref 0–5)
HDLC SERPL-MCNC: 58 MG/DL
LDL/HDL RATIO,LDHD: 2.8
LDLC SERPL CALC-MCNC: 160 MG/DL (ref 50–99)
MICROALB/CREAT RATIO, 140286: NORMAL
MICROALBUMIN,URINE RANDOM 140054: <12 MG/L (ref 0.1–17)
NON-HDL CHOLESTEROL, 011976: 200 MG/DL
POTASSIUM SERPL-SCNC: 4.4 MMOL/L (ref 3.5–5.5)
PROT SERPL-MCNC: 6.9 G/DL (ref 6.4–8.3)
SODIUM SERPL-SCNC: 143 MMOL/L (ref 133–145)
TRIGL SERPL-MCNC: 199 MG/DL (ref 40–149)
VLDLC SERPL CALC-MCNC: 40 MG/DL (ref 8–30)

## 2022-06-22 ENCOUNTER — OFFICE VISIT (OUTPATIENT)
Dept: INTERNAL MEDICINE CLINIC | Age: 57
End: 2022-06-22
Payer: MEDICARE

## 2022-06-22 VITALS
HEIGHT: 62 IN | WEIGHT: 182.2 LBS | OXYGEN SATURATION: 96 % | SYSTOLIC BLOOD PRESSURE: 136 MMHG | BODY MASS INDEX: 33.53 KG/M2 | DIASTOLIC BLOOD PRESSURE: 88 MMHG | HEART RATE: 84 BPM | TEMPERATURE: 96.6 F | RESPIRATION RATE: 18 BRPM

## 2022-06-22 DIAGNOSIS — E11.65 TYPE 2 DIABETES MELLITUS WITH HYPERGLYCEMIA, WITH LONG-TERM CURRENT USE OF INSULIN (HCC): ICD-10-CM

## 2022-06-22 DIAGNOSIS — R60.0 EDEMA OF BOTH LOWER EXTREMITIES: ICD-10-CM

## 2022-06-22 DIAGNOSIS — Z79.4 TYPE 2 DIABETES MELLITUS WITH HYPERGLYCEMIA, WITH LONG-TERM CURRENT USE OF INSULIN (HCC): ICD-10-CM

## 2022-06-22 DIAGNOSIS — I10 PRIMARY HYPERTENSION: Primary | ICD-10-CM

## 2022-06-22 DIAGNOSIS — F41.8 DEPRESSION WITH ANXIETY: ICD-10-CM

## 2022-06-22 DIAGNOSIS — E83.52 HYPERCALCEMIA: ICD-10-CM

## 2022-06-22 DIAGNOSIS — N39.46 MIXED STRESS AND URGE URINARY INCONTINENCE: ICD-10-CM

## 2022-06-22 DIAGNOSIS — E83.42 HYPOMAGNESEMIA: ICD-10-CM

## 2022-06-22 DIAGNOSIS — G89.29 CHRONIC PAIN OF LEFT ANKLE: ICD-10-CM

## 2022-06-22 DIAGNOSIS — M25.572 CHRONIC PAIN OF LEFT ANKLE: ICD-10-CM

## 2022-06-22 DIAGNOSIS — J30.89 ENVIRONMENTAL AND SEASONAL ALLERGIES: ICD-10-CM

## 2022-06-22 DIAGNOSIS — E11.40 DIABETIC NEUROPATHY, PAINFUL (HCC): ICD-10-CM

## 2022-06-22 DIAGNOSIS — E78.2 MIXED HYPERLIPIDEMIA: ICD-10-CM

## 2022-06-22 DIAGNOSIS — K21.9 GASTROESOPHAGEAL REFLUX DISEASE WITHOUT ESOPHAGITIS: ICD-10-CM

## 2022-06-22 DIAGNOSIS — R00.2 PALPITATIONS: ICD-10-CM

## 2022-06-22 PROCEDURE — G8427 DOCREV CUR MEDS BY ELIG CLIN: HCPCS | Performed by: NURSE PRACTITIONER

## 2022-06-22 PROCEDURE — 3051F HG A1C>EQUAL 7.0%<8.0%: CPT | Performed by: NURSE PRACTITIONER

## 2022-06-22 PROCEDURE — G8754 DIAS BP LESS 90: HCPCS | Performed by: NURSE PRACTITIONER

## 2022-06-22 PROCEDURE — G9717 DOC PT DX DEP/BP F/U NT REQ: HCPCS | Performed by: NURSE PRACTITIONER

## 2022-06-22 PROCEDURE — 2022F DILAT RTA XM EVC RTNOPTHY: CPT | Performed by: NURSE PRACTITIONER

## 2022-06-22 PROCEDURE — G8417 CALC BMI ABV UP PARAM F/U: HCPCS | Performed by: NURSE PRACTITIONER

## 2022-06-22 PROCEDURE — G9899 SCRN MAM PERF RSLTS DOC: HCPCS | Performed by: NURSE PRACTITIONER

## 2022-06-22 PROCEDURE — G8752 SYS BP LESS 140: HCPCS | Performed by: NURSE PRACTITIONER

## 2022-06-22 PROCEDURE — 99214 OFFICE O/P EST MOD 30 MIN: CPT | Performed by: NURSE PRACTITIONER

## 2022-06-22 PROCEDURE — 3017F COLORECTAL CA SCREEN DOC REV: CPT | Performed by: NURSE PRACTITIONER

## 2022-06-22 RX ORDER — ESOMEPRAZOLE MAGNESIUM 40 MG/1
40 CAPSULE, DELAYED RELEASE ORAL
Qty: 90 CAPSULE | Refills: 3 | Status: SHIPPED | OUTPATIENT
Start: 2022-06-22

## 2022-06-22 RX ORDER — FUROSEMIDE 20 MG/1
20 TABLET ORAL DAILY
Qty: 90 TABLET | Refills: 0 | Status: SHIPPED | OUTPATIENT
Start: 2022-06-22 | End: 2022-09-14 | Stop reason: SDUPTHER

## 2022-06-22 RX ORDER — INSULIN ASPART 100 [IU]/ML
INJECTION, SOLUTION INTRAVENOUS; SUBCUTANEOUS
Qty: 5 ADJUSTABLE DOSE PRE-FILLED PEN SYRINGE | Refills: 3 | Status: SHIPPED
Start: 2022-06-22 | End: 2022-09-20

## 2022-06-22 RX ORDER — LANOLIN ALCOHOL/MO/W.PET/CERES
400 CREAM (GRAM) TOPICAL 2 TIMES DAILY WITH MEALS
Qty: 120 TABLET | Refills: 0 | Status: SHIPPED | OUTPATIENT
Start: 2022-06-22

## 2022-06-22 RX ORDER — PEN NEEDLE, DIABETIC 31 GX3/16"
NEEDLE, DISPOSABLE MISCELLANEOUS
Qty: 100 PEN NEEDLE | Refills: 11 | Status: SHIPPED | OUTPATIENT
Start: 2022-06-22

## 2022-06-22 NOTE — TELEPHONE ENCOUNTER
Requested Prescriptions     Pending Prescriptions Disp Refills    Insulin Needles, Disposable, (Prerna Pen Needle) 32 gauge x 5/32\" ndle 100 Pen Needle 11     Sig: Check blood sugars three times a day        Per pt this refill request was forgotten today. She had ov today. Stated she is almost out.      [facility]

## 2022-06-22 NOTE — TELEPHONE ENCOUNTER
Neel is calling from [address] in regards to the Insulin. She needs to know what the max daily dose is.

## 2022-06-22 NOTE — PROGRESS NOTES
Chief Complaint   Patient presents with    Cholesterol Problem     3 month f/u    Hypertension    Chronic Kidney Disease    Labs     completed 6/15/2022     1. \"Have you been to the ER, urgent care clinic since your last visit? Hospitalized since your last visit? \" No    2. \"Have you seen or consulted any other health care providers outside of the [address] [place] [address] since your last visit? \" No     3. For patients aged 39-70: Has the patient had a colonoscopy / FIT/ Cologuard? Yes - no Care Gap present      If the patient is female:    4. For patients aged 41-77: Has the patient had a mammogram within the past 2 years? Yes - no Care Gap present      5. For patients aged 21-65: Has the patient had a pap smear?  Yes - no Care Gap present

## 2022-06-22 NOTE — PROGRESS NOTES
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      Past Medical History:   Diagnosis Date    Acquired cyst of kidney 01/16/2020    [facility]    Asthma     Bilateral great toe fractures 2014    Chronic kidney disease (CKD), stage IV (severe) ([address] 06/2022    Dr Cathy Burdick    Chronic sinusitis     Dr. Fawn Blue in the past    Colon polyp 5/16    Dr Tyson Shoulder    Depression 2019    Diabetes mellitus ([address]     DJD (degenerative joint disease) of cervical spine 2016    DJD (degenerative joint disease), lumbar 2013    MRI c spine w degen changes; Dr Mike Connors    Dyslipidemia     calculated 10 year risk score was 2.0% (12/13)    Edema of both ankles 8/28/2018    Environmental and seasonal allergies 8/28/2018    Eustachian tube dysfunction     Fibrocystic breast     Dr Mikey Georges    GERD (gastroesophageal reflux disease)     Hypertriglyceridemia 8/28/2018    Lateral epicondylitis of both elbows 2/6/2017    Macular degeneration of both eyes 08/28/2018    Dr [facility]    Mild intermittent asthma without complication 55/78/1248    Dr. Sena Smalls;  ratio 68%, FEV1 78% w 6% inc postbd, TLC 77, RV 56, DLCO 61%    Morbid obesity (HCC)     peak weight 196 lbs, bmi 35.8 from 10/13    Neuropathy 8/28/2018    Osteopenia     Dr. Quynh Esquivel; DEXA t score -0.7 spine, -0.2 hip (8/14)    Sarcoidosis     Dr Catie Logan    Type 2 diabetes mellitus with hyperglycemia, with long-term current use of insulin ([facility] 8/28/2018     Past Surgical History:   Procedure Laterality Date    COLONOSCOPY N/A 5/26/2016    Dr Tyson Shoulder hyperplastic    COLONOSCOPY N/A 10/19/2021    COLONOSCOPY with polypectomy performed by Umer Aguila MD at [address] [place] [address] Dr Dr. Sagrario GALDAMEZ      nasal polypectomy Dr. Rommel GALDAMEZ      tear duct surgery right Dr. Jodie Ann 2010; left Dr Milagros Cho 2015    HX ORTHOPAEDIC      DEXA -0.7 spine, -0.2 hip 8/14    AK CARDIAC SURG PROCEDURE UNLIST  9/10, 8/13    thallium negative ef 72%; negative ef 70%    AK CHEST SURGERY PROCEDURE UNLISTED  7/12    US thyroid negative    AK CHEST SURGERY PROCEDURE UNLISTED  2012    pfts w mod restrictive defect     VASCULAR SURGERY PROCEDURE UNLIST  12/13    venous doppler negative     Current Outpatient Medications   Medication Sig    insulin glargine (LANTUS,BASAGLAR) 100 unit/mL (3 mL) inpn 25 Units by SubCUTAneous route nightly. pravastatin (PRAVACHOL) 80 mg tablet Take 1 Tablet by mouth daily. SITagliptin (JANUVIA) 25 mg tablet Take 1 Tablet by mouth daily. For diabetes    topiramate (TOPAMAX) 50 mg tablet Take 1 Tablet by mouth two (2) times a day. venlafaxine-SR (EFFEXOR-XR) 75 mg capsule Take 1 Capsule by mouth daily. cetirizine HCl (ZYRTEC PO) Take  by mouth.    esomeprazole (NEXIUM) 40 mg capsule Take 1 Capsule by mouth daily as needed for Gastroesophageal Reflux Disease (GERD). insulin aspart U-100 (NovoLOG Flexpen U-100 Insulin) 100 unit/mL (3 mL) inpn 3 times per day: 150-200 2u, 201-250 4u, 251-300 6u, 301-350 8u, 351-400 10u, > 400 12u. magnesium oxide (MAG-OX) 400 mg tablet Take 1 Tablet by mouth two (2) times daily (with meals). furosemide (LASIX) 20 mg tablet Take 1 Tablet by mouth daily. Indications: visible water retention, high blood pressure    dilTIAZem ER (CARDIZEM CD) 120 mg capsule Take 1 Capsule by mouth daily. predniSONE (DELTASONE) 5 mg tablet 4 tablets po daily x week then 3 tablets po daily x 1 week then 2 tablets po daily x 1 week then 1 tablet po daily    ondansetron hcl (Zofran) 4 mg tablet Take 1 Tablet by mouth every eight (8) hours as needed for Nausea or Nausea or Vomiting. butalbital-acetaminophen-caffeine (FIORICET, ESGIC) -40 mg per tablet Take 1 Tablet by mouth every six (6) hours as needed.     fluticasone propionate (Flovent Diskus) 250 mcg/actuation dsdv Take 1 Puff by inhalation two (2) times a day. Rinse gargle mouth thoroughly after each use. Disp: 3 inhalers    fluticasone-umeclidin-vilanter (Trelegy Ellipta) 200-62.5-25 mcg dsdv Take 1 Puff by inhalation daily. Rinse and gargle after each use    cyanocobalamin (VITAMIN B12) 100 mcg tablet Take 50 mcg by mouth daily. albuterol (PROVENTIL HFA, VENTOLIN HFA, PROAIR HFA) 90 mcg/actuation inhaler Take 2 Puffs by inhalation every four (4) hours as needed for Shortness of Breath. Indications: asthma attack    therapeutic multivitamin-minerals (THERAGRAN-M) tablet Take 1 Tab by mouth daily. triamcinolone acetonide (KENALOG) 0.1 % topical cream Apply  to affected area two (2) times daily as needed for Skin Irritation. aspirin delayed-release 81 mg tablet Take 1 Tab by mouth daily. For heart health    glucose blood VI test strips (ACCU-CHEK POOJA) strip Pt to test 5 times daily. Dx:E11.65    Insulin Needles, Disposable, ([place] Pen Needle) 32 gauge x 5/32\" ndle Check blood sugars three times a day     No current facility-administered medications for this visit. Allergies and Intolerances: Allergies   Allergen Reactions    Other Food Itching     Pt reported food allergy to \"tropical fruits\", but could only specify pineapple and jenifer; pt was not able to recall any other fruits. Cracks in corner of mouth, irritation of tongue. Pollen Extracts Runny Nose and Cough    Amoxicillin Hives, Shortness of Breath and Swelling    Crestor [Rosuvastatin] Myalgia    Ibuprofen Other (comments)     dont prescribe due to kidney function    Lipitor [Atorvastatin] Other (comments)     Muscle cramps and weakness      Jenifer Flavor Itching     Allergy to [place]. Cracks in corner of mouth, irritation of tongue. Pcn [Penicillins] Angioedema    Pineapple Itching and Other (comments)     Cracks in corner of mouth, irritation of tongue.       Family History:   Family History   Problem Relation Age of Onset Cancer Mother     Hypertension Mother     Cancer Father     Diabetes Father     Hypertension Father     Stroke Father     Diabetes Sister     Hypertension Sister     Heart Disease Sister     Colon Cancer Sister 52    Cancer Sister     Hypertension Brother     Cancer Maternal Aunt      Social History:   She  reports that she has never smoked. She has never used smokeless tobacco.   Social History     Substance and Sexual Activity   Alcohol Use Yes    Alcohol/week: 0.0 standard drinks    Comment: occasional wine     Immunization History:  Immunization History   Administered Date(s) Administered    Influenza Vaccine 02/15/2021, 10/07/2021    Influenza Vaccine (Quad) PF (>6 Mo Flulaval, Fluarix, and >3 Yrs Afluria, Fluzone 44085) 09/28/2017, 11/26/2018    Pneumococcal Polysaccharide (PPSV-23) 09/28/2017    Tdap 04/24/2013     Todays concerns/HPI:  1. Left outer ankle discomfort. Dr. Leona Jha, podiatrist, at 1 foot 2 foot completed a biopsy to this site 2021. Patient noted swelling and mild discomfort approximately 3 months ago. She denies symptoms worsening, they have stabilized. She is unable to follow-up with Dr. Allen Ziegler due to insurance issues. 2.  COVID vaccinations. Patient required about the safety of the vaccine and whether she should obtain as she is interested in obtaining the series. 3.  Palpitations. These are intermittent. They last for seconds to minutes at a time. She denies shortness of breath or chest pain when she has palpitations. She is seeing cardiology who initiated Cardizem. Patient has not started this treatment as she wanted to verify with her PCP that this medication is safe for her to take. 4.  Allergies. She is scheduled with Dr. Rudolph Craig for allergy testing in the upcoming weeks. 5.  Diabetes. Blood sugar last night was 99. She did not take her Lantus because she felt this was too low. Anytime that her blood sugar is less than 110 she does not take her Lantus. She continues Januvia. Currently taking Humalog but was informed her insurance would no longer pay for this medication. An alternative is NovoLog. Patient is seeking a prescription for this medication. 6.  Urinary incontinence. Noted worsening of symptoms approximately 3 to 4 months ago. She has urgency along with stress incontinence. She changes her adult undergarment approximately 2 times per day due to urinary incontinence. She finds she has worsened incontinence throughout the night. She denies any incontinence of stool. She is seeking to have orders to obtain undergarments via her insurance. Patient will have DME company supply order forms. Retinal tear (right eye): Under care of Dr. Olivia Saleh, [place] [facility] . Environmental/seasonal allergies: Sees Dr. Felisa Randhawa with [facility]. Taking Singulair which she continues daily and tolerates well. CKD 4: Dr. Maggie Matthews, nephrology. Mixed hyperlipidemia: Continues statin daily and tolerating well. Sarcoidosis and asthma: Follows Dr. Fam Bran, pulmonary. She continues all resp medications as prescribed. Depression with anxiety: Used to take prozac but not effective. Started Effexor in March 2021 and rose mary well. She is not seeking psychiatry/counseling at this time. DM neuropathy: Bilateral feet. Topamax is effective. Sees podiatry every 3 months. Eczema: Continues triamcinolone cream as needed to the back of her neck. Uses as needed; mostly summer months. Review of Systems:   As above included in HPI. Otherwise 11 point review of systems negative including constitutional, skin, HENT, eyes, respiratory, cardiovascular, gastrointestinal, genitourinary, musculoskeletal, endocrine, hematologic, allergy, and neurologic.       Physical:   Visit Vitals  /88   Pulse 84   Temp (!) 96.6 °F (35.9 °C) (Temporal)   Resp 18   Ht 5' 2\" (1.575 m)   Wt 182 lb 3.2 oz (82.6 kg)   LMP 03/30/2013   SpO2 96%   BMI 33.32 kg/m²      Wt Readings from Last 3 Encounters:   06/22/22 182 lb 3.2 oz (82.6 kg)   06/15/22 180 lb 3.2 oz (81.7 kg)   05/25/22 181 lb 9.6 oz (82.4 kg)       Exam:   Physical Exam  Vitals and nursing note reviewed. Constitutional:       Appearance: Normal appearance. She is obese. HENT:      Head: Normocephalic and atraumatic. Right Ear: External ear normal.      Left Ear: External ear normal.      Mouth/Throat:      Mouth: Mucous membranes are moist.   Eyes:      Extraocular Movements: Extraocular movements intact. Conjunctiva/sclera: Conjunctivae normal.   Neck:      Vascular: No carotid bruit. Cardiovascular:      Rate and Rhythm: Normal rate and regular rhythm. Heart sounds: Normal heart sounds. Comments: No edema noted to bilateral lower extremities  Pulmonary:      Effort: Pulmonary effort is normal. No respiratory distress. Breath sounds: Normal breath sounds. No wheezing. Musculoskeletal:         General: Normal range of motion. Cervical back: Normal range of motion and neck supple. Skin:     General: Skin is warm and dry. Findings: No rash. Comments: Left ankle area lateral aspect. Noted small darker pigmented area (approximately 0.5 cm x 1 cm). This is an old biopsy site. Minimal to no swelling noted to the area. No signs and symptoms of infection. Some tenderness when touched. Neurological:      General: No focal deficit present. Mental Status: She is alert and oriented to person, place, and time. Psychiatric:         Mood and Affect: Mood normal.         Behavior: Behavior normal.         Thought Content: Thought content normal.         Judgment: Judgment normal.       Review of Data:  n/a      Plan:    ICD-10-CM ICD-9-CM    1. Primary hypertension  C05 191.5 METABOLIC PANEL, COMPREHENSIVE   2. Edema of both lower extremities  R60.0 782.3    3. Mixed hyperlipidemia  E78.2 272.2 pravastatin (PRAVACHOL) 80 mg tablet   4.  Gastroesophageal reflux disease without esophagitis  K21.9 530.81 esomeprazole (NEXIUM) 40 mg capsule   5. Type 2 diabetes mellitus with hyperglycemia, with long-term current use of insulin (HCC)  E11.65 250.00 insulin glargine (LANTUS,BASAGLAR) 100 unit/mL (3 mL) inpn    Z79.4 790.29 SITagliptin (JANUVIA) 25 mg tablet     V58.67 HEMOGLOBIN A1C WITH EAG   6. Diabetic neuropathy, painful (HCC)  E11.40 250.60 topiramate (TOPAMAX) 50 mg tablet     357.2    7. Hypomagnesemia  E83.42 275.2 magnesium oxide (MAG-OX) 400 mg tablet   8. Hypercalcemia  E83.52 275.42 furosemide (LASIX) 20 mg tablet      METABOLIC PANEL, COMPREHENSIVE   9. Depression with anxiety  F41.8 300.4 venlafaxine-SR (EFFEXOR-XR) 75 mg capsule   10. Environmental and seasonal allergies  J30.89 477.8    11. Palpitations  R00.2 785.1    12. Mixed stress and urge urinary incontinence  N39.46 788.33    13. Chronic pain of left ankle  M25.572 719.47 REFERRAL TO PODIATRY    G89.29 338.29      -Primary Hypertension  BP well controlled   Continue Lasix therapy    -Edema Jarad LEs  Sx controlled with lasix  continue, Lasix therapy      -Mixed hyperlipidemia  Not controlled  -199; LDL   continue, Pravastatin 80mg qd. Stressed needing to reduce fried fatty foods. -GERD  continue, Nexium qd prn      -Type 2 diabetes  A1c 7.3-7.4  Instructed pt not to omit her lantus therapy unless her BS is <60. If this occurs, she needs to eat and recheck BS in 1 hour. If BS 70 or above she needs to take insulin. Pt verb understanding.   Continue Januvia  Continue Lantus   DC Humalog d/t insurance wont cover  Initiate Novolog s/s     -Diabetic neuropathy  Continue Topamax therapy    -Hypomagnesemia  Continue Mag ox therapy    -Hypercalcemia  Lasix corrected level    -Depression and anxiety  Continue Effexor therapy  Pt declines Psych and/or counseling       -Stage 4 CKD  Creatinine 2.0  Follow up with Dr. Topher Whittaker as scheduled    -Allergies   Follow up with Dr Joanie Grove for allergy testing  Continue singulair therapy      -Palpitations  Discussed diazepam therapy prescribed by Dr Evert Angel, cardio. Educated pt ie purpose of medication and safe to take with other meds. -Mixed stress/urge incontinence  Informed pt I will sign DME order form for adult undergarments when I receive the order. Incontinence due to pelvic muscle wasting (pelvic floor dysfunction)  Degree of incontinence is light and frequent    -Chronic pain left ankle   Not the ankle but an old biopsy site. Referral to Dr Diallo Mai, podiatry. Will administer PNA 23 at follow up appt. Will inform pt that prednisone MAY reduce effectiveness of vaccine.      Follow up 3 months with labs A1c, CMP      Dr. Fred Higginbotham, AGNP-C, DNP  [facility] [address] [place] [address]

## 2022-06-23 RX ORDER — PRAVASTATIN SODIUM 80 MG/1
80 TABLET ORAL DAILY
Qty: 90 TABLET | Refills: 1 | Status: SHIPPED | OUTPATIENT
Start: 2022-06-23

## 2022-06-23 RX ORDER — VENLAFAXINE HYDROCHLORIDE 75 MG/1
75 CAPSULE, EXTENDED RELEASE ORAL DAILY
Qty: 90 CAPSULE | Refills: 1 | Status: SHIPPED | OUTPATIENT
Start: 2022-06-23

## 2022-06-23 RX ORDER — INSULIN GLARGINE 100 [IU]/ML
25 INJECTION, SOLUTION SUBCUTANEOUS
Qty: 5 PEN | Refills: 1 | Status: SHIPPED | OUTPATIENT
Start: 2022-06-23 | End: 2022-09-20 | Stop reason: SDUPTHER

## 2022-06-23 RX ORDER — TOPIRAMATE 50 MG/1
50 TABLET, FILM COATED ORAL 2 TIMES DAILY
Qty: 180 TABLET | Refills: 1 | Status: SHIPPED | OUTPATIENT
Start: 2022-06-23

## 2022-06-23 NOTE — TELEPHONE ENCOUNTER
For Christiano Chen in place:    Recommendation Provided To:    Intervention Detail: New Rx: 1, reason: Patient Preference   Gap Closed?:    Intervention Accepted By:   Emilee Moreno Time Spent (min): 5

## 2022-06-23 NOTE — TELEPHONE ENCOUNTER
Patient called and states the pharmacy had called here for clarification on the novolog and after speaking with the office, the price of the prescription went from $185 to $285. She is asking to speak with a nurse about what she can do and can be reached at [phone].   Please advise, thank you

## 2022-08-03 ENCOUNTER — TELEPHONE (OUTPATIENT)
Dept: INTERNAL MEDICINE CLINIC | Age: 57
End: 2022-08-03

## 2022-08-03 NOTE — TELEPHONE ENCOUNTER
Pt states her handicap decal runs out today and wants to know if she needs a new form to take to [facility]? She has not called DMV to find out. Also, the foot specialist she was referred to prescribed a creme for her foot pain and it is not working. She wants to know if AN has received any notes from that doctor. I asked her if she called his office to let him know it isn't helping and she said his office is hard to get in touch with.     Please advise her at [phone]

## 2022-08-03 NOTE — TELEPHONE ENCOUNTER
Called pt and informed her we can update her dmv handicap decal and call her when ready for pickup (she does need to fill out her portion), and she can take to [facility]. As for Mando's office I called and spoke with  staff and they stated they have spoke with pt earlier today and will let Dr. Evert Bocanegra know. I placed form on desk, please let me know once completed and I will call the pt.

## 2022-08-16 NOTE — TELEPHONE ENCOUNTER
Pablo Chavez spoke with patient, per Dr Huma Childers, and she stated that she should lay flat and drink plenty of fluids and caffeine and call back Friday if headache is not better. Female

## 2022-08-25 ENCOUNTER — OFFICE VISIT (OUTPATIENT)
Dept: PULMONOLOGY | Age: 57
End: 2022-08-25
Payer: MEDICARE

## 2022-08-25 VITALS
OXYGEN SATURATION: 94 % | HEIGHT: 62 IN | SYSTOLIC BLOOD PRESSURE: 151 MMHG | TEMPERATURE: 98.2 F | DIASTOLIC BLOOD PRESSURE: 74 MMHG | BODY MASS INDEX: 32.79 KG/M2 | HEART RATE: 84 BPM | WEIGHT: 178.2 LBS | RESPIRATION RATE: 16 BRPM

## 2022-08-25 DIAGNOSIS — D86.89 SARCOIDOSIS OF OTHER SITES: ICD-10-CM

## 2022-08-25 DIAGNOSIS — Z59.89 INSURANCE COVERAGE PROBLEMS: ICD-10-CM

## 2022-08-25 DIAGNOSIS — D86.0 SARCOIDOSIS, LUNG (HCC): ICD-10-CM

## 2022-08-25 DIAGNOSIS — R06.09 DYSPNEA ON EXERTION: ICD-10-CM

## 2022-08-25 DIAGNOSIS — Z79.52 CURRENT CHRONIC USE OF SYSTEMIC STEROIDS: ICD-10-CM

## 2022-08-25 DIAGNOSIS — J45.50 POORLY CONTROLLED SEVERE PERSISTENT ASTHMA WITHOUT COMPLICATION: Primary | ICD-10-CM

## 2022-08-25 PROCEDURE — G8427 DOCREV CUR MEDS BY ELIG CLIN: HCPCS | Performed by: INTERNAL MEDICINE

## 2022-08-25 PROCEDURE — G9899 SCRN MAM PERF RSLTS DOC: HCPCS | Performed by: INTERNAL MEDICINE

## 2022-08-25 PROCEDURE — G8754 DIAS BP LESS 90: HCPCS | Performed by: INTERNAL MEDICINE

## 2022-08-25 PROCEDURE — G9717 DOC PT DX DEP/BP F/U NT REQ: HCPCS | Performed by: INTERNAL MEDICINE

## 2022-08-25 PROCEDURE — G8753 SYS BP > OR = 140: HCPCS | Performed by: INTERNAL MEDICINE

## 2022-08-25 PROCEDURE — 3017F COLORECTAL CA SCREEN DOC REV: CPT | Performed by: INTERNAL MEDICINE

## 2022-08-25 PROCEDURE — 99214 OFFICE O/P EST MOD 30 MIN: CPT | Performed by: INTERNAL MEDICINE

## 2022-08-25 PROCEDURE — G8417 CALC BMI ABV UP PARAM F/U: HCPCS | Performed by: INTERNAL MEDICINE

## 2022-08-25 RX ORDER — PREDNISONE 5 MG/1
5 TABLET ORAL DAILY
COMMUNITY

## 2022-08-25 RX ORDER — DICLOFENAC SODIUM 10 MG/G
2 GEL TOPICAL 4 TIMES DAILY
COMMUNITY
Start: 2022-07-13

## 2022-08-25 SDOH — ECONOMIC STABILITY - INCOME SECURITY: OTHER PROBLEMS RELATED TO HOUSING AND ECONOMIC CIRCUMSTANCES: Z59.89

## 2022-08-25 NOTE — PROGRESS NOTES
Dyana Unger presents today for   Chief Complaint   Patient presents with    Asthma     Follow up from 5/25/2022    Sarcoidosis    Other     ILD, PATTON & current chronic use of systemic steroids       Is someone accompanying this pt? No    Is the patient using any DME equipment during OV? No    -DME Company N/A    Depression Screening:  3 most recent PHQ Screens 6/22/2022   PHQ Not Done -   Little interest or pleasure in doing things Not at all   Feeling down, depressed, irritable, or hopeless Not at all   Total Score PHQ 2 0   Trouble falling or staying asleep, or sleeping too much -   Feeling tired or having little energy -   Poor appetite, weight loss, or overeating -   Feeling bad about yourself - or that you are a failure or have let yourself or your family down -   Trouble concentrating on things such as school, work, reading, or watching TV -   Moving or speaking so slowly that other people could have noticed; or the opposite being so fidgety that others notice -   Thoughts of being better off dead, or hurting yourself in some way -   PHQ 9 Score -   How difficult have these problems made it for you to do your work, take care of your home and get along with others -       Learning Assessment:  Learning Assessment 4/29/2022   PRIMARY LEARNER Patient   HIGHEST LEVEL OF EDUCATION - PRIMARY LEARNER  -   BARRIERS PRIMARY LEARNER -   CO-LEARNER CAREGIVER -   PRIMARY LANGUAGE ENGLISH   LEARNER PREFERENCE PRIMARY DEMONSTRATION     -     -   ANSWERED BY patient   RELATIONSHIP SELF       Abuse Screening:  Abuse Screening Questionnaire 4/29/2022   Do you ever feel afraid of your partner? N   Are you in a relationship with someone who physically or mentally threatens you? N   Is it safe for you to go home? Y       Fall Risk  Fall Risk Assessment, last 12 mths 3/24/2021   Able to walk? Yes   Fall in past 12 months? 1   Do you feel unsteady?  1   Are you worried about falling 1   Is TUG test greater than 12 seconds? 0 Is the gait abnormal? 0   Number of falls in past 12 months 2         Coordination of Care:  1. Have you been to the ER, urgent care clinic since your last visit? Hospitalized since your last visit? No    2. Have you seen or consulted any other health care providers outside of the [address] [place] [address] since your last visit? Include any pap smears or colon screening. Yes. Dr. Sherry Potter, ophthalmologist, Dr. Bret Mariscal, nephologist, Dr. Adolfo Cage, ENT    Per  patient, has not had COVID vaccine.

## 2022-08-25 NOTE — PROGRESS NOTES
[address] [place] [address] [facility]  Pulmonary, Critical Care, and Sleep Medicine    Pulmonary Office F/U  Name: Kelli Trevizo 61 y.o. female  MRN: [id number]  : 1965  Service Date: 22  Chief Complaint:   Chief Complaint   Patient presents with    Asthma     Follow up from 2022    Sarcoidosis    Other     ILD, PATTON & current chronic use of systemic steroids         History of Present Illness:  Kelli Trevizo is a 61 y.o. female, who presents to Pulmonary clinic for follow-up of sarcoidosis. Patient was last seen in our clinic on 22. Pt continues to report severe sx with dyspnea and bronchospasm  Still on trelegy and flovent -- however she is taking them staggaered due to issues with insurance coverage. Also having issues with her other medicine. Using albuterol multiple times daily  Nocturnal awakenings: twice a week  Reports she saw ENT, had skin allergy testing performed yesterday and she was very allergic to multiple agents.       Past Medical History:   Diagnosis Date    Acquired cyst of kidney 2020    emerita    Asthma     Bilateral great toe fractures     Chronic kidney disease (CKD), stage IV (severe) ([address] 2022    Dr Analy Ceron    Chronic sinusitis     Dr. Viral Cobian in the past    Colon polyp     Dr Anton Danielle    Depression     Diabetes mellitus ([facility]     DJD (degenerative joint disease) of cervical spine     DJD (degenerative joint disease), lumbar     MRI c spine w degen changes; Dr Munoz Daily    Dyslipidemia     calculated 10 year risk score was 2.0% ()    Edema of both ankles 2018    Environmental and seasonal allergies 2018    Eustachian tube dysfunction     Fibrocystic breast     Dr Diane Paz    GERD (gastroesophageal reflux disease)     Hypertriglyceridemia 2018    Lateral epicondylitis of both elbows 2017    Macular degeneration of both eyes 2018    Dr JAMES Dixie, [facility]    Mild intermittent asthma without complication 24/18/4058    Dr. Sal Pathak;  ratio 68%, FEV1 78% w 6% inc postbd, TLC 77, RV 56, DLCO 61%    Morbid obesity (HCC)     peak weight 196 lbs, bmi 35.8 from 10/13    Neuropathy 8/28/2018    Osteopenia     Dr. Mj Navarro; DEXA t score -0.7 spine, -0.2 hip (8/14)    Sarcoidosis     Dr Tabatha Tolbert    Type 2 diabetes mellitus with hyperglycemia, with long-term current use of insulin ([facility] 8/28/2018     Past Surgical History:   Procedure Laterality Date    COLONOSCOPY N/A 5/26/2016    Dr Ene Hess hyperplastic    COLONOSCOPY N/A 10/19/2021    COLONOSCOPY with polypectomy performed by Paul Plaza MD at [address] [place] [address] Dr Dr. Nelson Jerry HEENT      nasal polypectomy Dr. Saul Padron HEENT      tear duct surgery right Dr. Krys Oleary 2010; left Dr Chano Douglas 2015    HX ORTHOPAEDIC      DEXA -0.7 spine, -0.2 hip 8/14    SC CARDIAC SURG PROCEDURE UNLIST  9/10, 8/13    thallium negative ef 72%; negative ef 70%    SC CHEST SURGERY PROCEDURE UNLISTED  7/12    US thyroid negative    SC CHEST SURGERY PROCEDURE UNLISTED  2012    pfts w mod restrictive defect     VASCULAR SURGERY PROCEDURE UNLIST  12/13    venous doppler negative     Family History   Problem Relation Age of Onset    Cancer Mother     Hypertension Mother     Cancer Father     Diabetes Father     Hypertension Father     Stroke Father     Diabetes Sister     Hypertension Sister     Heart Disease Sister     Colon Cancer Sister 52    Cancer Sister     Hypertension Brother     Cancer Maternal Aunt      Social History     Socioeconomic History    Marital status: LEGALLY      Spouse name: Not on file    Number of children: 0    Years of education: 15    Highest education level: Not on file   Occupational History    Occupation: Unemployed   Tobacco Use    Smoking status: Never    Smokeless tobacco: Never   Vaping Use    Vaping Use: Never used   Substance and Sexual Activity    Alcohol use: Yes     Alcohol/week: 0.0 standard drinks     Comment: occasional wine    Drug use: Never    Sexual activity: Not Currently   Other Topics Concern     Service No    Blood Transfusions No    Caffeine Concern Yes     Comment: due to reflux    Occupational Exposure No    Hobby Hazards No    Sleep Concern Yes    Stress Concern Yes     Comment: wants a job    Weight Concern Yes    Special Diet No    Back Care No    Exercise Yes    Bike Helmet No    Seat Belt Yes    Self-Exams Yes   Social History Narrative    Patient lives with a friend, no pets. Social Determinants of Health     Financial Resource Strain: Not on file   Food Insecurity: Not on file   Transportation Needs: Not on file   Physical Activity: Not on file   Stress: Not on file   Social Connections: Not on file   Intimate Partner Violence: Not on file   Housing Stability: Not on file     Allergies   Allergen Reactions    Other Food Itching     Pt reported food allergy to \"tropical fruits\", but could only specify pineapple and jenifer; pt was not able to recall any other fruits. Cracks in corner of mouth, irritation of tongue. Pollen Extracts Runny Nose and Cough    Amoxicillin Hives, Shortness of Breath and Swelling    Crestor [Rosuvastatin] Myalgia    Ibuprofen Other (comments)     dont prescribe due to kidney function    Lipitor [Atorvastatin] Other (comments)     Muscle cramps and weakness      Cotati Flavor Itching     Allergy to Cotati. Cracks in corner of mouth, irritation of tongue. Pcn [Penicillins] Angioedema    Pineapple Itching and Other (comments)     Cracks in corner of mouth, irritation of tongue. Prior to Admission medications    Medication Sig Start Date End Date Taking? Authorizing Provider   insulin glargine (LANTUS,BASAGLAR) 100 unit/mL (3 mL) inpn 25 Units by SubCUTAneous route nightly. 6/23/22   Martha Abraahm DNP   pravastatin (PRAVACHOL) 80 mg tablet Take 1 Tablet by mouth daily.  6/23/22   [facility] Rosa Maria Cuba DNP   SITagliptin (JANUVIA) 25 mg tablet Take 1 Tablet by mouth daily. For diabetes 6/23/22   George LOMBARDO DNP   topiramate (TOPAMAX) 50 mg tablet Take 1 Tablet by mouth two (2) times a day. 6/23/22   Jacqueline Dubose DNP   venlafaxine-SR [facility] 75 mg capsule Take 1 Capsule by mouth daily. 6/23/22   Jacqueline Dubose DNP   cetirizine HCl (ZYRTEC PO) Take  by mouth. Provider, Historical   esomeprazole (NEXIUM) 40 mg capsule Take 1 Capsule by mouth daily as needed for Gastroesophageal Reflux Disease (GERD). 6/22/22   Rosa Maria Khan DNP   insulin aspart U-100 (NovoLOG Flexpen U-100 Insulin) 100 unit/mL (3 mL) inpn 3 times per day: 150-200 2u, 201-250 4u, 251-300 6u, 301-350 8u, 351-400 10u, > 400 12u. 6/22/22   Carlee Khan, [place]   magnesium oxide (MAG-OX) 400 mg tablet Take 1 Tablet by mouth two (2) times daily (with meals). 6/22/22   Jacqueline Dubose DNP   furosemide (LASIX) 20 mg tablet Take 1 Tablet by mouth daily. Indications: visible water retention, high blood pressure 6/22/22   Carlee Khan [place], [place]   Insulin Needles, Disposable, ([place] Pen Needle) 32 gauge x 5/32\" ndle Check blood sugars three times a day 6/22/22   George LOMBARDO DNP   dilTIAZem ER (CARDIZEM CD) 120 mg capsule Take 1 Capsule by mouth daily. 6/15/22   Breann JONES DO   predniSONE (DELTASONE) 5 mg tablet 4 tablets po daily x week then 3 tablets po daily x 1 week then 2 tablets po daily x 1 week then 1 tablet po daily 2/2/22   Master Iqbal MD   ondansetron hcl (Zofran) 4 mg tablet Take 1 Tablet by mouth every eight (8) hours as needed for Nausea or Nausea or Vomiting. 2/2/22   Master Iqbal MD   butalbital-acetaminophen-caffeine (FIORICET, ESGIC) -40 mg per tablet Take 1 Tablet by mouth every six (6) hours as needed. 12/22/21   Provider, Historical   fluticasone propionate (Flovent Diskus) 250 mcg/actuation dsdv Take 1 Puff by inhalation two (2) times a day.  Rinse gargle mouth thoroughly after each use. Disp: 3 inhalers 9/15/21   Mckay Dao MD   fluticasone-umeclidin-vilanter (Trelegy Ellipta) 417-43.2-75 mcg dsdv Take 1 Puff by inhalation daily. Rinse and gargle after each use 9/15/21   Mckay Dao MD   cyanocobalamin (VITAMIN B12) 100 mcg tablet Take 50 mcg by mouth daily. Provider, Historical   albuterol (PROVENTIL HFA, VENTOLIN HFA, PROAIR HFA) 90 mcg/actuation inhaler Take 2 Puffs by inhalation every four (4) hours as needed for Shortness of Breath. Indications: asthma attack 6/16/21   Terri Thomas DNP   therapeutic multivitamin-minerals [facility]) tablet Take 1 Tab by mouth daily. 3/7/21   Provider, Historical   triamcinolone acetonide (KENALOG) 0.1 % topical cream Apply  to affected area two (2) times daily as needed for Skin Irritation. 3/24/21   Terri Thomas DNP   aspirin delayed-release 81 mg tablet Take 1 Tab by mouth daily. For heart health 9/3/19   Lyubov LOMBARDO DNP   glucose blood VI test strips (ACCU-CHEK POOJA) strip Pt to test 5 times daily. Dx:E11.65 11/17/17   Homero Resendiz MD     Immunization History   Administered Date(s) Administered    Influenza Vaccine 02/15/2021, 10/07/2021    Influenza, FLUARIX, FLULAVAL, (age 10 mo+) AND AFLURIA, FLUZONE (age 1 y+), PF 09/28/2017, 11/26/2018    Pneumococcal Polysaccharide (PPSV-23) 09/28/2017    Tdap 04/24/2013       Review of Systems:  A complete review of systems was performed as stated in the HPI, all others are negative.       Objective:    Physical Exam:  BP (!) 151/74 (BP 1 Location: Left upper arm, BP Patient Position: Sitting, BP Cuff Size: Large adult)   Pulse 84   Temp 98.2 °F (36.8 °C) (Temporal)   Resp 16   Ht 5' 2\" (1.575 m)   Wt 80.8 kg (178 lb 3.2 oz)   LMP 03/30/2013   SpO2 94%   BMI 32.59 kg/m²   Vitals were personally reviewed  Gen: no acute distress, pleasant and cooperative, sitting up in chair, ambulates without difficulty  HEENT: normocephalic/atraumatic, no ocular drainage, EOMI, no scleral icterus, nasal bridge midline, unable to assess nasal and oral cavities due to patient wearing mask in the setting of COVID-19 pandemic  Neck: supple, trachea midline, no JVD, no cervical and supraclavicular adenopathy  CVS: regular rate rhythm, S1/S2, no murmurs/rubs/gallops  Lungs: fair air entry B/L, CTABL, no wheezes/rales/rhonchi  Psych: normal memory, thought content, and processing    Labs: I have reviewed the patient's available labs  Lab Results   Component Value Date/Time    WBC 6.1 05/04/2022 10:36 AM    Hemoglobin, POC 13.6 05/26/2016 10:49 AM    HGB 12.0 05/04/2022 10:36 AM    Hematocrit, POC 40 05/26/2016 10:49 AM    HCT 38.8 05/04/2022 10:36 AM    PLATELET 555 04/85/5185 10:36 AM    MCV 96.5 05/04/2022 10:36 AM    on 3/7/22  Lab Results   Component Value Date/Time    Sodium 143 06/15/2022 10:47 AM    Potassium 4.4 06/15/2022 10:47 AM    Chloride 104 06/15/2022 10:47 AM    CO2 26 06/15/2022 10:47 AM    Anion gap 13.0 06/15/2022 10:47 AM    Glucose 134 (H) 06/15/2022 10:47 AM    BUN 33 (H) 06/15/2022 10:47 AM    Creatinine 2.0 (H) 06/15/2022 10:47 AM    BUN/Creatinine ratio 17 05/04/2022 10:36 AM    GFR est AA 29 (L) 05/04/2022 10:36 AM    GFR est non-AA 24 (L) 05/04/2022 10:36 AM    Calcium 10.1 06/15/2022 10:47 AM    Bilirubin, total 0.3 06/15/2022 10:47 AM    Alk. phosphatase 81 06/15/2022 10:47 AM    Protein, total 6.9 06/15/2022 10:47 AM    Albumin 4.4 06/15/2022 10:47 AM    Globulin 2.5 06/15/2022 10:47 AM    A-G Ratio 1.8 06/15/2022 10:47 AM    ALT (SGPT) 16 06/15/2022 10:47 AM    AST (SGOT) 23 06/15/2022 10:47 AM   Last ACE level was 52 on 6/30/2021      Imaging:  I have personally reviewed patient's imaging as follows--No new imaging  **CT chest abdomen pelvis without contrast from 1/30/2022 shows scattered linear fibrotic opacities apically extending from the hilum, otherwise no infiltrates or effusions.   Patient also has some scattered bilateral reticular changes and fibrotic changes in the bases. No nodules or active inflammation seen. Official report per radiology:  CT Results (most recent):  Results from Hospital Encounter encounter on 01/28/22    CT CHEST ABD PELV WO CONT    Narrative  CT Chest, Abdomen, And Pelvis Without Contrast    TECHNIQUE: 5 mm axial images from the thoracic inlet to issue tuberosities were  obtained without intravenous contrast.  Oral contrast was not administered. Coronal and sagittal reformations. All CT scans are performed using dose optimization techniques as appropriate to  the performed exam including the following: Automated exposure control,  adjustment of mA and/or kV according to patient size, and use of iterative  reconstructive technique. HISTORY: Hypercalcemia. Question malignancy. History of sarcoidosis. FINDINGS:  CHEST:  Visualized thyroid gland without definitive nodule. No axillary adenopathy. Calcified mediastinal and bilateral hilar nodes similar to previous  atherosclerotic calcification aorta and coronary arteries. Thoracic aorta is not  aneurysmal. No pericardial effusion. Some progressive perihilar and upper lobe atelectasis right greater than left. Trace effusion on the right. No discrete lung nodule or mass. No definite new  consolidations    Nonspecific fatty nodules of the right and left breast which may correspond with  lateral cyst on mammogram 3/2021. Continued mammographic follow-up suggested. ABDOMEN/PELVIS:  Noncontrast liver, spleen and pancreas without focal abnormality. Nondilated  gallbladder. No biliary duct dilatation. No adrenal nodule or mass. No contour  deforming mass of the right or left kidney. No hydronephrosis. Tiny  nonobstructing stone lower pole left kidney measuring a few millimeters. No dilated stomach, small bowel, or colon. Appendix is not inflamed. Sigmoid  diverticulosis. No gross adenopathy. No ascites. Uterus and adnexa are unremarkable. Underdistended bladder. OSSEOUS STRUCTURES:  No focal suspicious bone lesions. Impression  There is no CT finding for occult malignancy or metastatic disease on this  noncontrast examination. Calcified mediastinal and hilar lymph nodes compatible with history of  sarcoidosis. Relatively perihilar and septal thickening and of atelectasis/scarring which may  be reflective of pulmonary sarcoid. PFTs: 5/24/2022 as follows: Spirometry is normal, no BD response. Lung volumes show a mild restriction. Diffusion capacity is moderately reduced    TTE:  I have reviewed the patient's TTE results  05/14/19   ECHO ADULT COMPLETE 05/15/2019 5/15/2019    Narrative · Left Ventricle: Estimated left ventricular ejection fraction is 61 -   65%. No regional wall motion abnormality noted. Age-appropriate left   ventricular diastolic function. Signed by: Raymond Gurrola DO         Assessment and Plan:  61 y.o. female with:    Impression:  1. Severe persistent asthma, poorly controlled:  Has significant peripheral eosinophilia (600 from 1/29/2022 and 500 on 3/7/22), with recurrent exacerbations (at least 2) and pt on chronic prednisone  2. Sarcoidosis of the lung: Patient has interstitial opacities as well as multiple calcified lymph nodes. Patient has been on chronic prednisone therapy for over 10 years. Patient was on 6 mg before hospitalization in March 2021, then discharged at 20 mg and tapered off after last visit in September -- discontinued around Nov 2021. Patient admitted with hypercalcemia in late January 2022, placed back on prednisone. Patient down to 5 mg daily -- remaining there chronically per Nephrology. CT scan shows no evidence of active inflammation from sarcoidosis, patient has fibrotic changes which likely represents burned-out disease. Patient likely does have ongoing reactive airway disease from sarcoidosis  3.   Hypercalcemia:  -Occurred in Jan 2022, as high as 15.2, admitted to [facility] from 1/28 through 2/2/2022. Patient was seen by nephrology, thought to be combination of factors including sarcoidosis, although vitamin D1, 25 was within normal limits (high side of normal), vitamin D supplementation and CKD with diuretic use. Treated with IVF, calcitonin, and Zometa. -Patient started on steroids for presumed sarcoidosis related renal disease.    -Remains on chronic prednisone therapy 5mg by Dr. Lynne Arreaga    4. Sarcoidosis of the nose: Patient reports she was diagnosed by biopsy about 10 years ago, follows with ENT, recent CT sinuses shows scattered sinus changes  5. ILD: Secondary to sarcoidosis as noted above  6. 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 Chronic allergic rhinosinusitis  8. Obesity: Body mass index is 33.32 kg/m². 9.  Insurance coverage issues:  Pt reports her income went up slightly but resulted in loss of medicaid    Plan:  -Due to recurrent exacerbations in the setting of poorly controlled, severe persistent asthma and patient on maximal medical therapy -- STEROID DEPENDENT, I strongly advised patient to consider biologic therapy. Discussed patient's lab work showing significant eosinophilia (500 from 3/7/2022), I discussed the possibility of benralizumab SQ every other month. I reviewed the risks and benefits including the risks of anaphylaxis/delayed anaphylaxis, injection site reaction, as well as side effects of headache and pharyngitis. Patient was agreeable to initiating therapy if approved by their insurance. Forms filled out in clinic and given ancillary staff for prior authorization. Of note, discussed with patient if [place] is denied, will attempt to get Nucala as an alternative  -Start benralizumab 30 mg SQ every 4 weeks for the first 3 doses followed by every 8 weeks thereafter.  -Continue chronic prednisone (5mg) per Nephrology.   Management of hypercalcemia and Vit D levels per Nephrology  -Pt does not have financial means to do dual high dose ICS therapy, so will hold off and continue ICS/LABA/LAMA therapy  -Continue Trelegy Ellipta 200 mcg 1 puff once daily. Counseled patient to rinse mouth thoroughly after each use  -Management of sinusitis and nasal sarcoidosis and potential SCIT per ENT  -Continue ongoing lab work and avoid nephrotoxic agents and management of anemia per nephrology  -Advised patient to have yearly ophthalmology screening for ocular sarcoidosis as ocular sarcoidosis is a preventable cause of blindness/visual impairment  -Advised to remain active. Offered pulmonary rehab, patient declined  -Due to chronic prednisone therapy, advised patient to follow-up with PCP with regards to DEXA scan--not needed until next year given that patient was given a dose of Zometa while hospitalized  -Immunizations reviewed, pneumococcal vaccinations are up-to-date  -Advised pt to receive COVID-19 vaccination when possible since she is high risk on chronic immunosuppression and severe asthma    Follow-up and Dispositions    Return in about 3 months (around 11/25/2022).          Aicha Barriga MD/MPH     Pulmonary, Critical Care Medicine  [facility]

## 2022-08-25 NOTE — LETTER
8/25/2022    Patient: Gregory Pollack   YOB: 1965   Date of Visit: 8/25/2022     Nita Ash, DNP  [address] Formerly [facility]  [place]    Dear Nita Ash, [address],      Thank you for referring Ms. Yisel Joseph to [address] [place] [address] for evaluation. My notes for this consultation are attached. If you have questions, please do not hesitate to call me. I look forward to following your patient along with you.       Sincerely,    Mary Rider MD

## 2022-08-25 NOTE — Clinical Note
You can send the order for Fasenra to the infusion center.   Aicha Barriga MD/MPH    Pulmonary, Critical Care Medicine [facility]

## 2022-09-06 ENCOUNTER — TELEPHONE (OUTPATIENT)
Dept: PULMONOLOGY | Age: 57
End: 2022-09-06

## 2022-09-06 ENCOUNTER — TRANSCRIBE ORDER (OUTPATIENT)
Dept: SCHEDULING | Age: 57
End: 2022-09-06

## 2022-09-06 DIAGNOSIS — M85.672 OTHER CYST OF BONE, LEFT ANKLE AND FOOT: Primary | ICD-10-CM

## 2022-09-08 PROBLEM — J45.50 SEVERE PERSISTENT ASTHMA: Status: ACTIVE | Noted: 2022-09-08

## 2022-09-08 RX ORDER — HYDROCORTISONE SODIUM SUCCINATE 100 MG/2ML
100 INJECTION, POWDER, FOR SOLUTION INTRAMUSCULAR; INTRAVENOUS AS NEEDED
Status: CANCELLED | OUTPATIENT
Start: 2022-09-13

## 2022-09-08 RX ORDER — DIPHENHYDRAMINE HYDROCHLORIDE 50 MG/ML
50 INJECTION, SOLUTION INTRAMUSCULAR; INTRAVENOUS AS NEEDED
Status: CANCELLED
Start: 2022-09-13

## 2022-09-08 RX ORDER — ACETAMINOPHEN 325 MG/1
650 TABLET ORAL AS NEEDED
Status: CANCELLED
Start: 2022-09-13

## 2022-09-08 RX ORDER — EPINEPHRINE 1 MG/ML
0.3 INJECTION, SOLUTION, CONCENTRATE INTRAVENOUS AS NEEDED
Status: CANCELLED | OUTPATIENT
Start: 2022-09-13

## 2022-09-08 RX ORDER — DIPHENHYDRAMINE HYDROCHLORIDE 50 MG/ML
25 INJECTION, SOLUTION INTRAMUSCULAR; INTRAVENOUS AS NEEDED
Status: CANCELLED
Start: 2022-09-13

## 2022-09-08 RX ORDER — ALBUTEROL SULFATE 0.83 MG/ML
2.5 SOLUTION RESPIRATORY (INHALATION) AS NEEDED
Status: CANCELLED
Start: 2022-09-13

## 2022-09-08 RX ORDER — ONDANSETRON 2 MG/ML
8 INJECTION INTRAMUSCULAR; INTRAVENOUS AS NEEDED
Status: CANCELLED | OUTPATIENT
Start: 2022-09-13

## 2022-09-09 ENCOUNTER — APPOINTMENT (OUTPATIENT)
Dept: INFUSION THERAPY | Age: 57
End: 2022-09-09

## 2022-09-13 ENCOUNTER — HOSPITAL ENCOUNTER (OUTPATIENT)
Dept: INFUSION THERAPY | Age: 57
Discharge: HOME | End: 2022-09-13
Payer: MEDICARE

## 2022-09-13 VITALS
TEMPERATURE: 98.2 F | SYSTOLIC BLOOD PRESSURE: 143 MMHG | RESPIRATION RATE: 18 BRPM | HEART RATE: 96 BPM | DIASTOLIC BLOOD PRESSURE: 85 MMHG | OXYGEN SATURATION: 95 %

## 2022-09-13 DIAGNOSIS — J45.50 SEVERE PERSISTENT ASTHMA, UNSPECIFIED WHETHER COMPLICATED: Primary | ICD-10-CM

## 2022-09-13 PROCEDURE — 96372 THER/PROPH/DIAG INJ SC/IM: CPT

## 2022-09-13 PROCEDURE — 74011250636 HC RX REV CODE- 250/636: Performed by: INTERNAL MEDICINE

## 2022-09-13 RX ORDER — HYDROCORTISONE SODIUM SUCCINATE 100 MG/2ML
100 INJECTION, POWDER, FOR SOLUTION INTRAMUSCULAR; INTRAVENOUS AS NEEDED
Status: CANCELLED | OUTPATIENT
Start: 2022-10-11

## 2022-09-13 RX ORDER — ACETAMINOPHEN 325 MG/1
650 TABLET ORAL AS NEEDED
Status: CANCELLED
Start: 2022-10-11

## 2022-09-13 RX ORDER — ALBUTEROL SULFATE 0.83 MG/ML
2.5 SOLUTION RESPIRATORY (INHALATION) AS NEEDED
Status: CANCELLED
Start: 2022-10-11

## 2022-09-13 RX ORDER — ONDANSETRON 2 MG/ML
8 INJECTION INTRAMUSCULAR; INTRAVENOUS AS NEEDED
Status: CANCELLED | OUTPATIENT
Start: 2022-10-11

## 2022-09-13 RX ORDER — DIPHENHYDRAMINE HYDROCHLORIDE 50 MG/ML
25 INJECTION, SOLUTION INTRAMUSCULAR; INTRAVENOUS AS NEEDED
Status: CANCELLED
Start: 2022-10-11

## 2022-09-13 RX ORDER — DIPHENHYDRAMINE HYDROCHLORIDE 50 MG/ML
50 INJECTION, SOLUTION INTRAMUSCULAR; INTRAVENOUS AS NEEDED
Status: CANCELLED
Start: 2022-10-11

## 2022-09-13 RX ORDER — EPINEPHRINE 1 MG/ML
0.3 INJECTION, SOLUTION, CONCENTRATE INTRAVENOUS AS NEEDED
Status: CANCELLED | OUTPATIENT
Start: 2022-10-11

## 2022-09-13 RX ADMIN — BENRALIZUMAB 30 MG: 30 INJECTION, SOLUTION SUBCUTANEOUS at 11:33

## 2022-09-13 NOTE — PROGRESS NOTES
[facility] OPIC Progress Note    Date: 2022    Name: Anju Bermudez    MRN: [id number]         : 1965      FASENRA Q4 WEEKS x3, DOSE 1 OF 3  (THEN Q8 WEEKS)      Ms. Joseph arrived to [facility] at 1120. Ms. Radha Cam was assessed and written/ verbal education provided. Pt was given the opportunity to ask questions which were answered to her satisfaction. Ms. Alize Colindres vitals were reviewed. Visit Vitals  BP (!) 143/85 (BP 1 Location: Left upper arm, BP Patient Position: Sitting)   Pulse 96   Temp 98.2 °F (36.8 °C)   Resp 18   SpO2 95%   Breastfeeding No     Fasenra 30mg was administered as ordered SQ in patient's L upper arm. Pt tolerated well without complaints. Band-aid applied to site. Pt stayed for 30 minute observation following injection (first dose precaution). Pt w/o s/sx of adverse reaction & denies complaints. Discharge/ follow-up instructions discussed w/ pt. Pt verbalized understanding. Pt armband removed & shredded. Ms. Radha Cam was discharged from [place] [address] in stable condition at 1210. She is to return on 10/12/22 at 1130 for her next appointment.     Rizwan Fatima RN  2022

## 2022-09-14 ENCOUNTER — OFFICE VISIT (OUTPATIENT)
Dept: INTERNAL MEDICINE CLINIC | Age: 57
End: 2022-09-14
Payer: MEDICARE

## 2022-09-14 ENCOUNTER — APPOINTMENT (OUTPATIENT)
Dept: INTERNAL MEDICINE CLINIC | Age: 57
End: 2022-09-14

## 2022-09-14 VITALS
TEMPERATURE: 97.6 F | RESPIRATION RATE: 16 BRPM | BODY MASS INDEX: 33.01 KG/M2 | HEIGHT: 62 IN | DIASTOLIC BLOOD PRESSURE: 82 MMHG | OXYGEN SATURATION: 98 % | WEIGHT: 179.4 LBS | SYSTOLIC BLOOD PRESSURE: 138 MMHG | HEART RATE: 80 BPM

## 2022-09-14 DIAGNOSIS — E11.65 TYPE 2 DIABETES MELLITUS WITH HYPERGLYCEMIA, WITH LONG-TERM CURRENT USE OF INSULIN (HCC): Primary | ICD-10-CM

## 2022-09-14 DIAGNOSIS — Z79.4 TYPE 2 DIABETES MELLITUS WITH HYPERGLYCEMIA, WITH LONG-TERM CURRENT USE OF INSULIN (HCC): Primary | ICD-10-CM

## 2022-09-14 DIAGNOSIS — E11.40 DIABETIC NEUROPATHY, PAINFUL (HCC): ICD-10-CM

## 2022-09-14 DIAGNOSIS — J45.50 SEVERE PERSISTENT ASTHMA, UNSPECIFIED WHETHER COMPLICATED: ICD-10-CM

## 2022-09-14 DIAGNOSIS — E78.2 MIXED HYPERLIPIDEMIA: ICD-10-CM

## 2022-09-14 DIAGNOSIS — E83.52 HYPERCALCEMIA: ICD-10-CM

## 2022-09-14 DIAGNOSIS — N18.4 CHRONIC KIDNEY DISEASE (CKD), STAGE IV (SEVERE) (HCC): ICD-10-CM

## 2022-09-14 DIAGNOSIS — F41.8 DEPRESSION WITH ANXIETY: ICD-10-CM

## 2022-09-14 PROBLEM — E11.3599 TYPE 2 DIABETES MELLITUS WITH PROLIFERATIVE RETINOPATHY (HCC): Status: RESOLVED | Noted: 2019-06-10 | Resolved: 2022-09-14

## 2022-09-14 PROCEDURE — 3051F HG A1C>EQUAL 7.0%<8.0%: CPT | Performed by: NURSE PRACTITIONER

## 2022-09-14 PROCEDURE — 2022F DILAT RTA XM EVC RTNOPTHY: CPT | Performed by: NURSE PRACTITIONER

## 2022-09-14 PROCEDURE — 99213 OFFICE O/P EST LOW 20 MIN: CPT | Performed by: NURSE PRACTITIONER

## 2022-09-14 PROCEDURE — 3017F COLORECTAL CA SCREEN DOC REV: CPT | Performed by: NURSE PRACTITIONER

## 2022-09-14 PROCEDURE — G8417 CALC BMI ABV UP PARAM F/U: HCPCS | Performed by: NURSE PRACTITIONER

## 2022-09-14 PROCEDURE — G9899 SCRN MAM PERF RSLTS DOC: HCPCS | Performed by: NURSE PRACTITIONER

## 2022-09-14 PROCEDURE — G9717 DOC PT DX DEP/BP F/U NT REQ: HCPCS | Performed by: NURSE PRACTITIONER

## 2022-09-14 PROCEDURE — G8754 DIAS BP LESS 90: HCPCS | Performed by: NURSE PRACTITIONER

## 2022-09-14 PROCEDURE — G8427 DOCREV CUR MEDS BY ELIG CLIN: HCPCS | Performed by: NURSE PRACTITIONER

## 2022-09-14 PROCEDURE — G8752 SYS BP LESS 140: HCPCS | Performed by: NURSE PRACTITIONER

## 2022-09-14 RX ORDER — FUROSEMIDE 20 MG/1
20 TABLET ORAL DAILY
Qty: 90 TABLET | Refills: 0 | Status: SHIPPED | OUTPATIENT
Start: 2022-09-14

## 2022-09-14 NOTE — PROGRESS NOTES
[facility] [place] [address] [address] [facility], [address]  [phone] TVZLUB/[phone] fax    9/14/2022    Yisel Cruz Grate 1965 is a pleasant BLACK/ female. Todays concerns/HPI:  Left ankle. Seeing Dr Vivek Weldon. Scheduled for MRI on Friday. Suspects calcium buildup. Incont supplies. Never heard back from [facility] [address]. Pt to call DME and request supply order be sent to this office. DM. Unable to afford Novolog-costs >$100. Has some humalog left. Using approx 1x/d. Continues Lantus and Januvia therapy. BS . Asthma/Allergy injections. Started therapy yesterday under pulm.        Past Medical History:   Diagnosis Date    Acquired cyst of kidney 01/16/2020    [facility]    Asthma     Bilateral great toe fractures 2014    Chronic kidney disease (CKD), stage IV (severe) ([address] 06/2022    Dr Cristal Pang    Chronic sinusitis     Dr. Reinier Prescott in the past    Colon polyp 5/16    Dr Henri Bailey    Depression 2019    Diabetes mellitus ([facility] [address]     DJD (degenerative joint disease) of cervical spine 2016    DJD (degenerative joint disease), lumbar 2013    MRI c spine w degen changes; Dr Corina Millan    Dyslipidemia     calculated 10 year risk score was 2.0% (12/13)    Edema of both ankles 8/28/2018    Environmental and seasonal allergies 8/28/2018    Eustachian tube dysfunction     Fibrocystic breast     Dr Cale Tomlinson    GERD (gastroesophageal reflux disease)     Hypertriglyceridemia 8/28/2018    Lateral epicondylitis of both elbows 2/6/2017    Macular degeneration of both eyes 08/28/2018    Dr Judie Godinezy, [facility]    Mild intermittent asthma without complication 01/00/6716    Dr. Umair Roach;  ratio 68%, FEV1 78% w 6% inc postbd, TLC 77, RV 56, DLCO 61%    Morbid obesity (HCC)     peak weight 196 lbs, bmi 35.8 from 10/13    Neuropathy 8/28/2018    Osteopenia     Dr. Mik Beltre; DEXA t score -0.7 spine, -0.2 hip (8/14)    Sarcoidosis     Dr Jazzmine Mckeon    Type 2 diabetes mellitus with hyperglycemia, with long-term current use of insulin ([facility] [address] 8/28/2018     Current Outpatient Medications   Medication Sig    furosemide (LASIX) 20 mg tablet Take 1 Tablet by mouth daily. Indications: visible water retention, high blood pressure    diclofenac (VOLTAREN) 1 % gel 2 g four (4) times daily. predniSONE (DELTASONE) 5 mg tablet Take 5 mg by mouth daily. insulin glargine (LANTUS,BASAGLAR) 100 unit/mL (3 mL) inpn 25 Units by SubCUTAneous route nightly. pravastatin (PRAVACHOL) 80 mg tablet Take 1 Tablet by mouth daily. SITagliptin (JANUVIA) 25 mg tablet Take 1 Tablet by mouth daily. For diabetes    topiramate (TOPAMAX) 50 mg tablet Take 1 Tablet by mouth two (2) times a day. venlafaxine-SR (EFFEXOR-XR) 75 mg capsule Take 1 Capsule by mouth daily. cetirizine HCl (ZYRTEC PO) Take 1 Tablet by mouth daily. esomeprazole (NEXIUM) 40 mg capsule Take 1 Capsule by mouth daily as needed for Gastroesophageal Reflux Disease (GERD). magnesium oxide (MAG-OX) 400 mg tablet Take 1 Tablet by mouth two (2) times daily (with meals). Insulin Needles, Disposable, ([place] Pen Needle) 32 gauge x 5/32\" ndle Check blood sugars three times a day    dilTIAZem ER (CARDIZEM CD) 120 mg capsule Take 1 Capsule by mouth daily. ondansetron hcl (Zofran) 4 mg tablet Take 1 Tablet by mouth every eight (8) hours as needed for Nausea or Nausea or Vomiting. butalbital-acetaminophen-caffeine (FIORICET, ESGIC) -40 mg per tablet Take 1 Tablet by mouth every six (6) hours as needed. fluticasone-umeclidin-vilanter (Trelegy Ellipta) 200-62.5-25 mcg dsdv Take 1 Puff by inhalation daily. Rinse and gargle after each use    cyanocobalamin (VITAMIN B12) 100 mcg tablet Take 50 mcg by mouth daily. albuterol (PROVENTIL HFA, VENTOLIN HFA, PROAIR HFA) 90 mcg/actuation inhaler Take 2 Puffs by inhalation every four (4) hours as needed for Shortness of Breath.  Indications: asthma attack    therapeutic multivitamin-minerals (THERAGRAN-M) tablet Take 1 Tab by mouth daily. triamcinolone acetonide (KENALOG) 0.1 % topical cream Apply  to affected area two (2) times daily as needed for Skin Irritation. aspirin delayed-release 81 mg tablet Take 1 Tab by mouth daily. For heart health    glucose blood VI test strips (ACCU-CHEK POOJA) strip Pt to test 5 times daily. Dx:E11.65    insulin aspart U-100 (NovoLOG Flexpen U-100 Insulin) 100 unit/mL (3 mL) inpn 3 times per day: 150-200 2u, 201-250 4u, 251-300 6u, 301-350 8u, 351-400 10u, > 400 12u. (Patient not taking: Reported on 9/14/2022)     No current facility-administered medications for this visit. Review of Systems:  Pertinent items are noted in HPI. Physical:   Visit Vitals  /82   Pulse 80   Temp 97.6 °F (36.4 °C) (Temporal)   Resp 16   Ht 5' 2\" (1.575 m)   Wt 179 lb 6.4 oz (81.4 kg)   LMP 03/30/2013   SpO2 98%   BMI 32.81 kg/m²      Wt Readings from Last 3 Encounters:   09/14/22 179 lb 6.4 oz (81.4 kg)   08/25/22 178 lb 3.2 oz (80.8 kg)   06/22/22 182 lb 3.2 oz (82.6 kg)       Exam:   Physical Exam  Constitutional:       Appearance: Normal appearance. She is obese. Neck:      Vascular: No carotid bruit. Cardiovascular:      Rate and Rhythm: Normal rate and regular rhythm. Pulmonary:      Effort: Pulmonary effort is normal. No respiratory distress. Breath sounds: Normal breath sounds. No wheezing. Musculoskeletal:         General: Normal range of motion. Skin:     General: Skin is warm and dry. Neurological:      General: No focal deficit present. Mental Status: She is alert and oriented to person, place, and time. Body mass index is 32.81 kg/m². Review of Data:  Did not complete labs prior to todays appt. Obtain CMP and A1c today    Plan:    1.  Type 2 diabetes mellitus with hyperglycemia, with long-term current use of insulin (HCC)  Awaiting results of A1c  Pt is unable to afford humalog and novolog therapies due to cost  Discussed possible treatment with Trulicity. Continue Lantus and januvia as prescribed. No refills needed at this time (Due Dec)    2. Diabetic neuropathy, painful ([facility] [address]  Continue topamax as prescribed  No refills needed (Due Dec)    3. Chronic kidney disease (CKD), stage IV (severe) ([facility] [address]  Specialist managed condition is being evaluated/managed by Dr. Enrique Kramer. No acute findings today meriting change in management plan. 4. Hypercalcemia    - furosemide (LASIX) 20 mg tablet; Take 1 Tablet by mouth daily. Indications: visible water retention, high blood pressure  Dispense: 90 Tablet; Refill: 0    5. Severe persistent asthma, unspecified whether complicated  Continue current therapy  Follow up with pulm    6. Mixed hyperlipidemia  Continue pravachol treatment  No refills needed at this time (Due Dec)    7. Depression with anxiety  Continue effexor therapy  No refills needed at this time (Due Dec)    Reviewed medication and completed medication reconciliation with the patient. Reviewed side effects of medications with the patient. Questions were answered and patient verb understanding. Past Surgical History:   Procedure Laterality Date    COLONOSCOPY N/A 5/26/2016    Dr Tr Bernardo hyperplastic    COLONOSCOPY N/A 10/19/2021    COLONOSCOPY with polypectomy performed by Bob Stoddard MD at [address]    [place] GALDAMEZ      nasal polypectomy Dr. Aisha Spatz HEENT      tear duct surgery right Dr. Verito Cho 2010; left Dr Ivan Colón 2015    HX ORTHOPAEDIC      DEXA -0.7 spine, -0.2 hip 8/14    [place] CARDIAC SURG PROCEDURE UNLIST  9/10, 8/13    thallium negative ef 72%; negative ef 70%    [place] CHEST SURGERY PROCEDURE UNLISTED  7/12    US thyroid negative    [place] CHEST SURGERY PROCEDURE UNLISTED  2012    pfts w mod restrictive defect     VASCULAR SURGERY PROCEDURE UNLIST  12/13    venous doppler negative     Allergies and Intolerances:    Allergies   Allergen Reactions    Other Food Itching     Pt reported she's allergic to \"all tropical fruits, my tongue starts tingling & side of mouth starts tingling & face swells\"    Pollen Extracts Runny Nose and Cough    Adhesive Tape-Silicones Other (comments)     Reports blistering & burning upon removal of freestyle lindy & and also happened upon removal of ecg holter monitor    Amoxicillin Hives, Shortness of Breath and Swelling    Crestor [Rosuvastatin] Myalgia    Ibuprofen Other (comments)     dont prescribe due to kidney function    Lipitor [Atorvastatin] Other (comments)     Muscle cramps and weakness      [place] Flavor Itching     Allergy to [place]. Cracks in corner of mouth, irritation of tongue. Pcn [Penicillins] Angioedema    Pineapple Itching and Other (comments)     Cracks in corner of mouth, irritation of tongue. Family History:   Family History   Problem Relation Age of Onset    Cancer Mother     Hypertension Mother     Cancer Father     Diabetes Father     Hypertension Father     Stroke Father     Diabetes Sister     Hypertension Sister     Heart Disease Sister     Colon Cancer Sister 52    Cancer Sister     Hypertension Brother     Cancer Maternal Aunt      Social History:   She  reports that she has never smoked.  She has never used smokeless tobacco.   Social History     Substance and Sexual Activity   Alcohol Use Yes    Alcohol/week: 0.0 standard drinks    Comment: occasional wine     Immunization History:  Immunization History   Administered Date(s) Administered    Influenza Vaccine 02/15/2021, 10/07/2021    Influenza, FLUARIX, FLULAVAL, FLUZONE (age 10 mo+) AND AFLURIA, (age 1 y+), PF, 0.5mL 09/28/2017, 11/26/2018    Pneumococcal Polysaccharide (PPSV-23) 09/28/2017    Tdap 04/24/2013         Follow up 3m A1c      Dr. Aubrie Zazueta, AGNP-C, DNP  [facility] [address] [place] [address]

## 2022-09-14 NOTE — PROGRESS NOTES
Chief Complaint   Patient presents with    Diabetes     3 month f/u     1. \"Have you been to the ER, urgent care clinic since your last visit? Hospitalized since your last visit? \" No    2. \"Have you seen or consulted any other health care providers outside of the [address] [place] [address] since your last visit? \" No     3. For patients aged 39-70: Has the patient had a colonoscopy / FIT/ Cologuard? Yes - no Care Gap present      If the patient is female:    4. For patients aged 41-77: Has the patient had a mammogram within the past 2 years? Yes - no Care Gap present      5. For patients aged 21-65: Has the patient had a pap smear? Yes - Care Gap present.  Most recent result on file

## 2022-09-15 LAB
ALBUMIN SERPL-MCNC: 3.9 G/DL (ref 3.8–4.9)
ALBUMIN/GLOB SERPL: 1.3 {RATIO} (ref 1.2–2.2)
ALP SERPL-CCNC: 98 IU/L (ref 44–121)
ALT SERPL-CCNC: 12 IU/L (ref 0–32)
AST SERPL-CCNC: 20 IU/L (ref 0–40)
BILIRUB SERPL-MCNC: <0.2 MG/DL (ref 0–1.2)
BUN SERPL-MCNC: 24 MG/DL (ref 6–24)
BUN/CREAT SERPL: 13 (ref 9–23)
CALCIUM SERPL-MCNC: 9 MG/DL (ref 8.7–10.2)
CHLORIDE SERPL-SCNC: 103 MMOL/L (ref 96–106)
CO2 SERPL-SCNC: 24 MMOL/L (ref 20–29)
CREAT SERPL-MCNC: 1.88 MG/DL (ref 0.57–1)
EGFR: 31 ML/MIN/1.73
EST. AVERAGE GLUCOSE BLD GHB EST-MCNC: 174 MG/DL
GLOBULIN SER CALC-MCNC: 2.9 G/DL (ref 1.5–4.5)
GLUCOSE SERPL-MCNC: 157 MG/DL (ref 65–99)
HBA1C MFR BLD: 7.7 % (ref 4.8–5.6)
INTERPRETATION: NORMAL
POTASSIUM SERPL-SCNC: 4.1 MMOL/L (ref 3.5–5.2)
PROT SERPL-MCNC: 6.8 G/DL (ref 6–8.5)
SODIUM SERPL-SCNC: 142 MMOL/L (ref 134–144)

## 2022-09-16 ENCOUNTER — HOSPITAL ENCOUNTER (OUTPATIENT)
Age: 57
Discharge: HOME | End: 2022-09-16
Attending: PODIATRIST
Payer: MEDICARE

## 2022-09-16 DIAGNOSIS — M85.672 OTHER CYST OF BONE, LEFT ANKLE AND FOOT: ICD-10-CM

## 2022-09-16 PROCEDURE — 73721 MRI JNT OF LWR EXTRE W/O DYE: CPT

## 2022-09-19 DIAGNOSIS — E11.65 TYPE 2 DIABETES MELLITUS WITH HYPERGLYCEMIA, WITH LONG-TERM CURRENT USE OF INSULIN (HCC): Primary | ICD-10-CM

## 2022-09-19 DIAGNOSIS — Z79.4 TYPE 2 DIABETES MELLITUS WITH HYPERGLYCEMIA, WITH LONG-TERM CURRENT USE OF INSULIN (HCC): Primary | ICD-10-CM

## 2022-09-20 DIAGNOSIS — E11.65 TYPE 2 DIABETES MELLITUS WITH HYPERGLYCEMIA, WITH LONG-TERM CURRENT USE OF INSULIN (HCC): Primary | ICD-10-CM

## 2022-09-20 DIAGNOSIS — Z79.4 TYPE 2 DIABETES MELLITUS WITH HYPERGLYCEMIA, WITH LONG-TERM CURRENT USE OF INSULIN (HCC): Primary | ICD-10-CM

## 2022-09-20 RX ORDER — INSULIN GLARGINE 100 [IU]/ML
22 INJECTION, SOLUTION SUBCUTANEOUS
Qty: 5 PEN | Refills: 1 | Status: SHIPPED | OUTPATIENT
Start: 2022-09-20

## 2022-09-20 NOTE — PROGRESS NOTES
Please call patient with the following lab results  1. Creatinine slightly elevated at 1.88 this is improved from 3 months prior  2. GFR 31. Follow-up with nephrologist as scheduled. 3. A1c 7.4-7.7. Discontinue short acting insulin (Humalog/Novolog). Decrease Lantus to 22 units instead of 25 units. Continue Januvia and add Jardiance-sent to her pharmacy.     Thank you

## 2022-09-21 ENCOUNTER — TELEPHONE (OUTPATIENT)
Dept: INTERNAL MEDICINE CLINIC | Age: 57
End: 2022-09-21

## 2022-09-21 NOTE — TELEPHONE ENCOUNTER
----- Message from Valencia Carlson DNP sent at 9/20/2022  7:10 PM EDT -----  Please call patient with the following lab results  1. Creatinine slightly elevated at 1.88 this is improved from 3 months prior  2. GFR 31. Follow-up with nephrologist as scheduled. 3. A1c 7.4-7.7. Discontinue short acting insulin (Humalog/Novolog). Decrease Lantus to 22 units instead of 25 units. Continue Januvia and add Jardiance-sent to her pharmacy.     Thank you

## 2022-10-04 ENCOUNTER — HOSPITAL ENCOUNTER (OUTPATIENT)
Dept: LAB | Age: 57
Discharge: HOME | End: 2022-10-04
Payer: MEDICARE

## 2022-10-04 LAB
25(OH)D3 SERPL-MCNC: 36.5 NG/ML (ref 30–100)
ALBUMIN SERPL-MCNC: 3.4 G/DL (ref 3.4–5)
ANION GAP SERPL CALC-SCNC: 5 MMOL/L (ref 3–18)
BUN SERPL-MCNC: 26 MG/DL (ref 7–18)
BUN/CREAT SERPL: 14 (ref 12–20)
CALCIUM SERPL-MCNC: 9.5 MG/DL (ref 8.5–10.1)
CALCIUM SERPL-MCNC: 9.6 MG/DL (ref 8.5–10.1)
CHLORIDE SERPL-SCNC: 109 MMOL/L (ref 100–111)
CO2 SERPL-SCNC: 27 MMOL/L (ref 21–32)
CREAT SERPL-MCNC: 1.91 MG/DL (ref 0.6–1.3)
CREAT UR-MCNC: 168 MG/DL (ref 30–125)
ERYTHROCYTE [DISTWIDTH] IN BLOOD BY AUTOMATED COUNT: 13 % (ref 11.6–14.5)
GLUCOSE SERPL-MCNC: 180 MG/DL (ref 74–99)
HCT VFR BLD AUTO: 39.3 % (ref 35–45)
HGB BLD-MCNC: 12.4 G/DL (ref 12–16)
MCH RBC QN AUTO: 29.8 PG (ref 24–34)
MCHC RBC AUTO-ENTMCNC: 31.6 G/DL (ref 31–37)
MCV RBC AUTO: 94.5 FL (ref 78–100)
NRBC # BLD: 0 K/UL (ref 0–0.01)
NRBC BLD-RTO: 0 PER 100 WBC
PHOSPHATE SERPL-MCNC: 3.9 MG/DL (ref 2.5–4.9)
PLATELET # BLD AUTO: 172 K/UL (ref 135–420)
PMV BLD AUTO: 11 FL (ref 9.2–11.8)
POTASSIUM SERPL-SCNC: 4.5 MMOL/L (ref 3.5–5.5)
PROT UR-MCNC: 16 MG/DL
PROT/CREAT UR-RTO: 0.1
PTH-INTACT SERPL-MCNC: 63.1 PG/ML (ref 18.4–88)
RBC # BLD AUTO: 4.16 M/UL (ref 4.2–5.3)
SODIUM SERPL-SCNC: 141 MMOL/L (ref 136–145)
WBC # BLD AUTO: 5.3 K/UL (ref 4.6–13.2)

## 2022-10-04 PROCEDURE — 83970 ASSAY OF PARATHORMONE: CPT

## 2022-10-04 PROCEDURE — 80069 RENAL FUNCTION PANEL: CPT

## 2022-10-04 PROCEDURE — 84156 ASSAY OF PROTEIN URINE: CPT

## 2022-10-04 PROCEDURE — 82306 VITAMIN D 25 HYDROXY: CPT

## 2022-10-04 PROCEDURE — 36415 COLL VENOUS BLD VENIPUNCTURE: CPT

## 2022-10-04 PROCEDURE — 85027 COMPLETE CBC AUTOMATED: CPT

## 2022-10-04 NOTE — PATIENT INSTRUCTIONS
Emergency Department Attending Note    Lita Chase MD    Date of ED VIsit: 10/3/2022    CHIEF COMPLAINT  Flank Pain (C/O right flank pain x 4 hours)      HISTORY OF PRESENT ILLNESS  Yusra Payne is a 69 y.o. female  With Vital signs of BP (!) 218/116   Pulse 74   Temp 98.3 °F (36.8 °C) (Oral)   Resp 16   Ht 5' 4\" (1.626 m)   Wt 160 lb (72.6 kg)   LMP 01/01/1999   SpO2 99%   BMI 27.46 kg/m²  who presents to the ED with a complaint of right flank pain x4 hours. Patient seen and evaluated in room 2. Patient complains of right flank pain x4 hours. It sounds like it might of been stone because once she urinated here in the emergency department the pain went away completely. Although her urinalysis does appear to be infected as well so I am not quite sure if it was a stone or a UTI so organ to treat her for the UTI with Keflex considering her allergies. .  No other complaints, modifying factors or associated symptoms. Patients Past medical history reviewed and listed below  Past Medical History:   Diagnosis Date    Bladder prolapse 08/18/09    ABD SACROCOLPOPEXY, ACUP    Cystocele 03/15/2007    Depression     Heart disease     Hyperlipemia     Hypertension     Uterovaginal prolapse, complete 08/18/2009    SUBTOTAL HYST, SCAROCOLPOPEXY     Past Surgical History:   Procedure Laterality Date    BLADDER SUSPENSION  08/18/2009    ABD SACROCOLPOPEXY, ACUP    HYSTERECTOMY (CERVIX STATUS UNKNOWN)  08/18/2009    SUBTOTAL HYST       I have reviewed the following from the nursing documentation.     Family History   Problem Relation Age of Onset    Depression Mother     Heart Disease Mother     High Blood Pressure Mother     High Cholesterol Mother     High Blood Pressure Son     High Cholesterol Son      Social History     Socioeconomic History    Marital status:      Spouse name: Not on file    Number of children: Not on file    Years of education: Not on file    Highest education level: Not on file Sacroiliac Pain: Exercises  Introduction  Here are some examples of exercises for you to try. The exercises may be suggested for a condition or for rehabilitation. Start each exercise slowly. Ease off the exercises if you start to have pain. You will be told when to start these exercises and which ones will work best for you. How to do the exercises  Knee-to-chest stretch    1. Do not do the knee-to-chest exercise if it causes or increases back or leg pain. 2. Lie on your back with your knees bent and your feet flat on the floor. You can put a small pillow under your head and neck if it is more comfortable. 3. Grasp your hands under one knee and bring the knee to your chest, keeping the other foot flat on the floor. 4. Keep your lower back pressed to the floor. Hold for at least 15 to 30 seconds. 5. Relax and lower the knee to the starting position. Repeat with the other leg. 6. Repeat 2 to 4 times with each leg. 7. To get more stretch, keep your other leg flat on the floor while pulling your knee to your chest.  Bridging    1. Lie on your back with both knees bent. Your knees should be bent about 90 degrees. 2. Tighten your belly muscles by pulling in your belly button toward your spine. Then push your feet into the floor, squeeze your buttocks, and lift your hips off the floor until your shoulders, hips, and knees are all in a straight line. 3. Hold for about 6 seconds as you continue to breathe normally, and then slowly lower your hips back down to the floor and rest for up to 10 seconds. 4. Repeat 8 to 12 times. Hip extension    1. Get down on your hands and knees on the floor. 2. Keeping your back and neck straight, lift one leg straight out behind you. When you lift your leg, keep your hips level. Don't let your back twist, and don't let your hip drop toward the floor. 3. Hold for 6 seconds. Repeat 8 to 12 times with each leg.   4. If you feel steady and strong when you do this exercise, you can make it more difficult. To do this, when you lift your leg, also lift the opposite arm straight out in front of you. For example, lift the left leg and the right arm at the same time. (This is sometimes called the \"bird dog exercise. \") Hold for 6 seconds, and repeat 8 to 12 times on each side. Clamshell    1. Lie on your side with a pillow under your head. Keep your feet and knees together and your knees bent. 2. Raise your top knee, but keep your feet together. Do not let your hips roll back. Your legs should open up like a clamshell. 3. Hold for 6 seconds. 4. Slowly lower your knee back down. Rest for 10 seconds. 5. Repeat 8 to 12 times. 6. Switch to your other side and repeat steps 1 through 5. Hamstring wall stretch    1. Lie on your back in a doorway, with one leg through the open door. 2. Slide your affected leg up the wall to straighten your knee. You should feel a gentle stretch down the back of your leg. 3. Hold the stretch for at least 1 minute to begin. Then try to lengthen the time you hold the stretch to as long as 6 minutes. 4. Switch legs, and repeat steps 1 through 3.  5. Repeat 2 to 4 times. 6. If you do not have a place to do this exercise in a doorway, there is another way to do it:  7. Lie on your back, and bend one knee. 8. Loop a towel under the ball and toes of that foot, and hold the ends of the towel in your hands. 9. Straighten your knee, and slowly pull back on the towel. You should feel a gentle stretch down the back of your leg. 10. Switch legs, and repeat steps 1 through 3.  11. Repeat 2 to 4 times. 1. Do not arch your back. 2. Do not bend either knee. 3. Keep one heel touching the floor and the other heel touching the wall. Do not point your toes. Lower abdominal strengthening    1. Lie on your back with your knees bent and your feet flat on the floor. 2. Tighten your belly muscles by pulling your belly button in toward your spine.   3. Lift one foot off the floor Pulse 74   Temp 98.3 °F (36.8 °C) (Oral)   Resp 16   Ht 5' 4\" (1.626 m)   Wt 160 lb (72.6 kg)   LMP 01/01/1999   SpO2 99%   BMI 27.46 kg/m²   GENERAL APPEARANCE: Awake and alert. Cooperative. In no obvious distress. HEAD: Normocephalic. Atraumatic. EYES: PERRL. EOM's grossly intact. ENT: Mucous membranes are pink and moist.   NECK: Supple. HEART: RRR. No murmurs. LUNGS: Respirations unlabored. CTAB. Good air exchange. ABDOMEN: Soft. Non-distended. Non-tender. No masses. No organomegaly. No guarding or rebound. No CVAT  EXTREMITIES: No peripheral edema. Moves all extremities equally. All extremities neurovascularly intact. SKIN: Warm and dry. No acute rashes. NEUROLOGICAL: Alert and oriented. Strength 5/5, sensation intact. Gait normal.   PSYCHIATRIC: Normal mood and affect. No HI or SI expressed to me. RADIOLOGY    If acquired see below     EKG:     If acquired see below       ED COURSE/MDM    Patient was comfortable by the time she got here and went to the bathroom.   So based on her urinalysis I am going to call her a UTI and treat her accordingly    ED Course as of 10/04/22 0018   Tue Oct 04, 2022   0010 Urinalysis with Reflex to Culture(!):    Color, UA Yellow   Clarity, UA CLOUDY(!)   Glucose, UA Negative   Bilirubin, Urine Negative   Ketones, Urine Negative   Specific Gravity, UA 1.020   Blood, Urine SMALL(!)   pH, UA 7.0   Protein, UA 30(!)   Urobilinogen, Urine 0.2   Nitrite, Urine POSITIVE(!)   Leukocyte Esterase, Urine MODERATE(!)   Microscopic Examination YES   Urine Type NotGiven  Analysis showed a small amount of blood positive nitrates positive leukocyte esterase with a small amount of blood [DL]   0010 CBC with Auto Differential(!):    WBC 11.1(!)   RBC 4.10   Hemoglobin Quant 12.4   Hematocrit 37.2   MCV 90.7   MCH 30.3   MCHC 33.3   RDW 13.0   Platelet Count 285   MPV 8.8   Neutrophils % 83.5   Lymphocyte % 8.6   Monocytes % 5.6   Eosinophils % 1.8   Basophils % 0.5 and bring your knee toward your chest, so that your knee is straight above your hip and your leg is bent like the letter \"L. \"  4. Lift the other knee up to the same position. 5. Lower one leg at a time to the starting position. 6. Keep alternating legs until you have lifted each leg 8 to 12 times. 7. Be sure to keep your belly muscles tight and your back still as you are moving your legs. Be sure to breathe normally. Piriformis stretch    1. Lie on your back with your legs straight. 2. Lift your affected leg, and bend your knee. With your opposite hand, reach across your body, and then gently pull your knee toward your opposite shoulder. 3. Hold the stretch for 15 to 30 seconds. 4. Switch legs and repeat steps 1 through 3.  5. Repeat 2 to 4 times. Follow-up care is a key part of your treatment and safety. Be sure to make and go to all appointments, and call your doctor if you are having problems. It's also a good idea to know your test results and keep a list of the medicines you take. Where can you learn more? Go to http://www.gray.com/  Enter G782 in the search box to learn more about \"Sacroiliac Pain: Exercises. \"  Current as of: July 1, 2021               Content Version: 13.0  © 2006-2021 [employer]wise, Inc[employer]. Care instructions adapted under license by [employer] (which disclaims liability or warranty for this information). If you have questions about a medical condition or this instruction, always ask your healthcare professional. [address] any warranty or liability for your use of this information. 98

## 2022-10-12 ENCOUNTER — HOSPITAL ENCOUNTER (OUTPATIENT)
Dept: INFUSION THERAPY | Age: 57
End: 2022-10-12

## 2022-10-13 ENCOUNTER — HOSPITAL ENCOUNTER (OUTPATIENT)
Dept: INFUSION THERAPY | Age: 57
Discharge: HOME | End: 2022-10-13
Payer: MEDICARE

## 2022-10-13 VITALS
RESPIRATION RATE: 18 BRPM | DIASTOLIC BLOOD PRESSURE: 95 MMHG | SYSTOLIC BLOOD PRESSURE: 130 MMHG | OXYGEN SATURATION: 94 % | TEMPERATURE: 98.2 F | HEART RATE: 86 BPM

## 2022-10-13 DIAGNOSIS — J45.50 SEVERE PERSISTENT ASTHMA, UNSPECIFIED WHETHER COMPLICATED: Primary | ICD-10-CM

## 2022-10-13 PROCEDURE — 74011250636 HC RX REV CODE- 250/636: Performed by: INTERNAL MEDICINE

## 2022-10-13 PROCEDURE — 96372 THER/PROPH/DIAG INJ SC/IM: CPT

## 2022-10-13 RX ORDER — DIPHENHYDRAMINE HYDROCHLORIDE 50 MG/ML
25 INJECTION, SOLUTION INTRAMUSCULAR; INTRAVENOUS AS NEEDED
Start: 2022-11-10

## 2022-10-13 RX ORDER — ACETAMINOPHEN 325 MG/1
650 TABLET ORAL AS NEEDED
Start: 2022-11-10

## 2022-10-13 RX ORDER — EPINEPHRINE 1 MG/ML
0.3 INJECTION, SOLUTION, CONCENTRATE INTRAVENOUS AS NEEDED
OUTPATIENT
Start: 2022-11-10

## 2022-10-13 RX ORDER — DIPHENHYDRAMINE HYDROCHLORIDE 50 MG/ML
50 INJECTION, SOLUTION INTRAMUSCULAR; INTRAVENOUS AS NEEDED
Start: 2022-11-10

## 2022-10-13 RX ORDER — HYDROCORTISONE SODIUM SUCCINATE 100 MG/2ML
100 INJECTION, POWDER, FOR SOLUTION INTRAMUSCULAR; INTRAVENOUS AS NEEDED
OUTPATIENT
Start: 2022-11-10

## 2022-10-13 RX ORDER — ONDANSETRON 2 MG/ML
8 INJECTION INTRAMUSCULAR; INTRAVENOUS AS NEEDED
OUTPATIENT
Start: 2022-11-10

## 2022-10-13 RX ORDER — FLUTICASONE FUROATE, UMECLIDINIUM BROMIDE AND VILANTEROL TRIFENATATE 200; 62.5; 25 UG/1; UG/1; UG/1
POWDER RESPIRATORY (INHALATION)
Qty: 180 BLISTER | Refills: 3 | Status: SHIPPED | OUTPATIENT
Start: 2022-10-13

## 2022-10-13 RX ORDER — ALBUTEROL SULFATE 0.83 MG/ML
2.5 SOLUTION RESPIRATORY (INHALATION) AS NEEDED
Start: 2022-11-10

## 2022-10-13 RX ADMIN — BENRALIZUMAB 30 MG: 30 INJECTION, SOLUTION SUBCUTANEOUS at 10:31

## 2022-10-13 NOTE — PROGRESS NOTES
[facility] Progress Note    Date: 2022    Name: Tyrese Hinojosa    MRN: [id number]         : 1965      FASENRA Q4 WEEKS x3, DOSE 2 OF 3  (THEN Q8 WEEKS)      Ms. Joseph arrived to [facility] at 1025. Ms. Kiran Márquez was assessed and education provided. Pt verbalized not having any problems with her first injection. Ms. Bashir Pen vitals were reviewed. Visit Vitals  BP (!) 130/95 (BP 1 Location: Right upper arm, BP Patient Position: Sitting)   Pulse 86   Temp 98.2 °F (36.8 °C)   Resp 18   SpO2 94%   Breastfeeding No     Fasenra 30mg was administered as ordered SQ in patient's RIGHT upper arm. Pt tolerated well without complaints. Band-aid applied to site. Discharge/ follow-up instructions discussed w/ pt. Pt verbalized understanding. Pt armband removed & shredded. Ms. Kiran Márquez was discharged from [place] [address] in stable condition at 1035. She is to return on 11/10/22 at 1030 for her next appointment.       Reyes Marquez  2022

## 2022-11-09 ENCOUNTER — APPOINTMENT (OUTPATIENT)
Dept: INFUSION THERAPY | Age: 57
End: 2022-11-09
Payer: MEDICARE

## 2022-11-17 ENCOUNTER — APPOINTMENT (OUTPATIENT)
Dept: GENERAL RADIOLOGY | Age: 57
End: 2022-11-17
Attending: EMERGENCY MEDICINE
Payer: MEDICARE

## 2022-11-17 ENCOUNTER — HOSPITAL ENCOUNTER (EMERGENCY)
Age: 57
Discharge: HOME | End: 2022-11-17
Attending: EMERGENCY MEDICINE
Payer: MEDICARE

## 2022-11-17 ENCOUNTER — APPOINTMENT (OUTPATIENT)
Dept: CT IMAGING | Age: 57
End: 2022-11-17
Attending: PHYSICIAN ASSISTANT
Payer: MEDICARE

## 2022-11-17 ENCOUNTER — HOSPITAL ENCOUNTER (OUTPATIENT)
Dept: INFUSION THERAPY | Age: 57
Discharge: HOME | End: 2022-11-17
Payer: MEDICARE

## 2022-11-17 VITALS
OXYGEN SATURATION: 95 % | SYSTOLIC BLOOD PRESSURE: 149 MMHG | RESPIRATION RATE: 16 BRPM | TEMPERATURE: 98.7 F | HEART RATE: 84 BPM | DIASTOLIC BLOOD PRESSURE: 70 MMHG

## 2022-11-17 VITALS
TEMPERATURE: 98.5 F | BODY MASS INDEX: 32.2 KG/M2 | RESPIRATION RATE: 18 BRPM | WEIGHT: 175 LBS | HEART RATE: 75 BPM | HEIGHT: 62 IN | SYSTOLIC BLOOD PRESSURE: 111 MMHG | OXYGEN SATURATION: 96 % | DIASTOLIC BLOOD PRESSURE: 58 MMHG

## 2022-11-17 DIAGNOSIS — D86.9 SARCOIDOSIS: Primary | ICD-10-CM

## 2022-11-17 DIAGNOSIS — R07.89 CHEST WALL PAIN: ICD-10-CM

## 2022-11-17 DIAGNOSIS — R06.02 SOB (SHORTNESS OF BREATH): ICD-10-CM

## 2022-11-17 DIAGNOSIS — J45.50 SEVERE PERSISTENT ASTHMA, UNSPECIFIED WHETHER COMPLICATED: Primary | ICD-10-CM

## 2022-11-17 LAB
ALBUMIN SERPL-MCNC: 3.1 G/DL (ref 3.4–5)
ALBUMIN/GLOB SERPL: 0.9 {RATIO} (ref 0.8–1.7)
ALP SERPL-CCNC: 68 U/L (ref 45–117)
ALT SERPL-CCNC: 18 U/L (ref 13–56)
ANION GAP SERPL CALC-SCNC: 5 MMOL/L (ref 3–18)
AST SERPL-CCNC: 19 U/L (ref 10–38)
BASOPHILS # BLD: 0 K/UL (ref 0–0.1)
BASOPHILS NFR BLD: 0 % (ref 0–2)
BILIRUB SERPL-MCNC: 0.3 MG/DL (ref 0.2–1)
BUN SERPL-MCNC: 30 MG/DL (ref 7–18)
BUN/CREAT SERPL: 14 (ref 12–20)
CALCIUM SERPL-MCNC: 8.8 MG/DL (ref 8.5–10.1)
CHLORIDE SERPL-SCNC: 110 MMOL/L (ref 100–111)
CO2 SERPL-SCNC: 26 MMOL/L (ref 21–32)
CREAT SERPL-MCNC: 2.09 MG/DL (ref 0.6–1.3)
DIFFERENTIAL METHOD BLD: ABNORMAL
EOSINOPHIL # BLD: 0 K/UL (ref 0–0.4)
EOSINOPHIL NFR BLD: 0 % (ref 0–5)
ERYTHROCYTE [DISTWIDTH] IN BLOOD BY AUTOMATED COUNT: 13 % (ref 11.6–14.5)
GLOBULIN SER CALC-MCNC: 3.4 G/DL (ref 2–4)
GLUCOSE SERPL-MCNC: 135 MG/DL (ref 74–99)
HCT VFR BLD AUTO: 35.8 % (ref 35–45)
HGB BLD-MCNC: 11.5 G/DL (ref 12–16)
IMM GRANULOCYTES # BLD AUTO: 0 K/UL (ref 0–0.04)
IMM GRANULOCYTES NFR BLD AUTO: 0 % (ref 0–0.5)
LYMPHOCYTES # BLD: 2.2 K/UL (ref 0.9–3.6)
LYMPHOCYTES NFR BLD: 39 % (ref 21–52)
MCH RBC QN AUTO: 30 PG (ref 24–34)
MCHC RBC AUTO-ENTMCNC: 32.1 G/DL (ref 31–37)
MCV RBC AUTO: 93.5 FL (ref 78–100)
MONOCYTES # BLD: 0.6 K/UL (ref 0.05–1.2)
MONOCYTES NFR BLD: 10 % (ref 3–10)
NEUTS SEG # BLD: 2.9 K/UL (ref 1.8–8)
NEUTS SEG NFR BLD: 50 % (ref 40–73)
NRBC # BLD: 0 K/UL (ref 0–0.01)
NRBC BLD-RTO: 0 PER 100 WBC
PLATELET # BLD AUTO: 168 K/UL (ref 135–420)
PMV BLD AUTO: 10.7 FL (ref 9.2–11.8)
POTASSIUM SERPL-SCNC: 3.8 MMOL/L (ref 3.5–5.5)
PROT SERPL-MCNC: 6.5 G/DL (ref 6.4–8.2)
RBC # BLD AUTO: 3.83 M/UL (ref 4.2–5.3)
SODIUM SERPL-SCNC: 141 MMOL/L (ref 136–145)
TROPONIN-HIGH SENSITIVITY: 10 NG/L (ref 0–54)
TROPONIN-HIGH SENSITIVITY: 8 NG/L (ref 0–54)
WBC # BLD AUTO: 5.8 K/UL (ref 4.6–13.2)

## 2022-11-17 PROCEDURE — 74011636637 HC RX REV CODE- 636/637: Performed by: PHYSICIAN ASSISTANT

## 2022-11-17 PROCEDURE — 71275 CT ANGIOGRAPHY CHEST: CPT

## 2022-11-17 PROCEDURE — 85025 COMPLETE CBC W/AUTO DIFF WBC: CPT

## 2022-11-17 PROCEDURE — 74011250636 HC RX REV CODE- 250/636: Performed by: PHYSICIAN ASSISTANT

## 2022-11-17 PROCEDURE — 84484 ASSAY OF TROPONIN QUANT: CPT

## 2022-11-17 PROCEDURE — 74011000250 HC RX REV CODE- 250: Performed by: PHYSICIAN ASSISTANT

## 2022-11-17 PROCEDURE — 71046 X-RAY EXAM CHEST 2 VIEWS: CPT

## 2022-11-17 PROCEDURE — 93005 ELECTROCARDIOGRAM TRACING: CPT

## 2022-11-17 PROCEDURE — 74011000636 HC RX REV CODE- 636: Performed by: EMERGENCY MEDICINE

## 2022-11-17 PROCEDURE — 74011250636 HC RX REV CODE- 250/636: Performed by: INTERNAL MEDICINE

## 2022-11-17 PROCEDURE — 96372 THER/PROPH/DIAG INJ SC/IM: CPT

## 2022-11-17 PROCEDURE — 80053 COMPREHEN METABOLIC PANEL: CPT

## 2022-11-17 RX ORDER — ONDANSETRON 2 MG/ML
8 INJECTION INTRAMUSCULAR; INTRAVENOUS AS NEEDED
OUTPATIENT
Start: 2023-01-12

## 2022-11-17 RX ORDER — ACETAMINOPHEN 325 MG/1
650 TABLET ORAL AS NEEDED
Start: 2023-01-12

## 2022-11-17 RX ORDER — IPRATROPIUM BROMIDE AND ALBUTEROL SULFATE 2.5; .5 MG/3ML; MG/3ML
3 SOLUTION RESPIRATORY (INHALATION)
Status: COMPLETED | OUTPATIENT
Start: 2022-11-17 | End: 2022-11-17

## 2022-11-17 RX ORDER — AZITHROMYCIN 250 MG/1
TABLET, FILM COATED ORAL
Qty: 6 TABLET | Refills: 0 | Status: SHIPPED | OUTPATIENT
Start: 2022-11-17 | End: 2022-12-01 | Stop reason: ALTCHOICE

## 2022-11-17 RX ORDER — DIPHENHYDRAMINE HYDROCHLORIDE 50 MG/ML
50 INJECTION, SOLUTION INTRAMUSCULAR; INTRAVENOUS AS NEEDED
Start: 2023-01-12

## 2022-11-17 RX ORDER — DIPHENHYDRAMINE HYDROCHLORIDE 50 MG/ML
25 INJECTION, SOLUTION INTRAMUSCULAR; INTRAVENOUS AS NEEDED
Start: 2023-01-12

## 2022-11-17 RX ORDER — EPINEPHRINE 1 MG/ML
0.3 INJECTION, SOLUTION, CONCENTRATE INTRAVENOUS AS NEEDED
OUTPATIENT
Start: 2023-01-12

## 2022-11-17 RX ORDER — IODIXANOL 320 MG/ML
60 INJECTION, SOLUTION INTRAVASCULAR
Status: COMPLETED | OUTPATIENT
Start: 2022-11-17 | End: 2022-11-17

## 2022-11-17 RX ORDER — HYDROCODONE POLISTIREX AND CHLORPHENIRAMINE POLISTIREX 10; 8 MG/5ML; MG/5ML
5 SUSPENSION, EXTENDED RELEASE ORAL
Qty: 50 ML | Refills: 0 | Status: SHIPPED | OUTPATIENT
Start: 2022-11-17 | End: 2022-11-22

## 2022-11-17 RX ORDER — PREDNISONE 10 MG/1
TABLET ORAL
Qty: 1 DOSE PACK | Refills: 0 | Status: SHIPPED | OUTPATIENT
Start: 2022-11-17

## 2022-11-17 RX ORDER — HYDROCORTISONE SODIUM SUCCINATE 100 MG/2ML
100 INJECTION, POWDER, FOR SOLUTION INTRAMUSCULAR; INTRAVENOUS AS NEEDED
OUTPATIENT
Start: 2023-01-12

## 2022-11-17 RX ORDER — ALBUTEROL SULFATE 0.83 MG/ML
2.5 SOLUTION RESPIRATORY (INHALATION) AS NEEDED
Start: 2023-01-12

## 2022-11-17 RX ADMIN — IPRATROPIUM BROMIDE AND ALBUTEROL SULFATE 3 ML: 2.5; .5 SOLUTION RESPIRATORY (INHALATION) at 16:52

## 2022-11-17 RX ADMIN — BENRALIZUMAB 30 MG: 30 INJECTION, SOLUTION SUBCUTANEOUS at 11:42

## 2022-11-17 RX ADMIN — IPRATROPIUM BROMIDE AND ALBUTEROL SULFATE 3 ML: .5; 2.5 SOLUTION RESPIRATORY (INHALATION) at 13:54

## 2022-11-17 RX ADMIN — SODIUM CHLORIDE 1000 ML: 900 INJECTION, SOLUTION INTRAVENOUS at 15:08

## 2022-11-17 RX ADMIN — IODIXANOL 60 ML: 320 INJECTION, SOLUTION INTRAVASCULAR at 18:58

## 2022-11-17 RX ADMIN — PREDNISONE 50 MG: 20 TABLET ORAL at 13:54

## 2022-11-17 NOTE — PROGRESS NOTES
[facility] Progress Note    Date: 2022    Name: Carla Nelson    MRN: [id number]         : 1965    Ms. Joseph arrived in the [facility] today at 1125, in stable condition, here for her SQ FASENRA INJECTION (EVERY 4 WEEKS X 3 DOSES, THEN EVERY 8 WEEKS). (DOSE # 3 OF 3, IN THE LOADING SEQUENCE TODAY)    She was assessed and education was provided. Ms. Geni Dang vitals were reviewed. Visit Vitals  BP (!) 149/70 (BP 1 Location: Left upper arm, BP Patient Position: Sitting)   Pulse 84   Temp 98.7 °F (37.1 °C)   Resp 16   SpO2 95%   Breastfeeding No       Ms. Joseph presented to the [facility] today, stating that she was concerned that after today, her injections would only be every 8 weeks, instead of every 4 weeks. She also stated that she didn't feel like the injections were really helping her asthma. So, I explained to her how [facility] is given, 1st with the loading sequence, and then the maintenance sequence, and she verbalized understanding. But, I also instructed her to talk with her referring provider Dr. Alexis SEYMOUR, and let him know that she didn't feel like the medication was helping her, and she agreed. Also, Ms. Joseph presented to the [facility] today, complaining that she has been having chest pain off & on for the last couple of weeks, that worsens with her breathing, especially when she tries to take a deep breath. She rated the chest pain at a # 5 on the pain scale today. She was instructed to report to the emergency room immediately after leaving the [facility] today, for further evaluation and treatment, and she verbalized understanding, and agreed to go. Benralizumab (FASENRA) 30 mg, was administered SQ, in the back of her LEFT arm at 1142, per order and without incident. Ms. Fredis Baker tolerated the [facility] injection well, and voiced no additional complaints. Ms. Fredis Baker was discharged from [place] [address] in stable condition at 1145.  She is to return in 8 weeks, on Thursday, 1-12-23 at 1130, for her next appointment, for her next [facility] injection.      Sosa Cooper RN  November 17, 2022  11:41 AM

## 2022-11-17 NOTE — ED TRIAGE NOTES
Pt presents to ED with c/o constant mid sternal chest pain and SOB x2 weeks. Denies cough, fever, N/V/D.

## 2022-11-17 NOTE — ED PROVIDER NOTES
[facility] (DP/SNF)  Emergency Department Treatment Report        Patient: Gracie Frazier Age: 61 y.o. Sex: female    YOB: 1965 Admit Date: 11/17/2022 PCP: Robbie Peterson   MRN: [id number]  CSN: [id number]  Attending: Oren Watkins MD      Room: [address] Time Dictated: 12:33 PM PA-C: ASHLI Escalera     Chief Complaint   Chest pain, SOB    History of Present Illness   12:33 PM   61 y.o. female presents to the ED C/O chest pain and SOB. Patient reports she has a history of asthma and it has been flaring up for the last 2 weeks. Patient has been taking her Albuterol inhaler, Flovent inhaler, and 5 mg Prednisone daily for the symptoms. She went to get her [facility] injection today and told them about her symptoms and they advised her to come here for further evaluation and to contact her pulmonolotist. The last time she felt like this she had double pneumonia. She was supposed to get her injection last week but couldn't get there until today. No fever, chills, back pain, diaphoresis, or sweating. She describes the chest pain as throbbing, sharp/stabbing and heaviness. The pain worsens when she presses on her chest also. Nothing has been improving the pain or SOB. PMHx:Kidney cysts, Asthma, CKD, Sinusitis, colon polyps, DJD, Hypertriglyceridemia, Sarcoidosis, DM, Dyslipidemia, Osteomyelitis        Review of Systems   Review of Systems   Constitutional:  Negative for appetite change, chills and fever. HENT:  Negative for congestion, ear pain and sore throat. Eyes:  Negative for discharge and redness. Respiratory:  Positive for shortness of breath. Negative for cough, chest tightness and wheezing. Cardiovascular:  Positive for chest pain. Gastrointestinal:  Negative for abdominal pain, constipation, diarrhea, nausea and vomiting. Endocrine: Negative for polyuria.    Genitourinary:  Negative for dysuria, frequency, hematuria, pelvic pain, urgency, vaginal bleeding, vaginal discharge and vaginal pain. Musculoskeletal:  Negative for back pain, joint swelling and neck pain. Skin:  Negative for rash and wound. Allergic/Immunologic: Negative for immunocompromised state. Neurological:  Negative for dizziness, syncope, light-headedness, numbness and headaches. Hematological:  Negative for adenopathy. Psychiatric/Behavioral:  Negative for agitation and confusion. The patient is not nervous/anxious.     Past Medical/Surgical History     Past Medical History:   Diagnosis Date    Acquired cyst of kidney 01/16/2020    [facility]    Asthma     Bilateral great toe fractures 2014    Chronic kidney disease (CKD), stage IV (severe) ([address] 06/2022    Dr Clifford Shields    Chronic sinusitis     Dr. Tracy Fischer in the past    Colon polyp 5/16    Dr Shoshana Bran    Depression 2019    Diabetes mellitus ([facility]     DJD (degenerative joint disease) of cervical spine 2016    DJD (degenerative joint disease), lumbar 2013    MRI c spine w degen changes; Dr Sadie Simpson    Dyslipidemia     calculated 10 year risk score was 2.0% (12/13)    Edema of both ankles 8/28/2018    Environmental and seasonal allergies 8/28/2018    Eustachian tube dysfunction     Fibrocystic breast     Dr Una Miller    GERD (gastroesophageal reflux disease)     Hypertriglyceridemia 8/28/2018    Lateral epicondylitis of both elbows 2/6/2017    Macular degeneration of both eyes 08/28/2018    Dr Caterina Lu, [facility]    Mild intermittent asthma without complication 66/03/6009    Dr. Flor Harrell;  ratio 68%, FEV1 78% w 6% inc postbd, TLC 77, RV 56, DLCO 61%    Morbid obesity (HCC)     peak weight 196 lbs, bmi 35.8 from 10/13    Neuropathy 8/28/2018    Osteopenia     Dr. Atif Soriano; DEXA t score -0.7 spine, -0.2 hip (8/14)    Sarcoidosis     Dr Rasheed Anderson    Type 2 diabetes mellitus with hyperglycemia, with long-term current use of insulin ([facility] 8/28/2018     Past Surgical History:   Procedure Laterality Date    COLONOSCOPY N/A 5/26/2016    Dr Shoshana Bran hyperplastic COLONOSCOPY N/A 10/19/2021    COLONOSCOPY with polypectomy performed by Amber Rider MD at [facility] [place]      nasal polypectomy Dr. Aubrie GALDAMEZ      tear duct surgery right Dr. Reese Marroquin 2010; left Dr Scott Mercado 2015    HX ORTHOPAEDIC      DEXA -0.7 spine, -0.2 hip 8/14    [facility] CARDIAC SURG PROCEDURE UNLIST  9/10, 8/13    thallium negative ef 72%; negative ef 70%    [facility] CHEST SURGERY PROCEDURE UNLISTED  7/12    US thyroid negative    CA CHEST SURGERY PROCEDURE UNLISTED  2012    pfts w mod restrictive defect     VASCULAR SURGERY PROCEDURE UNLIST  12/13    venous doppler negative       Social History     Social History     Socioeconomic History    Marital status: LEGALLY      Spouse name: Not on file    Number of children: 0    Years of education: 15    Highest education level: Not on file   Occupational History    Occupation: Unemployed   Tobacco Use    Smoking status: Never    Smokeless tobacco: Never   Vaping Use    Vaping Use: Never used   Substance and Sexual Activity    Alcohol use: Yes     Alcohol/week: 0.0 standard drinks     Comment: occasional wine    Drug use: Never    Sexual activity: Not Currently   Other Topics Concern     Service No    Blood Transfusions No    Caffeine Concern Yes     Comment: due to reflux    Occupational Exposure No    Hobby Hazards No    Sleep Concern Yes    Stress Concern Yes     Comment: wants a job    Weight Concern Yes    Special Diet No    Back Care No    Exercise Yes    Bike Helmet No    Seat Belt Yes    Self-Exams Yes   Social History Narrative    Patient lives with a friend, no pets.      Social Determinants of Health     Financial Resource Strain: Not on file   Food Insecurity: Not on file   Transportation Needs: Not on file   Physical Activity: Not on file   Stress: Not on file   Social Connections: Not on file   Intimate Partner Violence: Not on file   Housing Stability: Not on file       Family History     Family History   Problem Relation Age of Onset    Cancer Mother     Hypertension Mother     Cancer Father     Diabetes Father     Hypertension Father     Stroke Father     Diabetes Sister     Hypertension Sister     Heart Disease Sister     Colon Cancer Sister 52    Cancer Sister     Hypertension Brother     Cancer Maternal Aunt        Current Medications     Prior to Admission Medications   Prescriptions Last Dose Informant Patient Reported? Taking? Insulin Needles, Disposable, ([place]na Pen Needle) 32 gauge x \" ndle   No No   Sig: Check blood sugars three times a day   SITagliptin (JANUVIA) 25 mg tablet   No No   Sig: Take 1 Tablet by mouth daily. For diabetes   Trelegy Ellipta 200-62.5-25 mcg inhaler   No No   Sig: INHALE ONE PUFF BY MOUTH DAILY, RINSE AND GARGLE AFTER EACH USE   albuterol (PROVENTIL HFA, VENTOLIN HFA, PROAIR HFA) 90 mcg/actuation inhaler   No No   Sig: Take 2 Puffs by inhalation every four (4) hours as needed for Shortness of Breath. Indications: asthma attack   aspirin delayed-release 81 mg tablet   No No   Sig: Take 1 Tab by mouth daily. For heart health   butalbital-acetaminophen-caffeine (FIORICET, ESGIC) -40 mg per tablet   Yes No   Sig: Take 1 Tablet by mouth every six (6) hours as needed. cetirizine HCl (ZYRTEC PO)   Yes No   Sig: Take 1 Tablet by mouth daily. cyanocobalamin (VITAMIN B12) 100 mcg tablet   Yes No   Sig: Take 50 mcg by mouth daily. diclofenac (VOLTAREN) 1 % gel   Yes No   Si g four (4) times daily. dilTIAZem ER (CARDIZEM CD) 120 mg capsule   No No   Sig: Take 1 Capsule by mouth daily. empagliflozin (JARDIANCE) 10 mg tablet   No No   Sig: Take 1 Tablet by mouth daily. esomeprazole (NEXIUM) 40 mg capsule   No No   Sig: Take 1 Capsule by mouth daily as needed for Gastroesophageal Reflux Disease (GERD). furosemide (LASIX) 20 mg tablet   No No   Sig: Take 1 Tablet by mouth daily.  Indications: visible water retention, high blood pressure glucose blood VI test strips (ACCU-CHEK POOJA) strip   No No   Sig: Pt to test 5 times daily. Dx:E11.65   insulin glargine (LANTUS,BASAGLAR) 100 unit/mL (3 mL) inpn   No No   Si Units by SubCUTAneous route nightly.   magnesium oxide (MAG-OX) 400 mg tablet   No No   Sig: Take 1 Tablet by mouth two (2) times daily (with meals). ondansetron hcl (Zofran) 4 mg tablet   No No   Sig: Take 1 Tablet by mouth every eight (8) hours as needed for Nausea or Nausea or Vomiting. pravastatin (PRAVACHOL) 80 mg tablet   No No   Sig: Take 1 Tablet by mouth daily. predniSONE (DELTASONE) 5 mg tablet   Yes No   Sig: Take 5 mg by mouth daily. therapeutic multivitamin-minerals (THERAGRAN-M) tablet   Yes No   Sig: Take 1 Tab by mouth daily. topiramate (TOPAMAX) 50 mg tablet   No No   Sig: Take 1 Tablet by mouth two (2) times a day. triamcinolone acetonide (KENALOG) 0.1 % topical cream   No No   Sig: Apply  to affected area two (2) times daily as needed for Skin Irritation. venlafaxine-SR (EFFEXOR-XR) 75 mg capsule   No No   Sig: Take 1 Capsule by mouth daily. Facility-Administered Medications: None       Allergies     Allergies   Allergen Reactions    Other Food Itching     Pt reported she's allergic to \"all tropical fruits, my tongue starts tingling & side of mouth starts tingling & face swells\"    Pollen Extracts Runny Nose and Cough    Adhesive Tape-Silicones Other (comments)     Reports blistering & burning upon removal of freestyle lindy & and also happened upon removal of ecg holter monitor    Amoxicillin Hives, Shortness of Breath and Swelling    Crestor [Rosuvastatin] Myalgia    Ibuprofen Other (comments)     dont prescribe due to kidney function    Lipitor [Atorvastatin] Other (comments)     Muscle cramps and weakness      Karthikeyan Flavor Itching     Allergy to Blyn. Cracks in corner of mouth, irritation of tongue.      Pcn [Penicillins] Angioedema    Pineapple Itching and Other (comments)     Cracks in corner of mouth, irritation of tongue. Physical Exam   Patient Vitals for the past 8 hrs:   BP SpO2   11/17/22 1930 (!) 111/58 96 %       Physical Exam  Vitals and nursing note reviewed. Constitutional:       General: She is not in acute distress. Appearance: She is well-developed. She is not diaphoretic. HENT:      Head: Normocephalic and atraumatic. Right Ear: External ear normal.      Left Ear: External ear normal.      Nose: Nose normal.      Mouth/Throat:      Pharynx: No oropharyngeal exudate. Eyes:      General:         Right eye: No discharge. Left eye: No discharge. Conjunctiva/sclera: Conjunctivae normal.   Cardiovascular:      Rate and Rhythm: Normal rate and regular rhythm. Heart sounds: Normal heart sounds. No murmur heard. No friction rub. No gallop. Pulmonary:      Effort: Pulmonary effort is normal. No respiratory distress. Breath sounds: Normal breath sounds. No wheezing or rales. Chest:      Chest wall: No tenderness. Abdominal:      General: Bowel sounds are normal. There is no distension. Palpations: Abdomen is soft. There is no mass. Tenderness: There is no abdominal tenderness. There is no guarding or rebound. Musculoskeletal:         General: No tenderness or deformity. Normal range of motion. Cervical back: Normal range of motion and neck supple. Lymphadenopathy:      Cervical: No cervical adenopathy. Skin:     General: Skin is warm and dry. Findings: No rash. Neurological:      Mental Status: She is alert and oriented to person, place, and time. Psychiatric:         Behavior: Behavior normal.         Thought Content: Thought content normal.         Judgment: Judgment normal.       Diagnostic Studies   RESULTS:    CTA CHEST W OR W WO CONT   Final Result      1. No CT evidence of acute pulmonary embolism.       2. Redemonstration of biapical and perihilar reticular opacities/scarring,   consistent with sequela of pulmonary sarcoidosis. 3. Unchanged calcified mediastinal and hilar lymph nodes, consistent with   sequela of sarcoidosis. 4. Small hiatal hernia. As attending radiologist I have assessed the study images, and dictated or   reviewed/edited the final report as needed. XR CHEST PA LAT   Final Result      Chronic appearing streaky densities similar to prior and multiple calcified   mediastinal and hilar nodes, including with reported sarcoidosis, not   significantly changed since prior. Superimposed streaky infiltrate or edema   difficult to completely exclude.           Labs Reviewed   CBC WITH AUTOMATED DIFF - Abnormal; Notable for the following components:       Result Value    RBC 3.83 (*)     HGB 11.5 (*)     All other components within normal limits   METABOLIC PANEL, COMPREHENSIVE - Abnormal; Notable for the following components:    Glucose 135 (*)     BUN 30 (*)     Creatinine 2.09 (*)     eGFR 27 (*)     Albumin 3.1 (*)     All other components within normal limits   TROPONIN-HIGH SENSITIVITY   TROPONIN-HIGH SENSITIVITY       Recent Results (from the past 12 hour(s))   EKG, 12 LEAD, INITIAL    Collection Time: 11/17/22 12:05 PM   Result Value Ref Range    Ventricular Rate 90 BPM    Atrial Rate 90 BPM    P-R Interval 164 ms    QRS Duration 80 ms    Q-T Interval 342 ms    QTC Calculation (Bezet) 418 ms    Calculated P Axis 45 degrees    Calculated R Axis 16 degrees    Calculated T Axis 72 degrees    Diagnosis       Normal sinus rhythm  Nonspecific T wave abnormality  Abnormal ECG  When compared with ECG of 28-JAN-2022 19:59,  Nonspecific T wave abnormality, improved in Inferior leads  Nonspecific T wave abnormality, worse in Lateral leads     TROPONIN-HIGH SENSITIVITY    Collection Time: 11/17/22 12:53 PM   Result Value Ref Range    Troponin-High Sensitivity 10 0 - 54 ng/L   CBC WITH AUTOMATED DIFF    Collection Time: 11/17/22  1:40 PM   Result Value Ref Range    WBC 5.8 4.6 - 13.2 K/uL RBC 3.83 (L) 4.20 - 5.30 M/uL    HGB 11.5 (L) 12.0 - 16.0 g/dL    HCT 35.8 35.0 - 45.0 %    MCV 93.5 78.0 - 100.0 FL    MCH 30.0 24.0 - 34.0 PG    MCHC 32.1 31.0 - 37.0 g/dL    RDW 13.0 11.6 - 14.5 %    PLATELET 460 494 - 622 K/uL    MPV 10.7 9.2 - 11.8 FL    NRBC 0.0 0  WBC    ABSOLUTE NRBC 0.00 0.00 - 0.01 K/uL    NEUTROPHILS 50 40 - 73 %    LYMPHOCYTES 39 21 - 52 %    MONOCYTES 10 3 - 10 %    EOSINOPHILS 0 0 - 5 %    BASOPHILS 0 0 - 2 %    IMMATURE GRANULOCYTES 0 0.0 - 0.5 %    ABS. NEUTROPHILS 2.9 1.8 - 8.0 K/UL    ABS. LYMPHOCYTES 2.2 0.9 - 3.6 K/UL    ABS. MONOCYTES 0.6 0.05 - 1.2 K/UL    ABS. EOSINOPHILS 0.0 0.0 - 0.4 K/UL    ABS. BASOPHILS 0.0 0.0 - 0.1 K/UL    ABS. IMM. GRANS. 0.0 0.00 - 0.04 K/UL    DF AUTOMATED     METABOLIC PANEL, COMPREHENSIVE    Collection Time: 11/17/22  1:40 PM   Result Value Ref Range    Sodium 141 136 - 145 mmol/L    Potassium 3.8 3.5 - 5.5 mmol/L    Chloride 110 100 - 111 mmol/L    CO2 26 21 - 32 mmol/L    Anion gap 5 3.0 - 18 mmol/L    Glucose 135 (H) 74 - 99 mg/dL    BUN 30 (H) 7.0 - 18 MG/DL    Creatinine 2.09 (H) 0.6 - 1.3 MG/DL    BUN/Creatinine ratio 14 12 - 20      eGFR 27 (L) >60 ml/min/1.73m2    Calcium 8.8 8.5 - 10.1 MG/DL    Bilirubin, total 0.3 0.2 - 1.0 MG/DL    ALT (SGPT) 18 13 - 56 U/L    AST (SGOT) 19 10 - 38 U/L    Alk.  phosphatase 68 45 - 117 U/L    Protein, total 6.5 6.4 - 8.2 g/dL    Albumin 3.1 (L) 3.4 - 5.0 g/dL    Globulin 3.4 2.0 - 4.0 g/dL    A-G Ratio 0.9 0.8 - 1.7     TROPONIN-HIGH SENSITIVITY    Collection Time: 11/17/22  2:57 PM   Result Value Ref Range    Troponin-High Sensitivity 8 0 - 54 ng/L       MEDICATIONS GIVEN:  Medications   albuterol-ipratropium (DUO-NEB) 2.5 MG-0.5 MG/3 ML (3 mL Nebulization Given 11/17/22 0621)   predniSONE (DELTASONE) tablet 50 mg (50 mg Oral Given 11/17/22 1354)   sodium chloride 0.9 % bolus infusion 1,000 mL (0 mL IntraVENous IV Completed 11/17/22 2112)   albuterol-ipratropium (DUO-NEB) 2.5 MG-0.5 MG/3 ML (3 mL Nebulization Given 11/17/22 5822)   iodixanoL (VISIPAQUE) 320 mg iodine/mL contrast injection 60 mL (60 mL IntraVENous Given 11/17/22 9358)       EKG interpretation: (Preliminary)  Rhythm: normal sinus rhythm; and regular. Rate (approx.): 90 BPM; Axis: normal; P wave: normal; QRS interval: normal ; ST/T wave: normal; Non-specific T-Wave abnormality. No STEMI    Procedures  Procedures    Impression / ED Course / Medical Decision Making   MDM  Number of Diagnoses or Management Options  Chest wall pain: minor  Sarcoidosis: established, worsening  SOB (shortness of breath): established, worsening  Diagnosis management comments: DDx: chest pain, shortness of breath, asthma with acute exacerbation, pneumonia bronchitis. Impression: Sarcoidosis flare up, shortness of breath, and chest wall pain. Management Plan: Evaluate and treat chest pain and shortness of breath. Obtain Chest XR, labs, and CTA chest. Reviewed all results with patient. Dr. Nela Osborne also examined patient. Treated here with a Duoneb x2 and Prednisone 60 mg. Prescribed a Z-rocio and a 12 day Prednisone taper. Advised patient to hold her Prednisone 5 mg until taper dose is complete then resume it. Also advised her to F/U with her PCP and her Pulmonologist next week. Patient was in agreement with discharge plan and discharged in good condition. Amount and/or Complexity of Data Reviewed  Tests in the radiology section of CPT®: ordered and reviewed  Tests in the medicine section of CPT®: ordered and reviewed  Discussion of test results with the performing providers: yes (Dr. Nela Osborne)  Discuss the patient with other providers: yes (Dr. Nela Osborne)    Risk of Complications, Morbidity, and/or Mortality  Presenting problems: moderate  Diagnostic procedures: moderate  Management options: low    Patient Progress  Patient progress: improved      PROGRESS NOTE:   12:33 PM   Initial assessment completed.      CONSULT NOTE:   2:00 PM  I spoke with Dr. Nela Osborne Specialty: ER Attending  Discussed pt's hx, disposition, and available diagnostic and imaging results. Reviewed care plans. Consulting physician agrees with plans as outlined. He advised CTA with fluids first to R/O PE and saw patient as well. PROGRESS NOTE:   2:00 PM  Pt has been re-examined by Blanka Stanton PA-C. She has improved after Duoneb treatment    PROGRESS NOTE:   5:00 PM  Pt has been re-examined by Blanka Stanton PA-C. She is still doing all right, still awaiting CTA    PROGRESS NOTE:   8:00 PM  Pt has been re-examined by Blanka Stanton PA-C. She is still doing well, we are still awaiting CTA results. PROGRESS NOTE:   8:29 PM  Pt has been re-examined by Blanka Stanton PA-C. Reviewed results with patient discussed treatment plan. DISCHARGE NOTE:  8:27 PM   Yisel Joseph's  results have been reviewed with her. She has been counseled regarding her diagnosis, treatment, and plan. She verbally conveys understanding and agreement of the signs, symptoms, diagnosis, treatment and prognosis and additionally agrees to follow up as discussed. She also agrees with the care-plan and conveys that all of her questions have been answered. I have also provided discharge instructions for her that include: educational information regarding their diagnosis and treatment, and list of reasons why they would want to return to the ED prior to their follow-up appointment, should her condition change. Final Diagnosis     1. Sarcoidosis    2. Chest wall pain    3. SOB (shortness of breath)        Disposition     Current Discharge Medication List        START taking these medications    Details   azithromycin (Zithromax Z-Frank) 250 mg tablet Take two tablets PO today, then one tablet PO daily for the next four days.   Qty: 6 Tablet, Refills: 0  Start date: 11/17/2022      predniSONE (STERAPRED DS) 10 mg dose pack Take 6 tabs on days 1-2  Take 5 tabs on days 3-4  Take 4 tabs on days 5-6  Take 3 tabs on days 7-8  Take 2 tabs on days 9-10  Take 1 tab on days 11-12  Qty: 1 Dose Pack, Refills: 0  Start date: 11/17/2022           CONTINUE these medications which have NOT CHANGED    Details   predniSONE (DELTASONE) 5 mg tablet Take 5 mg by mouth daily. Follow-up Information       Follow up With Specialties Details Why Contact Info    Dimitrios Griders Nurse Practitioner Go in 1 week If no improvement [phone] [facility] [address]  [phone]      Your Pulmonologist  Go in 1 week without fail for follow up                Nursing notes have been reviewed by the physician/ advanced practice    Clinician.     Reyes Orosco PA-C  November 17, 2022

## 2022-11-18 LAB
ATRIAL RATE: 90 BPM
CALCULATED P AXIS, ECG09: 45 DEGREES
CALCULATED R AXIS, ECG10: 16 DEGREES
CALCULATED T AXIS, ECG11: 72 DEGREES
DIAGNOSIS, 93000: NORMAL
P-R INTERVAL, ECG05: 164 MS
Q-T INTERVAL, ECG07: 342 MS
QRS DURATION, ECG06: 80 MS
QTC CALCULATION (BEZET), ECG08: 418 MS
VENTRICULAR RATE, ECG03: 90 BPM

## 2022-11-18 NOTE — ED NOTES
Introduced self to pt, pt sitting in high flowers position, on her phone, [id number], responds to verbal commands, 96% on room air, no apparent distress noted at this time, bed low and locked, call bell within reach, will continue to monitor.

## 2022-11-18 NOTE — DISCHARGE INSTRUCTIONS
Hold your Prednisone 5 mg until you complete the 12 day taper dose then resume. Complete anitbiotics. F/U with your PCP and Pulmonologist next week without fail.

## 2022-11-18 NOTE — ED NOTES
Bedside shift change report given to Brandon Pickett RN by Galdino Newton RN. Report included the following information SBAR, ED Summary, and MAR.

## 2022-11-23 PROBLEM — H35.363: Status: ACTIVE | Noted: 2022-11-23

## 2022-12-01 ENCOUNTER — OFFICE VISIT (OUTPATIENT)
Dept: PULMONOLOGY | Age: 57
End: 2022-12-01
Payer: MEDICARE

## 2022-12-01 VITALS
HEART RATE: 91 BPM | HEIGHT: 62 IN | TEMPERATURE: 98 F | WEIGHT: 174 LBS | SYSTOLIC BLOOD PRESSURE: 134 MMHG | DIASTOLIC BLOOD PRESSURE: 72 MMHG | BODY MASS INDEX: 32.02 KG/M2 | OXYGEN SATURATION: 94 % | RESPIRATION RATE: 16 BRPM

## 2022-12-01 DIAGNOSIS — J45.50 POORLY CONTROLLED SEVERE PERSISTENT ASTHMA WITHOUT COMPLICATION: Primary | ICD-10-CM

## 2022-12-01 DIAGNOSIS — D86.89 SARCOIDOSIS OF OTHER SITES: ICD-10-CM

## 2022-12-01 DIAGNOSIS — J84.9 ILD (INTERSTITIAL LUNG DISEASE) (HCC): ICD-10-CM

## 2022-12-01 DIAGNOSIS — D86.0 SARCOIDOSIS, LUNG (HCC): ICD-10-CM

## 2022-12-01 DIAGNOSIS — Z79.52 CURRENT CHRONIC USE OF SYSTEMIC STEROIDS: ICD-10-CM

## 2022-12-01 DIAGNOSIS — Z59.89 INSURANCE COVERAGE PROBLEMS: ICD-10-CM

## 2022-12-01 PROCEDURE — G8417 CALC BMI ABV UP PARAM F/U: HCPCS | Performed by: INTERNAL MEDICINE

## 2022-12-01 PROCEDURE — G8754 DIAS BP LESS 90: HCPCS | Performed by: INTERNAL MEDICINE

## 2022-12-01 PROCEDURE — G8752 SYS BP LESS 140: HCPCS | Performed by: INTERNAL MEDICINE

## 2022-12-01 PROCEDURE — 3017F COLORECTAL CA SCREEN DOC REV: CPT | Performed by: INTERNAL MEDICINE

## 2022-12-01 PROCEDURE — G9899 SCRN MAM PERF RSLTS DOC: HCPCS | Performed by: INTERNAL MEDICINE

## 2022-12-01 PROCEDURE — 3078F DIAST BP <80 MM HG: CPT | Performed by: INTERNAL MEDICINE

## 2022-12-01 PROCEDURE — 3074F SYST BP LT 130 MM HG: CPT | Performed by: INTERNAL MEDICINE

## 2022-12-01 PROCEDURE — G8427 DOCREV CUR MEDS BY ELIG CLIN: HCPCS | Performed by: INTERNAL MEDICINE

## 2022-12-01 PROCEDURE — 99214 OFFICE O/P EST MOD 30 MIN: CPT | Performed by: INTERNAL MEDICINE

## 2022-12-01 PROCEDURE — G9717 DOC PT DX DEP/BP F/U NT REQ: HCPCS | Performed by: INTERNAL MEDICINE

## 2022-12-01 RX ORDER — BUDESONIDE, GLYCOPYRROLATE, AND FORMOTEROL FUMARATE 160; 9; 4.8 UG/1; UG/1; UG/1
2 AEROSOL, METERED RESPIRATORY (INHALATION) 2 TIMES DAILY
Qty: 2 EACH | Refills: 0 | Status: SHIPPED | COMMUNITY
Start: 2022-12-01

## 2022-12-01 SDOH — ECONOMIC STABILITY - INCOME SECURITY: OTHER PROBLEMS RELATED TO HOUSING AND ECONOMIC CIRCUMSTANCES: Z59.89

## 2022-12-01 NOTE — PROGRESS NOTES
[address] [place] [address] [facility]  Pulmonary, Critical Care, and Sleep Medicine    Pulmonary Office F/U  Name: Amita Hunter 61 y.o. female  MRN: [id number]  : 1965  Service Date: 22  Chief Complaint:   Chief Complaint   Patient presents with    Asthma     Follow up from 2022    Sarcoidosis    Other     PATTON, current chronic use of systemic steroids    Results     CXR & CTA chest 2022       History of Present Illness:  Amita Hunter is a 61 y.o. female, who presents to Pulmonary clinic for follow-up of sarcoidosis and asthma. Patient was last seen in our clinic on 22. Had another asthma exacerbation in early November, had to go to the ER on  -- got a steroid dose pack, pt is on her last few days. Pt reports that her insurance won't cover Trelegy --- pt only using Flovent. She reports Trelegy may be on a lower tier next year. Pt off Trelegy since last visit due to these insurance issues. Pt on SCIT therapy done by ENT.   No nocturnal awakenings since ER visit    Past Medical History:   Diagnosis Date    Acquired cyst of kidney 2020    [facility]    Asthma     Bilateral great toe fractures     Chronic kidney disease (CKD), stage IV (severe) ([address] 2022    Dr Quang Watkins    Chronic sinusitis     Dr. Lisa March in the past    Colon polyp     Dr Shweta Zacarias    Depression     Diabetes mellitus ([address]     DJD (degenerative joint disease) of cervical spine     DJD (degenerative joint disease), lumbar     MRI c spine w degen changes; Dr Soraida Telles    Dyslipidemia     calculated 10 year risk score was 2.0% ()    Edema of both ankles 2018    Environmental and seasonal allergies 2018    Eustachian tube dysfunction     Fibrocystic breast     Dr Robynn Councilman    GERD (gastroesophageal reflux disease)     Hypertriglyceridemia 2018    Lateral epicondylitis of both elbows 2017    Macular degeneration of both eyes 08/28/2018    Dr Joselyn Sloan, [facility]    Mild intermittent asthma without complication 88/35/5129    Dr. Benigno Leal;  ratio 68%, FEV1 78% w 6% inc postbd, TLC 77, RV 56, DLCO 61%    Morbid obesity (HCC)     peak weight 196 lbs, bmi 35.8 from 10/13    Neuropathy 8/28/2018    Osteopenia     Dr. Laurita Barker; DEXA t score -0.7 spine, -0.2 hip (8/14)    Sarcoidosis     Dr King Spence    Type 2 diabetes mellitus with hyperglycemia, with long-term current use of insulin ([facility] 8/28/2018     Past Surgical History:   Procedure Laterality Date    COLONOSCOPY N/A 5/26/2016    Dr Anthony Rivas hyperplastic    COLONOSCOPY N/A 10/19/2021    COLONOSCOPY with polypectomy performed by Darshan Paz MD at [address] [place] [address] Dr Dr. Carol GALDAMEZ      nasal polypectomy Dr. Marty GALDAMEZ      tear duct surgery right Dr. Buzz Simeon 2010; left Dr Mariana Taylor 2015    HX ORTHOPAEDIC      DEXA -0.7 spine, -0.2 hip 8/14    RI CARDIAC SURG PROCEDURE UNLIST  9/10, 8/13    thallium negative ef 72%; negative ef 70%    RI CHEST SURGERY PROCEDURE UNLISTED  7/12    US thyroid negative    RI CHEST SURGERY PROCEDURE UNLISTED  2012    pfts w mod restrictive defect     VASCULAR SURGERY PROCEDURE UNLIST  12/13    venous doppler negative     Family History   Problem Relation Age of Onset    Cancer Mother     Hypertension Mother     Cancer Father     Diabetes Father     Hypertension Father     Stroke Father     Diabetes Sister     Hypertension Sister     Heart Disease Sister     Colon Cancer Sister 52    Cancer Sister     Hypertension Brother     Cancer Maternal Aunt      Social History     Socioeconomic History    Marital status: LEGALLY      Spouse name: Not on file    Number of children: 0    Years of education: 15    Highest education level: Not on file   Occupational History    Occupation: Unemployed   Tobacco Use    Smoking status: Never    Smokeless tobacco: Never   Vaping Use    Vaping Use: Never used Substance and Sexual Activity    Alcohol use: Yes     Alcohol/week: 0.0 standard drinks     Comment: occasional wine    Drug use: Never    Sexual activity: Not Currently   Other Topics Concern     Service No    Blood Transfusions No    Caffeine Concern Yes     Comment: due to reflux    Occupational Exposure No    Hobby Hazards No    Sleep Concern Yes    Stress Concern Yes     Comment: wants a job    Weight Concern Yes    Special Diet No    Back Care No    Exercise Yes    Bike Helmet No    Seat Belt Yes    Self-Exams Yes   Social History Narrative    Patient lives with a friend, no pets. Social Determinants of Health     Financial Resource Strain: Not on file   Food Insecurity: Not on file   Transportation Needs: Not on file   Physical Activity: Not on file   Stress: Not on file   Social Connections: Not on file   Intimate Partner Violence: Not on file   Housing Stability: Not on file     Allergies   Allergen Reactions    Other Food Itching     Pt reported she's allergic to \"all tropical fruits, my tongue starts tingling & side of mouth starts tingling & face swells\"    Pollen Extracts Runny Nose and Cough    Adhesive Tape-Silicones Other (comments)     Reports blistering & burning upon removal of freestyle lindy & and also happened upon removal of ecg holter monitor    Amoxicillin Hives, Shortness of Breath and Swelling    Crestor [Rosuvastatin] Myalgia    Ibuprofen Other (comments)     dont prescribe due to kidney function    Lipitor [Atorvastatin] Other (comments)     Muscle cramps and weakness      Karthikeyan Flavor Itching     Allergy to [place]. Cracks in corner of mouth, irritation of tongue. Pcn [Penicillins] Angioedema    Pineapple Itching and Other (comments)     Cracks in corner of mouth, irritation of tongue. Prior to Admission medications    Medication Sig Start Date End Date Taking?  Authorizing Provider   predniSONE (STERAPRED DS) 10 mg dose pack Take 6 tabs on days 1-2  Take 5 tabs on days 3-4  Take 4 tabs on days 5-6  Take 3 tabs on days 7-8  Take 2 tabs on days 9-10  Take 1 tab on days 11-12 11/17/22   ASHLI Boudreaux 914-58.8-90 mcg inhaler INHALE ONE PUFF BY MOUTH DAILY, RINSE AND GARGLE AFTER EACH USE 10/13/22   Libia Liang MD   empagliflozin (JARDIANCE) 10 mg tablet Take 1 Tablet by mouth daily. 9/20/22   Artemio Khan DNP   insulin glargine (LANTUS,BASAGLAR) 100 unit/mL (3 mL) inpn 22 Units by SubCUTAneous route nightly. 9/20/22   Artemio Khan DNP   furosemide (LASIX) 20 mg tablet Take 1 Tablet by mouth daily. Indications: visible water retention, high blood pressure 9/14/22   Artemio Khan Kan\[facility]\""   diclofenac (VOLTAREN) 1 % gel 2 g four (4) times daily. 7/13/22   Provider, Historical   predniSONE (DELTASONE) 5 mg tablet Take 5 mg by mouth daily. Milagro Perez MD   pravastatin (PRAVACHOL) 80 mg tablet Take 1 Tablet by mouth daily. 6/23/22   Artemio Khan DNP   SITagliptin (JANUVIA) 25 mg tablet Take 1 Tablet by mouth daily. For diabetes 6/23/22   Sam LOMBARDO DNP   topiramate (TOPAMAX) 50 mg tablet Take 1 Tablet by mouth two (2) times a day. 6/23/22   Melvin Sawyer DNP   venlafaxine-SR [facility] 75 mg capsule Take 1 Capsule by mouth daily. 6/23/22   Melvin Sawyer DNP   cetirizine HCl (ZYRTEC PO) Take 1 Tablet by mouth daily. Provider, Historical   esomeprazole (NEXIUM) 40 mg capsule Take 1 Capsule by mouth daily as needed for Gastroesophageal Reflux Disease (GERD). 6/22/22   Melvin Sawyer DNP   magnesium oxide (MAG-OX) 400 mg tablet Take 1 Tablet by mouth two (2) times daily (with meals). 6/22/22   Melvin Sawyer DNP   Insulin Needles, Disposable, ([place] Pen Needle) 32 gauge x 5/32\" ndle Check blood sugars three times a day 6/22/22   Sam LOMBARDO, VINH   dilTIAZem ER (CARDIZEM CD) 120 mg capsule Take 1 Capsule by mouth daily.  6/15/22   Katya JONES DO   ondansetron hcl (Zofran) 4 mg tablet Take 1 Tablet by mouth every eight (8) hours as needed for Nausea or Nausea or Vomiting. 2/2/22   Marycarmen Godinez MD   butalbital-acetaminophen-caffeine (FIORICET, ESGIC) -40 mg per tablet Take 1 Tablet by mouth every six (6) hours as needed. 12/22/21   Provider, Historical   cyanocobalamin (VITAMIN B12) 100 mcg tablet Take 50 mcg by mouth daily. Provider, Historical   albuterol (PROVENTIL HFA, VENTOLIN HFA, PROAIR HFA) 90 mcg/actuation inhaler Take 2 Puffs by inhalation every four (4) hours as needed for Shortness of Breath. Indications: asthma attack 6/16/21   Drew Calle DNP   therapeutic multivitamin-minerals [facility]) tablet Take 1 Tab by mouth daily. 3/7/21   Provider, Historical   triamcinolone acetonide (KENALOG) 0.1 % topical cream Apply  to affected area two (2) times daily as needed for Skin Irritation. 3/24/21   Drew Calle DNP   aspirin delayed-release 81 mg tablet Take 1 Tab by mouth daily. For heart health 9/3/19   Allison LOMBARDO DNP   glucose blood VI test strips (ACCU-CHEK POOJA) strip Pt to test 5 times daily. Dx:E11.65 11/17/17   Kayleen Sprague MD     Immunization History   Administered Date(s) Administered    Influenza Vaccine 02/15/2021, 10/07/2021    Influenza, FLUARIX, FLULAVAL, FLUZONE (age 10 mo+) AND AFLURIA, (age 1 y+), PF, 0.5mL 09/28/2017, 11/26/2018    Pneumococcal Polysaccharide (PPSV-23) 09/28/2017    Tdap 04/24/2013       Review of Systems:  A complete review of systems was performed as stated in the HPI, all others are negative.       Objective:    Physical Exam:  /72 (BP 1 Location: Left upper arm, BP Patient Position: Sitting, BP Cuff Size: Adult) Comment: taken manually  Pulse 91   Temp 98 °F (36.7 °C) (Temporal)   Resp 16   Ht 5' 2\" (1.575 m)   Wt 78.9 kg (174 lb)   LMP 03/30/2013   SpO2 94%   BMI 31.83 kg/m²   Vitals were personally reviewed  Gen: no acute distress, pleasant and cooperative, sitting up in chair, ambulates without difficulty  HEENT: normocephalic/atraumatic, no ocular drainage, EOMI, no scleral icterus, nasal bridge midline, unable to assess nasal and oral cavities due to patient wearing mask in the setting of COVID-19 pandemic  Neck: supple, trachea midline, no JVD, no cervical and supraclavicular adenopathy  CVS: regular rate rhythm, S1/S2, no murmurs/rubs/gallops  Lungs: fair air entry B/L, CTABL, no wheezes/rales/rhonchi  Psych: normal memory, thought content, and processing    Labs: I have reviewed the patient's available labs  Lab Results   Component Value Date/Time    WBC 5.8 11/17/2022 01:40 PM    Hemoglobin, POC 13.6 05/26/2016 10:49 AM    HGB 11.5 (L) 11/17/2022 01:40 PM    Hematocrit, POC 40 05/26/2016 10:49 AM    HCT 35.8 11/17/2022 01:40 PM    PLATELET 150 16/90/7596 01:40 PM    MCV 93.5 11/17/2022 01:40 PM    on 3/7/22  Lab Results   Component Value Date/Time    Sodium 141 11/17/2022 01:40 PM    Potassium 3.8 11/17/2022 01:40 PM    Chloride 110 11/17/2022 01:40 PM    CO2 26 11/17/2022 01:40 PM    Anion gap 5 11/17/2022 01:40 PM    Glucose 135 (H) 11/17/2022 01:40 PM    BUN 30 (H) 11/17/2022 01:40 PM    Creatinine 2.09 (H) 11/17/2022 01:40 PM    BUN/Creatinine ratio 14 11/17/2022 01:40 PM    GFR est AA 29 (L) 05/04/2022 10:36 AM    GFR est non-AA 24 (L) 05/04/2022 10:36 AM    Calcium 8.8 11/17/2022 01:40 PM    Bilirubin, total 0.3 11/17/2022 01:40 PM    Alk. phosphatase 68 11/17/2022 01:40 PM    Protein, total 6.5 11/17/2022 01:40 PM    Albumin 3.1 (L) 11/17/2022 01:40 PM    Globulin 3.4 11/17/2022 01:40 PM    A-G Ratio 0.9 11/17/2022 01:40 PM    ALT (SGPT) 18 11/17/2022 01:40 PM    AST (SGOT) 19 11/17/2022 01:40 PM   Last ACE level was 52 on 6/30/2021      Imaging:  I have personally reviewed patient's imaging as follows--CTA chest with contrast from 11/17/2022 shows scattered linear fibrotic opacities apically extending from the hilum, otherwise no infiltrates or effusions.   Patient also has some scattered bilateral reticular changes and fibrotic changes in the bases. She has some calcified mediastinal and hilar lymphadenopathy. No nodules or active inflammation seen. Official report per radiology:  CT Results (most recent):  Results from Hospital Encounter encounter on 11/17/22    CTA CHEST W OR W WO CONT    Narrative  CTA CHEST PULMONARY ANGIOGRAM    HISTORY/INDICATION:  Chest pain with shortness of breath    COMPARISON:  CT chest/abdomen/pelvis 1/30/2022    TECHNIQUE:  Helical multidetector array volumetric acquisition of the chest is  performed following intravenous contrast administration of 60cc Visipaque 320,  per pulmonary angiogram protocol. These images are reviewed and 2.5 mm and 5 mm  images along with coronal and sagittal 3D reconstruction maximum intensity  projection (MIP) images.] Dose reduction techniques:  Automated exposure  control, mAs and/or kVp reductions based on patient size, and iterative  reconstruction. CTA SPECIFIC FINDINGS:  Pulmonary arteries: No filling defects are identified in the main, segmental or  subsegmental pulmonary arterial tree to suggest significant pulmonary embolism. Aorta: No aneurysm. CHEST FINDINGS:  Thyroid:  No focal lesion. Mediastinum: Heart is normal in size. No pericardial effusion. Pleural space: No pneumothorax. No pleural effusion. Lungs: Redemonstration of biapical and perihilar reticular opacities and  traction bronchiectasis. No new focal consolidation. No discrete masses or  nodules. Included Abdomen: Small hiatal hernia. Skeleton: No suspicious lytic or blastic lesions. No fractures. Soft tissues: Areas of fat necrosis noted in the bilateral breasts, correlating  with postreduction changes seen on prior mammogram.    Lymph Nodes: Calcified mediastinal, bilateral hilar, and paraesophageal lymph  nodes, unchanged. Impression  1. No CT evidence of acute pulmonary embolism.     2. Redemonstration of biapical and perihilar reticular opacities/scarring,  consistent with sequela of pulmonary sarcoidosis. 3. Unchanged calcified mediastinal and hilar lymph nodes, consistent with  sequela of sarcoidosis. 4. Small hiatal hernia. As attending radiologist I have assessed the study images, and dictated or  reviewed/edited the final report as needed. PFTs: 5/24/2022 as follows: Spirometry is normal, no BD response. Lung volumes show a mild restriction. Diffusion capacity is moderately reduced    TTE:  I have reviewed the patient's TTE results  05/14/19   ECHO ADULT COMPLETE 05/15/2019 5/15/2019    Narrative · Left Ventricle: Estimated left ventricular ejection fraction is 61 -   65%. No regional wall motion abnormality noted. Age-appropriate left   ventricular diastolic function. Signed by: Jesus Lopez,          Assessment and Plan:  61 y.o. female with:    Impression:  1. Severe persistent asthma, poorly controlled, with recent exacerbation:  Has significant peripheral eosinophilia (600 from 1/29/2022 and 500 on 3/7/22), with recurrent exacerbations (at least 2) and pt on chronic prednisone. Patient started benralizumab therapy in September 2022. Patient on ICS/LTRA therapy, cannot afford combination inhaler therapy. Most recent exacerbation required ER visit on 11/17/2022, received prednisone Dosepak  2. Sarcoidosis of the lung: Patient has interstitial opacities as well as multiple calcified lymph nodes. Patient has been on chronic prednisone therapy for over 10 years. Patient was on 6 mg before hospitalization in March 2021, then discharged at 20 mg and tapered off after last visit in September -- discontinued around Nov 2021. Patient admitted with hypercalcemia in late January 2022, placed back on prednisone. Patient down to 5 mg daily -- remaining there chronically per Nephrology.   CT scan shows no evidence of active inflammation from sarcoidosis, patient has fibrotic changes which represents burned-out disease. Patient likely does have ongoing reactive airway disease from sarcoidosis  3. Hypercalcemia:  -Occurred in Jan 2022, as high as 15.2, admitted to [facility] from 1/28 through 2/2/2022. Patient was seen by nephrology, thought to be combination of factors including sarcoidosis, although vitamin D1, 25 was within normal limits (high side of normal), vitamin D supplementation and CKD with diuretic use. Treated with IVF, calcitonin, and Zometa. -Patient started on steroids for presumed sarcoidosis related renal disease.    -Remains on chronic prednisone therapy 5mg by Dr. Joel Corrales    4. Sarcoidosis of the nose: Patient reports she was diagnosed by biopsy about 10 years ago, follows with ENT, recent CT sinuses shows scattered sinus changes  5. ILD: Secondary to sarcoidosis as noted above  6. 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 Chronic allergic rhinosinusitis  8. Obesity: Body mass index is 32.01 kg/m². 9.  Insurance coverage issues    Plan:  -Continue prednisone taper.  -Continue benralizumab 30 mg SQ every 8 weeks (just completed loading), will assess for efficacy after 6 total months of therapy  -Spent extensive time discussing patient's insurance coverage issues, gave patient list of formulary alternatives. We will give patient sample of Breztri 2 puffs twice daily until next calendar year. Advised to contact our office back with formulary alternative. Counseled to rinse mouth thoroughly after each use  -Management of sinusitis and nasal sarcoidosis and potential SCIT per ENT  -Continue ongoing lab work and avoid nephrotoxic agents and management of anemia per nephrology  -Advised patient to have yearly ophthalmology screening for ocular sarcoidosis as ocular sarcoidosis is a preventable cause of blindness/visual impairment  -Advised to remain active.   Offered pulmonary rehab, patient declined  -Due to chronic prednisone therapy, advised patient to follow-up with PCP with regards to DEXA scan--not needed until next year (9/2023) given that patient was given a dose of Zometa while hospitalized  -Immunizations reviewed, pneumococcal and reported influenza vaccination up-to-date      Follow-up and Dispositions    Return in about 2 months (around 2/1/2023).        Orders Placed This Encounter    budesonide-glycopyr-formoterol (Henrene Spore) 160-9-4.8 mcg/actuation HFAA         Kennedy Roque MD/MPH     Pulmonary, Critical Care Medicine  [facility]

## 2022-12-01 NOTE — PROGRESS NOTES
Leonid Both presents today for   Chief Complaint   Patient presents with    Asthma     Follow up from 8/25/2022    Sarcoidosis    Other     PATTON, current chronic use of systemic steroids    Results     CXR & CTA chest 11/17/2022       Is someone accompanying this pt? No    Is the patient using any DME equipment during OV? Yes. glucometer   -[employer] N/A    Depression Screening:  3 most recent PHQ Screens 9/14/2022   PHQ Not Done -   Little interest or pleasure in doing things Not at all   Feeling down, depressed, irritable, or hopeless Not at all   Total Score PHQ 2 0   Trouble falling or staying asleep, or sleeping too much Not at all   Feeling tired or having little energy Not at all   Poor appetite, weight loss, or overeating Not at all   Feeling bad about yourself - or that you are a failure or have let yourself or your family down Not at all   Trouble concentrating on things such as school, work, reading, or watching TV Not at all   Moving or speaking so slowly that other people could have noticed; or the opposite being so fidgety that others notice Not at all   Thoughts of being better off dead, or hurting yourself in some way Not at all   PHQ 9 Score 0   How difficult have these problems made it for you to do your work, take care of your home and get along with others Not difficult at all       Learning Assessment:  Learning Assessment 4/29/2022   PRIMARY LEARNER Patient   HIGHEST LEVEL OF EDUCATION - PRIMARY LEARNER  -   [facility] 22 LEARNER -   [facility]   LEARNER PREFERENCE PRIMARY DEMONSTRATION     -     -   ANSWERED BY patient   RELATIONSHIP SELF       Abuse Screening:  Abuse Screening Questionnaire 4/29/2022   Do you ever feel afraid of your partner? N   Are you in a relationship with someone who physically or mentally threatens you? N   Is it safe for you to go home? Y       Fall Risk  Fall Risk Assessment, last 12 mths 3/24/2021   Able to walk?  Yes Fall in past 12 months? 1   Do you feel unsteady? 1   Are you worried about falling 1   Is TUG test greater than 12 seconds? 0   Is the gait abnormal? 0   Number of falls in past 12 months 2         Coordination of Care:  1. Have you been to the ER, urgent care clinic since your last visit? Hospitalized since your last visit? Yes. [facility] ED 11/17/2022-sarcoidosis, chest wall pain and SOB    2. Have you seen or consulted any other health care providers outside of the [address] [place] [address] since your last visit? Include any pap smears or colon screening. Yes.  Dr. Baljit Cervantes, ENT, Dr. Chuck Garcia, ophthalmologist, Dr. Neelam Banerjee, nephrologist

## 2022-12-01 NOTE — LETTER
12/1/2022    Patient: Uche Rush   YOB: 1965   Date of Visit: 12/1/2022     Ventura Dior, DNP  [address] [place] [address]  [address]  [address] [place] [id number]  Via Fax: [phone]    Dear Gorden Lesches, MD,      Thank you for referring Ms. Yisel Joseph to [address] [place] [address] for evaluation. My notes for this consultation are attached. If you have questions, please do not hesitate to call me. I look forward to following your patient along with you.       Sincerely,    Robb Ch MD/MPH     Pulmonary, Critical Care Medicine  [facility]

## 2022-12-02 ENCOUNTER — DOCUMENTATION ONLY (OUTPATIENT)
Dept: INTERNAL MEDICINE CLINIC | Age: 57
End: 2022-12-02

## 2022-12-02 ENCOUNTER — LAB ONLY (OUTPATIENT)
Dept: INTERNAL MEDICINE CLINIC | Age: 57
End: 2022-12-02

## 2022-12-02 DIAGNOSIS — E11.65 TYPE 2 DIABETES MELLITUS WITH HYPERGLYCEMIA, WITH LONG-TERM CURRENT USE OF INSULIN (HCC): Primary | ICD-10-CM

## 2022-12-02 DIAGNOSIS — Z79.4 TYPE 2 DIABETES MELLITUS WITH HYPERGLYCEMIA, WITH LONG-TERM CURRENT USE OF INSULIN (HCC): Primary | ICD-10-CM

## 2022-12-02 NOTE — PROGRESS NOTES
Patient calling from pharmacy stating she is trying to get [employer] brand of insulin due to her high BS readings recently. Pt states her BS was in the upper 200s and the jardiance is not helping. Dr. Maritza Hurst instructed pt to up her lantus to 25 units nightly.  She is to check her BS nightly and record readings and bring to upcoming appt next week,

## 2022-12-03 LAB
EST. AVERAGE GLUCOSE BLD GHB EST-MCNC: 220 MG/DL
HBA1C MFR BLD: 9.3 % (ref 4.8–5.6)

## 2022-12-08 ENCOUNTER — OFFICE VISIT (OUTPATIENT)
Dept: CARDIOLOGY CLINIC | Age: 57
End: 2022-12-08
Payer: MEDICARE

## 2022-12-08 VITALS
DIASTOLIC BLOOD PRESSURE: 84 MMHG | SYSTOLIC BLOOD PRESSURE: 132 MMHG | WEIGHT: 174 LBS | BODY MASS INDEX: 32.02 KG/M2 | HEIGHT: 62 IN | OXYGEN SATURATION: 98 % | HEART RATE: 92 BPM

## 2022-12-08 DIAGNOSIS — I47.1 PAROXYSMAL SVT (SUPRAVENTRICULAR TACHYCARDIA) (HCC): Primary | ICD-10-CM

## 2022-12-08 RX ORDER — METOPROLOL SUCCINATE 25 MG/1
25 TABLET, EXTENDED RELEASE ORAL DAILY
Qty: 30 TABLET | Refills: 6 | Status: SHIPPED | OUTPATIENT
Start: 2022-12-08

## 2022-12-08 RX ORDER — METOPROLOL SUCCINATE 25 MG/1
25 TABLET, EXTENDED RELEASE ORAL DAILY
COMMUNITY
End: 2022-12-08 | Stop reason: SDUPTHER

## 2022-12-08 NOTE — PROGRESS NOTES
Trev    Chief Complaint   Patient presents with    Follow-up     6 month follow up       HPI    Trev is a 61 y.o. AAF with no known coronary disease referred by her pulmonologist for palpitations. Her records were obtained and reviewed in detail. She has had 2 prior echoes which I obtained and reviewed. Not having any chest pain but almost daily can feel her heart racing skipping fluttering in these sensations last for about 5 minutes at a time. At first it was thought this was correlated to electrolyte disturbance she is also been on varying doses of steroids etc. which she does we will impact the frequency.     Past Medical History:   Diagnosis Date    Acquired cyst of kidney 01/16/2020    [facility]    Asthma     Bilateral great toe fractures 2014    Chronic kidney disease (CKD), stage IV (severe) ([address] 06/2022    Dr Eda Vallejo    Chronic sinusitis     Dr. Michelle Montalvo in the past    Colon polyp 5/16    Dr Olena Gtz    Depression 2019    Diabetes mellitus ([facility]     DJD (degenerative joint disease) of cervical spine 2016    DJD (degenerative joint disease), lumbar 2013    MRI c spine w degen changes; Dr Yasmine Lemos    Dyslipidemia     calculated 10 year risk score was 2.0% (12/13)    Edema of both ankles 8/28/2018    Environmental and seasonal allergies 8/28/2018    Eustachian tube dysfunction     Fibrocystic breast     Dr Tess Gamboa    GERD (gastroesophageal reflux disease)     Hypertriglyceridemia 8/28/2018    Lateral epicondylitis of both elbows 2/6/2017    Macular degeneration of both eyes 08/28/2018    [facility]    Mild intermittent asthma without complication 10/37/1122    Dr. Camila Rangel;  ratio 68%, FEV1 78% w 6% inc postbd, TLC 77, RV 56, DLCO 61%    Morbid obesity (HCC)     peak weight 196 lbs, bmi 35.8 from 10/13    Neuropathy 8/28/2018    Osteopenia     Dr. Liz Pascual; DEXA t score -0.7 spine, -0.2 hip (8/14)    Sarcoidosis     Dr Fran Johnson    Type 2 diabetes mellitus with hyperglycemia, with long-term current use of insulin ([facility] 8/28/2018       Past Surgical History:   Procedure Laterality Date    COLONOSCOPY N/A 5/26/2016    Dr Rani Alicea hyperplastic    COLONOSCOPY N/A 10/19/2021    COLONOSCOPY with polypectomy performed by Leslee Tello MD at [address]    [facility], [place], operated by [facility]      Dr. Pravin Crowe HEMERCEDES      nasal polypectomy Dr. Giuseppe Moser HEENT      tear duct surgery right Dr. Solano Case 2010; left Dr Zana Norman 2015    HX ORTHOPAEDIC      DEXA -0.7 spine, -0.2 hip 8/14    IN CARDIAC SURG PROCEDURE UNLIST  9/10, 8/13    thallium negative ef 72%; negative ef 70%    IN CHEST SURGERY PROCEDURE UNLISTED  7/12    US thyroid negative    IN CHEST SURGERY PROCEDURE UNLISTED  2012    pfts w mod restrictive defect     VASCULAR SURGERY PROCEDURE UNLIST  12/13    venous doppler negative       Current Outpatient Medications   Medication Sig Dispense Refill    budesonide-glycopyr-formoterol (Breztri Aerosphere) 160-9-4.8 mcg/actuation HFAA Take 2 Puffs by inhalation two (2) times a day. Rinse and gargle after each use 2 Each 0    Trelegy Ellipta 200-62.5-25 mcg inhaler INHALE ONE PUFF BY MOUTH DAILY, RINSE AND GARGLE AFTER EACH  Blister 3    empagliflozin (JARDIANCE) 10 mg tablet Take 1 Tablet by mouth daily. 90 Tablet 1    insulin glargine (LANTUS,BASAGLAR) 100 unit/mL (3 mL) inpn 22 Units by SubCUTAneous route nightly. 5 Pen 1    furosemide (LASIX) 20 mg tablet Take 1 Tablet by mouth daily. Indications: visible water retention, high blood pressure 90 Tablet 0    diclofenac (VOLTAREN) 1 % gel Apply 2 g to affected area four (4) times daily as needed. predniSONE (DELTASONE) 5 mg tablet Take 5 mg by mouth daily. pravastatin (PRAVACHOL) 80 mg tablet Take 1 Tablet by mouth daily. 90 Tablet 1    SITagliptin (JANUVIA) 25 mg tablet Take 1 Tablet by mouth daily. For diabetes 90 Tablet 1    topiramate (TOPAMAX) 50 mg tablet Take 1 Tablet by mouth two (2) times a day.  180 Tablet 1 venlafaxine-SR (EFFEXOR-XR) 75 mg capsule Take 1 Capsule by mouth daily. 90 Capsule 1    cetirizine HCl (ZYRTEC PO) Take 1 Tablet by mouth daily. esomeprazole (NEXIUM) 40 mg capsule Take 1 Capsule by mouth daily as needed for Gastroesophageal Reflux Disease (GERD). 90 Capsule 3    magnesium oxide (MAG-OX) 400 mg tablet Take 1 Tablet by mouth two (2) times daily (with meals). 120 Tablet 0    Insulin Needles, Disposable, ([place] Pen Needle) 32 gauge x 5/32\" ndle Check blood sugars three times a day 100 Pen Needle 11    dilTIAZem ER (CARDIZEM CD) 120 mg capsule Take 1 Capsule by mouth daily. 30 Capsule 5    ondansetron hcl (Zofran) 4 mg tablet Take 1 Tablet by mouth every eight (8) hours as needed for Nausea or Nausea or Vomiting. 20 Tablet 0    butalbital-acetaminophen-caffeine (FIORICET, ESGIC) -40 mg per tablet Take 1 Tablet by mouth every six (6) hours as needed. cyanocobalamin (VITAMIN B12) 100 mcg tablet Take 50 mcg by mouth daily. albuterol (PROVENTIL HFA, VENTOLIN HFA, PROAIR HFA) 90 mcg/actuation inhaler Take 2 Puffs by inhalation every four (4) hours as needed for Shortness of Breath. Indications: asthma attack 1 Inhaler 5    therapeutic multivitamin-minerals (THERAGRAN-M) tablet Take 1 Tab by mouth daily. triamcinolone acetonide (KENALOG) 0.1 % topical cream Apply  to affected area two (2) times daily as needed for Skin Irritation. 15 g 1    aspirin delayed-release 81 mg tablet Take 1 Tab by mouth daily. For heart health 30 Tab 11    glucose blood VI test strips (ACCU-CHEK POOJA) strip Pt to test 5 times daily.   Dx:E11.65 500 Strip 3       Allergies   Allergen Reactions    Other Food Itching     Pt reported she's allergic to \"all tropical fruits, my tongue starts tingling & side of mouth starts tingling & face swells\"    Pollen Extracts Runny Nose and Cough    Adhesive Tape-Silicones Other (comments)     Reports blistering & burning upon removal of freestyle lindy & and also happened upon removal of ecg holter monitor    Amoxicillin Hives, Shortness of Breath and Swelling    Crestor [Rosuvastatin] Myalgia    Ibuprofen Other (comments)     dont prescribe due to kidney function    Lipitor [Atorvastatin] Other (comments)     Muscle cramps and weakness      [place] Flavor Itching     Allergy to Karthikeyan. Cracks in corner of mouth, irritation of tongue. Pcn [Penicillins] Angioedema    Pineapple Itching and Other (comments)     Cracks in corner of mouth, irritation of tongue. Social History     Socioeconomic History    Marital status: LEGALLY      Spouse name: Not on file    Number of children: 0    Years of education: 15    Highest education level: Not on file   Occupational History    Occupation: Unemployed   Tobacco Use    Smoking status: Never    Smokeless tobacco: Never   Vaping Use    Vaping Use: Never used   Substance and Sexual Activity    Alcohol use: Yes     Alcohol/week: 0.0 standard drinks     Comment: occasional wine    Drug use: Never    Sexual activity: Not Currently   Other Topics Concern     Service No    Blood Transfusions No    Caffeine Concern Yes     Comment: due to reflux    Occupational Exposure No    Hobby Hazards No    Sleep Concern Yes    Stress Concern Yes     Comment: wants a job    Weight Concern Yes    Special Diet No    Back Care No    Exercise Yes    Bike Helmet No    Seat Belt Yes    Self-Exams Yes   Social History Narrative    Patient lives with a friend, no pets. Social Determinants of Health     Financial Resource Strain: Not on file   Food Insecurity: Not on file   Transportation Needs: Not on file   Physical Activity: Not on file   Stress: Not on file   Social Connections: Not on file   Intimate Partner Violence: Not on file   Housing Stability: Not on file        FH: neg premature ASCVD, no SCD    Review of Systems    14 pt Review of Systems is negative unless otherwise mentioned in the HPI.     Wt Readings from Last 3 Encounters: 12/08/22 78.9 kg (174 lb)   12/01/22 78.9 kg (174 lb)   11/17/22 79.4 kg (175 lb)     Temp Readings from Last 3 Encounters:   12/01/22 98 °F (36.7 °C) (Temporal)   11/17/22 98.7 °F (37.1 °C)   11/17/22 98.5 °F (36.9 °C)     BP Readings from Last 3 Encounters:   12/08/22 132/84   12/01/22 134/72   11/17/22 (!) 149/70     Pulse Readings from Last 3 Encounters:   12/08/22 92   12/01/22 91   11/17/22 84       05/14/19    ECHO ADULT COMPLETE 05/15/2019 5/15/2019    Interpretation Summary  · Left Ventricle: Estimated left ventricular ejection fraction is 61 - 65%. No regional wall motion abnormality noted. Age-appropriate left ventricular diastolic function. Signed by: Freddie Rojas DO on 5/15/2019  7:15 AM      Physical Exam:    Visit Vitals  /84 (BP 1 Location: Left upper arm, BP Patient Position: Sitting, BP Cuff Size: Small adult)   Pulse 92   Ht 5' 2\" (1.575 m)   Wt 78.9 kg (174 lb)   LMP 03/30/2013   SpO2 98%   BMI 31.83 kg/m²      Physical Exam  HENT:      Head: Normocephalic and atraumatic. Eyes:      Pupils: Pupils are equal, round, and reactive to light. Cardiovascular:      Rate and Rhythm: Normal rate and regular rhythm. Heart sounds: Normal heart sounds. No murmur heard. No friction rub. No gallop. Pulmonary:      Effort: Pulmonary effort is normal. No respiratory distress. Breath sounds: Normal breath sounds. No wheezing or rales. Chest:      Chest wall: No tenderness. Abdominal:      General: Bowel sounds are normal.      Palpations: Abdomen is soft. Musculoskeletal:         General: No tenderness. Skin:     General: Skin is warm and dry. Neurological:      Mental Status: She is alert and oriented to person, place, and time. EKG  Sinus tachycardia   Nonspecific T wave abnormality   Abnormal ECG   When compared with ECG of 16-AUG-2019 19:41,   Vent.  rate has increased BY  40 BPM   Nonspecific T wave abnormality now evident in Anterolateral leads   Confirmed by Simin Host ([id number]) on 1/29/2022 11:19:24 PMs    Lab Results   Component Value Date/Time    Sodium 141 11/17/2022 01:40 PM    Potassium 3.8 11/17/2022 01:40 PM    Chloride 110 11/17/2022 01:40 PM    CO2 26 11/17/2022 01:40 PM    Anion gap 5 11/17/2022 01:40 PM    Glucose 135 (H) 11/17/2022 01:40 PM    BUN 30 (H) 11/17/2022 01:40 PM    Creatinine 2.09 (H) 11/17/2022 01:40 PM    BUN/Creatinine ratio 14 11/17/2022 01:40 PM    GFR est AA 29 (L) 05/04/2022 10:36 AM    GFR est non-AA 24 (L) 05/04/2022 10:36 AM    Calcium 8.8 11/17/2022 01:40 PM    Bilirubin, total 0.3 11/17/2022 01:40 PM    Alk.  phosphatase 68 11/17/2022 01:40 PM    Protein, total 6.5 11/17/2022 01:40 PM    Albumin 3.1 (L) 11/17/2022 01:40 PM    Globulin 3.4 11/17/2022 01:40 PM    A-G Ratio 0.9 11/17/2022 01:40 PM    ALT (SGPT) 18 11/17/2022 01:40 PM    AST (SGOT) 19 11/17/2022 01:40 PM     Lab Results   Component Value Date/Time    WBC 5.8 11/17/2022 01:40 PM    Hemoglobin, POC 13.6 05/26/2016 10:49 AM    HGB 11.5 (L) 11/17/2022 01:40 PM    Hematocrit, POC 40 05/26/2016 10:49 AM    HCT 35.8 11/17/2022 01:40 PM    PLATELET 398 47/52/9349 01:40 PM    MCV 93.5 11/17/2022 01:40 PM     Lab Results   Component Value Date/Time    TSH 2.33 08/27/2018 09:30 AM     Magnesium   Date Value Ref Range Status   03/07/2022 1.8 1.6 - 2.6 mg/dL Final   02/01/2022 1.7 1.6 - 2.6 mg/dL Final   01/31/2022 1.4 (L) 1.6 - 2.6 mg/dL Final   01/29/2022 1.4 (L) 1.6 - 2.6 mg/dL Final   08/27/2018 1.9 1.6 - 2.6 mg/dL Final     Lab Results   Component Value Date/Time    Hemoglobin A1c 9.3 (H) 12/02/2022 01:33 AM    Hemoglobin A1c (POC) 7.1 01/03/2022 11:57 AM    Hemoglobin A1c, External 6.7 09/28/2016 12:00 AM       Impression and Plan:  Cathy Jordan is a 61 y.o. with:    Palpitations, SVT/AT on MCT  Sarcoidosis, s/p flare  Normal Echos, last 2019, no pulm HTN  DM2, known  HyperCa and renal dysfunction, (Freda)  Asthma ([facility] Daniel)    Repeat labs WNL, incl TSH  Echo WNL  7 day MCT, showed short SVT bursts c/w her symptoms  Tried Cardizem and maybe slight improvement but still c/o daily symptoms  Lets try low dose Toprol and if doesn't make a difference may need to consider EPS  RTC ~4 months with me    Thank you for allowing me to participate in the care of your patient, please do not hesitate to call with questions or concerns.     [address],    Soraida Faust, DO

## 2022-12-08 NOTE — PROGRESS NOTES
Marta Lyle presents today for   Chief Complaint   Patient presents with    Follow-up     6 month follow up       Yisel Mckeon preferred language for health care discussion is english/other. Is someone accompanying this pt? no    Is the patient using any DME equipment during [address]? no    Depression Screening:  3 most recent PHQ Screens 12/8/2022   PHQ Not Done -   Little interest or pleasure in doing things Not at all   Feeling down, depressed, irritable, or hopeless Not at all   Total Score PHQ 2 0   Trouble falling or staying asleep, or sleeping too much -   Feeling tired or having little energy -   Poor appetite, weight loss, or overeating -   Feeling bad about yourself - or that you are a failure or have let yourself or your family down -   Trouble concentrating on things such as school, work, reading, or watching TV -   Moving or speaking so slowly that other people could have noticed; or the opposite being so fidgety that others notice -   Thoughts of being better off dead, or hurting yourself in some way -   PHQ 9 Score -   How difficult have these problems made it for you to do your work, take care of your home and get along with others -       Learning Assessment:  Learning Assessment 12/8/2022   PRIMARY LEARNER Patient   HIGHEST LEVEL OF EDUCATION - PRIMARY LEARNER  -   BARRIERS PRIMARY LEARNER -   CO-LEARNER CAREGIVER -   PRIMARY LANGUAGE ENGLISH   LEARNER PREFERENCE PRIMARY DEMONSTRATION     -     -   ANSWERED BY patient   RELATIONSHIP SELF       Abuse Screening:  Abuse Screening Questionnaire 12/8/2022   Do you ever feel afraid of your partner? N   Are you in a relationship with someone who physically or mentally threatens you? N   Is it safe for you to go home? Y       Fall Risk  Fall Risk Assessment, last 12 mths 3/24/2021   Able to walk? Yes   Fall in past 12 months? 1   Do you feel unsteady? 1   Are you worried about falling 1   Is TUG test greater than 12 seconds?  0   Is the gait abnormal? 0   Number of falls in past 12 months 2           Pt currently taking Anticoagulant therapy? no    Pt currently taking Antiplatelet therapy ? ASA 81 mg once a day      Coordination of Care:  1. Have you been to the ER, urgent care clinic since your last visit? Hospitalized since your last visit? yes    2. Have you seen or consulted any other health care providers outside of the [address] [place] [address] since your last visit? Include any pap smears or colon screening.  no

## 2022-12-08 NOTE — PATIENT INSTRUCTIONS
Follow up in 3 to 4 months with EKG or sooner if needed.    Start Metoprolol Succinate 25 mg once a day

## 2022-12-09 ENCOUNTER — OFFICE VISIT (OUTPATIENT)
Dept: INTERNAL MEDICINE CLINIC | Age: 57
End: 2022-12-09
Payer: MEDICARE

## 2022-12-09 VITALS
BODY MASS INDEX: 32.39 KG/M2 | WEIGHT: 176 LBS | OXYGEN SATURATION: 97 % | TEMPERATURE: 98.2 F | DIASTOLIC BLOOD PRESSURE: 90 MMHG | RESPIRATION RATE: 16 BRPM | SYSTOLIC BLOOD PRESSURE: 144 MMHG | HEIGHT: 62 IN | HEART RATE: 89 BPM

## 2022-12-09 DIAGNOSIS — F41.8 DEPRESSION WITH ANXIETY: ICD-10-CM

## 2022-12-09 DIAGNOSIS — Z79.4 TYPE 2 DIABETES MELLITUS WITH HYPERGLYCEMIA, WITH LONG-TERM CURRENT USE OF INSULIN (HCC): Primary | ICD-10-CM

## 2022-12-09 DIAGNOSIS — E78.2 MIXED HYPERLIPIDEMIA: ICD-10-CM

## 2022-12-09 DIAGNOSIS — E11.65 TYPE 2 DIABETES MELLITUS WITH HYPERGLYCEMIA, WITH LONG-TERM CURRENT USE OF INSULIN (HCC): Primary | ICD-10-CM

## 2022-12-09 DIAGNOSIS — Z23 NEEDS FLU SHOT: ICD-10-CM

## 2022-12-09 RX ORDER — PRAVASTATIN SODIUM 80 MG/1
80 TABLET ORAL DAILY
Qty: 90 TABLET | Refills: 1 | Status: SHIPPED | OUTPATIENT
Start: 2022-12-09

## 2022-12-09 RX ORDER — INSULIN GLARGINE 100 [IU]/ML
30 INJECTION, SOLUTION SUBCUTANEOUS
Qty: 5 PEN | Refills: 1 | Status: SHIPPED | OUTPATIENT
Start: 2022-12-09

## 2022-12-09 RX ORDER — VENLAFAXINE HYDROCHLORIDE 75 MG/1
75 CAPSULE, EXTENDED RELEASE ORAL DAILY
Qty: 90 CAPSULE | Refills: 1 | Status: SHIPPED | OUTPATIENT
Start: 2022-12-09

## 2022-12-09 NOTE — PATIENT INSTRUCTIONS
Vaccine Information Statement    Influenza (Flu) Vaccine (Inactivated or Recombinant): What You Need to Know    Many vaccine information statements are available in [place] and other languages. See www.immunize.org/vis. Hojas de información sobre vacunas están disponibles en español y en muchos otros idiomas. Visite www.immunize.org/vis. 1. Why get vaccinated? Influenza vaccine can prevent influenza (flu). Flu is a contagious disease that spreads around the [place] [facility] every year, usually between October and May. Anyone can get the flu, but it is more dangerous for some people. Infants and young children, people 72 years and older, pregnant people, and people with certain health conditions or a weakened immune system are at greatest risk of flu complications. Pneumonia, bronchitis, sinus infections, and ear infections are examples of flu-related complications. If you have a medical condition, such as heart disease, cancer, or diabetes, flu can make it worse. Flu can cause fever and chills, sore throat, muscle aches, fatigue, cough, headache, and runny or stuffy nose. Some people may have vomiting and diarrhea, though this is more common in children than adults. In an average year, thousands of people in the [facility] die from flu, and many more are hospitalized. Flu vaccine prevents millions of illnesses and flu-related visits to the doctor each year. 2. Influenza vaccines     CDC recommends everyone 6 months and older get vaccinated every flu season. Children 6 months through 6years of age may need 2 doses during a single flu season. Everyone else needs only 1 dose each flu season. It takes about 2 weeks for protection to develop after vaccination. There are many flu viruses, and they are always changing. Each year a new flu vaccine is made to protect against the influenza viruses believed to be likely to cause disease in the upcoming flu season.  Even when the vaccine doesnt exactly match these viruses, it may still provide some protection. Influenza vaccine does not cause flu. Influenza vaccine may be given at the same time as other vaccines. 3. Talk with your health care provider    Tell your vaccination provider if the person getting the vaccine:  Has had an allergic reaction after a previous dose of influenza vaccine, or has any severe, life-threatening allergies   Has ever had Guillain-Barré Syndrome (also called GBS)    In some cases, your health care provider may decide to postpone influenza vaccination until a future visit. Influenza vaccine can be administered at any time during pregnancy. People who are or will be pregnant during influenza season should receive inactivated influenza vaccine. People with minor illnesses, such as a cold, may be vaccinated. People who are moderately or severely ill should usually wait until they recover before getting influenza vaccine. Your health care provider can give you more information. 4. Risks of a vaccine reaction    Soreness, redness, and swelling where the shot is given, fever, muscle aches, and headache can happen after influenza vaccination. There may be a very small increased risk of Guillain-Barré Syndrome (GBS) after inactivated influenza vaccine (the flu shot). [facility] children who get the flu shot along with pneumococcal vaccine (PCV13) and/or DTaP vaccine at the same time might be slightly more likely to have a seizure caused by fever. Tell your health care provider if a child who is getting flu vaccine has ever had a seizure. People sometimes faint after medical procedures, including vaccination. Tell your provider if you feel dizzy or have vision changes or ringing in the ears. As with any medicine, there is a very remote chance of a vaccine causing a severe allergic reaction, other serious injury, or death. 5. What if there is a serious problem?     An allergic reaction could occur after the vaccinated person leaves the clinic. If you see signs of a severe allergic reaction (hives, swelling of the face and throat, difficulty breathing, a fast heartbeat, dizziness, or weakness), call 9-1-1 and get the person to the nearest hospital.    For other signs that concern you, call your health care provider. Adverse reactions should be reported to the Vaccine Adverse Event Reporting System (VAERS). Your health care provider will usually file this report, or you can do it yourself. Visit the VAERS website at www.vaers. [facility].gov or call 2-593.242.1932. VAERS is only for reporting reactions, and VAERS staff members do not give medical advice. 6. The National Vaccine Injury Compensation Program    The [facility] Vaccine Injury Compensation Program (VICP) is a federal program that was created to compensate people who may have been injured by certain vaccines. Claims regarding alleged injury or death due to vaccination have a time limit for filing, which may be as short as two years. Visit the VICP website at www.[facility].gov/vaccinecompensation or call 7-359.393.4320 to learn about the program and about filing a claim. 7. How can I learn more? Ask your health care provider. Call your local or state health department. Visit the website of the Food and Drug Administration (FDA) for vaccine package inserts and additional information at www.fda.gov/vaccines-blood-biologics/vaccines. Contact the Centers for Disease Control and Prevention (CDC): Call 4-415.656.9249 (1-800-CDC-INFO) or  Visit CDCs influenza website at www.cdc.gov/flu. Vaccine Information Statement   Inactivated Influenza Vaccine   8/6/2021  42 MARY Hardin [id number]   Department of Health and Human Services  Centers for Disease Control and Prevention    Office Use Only

## 2022-12-09 NOTE — PROGRESS NOTES
Chief Complaint   Patient presents with    Diabetes     3 month f/u    Labs     Completed 12/2/2022     1. \"Have you been to the ER, urgent care clinic since your last visit? Hospitalized since your last visit? \" Yes ER 11/17/2022 for SOB    2. \"Have you seen or consulted any other health care providers outside of the [address] [place] [address] since your last visit? \" No     3. For patients aged 39-70: Has the patient had a colonoscopy / FIT/ Cologuard? Yes - no Care Gap present      If the patient is female:    4. For patients aged 41-77: Has the patient had a mammogram within the past 2 years? Yes - no Care Gap present      5. For patients aged 21-65: Has the patient had a pap smear?  Yes - no Care Gap present

## 2022-12-09 NOTE — PROGRESS NOTES
Subjective:      Uche Rush is a 61 y.o. female who presents for follow-up of Type 2 diabetes mellitus. Current symptoms/problems include {Symptoms; diabetes:90408} and have been {Desc; course:10219}. Symptoms have been present for {NUMBERS; 0-10:94008} {units:11}. Known diabetic complications: {diabetes complications:1215}  Cardiovascular risk factors: {cv risk factors:510}  Current diabetic medications include {dm treatment:02000}. Eye exam current (within one year): {yes/no/unknown:74}  Weight trend: {trend:93479}  Prior visit with dietician: {yes***/no:45512}  Current diet: {diet habits:15813}  Current exercise: {exercise types:63143}    Current monitoring regimen: {diabetes monitorin}  Home blood sugar records: {diabetes glucometry results:13304}  Any episodes of hypoglycemia? {yes***/no:28970}    Is She on ACE inhibitor or angiotensin II receptor blocker?    {yes/no/not indicated:89289}   {ace inhibitors:96849}  {angiotensin ii receptor blockers:82849}    Past Medical History:   Diagnosis Date    Acquired cyst of kidney 2020    [facility]    Asthma     Bilateral great toe fractures     Chronic kidney disease (CKD), stage IV (severe) ([address] 2022    Dr Carin Reynolds    Chronic sinusitis     Dr. Celestina Jacobo in the past    Colon polyp     Dr Kimberlee Porter    Depression 2019    Diabetes mellitus ([address]     DJD (degenerative joint disease) of cervical spine     DJD (degenerative joint disease), lumbar     MRI c spine w degen changes; Dr Vale Menchaca    Dyslipidemia     calculated 10 year risk score was 2.0% ()    Edema of both ankles 2018    Environmental and seasonal allergies 2018    Eustachian tube dysfunction     Fibrocystic breast     Dr Geo Rapp    GERD (gastroesophageal reflux disease)     Hypertriglyceridemia 2018    Lateral epicondylitis of both elbows 2017    Macular degeneration of both eyes 2018    Dr Sam Monzon [place]    Mild intermittent asthma without complication 38/92/5952    Dr. Oneal Cast;  ratio 68%, FEV1 78% w 6% inc postbd, TLC 77, RV 56, DLCO 61%    Morbid obesity (HCC)     peak weight 196 lbs, bmi 35.8 from 10/13    Neuropathy 8/28/2018    Osteopenia     Dr. Jo Colorado; DEXA t score -0.7 spine, -0.2 hip (8/14)    Sarcoidosis     Dr Sander Judd    Type 2 diabetes mellitus with hyperglycemia, with long-term current use of insulin ([facility] 8/28/2018     Past Surgical History:   Procedure Laterality Date    COLONOSCOPY N/A 5/26/2016    Dr Yan Woodall hyperplastic    COLONOSCOPY N/A 10/19/2021    COLONOSCOPY with polypectomy performed by Yamile Garces MD at [address]    [facility]      Dr. Jorgito Rubi      [facility]      tear duct surgery right Dr. Clemetine Claude 2010; left Dr Last Weldon 2015    HX ORTHOPAEDIC      DEXA -0.7 spine, -0.2 hip 8/14    [place] CARDIAC SURG PROCEDURE UNLIST  9/10, 8/13    thallium negative ef 72%; negative ef 70%    [place] CHEST SURGERY PROCEDURE UNLISTED  7/12    US thyroid negative    [place] CHEST SURG[place] PROCEDURE UNLISTED  2012    pfts w mod restrictive defect     VASCULAR SURGERY PROCEDURE UNLIST  12/13    venous doppler negative     Family History   Problem Relation Age of Onset    Cancer Mother     Hypertension Mother     Cancer Father     Diabetes Father     Hypertension Father     Stroke Father     Diabetes Sister     Hypertension Sister     Heart Disease Sister     Colon Cancer Sister 52    Cancer Sister     Hypertension Brother     Cancer Maternal Aunt      Current Outpatient Medications   Medication Sig Dispense Refill    metoprolol succinate (TOPROL-XL) 25 mg XL tablet Take 1 Tablet by mouth daily. 30 Tablet 6    budesonide-glycopyr-formoterol (Breztri Aerosphere) 160-9-4.8 mcg/actuation HFAA Take 2 Puffs by inhalation two (2) times a day.  Rinse and gargle after each use 2 Each 0    Trelegy Ellipta 200-62.5-25 mcg inhaler INHALE ONE PUFF BY MOUTH DAILY, RINSE AND GARGLE AFTER EACH  Blister 3    insulin glargine (LANTUS,BASAGLAR) 100 unit/mL (3 mL) inpn 22 Units by SubCUTAneous route nightly. 5 Pen 1    furosemide (LASIX) 20 mg tablet Take 1 Tablet by mouth daily. Indications: visible water retention, high blood pressure 90 Tablet 0    diclofenac (VOLTAREN) 1 % gel Apply 2 g to affected area four (4) times daily as needed. predniSONE (DELTASONE) 5 mg tablet Take 5 mg by mouth daily. pravastatin (PRAVACHOL) 80 mg tablet Take 1 Tablet by mouth daily. 90 Tablet 1    SITagliptin (JANUVIA) 25 mg tablet Take 1 Tablet by mouth daily. For diabetes 90 Tablet 1    topiramate (TOPAMAX) 50 mg tablet Take 1 Tablet by mouth two (2) times a day. 180 Tablet 1    venlafaxine-SR (EFFEXOR-XR) 75 mg capsule Take 1 Capsule by mouth daily. 90 Capsule 1    cetirizine HCl (ZYRTEC PO) Take 1 Tablet by mouth daily. esomeprazole (NEXIUM) 40 mg capsule Take 1 Capsule by mouth daily as needed for Gastroesophageal Reflux Disease (GERD). 90 Capsule 3    magnesium oxide (MAG-OX) 400 mg tablet Take 1 Tablet by mouth two (2) times daily (with meals). 120 Tablet 0    Insulin Needles, Disposable, ([place] Pen Needle) 32 gauge x 5/32\" ndle Check blood sugars three times a day 100 Pen Needle 11    dilTIAZem ER (CARDIZEM CD) 120 mg capsule Take 1 Capsule by mouth daily. 30 Capsule 5    ondansetron hcl (Zofran) 4 mg tablet Take 1 Tablet by mouth every eight (8) hours as needed for Nausea or Nausea or Vomiting. 20 Tablet 0    butalbital-acetaminophen-caffeine (FIORICET, ESGIC) -40 mg per tablet Take 1 Tablet by mouth every six (6) hours as needed. cyanocobalamin (VITAMIN B12) 100 mcg tablet Take 50 mcg by mouth daily. albuterol (PROVENTIL HFA, VENTOLIN HFA, PROAIR HFA) 90 mcg/actuation inhaler Take 2 Puffs by inhalation every four (4) hours as needed for Shortness of Breath. Indications: asthma attack 1 Inhaler 5    therapeutic multivitamin-minerals (THERAGRAN-M) tablet Take 1 Tab by mouth daily.       triamcinolone acetonide (KENALOG) 0.1 % topical cream Apply  to affected area two (2) times daily as needed for Skin Irritation. 15 g 1    aspirin delayed-release 81 mg tablet Take 1 Tab by mouth daily. For heart health 30 Tab 11    glucose blood VI test strips (ACCU-CHEK POOJA) strip Pt to test 5 times daily. Dx:E11.65 500 Strip 3    empagliflozin (JARDIANCE) 10 mg tablet Take 1 Tablet by mouth daily. (Patient not taking: Reported on 12/9/2022) 90 Tablet 1     Allergies   Allergen Reactions    Other Food Itching     Pt reported she's allergic to \"all tropical fruits, my tongue starts tingling & side of mouth starts tingling & face swells\"    Pollen Extracts Runny Nose and Cough    Adhesive Tape-Silicones Other (comments)     Reports blistering & burning upon removal of freestyle lindy & and also happened upon removal of ecg holter monitor    Amoxicillin Hives, Shortness of Breath and Swelling    Crestor [Rosuvastatin] Myalgia    Ibuprofen Other (comments)     dont prescribe due to kidney function    Lipitor [Atorvastatin] Other (comments)     Muscle cramps and weakness      Karthikeyan Flavor Itching     Allergy to Karthikeyan. Cracks in corner of mouth, irritation of tongue. Pcn [Penicillins] Angioedema    Pineapple Itching and Other (comments)     Cracks in corner of mouth, irritation of tongue. Social History     Socioeconomic History    Marital status: LEGALLY      Spouse name: Not on file    Number of children: 0    Years of education: 15    Highest education level: Not on file   Occupational History    Occupation: Unemployed   Tobacco Use    Smoking status: Never    Smokeless tobacco: Never   Vaping Use    Vaping Use: Never used   Substance and Sexual Activity    Alcohol use:  Yes     Alcohol/week: 0.0 standard drinks     Comment: occasional wine    Drug use: Never    Sexual activity: Not Currently   Other Topics Concern     Service No    Blood Transfusions No    Caffeine Concern Yes     Comment: due to reflux    Occupational Exposure No    Hobby Hazards No    Sleep Concern Yes    Stress Concern Yes     Comment: wants a job    Weight Concern Yes    Special Diet No    Back Care No    Exercise Yes    Bike Helmet No    Seat Belt Yes    Self-Exams Yes   Social History Narrative    Patient lives with a friend, no pets. Social Determinants of Health     Financial Resource Strain: Not on file   Food Insecurity: Not on file   Transportation Needs: Not on file   Physical Activity: Not on file   Stress: Not on file   Social Connections: Not on file   Intimate Partner Violence: Not on file   Housing Stability: Not on file       Review of Systems  {Ros - Complete:14211}     Objective:   Visit Vitals  BP (!) 144/90   Pulse 89   Temp 98.2 °F (36.8 °C) (Temporal)   Resp 16   Ht 5' 2\" (1.575 m)   Wt 176 lb (79.8 kg)   LMP 03/30/2013   SpO2 97%   BMI 32.19 kg/m²       General:  {appearance:47698}   Oropharynx: {oropharynx brief exam:89987}    Eyes:  {eyes:201}    Ears:  {ears:5207}   Neck: {neck:06667}   Thyroid:  {nodule:84578}   Lung: {lung exam:59242}   Heart:  {heart:5510}   Abdomen: {abdomen exam:77261}   Extremities: {extremity:5109}   Skin: {skin:89759}   Pulses: {PULSE EXAM:78801}   Neuro: {neuro exam:5902}     Lab Review  CO2 (mmol/L)   Date Value   11/17/2022 26   10/04/2022 27   09/14/2022 24     BUN   Date Value   11/17/2022 30 MG/DL (H)   10/04/2022 26 MG/DL (H)   09/14/2022 24 mg/dL     {LABS ON MAAF:78444}    Assessment:     Diabetes Mellitus type II, under {good/fair/poor:69915} control. Plan:     1. Rx changes: {none:00499}  2. Education: Reviewed ABCs of diabetes management (respective goals in parentheses):  A1C (<7), blood pressure (<130/80), and cholesterol (LDL <100). 3.  Compliance at present is estimated to be {good/fair/poor:96500}. Efforts to improve compliance (if necessary) will be directed at {compliance:19334}.   4. Follow up: {NUMBERS; 0-10:02159} {time:11}

## 2022-12-09 NOTE — PROGRESS NOTES
Yisel Urias 1965 female who presents for routine immunizations. Patient denies any symptoms , reactions or allergies that would exclude them from being immunized today. Risks and adverse reactions were discussed and the VIS was given to them. All questions were addressed. Order placed for INFLUENZA in RIGHT deltoid, per Verbal Order from Jorgito Franco [place], with read back. Patient was observed for 15 min post injection. There were no reactions observed.     Igor MARKHAM

## 2022-12-11 NOTE — PROGRESS NOTES
[facility] [address], [facility]  [phone] MANDO/[phone] fax    12/9/2022    Yisel Torres 1965 is a pleasant BLACK/ female. Todays concerns/HPI:  Diabetes. Up until last week pt was taking Basaglar 22 units, Januvia, and Jardiance. She stopped Jardiance because she felt med was not working (and copay to high). BS in the 200's. She called the office last week due to her elevated BS. She was supposed to be taking Humalog but insurance would not cover med then she was changed to Novolog with meals but copay is to high. Insulin was increased to 25 units last week. She denies hyper/hypoglycemic episodes. She is willing to reconnect with PharmD after the first of the year. She will be getting new insurance next year. She is currently in the donut hole.      Past Medical History:   Diagnosis Date    Acquired cyst of kidney 01/16/2020    [facility]    Asthma     Bilateral great toe fractures 2014    Chronic kidney disease (CKD), stage IV (severe) ([facility] [address] 06/2022    Dr Sue Romero    Chronic sinusitis     Dr. Satnam Moore in the past    Colon polyp 5/16    Dr Mccarty Iba    Depression 2019    Diabetes mellitus ([facility] [address]     DJD (degenerative joint disease) of cervical spine 2016    DJD (degenerative joint disease), lumbar 2013    MRI c spine w degen changes; Dr Diaz Mom    Dyslipidemia     calculated 10 year risk score was 2.0% (12/13)    Edema of both ankles 8/28/2018    Environmental and seasonal allergies 8/28/2018    Eustachian tube dysfunction     Fibrocystic breast     Dr Yessenia Soto    GERD (gastroesophageal reflux disease)     Hypertriglyceridemia 8/28/2018    Lateral epicondylitis of both elbows 2/6/2017    Macular degeneration of both eyes 08/28/2018    Dr Dannie Cooper, [facility]    Mild intermittent asthma without complication 20/65/3346    Dr. Jonathan Rios;  ratio 68%, FEV1 78% w 6% inc postbd, TLC 77, RV 56, DLCO 61%    Morbid obesity (HCC)     peak weight 196 lbs, bmi 35.8 from 10/13    Neuropathy 8/28/2018    Osteopenia     Dr. Lupillo Weiner; DEXA t score -0.7 spine, -0.2 hip (8/14)    Sarcoidosis     Dr Billie Gomes    Type 2 diabetes mellitus with hyperglycemia, with long-term current use of insulin ([facility] 8/28/2018     Current Outpatient Medications   Medication Sig    insulin glargine (LANTUS,BASAGLAR) 100 unit/mL (3 mL) inpn 30 Units by SubCUTAneous route nightly. SITagliptin phosphate (JANUVIA) 50 mg tablet Take 1 Tablet by mouth daily. venlafaxine-SR (EFFEXOR-XR) 75 mg capsule Take 1 Capsule by mouth daily. pravastatin (PRAVACHOL) 80 mg tablet Take 1 Tablet by mouth daily. metoprolol succinate (TOPROL-XL) 25 mg XL tablet Take 1 Tablet by mouth daily. budesonide-glycopyr-formoterol (Breztri Aerosphere) 160-9-4.8 mcg/actuation HFAA Take 2 Puffs by inhalation two (2) times a day. Rinse and gargle after each use    Trelegy Ellipta 200-62.5-25 mcg inhaler INHALE ONE PUFF BY MOUTH DAILY, RINSE AND GARGLE AFTER EACH USE    furosemide (LASIX) 20 mg tablet Take 1 Tablet by mouth daily. Indications: visible water retention, high blood pressure    diclofenac (VOLTAREN) 1 % gel Apply 2 g to affected area four (4) times daily as needed. predniSONE (DELTASONE) 5 mg tablet Take 5 mg by mouth daily. topiramate (TOPAMAX) 50 mg tablet Take 1 Tablet by mouth two (2) times a day. cetirizine HCl (ZYRTEC PO) Take 1 Tablet by mouth daily. esomeprazole (NEXIUM) 40 mg capsule Take 1 Capsule by mouth daily as needed for Gastroesophageal Reflux Disease (GERD). magnesium oxide (MAG-OX) 400 mg tablet Take 1 Tablet by mouth two (2) times daily (with meals). Insulin Needles, Disposable, ([place] Pen Needle) 32 gauge x 5/32\" ndle Check blood sugars three times a day    dilTIAZem ER (CARDIZEM CD) 120 mg capsule Take 1 Capsule by mouth daily. ondansetron hcl (Zofran) 4 mg tablet Take 1 Tablet by mouth every eight (8) hours as needed for Nausea or Nausea or Vomiting. butalbital-acetaminophen-caffeine (FIORICET, ESGIC) -40 mg per tablet Take 1 Tablet by mouth every six (6) hours as needed. cyanocobalamin (VITAMIN B12) 100 mcg tablet Take 50 mcg by mouth daily. albuterol (PROVENTIL HFA, VENTOLIN HFA, PROAIR HFA) 90 mcg/actuation inhaler Take 2 Puffs by inhalation every four (4) hours as needed for Shortness of Breath. Indications: asthma attack    therapeutic multivitamin-minerals (THERAGRAN-M) tablet Take 1 Tab by mouth daily. triamcinolone acetonide (KENALOG) 0.1 % topical cream Apply  to affected area two (2) times daily as needed for Skin Irritation. aspirin delayed-release 81 mg tablet Take 1 Tab by mouth daily. For heart health    glucose blood VI test strips (ACCU-CHEK POOJA) strip Pt to test 5 times daily. Dx:E11.65     No current facility-administered medications for this visit. Review of Systems:  Pertinent items are noted in HPI. Physical:   Visit Vitals  BP (!) 144/90   Pulse 89   Temp 98.2 °F (36.8 °C) (Temporal)   Resp 16   Ht 5' 2\" (1.575 m)   Wt 176 lb (79.8 kg)   LMP 03/30/2013   SpO2 97%   BMI 32.19 kg/m²      Wt Readings from Last 3 Encounters:   12/09/22 176 lb (79.8 kg)   12/08/22 174 lb (78.9 kg)   12/01/22 174 lb (78.9 kg)       Exam:   Physical Exam  Constitutional:       Appearance: Normal appearance. She is obese. Neck:      Vascular: No carotid bruit. Cardiovascular:      Rate and Rhythm: Normal rate and regular rhythm. Pulmonary:      Effort: Pulmonary effort is normal. No respiratory distress. Breath sounds: Normal breath sounds. No wheezing. Musculoskeletal:         General: Normal range of motion. Neurological:      Mental Status: She is alert and oriented to person, place, and time. Psychiatric:         Mood and Affect: Mood normal.      Body mass index is 32.19 kg/m². Review of Data:  A1c 7.7-9.3    Plan:    1.  Type 2 diabetes mellitus with hyperglycemia, with long-term current use of insulin [facility])  Not controlled  Increase Basaglar to 30 units daily  Discussed possible BID therapy with Basaglar  Discussed possible GLP-1 therapy if insurance will cover. Discussed needing to follow up with PharmD. Educated pt to avoid using [employer] brand insulins   Pt to call office week of 12/19/22 with BS results for adjustment of insulin therapy. Check BS BID  DC Jardiance  DC Novolog    - insulin glargine (LANTUS,BASAGLAR) 100 unit/mL (3 mL) inpn; 30 Units by SubCUTAneous route nightly. Dispense: 5 Pen; Refill: 1  - SITagliptin phosphate (JANUVIA) 50 mg tablet; Take 1 Tablet by mouth daily. Dispense: 90 Tablet; Refill: 0    2. Mixed hyperlipidemia  Refilled    - pravastatin (PRAVACHOL) 80 mg tablet; Take 1 Tablet by mouth daily. Dispense: 90 Tablet; Refill: 1    3. Depression with anxiety  Refilled    - venlafaxine-SR (EFFEXOR-XR) 75 mg capsule; Take 1 Capsule by mouth daily. Dispense: 90 Capsule; Refill: 1    4. Needs flu shot    - INFLUENZA, FLUARIX, FLULAVAL, FLUZONE (AGE 6 MO+), AFLURIA(AGE 3Y+) IM, PF, 0.5 ML    Reviewed medication and completed medication reconciliation with the patient. Reviewed side effects of medications with the patient. Questions were answered and patient verb understanding.     Past Surgical History:   Procedure Laterality Date    COLONOSCOPY N/A 5/26/2016    Dr Cabrera Care hyperplastic    COLONOSCOPY N/A 10/19/2021    COLONOSCOPY with polypectomy performed by Skylar Spangler MD at [address] [place] [address]      Dr. Maddy Velasco [facility]      nasal polypectomy Dr. Connor Olivo [facility]      tear duct surgery right Dr. Jyoti Worrell 2010; left Dr Tess Good 2015    HX ORTHOPAEDIC      DEXA -0.7 spine, -0.2 hip 8/14    [place] CARDIAC SURG PROCEDURE UNLIST  9/10, 8/13    thallium negative ef 72%; negative ef 70%    [place] CHEST SURGERY PROCEDURE UNLISTED  7/12    US thyroid negative    [place] CHEST SURGERY PROCEDURE UNLISTED  2012    pfts w mod restrictive defect     VASCULAR SURGERY PROCEDURE UNLIST 12/13    venous doppler negative     Allergies and Intolerances: Allergies   Allergen Reactions    Other Food Itching     Pt reported she's allergic to \"all tropical fruits, my tongue starts tingling & side of mouth starts tingling & face swells\"    Pollen Extracts Runny Nose and Cough    Adhesive Tape-Silicones Other (comments)     Reports blistering & burning upon removal of freestyle lindy & and also happened upon removal of ecg holter monitor    Amoxicillin Hives, Shortness of Breath and Swelling    Crestor [Rosuvastatin] Myalgia    Ibuprofen Other (comments)     dont prescribe due to kidney function    Lipitor [Atorvastatin] Other (comments)     Muscle cramps and weakness      [place] Flavor Itching     Allergy to Karthikeyan. Cracks in corner of mouth, irritation of tongue. Pcn [Penicillins] Angioedema    Pineapple Itching and Other (comments)     Cracks in corner of mouth, irritation of tongue. Family History:   Family History   Problem Relation Age of Onset    Cancer Mother     Hypertension Mother     Cancer Father     Diabetes Father     Hypertension Father     Stroke Father     Diabetes Sister     Hypertension Sister     Heart Disease Sister     Colon Cancer Sister 52    Cancer Sister     Hypertension Brother     Cancer Maternal Aunt      Social History:   She  reports that she has never smoked.  She has never used smokeless tobacco.   Social History     Substance and Sexual Activity   Alcohol Use Yes    Alcohol/week: 0.0 standard drinks    Comment: occasional wine     Immunization History:  Immunization History   Administered Date(s) Administered    Influenza Vaccine 02/15/2021, 10/07/2021    Influenza, FLUARIX, FLULAVAL, FLUZONE (age 10 mo+) AND AFLURIA, (age 1 y+), PF, 0.5mL 09/28/2017, 11/26/2018, 12/09/2022    Pneumococcal Polysaccharide (PPSV-23) 09/28/2017    Tdap 04/24/2013         Follow up 3 months AWV (A1c, microalbumin, lipid, CMP)      Dr. Farheen Esteves, AGNP-C, DNP  [facility]

## 2023-01-05 ENCOUNTER — APPOINTMENT (OUTPATIENT)
Dept: INFUSION THERAPY | Age: 58
End: 2023-01-05
Payer: MEDICARE

## 2023-01-06 RX ORDER — DILTIAZEM HYDROCHLORIDE 120 MG/1
CAPSULE, COATED, EXTENDED RELEASE ORAL
Qty: 30 CAPSULE | Refills: 5 | Status: SHIPPED | OUTPATIENT
Start: 2023-01-06

## 2023-01-06 NOTE — PROGRESS NOTES
Pharmacy Progress Note - Diabetes Management       Assessment / Plan:   Diabetes Management:  Per ADA guidelines, Pt's A1c is not at goal of < 7%. Pt's BG is variable and dependent on her meals. She is not monitoring her BG surrounding meals so post-prandial values are unknown. Will start Ozempic 0.25mg weekly to target the likely post-prandial excursions which are influencing her A1c. Will decrease her Lantus to 20units qhs to decrease possible hypoglycemia. Pt was instructed on tapering instructions if she has BG values less than 80mg/dL (decrease by 2 units for every BG less than 80mg/dL). Goal will be to eventually discontinue the Lantus while titrating the Ozempic. Will reassess with SMBG logs tested ac tid and sometimes hs in 2 weeks. Nutrition/Lifestyle Modifications:  - Educated pt on the importance of moderating carbohydrate intake. Reviewed sources of carbohydrates and method to help determine appropriate portion sizes (e.g., Diabetes Plate Method). - Advised patient to avoid sugar-sweetened beverages and replace with water or diet/zero sugar option.  - Recommend ~30 minutes consistent, moderately intensive, exercise/day or ~150 minutes/week. Start small, stay consistent, and increase length and types of exercise, as tolerated. Patient will return to clinic in 2 week(s) for follow up.         S/O: Ms. Emerson Ayoub, a 61 y.o. female referred by Neil Crigler,  has a past medical history of Acquired cyst of kidney (01/16/2020), Asthma, Bilateral great toe fractures (2014), Chronic kidney disease (CKD), stage IV (severe) ([address] (06/2022), Chronic sinusitis, Colon polyp (5/16), Depression (2019), Diabetes mellitus ([address] DJD (degenerative joint disease) of cervical spine (2016), DJD (degenerative joint disease), lumbar (2013), Dyslipidemia, Edema of both ankles (8/28/2018), Environmental and seasonal allergies (8/28/2018), Eustachian tube dysfunction, Fibrocystic breast, GERD (gastroesophageal reflux disease), Hypertriglyceridemia (8/28/2018), Lateral epicondylitis of both elbows (2/6/2017), Macular degeneration of both eyes (08/28/2018), Mild intermittent asthma without complication (23/96/5622), Morbid obesity ([facility] Neuropathy (8/28/2018), Osteopenia, Sarcoidosis, and Type 2 diabetes mellitus with hyperglycemia, with long-term current use of insulin ([facility] (8/28/2018). Pt was seen today for diabetes management. Patient's last A1c was:   Lab Results   Component Value Date/Time    Hemoglobin A1c 9.3 (H) 12/02/2022 01:33 AM    Hemoglobin A1c (POC) 7.1 01/03/2022 11:57 AM    Hemoglobin A1c, External 6.7 09/28/2016 12:00 AM       Interim update:    Pt was last seen by Lidia Ortega DNP on 09 Dec 2022. Per her A/P: Increase Basaglar to 30 units daily  Discussed possible BID therapy with Basaglar  Discussed possible GLP-1 therapy if insurance will cover. Discussed needing to follow up with PharmD. Educated pt to avoid using [employer] brand insulins   Pt to call office week of 12/19/22 with BS results for adjustment of insulin therapy. Check BS BID  DC Jardiance  DC Novolog    Today:   Has been using the [employer] meter and strips from [employer], but is only checking FBG and sometimes hs. She did not bring in her BG meter and couldn't recall many BG values. She states that she will have some BG values in the 200s, but mostly in the 100s. She states that she is pushing improving her diet since the A1c result was told to her. She is amenable to checking ac tid and sometimes hs with a new rx of strips and lancets. Her new insurance should now cover it.     Reviewed Ozempic in detail, how to inject, store, attach pen needle and dispose of pen needle, use on needle per injection , injection sites, rotation of sites, once weekly - pick day - what to do if miss day, potential side effects and what to do, holding pen in site after injecting to make sure medication is fully injected, expected effects, dose titration, amount of medication in pen, how the medication works, documenting doses given on ozempic box. Current anti-hyperglycemic regimen includes:    Key Antihyperglycemic Medications               insulin glargine (LANTUS,BASAGLAR) 100 unit/mL (3 mL) inpn 30 Units by SubCUTAneous route nightly. SITagliptin phosphate (JANUVIA) 50 mg tablet Take 1 Tablet by mouth daily. Complete current medication regimen includes:  Current Outpatient Medications   Medication Sig    insulin glargine (LANTUS,BASAGLAR) 100 unit/mL (3 mL) inpn 30 Units by SubCUTAneous route nightly. SITagliptin phosphate (JANUVIA) 50 mg tablet Take 1 Tablet by mouth daily. venlafaxine-SR (EFFEXOR-XR) 75 mg capsule Take 1 Capsule by mouth daily. pravastatin (PRAVACHOL) 80 mg tablet Take 1 Tablet by mouth daily. metoprolol succinate (TOPROL-XL) 25 mg XL tablet Take 1 Tablet by mouth daily. budesonide-glycopyr-formoterol (Breztri Aerosphere) 160-9-4.8 mcg/actuation HFAA Take 2 Puffs by inhalation two (2) times a day. Rinse and gargle after each use    Trelegy Ellipta 200-62.5-25 mcg inhaler INHALE ONE PUFF BY MOUTH DAILY, RINSE AND GARGLE AFTER EACH USE    furosemide (LASIX) 20 mg tablet Take 1 Tablet by mouth daily. Indications: visible water retention, high blood pressure    diclofenac (VOLTAREN) 1 % gel Apply 2 g to affected area four (4) times daily as needed. predniSONE (DELTASONE) 5 mg tablet Take 5 mg by mouth daily. topiramate (TOPAMAX) 50 mg tablet Take 1 Tablet by mouth two (2) times a day. cetirizine HCl (ZYRTEC PO) Take 1 Tablet by mouth daily. esomeprazole (NEXIUM) 40 mg capsule Take 1 Capsule by mouth daily as needed for Gastroesophageal Reflux Disease (GERD). magnesium oxide (MAG-OX) 400 mg tablet Take 1 Tablet by mouth two (2) times daily (with meals).     Insulin Needles, Disposable, ([place]na Pen Needle) 32 gauge x 5/32\" ndle Check blood sugars three times a day    dilTIAZem ER (CARDIZEM CD) 120 mg capsule Take 1 Capsule by mouth daily. ondansetron hcl (Zofran) 4 mg tablet Take 1 Tablet by mouth every eight (8) hours as needed for Nausea or Nausea or Vomiting. butalbital-acetaminophen-caffeine (FIORICET, ESGIC) -40 mg per tablet Take 1 Tablet by mouth every six (6) hours as needed. cyanocobalamin (VITAMIN B12) 100 mcg tablet Take 50 mcg by mouth daily. albuterol (PROVENTIL HFA, VENTOLIN HFA, PROAIR HFA) 90 mcg/actuation inhaler Take 2 Puffs by inhalation every four (4) hours as needed for Shortness of Breath. Indications: asthma attack    therapeutic multivitamin-minerals (THERAGRAN-M) tablet Take 1 Tab by mouth daily. triamcinolone acetonide (KENALOG) 0.1 % topical cream Apply  to affected area two (2) times daily as needed for Skin Irritation. aspirin delayed-release 81 mg tablet Take 1 Tab by mouth daily. For heart health    glucose blood VI test strips (ACCU-CHEK POOJA) strip Pt to test 5 times daily. Dx:E11.65     No current facility-administered medications for this visit. Allergies: Allergies   Allergen Reactions    Other Food Itching     Pt reported she's allergic to \"all tropical fruits, my tongue starts tingling & side of mouth starts tingling & face swells\"    Pollen Extracts Runny Nose and Cough    Adhesive Tape-Silicones Other (comments)     Reports blistering & burning upon removal of freestyle lindy & and also happened upon removal of ecg holter monitor    Amoxicillin Hives, Shortness of Breath and Swelling    Crestor [Rosuvastatin] Myalgia    Ibuprofen Other (comments)     dont prescribe due to kidney function    Lipitor [Atorvastatin] Other (comments)     Muscle cramps and weakness      Baker Flavor Itching     Allergy to Baker. Cracks in corner of mouth, irritation of tongue. Pcn [Penicillins] Angioedema    Pineapple Itching and Other (comments)     Cracks in corner of mouth, irritation of tongue. Vitals/Labs:   Wt Readings from Last 3 Encounters:   12/09/22 176 lb (79.8 kg)   12/08/22 174 lb (78.9 kg)   12/01/22 174 lb (78.9 kg)     BP Readings from Last 3 Encounters:   12/09/22 (!) 144/90   12/08/22 132/84   12/01/22 134/72     Pulse Readings from Last 3 Encounters:   12/09/22 89   12/08/22 92   12/01/22 91       Lab Results   Component Value Date/Time    Sodium 141 11/17/2022 01:40 PM    Potassium 3.8 11/17/2022 01:40 PM    Chloride 110 11/17/2022 01:40 PM    CO2 26 11/17/2022 01:40 PM    Anion gap 5 11/17/2022 01:40 PM    Glucose 135 (H) 11/17/2022 01:40 PM    BUN 30 (H) 11/17/2022 01:40 PM    Creatinine 2.09 (H) 11/17/2022 01:40 PM    BUN/Creatinine ratio 14 11/17/2022 01:40 PM    GFR est AA 29 (L) 05/04/2022 10:36 AM    GFR est non-AA 24 (L) 05/04/2022 10:36 AM    Calcium 8.8 11/17/2022 01:40 PM    Bilirubin, total 0.3 11/17/2022 01:40 PM    Alk.  phosphatase 68 11/17/2022 01:40 PM    Protein, total 6.5 11/17/2022 01:40 PM    Albumin 3.1 (L) 11/17/2022 01:40 PM    Globulin 3.4 11/17/2022 01:40 PM    A-G Ratio 0.9 11/17/2022 01:40 PM    ALT (SGPT) 18 11/17/2022 01:40 PM     Lab Results   Component Value Date/Time    Cholesterol, total 258 (H) 06/15/2022 10:47 AM    HDL Cholesterol 58 06/15/2022 10:47 AM    LDL, calculated 160 (H) 06/15/2022 10:47 AM    VLDL, calculated 40 (H) 06/15/2022 10:47 AM    Triglyceride 199 (H) 06/15/2022 10:47 AM    CHOL/HDL Ratio 4.2 09/08/2021 11:05 AM     Lab Results   Component Value Date/Time    WBC 5.8 11/17/2022 01:40 PM    Hemoglobin, POC 13.6 05/26/2016 10:49 AM    HGB 11.5 (L) 11/17/2022 01:40 PM    Hematocrit, POC 40 05/26/2016 10:49 AM    HCT 35.8 11/17/2022 01:40 PM    PLATELET 930 94/73/0303 01:40 PM    MCV 93.5 11/17/2022 01:40 PM     Lab Results   Component Value Date/Time    Hemoglobin A1c 9.3 (H) 12/02/2022 01:33 AM    Hemoglobin A1c 7.7 (H) 09/14/2022 10:07 AM    Hemoglobin A1c 7.4 (H) 06/15/2022 10:47 AM    Hemoglobin A1c, External 6.7 09/28/2016 12:00 AM     Hemoglobin A1c (POC)   Date Value Ref Range Status   01/03/2022 7.1 % Final        Screenings/Prevention Parameters:  -Diabetic Eye and Foot Exams:      Diabetic Foot and Eye Exam HM Status   Topic Date Due    Eye Exam  03/19/2021    Diabetic Foot Care  03/31/2023     -Microalbumin / Creatinine ratio:       Lab Results   Component Value Date/Time    Microalbumin/Creat ratio (mg/g creat) 4 03/18/2021 10:48 AM    Microalb/Creat ratio (ug/mg creat.)  06/15/2022 10:47 AM      Comment:      ** Unable to calculate Microablumin/Creatinine Ratio due to low Microalbumin        Microalbumin,urine random 0.59 03/18/2021 10:48 AM     -ASCVD Risk Score Parameters and Calculation    Race: BLACK/     BP Readings from Last 3 Encounters:   12/09/22 (!) 144/90   12/08/22 132/84   12/01/22 134/72        Lab Results   Component Value Date/Time    Cholesterol, total 258 (H) 06/15/2022 10:47 AM    HDL Cholesterol 58 06/15/2022 10:47 AM    LDL, calculated 160 (H) 06/15/2022 10:47 AM    VLDL, calculated 40 (H) 06/15/2022 10:47 AM    Triglyceride 199 (H) 06/15/2022 10:47 AM    CHOL/HDL Ratio 4.2 09/08/2021 11:05 AM        Social History     Tobacco Use    Smoking status: Never    Smokeless tobacco: Never   Substance Use Topics    Alcohol use: Yes     Alcohol/week: 0.0 standard drinks     Comment: occasional wine         Calculated ASCVD Risk Score: The 10-year ASCVD risk score (Queenie ORTIZ, et al., 2019) is: 23.2%    Values used to calculate the score:      Age: 61 years      Sex: Female      Is Non- : Yes      Diabetic: Yes      Tobacco smoker: No      Systolic Blood Pressure: 497 mmHg      Is BP treated: Yes      HDL Cholesterol: 58 mg/dL      Total Cholesterol: 258 mg/dL    -Statin Safety Parameters:      Lab Results   Component Value Date/Time    ALT (SGPT) 18 11/17/2022 01:40 PM    AST (SGOT) 19 11/17/2022 01:40 PM    Alk.  phosphatase 68 11/17/2022 01:40 PM    Bilirubin, total 0.3 11/17/2022 01:40 PM     -Immunizations: Immunization History   Administered Date(s) Administered    Influenza Vaccine 02/15/2021, 10/07/2021    Influenza, FLUARIX, FLULAVAL, Dorsie Search (age 10 mo+) AND AFLURIA, (age 1 y+), PF, 0.5mL 09/28/2017, 11/26/2018, 12/09/2022    Pneumococcal Polysaccharide (PPSV-23) 09/28/2017    Tdap 04/24/2013       Additional Laboratory Parameters of Interest:   Estimation of renal function:  Lab Results   Component Value Date/Time    Creatinine 2.09 (H) 11/17/2022 01:40 PM    Creatinine 1.91 (H) 10/04/2022 01:32 PM    Creatinine 1.88 (H) 09/14/2022 10:07 AM    GFR est AA 29 (L) 05/04/2022 10:36 AM    GFR est AA 31 (L) 03/07/2022 10:30 AM    GFR est AA 29 (L) 02/08/2022 01:23 PM    GFR est non-AA 24 (L) 05/04/2022 10:36 AM    GFR est non-AA 26 (L) 03/07/2022 10:30 AM    GFR est non-AA 24 (L) 02/08/2022 01:23 PM     Wt Readings from Last 3 Encounters:   12/09/22 176 lb (79.8 kg)   12/08/22 174 lb (78.9 kg)   12/01/22 174 lb (78.9 kg)     Ht Readings from Last 1 Encounters:   12/09/22 5' 2\" (1.575 m)     Calculated estimated creatinine clearance: CrCl cannot be calculated (Unknown ideal weight.). Vital Signs Today:    Visit Vitals  LMP 03/30/2013       There are no discontinued medications. Future Appointments   Date Time Provider Claudette Benjamin   1/9/2023 10:00 AM [facility] BS AMB   1/12/2023 11:30 AM [facility] FAST TRACK NURSE [facility]   2/28/2023 11:05 AM [facility] LAB VISIT [facility] BS AMB   3/7/2023 11:00 AM Serge Khan [facility] BS AMB   3/15/2023  8:40 AM Edita Wesley DO [facility] BS AMB       Patient verbalized understanding of the information presented and all of the patients questions were answered. AVS was handed to the patient. Patient advised to call the office with any additional questions or concerns. Notifications of recommendations will be sent to Fran Lopez DNP for review.       Thank you for the consult,  Anamaria Teague, PharmD, [place], BC-[facility]        For Pharmacy Admin Tracking Only    Program: Medical Group  CPA in place: Yes  Recommendation Provided To: Patient/Caregiver: 7 via In person  Intervention Detail: Adherence Monitorin, Benefit Assistance, Device Training, Dose Adjustment: 1, reason: Therapy De-escalation, New Rx: 1, reason: Needs Additional Therapy, Refill(s) Provided, and Scheduled Appointment  Intervention Accepted By: Patient/Caregiver: 7  Gap Closed?: No  Time Spent (min): 60

## 2023-01-09 ENCOUNTER — OFFICE VISIT (OUTPATIENT)
Dept: INTERNAL MEDICINE CLINIC | Age: 58
End: 2023-01-09

## 2023-01-09 DIAGNOSIS — Z79.4 TYPE 2 DIABETES MELLITUS WITH HYPERGLYCEMIA, WITH LONG-TERM CURRENT USE OF INSULIN (HCC): Primary | ICD-10-CM

## 2023-01-09 DIAGNOSIS — E11.65 TYPE 2 DIABETES MELLITUS WITH HYPERGLYCEMIA, WITH LONG-TERM CURRENT USE OF INSULIN (HCC): Primary | ICD-10-CM

## 2023-01-09 RX ORDER — INSULIN PUMP SYRINGE, 3 ML
EACH MISCELLANEOUS
Qty: 1 KIT | Refills: 0 | Status: SHIPPED | OUTPATIENT
Start: 2023-01-09

## 2023-01-09 RX ORDER — SEMAGLUTIDE 1.34 MG/ML
0.25 INJECTION, SOLUTION SUBCUTANEOUS
Qty: 1 BOX | Refills: 3 | Status: SHIPPED | OUTPATIENT
Start: 2023-01-09

## 2023-01-09 RX ORDER — LANCETS
EACH MISCELLANEOUS
Qty: 300 EACH | Refills: 3 | Status: SHIPPED | OUTPATIENT
Start: 2023-01-09

## 2023-01-09 NOTE — PATIENT INSTRUCTIONS
Your Visit Summary:     Plan:  - Decrease Lantus to 20 units every night  - Start Ozempic 0.25mg weekly  - If any blood glucose values are less than 80mg/dL, decrease your Lantus dose by 2 units    Call me with any questions or concerns  Gerson Krueger [phone]    Check and document your blood sugar first thing in the morning (fasting at least 8 hours), 2 hours after a meal, and/or before bedtime. Bring your meter/log to all future visits. Your blood sugar goals:  - Fasting (first thing in the morning)  blood sugar: 80 - 130mg/dL  - 2 hours after a meal: 80 - 180mg/dL    When you experience symptoms of low blood sugar (example: less than 70):  - Confirm low reading by checking your blood sugar.   - Then treat with 15 grams of carbohydrates (one-half cup of juice or regular soda, or 4-5 glucose tablets). - Wait 15 minutes to recheck blood sugar.   - Then eat a protein containing meal/snack to prevent another low blood sugar episode. (example: peanut butter + crackers)    Nutrition:  - When reviewing a nutrition label, focus on the serving size, total calories, fat (and type of fats), total carbohydrates, sugar (and amount of added sugar), amount of fiber (good for your digestive), and amount of protein. Refer to your nutrition label guide for more information.  - For a meal : max 45 - 60 grams of carbohydrates  - For a snack: max 15 grams of carbohydrates  - Reduce amount of saturated and trans fat. Consider more unsaturated fat options as they are better for your heart health.    - Have at least 1 serving of lean fat protein with each meal.    - Increase fiber intake slowly to prevent constipation.   - Substitute fruit juices for the whole fruit    Low carb snack ideas (15 grams total carb or less):    String cheese or babybel with 6 crackers  4 peanut butter crackers  3 cups of popcorn  1 cup raw vegetables with hummus or ranch dip (just need to watch how much dip you use)  Nuts  2 rice cakes  Celery with peanut butter or cream cheese  String cheese with 1 serving of fruit  Greek yogurt (look at label to make sure < 15 gram carb)  Plain greek yogurt with fresh berries added  [place] protein bar  Whisps parmesan cheese crisps  Hard boiled egg  Cottage cheese  Tuna salad lettuce roll-ups  Deli meat roll-ups with slice of cheese  Sugar Free Jello  Glucerna shake (16 grams)   Glucerna hunger smart shake (16 grams)  Ensure protein max shake  Fruit (1 serving/15 grams)  1/2 banana, jenifer, or grapefruit   1/3 melon (small cantaloupe)  1 slice or 1 cup of honeydew melon  1 slice or 1 and 1/4 cups of watermelon   1 small apple, peach, orange or pear  2 small tangerines  1 cup of raspberries  3/4 cup of blackberries, blueberries or pineapples  1/2 cup of fruit juice, pears, applesauce, or mangos  17 small grapes  12 cherries    Be careful with the glucerna products as they differ in the total carbs depending on the product (some are intended as meal replacements not snacks). Make sure you look at the total carbs on the label as products can differ. Physical Activity:  - Aim for 30 minutes of consistent, moderately intensive, physical activity a day for 5 days or an average of 150 minutes per week. - Start slow, increase as tolerated. For example: Walk every day, working up to 30 minutes of brisk walking, 5 days a week--or split the 30 minutes into two 15-minute or three 10-minute walks. - If you sit for a long time, get up and move/stretch every 90 minutes. Other recommendations:  - Schedule an annual eye exam.  - Check your feet daily for any signs of open wounds, cuts, or sores. - Given your risk factors, the following vaccines are recommended: annual flu shot, age-based pneumococcal vaccines (Pneumovax, Prevnar 13). In addition to taking your medications as directed, improving your blood sugar involves modifying your nutrition and maximizing the amount of physical activity.

## 2023-01-12 ENCOUNTER — HOSPITAL ENCOUNTER (OUTPATIENT)
Dept: INFUSION THERAPY | Age: 58
End: 2023-01-12

## 2023-01-12 ENCOUNTER — HOSPITAL ENCOUNTER (OUTPATIENT)
Dept: INFUSION THERAPY | Age: 58
End: 2023-01-12
Payer: MEDICARE

## 2023-01-12 VITALS
SYSTOLIC BLOOD PRESSURE: 114 MMHG | RESPIRATION RATE: 18 BRPM | TEMPERATURE: 97.6 F | OXYGEN SATURATION: 96 % | HEART RATE: 86 BPM | DIASTOLIC BLOOD PRESSURE: 79 MMHG

## 2023-01-12 DIAGNOSIS — J45.50 SEVERE PERSISTENT ASTHMA, UNSPECIFIED WHETHER COMPLICATED: Primary | ICD-10-CM

## 2023-01-12 PROCEDURE — 96372 THER/PROPH/DIAG INJ SC/IM: CPT

## 2023-01-12 PROCEDURE — 74011250636 HC RX REV CODE- 250/636: Performed by: INTERNAL MEDICINE

## 2023-01-12 RX ORDER — ACETAMINOPHEN 325 MG/1
650 TABLET ORAL AS NEEDED
Start: 2023-03-09

## 2023-01-12 RX ORDER — ALBUTEROL SULFATE 0.83 MG/ML
2.5 SOLUTION RESPIRATORY (INHALATION) AS NEEDED
Start: 2023-03-09

## 2023-01-12 RX ORDER — ONDANSETRON 2 MG/ML
8 INJECTION INTRAMUSCULAR; INTRAVENOUS AS NEEDED
OUTPATIENT
Start: 2023-03-09

## 2023-01-12 RX ORDER — DIPHENHYDRAMINE HYDROCHLORIDE 50 MG/ML
25 INJECTION, SOLUTION INTRAMUSCULAR; INTRAVENOUS AS NEEDED
Start: 2023-03-09

## 2023-01-12 RX ORDER — EPINEPHRINE 1 MG/ML
0.3 INJECTION, SOLUTION, CONCENTRATE INTRAVENOUS AS NEEDED
OUTPATIENT
Start: 2023-03-09

## 2023-01-12 RX ORDER — DIPHENHYDRAMINE HYDROCHLORIDE 50 MG/ML
50 INJECTION, SOLUTION INTRAMUSCULAR; INTRAVENOUS AS NEEDED
Start: 2023-03-09

## 2023-01-12 RX ORDER — HYDROCORTISONE SODIUM SUCCINATE 100 MG/2ML
100 INJECTION, POWDER, FOR SOLUTION INTRAMUSCULAR; INTRAVENOUS AS NEEDED
OUTPATIENT
Start: 2023-03-09

## 2023-01-12 RX ADMIN — BENRALIZUMAB 30 MG: 30 INJECTION, SOLUTION SUBCUTANEOUS at 11:34

## 2023-01-12 NOTE — PROGRESS NOTES
[facility] Progress Note    Date: 2023    Name: Conchis Roldan    MRN: [id number]         : 1965      [facility] Maintenance      Ms. Joseph arrived to [facility] at [phone]. Ms. Ruth Guevara was assessed and education provided. Pt verbalized not having any problems with her previous injections. Patient complained of chest pain on her previous visit but per patient her Pulmonary MD said it was her sarcoidosis. Ms. Madina King vitals were reviewed. Visit Vitals  /79 (BP 1 Location: Left upper arm, BP Patient Position: Sitting)   Pulse 86   Temp 97.6 °F (36.4 °C)   Resp 18   SpO2 96%   Breastfeeding No     Fasenra 30 mg was administered as ordered SQ in patient's LEFT upper arm. Pt tolerated well without complaints. Declined band-aids. Discharge/ follow-up instructions discussed w/ pt. Pt verbalized understanding. Pt armband removed & shredded. Ms. Ruth Guevara was discharged from [place] [id number] in stable condition at 1140. She is to return on 23 at 1130 for her next appointment.       Elizabeth Charles  2023

## 2023-01-13 RX ORDER — ACETAMINOPHEN 325 MG/1
650 TABLET ORAL
OUTPATIENT
Start: 2023-03-08

## 2023-01-13 RX ORDER — ALBUTEROL SULFATE 90 UG/1
4 AEROSOL, METERED RESPIRATORY (INHALATION) PRN
OUTPATIENT
Start: 2023-03-08

## 2023-01-13 RX ORDER — ONDANSETRON 2 MG/ML
8 INJECTION INTRAMUSCULAR; INTRAVENOUS
OUTPATIENT
Start: 2023-03-08

## 2023-01-13 RX ORDER — EPINEPHRINE 1 MG/ML
0.3 INJECTION, SOLUTION, CONCENTRATE INTRAVENOUS PRN
OUTPATIENT
Start: 2023-03-08

## 2023-01-13 RX ORDER — DIPHENHYDRAMINE HYDROCHLORIDE 50 MG/ML
50 INJECTION INTRAMUSCULAR; INTRAVENOUS
OUTPATIENT
Start: 2023-03-08

## 2023-01-13 RX ORDER — SODIUM CHLORIDE 9 MG/ML
INJECTION, SOLUTION INTRAVENOUS CONTINUOUS
OUTPATIENT
Start: 2023-03-08

## 2023-01-20 DIAGNOSIS — E83.52 HYPERCALCEMIA: ICD-10-CM

## 2023-01-20 DIAGNOSIS — E78.2 MIXED HYPERLIPIDEMIA: ICD-10-CM

## 2023-01-20 DIAGNOSIS — E11.40 DIABETIC NEUROPATHY, PAINFUL (HCC): ICD-10-CM

## 2023-01-20 RX ORDER — PREDNISONE 5 MG/1
5 TABLET ORAL DAILY
OUTPATIENT
Start: 2023-01-20

## 2023-01-20 RX ORDER — METOPROLOL SUCCINATE 25 MG/1
25 TABLET, EXTENDED RELEASE ORAL DAILY
Qty: 90 TABLET | Refills: 3 | Status: SHIPPED | OUTPATIENT
Start: 2023-01-20

## 2023-01-20 RX ORDER — PRAVASTATIN SODIUM 80 MG/1
80 TABLET ORAL DAILY
Qty: 90 TABLET | Refills: 3 | OUTPATIENT
Start: 2023-01-20

## 2023-01-20 RX ORDER — PRAVASTATIN SODIUM 80 MG/1
80 TABLET ORAL DAILY
Qty: 90 TABLET | Refills: 3 | Status: SHIPPED | OUTPATIENT
Start: 2023-01-20

## 2023-01-20 RX ORDER — DILTIAZEM HYDROCHLORIDE 120 MG/1
120 CAPSULE, COATED, EXTENDED RELEASE ORAL DAILY
Qty: 90 CAPSULE | Refills: 3 | Status: SHIPPED | OUTPATIENT
Start: 2023-01-20

## 2023-01-20 RX ORDER — FUROSEMIDE 20 MG/1
20 TABLET ORAL DAILY
Qty: 90 TABLET | Refills: 3 | OUTPATIENT
Start: 2023-01-20

## 2023-01-20 RX ORDER — FUROSEMIDE 20 MG/1
20 TABLET ORAL DAILY
Qty: 90 TABLET | Refills: 3 | Status: SHIPPED | OUTPATIENT
Start: 2023-01-20

## 2023-01-20 RX ORDER — TOPIRAMATE 50 MG/1
50 TABLET, FILM COATED ORAL 2 TIMES DAILY
Qty: 180 TABLET | Refills: 1 | Status: SHIPPED | OUTPATIENT
Start: 2023-01-20

## 2023-01-20 NOTE — TELEPHONE ENCOUNTER
Cardiology refilled furosemide and Pravachol today to mail-in pharmacy. I am not the prescriber of the prednisone. I will refill the Topamax. The purpose of this refill is so to transfer medication to her mail-in pharmacy.

## 2023-01-24 DIAGNOSIS — E11.65 TYPE 2 DIABETES MELLITUS WITH HYPERGLYCEMIA, WITH LONG-TERM CURRENT USE OF INSULIN (HCC): ICD-10-CM

## 2023-01-24 DIAGNOSIS — Z79.4 TYPE 2 DIABETES MELLITUS WITH HYPERGLYCEMIA, WITH LONG-TERM CURRENT USE OF INSULIN (HCC): ICD-10-CM

## 2023-01-24 NOTE — TELEPHONE ENCOUNTER
Jefry Mullins with [employer] called and states that they received a fax from us for a prescription for BD Prerna Pen Needles and they have some questions about the instructions and would like to speak with a nurse. They can be reached at [phone]. Ref #: [id number].   Please advise, thank you

## 2023-01-25 ENCOUNTER — OFFICE VISIT (OUTPATIENT)
Dept: INTERNAL MEDICINE CLINIC | Age: 58
End: 2023-01-25

## 2023-01-25 DIAGNOSIS — E11.65 TYPE 2 DIABETES MELLITUS WITH HYPERGLYCEMIA, WITH LONG-TERM CURRENT USE OF INSULIN (HCC): Primary | ICD-10-CM

## 2023-01-25 DIAGNOSIS — Z79.4 TYPE 2 DIABETES MELLITUS WITH HYPERGLYCEMIA, WITH LONG-TERM CURRENT USE OF INSULIN (HCC): Primary | ICD-10-CM

## 2023-01-25 RX ORDER — SEMAGLUTIDE 1.34 MG/ML
0.5 INJECTION, SOLUTION SUBCUTANEOUS
Qty: 1 PEN | Refills: 1 | Status: SHIPPED | OUTPATIENT
Start: 2023-01-25

## 2023-01-25 NOTE — TELEPHONE ENCOUNTER
Verbal given, to change sig to use as directed. Please sign for phone in RX so it will reflect your refill. This was initiated by fax.

## 2023-01-25 NOTE — PROGRESS NOTES
Pharmacy Progress Note - Diabetes Management       Assessment / Plan:   Diabetes Management:  Per ADA guidelines, Pt's A1c is not at goal of < 7%. Pt's FBG values are all at goal while her post-prandial values continue to be elevated. Will increase her Ozempic dose to 0.5mg weekly to target the post-prandial elevations. She injected her 0.25mg dose yesterday so will have her inject another 0.25mg today. She has two more injections left in her current pen which should give her enough time to obtain the new rx. Will reassess with SMBG logs at follow up in 3 weeks. Orders Placed This Encounter    semaglutide (Ozempic) 0.25 mg or 0.5 mg/dose (2 mg/1.5 ml) subq pen     Si.5 mg by SubCUTAneous route every seven (7) days. Indications: type 2 diabetes mellitus     Dispense:  1 Pen     Refill:  1     Increasing dose from 0.25mg to 0.5mg weekly. 1 pen = 28 day supply     Nutrition/Lifestyle Modifications:  - Educated pt on the importance of moderating carbohydrate intake. Reviewed sources of carbohydrates and method to help determine appropriate portion sizes (e.g., Diabetes Plate Method). - Advised patient to avoid sugar-sweetened beverages and replace with water or diet/zero sugar option.  - Recommend ~30 minutes consistent, moderately intensive, exercise/day or ~150 minutes/week. Start small, stay consistent, and increase length and types of exercise, as tolerated. Patient will return to clinic in 3 week(s) for follow up.         S/O: Ms. Quinn Alvarado, a 61 y.o. female referred by Sharri Boykin,  has a past medical history of Acquired cyst of kidney (2020), Asthma, Bilateral great toe fractures (), Chronic kidney disease (CKD), stage IV (severe) ([address] (2022), Chronic sinusitis, Colon polyp (), Depression (), Diabetes mellitus ([facility] [address] DJD (degenerative joint disease) of cervical spine (), DJD (degenerative joint disease), lumbar (), Dyslipidemia, Edema of both ankles (8/28/2018), Environmental and seasonal allergies (8/28/2018), Eustachian tube dysfunction, Fibrocystic breast, GERD (gastroesophageal reflux disease), Hypertriglyceridemia (8/28/2018), Lateral epicondylitis of both elbows (2/6/2017), Macular degeneration of both eyes (08/28/2018), Mild intermittent asthma without complication (44/[birth year]1931), Morbid obesity ([facility] [address] Neuropathy (8/28/2018), Osteopenia, Sarcoidosis, and Type 2 diabetes mellitus with hyperglycemia, with long-term current use of insulin ([facility] [address] (8/28/2018). Pt was seen today for diabetes management. Patient's last A1c was:   Lab Results   Component Value Date/Time    Hemoglobin A1c 9.3 (H) 12/02/2022 01:33 AM    Hemoglobin A1c (POC) 7.1 01/03/2022 11:57 AM    Hemoglobin A1c, External 6.7 09/28/2016 12:00 AM       Interim update: Pt was last seen by me on 09 Jan 2023. Per my prior note: Pt's A1c is not at goal of < 7%. Pt's BG is variable and dependent on her meals. She is not monitoring her BG surrounding meals so post-prandial values are unknown. Will start Ozempic 0.25mg weekly to target the likely post-prandial excursions which are influencing her A1c. Will decrease her Lantus to 20units qhs to decrease possible hypoglycemia. Pt was instructed on tapering instructions if she has BG values less than 80mg/dL (decrease by 2 units for every BG less than 80mg/dL). Goal will be to eventually discontinue the Lantus while titrating the Ozempic. Will reassess with SMBG logs tested ac tid and sometimes hs in 2 weeks. Today:  Pt brought in a written SMBG log attached below. She has decreased her Lantus down to 20units qhs. She injects her Ozempic on Tuesdays and is amenable to increasing this dose. She states that she has tried to maintain her diabetic diet.     Current anti-hyperglycemic regimen includes:    Key Antihyperglycemic Medications               semaglutide (Ozempic) 0.25 mg or 0.5 mg/dose (2 mg/1.5 ml) subq pen 0.25 mg by SubCUTAneous route every seven (7) days. Indications: type 2 diabetes mellitus    insulin glargine (LANTUS,BASAGLAR) 100 unit/mL (3 mL) inpn 20 Units by SubCUTAneous route nightly. Complete current medication regimen includes:  Current Outpatient Medications   Medication Sig    metoprolol succinate (TOPROL-XL) 25 mg XL tablet Take 1 Tablet by mouth daily. furosemide (LASIX) 20 mg tablet Take 1 Tablet by mouth daily. Indications: visible water retention, high blood pressure    pravastatin (PRAVACHOL) 80 mg tablet Take 1 Tablet by mouth daily. dilTIAZem ER (CARDIZEM CD) 120 mg capsule Take 1 Capsule by mouth daily. topiramate (TOPAMAX) 50 mg tablet Take 1 Tablet by mouth two (2) times a day. semaglutide (Ozempic) 0.25 mg or 0.5 mg/dose (2 mg/1.5 ml) subq pen 0.25 mg by SubCUTAneous route every seven (7) days. Indications: type 2 diabetes mellitus    Blood-Glucose Meter monitoring kit Use as directed to monitor blood glucose up to three times daily. glucose blood VI test strips (Accu-Chek Kimberley) strip Pt to test 3 times daily. Dx:E11.65    lancets misc Use as directed to monitor blood glucose up to three times daily. insulin glargine (LANTUS,BASAGLAR) 100 unit/mL (3 mL) inpn 20 Units by SubCUTAneous route nightly. venlafaxine-SR (EFFEXOR-XR) 75 mg capsule Take 1 Capsule by mouth daily. budesonide-glycopyr-formoterol (Breztri Aerosphere) 160-9-4.8 mcg/actuation HFAA Take 2 Puffs by inhalation two (2) times a day. Rinse and gargle after each use    Trelegy Ellipta 200-62.5-25 mcg inhaler INHALE ONE PUFF BY MOUTH DAILY, RINSE AND GARGLE AFTER EACH USE    diclofenac (VOLTAREN) 1 % gel Apply 2 g to affected area four (4) times daily as needed. predniSONE (DELTASONE) 5 mg tablet Take 5 mg by mouth daily. cetirizine HCl (ZYRTEC PO) Take 1 Tablet by mouth daily. esomeprazole (NEXIUM) 40 mg capsule Take 1 Capsule by mouth daily as needed for Gastroesophageal Reflux Disease (GERD). magnesium oxide (MAG-OX) 400 mg tablet Take 1 Tablet by mouth two (2) times daily (with meals). Insulin Needles, Disposable, ([place] Pen Needle) 32 gauge x 5/32\" ndle Check blood sugars three times a day    ondansetron hcl (Zofran) 4 mg tablet Take 1 Tablet by mouth every eight (8) hours as needed for Nausea or Nausea or Vomiting. butalbital-acetaminophen-caffeine (FIORICET, ESGIC) -40 mg per tablet Take 1 Tablet by mouth every six (6) hours as needed. cyanocobalamin (VITAMIN B12) 100 mcg tablet Take 50 mcg by mouth daily. albuterol (PROVENTIL HFA, VENTOLIN HFA, PROAIR HFA) 90 mcg/actuation inhaler Take 2 Puffs by inhalation every four (4) hours as needed for Shortness of Breath. Indications: asthma attack    therapeutic multivitamin-minerals (THERAGRAN-M) tablet Take 1 Tab by mouth daily. triamcinolone acetonide (KENALOG) 0.1 % topical cream Apply  to affected area two (2) times daily as needed for Skin Irritation. aspirin delayed-release 81 mg tablet Take 1 Tab by mouth daily. For heart health     No current facility-administered medications for this visit. Allergies: Allergies   Allergen Reactions    Other Food Itching     Pt reported she's allergic to \"all tropical fruits, my tongue starts tingling & side of mouth starts tingling & face swells\"    Pollen Extracts Runny Nose and Cough    Adhesive Tape-Silicones Other (comments)     Reports blistering & burning upon removal of freestyle lindy & and also happened upon removal of ecg holter monitor    Amoxicillin Hives, Shortness of Breath and Swelling    Crestor [Rosuvastatin] Myalgia    Ibuprofen Other (comments)     dont prescribe due to kidney function    Lipitor [Atorvastatin] Other (comments)     Muscle cramps and weakness      [place] Flavor Itching     Allergy to [place]. Cracks in corner of mouth, irritation of tongue.      Pcn [Penicillins] Angioedema    Pineapple Itching and Other (comments)     Cracks in corner of mouth, irritation of tongue. Blood Glucose Monitoring (BGM) or CGM:  - Brought in home glucometer/blood glucose log/CGM reader today:  yes        ROS:  Today, Pt endorses:  - Symptoms of Hyperglycemia: none  - Symptoms of Hypoglycemia: none    Lifestyle modification(s):  - none    Medication Adherence/Access:  - Endorses adherence to current regimen?: yes    Vitals/Labs: Wt Readings from Last 3 Encounters:   12/09/22 176 lb (79.8 kg)   12/08/22 174 lb (78.9 kg)   12/01/22 174 lb (78.9 kg)     BP Readings from Last 3 Encounters:   01/12/23 114/79   12/09/22 (!) 144/90   12/08/22 132/84     Pulse Readings from Last 3 Encounters:   01/12/23 86   12/09/22 89   12/08/22 92       Lab Results   Component Value Date/Time    Sodium 141 11/17/2022 01:40 PM    Potassium 3.8 11/17/2022 01:40 PM    Chloride 110 11/17/2022 01:40 PM    CO2 26 11/17/2022 01:40 PM    Anion gap 5 11/17/2022 01:40 PM    Glucose 135 (H) 11/17/2022 01:40 PM    BUN 30 (H) 11/17/2022 01:40 PM    Creatinine 2.09 (H) 11/17/2022 01:40 PM    BUN/Creatinine ratio 14 11/17/2022 01:40 PM    GFR est AA 29 (L) 05/04/2022 10:36 AM    GFR est non-AA 24 (L) 05/04/2022 10:36 AM    Calcium 8.8 11/17/2022 01:40 PM    Bilirubin, total 0.3 11/17/2022 01:40 PM    Alk.  phosphatase 68 11/17/2022 01:40 PM    Protein, total 6.5 11/17/2022 01:40 PM    Albumin 3.1 (L) 11/17/2022 01:40 PM    Globulin 3.4 11/17/2022 01:40 PM    A-G Ratio 0.9 11/17/2022 01:40 PM    ALT (SGPT) 18 11/17/2022 01:40 PM     Lab Results   Component Value Date/Time    Cholesterol, total 258 (H) 06/15/2022 10:47 AM    HDL Cholesterol 58 06/15/2022 10:47 AM    LDL, calculated 160 (H) 06/15/2022 10:47 AM    VLDL, calculated 40 (H) 06/15/2022 10:47 AM    Triglyceride 199 (H) 06/15/2022 10:47 AM    CHOL/HDL Ratio 4.2 09/08/2021 11:05 AM     Lab Results   Component Value Date/Time    WBC 5.8 11/17/2022 01:40 PM    Hemoglobin, POC 13.6 05/26/2016 10:49 AM    HGB 11.5 (L) 11/17/2022 01:40 PM    Hematocrit, POC 40 05/26/2016 10:49 AM    HCT 35.8 11/17/2022 01:40 PM    PLATELET 746 74/79/2096 01:40 PM    MCV 93.5 11/17/2022 01:40 PM     Lab Results   Component Value Date/Time    Hemoglobin A1c 9.3 (H) 12/02/2022 01:33 AM    Hemoglobin A1c 7.7 (H) 09/14/2022 10:07 AM    Hemoglobin A1c 7.4 (H) 06/15/2022 10:47 AM    Hemoglobin A1c, External 6.7 09/28/2016 12:00 AM     Hemoglobin A1c (POC)   Date Value Ref Range Status   01/03/2022 7.1 % Final        Screenings/Prevention Parameters:  -Diabetic Eye and Foot Exams:      Diabetic Foot and Eye Exam HM Status   Topic Date Due    Eye Exam  03/19/2021    Diabetic Foot Care  03/31/2023     -Microalbumin / Creatinine ratio:       Lab Results   Component Value Date/Time    Microalbumin/Creat ratio (mg/g creat) 4 03/18/2021 10:48 AM    Microalb/Creat ratio (ug/mg creat.)  06/15/2022 10:47 AM      Comment:      ** Unable to calculate Microablumin/Creatinine Ratio due to low Microalbumin        Microalbumin,urine random 0.59 03/18/2021 10:48 AM     -ASCVD Risk Score Parameters and Calculation    Race: BLACK/     BP Readings from Last 3 Encounters:   01/12/23 114/79   12/09/22 (!) 144/90   12/08/22 132/84        Lab Results   Component Value Date/Time    Cholesterol, total 258 (H) 06/15/2022 10:47 AM    HDL Cholesterol 58 06/15/2022 10:47 AM    LDL, calculated 160 (H) 06/15/2022 10:47 AM    VLDL, calculated 40 (H) 06/15/2022 10:47 AM    Triglyceride 199 (H) 06/15/2022 10:47 AM    CHOL/HDL Ratio 4.2 09/08/2021 11:05 AM        Social History     Tobacco Use    Smoking status: Never    Smokeless tobacco: Never   Substance Use Topics    Alcohol use: Yes     Alcohol/week: 0.0 standard drinks     Comment: occasional wine         Calculated ASCVD Risk Score:  The 10-year ASCVD risk score (Queenie ORTIZ, et al., 2019) is: 11.8%    Values used to calculate the score:      Age: 61 years      Sex: Female      Is Non- : Yes      Diabetic: Yes      Tobacco smoker: No Systolic Blood Pressure: 471 mmHg      Is BP treated: Yes      HDL Cholesterol: 58 mg/dL      Total Cholesterol: 258 mg/dL    -Statin Safety Parameters:      Lab Results   Component Value Date/Time    ALT (SGPT) 18 11/17/2022 01:40 PM    AST (SGOT) 19 11/17/2022 01:40 PM    Alk. phosphatase 68 11/17/2022 01:40 PM    Bilirubin, total 0.3 11/17/2022 01:40 PM     -Immunizations:      Immunization History   Administered Date(s) Administered    Influenza Vaccine 02/15/2021, 10/07/2021    Influenza, FLUARIX, FLULAVAL, Faustine Appl (age 10 mo+) AND AFLURIA, (age 1 y+), PF, 0.5mL 09/28/2017, 11/26/2018, 12/09/2022    Pneumococcal Polysaccharide (PPSV-23) 09/28/2017    Tdap 04/24/2013       Additional Laboratory Parameters of Interest:   Estimation of renal function:  Lab Results   Component Value Date/Time    Creatinine 2.09 (H) 11/17/2022 01:40 PM    Creatinine 1.91 (H) 10/04/2022 01:32 PM    Creatinine 1.88 (H) 09/14/2022 10:07 AM    GFR est AA 29 (L) 05/04/2022 10:36 AM    GFR est AA 31 (L) 03/07/2022 10:30 AM    GFR est AA 29 (L) 02/08/2022 01:23 PM    GFR est non-AA 24 (L) 05/04/2022 10:36 AM    GFR est non-AA 26 (L) 03/07/2022 10:30 AM    GFR est non-AA 24 (L) 02/08/2022 01:23 PM     Wt Readings from Last 3 Encounters:   12/09/22 176 lb (79.8 kg)   12/08/22 174 lb (78.9 kg)   12/01/22 174 lb (78.9 kg)     Ht Readings from Last 1 Encounters:   12/09/22 5' 2\" (1.575 m)     Calculated estimated creatinine clearance: CrCl cannot be calculated (Unknown ideal weight.). Vital Signs Today:    Visit Vitals  LMP 03/30/2013       There are no discontinued medications.     Future Appointments   Date Time Provider Claudette Benjamin   2/15/2023  1:00 PM SMITH Freitas [facility] BS AMB   2/28/2023 11:05 AM IO LAB VISIT [facility] BS AMB   3/7/2023 11:00 AM Robbie Chandra [facility] BS AMB   3/9/2023 11:30 AM HBV FAST TRACK NURSE [facility]   3/15/2023  8:40 AM Saul Gaviria DO [facility] BS AMB       Patient verbalized understanding of the information presented and all of the patients questions were answered. AVS was handed to the patient. Patient advised to call the office with any additional questions or concerns. Notifications of recommendations will be sent to Rojas Dong DNP for review.       Thank you for the consult,  Wesley Flow, PharmD, BCACP, BC-ADM        For Pharmacy Admin Tracking Only    Program: Medical Group  CPA in place: Yes  Recommendation Provided To: Patient/Caregiver: 4 via In person  Intervention Detail: Adherence Monitorin, Dose Adjustment: 1, reason: Therapy De-escalation, New Rx: 1, reason: Needs Additional Therapy, and Scheduled Appointment  Intervention Accepted By: Patient/Caregiver: 4  Gap Closed?: No  Time Spent (min): 30

## 2023-01-25 NOTE — PATIENT INSTRUCTIONS
Your Visit Summary:     Plan:  - Increase Ozempic to 0.5mg weekly   - Increase another 0.25mg injection today and then return to Tuesday injections    - Continue Lantus 20units every day    - Contact clinic if blood sugar is less than 80mg/dL at any time. Call me with any questions or concerns  Marisel Anderson [phone]    Check and document your blood sugar first thing in the morning (fasting at least 8 hours), 2 hours after a meal, and/or before bedtime. Bring your meter/log to all future visits. Your blood sugar goals:  - Fasting (first thing in the morning)  blood sugar: 80 - 130mg/dL  - 2 hours after a meal: 80 - 180mg/dL    When you experience symptoms of low blood sugar (example: less than 70):  - Confirm low reading by checking your blood sugar.   - Then treat with 15 grams of carbohydrates (one-half cup of juice or regular soda, or 4-5 glucose tablets). - Wait 15 minutes to recheck blood sugar.   - Then eat a protein containing meal/snack to prevent another low blood sugar episode. (example: peanut butter + crackers)    Nutrition:  - When reviewing a nutrition label, focus on the serving size, total calories, fat (and type of fats), total carbohydrates, sugar (and amount of added sugar), amount of fiber (good for your digestive), and amount of protein. Refer to your nutrition label guide for more information.  - For a meal : max 45 - 60 grams of carbohydrates  - For a snack: max 15 grams of carbohydrates  - Reduce amount of saturated and trans fat. Consider more unsaturated fat options as they are better for your heart health.    - Have at least 1 serving of lean fat protein with each meal.    - Increase fiber intake slowly to prevent constipation.   - Substitute fruit juices for the whole fruit    Low carb snack ideas (15 grams total carb or less):    String cheese or babybel with 6 crackers  4 peanut butter crackers  3 cups of popcorn  1 cup raw vegetables with hummus or ranch dip (just need to watch how much dip you use)  Nuts  2 rice cakes  Celery with peanut butter or cream cheese  String cheese with 1 serving of fruit  Greek yogurt (look at label to make sure < 15 gram carb)  Plain greek yogurt with fresh berries added  [place] protein bar  Whisps parmesan cheese crisps  Hard boiled egg  Cottage cheese  Tuna salad lettuce roll-ups  Deli meat roll-ups with slice of cheese  Sugar Free Jello  Glucerna shake (16 grams)   Glucerna hunger smart shake (16 grams)  Ensure protein max shake  Fruit (1 serving/15 grams)  1/2 banana, jenifer, or grapefruit   1/3 melon (small cantaloupe)  1 slice or 1 cup of honeydew melon  1 slice or 1 and 1/4 cups of watermelon   1 small apple, peach, orange or pear  2 small tangerines  1 cup of raspberries  3/4 cup of blackberries, blueberries or pineapples  1/2 cup of fruit juice, pears, applesauce, or mangos  17 small grapes  12 cherries    Be careful with the glucerna products as they differ in the total carbs depending on the product (some are intended as meal replacements not snacks). Make sure you look at the total carbs on the label as products can differ. Physical Activity:  - Aim for 30 minutes of consistent, moderately intensive, physical activity a day for 5 days or an average of 150 minutes per week. - Start slow, increase as tolerated. For example: Walk every day, working up to 30 minutes of brisk walking, 5 days a week--or split the 30 minutes into two 15-minute or three 10-minute walks. - If you sit for a long time, get up and move/stretch every 90 minutes. Other recommendations:  - Schedule an annual eye exam.  - Check your feet daily for any signs of open wounds, cuts, or sores. - Given your risk factors, the following vaccines are recommended: annual flu shot, age-based pneumococcal vaccines (Pneumovax, Prevnar 13).     In addition to taking your medications as directed, improving your blood sugar involves modifying your nutrition and maximizing the amount of physical activity.

## 2023-01-31 RX ORDER — PEN NEEDLE, DIABETIC 31 GX3/16"
NEEDLE, DISPOSABLE MISCELLANEOUS
Qty: 100 PEN NEEDLE | Refills: 11 | Status: SHIPPED | OUTPATIENT
Start: 2023-01-31

## 2023-02-04 DIAGNOSIS — Z79.4 TYPE 2 DIABETES MELLITUS WITH HYPERGLYCEMIA, WITH LONG-TERM CURRENT USE OF INSULIN (HCC): Primary | ICD-10-CM

## 2023-02-04 DIAGNOSIS — E11.65 TYPE 2 DIABETES MELLITUS WITH HYPERGLYCEMIA, WITH LONG-TERM CURRENT USE OF INSULIN (HCC): Primary | ICD-10-CM

## 2023-02-04 DIAGNOSIS — E78.2 MIXED HYPERLIPIDEMIA: Primary | ICD-10-CM

## 2023-02-05 DIAGNOSIS — E11.65 TYPE 2 DIABETES MELLITUS WITH HYPERGLYCEMIA, WITH LONG-TERM CURRENT USE OF INSULIN (HCC): Primary | ICD-10-CM

## 2023-02-05 DIAGNOSIS — Z79.4 TYPE 2 DIABETES MELLITUS WITH HYPERGLYCEMIA, WITH LONG-TERM CURRENT USE OF INSULIN (HCC): Primary | ICD-10-CM

## 2023-02-20 ENCOUNTER — TELEPHONE (OUTPATIENT)
Age: 58
End: 2023-02-20

## 2023-03-01 ENCOUNTER — NURSE ONLY (OUTPATIENT)
Age: 58
End: 2023-03-01

## 2023-03-01 ENCOUNTER — HOSPITAL ENCOUNTER (OUTPATIENT)
Facility: HOSPITAL | Age: 58
Discharge: HOME | End: 2023-03-04

## 2023-03-01 DIAGNOSIS — Z79.4 TYPE 2 DIABETES MELLITUS WITH HYPERGLYCEMIA, WITH LONG-TERM CURRENT USE OF INSULIN (HCC): ICD-10-CM

## 2023-03-01 DIAGNOSIS — E78.2 MIXED HYPERLIPIDEMIA: ICD-10-CM

## 2023-03-01 DIAGNOSIS — E11.65 TYPE 2 DIABETES MELLITUS WITH HYPERGLYCEMIA, WITH LONG-TERM CURRENT USE OF INSULIN (HCC): ICD-10-CM

## 2023-03-01 LAB
LABCORP SPECIMEN COLLECTION: NORMAL
LABCORP SPECIMEN COLLECTION: NORMAL

## 2023-03-01 PROCEDURE — 99001 SPECIMEN HANDLING PT-LAB: CPT

## 2023-03-02 ENCOUNTER — APPOINTMENT (OUTPATIENT)
Dept: INFUSION THERAPY | Age: 58
End: 2023-03-02

## 2023-03-02 LAB
25(OH)D3+25(OH)D2 SERPL-MCNC: 51.2 NG/ML (ref 30–100)
ALBUMIN SERPL-MCNC: 4 G/DL (ref 3.8–4.9)
ALBUMIN/CREAT UR: 2 MG/G CREAT (ref 0–29)
ALBUMIN/GLOB SERPL: 1.6 {RATIO} (ref 1.2–2.2)
ALP SERPL-CCNC: 76 IU/L (ref 44–121)
ALT SERPL-CCNC: 20 IU/L (ref 0–32)
AST SERPL-CCNC: 16 IU/L (ref 0–40)
BASOPHILS # BLD AUTO: 0 X10E3/UL (ref 0–0.2)
BASOPHILS NFR BLD AUTO: 0 %
BILIRUB SERPL-MCNC: 0.3 MG/DL (ref 0–1.2)
BUN SERPL-MCNC: 30 MG/DL (ref 6–24)
BUN/CREAT SERPL: 15 (ref 9–23)
CALCIUM SERPL-MCNC: 9.9 MG/DL (ref 8.7–10.2)
CHLORIDE SERPL-SCNC: 102 MMOL/L (ref 96–106)
CHOLEST SERPL-MCNC: 228 MG/DL (ref 100–199)
CO2 SERPL-SCNC: 20 MMOL/L (ref 20–29)
CREAT SERPL-MCNC: 1.95 MG/DL (ref 0.57–1)
CREAT UR-MCNC: 250 MG/DL
EGFRCR SERPLBLD CKD-EPI 2021: 29 ML/MIN/1.73
EOSINOPHIL # BLD AUTO: 0 X10E3/UL (ref 0–0.4)
EOSINOPHIL NFR BLD AUTO: 0 %
ERYTHROCYTE [DISTWIDTH] IN BLOOD BY AUTOMATED COUNT: 13.1 % (ref 11.7–15.4)
GLOBULIN SER CALC-MCNC: 2.5 G/DL (ref 1.5–4.5)
GLUCOSE SERPL-MCNC: 211 MG/DL (ref 70–99)
HBA1C MFR BLD: 9.1 % (ref 4.8–5.6)
HCT VFR BLD AUTO: 37.7 % (ref 34–46.6)
HDLC SERPL-MCNC: 48 MG/DL
HGB BLD-MCNC: 13.3 G/DL (ref 11.1–15.9)
IMM GRANULOCYTES # BLD AUTO: 0 X10E3/UL (ref 0–0.1)
IMM GRANULOCYTES NFR BLD AUTO: 0 %
IMP & REVIEW OF LAB RESULTS: NORMAL
LDLC SERPL CALC-MCNC: 137 MG/DL (ref 0–99)
LYMPHOCYTES # BLD AUTO: 1.8 X10E3/UL (ref 0.7–3.1)
LYMPHOCYTES NFR BLD AUTO: 25 %
MCH RBC QN AUTO: 32.8 PG (ref 26.6–33)
MCHC RBC AUTO-ENTMCNC: 35.3 G/DL (ref 31.5–35.7)
MCV RBC AUTO: 93 FL (ref 79–97)
MICROALBUMIN UR-MCNC: 6.1 UG/ML
MONOCYTES # BLD AUTO: 0.5 X10E3/UL (ref 0.1–0.9)
MONOCYTES NFR BLD AUTO: 7 %
NEUTROPHILS # BLD AUTO: 4.7 X10E3/UL (ref 1.4–7)
NEUTROPHILS NFR BLD AUTO: 68 %
PHOSPHATE SERPL-MCNC: 3.4 MG/DL (ref 3–4.3)
PLATELET # BLD AUTO: 169 X10E3/UL (ref 150–450)
POTASSIUM SERPL-SCNC: 4.5 MMOL/L (ref 3.5–5.2)
PROT SERPL-MCNC: 6.5 G/DL (ref 6–8.5)
PROT UR-MCNC: 11.2 MG/DL
PROT/CREAT UR: 45 MG/G CREAT (ref 0–200)
RBC # BLD AUTO: 4.06 X10E6/UL (ref 3.77–5.28)
REPORT: NORMAL
SODIUM SERPL-SCNC: 141 MMOL/L (ref 134–144)
SPECIMEN STATUS REPORT, ROLRST: NORMAL
TRIGL SERPL-MCNC: 241 MG/DL (ref 0–149)
VLDLC SERPL CALC-MCNC: 43 MG/DL (ref 5–40)
WBC # BLD AUTO: 7 X10E3/UL (ref 3.4–10.8)

## 2023-03-07 ENCOUNTER — TELEPHONE (OUTPATIENT)
Age: 58
End: 2023-03-07

## 2023-03-07 NOTE — TELEPHONE ENCOUNTER
Called and left message for patient to call about appointment today. Per Roge Crespo DNP please move to April. Medicare wellness is due in April.

## 2023-03-09 ENCOUNTER — APPOINTMENT (OUTPATIENT)
Dept: INFUSION THERAPY | Age: 58
End: 2023-03-09
Payer: MEDICARE

## 2023-03-09 ENCOUNTER — HOSPITAL ENCOUNTER (OUTPATIENT)
Dept: INFUSION THERAPY | Age: 58
End: 2023-03-09

## 2023-03-09 ENCOUNTER — HOSPITAL ENCOUNTER (OUTPATIENT)
Facility: HOSPITAL | Age: 58
Setting detail: INFUSION SERIES
End: 2023-03-09
Payer: MEDICARE

## 2023-03-09 VITALS
RESPIRATION RATE: 20 BRPM | HEART RATE: 82 BPM | TEMPERATURE: 98.5 F | OXYGEN SATURATION: 96 % | DIASTOLIC BLOOD PRESSURE: 82 MMHG | SYSTOLIC BLOOD PRESSURE: 129 MMHG

## 2023-03-09 DIAGNOSIS — J45.50 SEVERE PERSISTENT ASTHMA, UNSPECIFIED WHETHER COMPLICATED: Primary | ICD-10-CM

## 2023-03-09 PROCEDURE — 96372 THER/PROPH/DIAG INJ SC/IM: CPT

## 2023-03-09 PROCEDURE — 6360000002 HC RX W HCPCS: Performed by: INTERNAL MEDICINE

## 2023-03-09 RX ORDER — ALBUTEROL SULFATE 90 UG/1
4 AEROSOL, METERED RESPIRATORY (INHALATION) PRN
Status: CANCELLED | OUTPATIENT
Start: 2023-05-04

## 2023-03-09 RX ORDER — DIPHENHYDRAMINE HYDROCHLORIDE 50 MG/ML
50 INJECTION INTRAMUSCULAR; INTRAVENOUS
Status: CANCELLED | OUTPATIENT
Start: 2023-05-04

## 2023-03-09 RX ORDER — ONDANSETRON 2 MG/ML
8 INJECTION INTRAMUSCULAR; INTRAVENOUS
Status: CANCELLED | OUTPATIENT
Start: 2023-05-04

## 2023-03-09 RX ORDER — EPINEPHRINE 1 MG/ML
0.3 INJECTION, SOLUTION, CONCENTRATE INTRAVENOUS PRN
Status: CANCELLED | OUTPATIENT
Start: 2023-05-04

## 2023-03-09 RX ORDER — SODIUM CHLORIDE 9 MG/ML
INJECTION, SOLUTION INTRAVENOUS CONTINUOUS
Status: CANCELLED | OUTPATIENT
Start: 2023-05-04

## 2023-03-09 RX ORDER — BENRALIZUMAB 30 MG/ML
30 INJECTION, SOLUTION SUBCUTANEOUS ONCE
COMMUNITY

## 2023-03-09 RX ORDER — ACETAMINOPHEN 325 MG/1
650 TABLET ORAL
Status: CANCELLED | OUTPATIENT
Start: 2023-05-04

## 2023-03-09 RX ADMIN — BENRALIZUMAB 30 MG: 30 INJECTION, SOLUTION SUBCUTANEOUS at 11:50

## 2023-03-09 ASSESSMENT — PAIN DESCRIPTION - PAIN TYPE: TYPE: ACUTE PAIN

## 2023-03-09 ASSESSMENT — PAIN DESCRIPTION - LOCATION: LOCATION: HEAD

## 2023-03-09 ASSESSMENT — PAIN DESCRIPTION - DESCRIPTORS: DESCRIPTORS: ACHING

## 2023-03-09 ASSESSMENT — PAIN SCALES - GENERAL: PAINLEVEL_OUTOF10: 6

## 2023-03-09 NOTE — PROGRESS NOTES
[facility] Progress Note    Date: 2023    Name: Denver Herald    MRN: [id number]         : 1965    Ms. Marshall arrived in the [facility] today at 1125, in stable condition, here for her FASENRA INJECTION ([address]). She was assessed and education was provided. Ms. Nik Cedeno vitals were reviewed. Vitals:    23 1125   BP: 129/82   Pulse: 82   Resp: 20   Temp: 98.5 °F (36.9 °C)   SpO2: 96%         Ms. Marshall stated that she was doing well, and voiced no major complaints. Benralizumab (FASENRA) 30 mg, was administered SQ, in the back of her LEFT arm at 1150, per order and without incident. Ms. Angus Esparza tolerated well and voiced no complaints. Ms. Angus Esparza was discharged from [place] [address] in stable condition at 1155. She is to return in 8 weeks, on 23 at 1130, for her next appointment, [facility].      Kimberly Fermin RN  2023  11:48 AM

## 2023-03-09 NOTE — PROGRESS NOTES
Pharmacy Progress Note - Diabetes Management       Assessment / Plan:   Diabetes Management:  Per ADA guidelines, Pt's A1c is not at goal of < 7%. Pt's basal control continues to be adequate without any hypoglycemia. Her post-prandial elevations are improved, but are still at either upper range of goal or above. Will increase her Ozempic dose to 1mg weekly in an attempt to target the post-prandial fluctuations. Will reassess with SMBG logs at follow up in 3 weeks. Nutrition/Lifestyle Modifications:  - Educated pt on the importance of moderating carbohydrate intake. Reviewed sources of carbohydrates and method to help determine appropriate portion sizes (e.g., Diabetes Plate Method). - Advised patient to avoid sugar-sweetened beverages and replace with water or diet/zero sugar option.  - Recommend ~30 minutes consistent, moderately intensive, exercise/day or ~150 minutes/week. Start small, stay consistent, and increase length and types of exercise, as tolerated. Patient will return to clinic in 3 week(s) for follow up. S/O: Ms. Naveen Fontana, a 61 y.o. female referred by Syeda Rincon DNP,  has a past medical history of Acquired cyst of kidney, Asthma, Bilateral great toe fractures, Chronic kidney disease (CKD), stage IV (severe) ([address] Chronic sinusitis, Colon polyp, Depression, Diabetes mellitus ([address] DJD (degenerative joint disease) of cervical spine, DJD (degenerative joint disease), lumbar, Dyslipidemia, Edema of both ankles, Environmental and seasonal allergies, Eustachian tube dysfunction, Fibrocystic breast, GERD (gastroesophageal reflux disease), Hypertriglyceridemia, Lateral epicondylitis of both elbows, Macular degeneration of both eyes, Mild intermittent asthma without complication, Morbid obesity ([address] Neuropathy, Osteopenia, Sarcoidosis, and Type 2 diabetes mellitus with hyperglycemia, with long-term current use of insulin ([address]   Pt was seen today for diabetes management. Patient's last A1c was:   Hemoglobin A1C   Date Value Ref Range Status   12/02/2022 9.3 (H) 4.8 - 5.6 % Final     Comment:              Prediabetes: 5.7 - 6.4           Diabetes: >6.4           Glycemic control for adults with diabetes: <7.0       Hemoglobin A1C, POC   Date Value Ref Range Status   01/03/2022 7.1 % Final       Interim update: Pt was last seen by me on 1/25/23. Per my prior note: Pt's A1c is not at goal of < 7%. Pt's FBG values are all at goal while her post-prandial values continue to be elevated. Will increase her Ozempic dose to 0.5mg weekly to target the post-prandial elevations. She injected her 0.25mg dose yesterday so will have her inject another 0.25mg today. She has two more injections left in her current pen which should give her enough time to obtain the new rx. Will reassess with SMBG logs at follow up in 3 weeks. Today:   Pt has not been writing down her BG values and she did not bring in her BG meter. She states that she has been happier with her BG values, but admits to some post-prandial elevations. She states that she has not had any sodas at all, but is still having some OJ. She generally monzon the OJ down, but knows some of the 220s have been from drinking it straight. She is amenable to an increase in her dose of Ozempic.       Current anti-hyperglycemic regimen includes:    Key Antihyperglycemic Medications               insulin glargine (LANTUS SOLOSTAR) 100 UNIT/ML injection pen Inject 30 Units into the skin    Semaglutide,0.25 or 0.5MG/DOS, (OZEMPIC, 0.25 OR 0.5 MG/DOSE,) 2 MG/1.5ML SOPN Inject 0.5 mg into the skin every 7 days          Complete current medication regimen includes:  Current Outpatient Medications   Medication Sig    BD PEN NEEDLE SERGIO 2ND GEN 32G X 4 MM MISC     Blood Glucose Monitoring Suppl (ACCU-CHEK GUIDE) w/Device KIT     ACCU-CHEK GUIDE strip     benralizumab (FASENRA) 30 MG/ML SOSY injection Inject 30 mg into the skin once EVERY 8 WEEKS    Lancets MISC Use as directed to monitor blood glucose up to three times daily. albuterol sulfate HFA (PROVENTIL;VENTOLIN;PROAIR) 108 (90 Base) MCG/ACT inhaler Inhale 2 puffs into the lungs every 4 hours as needed    aspirin 81 MG EC tablet Take 81 mg by mouth daily    butalbital-acetaminophen-caffeine (FIORICET, ESGIC) -40 MG per tablet Take 1 tablet by mouth every 6 hours as needed    cyanocobalamin 100 MCG tablet Take 50 mcg by mouth daily    diclofenac sodium (VOLTAREN) 1 % GEL Apply 2 g topically 4 times daily as needed    dilTIAZem (TIAZAC) 120 MG extended release capsule Take 120 mg by mouth daily    esomeprazole (NEXIUM) 40 MG delayed release capsule Take 40 mg by mouth daily as needed    fluticasone-umeclidin-vilant (TRELEGY ELLIPTA) 200-62.5-25 MCG/ACT AEPB inhaler INHALE ONE PUFF BY MOUTH DAILY, RINSE AND GARGLE AFTER EACH USE    furosemide (LASIX) 20 MG tablet Take 20 mg by mouth daily    insulin glargine (LANTUS SOLOSTAR) 100 UNIT/ML injection pen Inject 30 Units into the skin    magnesium oxide (MAG-OX) 400 MG tablet Take 400 mg by mouth 2 times daily (with meals)    metoprolol succinate (TOPROL XL) 25 MG extended release tablet Take 25 mg by mouth daily    ondansetron (ZOFRAN) 4 MG tablet Take 4 mg by mouth every 8 hours as needed    pravastatin (PRAVACHOL) 80 MG tablet Take 80 mg by mouth daily    predniSONE (DELTASONE) 5 MG tablet Take 5 mg by mouth daily    Semaglutide,0.25 or 0.5MG/DOS, (OZEMPIC, 0.25 OR 0.5 MG/DOSE,) 2 MG/1.5ML SOPN Inject 0.5 mg into the skin every 7 days    topiramate (TOPAMAX) 50 MG tablet Take 50 mg by mouth 2 times daily    triamcinolone (KENALOG) 0.1 % cream Apply topically 2 times daily as needed    venlafaxine (EFFEXOR XR) 75 MG extended release capsule Take 75 mg by mouth daily     No current facility-administered medications for this visit. Allergies:   Allergies   Allergen Reactions    Amoxicillin Hives, Shortness Of Breath and Swelling Adhesive Tape Other (See Comments)     Reports blistering & burning upon removal of freestyle feroz & and also happened upon removal of ecg holter monitor    Atorvastatin Other (See Comments)     Muscle cramps and weakness    Ibuprofen Other (See Comments)     dont prescribe due to kidney function    Abraham Flavor Itching     Allergy to Abraham. Cracks in corner of mouth, irritation of tongue. Other Itching     Pt reported she's allergic to \"all tropical fruits, my tongue starts tingling & side of mouth starts tingling & face swells\"    Penicillins Angioedema, Hives and Other (See Comments)    Pineapple Itching and Other (See Comments)     Cracks in corner of mouth, irritation of tongue.      Rosuvastatin Myalgia       Blood Glucose Monitoring (BGM) or CGM:  - Had access to home glucometer/blood glucose log/CGM reader today:  No  - Conducts/scans: 1-2x daily   - Fasting BG today: none  - Post-prandial: reports BG values up to 220 about twice weekly, but is typically 170-180s   -FBG: ranges from 90s to 110s    ROS:  Today, Pt endorses:  - Symptoms of Hyperglycemia: none  - Symptoms of Hypoglycemia: none    Lifestyle modification(s):  - above    Medication Adherence/Access:  - Endorses adherence to current regimen?: Yes    Vitals/Labs:  No results found for: HBA1C  Hemoglobin A1C, POC   Date Value Ref Range Status   01/03/2022 7.1 % Final        Screenings/Prevention Parameters:  -Diabetic Eye and Foot Exams:      Diabetes Management   Topic Date Due    Diabetic retinal exam  03/19/2020    Diabetic foot exam  03/31/2023     -Microalbumin / Creatinine ratio:     No results found for: LABMICR, [id number]  -ASCVD Risk Score Parameters and Calculation    The 10-year ASCVD risk score (Carter BERRY, et al., 2019) is: 17%    Values used to calculate the score:      Age: 61 years      Sex: Female      Is Non- : Yes      Diabetic: Yes      Tobacco smoker: No      Systolic Blood Pressure: 994 mmHg      Is BP treated: Yes      HDL Cholesterol: 58 mg/dL      Total Cholesterol: 258 mg/dL    -Immunizations:      Immunization History   Administered Date(s) Administered    Influenza Virus Vaccine 09/28/2017, 11/26/2018, 02/15/2021, 10/07/2021, 12/09/2022    Influenza, FLUARIX, FLULAVAL, FLUZONE (age 10 mo+) AND AFLURIA, (age 1 y+), PF, 0.5mL 09/28/2017, 11/26/2018, 12/09/2022    Pneumococcal Polysaccharide ([id number]) 09/28/2017    Tdap (Boostrix, Adacel) 04/24/2013       Additional Laboratory Parameters of Interest:   Estimation of renal function:  Lab Results   Component Value Date/Time    GFRAA 29 05/04/2022 10:36 AM    GFRAA 31 03/07/2022 10:30 AM    GFRAA 29 02/08/2022 01:23 PM     Wt Readings from Last 3 Encounters:   12/09/22 176 lb (79.8 kg)   12/08/22 174 lb (78.9 kg)   12/01/22 174 lb (78.9 kg)     Ht Readings from Last 1 Encounters:   12/09/22 5' 2\" (1.575 m)     Calculated estimated creatinine clearance: CrCl cannot be calculated (Unknown ideal weight.). Vital Signs Today:    Wt 167 lb 9.6 oz (76 kg)   BMI 30.65 kg/m²     There are no discontinued medications. No orders of the defined types were placed in this encounter. Future Appointments   Date Time Provider Rob Iglesias   3/15/2023  8:40 AM Nila Snyder DO [facility] BS AMB   3/23/2023  9:15 AM Gala Rojo MD [facility] BS AMB   3/31/2023 10:00 AM Shahab Caro [facility] BS AMB   4/7/2023 11:00 AM Darius Dean DNP [facility] BS AMB   5/4/2023 11:30 AM [facility] FAST TRACK NURSE JONATAN Santiago       Patient verbalized understanding of the information presented and all of the patients questions were answered. AVS was handed to the patient. Patient advised to call the office with any additional questions or concerns. Notifications of recommendations will be sent to Darius Dean DNP for review.       Thank you for the consult,  Aaron Simmonds, PharmD, BCACP, BC-ADM        For Pharmacy Admin Tracking Only    Program: Medical Group  CPA in place: Yes  Recommendation Provided To: Patient/Caregiver: 2 via In person  Intervention Detail: Adherence Monitorin and Dose Adjustment: 1, reason: Therapy Optimization  Intervention Accepted By: Patient/Caregiver: 2  Gap Closed?: No   Time Spent (min): 30

## 2023-03-10 ENCOUNTER — PHARMACY VISIT (OUTPATIENT)
Age: 58
End: 2023-03-10

## 2023-03-10 VITALS — BODY MASS INDEX: 30.65 KG/M2 | WEIGHT: 167.6 LBS

## 2023-03-10 DIAGNOSIS — E11.65 TYPE 2 DIABETES MELLITUS WITH HYPERGLYCEMIA, WITH LONG-TERM CURRENT USE OF INSULIN (HCC): Primary | ICD-10-CM

## 2023-03-10 DIAGNOSIS — Z79.4 TYPE 2 DIABETES MELLITUS WITH HYPERGLYCEMIA, WITH LONG-TERM CURRENT USE OF INSULIN (HCC): Primary | ICD-10-CM

## 2023-03-10 RX ORDER — BLOOD-GLUCOSE METER
EACH MISCELLANEOUS
COMMUNITY
Start: 2023-01-09

## 2023-03-10 RX ORDER — BLOOD SUGAR DIAGNOSTIC
STRIP MISCELLANEOUS
COMMUNITY
Start: 2023-01-09

## 2023-03-10 RX ORDER — PEN NEEDLE, DIABETIC 32GX 5/32"
NEEDLE, DISPOSABLE MISCELLANEOUS
COMMUNITY
Start: 2023-01-29

## 2023-03-10 NOTE — PATIENT INSTRUCTIONS
Your Visit Summary:     Plan:  - Increase Ozempic to 1mg every week    Call me with any questions or concerns  Kareen Velasquez [phone]    Check and document your blood sugar first thing in the morning (fasting at least 8 hours), 2 hours after a meal, and/or before bedtime. Bring your meter/log to all future visits. Your blood sugar goals:  - Fasting (first thing in the morning)  blood sugar: 80 - 130mg/dL  - 2 hours after a meal: 80 - 180mg/dL    When you experience symptoms of low blood sugar (example: less than 70):  - Confirm low reading by checking your blood sugar.   - Then treat with 15 grams of carbohydrates (one-half cup of juice or regular soda, or 4-5 glucose tablets). - Wait 15 minutes to recheck blood sugar.   - Then eat a protein containing meal/snack to prevent another low blood sugar episode. (example: peanut butter + crackers)    Nutrition:  - When reviewing a nutrition label, focus on the serving size, total calories, fat (and type of fats), total carbohydrates, sugar (and amount of added sugar), amount of fiber (good for your digestive), and amount of protein. Refer to your nutrition label guide for more information.  - For a meal : max 45 - 60 grams of carbohydrates  - For a snack: max 15 grams of carbohydrates  - Reduce amount of saturated and trans fat. Consider more unsaturated fat options as they are better for your heart health.    - Have at least 1 serving of lean fat protein with each meal.    - Increase fiber intake slowly to prevent constipation.   - Substitute fruit juices for the whole fruit    Low carb snack ideas (15 grams total carb or less):    String cheese or babybel with 6 crackers  4 peanut butter crackers  3 cups of popcorn  1 cup raw vegetables with hummus or ranch dip (just need to watch how much dip you use)  Nuts  2 rice cakes  Celery with peanut butter or cream cheese  String cheese with 1 serving of fruit  Greek yogurt (look at label to make sure < 15 gram carb)  Plain greek yogurt with fresh berries added  [employer] [place] protein bar  Whisps parmesan cheese crisps  Hard boiled egg  Cottage cheese  Tuna salad lettuce roll-ups  Deli meat roll-ups with slice of cheese  Sugar Free Jello  Glucerna shake (16 grams)   Glucerna hunger smart shake (16 grams)  Ensure protein max shake  Fruit (1 serving/15 grams)  1/2 banana, jeffrey, or grapefruit   1/3 melon (small cantaloupe)  1 slice or 1 cup of honeydew melon  1 slice or 1 and 1/4 cups of watermelon   1 small apple, peach, orange or pear  2 small tangerines  1 cup of raspberries  3/4 cup of blackberries, blueberries or pineapples  1/2 cup of fruit juice, pears, applesauce, or mangos  17 small grapes  12 cherries    Be careful with the glucerna products as they differ in the total carbs depending on the product (some are intended as meal replacements not snacks). Make sure you look at the total carbs on the label as products can differ. Physical Activity:  - Aim for 30 minutes of consistent, moderately intensive, physical activity a day for 5 days or an average of 150 minutes per week. - Start slow, increase as tolerated. For example: Walk every day, working up to 30 minutes of brisk walking, 5 days a week--or split the 30 minutes into two 15-minute or three 10-minute walks. - If you sit for a long time, get up and move/stretch every 90 minutes. Other recommendations:  - Schedule an annual eye exam.  - Check your feet daily for any signs of open wounds, cuts, or sores. - Given your risk factors, the following vaccines are recommended: annual flu shot, age-based pneumococcal vaccines (Pneumovax, Prevnar 13). In addition to taking your medications as directed, improving your blood sugar involves modifying your nutrition and maximizing the amount of physical activity.

## 2023-03-15 ENCOUNTER — OFFICE VISIT (OUTPATIENT)
Age: 58
End: 2023-03-15
Payer: MEDICARE

## 2023-03-15 VITALS
BODY MASS INDEX: 30.73 KG/M2 | DIASTOLIC BLOOD PRESSURE: 76 MMHG | OXYGEN SATURATION: 96 % | HEART RATE: 92 BPM | HEIGHT: 62 IN | SYSTOLIC BLOOD PRESSURE: 120 MMHG | WEIGHT: 167 LBS

## 2023-03-15 DIAGNOSIS — I47.1 SUPRAVENTRICULAR TACHYCARDIA (HCC): Primary | ICD-10-CM

## 2023-03-15 DIAGNOSIS — R00.2 PALPITATIONS: ICD-10-CM

## 2023-03-15 DIAGNOSIS — R07.9 CHEST PAIN, UNSPECIFIED TYPE: ICD-10-CM

## 2023-03-15 PROCEDURE — 99214 OFFICE O/P EST MOD 30 MIN: CPT | Performed by: INTERNAL MEDICINE

## 2023-03-15 PROCEDURE — 3078F DIAST BP <80 MM HG: CPT | Performed by: INTERNAL MEDICINE

## 2023-03-15 PROCEDURE — 3074F SYST BP LT 130 MM HG: CPT | Performed by: INTERNAL MEDICINE

## 2023-03-15 RX ORDER — M-VIT,TX,IRON,MINS/CALC/FOLIC 27MG-0.4MG
1 TABLET ORAL DAILY
COMMUNITY

## 2023-03-15 RX ORDER — CETIRIZINE HYDROCHLORIDE 10 MG/1
10 TABLET ORAL DAILY
COMMUNITY

## 2023-03-15 ASSESSMENT — PATIENT HEALTH QUESTIONNAIRE - PHQ9
1. LITTLE INTEREST OR PLEASURE IN DOING THINGS: 0
SUM OF ALL RESPONSES TO PHQ QUESTIONS 1-9: 0
7. TROUBLE CONCENTRATING ON THINGS, SUCH AS READING THE NEWSPAPER OR WATCHING TELEVISION: 0
2. FEELING DOWN, DEPRESSED OR HOPELESS: 0
SUM OF ALL RESPONSES TO PHQ QUESTIONS 1-9: 0
SUM OF ALL RESPONSES TO PHQ QUESTIONS 1-9: 0
9. THOUGHTS THAT YOU WOULD BE BETTER OFF DEAD, OR OF HURTING YOURSELF: 0
SUM OF ALL RESPONSES TO PHQ9 QUESTIONS 1 & 2: 0
8. MOVING OR SPEAKING SO SLOWLY THAT OTHER PEOPLE COULD HAVE NOTICED. OR THE OPPOSITE, BEING SO FIGETY OR RESTLESS THAT YOU HAVE BEEN MOVING AROUND A LOT MORE THAN USUAL: 0
4. FEELING TIRED OR HAVING LITTLE ENERGY: 0
SUM OF ALL RESPONSES TO PHQ QUESTIONS 1-9: 0
3. TROUBLE FALLING OR STAYING ASLEEP: 0
6. FEELING BAD ABOUT YOURSELF - OR THAT YOU ARE A FAILURE OR HAVE LET YOURSELF OR YOUR FAMILY DOWN: 0
5. POOR APPETITE OR OVEREATING: 0
10. IF YOU CHECKED OFF ANY PROBLEMS, HOW DIFFICULT HAVE THESE PROBLEMS MADE IT FOR YOU TO DO YOUR WORK, TAKE CARE OF THINGS AT HOME, OR GET ALONG WITH OTHER PEOPLE: 0

## 2023-03-15 NOTE — PROGRESS NOTES
Bailee    Chief Complaint   Patient presents with    Follow-up     6 month follow up       HPI    Bailee is a 61 y.o. AAF with no known coronary disease referred by her pulmonologist for palpitations. Her records were obtained and reviewed in detail. She has had 2 prior echoes which I obtained and reviewed. Not having any chest pain but almost daily can feel her heart racing skipping fluttering in these sensations last for about 5 minutes at a time. At first it was thought this was correlated to electrolyte disturbance she is also been on varying doses of steroids etc. which she does we will impact the frequency.     Past Medical History:   Diagnosis Date    Acquired cyst of kidney 01/16/2020    naseem    Asthma     Bilateral great toe fractures 2014    Chronic kidney disease (CKD), stage IV (severe) ([address] 06/2022    Dr Lezama Ill    Chronic sinusitis     Dr. Ayde Mcleod in the past    Colon polyp 5/16    Dr Shawn Dominguez    Depression 2019    Diabetes mellitus ([facility] [address]     DJD (degenerative joint disease) of cervical spine 2016    DJD (degenerative joint disease), lumbar 2013    MRI c spine w degen changes; Dr Sae Hernandez    Dyslipidemia     calculated 10 year risk score was 2.0% (12/13)    Edema of both ankles 8/28/2018    Environmental and seasonal allergies 8/28/2018    Eustachian tube dysfunction     Fibrocystic breast     Dr Leeroy Acevedo    GERD (gastroesophageal reflux disease)     Hypertriglyceridemia 8/28/2018    Lateral epicondylitis of both elbows 2/6/2017    Macular degeneration of both eyes 08/28/2018    Dr Meka Hawkins [place]    Mild intermittent asthma without complication 42/92/6693    Dr. Deandre Allen;  ratio 68%, FEV1 78% w 6% inc postbd, TLC 77, RV 56, DLCO 61%    Morbid obesity (HCC)     peak weight 196 lbs, bmi 35.8 from 10/13    Neuropathy 8/28/2018    Osteopenia     Dr. Kg Martinez; DEXA t score -0.7 spine, -0.2 hip (8/14)    Sarcoidosis     Dr Ottoniel Mathews    Type 2 diabetes mellitus with hyperglycemia, with long-term current use of insulin ([facility] [address] 8/28/2018       Past Surgical History:   Procedure Laterality Date    COLONOSCOPY N/A 5/26/2016    Dr Jeremías Lewis hyperplastic    COLONOSCOPY N/A 10/19/2021    COLONOSCOPY with polypectomy performed by Bryon Olszewski, MD at [address]    [facility]      Dr. Alma Delia SALVADOR      nasal polypectomy Dr. Haroon SALVADOR      tear duct surgery right Dr. Christiana Kamara 2010; left Dr Aleksandra Regalado 2015    HX ORTHOPAEDIC      DEXA -0.7 spine, -0.2 hip 8/14    SC CARDIAC SURG PROCEDURE UNLIST  9/10, 8/13    thallium negative ef 72%; negative ef 70%    SC CHEST SURGERY PROCEDURE UNLISTED  7/12    US thyroid negative    SC CHEST SURGERY PROCEDURE UNLISTED  2012    pfts w mod restrictive defect     VASCULAR SURGERY PROCEDURE UNLIST  12/13    venous doppler negative       Current Outpatient Medications   Medication Sig Dispense Refill    budesonide-glycopyr-formoterol (Breztri Aerosphere) 160-9-4.8 mcg/actuation HFAA Take 2 Puffs by inhalation two (2) times a day. Rinse and gargle after each use 2 Each 0    Trelegy Ellipta 200-62.5-25 mcg inhaler INHALE ONE PUFF BY MOUTH DAILY, RINSE AND GARGLE AFTER EACH  Blister 3    empagliflozin (JARDIANCE) 10 mg tablet Take 1 Tablet by mouth daily. 90 Tablet 1    insulin glargine (LANTUS,BASAGLAR) 100 unit/mL (3 mL) inpn 22 Units by SubCUTAneous route nightly. 5 Pen 1    furosemide (LASIX) 20 mg tablet Take 1 Tablet by mouth daily. Indications: visible water retention, high blood pressure 90 Tablet 0    diclofenac (VOLTAREN) 1 % gel Apply 2 g to affected area four (4) times daily as needed. predniSONE (DELTASONE) 5 mg tablet Take 5 mg by mouth daily. pravastatin (PRAVACHOL) 80 mg tablet Take 1 Tablet by mouth daily. 90 Tablet 1    SITagliptin (JANUVIA) 25 mg tablet Take 1 Tablet by mouth daily. For diabetes 90 Tablet 1    topiramate (TOPAMAX) 50 mg tablet Take 1 Tablet by mouth two (2) times a day.  180 Tablet 1 venlafaxine-SR (EFFEXOR-XR) 75 mg capsule Take 1 Capsule by mouth daily. 90 Capsule 1    cetirizine HCl (ZYRTEC PO) Take 1 Tablet by mouth daily. esomeprazole (NEXIUM) 40 mg capsule Take 1 Capsule by mouth daily as needed for Gastroesophageal Reflux Disease (GERD). 90 Capsule 3    magnesium oxide (MAG-OX) 400 mg tablet Take 1 Tablet by mouth two (2) times daily (with meals). 120 Tablet 0    Insulin Needles, Disposable, (Nany Pen Needle) 32 gauge x 5/32\" ndle Check blood sugars three times a day 100 Pen Needle 11    dilTIAZem ER (CARDIZEM CD) 120 mg capsule Take 1 Capsule by mouth daily. 30 Capsule 5    ondansetron hcl (Zofran) 4 mg tablet Take 1 Tablet by mouth every eight (8) hours as needed for Nausea or Nausea or Vomiting. 20 Tablet 0    butalbital-acetaminophen-caffeine (FIORICET, ESGIC) -40 mg per tablet Take 1 Tablet by mouth every six (6) hours as needed. cyanocobalamin (VITAMIN B12) 100 mcg tablet Take 50 mcg by mouth daily. albuterol (PROVENTIL HFA, VENTOLIN HFA, PROAIR HFA) 90 mcg/actuation inhaler Take 2 Puffs by inhalation every four (4) hours as needed for Shortness of Breath. Indications: asthma attack 1 Inhaler 5    therapeutic multivitamin-minerals (THERAGRAN-M) tablet Take 1 Tab by mouth daily. triamcinolone acetonide (KENALOG) 0.1 % topical cream Apply  to affected area two (2) times daily as needed for Skin Irritation. 15 g 1    aspirin delayed-release 81 mg tablet Take 1 Tab by mouth daily. For heart health 30 Tab 11    glucose blood VI test strips (ACCU-CHEK HALLIE) strip Pt to test 5 times daily.   Dx:E11.65 500 Strip 3       Allergies   Allergen Reactions    Other Food Itching     Pt reported she's allergic to \"all tropical fruits, my tongue starts tingling & side of mouth starts tingling & face swells\"    Pollen Extracts Runny Nose and Cough    Adhesive Tape-Silicones Other (comments)     Reports blistering & burning upon removal of freestyle feroz & and also happened upon removal of ecg holter monitor    Amoxicillin Hives, Shortness of Breath and Swelling    Crestor [Rosuvastatin] Myalgia    Ibuprofen Other (comments)     dont prescribe due to kidney function    Lipitor [Atorvastatin] Other (comments)     Muscle cramps and weakness      [place] Flavor Itching     Allergy to Abraham. Cracks in corner of mouth, irritation of tongue. Pcn [Penicillins] Angioedema    Pineapple Itching and Other (comments)     Cracks in corner of mouth, irritation of tongue. Social History     Socioeconomic History    Marital status: LEGALLY      Spouse name: Not on file    Number of children: 0    Years of education: 15    Highest education level: Not on file   Occupational History    Occupation: Unemployed   Tobacco Use    Smoking status: Never    Smokeless tobacco: Never   Vaping Use    Vaping Use: Never used   Substance and Sexual Activity    Alcohol use: Yes     Alcohol/week: 0.0 standard drinks     Comment: occasional wine    Drug use: Never    Sexual activity: Not Currently   Other Topics Concern     Service No    Blood Transfusions No    Caffeine Concern Yes     Comment: due to reflux    Occupational Exposure No    Hobby Hazards No    Sleep Concern Yes    Stress Concern Yes     Comment: wants a job    Weight Concern Yes    Special Diet No    Back Care No    Exercise Yes    Bike Helmet No    Seat Belt Yes    Self-Exams Yes   Social History Narrative    Patient lives with a friend, no pets. Social Determinants of Health     Financial Resource Strain: Not on file   Food Insecurity: Not on file   Transportation Needs: Not on file   Physical Activity: Not on file   Stress: Not on file   Social Connections: Not on file   Intimate Partner Violence: Not on file   Housing Stability: Not on file        FH: neg premature ASCVD, no SCD    Review of Systems    14 pt Review of Systems is negative unless otherwise mentioned in the HPI.     Wt Readings from Last 3 Encounters: 12/08/22 78.9 kg (174 lb)   12/01/22 78.9 kg (174 lb)   11/17/22 79.4 kg (175 lb)     Temp Readings from Last 3 Encounters:   12/01/22 98 °F (36.7 °C) (Temporal)   11/17/22 98.7 °F (37.1 °C)   11/17/22 98.5 °F (36.9 °C)     BP Readings from Last 3 Encounters:   12/08/22 132/84   12/01/22 134/72   11/17/22 (!) 149/70     Pulse Readings from Last 3 Encounters:   12/08/22 92   12/01/22 91   11/17/22 84       05/14/19    ECHO ADULT COMPLETE 05/15/2019 5/15/2019    Interpretation Summary  · Left Ventricle: Estimated left ventricular ejection fraction is 61 - 65%. No regional wall motion abnormality noted. Age-appropriate left ventricular diastolic function. Signed by: Beni Olsen DO on 5/15/2019  7:15 AM      Physical Exam:    Visit Vitals  /84 (BP 1 Location: Left upper arm, BP Patient Position: Sitting, BP Cuff Size: Small adult)   Pulse 92   Ht 5' 2\" (1.575 m)   Wt 78.9 kg (174 lb)   LMP 03/30/2013   SpO2 98%   BMI 31.83 kg/m²      Physical Exam  HENT:      Head: Normocephalic and atraumatic. Eyes:      Pupils: Pupils are equal, round, and reactive to light. Cardiovascular:      Rate and Rhythm: Normal rate and regular rhythm. Heart sounds: Normal heart sounds. No murmur heard. No friction rub. No gallop. Pulmonary:      Effort: Pulmonary effort is normal. No respiratory distress. Breath sounds: Normal breath sounds. No wheezing or rales. Chest:      Chest wall: No tenderness. Abdominal:      General: Bowel sounds are normal.      Palpations: Abdomen is soft. Musculoskeletal:         General: No tenderness. Skin:     General: Skin is warm and dry. Neurological:      Mental Status: She is alert and oriented to person, place, and time. EKG  Sinus tachycardia   Nonspecific T wave abnormality   Abnormal ECG   When compared with ECG of 16-AUG-2019 19:41,   Vent.  rate has increased BY  40 BPM   Nonspecific T wave abnormality now evident in Anterolateral leads   Confirmed by Ludin Powers ([id number]) on 1/29/2022 11:19:24 PMs    Lab Results   Component Value Date/Time    Sodium 141 11/17/2022 01:40 PM    Potassium 3.8 11/17/2022 01:40 PM    Chloride 110 11/17/2022 01:40 PM    CO2 26 11/17/2022 01:40 PM    Anion gap 5 11/17/2022 01:40 PM    Glucose 135 (H) 11/17/2022 01:40 PM    BUN 30 (H) 11/17/2022 01:40 PM    Creatinine 2.09 (H) 11/17/2022 01:40 PM    BUN/Creatinine ratio 14 11/17/2022 01:40 PM    GFR est AA 29 (L) 05/04/2022 10:36 AM    GFR est non-AA 24 (L) 05/04/2022 10:36 AM    Calcium 8.8 11/17/2022 01:40 PM    Bilirubin, total 0.3 11/17/2022 01:40 PM    Alk.  phosphatase 68 11/17/2022 01:40 PM    Protein, total 6.5 11/17/2022 01:40 PM    Albumin 3.1 (L) 11/17/2022 01:40 PM    Globulin 3.4 11/17/2022 01:40 PM    A-G Ratio 0.9 11/17/2022 01:40 PM    ALT (SGPT) 18 11/17/2022 01:40 PM    AST (SGOT) 19 11/17/2022 01:40 PM     Lab Results   Component Value Date/Time    WBC 5.8 11/17/2022 01:40 PM    Hemoglobin, POC 13.6 05/26/2016 10:49 AM    HGB 11.5 (L) 11/17/2022 01:40 PM    Hematocrit, POC 40 05/26/2016 10:49 AM    HCT 35.8 11/17/2022 01:40 PM    PLATELET 557 78/00/8622 01:40 PM    MCV 93.5 11/17/2022 01:40 PM     Lab Results   Component Value Date/Time    TSH 2.33 08/27/2018 09:30 AM     Magnesium   Date Value Ref Range Status   03/07/2022 1.8 1.6 - 2.6 mg/dL Final   02/01/2022 1.7 1.6 - 2.6 mg/dL Final   01/31/2022 1.4 (L) 1.6 - 2.6 mg/dL Final   01/29/2022 1.4 (L) 1.6 - 2.6 mg/dL Final   08/27/2018 1.9 1.6 - 2.6 mg/dL Final     Lab Results   Component Value Date/Time    Hemoglobin A1c 9.3 (H) 12/02/2022 01:33 AM    Hemoglobin A1c (POC) 7.1 01/03/2022 11:57 AM    Hemoglobin A1c, External 6.7 09/28/2016 12:00 AM       Impression and Plan:  Shawna Solders is a 61 y.o. with:    Palpitations, SVT/AT on MCT  Sarcoidosis, s/p flare  Normal Echos, last 2019, no pulm HTN  DM2, known  HyperCa and renal dysfunction, ([place])  Asthma ([facility])    Repeat labs WNL, incl TSH  Echo WNL  7 day MCT, showed short SVT bursts c/w her symptoms  Tried Cardizem and maybe slight improvement but still c/o daily symptoms  Toprol does seem to be helping so lets continue that  She is having chest pain, that doesn't go away with antacids that needs further eval- pharm nuc, will call with results and if no perfusion abnormalities, can see me      Thank you for allowing me to participate in the care of your patient, please do not hesitate to call with questions or concerns.     [address],    Marcela Daniel, DO

## 2023-03-15 NOTE — PROGRESS NOTES
Rafal Stein presents today for   Chief Complaint   Patient presents with    Follow-up     3 to 4 month follow up       Rafal Emil preferred language for health care discussion is english/other. Is someone accompanying this pt? no    Is the patient using any DME equipment during OV? no    Depression Screening:  Depression: Not at risk    PHQ-2 Score: 0        Learning Assessment:  Who is the primary learner? Patient    What is the preferred language for health care of the primary learner? ENGLISH    How does the primary learner prefer to learn new concepts? DEMONSTRATION    Answered By patient    Relationship to Learner SELF           Pt currently taking Anticoagulant therapy? no    Pt currently taking Antiplatelet therapy ? no      Coordination of Care:  1. Have you been to the ER, urgent care clinic since your last visit? Hospitalized since your last visit? no    2. Have you seen or consulted any other health care providers outside of the [address] [place] [address] since your last visit? Include any pap smears or colon screening.  no

## 2023-03-17 ENCOUNTER — HOSPITAL ENCOUNTER (OUTPATIENT)
Facility: HOSPITAL | Age: 58
Setting detail: RECURRING SERIES
Discharge: HOME | End: 2023-03-20
Payer: MEDICARE

## 2023-03-17 PROCEDURE — 97162 PT EVAL MOD COMPLEX 30 MIN: CPT

## 2023-03-17 NOTE — PROGRESS NOTES
PHYSICAL / OCCUPATIONAL THERAPY - DAILY TREATMENT NOTE (updated )    Patient Name: Lacey Nolan    Date: 3/17/2023    : 1965  Insurance: Payor: [employer] / Plan: [employer] HMO / Product Type: *No Product type* /      Patient  verified yes     Visit #   Current / Total 1 24   Time   In / Out 9:10 9:55   Pain   In / Out 8 8   Subjective Functional Status/Changes: See below   Changes to:  Meds, Allergies, Med Hx, Sx Hx? If yes, update Summary List no       TREATMENT AREA =  left foot/ankle pain     OBJECTIVE      35 min [x]Eval                  []Re-Eval     Therapeutic Procedures: Tx Min Billable or 1:1 Min (if diff from Tx Min) Procedure, Rationale, Specifics   10 (no charge) 10 [id number] Therapeutic Exercise (timed):  increase ROM, strength, coordination, balance, and proprioception to improve patient's ability to progress to PLOF and address remaining functional goals. (see flow sheet as applicable)     Details if applicable:   HEP creation ad review           Details if applicable:            Details if applicable:            Details if applicable:            Details if applicable:     10 10 MC BC Totals Reminder: bill using total billable min of TIMED therapeutic procedures (example: do not include dry needle or estim unattended, both untimed codes, in totals to left)  8-22 min = 1 unit; 23-37 min = 2 units; 38-52 min = 3 units; 53-67 min = 4 units; 68-82 min = 5 units   Total Total     [x]  Patient Education billed concurrently with other procedures   [x] Review HEP    [] Progressed/Changed HEP, detail:    [] Other detail:       Objective Information/Functional Measures/Assessment    SUBJECTIVE  Pain Level (0-10 scale): 8/10. Mika Cast .. Mika Cast average pain level is 8/10   [x]constant []intermittent []improving []worsening []no change since onset    Any medication changes, allergies to medications, adverse drug reactions, diagnosis change, or new procedure performed?: [x] No    [] Yes (see

## 2023-03-17 NOTE — PROGRESS NOTES
[address] Khari maldonado, [address] EF:[phone] Fx: [phone]    PLAN OF CARE/ Statement of Necessity for Physical Therapy Services           Patient name: Shane Salas Start of Care: 3/17/2023   Referral source: Kayla James DPM : 1965    Medical Diagnosis: Other synovitis and tenosynovitis, Left ankle and foot (M65.872); Hereditary and idiopathic neuropathy, unspecified (G60.9)   Onset Date: 1 year ago (MD order 3/7/23)     Treatment Diagnosis: H55.903  LEFT FOOT PAIN                                      Prior Hospitalization: see medical history Provider#: [id number]   Medications: Verified on Patient Summary List     Comorbidities: allergies, Asthma, Back pain, BMI over 30, Heart disease, Depression, DM, GI disease, HA, HTN, Kidney/bladder/or urination problems, Prior surgery, Sleep dysfunction, latex allergy, sarcoidosis, diabetic Neuropathy- diagnosed a couple years, Patient reports heart palpitation and is being followed by a Cardiologist, patient reports lightheadedness sensation over the last year but confirmed BP is good. Prior Level of Function: Chronic left ankle/foot pain that has been ongoing for a year. The Plan of Care and following information is based on the information from the initial evaluation. Assessment / key information:  Patient present with left ankle pain that began approximately 1 year ago with an insidious onset. Patient reports pain has been worsening in the last 6 months. Patient reports she fell in 2023 when she was walking and became lightheaded and fell. Patient denies an injury. Patient stated she has been dealing with lightheadedness for the last year. Patient confirmed her blood pressure is good. Therapist advised patient to notify PCP to discuss lightheadedness. Patient reports left ankle/foot pain as constant throbbing/aching and sharp/shooting.  Patient reports

## 2023-03-20 RX ORDER — MAGNESIUM OXIDE 400 MG/1
400 TABLET ORAL 2 TIMES DAILY WITH MEALS
Qty: 30 TABLET | Refills: 0 | Status: SHIPPED | OUTPATIENT
Start: 2023-03-20

## 2023-03-20 NOTE — TELEPHONE ENCOUNTER
Per pt no longer available without an rx    Requested Prescriptions     Pending Prescriptions Disp Refills    magnesium oxide (MAG-OX) 400 MG tablet 30 tablet      Sig: Take 1 tablet by mouth 2 times daily (with meals)

## 2023-03-21 PROBLEM — Z77.22 SECOND HAND SMOKE EXPOSURE: Status: ACTIVE | Noted: 2023-03-21

## 2023-03-21 PROBLEM — E44.0 MODERATE MALNUTRITION (HCC): Status: ACTIVE | Noted: 2021-03-05

## 2023-03-21 PROBLEM — I12.9 MALIGNANT HYPERTENSIVE KIDNEY DISEASE WITH CHRONIC KIDNEY DISEASE STAGE I THROUGH STAGE IV, OR UNSPECIFIED: Status: ACTIVE | Noted: 2021-01-12

## 2023-03-21 PROBLEM — N28.1 ACQUIRED CYST OF KIDNEY: Status: ACTIVE | Noted: 2020-01-16

## 2023-03-21 PROBLEM — J44.1 CHRONIC OBSTRUCTIVE PULMONARY DISEASE WITH ACUTE EXACERBATION (HCC): Status: ACTIVE | Noted: 2023-03-21

## 2023-03-21 PROBLEM — Z20.822 PERSON UNDER INVESTIGATION FOR COVID-19: Status: ACTIVE | Noted: 2021-03-04

## 2023-03-23 ENCOUNTER — OFFICE VISIT (OUTPATIENT)
Age: 58
End: 2023-03-23
Payer: MEDICARE

## 2023-03-23 ENCOUNTER — HOSPITAL ENCOUNTER (EMERGENCY)
Facility: HOSPITAL | Age: 58
Discharge: HOME | End: 2023-03-23
Attending: EMERGENCY MEDICINE
Payer: MEDICARE

## 2023-03-23 VITALS
HEIGHT: 62 IN | OXYGEN SATURATION: 97 % | RESPIRATION RATE: 16 BRPM | HEART RATE: 104 BPM | BODY MASS INDEX: 30.11 KG/M2 | WEIGHT: 163.6 LBS | SYSTOLIC BLOOD PRESSURE: 143 MMHG | TEMPERATURE: 97.9 F | DIASTOLIC BLOOD PRESSURE: 85 MMHG

## 2023-03-23 VITALS
DIASTOLIC BLOOD PRESSURE: 77 MMHG | TEMPERATURE: 97.4 F | SYSTOLIC BLOOD PRESSURE: 113 MMHG | HEIGHT: 62 IN | WEIGHT: 163.58 LBS | RESPIRATION RATE: 16 BRPM | HEART RATE: 92 BPM | BODY MASS INDEX: 30.1 KG/M2 | OXYGEN SATURATION: 95 %

## 2023-03-23 DIAGNOSIS — D86.0 SARCOIDOSIS OF LUNG (HCC): ICD-10-CM

## 2023-03-23 DIAGNOSIS — J45.50 SEVERE PERSISTENT ASTHMA, UNCOMPLICATED: Primary | ICD-10-CM

## 2023-03-23 DIAGNOSIS — G62.9 PERIPHERAL NEUROPATHY DUE TO INFLAMMATION: ICD-10-CM

## 2023-03-23 DIAGNOSIS — J31.0 CHRONIC RHINITIS: ICD-10-CM

## 2023-03-23 DIAGNOSIS — E83.52 HYPERCALCEMIA: ICD-10-CM

## 2023-03-23 DIAGNOSIS — D86.89 SARCOIDOSIS OF OTHER SITES: ICD-10-CM

## 2023-03-23 DIAGNOSIS — Z79.52 LONG TERM (CURRENT) USE OF SYSTEMIC STEROIDS: ICD-10-CM

## 2023-03-23 DIAGNOSIS — M72.9 FASCIITIS: Primary | ICD-10-CM

## 2023-03-23 DIAGNOSIS — M79.2 PERIPHERAL NEUROPATHY DUE TO INFLAMMATION: ICD-10-CM

## 2023-03-23 PROCEDURE — 99283 EMERGENCY DEPT VISIT LOW MDM: CPT

## 2023-03-23 PROCEDURE — 3074F SYST BP LT 130 MM HG: CPT | Performed by: INTERNAL MEDICINE

## 2023-03-23 PROCEDURE — 99214 OFFICE O/P EST MOD 30 MIN: CPT | Performed by: INTERNAL MEDICINE

## 2023-03-23 PROCEDURE — 3078F DIAST BP <80 MM HG: CPT | Performed by: INTERNAL MEDICINE

## 2023-03-23 RX ORDER — GABAPENTIN 300 MG/1
300 CAPSULE ORAL 3 TIMES DAILY
Qty: 30 CAPSULE | Refills: 0 | Status: SHIPPED | OUTPATIENT
Start: 2023-03-23 | End: 2023-03-23 | Stop reason: SDUPTHER

## 2023-03-23 RX ORDER — OXYCODONE HYDROCHLORIDE AND ACETAMINOPHEN 5; 325 MG/1; MG/1
1 TABLET ORAL EVERY 6 HOURS PRN
Qty: 28 TABLET | Refills: 0 | Status: SHIPPED | OUTPATIENT
Start: 2023-03-23 | End: 2023-03-23 | Stop reason: SDUPTHER

## 2023-03-23 RX ORDER — LIDOCAINE 4 G/G
1 PATCH TOPICAL DAILY
Status: DISCONTINUED | OUTPATIENT
Start: 2023-03-23 | End: 2023-03-23 | Stop reason: HOSPADM

## 2023-03-23 RX ORDER — GABAPENTIN 300 MG/1
300 CAPSULE ORAL 3 TIMES DAILY
Qty: 30 CAPSULE | Refills: 0 | Status: SHIPPED | OUTPATIENT
Start: 2023-03-23 | End: 2023-04-02

## 2023-03-23 RX ORDER — OXYCODONE HYDROCHLORIDE AND ACETAMINOPHEN 5; 325 MG/1; MG/1
1 TABLET ORAL EVERY 6 HOURS PRN
Qty: 28 TABLET | Refills: 0 | Status: SHIPPED | OUTPATIENT
Start: 2023-03-23 | End: 2023-03-30

## 2023-03-23 RX ORDER — LIDOCAINE 50 MG/G
1 PATCH TOPICAL DAILY
Qty: 10 PATCH | Refills: 0 | Status: SHIPPED | OUTPATIENT
Start: 2023-03-23 | End: 2023-04-02

## 2023-03-23 ASSESSMENT — PAIN DESCRIPTION - ORIENTATION: ORIENTATION: LEFT

## 2023-03-23 ASSESSMENT — PAIN - FUNCTIONAL ASSESSMENT: PAIN_FUNCTIONAL_ASSESSMENT: 0-10

## 2023-03-23 ASSESSMENT — PAIN DESCRIPTION - LOCATION: LOCATION: ANKLE;FOOT

## 2023-03-23 ASSESSMENT — PAIN SCALES - GENERAL: PAINLEVEL_OUTOF10: 10

## 2023-03-23 NOTE — DISCHARGE INSTRUCTIONS
Follow-up with orthopedic surgeon within 2 to 3 days. Follow-up with podiatrist within 2 to 3 days. Return to ER as needed.

## 2023-03-23 NOTE — PROGRESS NOTES
Cinthia Search presents today for   Chief Complaint   Patient presents with    Asthma     Follow up from 12/1/2022    Sarcoidosis    Other     ILD, long term current use of systemic steroids    Results     NM stress test 3/15/2023       Is someone accompanying this pt? No    Is the patient using any DME equipment during OV? Yes. glucometer    -DME Company N/A    Depression Screening:    PHQ-9 Questionaire 3/15/2023   Little interest or pleasure in doing things 0   Feeling down, depressed, or hopeless 0   Trouble falling or staying asleep, or sleeping too much 0   Feeling tired or having little energy 0   Poor appetite or overeating 0   Feeling bad about yourself - or that you are a failure or have let yourself or your family down 0   Trouble concentrating on things, such as reading the newspaper or watching television 0   Moving or speaking so slowly that other people could have noticed. Or the opposite - being so fidgety or restless that you have been moving around a lot more than usual 0   Thoughts that you would be better off dead, or of hurting yourself in some way 0   PHQ-9 Total Score 0   If you checked off any problems, how difficult have these problems made it for you to do your work, take care of things at home, or get along with other people? 0       Learning Needs Questionnaire:     Who is the primary learner? Patient    What is the preferred language for health care of the primary learner? ENGLISH    How does the primary learner prefer to learn new concepts? DEMONSTRATION    Answered By Patient    Relationship to Learner SELF          Fall Risk:     Fall Risk 3/15/2023   2 or more falls in past year? no   Fall with injury in past year? no        Abuse Screening:     No flowsheet data found. Coordination of Care:    1. Have you been to the ER, urgent care clinic since your last visit? Hospitalized since your last visit? No    2.  Have you seen or consulted any other health care providers outside of
epicondylitis of both elbows 2/6/2017    Macular degeneration of both eyes 08/28/2018    Dr Ramón Szymanski, [facility]    Mild intermittent asthma without complication 98/34/8868    Dr. Natalee Pat;  ratio 68%, FEV1 78% w 6% inc postbd, TLC 77, RV 56, DLCO 61%    Morbid obesity (HCC)     peak weight 196 lbs, bmi 35.8 from 10/13    Neuropathy 8/28/2018    Osteopenia     Dr. Zoraida Dudley; DEXA t score -0.7 spine, -0.2 hip (8/14)    Sarcoidosis     Dr Nash Rader    Type 2 diabetes mellitus with hyperglycemia, with long-term current use of insulin ([facility] [address] 8/28/2018     Past Surgical History:   Procedure Laterality Date    BREAST REDUCTION SURGERY      Dr. Salud Shay  2012    pfts w mod restrictive defect     CHEST SURGERY  7/12     thyroid negative    COLONOSCOPY N/A 5/26/2016    Dr Yann Wood hyperplastic    COLONOSCOPY N/A 10/19/2021    COLONOSCOPY with polypectomy performed by Delia Gaviria MD at [address]      nasal polypectomy Dr. Zavala Come      tear duct surgery right Dr. Shazia Peng 2010; left Dr Gerri Jonas -0.7 spine, -0.2 hip 8/14    [place] UNLISTED PROCEDURE CARDIAC SURGERY  9/10, 8/13    thallium negative ef 72%; negative ef 70%    VASCULAR SURGERY  12/13    venous doppler negative     Family History   Problem Relation Age of Onset    Cancer Mother     Hypertension Mother     Diabetes Father     Hypertension Father     Stroke Father     Cancer Father     Diabetes Sister     Hypertension Sister     Heart Disease Sister     Colon Cancer Sister 52    Cancer Sister     Hypertension Brother     Cancer Maternal Aunt     No Known Problems Maternal Uncle     No Known Problems Paternal Aunt     No Known Problems Paternal Uncle     No Known Problems Maternal Grandmother     No Known Problems Maternal Grandfather     No Known Problems Paternal Grandmother     No Known Problems Paternal Grandfather     No Known Problems Other      Social History     Tobacco Use    Smoking

## 2023-03-23 NOTE — ED TRIAGE NOTES
Pt is ambulatory to triage with complaints of L foot and ankle pain for >6 months. Pt states has been evaluated before, gotten xrays and an MRI, and told it is neuropathy. Pt has not been given any meds for pain. States has been using tylenol and voltaren gel without much relief. Denies injury or trauma.

## 2023-03-23 NOTE — ED PROVIDER NOTES
second to patient driving herself. Patient placed in a walking boot. MDM    Differential diagnosis: Acute tendinitis/fasciitis, peripheral neuropathy    6:46 PM Upon re-evaluation the patient's symptoms have improved. Pt has non-toxic appearance and condition is stable for discharge. Patient was informed of tests & results, instructed to f/u with PCP and return to the ED upon worsening of symptoms. All questions and concerns were addressed. FINAL IMPRESSION      Chronic tendinitis fasciitis/peripheral neuropathy    DISPOSITION/PLAN     DISPOSITION :  Brace in a walking boot for comfort. Patient will be treated with Neurontin Percocet and Mobic. Patient is to follow-up with her podiatrist within the next 2 to 3 days. Return to ER prn. DISCHARGE MEDICATIONS:  New Prescriptions    No medications on file        PATIENT REFERRED TO: orthopedic surgeon, podiatrist                  (Please note that portions of this note were completed with a voice recognitionprogram.  Efforts were made to edit the dictations but occasionally words are mis-transcribed.)    Ann Rader.  Kadi Ames MD(electronically signed)  Attending Emergency Physician          Abhishek Jacome MD  03/25/23 [id number]

## 2023-03-27 ENCOUNTER — TELEPHONE (OUTPATIENT)
Facility: HOSPITAL | Age: 58
End: 2023-03-27

## 2023-03-29 ENCOUNTER — HOSPITAL ENCOUNTER (OUTPATIENT)
Facility: HOSPITAL | Age: 58
Discharge: HOME | End: 2023-04-01
Payer: MEDICARE

## 2023-03-29 ENCOUNTER — APPOINTMENT (OUTPATIENT)
Facility: HOSPITAL | Age: 58
End: 2023-03-29
Payer: MEDICARE

## 2023-03-29 DIAGNOSIS — M85.672 OTHER CYST OF BONE, LEFT ANKLE AND FOOT: ICD-10-CM

## 2023-03-29 PROCEDURE — 73721 MRI JNT OF LWR EXTRE W/O DYE: CPT

## 2023-03-31 NOTE — PROGRESS NOTES
No current facility-administered medications for this visit. Allergies: Allergies   Allergen Reactions    Amoxicillin Hives, Shortness Of Breath and Swelling    Adhesive Tape Other (See Comments)     Reports blistering & burning upon removal of freestyle feroz & and also happened upon removal of ecg holter monitor    Atorvastatin Other (See Comments)     Muscle cramps and weakness    Ibuprofen Other (See Comments)     dont prescribe due to kidney function    Abraham Flavor Itching     Allergy to Cole. Cracks in corner of mouth, irritation of tongue. Other Itching     Pt reported she's allergic to \"all tropical fruits, my tongue starts tingling & side of mouth starts tingling & face swells\"    Penicillins Angioedema, Hives and Other (See Comments)    Pineapple Itching and Other (See Comments)     Cracks in corner of mouth, irritation of tongue.      Rosuvastatin Myalgia       Blood Glucose Monitoring (BGM) or CGM:  - Had access to home glucometer/blood glucose log/CGM reader today:  No  - Conducts/scans: 2x daily   - Fasting BG today: 98  - Post-prandial:    -FB-100    ROS:  Today, Pt endorses:  - Symptoms of Hyperglycemia: none  - Symptoms of Hypoglycemia: none    Lifestyle modification(s):  - above    Medication Adherence/Access:  - Endorses adherence to current regimen?: Yes    Vitals/Labs:  No results found for: HBA1C  Hemoglobin A1C   Date Value Ref Range Status   2023 9.1 (H) 4.8 - 5.6 % Final     Comment:              Prediabetes: 5.7 - 6.4           Diabetes: >6.4           Glycemic control for adults with diabetes: <7.0       Hemoglobin A1C, POC   Date Value Ref Range Status   2022 7.1 % Final       Screenings/Prevention Parameters:  -Diabetic Eye and Foot Exams:      Diabetes Management   Topic Date Due    Diabetic foot exam  2023     -Microalbumin / Creatinine ratio:     No results found for: Cristy Murray  -Immunizations:      Immunization History

## 2023-04-03 ENCOUNTER — PHARMACY VISIT (OUTPATIENT)
Age: 58
End: 2023-04-03

## 2023-04-03 DIAGNOSIS — Z79.4 TYPE 2 DIABETES MELLITUS WITH HYPERGLYCEMIA, WITH LONG-TERM CURRENT USE OF INSULIN (HCC): Primary | ICD-10-CM

## 2023-04-03 DIAGNOSIS — E11.65 TYPE 2 DIABETES MELLITUS WITH HYPERGLYCEMIA, WITH LONG-TERM CURRENT USE OF INSULIN (HCC): Primary | ICD-10-CM

## 2023-04-07 ENCOUNTER — OFFICE VISIT (OUTPATIENT)
Age: 58
End: 2023-04-07

## 2023-04-07 VITALS
HEART RATE: 88 BPM | SYSTOLIC BLOOD PRESSURE: 132 MMHG | BODY MASS INDEX: 30.91 KG/M2 | TEMPERATURE: 97.1 F | HEIGHT: 62 IN | WEIGHT: 168 LBS | DIASTOLIC BLOOD PRESSURE: 87 MMHG | RESPIRATION RATE: 18 BRPM | OXYGEN SATURATION: 96 %

## 2023-04-07 DIAGNOSIS — J44.1 CHRONIC OBSTRUCTIVE PULMONARY DISEASE WITH ACUTE EXACERBATION (HCC): ICD-10-CM

## 2023-04-07 DIAGNOSIS — E78.2 MIXED HYPERLIPIDEMIA: ICD-10-CM

## 2023-04-07 DIAGNOSIS — N18.4 TYPE 2 DIABETES MELLITUS WITH STAGE 4 CHRONIC KIDNEY DISEASE, WITH LONG-TERM CURRENT USE OF INSULIN (HCC): ICD-10-CM

## 2023-04-07 DIAGNOSIS — Z00.00 MEDICARE ANNUAL WELLNESS VISIT, SUBSEQUENT: Primary | ICD-10-CM

## 2023-04-07 DIAGNOSIS — J30.89 ENVIRONMENTAL AND SEASONAL ALLERGIES: ICD-10-CM

## 2023-04-07 DIAGNOSIS — D86.9 SARCOIDOSIS: ICD-10-CM

## 2023-04-07 DIAGNOSIS — L30.9 ECZEMA, UNSPECIFIED TYPE: ICD-10-CM

## 2023-04-07 DIAGNOSIS — H35.363: ICD-10-CM

## 2023-04-07 DIAGNOSIS — H25.13 AGE-RELATED NUCLEAR CATARACT, BILATERAL: ICD-10-CM

## 2023-04-07 DIAGNOSIS — M76.72 PERONEUS BREVIS TENDONITIS, LEFT: ICD-10-CM

## 2023-04-07 DIAGNOSIS — Z71.89 ACP (ADVANCE CARE PLANNING): ICD-10-CM

## 2023-04-07 DIAGNOSIS — E11.40 DIABETIC NEUROPATHY, PAINFUL (HCC): ICD-10-CM

## 2023-04-07 DIAGNOSIS — E11.22 TYPE 2 DIABETES MELLITUS WITH STAGE 4 CHRONIC KIDNEY DISEASE, WITH LONG-TERM CURRENT USE OF INSULIN (HCC): ICD-10-CM

## 2023-04-07 DIAGNOSIS — F33.9 RECURRENT MAJOR DEPRESSIVE DISORDER, REMISSION STATUS UNSPECIFIED (HCC): ICD-10-CM

## 2023-04-07 DIAGNOSIS — Z79.4 TYPE 2 DIABETES MELLITUS WITH STAGE 4 CHRONIC KIDNEY DISEASE, WITH LONG-TERM CURRENT USE OF INSULIN (HCC): ICD-10-CM

## 2023-04-07 DIAGNOSIS — I10 PRIMARY HYPERTENSION: ICD-10-CM

## 2023-04-07 DIAGNOSIS — M25.572 CHRONIC PAIN OF LEFT ANKLE: ICD-10-CM

## 2023-04-07 DIAGNOSIS — K21.9 GASTROESOPHAGEAL REFLUX DISEASE WITHOUT ESOPHAGITIS: ICD-10-CM

## 2023-04-07 DIAGNOSIS — E83.42 HYPOMAGNESEMIA: ICD-10-CM

## 2023-04-07 DIAGNOSIS — Z23 NEED FOR PROPHYLACTIC VACCINATION AND INOCULATION AGAINST VARICELLA: ICD-10-CM

## 2023-04-07 DIAGNOSIS — G89.29 CHRONIC PAIN OF LEFT ANKLE: ICD-10-CM

## 2023-04-07 PROBLEM — H35.369 RETINAL DRUSEN: Status: RESOLVED | Noted: 2021-04-02 | Resolved: 2023-04-07

## 2023-04-07 PROBLEM — Z20.822 PERSON UNDER INVESTIGATION FOR COVID-19: Status: RESOLVED | Noted: 2021-03-04 | Resolved: 2023-04-07

## 2023-04-07 PROBLEM — J45.20 MILD INTERMITTENT ASTHMA WITHOUT COMPLICATION: Status: RESOLVED | Noted: 2018-08-28 | Resolved: 2023-04-07

## 2023-04-07 PROBLEM — E83.52 HYPERCALCEMIA: Status: ACTIVE | Noted: 2022-01-28

## 2023-04-07 PROBLEM — N18.30 STAGE 3 CHRONIC KIDNEY DISEASE (HCC): Status: RESOLVED | Noted: 2021-03-24 | Resolved: 2023-04-07

## 2023-04-07 PROBLEM — I12.9 MALIGNANT HYPERTENSIVE KIDNEY DISEASE WITH CHRONIC KIDNEY DISEASE STAGE I THROUGH STAGE IV, OR UNSPECIFIED: Status: RESOLVED | Noted: 2021-01-12 | Resolved: 2023-04-07

## 2023-04-07 PROBLEM — F41.8 DEPRESSION WITH ANXIETY: Status: RESOLVED | Noted: 2021-03-24 | Resolved: 2023-04-07

## 2023-04-07 PROBLEM — G62.9 NEUROPATHY: Status: RESOLVED | Noted: 2018-08-28 | Resolved: 2023-04-07

## 2023-04-07 PROBLEM — M54.50 CHRONIC BILATERAL LOW BACK PAIN WITHOUT SCIATICA: Status: RESOLVED | Noted: 2022-04-03 | Resolved: 2023-04-07

## 2023-04-07 PROBLEM — H31.001 CHORIORETINAL SCAR OF RIGHT EYE: Status: RESOLVED | Noted: 2022-02-28 | Resolved: 2023-04-07

## 2023-04-07 PROBLEM — Z77.22 SECOND HAND SMOKE EXPOSURE: Status: RESOLVED | Noted: 2023-03-21 | Resolved: 2023-04-07

## 2023-04-07 PROBLEM — H35.30 MACULAR DEGENERATION OF BOTH EYES: Status: RESOLVED | Noted: 2018-08-28 | Resolved: 2023-04-07

## 2023-04-07 PROBLEM — R60.0 EDEMA OF BOTH LOWER EXTREMITIES: Status: RESOLVED | Noted: 2022-04-03 | Resolved: 2023-04-07

## 2023-04-07 RX ORDER — ESOMEPRAZOLE MAGNESIUM 40 MG/1
40 CAPSULE, DELAYED RELEASE ORAL DAILY PRN
Qty: 90 CAPSULE | Refills: 1 | Status: SHIPPED | OUTPATIENT
Start: 2023-04-07

## 2023-04-07 RX ORDER — CITALOPRAM 10 MG/1
10 TABLET ORAL DAILY
Qty: 90 TABLET | Refills: 1 | Status: SHIPPED | OUTPATIENT
Start: 2023-04-07

## 2023-04-07 RX ORDER — EZETIMIBE 10 MG/1
10 TABLET ORAL DAILY
Qty: 90 TABLET | Refills: 1 | Status: SHIPPED | OUTPATIENT
Start: 2023-04-07

## 2023-04-07 RX ORDER — CEFDINIR 300 MG/1
300 CAPSULE ORAL 2 TIMES DAILY
COMMUNITY

## 2023-04-07 RX ORDER — TOPIRAMATE 50 MG/1
50 TABLET, FILM COATED ORAL 2 TIMES DAILY
Qty: 180 TABLET | Refills: 0 | Status: SHIPPED | OUTPATIENT
Start: 2023-04-07

## 2023-04-07 RX ORDER — MAGNESIUM OXIDE 400 MG/1
400 TABLET ORAL DAILY
Qty: 93 TABLET | Refills: 3 | Status: SHIPPED | OUTPATIENT
Start: 2023-04-07

## 2023-04-07 RX ORDER — PRAVASTATIN SODIUM 80 MG/1
80 TABLET ORAL DAILY
Qty: 90 TABLET | Refills: 0 | Status: SHIPPED | OUTPATIENT
Start: 2023-04-07

## 2023-04-07 SDOH — ECONOMIC STABILITY: INCOME INSECURITY: HOW HARD IS IT FOR YOU TO PAY FOR THE VERY BASICS LIKE FOOD, HOUSING, MEDICAL CARE, AND HEATING?: NOT HARD AT ALL

## 2023-04-07 SDOH — ECONOMIC STABILITY: FOOD INSECURITY: WITHIN THE PAST 12 MONTHS, YOU WORRIED THAT YOUR FOOD WOULD RUN OUT BEFORE YOU GOT MONEY TO BUY MORE.: NEVER TRUE

## 2023-04-07 SDOH — ECONOMIC STABILITY: HOUSING INSECURITY
IN THE LAST 12 MONTHS, WAS THERE A TIME WHEN YOU DID NOT HAVE A STEADY PLACE TO SLEEP OR SLEPT IN A SHELTER (INCLUDING NOW)?: NO

## 2023-04-07 SDOH — ECONOMIC STABILITY: FOOD INSECURITY: WITHIN THE PAST 12 MONTHS, THE FOOD YOU BOUGHT JUST DIDN'T LAST AND YOU DIDN'T HAVE MONEY TO GET MORE.: NEVER TRUE

## 2023-04-07 ASSESSMENT — PATIENT HEALTH QUESTIONNAIRE - PHQ9
SUM OF ALL RESPONSES TO PHQ QUESTIONS 1-9: 0
6. FEELING BAD ABOUT YOURSELF - OR THAT YOU ARE A FAILURE OR HAVE LET YOURSELF OR YOUR FAMILY DOWN: 0
SUM OF ALL RESPONSES TO PHQ QUESTIONS 1-9: 0
2. FEELING DOWN, DEPRESSED OR HOPELESS: 0
5. POOR APPETITE OR OVEREATING: 0
8. MOVING OR SPEAKING SO SLOWLY THAT OTHER PEOPLE COULD HAVE NOTICED. OR THE OPPOSITE, BEING SO FIGETY OR RESTLESS THAT YOU HAVE BEEN MOVING AROUND A LOT MORE THAN USUAL: 0
SUM OF ALL RESPONSES TO PHQ QUESTIONS 1-9: 0
1. LITTLE INTEREST OR PLEASURE IN DOING THINGS: 0
7. TROUBLE CONCENTRATING ON THINGS, SUCH AS READING THE NEWSPAPER OR WATCHING TELEVISION: 0
SUM OF ALL RESPONSES TO PHQ9 QUESTIONS 1 & 2: 0
9. THOUGHTS THAT YOU WOULD BE BETTER OFF DEAD, OR OF HURTING YOURSELF: 0
10. IF YOU CHECKED OFF ANY PROBLEMS, HOW DIFFICULT HAVE THESE PROBLEMS MADE IT FOR YOU TO DO YOUR WORK, TAKE CARE OF THINGS AT HOME, OR GET ALONG WITH OTHER PEOPLE: 0
3. TROUBLE FALLING OR STAYING ASLEEP: 0
4. FEELING TIRED OR HAVING LITTLE ENERGY: 0
SUM OF ALL RESPONSES TO PHQ QUESTIONS 1-9: 0

## 2023-04-07 ASSESSMENT — LIFESTYLE VARIABLES
HOW MANY STANDARD DRINKS CONTAINING ALCOHOL DO YOU HAVE ON A TYPICAL DAY: 1 OR 2
HOW OFTEN DO YOU HAVE A DRINK CONTAINING ALCOHOL: MONTHLY OR LESS

## 2023-04-07 NOTE — ASSESSMENT & PLAN NOTE
[facility]: Reviewed all HM and ordered tests/imaging appropriately. Reviewed care teams and medications with updates.      Patient to schedule mammogram  Colonoscopy due 11/2023

## 2023-04-07 NOTE — ASSESSMENT & PLAN NOTE
Patient not satisfied with Dr. Juli Merlos second opinion  Referral placed    3/23/2023 per ED note:  Patient was placed in a walking boot.     Patient did not have more walking boot today

## 2023-04-07 NOTE — PATIENT INSTRUCTIONS
or do not like to cook, a program that offers meal replacement bars or shakes may be better for you. Or if you like to prepare meals, finding a plan that includes daily menus and recipes may be best.  Ask your doctor about other health professionals who can help you achieve your weight loss goals. A dietitian can help you make healthy changes in your diet. An exercise specialist or  can help you develop a safe and effective exercise program.  A counselor or psychiatrist can help you cope with issues such as depression, anxiety, or family problems that can make it hard to focus on weight loss. Consider joining a support group for people who are trying to lose weight. Your doctor can suggest groups in your area. Where can you learn more? Go to http://www.woods.com/ and enter U357 to learn more about \"Starting a Weight Loss Plan: Care Instructions. \"  Current as of: May 9, 2022               Content Version: 13.6  © 2006-2023 Remote. Care instructions adapted under license by [facility] ([facility]). If you have questions about a medical condition or this instruction, always ask your healthcare professional. [place] [address] any warranty or liability for your use of this information. A Healthy Heart: Care Instructions  Your Care Instructions     Coronary artery disease, also called heart disease, occurs when a substance called plaque builds up in the vessels that supply oxygen-rich blood to your heart muscle. This can narrow the blood vessels and reduce blood flow. A heart attack happens when blood flow is completely blocked. A high-fat diet, smoking, and other factors increase the risk of heart disease. Your doctor has found that you have a chance of having heart disease. You can do lots of things to keep your heart healthy. It may not be easy, but you can change your diet, exercise more, and quit smoking.  These steps really work to lower your

## 2023-04-07 NOTE — ASSESSMENT & PLAN NOTE
Well-controlled, continue current medications   Dr. Yandy Majano sodium in diet to 2g/d  Initiate cardio exercise  Weight reduction  Increase water intake  Check BP and report if 3 consecutive readings greater than 139/89 to office

## 2023-04-07 NOTE — ASSESSMENT & PLAN NOTE
Uncontrolled, continue current medications, medication adherence emphasized, lifestyle modifications recommended and Continue to work with Pharm. DAFSHIN Caraballo   Discussed importance of controlling diabetes due to CKD  Carbs sweets in diet  Increase cardio exercise

## 2023-04-07 NOTE — ASSESSMENT & PLAN NOTE
Obesity Counseling: Patient was asked about her current diet and exercise habits, and personalized advice was provided regarding recommended lifestyle changes. Patient's comorbid health conditions associated with elevated BMI were discussed, as well as the likely benefits of weight loss. Based upon patient's motivation to change her behavior, the following plan was agreed upon to work toward a weight loss goal of 5 pounds: lower carbohydrate diet. Educational materials for weight loss were provided. Patient will follow-up in 12 month(s) with PCP. Discussed Silver Sneakers program. Provider spent 8 minutes counseling patient.

## 2023-04-07 NOTE — ASSESSMENT & PLAN NOTE
Uncontrolled, changes made today: Continue pravastatin 80 mg daily. Initiate Zetia., medication adherence emphasized and lifestyle modifications recommended   -241; -137  Recheck level 3 months    Reduce fried fatty or oily foods in diet  Limit red meats, processed foods, and alcohol. Limit fast food  Increase physical activity to 60 minutes of moderate intensity aerobic exercise per week. Increase water intake  Reduce alcohol intake    How to raise HDL if low:  Consume oats, beans, legumes, olive oil, whole grains, nuts, seeds, fatty fish, and berries.   Lose weight/fat  Reduce alcohol consumption  Smoking cessation

## 2023-04-07 NOTE — PROGRESS NOTES
Melanie Morrow presents today for   Chief Complaint   Patient presents with    Medicare AWV    Discuss Labs     Completed 3/1/2023       1. \"Have you been to the ER, urgent care clinic since your last visit? Hospitalized since your last visit? \" Yes ER 3/23/2023for bruces    2. \"Have you seen or consulted any other health care providers outside of the [address] [place] [address] since your last visit? \" No     3. For patients aged 39-70: Has the patient had a colonoscopy / FIT/ Cologuard? Yes      If the patient is female:    4. For patients aged 41-77: Has the patient had a mammogram within the past 2 years? Yes. See top three    5. For patients aged 21-65: Has the patient had a pap smear?  Yes.
Problem Relation Age of Onset    Cancer Mother     Hypertension Mother     Diabetes Father     Hypertension Father     Stroke Father     Cancer Father     Diabetes Sister     Hypertension Sister     Heart Disease Sister     Colon Cancer Sister 52    Cancer Sister     Hypertension Brother     Cancer Maternal Aunt     No Known Problems Maternal Uncle     No Known Problems Paternal Aunt     No Known Problems Paternal Uncle     No Known Problems Maternal Grandmother     No Known Problems Maternal Grandfather     No Known Problems Paternal Grandmother     No Known Problems Paternal Grandfather     No Known Problems Other      Social History:   She  reports that she has never smoked. She has never used smokeless tobacco.   Social History     Substance and Sexual Activity   Alcohol Use Yes    Alcohol/week: 0.0 standard drinks     Immunization History:  Immunization History   Administered Date(s) Administered    Influenza Virus Vaccine 09/28/2017, 11/26/2018, 02/15/2021, 10/07/2021, 12/09/2022    Influenza, FLUARIX, FLULAVAL, FLUZONE (age 10 mo+) AND AFLURIA, (age 1 y+), PF, 0.5mL 09/28/2017, 11/26/2018, 12/09/2022    Pneumococcal, PPSV23, PNEUMOVAX 23, (age 2y+), SC/IM, 0.5mL 09/28/2017    TDaP, ADACEL (age 10y-63y), BOOSTRIX (age 10y+), IM, 0.5mL 04/24/2013       Todays concerns/HPI:  Lt ankle. Left outer ankle discomfort. Dr. Candance Running, podiatrist, at 1 foot 2 foot completed a biopsy to this site 2021. Now working with Dr. Jonas Presley. MRI reveals calcium buildup. Torn tendons posterior and left side. Referred to PT. Went for eval. Waited for authorization. Started exercise at home, next day couldn't walk. Questioned damaged nerves. Discussed surgery. Pt upset because Dr Jonas Presley fell asleep (according to nurse) in his office and she waited over an hour to see him. In Jonas Presley office last wed. Was supposed to have EMG. Never ordered. He mentioned pain mgt but no referral. Nothing else has happened.  No longer interested in
patient in written form: see Patient Instructions/AVS.     Return in about 3 months (around 7/7/2023) for Diabetes, Lab fasting (A1c, lipid). Subjective       Patient's complete Health Risk Assessment and screening values have been reviewed and are found in Flowsheets. The following problems were reviewed today and where indicated follow up appointments were made and/or referrals ordered. Positive Risk Factor Screenings with Interventions:              Self-assessment of health: In general, how would you say your health is?: (!) Poor    Interventions:  See AVS for additional education material      Weight and Activity:  Physical Activity: Inactive    Days of Exercise per Week: 0 days    Minutes of Exercise per Session: 0 min     On average, how many days per week do you engage in moderate to strenuous exercise (like a brisk walk)?: 0 days  Have you lost any weight without trying in the past 3 months?: No  Body mass index: (!) 30.72      Inactivity Interventions:  See AVS for additional education material  Obesity Interventions:  See AVS for additional education material                               Objective   Vitals:    04/07/23 1053   BP: 132/87   Pulse: 88   Resp: 18   Temp: 97.1 °F (36.2 °C)   TempSrc: Temporal   SpO2: 96%   Weight: 168 lb (76.2 kg)   Height: 5' 2\" (1.575 m)      Body mass index is 30.73 kg/m². Allergies   Allergen Reactions    Amoxicillin Hives, Shortness Of Breath and Swelling    Adhesive Tape Other (See Comments)     Reports blistering & burning upon removal of freestyle feroz & and also happened upon removal of ecg holter monitor    Atorvastatin Other (See Comments)     Muscle cramps and weakness    Ibuprofen Other (See Comments)     dont prescribe due to kidney function    Crooks Flavor Itching     Allergy to Abraham. Cracks in corner of mouth, irritation of tongue.      Other Itching     Pt reported she's allergic to \"all tropical fruits, my tongue starts tingling & side

## 2023-04-07 NOTE — ASSESSMENT & PLAN NOTE
Advanced Care Planning: Patient educated on the importance of an advanced care plan and paperwork was given to the patient today. Asked patient to read over the information and bring back at next appointment.     elects Full Code (Attempt Resuscitation)

## 2023-05-04 ENCOUNTER — HOSPITAL ENCOUNTER (OUTPATIENT)
Facility: HOSPITAL | Age: 58
Setting detail: INFUSION SERIES
End: 2023-05-04
Payer: MEDICARE

## 2023-05-04 VITALS
HEART RATE: 79 BPM | TEMPERATURE: 98 F | RESPIRATION RATE: 18 BRPM | DIASTOLIC BLOOD PRESSURE: 70 MMHG | OXYGEN SATURATION: 96 % | SYSTOLIC BLOOD PRESSURE: 112 MMHG

## 2023-05-04 DIAGNOSIS — J45.50 SEVERE PERSISTENT ASTHMA, UNSPECIFIED WHETHER COMPLICATED: Primary | ICD-10-CM

## 2023-05-04 PROCEDURE — 96372 THER/PROPH/DIAG INJ SC/IM: CPT

## 2023-05-04 PROCEDURE — 6360000002 HC RX W HCPCS: Performed by: INTERNAL MEDICINE

## 2023-05-04 RX ORDER — SODIUM CHLORIDE 9 MG/ML
INJECTION, SOLUTION INTRAVENOUS CONTINUOUS
Status: CANCELLED | OUTPATIENT
Start: 2023-06-29

## 2023-05-04 RX ORDER — ONDANSETRON 2 MG/ML
8 INJECTION INTRAMUSCULAR; INTRAVENOUS
Status: CANCELLED | OUTPATIENT
Start: 2023-06-29

## 2023-05-04 RX ORDER — ALBUTEROL SULFATE 90 UG/1
4 AEROSOL, METERED RESPIRATORY (INHALATION) PRN
Status: CANCELLED | OUTPATIENT
Start: 2023-06-29

## 2023-05-04 RX ORDER — DIPHENHYDRAMINE HYDROCHLORIDE 50 MG/ML
50 INJECTION INTRAMUSCULAR; INTRAVENOUS
Status: CANCELLED | OUTPATIENT
Start: 2023-06-29

## 2023-05-04 RX ORDER — EPINEPHRINE 1 MG/ML
0.3 INJECTION, SOLUTION, CONCENTRATE INTRAVENOUS PRN
Status: CANCELLED | OUTPATIENT
Start: 2023-06-29

## 2023-05-04 RX ORDER — ACETAMINOPHEN 325 MG/1
650 TABLET ORAL
Status: CANCELLED | OUTPATIENT
Start: 2023-06-29

## 2023-05-04 RX ADMIN — BENRALIZUMAB 30 MG: 30 INJECTION, SOLUTION SUBCUTANEOUS at 11:38

## 2023-05-04 ASSESSMENT — PAIN DESCRIPTION - PAIN TYPE: TYPE: CHRONIC PAIN

## 2023-05-04 ASSESSMENT — PAIN SCALES - GENERAL: PAINLEVEL_OUTOF10: 8

## 2023-05-04 ASSESSMENT — PAIN DESCRIPTION - ORIENTATION: ORIENTATION: LEFT

## 2023-05-04 ASSESSMENT — PAIN DESCRIPTION - LOCATION: LOCATION: ANKLE

## 2023-05-04 NOTE — PROGRESS NOTES
[facility] Progress Note    Date: May 4, 2023    Name: Daron Guerra    MRN: [id number]         : 1965    Ms. Marshall arrived in the [facility] today at [address], in stable condition, here for her [place] ([address]). She was assessed and education was provided. Ms. Isak Oates vitals were reviewed. Vitals:    23 1132   BP: 112/70   Pulse: 79   Resp: 18   Temp: 98 °F (36.7 °C)   SpO2: 96%       Benralizumab (FASENRA) 30 mg, was administered SQ, in the back of her LEFT arm. Pt declined bandaid. Ms. Kiara German tolerated well and voiced no complaints. Ms. Kiara German was discharged from [place] [address] in stable condition at 1145. She is to return in 8 weeks, on  2023 at 1130 for her next fasenra injection appointment.     Randolph Robles RN  May 4, 2023  11:41 AM

## 2023-05-07 PROBLEM — Z00.00 MEDICARE ANNUAL WELLNESS VISIT, SUBSEQUENT: Status: RESOLVED | Noted: 2023-04-07 | Resolved: 2023-05-07

## 2023-05-08 ENCOUNTER — HOSPITAL ENCOUNTER (OUTPATIENT)
Facility: HOSPITAL | Age: 58
Discharge: HOME | End: 2023-05-11
Payer: MEDICARE

## 2023-05-08 DIAGNOSIS — Z12.31 VISIT FOR SCREENING MAMMOGRAM: ICD-10-CM

## 2023-05-08 PROCEDURE — 77063 BREAST TOMOSYNTHESIS BI: CPT

## 2023-05-09 ENCOUNTER — OFFICE VISIT (OUTPATIENT)
Age: 58
End: 2023-05-09

## 2023-05-09 VITALS — WEIGHT: 163 LBS | BODY MASS INDEX: 30 KG/M2 | HEIGHT: 62 IN

## 2023-05-09 DIAGNOSIS — D86.9 SARCOIDOSIS: ICD-10-CM

## 2023-05-09 DIAGNOSIS — R60.9 SWELLING: ICD-10-CM

## 2023-05-09 DIAGNOSIS — G89.29 CHRONIC PAIN OF LEFT ANKLE: ICD-10-CM

## 2023-05-09 DIAGNOSIS — M25.572 CHRONIC PAIN OF LEFT ANKLE: ICD-10-CM

## 2023-05-09 DIAGNOSIS — S86.312A TEAR OF PERONEAL TENDON, LEFT, INITIAL ENCOUNTER: Primary | ICD-10-CM

## 2023-05-09 DIAGNOSIS — I73.9 PAD (PERIPHERAL ARTERY DISEASE) (HCC): ICD-10-CM

## 2023-05-09 NOTE — PROGRESS NOTES
injection in her back, on 2 occasions, by our routine orthopedic and spine team members. She states ever since then she has had worsening lower extremity pain. She has seen Dr. Yue Wynne, local podiatrist, where he had ordered an MRI in 2022, and 2023. I reviewed these MRIs. The MRI of her left ankle, reveals some tearing and tendinopathic regions of the peroneal brevis tendon. Her chief complaint, is pain in her left lower extremity pain that started along the anterolateral portion of her left ankle, pain to the plantar portion of her foot, pain to the plantar medial portion of her ankle. 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 She reports the more she walks the more pain that she has along the anterior portion of her left leg, and reports having some cramping to her left calf and lower calf when she walks. She is quite tearful, as she states she would \"never come here\", if she had realized that I, Dr. Suzy Gu, was a member of the same container is a member of the same team as the imaging orthopedic and spine members that she states she had a hospitalization for her back after an injection in her back in 2021. She does have a history of sarcoidosis, but denies having any known soft tissue manifestations of sarcoidosis to her extremities. She does see the pulmonary clinic, Dr. Karolina Moser MD.    Patients is employed at: [employer]. Ht 5' 2\" (1.575 m)   Wt 163 lb (73.9 kg)   BMI 29.81 kg/m²     Appearance: Alert, well appearing and pleasant patient who is in no distress, oriented to person, place/time, and who follows commands. Normal dress/motor activity/thought processes/memory. This patient is accompanied in the examination room by her herself. Patient arrives to office via: Without  assistive device:  .  Footwear: Athletic shoes    She is quite

## 2023-05-15 ENCOUNTER — PHARMACY VISIT (OUTPATIENT)
Age: 58
End: 2023-05-15

## 2023-05-15 DIAGNOSIS — Z79.4 TYPE 2 DIABETES MELLITUS WITH HYPERGLYCEMIA, WITH LONG-TERM CURRENT USE OF INSULIN (HCC): Primary | ICD-10-CM

## 2023-05-15 DIAGNOSIS — E11.65 TYPE 2 DIABETES MELLITUS WITH HYPERGLYCEMIA, WITH LONG-TERM CURRENT USE OF INSULIN (HCC): Primary | ICD-10-CM

## 2023-05-15 RX ORDER — INSULIN GLARGINE 100 [IU]/ML
30 INJECTION, SOLUTION SUBCUTANEOUS NIGHTLY
Qty: 5 ADJUSTABLE DOSE PRE-FILLED PEN SYRINGE | Refills: 1 | Status: SHIPPED
Start: 2023-05-15

## 2023-05-15 RX ORDER — SEMAGLUTIDE 2.68 MG/ML
2 INJECTION, SOLUTION SUBCUTANEOUS
Qty: 3 ML | Refills: 11 | Status: SHIPPED | OUTPATIENT
Start: 2023-05-15

## 2023-05-18 ENCOUNTER — HOSPITAL ENCOUNTER (OUTPATIENT)
Facility: HOSPITAL | Age: 58
Discharge: HOME | End: 2023-05-20
Payer: MEDICARE

## 2023-05-18 DIAGNOSIS — R60.9 SWELLING: ICD-10-CM

## 2023-05-18 DIAGNOSIS — I73.9 PAD (PERIPHERAL ARTERY DISEASE) (HCC): ICD-10-CM

## 2023-05-18 LAB
VAS LEFT ABI: 1.1
VAS LEFT ARM BP: 145 MMHG
VAS LEFT DORSALIS PEDIS BP: 159 MMHG
VAS LEFT PTA BP: 157 MMHG
VAS LEFT TBI: 0.4
VAS LEFT TOE PRESSURE: 58 MMHG
VAS RIGHT ABI: 1.02
VAS RIGHT ARM BP: 142 MMHG
VAS RIGHT DORSALIS PEDIS BP: 134 MMHG
VAS RIGHT PTA BP: 148 MMHG
VAS RIGHT TBI: 0.35
VAS RIGHT TOE PRESSURE: 51 MMHG

## 2023-05-18 PROCEDURE — 93922 UPR/L XTREMITY ART 2 LEVELS: CPT

## 2023-06-19 ENCOUNTER — TELEPHONE (OUTPATIENT)
Age: 58
End: 2023-06-19

## 2023-06-19 DIAGNOSIS — E78.2 MIXED HYPERLIPIDEMIA: ICD-10-CM

## 2023-06-19 NOTE — TELEPHONE ENCOUNTER
Pharmacy Progress Note - Telephone Call    Ms. Jermaine Morales 60 y.o. was contacted via an outbound telephone call today to reschedule their missed appointment for PharmD diabetes management and education per referral from Benny Castro, [facility]. A voicemail was left for pt to return the call. Please call pt to reschedule.     Thank you,    Laurent Garcia, PharmD, BCACP, BC-ADM

## 2023-06-28 ENCOUNTER — OFFICE VISIT (OUTPATIENT)
Age: 58
End: 2023-06-28

## 2023-06-28 VITALS — HEIGHT: 62 IN | BODY MASS INDEX: 29.81 KG/M2

## 2023-06-28 DIAGNOSIS — S86.312A TEAR OF PERONEAL TENDON, LEFT, INITIAL ENCOUNTER: Primary | ICD-10-CM

## 2023-06-29 ENCOUNTER — HOSPITAL ENCOUNTER (OUTPATIENT)
Facility: HOSPITAL | Age: 58
Discharge: HOME | End: 2023-07-02

## 2023-06-29 ENCOUNTER — HOSPITAL ENCOUNTER (OUTPATIENT)
Facility: HOSPITAL | Age: 58
Setting detail: INFUSION SERIES
End: 2023-06-29
Payer: MEDICARE

## 2023-06-29 VITALS
OXYGEN SATURATION: 98 % | DIASTOLIC BLOOD PRESSURE: 80 MMHG | SYSTOLIC BLOOD PRESSURE: 143 MMHG | HEART RATE: 68 BPM | RESPIRATION RATE: 18 BRPM | TEMPERATURE: 97.7 F

## 2023-06-29 DIAGNOSIS — J45.50 SEVERE PERSISTENT ASTHMA, UNSPECIFIED WHETHER COMPLICATED: Primary | ICD-10-CM

## 2023-06-29 LAB — LABCORP SPECIMEN COLLECTION: NORMAL

## 2023-06-29 PROCEDURE — 6360000002 HC RX W HCPCS: Performed by: INTERNAL MEDICINE

## 2023-06-29 PROCEDURE — 96372 THER/PROPH/DIAG INJ SC/IM: CPT

## 2023-06-29 RX ORDER — DIPHENHYDRAMINE HYDROCHLORIDE 50 MG/ML
50 INJECTION INTRAMUSCULAR; INTRAVENOUS
Status: CANCELLED | OUTPATIENT
Start: 2023-08-24

## 2023-06-29 RX ORDER — ACETAMINOPHEN 325 MG/1
650 TABLET ORAL
Status: CANCELLED | OUTPATIENT
Start: 2023-08-24

## 2023-06-29 RX ORDER — ONDANSETRON 2 MG/ML
8 INJECTION INTRAMUSCULAR; INTRAVENOUS
Status: CANCELLED | OUTPATIENT
Start: 2023-08-24

## 2023-06-29 RX ORDER — EPINEPHRINE 1 MG/ML
0.3 INJECTION, SOLUTION, CONCENTRATE INTRAVENOUS PRN
Status: CANCELLED | OUTPATIENT
Start: 2023-08-24

## 2023-06-29 RX ORDER — ALBUTEROL SULFATE 90 UG/1
4 AEROSOL, METERED RESPIRATORY (INHALATION) PRN
Status: CANCELLED | OUTPATIENT
Start: 2023-08-24

## 2023-06-29 RX ORDER — SODIUM CHLORIDE 9 MG/ML
INJECTION, SOLUTION INTRAVENOUS CONTINUOUS
Status: CANCELLED | OUTPATIENT
Start: 2023-08-24

## 2023-06-29 RX ADMIN — BENRALIZUMAB 30 MG: 30 INJECTION, SOLUTION SUBCUTANEOUS at 12:01

## 2023-06-30 LAB
CHOLEST SERPL-MCNC: 156 MG/DL (ref 100–199)
HDLC SERPL-MCNC: 47 MG/DL
LDLC SERPL CALC-MCNC: 76 MG/DL (ref 0–99)
TRIGL SERPL-MCNC: 199 MG/DL (ref 0–149)
VLDLC SERPL CALC-MCNC: 33 MG/DL (ref 5–40)

## 2023-07-07 ENCOUNTER — OFFICE VISIT (OUTPATIENT)
Age: 58
End: 2023-07-07
Payer: MEDICARE

## 2023-07-07 VITALS
HEIGHT: 62 IN | BODY MASS INDEX: 28.59 KG/M2 | WEIGHT: 155.38 LBS | TEMPERATURE: 97.3 F | OXYGEN SATURATION: 98 % | SYSTOLIC BLOOD PRESSURE: 122 MMHG | RESPIRATION RATE: 18 BRPM | DIASTOLIC BLOOD PRESSURE: 67 MMHG | HEART RATE: 81 BPM

## 2023-07-07 DIAGNOSIS — Z79.4 TYPE 2 DIABETES MELLITUS WITH STAGE 4 CHRONIC KIDNEY DISEASE, WITH LONG-TERM CURRENT USE OF INSULIN (HCC): Primary | ICD-10-CM

## 2023-07-07 DIAGNOSIS — N18.4 TYPE 2 DIABETES MELLITUS WITH STAGE 4 CHRONIC KIDNEY DISEASE, WITH LONG-TERM CURRENT USE OF INSULIN (HCC): Primary | ICD-10-CM

## 2023-07-07 DIAGNOSIS — E83.42 HYPOMAGNESEMIA: ICD-10-CM

## 2023-07-07 DIAGNOSIS — F33.9 RECURRENT MAJOR DEPRESSIVE DISORDER, REMISSION STATUS UNSPECIFIED (HCC): ICD-10-CM

## 2023-07-07 DIAGNOSIS — K21.9 GASTROESOPHAGEAL REFLUX DISEASE WITHOUT ESOPHAGITIS: ICD-10-CM

## 2023-07-07 DIAGNOSIS — E11.22 TYPE 2 DIABETES MELLITUS WITH STAGE 4 CHRONIC KIDNEY DISEASE, WITH LONG-TERM CURRENT USE OF INSULIN (HCC): Primary | ICD-10-CM

## 2023-07-07 DIAGNOSIS — I10 PRIMARY HYPERTENSION: ICD-10-CM

## 2023-07-07 DIAGNOSIS — E11.40 DIABETIC NEUROPATHY, PAINFUL (HCC): ICD-10-CM

## 2023-07-07 DIAGNOSIS — E78.2 MIXED HYPERLIPIDEMIA: ICD-10-CM

## 2023-07-07 PROBLEM — E44.0 MODERATE MALNUTRITION (HCC): Status: RESOLVED | Noted: 2021-03-05 | Resolved: 2023-07-07

## 2023-07-07 PROBLEM — L74.1 MILIARIA CRYSTALLINA: Status: RESOLVED | Noted: 2021-09-27 | Resolved: 2023-07-07

## 2023-07-07 PROBLEM — N28.1 ACQUIRED CYST OF KIDNEY: Status: RESOLVED | Noted: 2020-01-16 | Resolved: 2023-07-07

## 2023-07-07 PROBLEM — G97.0 CEREBROSPINAL FLUID LEAK FROM SPINAL PUNCTURE: Status: RESOLVED | Noted: 2022-01-03 | Resolved: 2023-07-07

## 2023-07-07 LAB — HBA1C MFR BLD: 6 %

## 2023-07-07 PROCEDURE — 83036 HEMOGLOBIN GLYCOSYLATED A1C: CPT | Performed by: NURSE PRACTITIONER

## 2023-07-07 PROCEDURE — PBSHW AMB POC HEMOGLOBIN A1C: Performed by: NURSE PRACTITIONER

## 2023-07-07 PROCEDURE — 3046F HEMOGLOBIN A1C LEVEL >9.0%: CPT | Performed by: NURSE PRACTITIONER

## 2023-07-07 PROCEDURE — 99213 OFFICE O/P EST LOW 20 MIN: CPT | Performed by: NURSE PRACTITIONER

## 2023-07-07 PROCEDURE — 99211 OFF/OP EST MAY X REQ PHY/QHP: CPT | Performed by: NURSE PRACTITIONER

## 2023-07-07 PROCEDURE — 3078F DIAST BP <80 MM HG: CPT | Performed by: NURSE PRACTITIONER

## 2023-07-07 PROCEDURE — 3074F SYST BP LT 130 MM HG: CPT | Performed by: NURSE PRACTITIONER

## 2023-07-07 RX ORDER — EZETIMIBE 10 MG/1
10 TABLET ORAL DAILY
Qty: 93 TABLET | Refills: 3 | Status: SHIPPED | OUTPATIENT
Start: 2023-07-07

## 2023-07-07 RX ORDER — TOPIRAMATE 50 MG/1
50 TABLET, FILM COATED ORAL 2 TIMES DAILY
Qty: 186 TABLET | Refills: 3 | Status: SHIPPED | OUTPATIENT
Start: 2023-07-07

## 2023-07-07 RX ORDER — MAGNESIUM OXIDE 400 MG/1
400 TABLET ORAL DAILY
Qty: 93 TABLET | Refills: 3 | Status: SHIPPED | OUTPATIENT
Start: 2023-07-07

## 2023-07-07 RX ORDER — INSULIN GLARGINE 100 [IU]/ML
30 INJECTION, SOLUTION SUBCUTANEOUS NIGHTLY
Qty: 5 ADJUSTABLE DOSE PRE-FILLED PEN SYRINGE | Refills: 1 | Status: SHIPPED | OUTPATIENT
Start: 2023-07-07

## 2023-07-07 RX ORDER — CITALOPRAM 10 MG/1
10 TABLET ORAL DAILY
Qty: 93 TABLET | Refills: 3 | Status: SHIPPED | OUTPATIENT
Start: 2023-07-07

## 2023-07-07 RX ORDER — FUROSEMIDE 20 MG/1
20 TABLET ORAL DAILY
Qty: 93 TABLET | Refills: 3 | Status: SHIPPED | OUTPATIENT
Start: 2023-07-07

## 2023-07-07 RX ORDER — PRAVASTATIN SODIUM 80 MG/1
80 TABLET ORAL DAILY
Qty: 93 TABLET | Refills: 3 | Status: SHIPPED | OUTPATIENT
Start: 2023-07-07

## 2023-07-07 RX ORDER — ESOMEPRAZOLE MAGNESIUM 40 MG/1
40 CAPSULE, DELAYED RELEASE ORAL DAILY PRN
Qty: 93 CAPSULE | Refills: 3 | Status: SHIPPED | OUTPATIENT
Start: 2023-07-07

## 2023-07-07 NOTE — ASSESSMENT & PLAN NOTE
Well-controlled, continue current medications   Avoid gut irritants such as spicy foods  Avoid laying down for 30 to 45 minutes after eating  Avoid excessive alcohol consumption

## 2023-07-07 NOTE — ASSESSMENT & PLAN NOTE
Well-controlled, continue current medications   A1c 9.1 to 6 TUCKER EVELIA!!   Continue lantus and ozempic therapies  Cont to reduce carbs and sweets in diet  Follow up with lyubov RojasD  Prabha A1c 3m

## 2023-07-09 DIAGNOSIS — E11.40 DIABETIC NEUROPATHY, PAINFUL (HCC): ICD-10-CM

## 2023-07-10 RX ORDER — TOPIRAMATE 50 MG/1
TABLET, FILM COATED ORAL
Qty: 180 TABLET | Refills: 1 | OUTPATIENT
Start: 2023-07-10

## 2023-07-10 NOTE — TELEPHONE ENCOUNTER
Please refill if appropriate or refuse medication if not appropriate.     PCP: Shanae Roach DNP     Last appt: 7//7/23    Last refill: 7/7/23 - PLEASE REFUSE      Future Appointments   Date Time Provider [address]   8/24/2023 11:30 AM [facility] FAST TRACK NURSE JONATAN Santiago   9/20/2023  8:20 AM Adrienne Murcia DO [facility] BS AMB   10/3/2023  8:00 AM Shanae Roach DNP [facility] BS AMB         Requested Prescriptions     Pending Prescriptions Disp Refills    topiramate (TOPAMAX) 50 MG tablet [Pharmacy Med Name: TOPIRAMATE TAB 50MG] 180 tablet 1     Sig: TAKE 1 TABLET TWICE A DAY

## 2023-07-31 ENCOUNTER — CLINICAL DOCUMENTATION (OUTPATIENT)
Age: 58
End: 2023-07-31

## 2023-07-31 NOTE — PROGRESS NOTES
Left a voicemail for Twyla at Dr Ector bustillos([place]),and forwarded msg as well. Per Dr. Araceli Segura:  Pt does not need a pulmonary clearance - she does not use oxygen and does not have severely impaired lung function. Let [place] know that pt is on prednisone prescribed by Nephrology.

## 2023-08-11 ENCOUNTER — TELEPHONE (OUTPATIENT)
Age: 58
End: 2023-08-11

## 2023-08-11 NOTE — TELEPHONE ENCOUNTER
Patient notified that [facility] as of [facility]. Discussed with patient the options that are available to them. Patient has PPO plan they still can be seen by [facility], but it maybe at a higher cost for out of network benefits. Patients with an HMO plan can either, call [facility] for a new primary care provider or they can call Medicare at [phone] and use the following verbiage: This was a plan misrepresentation, the plan that they signed up for gave them access to their providers and that is the reason they signed up and now they are not accessible. Medicare has a special open enrollment for [employer] Medicare patients. Patient was here for a Pre-Op appt this morning and was advised that [facility] is no longer in network with [facility]. Patient was upset stating she spoke to someone who told her she can still be seen. Patient left before I could discuss options.

## 2023-08-18 ENCOUNTER — CLINICAL DOCUMENTATION (OUTPATIENT)
Age: 58
End: 2023-08-18

## 2023-08-24 ENCOUNTER — HOSPITAL ENCOUNTER (OUTPATIENT)
Facility: HOSPITAL | Age: 58
Setting detail: INFUSION SERIES
End: 2023-08-24

## 2023-09-02 ENCOUNTER — HOSPITAL ENCOUNTER (EMERGENCY)
Facility: HOSPITAL | Age: 58
Discharge: HOME | End: 2023-09-02
Attending: EMERGENCY MEDICINE
Payer: MEDICARE

## 2023-09-02 ENCOUNTER — APPOINTMENT (OUTPATIENT)
Facility: HOSPITAL | Age: 58
End: 2023-09-02
Payer: MEDICARE

## 2023-09-02 VITALS
TEMPERATURE: 97.8 F | HEART RATE: 80 BPM | HEIGHT: 62 IN | RESPIRATION RATE: 16 BRPM | WEIGHT: 155 LBS | BODY MASS INDEX: 28.52 KG/M2 | OXYGEN SATURATION: 98 % | DIASTOLIC BLOOD PRESSURE: 65 MMHG | SYSTOLIC BLOOD PRESSURE: 131 MMHG

## 2023-09-02 DIAGNOSIS — M79.672 CHRONIC PAIN IN LEFT FOOT: ICD-10-CM
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
## 2023-09-02 DIAGNOSIS — S70.01XA CONTUSION OF RIGHT HIP, INITIAL ENCOUNTER: ICD-10-CM

## 2023-09-02 DIAGNOSIS — G89.29 CHRONIC PAIN IN LEFT FOOT: ICD-10-CM

## 2023-09-02 LAB
ALBUMIN SERPL-MCNC: 3.3 G/DL (ref 3.4–5)
ALBUMIN/GLOB SERPL: 1 (ref 0.8–1.7)
ALP SERPL-CCNC: 70 U/L (ref 45–117)
ALT SERPL-CCNC: 31 U/L (ref 13–56)
ANION GAP SERPL CALC-SCNC: 7 MMOL/L (ref 3–18)
APPEARANCE UR: CLEAR
AST SERPL-CCNC: 26 U/L (ref 10–38)
BASOPHILS # BLD: 0 K/UL (ref 0–0.1)
BASOPHILS NFR BLD: 0 % (ref 0–2)
BILIRUB SERPL-MCNC: 0.3 MG/DL (ref 0.2–1)
BILIRUB UR QL: NEGATIVE
BUN SERPL-MCNC: 26 MG/DL (ref 7–18)
BUN/CREAT SERPL: 13 (ref 12–20)
CALCIUM SERPL-MCNC: 8.7 MG/DL (ref 8.5–10.1)
CHLORIDE SERPL-SCNC: 112 MMOL/L (ref 100–111)
CO2 SERPL-SCNC: 25 MMOL/L (ref 21–32)
COLOR UR: YELLOW
CREAT SERPL-MCNC: 2.08 MG/DL (ref 0.6–1.3)
DIFFERENTIAL METHOD BLD: ABNORMAL
EKG ATRIAL RATE: 72 BPM
EKG DIAGNOSIS: NORMAL
EKG P AXIS: 39 DEGREES
EKG P-R INTERVAL: 186 MS
EKG Q-T INTERVAL: 400 MS
EKG QRS DURATION: 68 MS
EKG QTC CALCULATION (BAZETT): 438 MS
EKG R AXIS: 38 DEGREES
EKG T AXIS: 66 DEGREES
EKG VENTRICULAR RATE: 72 BPM
EOSINOPHIL # BLD: 0 K/UL (ref 0–0.4)
EOSINOPHIL NFR BLD: 0 % (ref 0–5)
EPITH CASTS URNS QL MICRO: ABNORMAL /LPF (ref 0–5)
ERYTHROCYTE [DISTWIDTH] IN BLOOD BY AUTOMATED COUNT: 12.5 % (ref 11.6–14.5)
GLOBULIN SER CALC-MCNC: 3.2 G/DL (ref 2–4)
GLUCOSE BLD STRIP.AUTO-MCNC: 149 MG/DL (ref 70–110)
GLUCOSE SERPL-MCNC: 184 MG/DL (ref 74–99)
GLUCOSE UR STRIP.AUTO-MCNC: NEGATIVE MG/DL
HCT VFR BLD AUTO: 36.2 % (ref 35–45)
HGB BLD-MCNC: 11.9 G/DL (ref 12–16)
HGB UR QL STRIP: NEGATIVE
IMM GRANULOCYTES # BLD AUTO: 0 K/UL (ref 0–0.04)
IMM GRANULOCYTES NFR BLD AUTO: 1 % (ref 0–0.5)
KETONES UR QL STRIP.AUTO: NEGATIVE MG/DL
LEUKOCYTE ESTERASE UR QL STRIP.AUTO: ABNORMAL
LIPASE SERPL-CCNC: 289 U/L (ref 73–393)
LYMPHOCYTES # BLD: 1.7 K/UL (ref 0.9–3.6)
LYMPHOCYTES NFR BLD: 21 % (ref 21–52)
MCH RBC QN AUTO: 31.4 PG (ref 24–34)
MCHC RBC AUTO-ENTMCNC: 32.9 G/DL (ref 31–37)
MCV RBC AUTO: 95.5 FL (ref 78–100)
MONOCYTES # BLD: 0.8 K/UL (ref 0.05–1.2)
MONOCYTES NFR BLD: 10 % (ref 3–10)
NEUTS SEG # BLD: 5.5 K/UL (ref 1.8–8)
NEUTS SEG NFR BLD: 68 % (ref 40–73)
NITRITE UR QL STRIP.AUTO: NEGATIVE
NRBC # BLD: 0 K/UL (ref 0–0.01)
NRBC BLD-RTO: 0 PER 100 WBC
PH UR STRIP: 6 (ref 5–8)
PLATELET # BLD AUTO: 152 K/UL (ref 135–420)
PMV BLD AUTO: 10.1 FL (ref 9.2–11.8)
POTASSIUM SERPL-SCNC: 3.6 MMOL/L (ref 3.5–5.5)
PROT SERPL-MCNC: 6.5 G/DL (ref 6.4–8.2)
PROT UR STRIP-MCNC: NEGATIVE MG/DL
RBC # BLD AUTO: 3.79 M/UL (ref 4.2–5.3)
RBC #/AREA URNS HPF: ABNORMAL /HPF (ref 0–5)
SODIUM SERPL-SCNC: 144 MMOL/L (ref 136–145)
SP GR UR REFRACTOMETRY: 1.01 (ref 1–1.03)
TROPONIN I SERPL HS-MCNC: 11 NG/L (ref 0–54)
TROPONIN I SERPL HS-MCNC: 9 NG/L (ref 0–54)
UROBILINOGEN UR QL STRIP.AUTO: 0.2 EU/DL (ref 0.2–1)
WBC # BLD AUTO: 8 K/UL (ref 4.6–13.2)
WBC URNS QL MICRO: ABNORMAL /HPF (ref 0–4)
YEAST URNS QL MICRO: ABNORMAL

## 2023-09-02 PROCEDURE — 73502 X-RAY EXAM HIP UNI 2-3 VIEWS: CPT

## 2023-09-02 PROCEDURE — 2580000003 HC RX 258: Performed by: EMERGENCY MEDICINE

## 2023-09-02 PROCEDURE — 81001 URINALYSIS AUTO W/SCOPE: CPT

## 2023-09-02 PROCEDURE — 80053 COMPREHEN METABOLIC PANEL: CPT

## 2023-09-02 PROCEDURE — 84484 ASSAY OF TROPONIN QUANT: CPT

## 2023-09-02 PROCEDURE — 82962 GLUCOSE BLOOD TEST: CPT

## 2023-09-02 PROCEDURE — 93005 ELECTROCARDIOGRAM TRACING: CPT | Performed by: EMERGENCY MEDICINE

## 2023-09-02 PROCEDURE — 83690 ASSAY OF LIPASE: CPT

## 2023-09-02 PROCEDURE — 85025 COMPLETE CBC W/AUTO DIFF WBC: CPT

## 2023-09-02 PROCEDURE — 93010 ELECTROCARDIOGRAM REPORT: CPT | Performed by: INTERNAL MEDICINE

## 2023-09-02 PROCEDURE — 99285 EMERGENCY DEPT VISIT HI MDM: CPT

## 2023-09-02 RX ORDER — OXYCODONE HYDROCHLORIDE AND ACETAMINOPHEN 5; 325 MG/1; MG/1
1 TABLET ORAL EVERY 6 HOURS PRN
Qty: 10 TABLET | Refills: 0 | Status: SHIPPED | OUTPATIENT
Start: 2023-09-02 | End: 2023-09-05

## 2023-09-02 RX ORDER — 0.9 % SODIUM CHLORIDE 0.9 %
1000 INTRAVENOUS SOLUTION INTRAVENOUS ONCE
Status: COMPLETED | OUTPATIENT
Start: 2023-09-02 | End: 2023-09-02

## 2023-09-02 RX ADMIN — SODIUM CHLORIDE 1000 ML: 9 INJECTION, SOLUTION INTRAVENOUS at 14:20

## 2023-09-02 ASSESSMENT — LIFESTYLE VARIABLES: HOW OFTEN DO YOU HAVE A DRINK CONTAINING ALCOHOL: NEVER

## 2023-09-02 ASSESSMENT — PAIN DESCRIPTION - LOCATION: LOCATION: FOOT;HIP

## 2023-09-02 ASSESSMENT — ENCOUNTER SYMPTOMS
EYES NEGATIVE: 1
ABDOMINAL PAIN: 0
BACK PAIN: 0
CHEST TIGHTNESS: 0

## 2023-09-02 ASSESSMENT — PAIN - FUNCTIONAL ASSESSMENT: PAIN_FUNCTIONAL_ASSESSMENT: 0-10

## 2023-09-02 ASSESSMENT — PAIN SCALES - GENERAL: PAINLEVEL_OUTOF10: 10

## 2023-09-02 ASSESSMENT — PAIN DESCRIPTION - ORIENTATION: ORIENTATION: RIGHT;LEFT

## 2023-09-02 NOTE — ED TRIAGE NOTES
Pt to ED for eval of right hip pain since GLF on Thursday. Pt states she became dizzy and fell on her stairs. -LOC, unsure if she hit head. Pt has been having issues ambulating 2/2 left foot pain. Pt was supposed to have surgery, but surgery was cancelled due to failed negotiations between [address] [place] [address] and [facility]. Pt is tremulous in triage, pt concerned glucose is elevated.

## 2023-09-02 NOTE — DISCHARGE INSTRUCTIONS
I have given you a short course of oxycodone for your pain until you can follow-up with your primary provider as well as your orthopedic surgeon. Please make sure to follow-up closely as an outpatient and return if you are at all worsened or concerned.

## 2023-09-02 NOTE — ED NOTES
Discharge instructions reviewed with patient. Patient verbalized understanding. Patient advised to follow up as directed on discharge instructions. Patient denies questions, needs or concerns at this time. Patient verbalized understanding. No s/sx of distress noted.        Idris Chanel RN  09/02/23 [id number]

## 2023-09-02 NOTE — ED NOTES
Patient resting comfortably on the stretcher with call bell in reach. Patient has no signs or symptoms of acute distress at this time. Patient has no concerns or questions about their treatment plan.       Rashawn Huerta RN  09/02/23 4023

## 2023-09-07 ENCOUNTER — OFFICE VISIT (OUTPATIENT)
Age: 58
End: 2023-09-07

## 2023-09-07 ENCOUNTER — TELEPHONE (OUTPATIENT)
Age: 58
End: 2023-09-07

## 2023-09-07 DIAGNOSIS — M25.572 PAIN IN LEFT ANKLE AND JOINTS OF LEFT FOOT: ICD-10-CM

## 2023-09-07 DIAGNOSIS — S86.312A TEAR OF PERONEAL TENDON, LEFT, INITIAL ENCOUNTER: ICD-10-CM

## 2023-09-07 DIAGNOSIS — S93.492A SPRAIN OF ANTERIOR TALOFIBULAR LIGAMENT OF LEFT ANKLE, INITIAL ENCOUNTER: Primary | ICD-10-CM

## 2023-09-07 DIAGNOSIS — I73.9 PAD (PERIPHERAL ARTERY DISEASE) (HCC): ICD-10-CM

## 2023-09-07 NOTE — PROGRESS NOTES
AMBULATORY PROGRESS NOTE      Patient: Jorge Royal             MRN: [id number]     SSN: [id number] There is no height or weight on file to calculate BMI. YOB: 1965     AGE: 60 y.o. EX: female  OFFICE VISIT DATE: 9/7/2023      PCP: Michael Wolff DNP       IMPRESSION //  DIAGNOSIS AND TREATMENT PLAN  9/7/2023       Jorge Royal has a diagnosis of:      Acute ankle sprain left side, diagnosis, however peroneal brevis tendon tear. We will get her acute sprain and proved, prior to her peroneal tendon surgery. DIAGNOSES    1. Sprain of anterior talofibular ligament of left ankle, initial encounter    2. Tear of peroneal tendon, left, initial encounter    3. PAD (peripheral artery disease) ([facility])    4. Pain in left ankle and joints of left foot          PLAN:    1. We will get her rescheduled, for surgery, for peroneal tendon surgery: No tourniquet to be used, she has PAD. We will have her use her over-the-counter ankle brace that she does have at home she states, for her recently diagnosed left ankle sprain  2.  3 to 4 weeks    RTO I will give her Twyla's card, we will get her rescheduled, for surgery, she still needs to be restratified    Orders Placed This Encounter    [17615] Foot Min 3V     Order Specific Question:   Are you Pregnant? Answer:   No    AMB POC XRAY, ANKLE; 2 VIEWS     Order Specific Question:   Are you Pregnant? Answer:   No        There are no Patient Instructions on file for this visit. Please follow up with your PCP for any health maintenance as recommended. Jorge Royal  expresses understanding of the diagnosis, treatment plan, and all of their proposed questions were answered to their satisfaction. Patient education has been provided re the diagnoses.          HPI //  OBJECTIVE EXAMINATION      Maribell Marshall IS A 60 y.o. female who is a/an  established patient, presenting to my outpatient office for evaluation of

## 2023-09-07 NOTE — TELEPHONE ENCOUNTER
----- Message from Gini Osullivan sent at 9/6/2023  4:15 PM EDT -----  Subject: Appointment Request    Reason for Call: Established Patient Appointment needed: Routine ED Follow   Up Visit    QUESTIONS    Reason for appointment request? No appointments available during search     Additional Information for Provider?   ---------------------------------------------------------------------------  --------------  [address]  [phone]; OK to leave message on voicemail  ---------------------------------------------------------------------------  --------------  SCRIPT ANSWERS

## 2023-09-12 ENCOUNTER — HOSPITAL ENCOUNTER (OUTPATIENT)
Facility: HOSPITAL | Age: 58
Setting detail: INFUSION SERIES
End: 2023-09-12
Payer: MEDICARE

## 2023-09-12 VITALS
DIASTOLIC BLOOD PRESSURE: 72 MMHG | RESPIRATION RATE: 16 BRPM | SYSTOLIC BLOOD PRESSURE: 110 MMHG | HEART RATE: 83 BPM | TEMPERATURE: 98.7 F | OXYGEN SATURATION: 96 %

## 2023-09-12 DIAGNOSIS — J45.50 SEVERE PERSISTENT ASTHMA, UNSPECIFIED WHETHER COMPLICATED: Primary | ICD-10-CM

## 2023-09-12 PROCEDURE — 6360000002 HC RX W HCPCS: Performed by: INTERNAL MEDICINE

## 2023-09-12 PROCEDURE — 96372 THER/PROPH/DIAG INJ SC/IM: CPT

## 2023-09-12 RX ORDER — ACETAMINOPHEN 325 MG/1
650 TABLET ORAL
OUTPATIENT
Start: 2023-11-07

## 2023-09-12 RX ORDER — DIPHENHYDRAMINE HYDROCHLORIDE 50 MG/ML
50 INJECTION INTRAMUSCULAR; INTRAVENOUS
OUTPATIENT
Start: 2023-11-07

## 2023-09-12 RX ORDER — ALBUTEROL SULFATE 90 UG/1
4 AEROSOL, METERED RESPIRATORY (INHALATION) PRN
OUTPATIENT
Start: 2023-11-07

## 2023-09-12 RX ORDER — SODIUM CHLORIDE 9 MG/ML
INJECTION, SOLUTION INTRAVENOUS CONTINUOUS
OUTPATIENT
Start: 2023-11-07

## 2023-09-12 RX ORDER — EPINEPHRINE 1 MG/ML
0.3 INJECTION, SOLUTION, CONCENTRATE INTRAVENOUS PRN
OUTPATIENT
Start: 2023-11-07

## 2023-09-12 RX ORDER — ONDANSETRON 2 MG/ML
8 INJECTION INTRAMUSCULAR; INTRAVENOUS
OUTPATIENT
Start: 2023-11-07

## 2023-09-12 RX ADMIN — BENRALIZUMAB 30 MG: 30 INJECTION, SOLUTION SUBCUTANEOUS at 11:55

## 2023-09-12 ASSESSMENT — PAIN - FUNCTIONAL ASSESSMENT: PAIN_FUNCTIONAL_ASSESSMENT: 0-10

## 2023-09-12 ASSESSMENT — PAIN DESCRIPTION - DESCRIPTORS: DESCRIPTORS: ACHING;THROBBING

## 2023-09-14 RX ORDER — METOPROLOL SUCCINATE 25 MG/1
25 TABLET, EXTENDED RELEASE ORAL DAILY
Qty: 90 TABLET | Refills: 3 | Status: SHIPPED | OUTPATIENT
Start: 2023-09-14

## 2023-09-14 RX ORDER — DILTIAZEM HYDROCHLORIDE 120 MG/1
120 CAPSULE, EXTENDED RELEASE ORAL DAILY
Qty: 90 CAPSULE | Refills: 3 | Status: SHIPPED | OUTPATIENT
Start: 2023-09-14

## 2023-09-15 ENCOUNTER — OFFICE VISIT (OUTPATIENT)
Age: 58
End: 2023-09-15
Payer: MEDICARE

## 2023-09-15 VITALS
DIASTOLIC BLOOD PRESSURE: 71 MMHG | SYSTOLIC BLOOD PRESSURE: 123 MMHG | TEMPERATURE: 98.2 F | HEIGHT: 62 IN | RESPIRATION RATE: 18 BRPM | OXYGEN SATURATION: 96 % | BODY MASS INDEX: 27.63 KG/M2 | WEIGHT: 150.13 LBS | HEART RATE: 76 BPM

## 2023-09-15 DIAGNOSIS — E86.0 DEHYDRATION: ICD-10-CM

## 2023-09-15 DIAGNOSIS — F33.9 RECURRENT MAJOR DEPRESSIVE DISORDER, REMISSION STATUS UNSPECIFIED (HCC): ICD-10-CM

## 2023-09-15 DIAGNOSIS — G89.29 CHRONIC PAIN OF LEFT ANKLE: ICD-10-CM

## 2023-09-15 DIAGNOSIS — M25.572 CHRONIC PAIN OF LEFT ANKLE: ICD-10-CM

## 2023-09-15 DIAGNOSIS — I10 PRIMARY HYPERTENSION: ICD-10-CM

## 2023-09-15 DIAGNOSIS — K21.9 GASTROESOPHAGEAL REFLUX DISEASE WITHOUT ESOPHAGITIS: ICD-10-CM

## 2023-09-15 DIAGNOSIS — E11.40 DIABETIC NEUROPATHY, PAINFUL (HCC): ICD-10-CM

## 2023-09-15 DIAGNOSIS — N18.4 CKD (CHRONIC KIDNEY DISEASE) STAGE 4, GFR 15-29 ML/MIN (HCC): Primary | ICD-10-CM

## 2023-09-15 DIAGNOSIS — E78.2 MIXED HYPERLIPIDEMIA: ICD-10-CM

## 2023-09-15 PROCEDURE — 99213 OFFICE O/P EST LOW 20 MIN: CPT | Performed by: NURSE PRACTITIONER

## 2023-09-15 PROCEDURE — 3074F SYST BP LT 130 MM HG: CPT | Performed by: NURSE PRACTITIONER

## 2023-09-15 PROCEDURE — G8417 CALC BMI ABV UP PARAM F/U: HCPCS | Performed by: NURSE PRACTITIONER

## 2023-09-15 PROCEDURE — G8427 DOCREV CUR MEDS BY ELIG CLIN: HCPCS | Performed by: NURSE PRACTITIONER

## 2023-09-15 PROCEDURE — 3017F COLORECTAL CA SCREEN DOC REV: CPT | Performed by: NURSE PRACTITIONER

## 2023-09-15 PROCEDURE — 3078F DIAST BP <80 MM HG: CPT | Performed by: NURSE PRACTITIONER

## 2023-09-15 PROCEDURE — 1036F TOBACCO NON-USER: CPT | Performed by: NURSE PRACTITIONER

## 2023-09-15 NOTE — ASSESSMENT & PLAN NOTE
Discussed importance of hydration with patient  BUN was slightly elevated earlier this month  She is taking lasix which can lead to dehydration

## 2023-09-15 NOTE — TELEPHONE ENCOUNTER
Last OV: 9/15/2023  Next OV: 10/3/2023  Last refill:  7/7/2023    Patient is now using [facility] Mail order.

## 2023-09-15 NOTE — ASSESSMENT & PLAN NOTE
Reviewed hosp labs from 9/2/23  Noted increasing creatinine and declining eGFR 2.08   These numbers appear worse from labs Dr Busch Been obtained a few months back  Dr Gumaro Coburn started on toprol back in Dec, ? Is causing decline in kidneys  Pt has an appt with cardio, will address at appt next week  Strongly encouraged pt to follow up with Dr Narayan Sarmiento as well.

## 2023-09-15 NOTE — ASSESSMENT & PLAN NOTE
Eric Silva 2 weeks ago re-injuring her left ankle  Saw Dr Monse Le and has a f/u apt in a few weeks  Contemplating surgery.   Encouraged pt to wear boot as recommended by ortho

## 2023-09-18 RX ORDER — TOPIRAMATE 50 MG/1
50 TABLET, FILM COATED ORAL 2 TIMES DAILY
Qty: 186 TABLET | Refills: 3 | Status: SHIPPED | OUTPATIENT
Start: 2023-09-18

## 2023-09-18 RX ORDER — CITALOPRAM HYDROBROMIDE 10 MG/1
10 TABLET ORAL DAILY
Qty: 93 TABLET | Refills: 3 | Status: SHIPPED | OUTPATIENT
Start: 2023-09-18

## 2023-09-18 RX ORDER — ESOMEPRAZOLE MAGNESIUM 40 MG/1
40 CAPSULE, DELAYED RELEASE ORAL DAILY PRN
Qty: 93 CAPSULE | Refills: 3 | Status: SHIPPED | OUTPATIENT
Start: 2023-09-18

## 2023-09-18 RX ORDER — FUROSEMIDE 20 MG/1
20 TABLET ORAL DAILY
Qty: 90 TABLET | Refills: 1 | OUTPATIENT
Start: 2023-09-18

## 2023-09-18 RX ORDER — PEN NEEDLE, DIABETIC 32GX 5/32"
NEEDLE, DISPOSABLE MISCELLANEOUS
Qty: 100 EACH | Refills: 2 | OUTPATIENT
Start: 2023-09-18

## 2023-09-18 RX ORDER — PRAVASTATIN SODIUM 80 MG/1
80 TABLET ORAL DAILY
Qty: 93 TABLET | Refills: 3 | OUTPATIENT
Start: 2023-09-18

## 2023-09-20 ENCOUNTER — OFFICE VISIT (OUTPATIENT)
Age: 58
End: 2023-09-20
Payer: MEDICARE

## 2023-09-20 VITALS
HEART RATE: 82 BPM | HEIGHT: 62 IN | OXYGEN SATURATION: 95 % | BODY MASS INDEX: 27.6 KG/M2 | DIASTOLIC BLOOD PRESSURE: 72 MMHG | SYSTOLIC BLOOD PRESSURE: 112 MMHG | WEIGHT: 150 LBS

## 2023-09-20 DIAGNOSIS — I47.1 SUPRAVENTRICULAR TACHYCARDIA (HCC): Primary | ICD-10-CM

## 2023-09-20 DIAGNOSIS — R07.9 CHEST PAIN, UNSPECIFIED TYPE: ICD-10-CM

## 2023-09-20 PROCEDURE — G8427 DOCREV CUR MEDS BY ELIG CLIN: HCPCS | Performed by: INTERNAL MEDICINE

## 2023-09-20 PROCEDURE — 3017F COLORECTAL CA SCREEN DOC REV: CPT | Performed by: INTERNAL MEDICINE

## 2023-09-20 PROCEDURE — 3078F DIAST BP <80 MM HG: CPT | Performed by: INTERNAL MEDICINE

## 2023-09-20 PROCEDURE — 3074F SYST BP LT 130 MM HG: CPT | Performed by: INTERNAL MEDICINE

## 2023-09-20 PROCEDURE — 99214 OFFICE O/P EST MOD 30 MIN: CPT | Performed by: INTERNAL MEDICINE

## 2023-09-20 PROCEDURE — 1036F TOBACCO NON-USER: CPT | Performed by: INTERNAL MEDICINE

## 2023-09-20 PROCEDURE — G8417 CALC BMI ABV UP PARAM F/U: HCPCS | Performed by: INTERNAL MEDICINE

## 2023-09-20 ASSESSMENT — PATIENT HEALTH QUESTIONNAIRE - PHQ9
SUM OF ALL RESPONSES TO PHQ QUESTIONS 1-9: 0
9. THOUGHTS THAT YOU WOULD BE BETTER OFF DEAD, OR OF HURTING YOURSELF: 0
SUM OF ALL RESPONSES TO PHQ QUESTIONS 1-9: 0
4. FEELING TIRED OR HAVING LITTLE ENERGY: 0
2. FEELING DOWN, DEPRESSED OR HOPELESS: 0
3. TROUBLE FALLING OR STAYING ASLEEP: 0
8. MOVING OR SPEAKING SO SLOWLY THAT OTHER PEOPLE COULD HAVE NOTICED. OR THE OPPOSITE, BEING SO FIGETY OR RESTLESS THAT YOU HAVE BEEN MOVING AROUND A LOT MORE THAN USUAL: 0
1. LITTLE INTEREST OR PLEASURE IN DOING THINGS: 0
SUM OF ALL RESPONSES TO PHQ9 QUESTIONS 1 & 2: 0
SUM OF ALL RESPONSES TO PHQ QUESTIONS 1-9: 0
SUM OF ALL RESPONSES TO PHQ QUESTIONS 1-9: 0
7. TROUBLE CONCENTRATING ON THINGS, SUCH AS READING THE NEWSPAPER OR WATCHING TELEVISION: 0
5. POOR APPETITE OR OVEREATING: 0
6. FEELING BAD ABOUT YOURSELF - OR THAT YOU ARE A FAILURE OR HAVE LET YOURSELF OR YOUR FAMILY DOWN: 0
10. IF YOU CHECKED OFF ANY PROBLEMS, HOW DIFFICULT HAVE THESE PROBLEMS MADE IT FOR YOU TO DO YOUR WORK, TAKE CARE OF THINGS AT HOME, OR GET ALONG WITH OTHER PEOPLE: 0

## 2023-09-22 NOTE — PROGRESS NOTES
Pharmacy Progress Note - Diabetes Management       Assessment / Plan:   Diabetes Management:  Per ADA guidelines, Pt's A1c is at goal of < 7%. Pt's SMBG logs continue to indicate excellent glycemic control. Will maintain her current tx and reassess with SMBG logs at follow up in 7 weeks. Nutrition/Lifestyle Modifications:  - Educated pt on the importance of moderating carbohydrate intake. Reviewed sources of carbohydrates and method to help determine appropriate portion sizes (e.g., Diabetes Plate Method). - Advised patient to avoid sugar-sweetened beverages and replace with water or diet/zero sugar option.  - Recommend ~30 minutes consistent, moderately intensive, exercise/day or ~150 minutes/week. Start small, stay consistent, and increase length and types of exercise, as tolerated. Patient will return to clinic in 7 week(s) for follow up. S/O: Ms. Angy Parks, a 60 y.o. female referred by Malick Sanchez DNP,  has a past medical history of Acquired cyst of kidney, Asthma, Bilateral great toe fractures, Cerebrospinal fluid leak from spinal puncture, Chronic bilateral low back pain without sciatica, Chronic kidney disease (CKD), stage IV (severe) ([facility]), Chronic sinusitis, Colon polyp, Depression, Diabetes mellitus ([facility]), DJD (degenerative joint disease) of cervical spine, DJD (degenerative joint disease), lumbar, Dyslipidemia, Edema of both ankles, Edema of both lower extremities, Environmental and seasonal allergies, Eustachian tube dysfunction, Fibrocystic breast, GERD (gastroesophageal reflux disease), Hypertriglyceridemia, Lateral epicondylitis of both elbows, Macular degeneration of both eyes, Migraine, Mild intermittent asthma without complication, Moderate malnutrition ([facility]), Morbid obesity ([facility]), Neuropathy, Osteopenia, Sarcoidosis, and Type 2 diabetes mellitus with hyperglycemia, with long-term current use of insulin ([facility]). Pt was seen today for diabetes management.   Patient's

## 2023-09-25 ENCOUNTER — PHARMACY VISIT (OUTPATIENT)
Age: 58
End: 2023-09-25

## 2023-09-25 DIAGNOSIS — N18.4 TYPE 2 DIABETES MELLITUS WITH STAGE 4 CHRONIC KIDNEY DISEASE, WITH LONG-TERM CURRENT USE OF INSULIN (HCC): Primary | ICD-10-CM

## 2023-09-25 DIAGNOSIS — Z79.4 TYPE 2 DIABETES MELLITUS WITH STAGE 4 CHRONIC KIDNEY DISEASE, WITH LONG-TERM CURRENT USE OF INSULIN (HCC): Primary | ICD-10-CM

## 2023-09-25 DIAGNOSIS — E11.22 TYPE 2 DIABETES MELLITUS WITH STAGE 4 CHRONIC KIDNEY DISEASE, WITH LONG-TERM CURRENT USE OF INSULIN (HCC): Primary | ICD-10-CM

## 2023-10-02 NOTE — ASSESSMENT & PLAN NOTE
Patient unable to afford Effexor therapy  Stop therapy over 1 month ago  Initiate Celexa 10 mg daily; may increase dose to 20 mg if 10 mg is not effective TIR did go down this last month. Not sure if you were thinking about fast acting insulin for largest meal? Please advise, thanks

## 2023-10-03 ENCOUNTER — OFFICE VISIT (OUTPATIENT)
Age: 58
End: 2023-10-03
Payer: MEDICARE

## 2023-10-03 VITALS
HEIGHT: 62 IN | SYSTOLIC BLOOD PRESSURE: 133 MMHG | BODY MASS INDEX: 27.81 KG/M2 | WEIGHT: 151.13 LBS | OXYGEN SATURATION: 94 % | TEMPERATURE: 98.2 F | RESPIRATION RATE: 16 BRPM | DIASTOLIC BLOOD PRESSURE: 76 MMHG | HEART RATE: 73 BPM

## 2023-10-03 DIAGNOSIS — Z79.4 TYPE 2 DIABETES MELLITUS WITH STAGE 4 CHRONIC KIDNEY DISEASE, WITH LONG-TERM CURRENT USE OF INSULIN (HCC): Primary | ICD-10-CM

## 2023-10-03 DIAGNOSIS — Z23 ENCOUNTER FOR IMMUNIZATION: ICD-10-CM

## 2023-10-03 DIAGNOSIS — N18.4 TYPE 2 DIABETES MELLITUS WITH STAGE 4 CHRONIC KIDNEY DISEASE, WITH LONG-TERM CURRENT USE OF INSULIN (HCC): Primary | ICD-10-CM

## 2023-10-03 DIAGNOSIS — E11.40 DIABETIC NEUROPATHY, PAINFUL (HCC): ICD-10-CM

## 2023-10-03 DIAGNOSIS — E11.22 TYPE 2 DIABETES MELLITUS WITH STAGE 4 CHRONIC KIDNEY DISEASE, WITH LONG-TERM CURRENT USE OF INSULIN (HCC): Primary | ICD-10-CM

## 2023-10-03 PROCEDURE — 90674 CCIIV4 VAC NO PRSV 0.5 ML IM: CPT | Performed by: NURSE PRACTITIONER

## 2023-10-03 PROCEDURE — 3075F SYST BP GE 130 - 139MM HG: CPT | Performed by: NURSE PRACTITIONER

## 2023-10-03 PROCEDURE — 1036F TOBACCO NON-USER: CPT | Performed by: NURSE PRACTITIONER

## 2023-10-03 PROCEDURE — 2022F DILAT RTA XM EVC RTNOPTHY: CPT | Performed by: NURSE PRACTITIONER

## 2023-10-03 PROCEDURE — PBSHW INFLUENZA, FLUCELVAX, (AGE 6 MO+), IM, PF, 0.5 ML: Performed by: NURSE PRACTITIONER

## 2023-10-03 PROCEDURE — 3017F COLORECTAL CA SCREEN DOC REV: CPT | Performed by: NURSE PRACTITIONER

## 2023-10-03 PROCEDURE — 3046F HEMOGLOBIN A1C LEVEL >9.0%: CPT | Performed by: NURSE PRACTITIONER

## 2023-10-03 PROCEDURE — 99214 OFFICE O/P EST MOD 30 MIN: CPT | Performed by: NURSE PRACTITIONER

## 2023-10-03 PROCEDURE — G8427 DOCREV CUR MEDS BY ELIG CLIN: HCPCS | Performed by: NURSE PRACTITIONER

## 2023-10-03 PROCEDURE — 3078F DIAST BP <80 MM HG: CPT | Performed by: NURSE PRACTITIONER

## 2023-10-03 PROCEDURE — G8482 FLU IMMUNIZE ORDER/ADMIN: HCPCS | Performed by: NURSE PRACTITIONER

## 2023-10-03 PROCEDURE — G8417 CALC BMI ABV UP PARAM F/U: HCPCS | Performed by: NURSE PRACTITIONER

## 2023-10-03 RX ORDER — INSULIN GLARGINE 100 [IU]/ML
30 INJECTION, SOLUTION SUBCUTANEOUS NIGHTLY
Qty: 5 ADJUSTABLE DOSE PRE-FILLED PEN SYRINGE | Refills: 5 | Status: SHIPPED | OUTPATIENT
Start: 2023-10-03

## 2023-10-03 RX ORDER — SEMAGLUTIDE 2.68 MG/ML
2 INJECTION, SOLUTION SUBCUTANEOUS
Qty: 12 ML | Refills: 1 | Status: SHIPPED | OUTPATIENT
Start: 2023-10-03

## 2023-10-03 RX ORDER — TOPIRAMATE 100 MG/1
100 TABLET, FILM COATED ORAL 2 TIMES DAILY
Qty: 180 TABLET | Refills: 0 | Status: SHIPPED | OUTPATIENT
Start: 2023-10-03

## 2023-10-03 NOTE — ASSESSMENT & PLAN NOTE
Hx of uncontrolled DM  Sx not controlled under current therapy   Increase topamax from 50mg BID to 100mg BID  Discussed OTC creams and supplements

## 2023-10-05 DIAGNOSIS — F33.9 RECURRENT MAJOR DEPRESSIVE DISORDER, REMISSION STATUS UNSPECIFIED (HCC): ICD-10-CM

## 2023-10-09 RX ORDER — CITALOPRAM HYDROBROMIDE 10 MG/1
10 TABLET ORAL DAILY
Qty: 90 TABLET | OUTPATIENT
Start: 2023-10-09

## 2023-10-15 PROBLEM — E86.0 DEHYDRATION: Status: RESOLVED | Noted: 2023-09-15 | Resolved: 2023-10-15

## 2023-10-16 ENCOUNTER — TELEPHONE (OUTPATIENT)
Age: 58
End: 2023-10-16

## 2023-10-16 NOTE — TELEPHONE ENCOUNTER
Patient walk in to drop off a form to be filled out form from [employer] (Serious Medical Condition Cert. form). Patient also need an handicapp karan filled out to receive her sticker. Please let patient know when form are ready to be picked up. Forms are in dr Kaylie Cabrera.

## 2023-10-18 NOTE — TELEPHONE ENCOUNTER
Completed series medical conditions certification form for [employer] due to patient requiring insulin therapy and needing refrigeration. Patient to complete her section. Form to be scanned into patient's chart prior to giving form to patient.

## 2023-10-19 ENCOUNTER — APPOINTMENT (OUTPATIENT)
Facility: HOSPITAL | Age: 58
End: 2023-10-19
Payer: MEDICARE

## 2023-11-07 ENCOUNTER — HOSPITAL ENCOUNTER (OUTPATIENT)
Facility: HOSPITAL | Age: 58
Setting detail: INFUSION SERIES
Discharge: HOME | End: 2023-11-10
Payer: MEDICARE

## 2023-11-07 ENCOUNTER — ENROLLMENT (OUTPATIENT)
Age: 58
End: 2023-11-07

## 2023-11-07 VITALS
HEART RATE: 98 BPM | RESPIRATION RATE: 18 BRPM | TEMPERATURE: 97.6 F | SYSTOLIC BLOOD PRESSURE: 116 MMHG | OXYGEN SATURATION: 98 % | DIASTOLIC BLOOD PRESSURE: 73 MMHG

## 2023-11-07 DIAGNOSIS — J45.50 SEVERE PERSISTENT ASTHMA, UNSPECIFIED WHETHER COMPLICATED: Primary | ICD-10-CM

## 2023-11-07 PROCEDURE — 96372 THER/PROPH/DIAG INJ SC/IM: CPT

## 2023-11-07 PROCEDURE — 6360000002 HC RX W HCPCS: Performed by: INTERNAL MEDICINE

## 2023-11-07 RX ORDER — EPINEPHRINE 1 MG/ML
0.3 INJECTION, SOLUTION, CONCENTRATE INTRAVENOUS PRN
OUTPATIENT
Start: 2024-01-02

## 2023-11-07 RX ORDER — ALBUTEROL SULFATE 90 UG/1
4 AEROSOL, METERED RESPIRATORY (INHALATION) PRN
OUTPATIENT
Start: 2024-01-02

## 2023-11-07 RX ORDER — DIPHENHYDRAMINE HYDROCHLORIDE 50 MG/ML
50 INJECTION INTRAMUSCULAR; INTRAVENOUS
OUTPATIENT
Start: 2024-01-02

## 2023-11-07 RX ORDER — ACETAMINOPHEN 325 MG/1
650 TABLET ORAL
OUTPATIENT
Start: 2024-01-02

## 2023-11-07 RX ORDER — ONDANSETRON 2 MG/ML
8 INJECTION INTRAMUSCULAR; INTRAVENOUS
OUTPATIENT
Start: 2024-01-02

## 2023-11-07 RX ORDER — SODIUM CHLORIDE 9 MG/ML
INJECTION, SOLUTION INTRAVENOUS CONTINUOUS
OUTPATIENT
Start: 2024-01-02

## 2023-11-07 RX ADMIN — BENRALIZUMAB 30 MG: 30 INJECTION, SOLUTION SUBCUTANEOUS at 11:50

## 2023-11-07 ASSESSMENT — PAIN DESCRIPTION - LOCATION: LOCATION: FOOT

## 2023-11-07 ASSESSMENT — PAIN SCALES - GENERAL: PAINLEVEL_OUTOF10: 8

## 2023-11-07 ASSESSMENT — PAIN DESCRIPTION - DESCRIPTORS: DESCRIPTORS: ACHING;THROBBING

## 2023-11-07 ASSESSMENT — PAIN DESCRIPTION - ORIENTATION: ORIENTATION: LEFT

## 2023-11-07 ASSESSMENT — PAIN DESCRIPTION - PAIN TYPE: TYPE: CHRONIC PAIN

## 2023-11-07 NOTE — PROGRESS NOTES
the office with any additional questions or concerns. Notifications of recommendations will be sent to Annie Peck DNP for review.       Thank you for the consult,  Rick Wright, PharmD, BCACP, BC-ADM        For Pharmacy Admin Tracking Only    Program: Medical Group  CPA in place:  Yes  Recommendation Provided To: Patient/Caregiver: 1 via In person  Intervention Detail: Adherence Monitorin  Intervention Accepted By: Patient/Caregiver: 1  Gap Closed?: Yes   Time Spent (min): 20

## 2023-11-10 ENCOUNTER — TELEPHONE (OUTPATIENT)
Age: 58
End: 2023-11-10

## 2023-11-10 ENCOUNTER — HOSPITAL ENCOUNTER (OUTPATIENT)
Facility: HOSPITAL | Age: 58
End: 2023-11-10
Payer: MEDICARE

## 2023-11-10 LAB
ALBUMIN SERPL-MCNC: 3.4 G/DL (ref 3.4–5)
ANION GAP SERPL CALC-SCNC: 7 MMOL/L (ref 3–18)
BUN SERPL-MCNC: 26 MG/DL (ref 7–18)
BUN/CREAT SERPL: 12 (ref 12–20)
CALCIUM SERPL-MCNC: 9 MG/DL (ref 8.5–10.1)
CHLORIDE SERPL-SCNC: 108 MMOL/L (ref 100–111)
CO2 SERPL-SCNC: 28 MMOL/L (ref 21–32)
CREAT SERPL-MCNC: 2.1 MG/DL (ref 0.6–1.3)
CREAT UR-MCNC: 373 MG/DL (ref 30–125)
GLUCOSE SERPL-MCNC: 130 MG/DL (ref 74–99)
MICROALBUMIN UR-MCNC: 1.92 MG/DL (ref 0–3)
MICROALBUMIN/CREAT UR-RTO: 5 MG/G (ref 0–30)
PHOSPHATE SERPL-MCNC: 3.7 MG/DL (ref 2.5–4.9)
POTASSIUM SERPL-SCNC: 4.2 MMOL/L (ref 3.5–5.5)
SODIUM SERPL-SCNC: 143 MMOL/L (ref 136–145)

## 2023-11-10 PROCEDURE — 80069 RENAL FUNCTION PANEL: CPT

## 2023-11-10 PROCEDURE — 36415 COLL VENOUS BLD VENIPUNCTURE: CPT

## 2023-11-10 PROCEDURE — 82570 ASSAY OF URINE CREATININE: CPT

## 2023-11-10 PROCEDURE — 82043 UR ALBUMIN QUANTITATIVE: CPT

## 2023-11-10 NOTE — TELEPHONE ENCOUNTER
Patient reports having unbearable left foot pain, and is asking to be seen as soon as possible. Patient was offered the next available  (11/22/23), and declined. Patient is asking to be seen sooner. Patient can be reached at [phone]. yes

## 2023-11-13 ENCOUNTER — PHARMACY VISIT (OUTPATIENT)
Age: 58
End: 2023-11-13

## 2023-11-13 DIAGNOSIS — N18.4 TYPE 2 DIABETES MELLITUS WITH STAGE 4 CHRONIC KIDNEY DISEASE, WITH LONG-TERM CURRENT USE OF INSULIN (HCC): Primary | ICD-10-CM

## 2023-11-13 DIAGNOSIS — Z79.4 TYPE 2 DIABETES MELLITUS WITH STAGE 4 CHRONIC KIDNEY DISEASE, WITH LONG-TERM CURRENT USE OF INSULIN (HCC): Primary | ICD-10-CM

## 2023-11-13 DIAGNOSIS — E11.22 TYPE 2 DIABETES MELLITUS WITH STAGE 4 CHRONIC KIDNEY DISEASE, WITH LONG-TERM CURRENT USE OF INSULIN (HCC): Primary | ICD-10-CM

## 2023-11-15 ENCOUNTER — TELEPHONE (OUTPATIENT)
Age: 58
End: 2023-11-15

## 2023-11-15 NOTE — TELEPHONE ENCOUNTER
Patient called and said she is having a lot of Left foot pain. That it hurts so bad, she can hardly walk.    Patient schedule the first available appt with  on 12/4/23.    Patient is asking if their is anyway  could see her sooner.     Patient tel. [phone]    Note: patient last seen by  on 09/07/23 for the left foot and ankle.

## 2023-11-19 ENCOUNTER — HOSPITAL ENCOUNTER (EMERGENCY)
Facility: HOSPITAL | Age: 58
Discharge: HOME | End: 2023-11-19
Attending: EMERGENCY MEDICINE
Payer: MEDICARE

## 2023-11-19 VITALS
TEMPERATURE: 98.5 F | BODY MASS INDEX: 27.6 KG/M2 | SYSTOLIC BLOOD PRESSURE: 117 MMHG | WEIGHT: 150 LBS | HEART RATE: 83 BPM | HEIGHT: 62 IN | OXYGEN SATURATION: 100 % | DIASTOLIC BLOOD PRESSURE: 71 MMHG | RESPIRATION RATE: 17 BRPM

## 2023-11-19 DIAGNOSIS — S00.412A ABRASION OF LEFT EAR, INITIAL ENCOUNTER: ICD-10-CM

## 2023-11-19 DIAGNOSIS — G44.039 EPISODIC PAROXYSMAL HEMICRANIA, NOT INTRACTABLE: Primary | ICD-10-CM

## 2023-11-19 PROCEDURE — 99283 EMERGENCY DEPT VISIT LOW MDM: CPT

## 2023-11-19 RX ORDER — BUTALBITAL, ACETAMINOPHEN AND CAFFEINE 300; 40; 50 MG/1; MG/1; MG/1
1 CAPSULE ORAL EVERY 4 HOURS PRN
Qty: 20 CAPSULE | Refills: 0 | Status: SHIPPED | OUTPATIENT
Start: 2023-11-19

## 2023-11-19 ASSESSMENT — PAIN - FUNCTIONAL ASSESSMENT: PAIN_FUNCTIONAL_ASSESSMENT: NONE - DENIES PAIN

## 2023-11-19 NOTE — ED PROVIDER NOTES
EMERGENCY DEPARTMENT HISTORY AND PHYSICAL EXAM      Patient Name: Mumtaz Caicedo  MRN: [id number]  YOB: 1965  Provider: Tramaine Cornejo MD  PCP: Beulah Enamorado DNP   Time/Date of evaluation: 6:56 PM EST on 11/19/23    History of Presenting Illness     No chief complaint on file.       History Provided By: {History Source:62983::\"Patient\"}     History (Narative):   Mumtaz Caicedo is a 60 y.o. female {PMHx:47588::\"with a PMHX of ***\"} who presents to the emergency department  {Arrival LYES:98572} C/O *** onset ***. ***        Past History     Past Medical History:  Past Medical History:   Diagnosis Date    Acquired cyst of kidney 01/16/2020    naseem    Asthma     Bilateral great toe fractures 2014    Cerebrospinal fluid leak from spinal puncture 1/3/2022    Chronic bilateral low back pain without sciatica 4/3/2022    Chronic kidney disease (CKD), stage IV (severe) ([address]) 06/2022    Dr Busch Been    Chronic sinusitis     Dr. Adri Marrufo in the past    Colon polyp 5/16    Dr Leyla Mccarthy    Depression 2019    Diabetes mellitus ([address])     DJD (degenerative joint disease) of cervical spine 2016    DJD (degenerative joint disease), lumbar 2013    MRI c spine w degen changes; Dr Cat Kwan    Dyslipidemia     calculated 10 year risk score was 2.0% (12/13)    Edema of both ankles 8/28/2018    Edema of both lower extremities 4/3/2022    Environmental and seasonal allergies 8/28/2018    Eustachian tube dysfunction     Fibrocystic breast     Dr Paulino Patel    GERD (gastroesophageal reflux disease)     Hypertriglyceridemia 8/28/2018    Lateral epicondylitis of both elbows 2/6/2017    Macular degeneration of both eyes 08/28/2018    Dr Faith Lr, [facility]    Migraine 6/19/2014    Mild intermittent asthma without complication 06/95/9981    Dr. Barry Wren;  ratio 68%, FEV1 78% w 6% inc postbd, TLC 77, RV 56, DLCO 61%    Moderate malnutrition ([address]) 3/5/2021    Morbid obesity (HCC)     peak weight 196 lbs, bmi 35.8 from 10/13    Neuropathy

## 2023-11-19 NOTE — ED TRIAGE NOTES
Pt ambulatory to room c/o a nose bleed that came out in clumps (yesterday) and a headache soon after the bleeding stopped. Pt denies being on blood thinners.  Pt states \"I scratched my ear and blood was on the tissue so I wanted to make sure I do not have a bleed going on in my ear\"

## 2023-11-20 ENCOUNTER — TELEPHONE (OUTPATIENT)
Age: 58
End: 2023-11-20

## 2023-11-20 NOTE — TELEPHONE ENCOUNTER
Patient called this morning stating that she went to the er yesterday for bleeding from her ear and nose. She do not think she was diagnose correctly, they told her that it was just a scratch in her ear, She is also this morning having migraines. Please Advise

## 2023-11-20 NOTE — ED NOTES
I have reviewed discharge instructions with the patient. The patient verbalized understanding. Discharge medications reviewed with the patient and appropriate educational materials and side effects teaching were provided.          Tashia Mcpherson RN  11/19/23 1136

## 2023-11-21 ENCOUNTER — OFFICE VISIT (OUTPATIENT)
Age: 58
End: 2023-11-21
Payer: MEDICARE

## 2023-11-21 VITALS
HEIGHT: 62 IN | DIASTOLIC BLOOD PRESSURE: 74 MMHG | HEART RATE: 82 BPM | TEMPERATURE: 97.1 F | BODY MASS INDEX: 28.16 KG/M2 | SYSTOLIC BLOOD PRESSURE: 106 MMHG | RESPIRATION RATE: 18 BRPM | WEIGHT: 153 LBS | OXYGEN SATURATION: 100 %

## 2023-11-21 DIAGNOSIS — S93.492S SPRAIN OF ANTERIOR TALOFIBULAR LIGAMENT OF LEFT ANKLE, SEQUELA: ICD-10-CM

## 2023-11-21 DIAGNOSIS — G43.919 INTRACTABLE MIGRAINE WITHOUT STATUS MIGRAINOSUS, UNSPECIFIED MIGRAINE TYPE: ICD-10-CM

## 2023-11-21 DIAGNOSIS — G89.29 CHRONIC MIDLINE LOW BACK PAIN WITH LEFT-SIDED SCIATICA: ICD-10-CM

## 2023-11-21 DIAGNOSIS — S00.412S: Primary | ICD-10-CM

## 2023-11-21 DIAGNOSIS — M54.42 CHRONIC MIDLINE LOW BACK PAIN WITH LEFT-SIDED SCIATICA: ICD-10-CM

## 2023-11-21 PROBLEM — M76.72: Status: RESOLVED | Noted: 2023-04-07 | Resolved: 2023-11-21

## 2023-11-21 PROBLEM — Z71.89 ACP (ADVANCE CARE PLANNING): Status: RESOLVED | Noted: 2023-04-07 | Resolved: 2023-11-21

## 2023-11-21 PROBLEM — S00.412A ABRASION OF LEFT EAR: Status: ACTIVE | Noted: 2023-11-21

## 2023-11-21 PROBLEM — M25.572 CHRONIC PAIN OF LEFT ANKLE: Status: RESOLVED | Noted: 2023-04-07 | Resolved: 2023-11-21

## 2023-11-21 PROBLEM — S93.492A SPRAIN OF ANTERIOR TALOFIBULAR LIGAMENT OF LEFT ANKLE: Status: ACTIVE | Noted: 2023-11-21

## 2023-11-21 PROCEDURE — G8417 CALC BMI ABV UP PARAM F/U: HCPCS | Performed by: NURSE PRACTITIONER

## 2023-11-21 PROCEDURE — 3074F SYST BP LT 130 MM HG: CPT | Performed by: NURSE PRACTITIONER

## 2023-11-21 PROCEDURE — 3078F DIAST BP <80 MM HG: CPT | Performed by: NURSE PRACTITIONER

## 2023-11-21 PROCEDURE — 3017F COLORECTAL CA SCREEN DOC REV: CPT | Performed by: NURSE PRACTITIONER

## 2023-11-21 PROCEDURE — G8482 FLU IMMUNIZE ORDER/ADMIN: HCPCS | Performed by: NURSE PRACTITIONER

## 2023-11-21 PROCEDURE — G8427 DOCREV CUR MEDS BY ELIG CLIN: HCPCS | Performed by: NURSE PRACTITIONER

## 2023-11-21 PROCEDURE — 99214 OFFICE O/P EST MOD 30 MIN: CPT | Performed by: NURSE PRACTITIONER

## 2023-11-21 PROCEDURE — 1036F TOBACCO NON-USER: CPT | Performed by: NURSE PRACTITIONER

## 2023-11-21 RX ORDER — LIDOCAINE 50 MG/G
1 PATCH TOPICAL DAILY
Qty: 30 PATCH | Refills: 0 | Status: SHIPPED | OUTPATIENT
Start: 2023-11-21 | End: 2023-12-21

## 2023-11-21 SDOH — ECONOMIC STABILITY: FOOD INSECURITY: WITHIN THE PAST 12 MONTHS, YOU WORRIED THAT YOUR FOOD WOULD RUN OUT BEFORE YOU GOT MONEY TO BUY MORE.: NEVER TRUE

## 2023-11-21 SDOH — ECONOMIC STABILITY: INCOME INSECURITY: HOW HARD IS IT FOR YOU TO PAY FOR THE VERY BASICS LIKE FOOD, HOUSING, MEDICAL CARE, AND HEATING?: NOT HARD AT ALL

## 2023-11-21 SDOH — ECONOMIC STABILITY: FOOD INSECURITY: WITHIN THE PAST 12 MONTHS, THE FOOD YOU BOUGHT JUST DIDN'T LAST AND YOU DIDN'T HAVE MONEY TO GET MORE.: NEVER TRUE

## 2023-11-21 ASSESSMENT — PATIENT HEALTH QUESTIONNAIRE - PHQ9
SUM OF ALL RESPONSES TO PHQ9 QUESTIONS 1 & 2: 0
2. FEELING DOWN, DEPRESSED OR HOPELESS: 0
SUM OF ALL RESPONSES TO PHQ QUESTIONS 1-9: 0
1. LITTLE INTEREST OR PLEASURE IN DOING THINGS: 0
SUM OF ALL RESPONSES TO PHQ QUESTIONS 1-9: 0

## 2023-11-21 NOTE — ASSESSMENT & PLAN NOTE
Pt has long sharp fingernails, instructed pt to avoid using her nails to scratch her inner ear. No further tx needed at this time.

## 2023-11-21 NOTE — ASSESSMENT & PLAN NOTE
Had lumbar injection Dr Silvina Nolan 12/2021. Procedure had to be stopped x2 due to pt having left leg pain. Developed H/A. Linette Del Real did epidural blood patch. Topamax and Mag Ox has held off sx until 2 days ago. According to pt, Dr Jamaal John was going to refer pt to spine due to c/o sciatica and low back pain. Pt declined because she did not want to see Dr Silvina Nolan. Pt seeking referral to another spine specialist  Referral placed.

## 2023-11-21 NOTE — PROGRESS NOTES
Heydi Alvarez presents today for   Chief Complaint   Patient presents with    Follow-Up from Hospital       ER nose and ears bleeding                 1. \"Have you been to the ER, urgent care clinic since your last visit? Hospitalized since your last visit? \" no    2. \"Have you seen or consulted any other health care providers outside of the [address] [place] [address] since your last visit? \" no     3. For patients aged 43-73: Has the patient had a colonoscopy / FIT/ Cologuard? Yes - no Care Gap present      If the patient is female:    4. For patients aged 43-66: Has the patient had a mammogram within the past 2 years? Yes - no Care Gap present      5. For patients aged 21-65: Has the patient had a pap smear?  Yes - no Care Gap present
Problems Paternal Aunt     No Known Problems Paternal Uncle     No Known Problems Maternal Grandmother     No Known Problems Maternal Grandfather     No Known Problems Paternal Grandmother     No Known Problems Paternal Grandfather     No Known Problems Other      Social History:   She  reports that she has never smoked. She has never used smokeless tobacco.   Social History     Substance and Sexual Activity   Alcohol Use Yes    Alcohol/week: 0.0 standard drinks of alcohol     Immunization History:  Immunization History   Administered Date(s) Administered    Influenza Virus Vaccine 09/28/2017, 11/26/2018, 02/15/2021, 10/07/2021, 12/09/2022, 10/05/2023    Influenza, FLUARIX, FLULAVAL, FLUZONE (age 10 mo+) AND AFLURIA, (age 1 y+), PF, 0.5mL 09/28/2017, 11/26/2018, 12/09/2022    Influenza, FLUCELVAX, (age 10 mo+), MDCK, PF, 0.5mL 10/03/2023    Pneumococcal, PPSV23, PNEUMOVAX 21, (age 2y+), SC/IM, 0.5mL 09/28/2017    TDaP, ADACEL (age 6y-58y), [facility] (age 10y+), IM, 0.5mL 04/24/2013         Return if symptoms worsen or fail to improve.       Dr. Almodovar Degree, AGNP-C, DNP  [facility]

## 2023-11-21 NOTE — ASSESSMENT & PLAN NOTE
Had lumbar injection Dr Margaret Tolbert 12/2021. Procedure had to be stopped x2 due to pt having left leg pain. Developed H/A. Livia Bhakti did epidural blood patch. Topamax and Mag Ox has held off sx until 2 days ago.     Encouraged pt to take Fioricet as prescribed  Cont topamax 100mg BID  Cont Mag Ox 400mg qd  If no relief with sx, will refer to neuro

## 2023-12-04 ENCOUNTER — OFFICE VISIT (OUTPATIENT)
Age: 58
End: 2023-12-04
Payer: MEDICARE

## 2023-12-04 DIAGNOSIS — E11.40 DIABETIC NEUROPATHY, PAINFUL (HCC): ICD-10-CM

## 2023-12-04 DIAGNOSIS — I73.9 PAD (PERIPHERAL ARTERY DISEASE) (HCC): ICD-10-CM

## 2023-12-04 DIAGNOSIS — S86.312A TEAR OF PERONEAL TENDON, LEFT, INITIAL ENCOUNTER: Primary | ICD-10-CM

## 2023-12-04 DIAGNOSIS — D86.9 SARCOIDOSIS: ICD-10-CM

## 2023-12-04 DIAGNOSIS — N28.9 RENAL DISEASE: ICD-10-CM

## 2023-12-04 PROCEDURE — G8417 CALC BMI ABV UP PARAM F/U: HCPCS | Performed by: ORTHOPAEDIC SURGERY

## 2023-12-04 PROCEDURE — 1036F TOBACCO NON-USER: CPT | Performed by: ORTHOPAEDIC SURGERY

## 2023-12-04 PROCEDURE — G8427 DOCREV CUR MEDS BY ELIG CLIN: HCPCS | Performed by: ORTHOPAEDIC SURGERY

## 2023-12-04 PROCEDURE — 99214 OFFICE O/P EST MOD 30 MIN: CPT | Performed by: ORTHOPAEDIC SURGERY

## 2023-12-04 PROCEDURE — 3017F COLORECTAL CA SCREEN DOC REV: CPT | Performed by: ORTHOPAEDIC SURGERY

## 2023-12-04 PROCEDURE — G8482 FLU IMMUNIZE ORDER/ADMIN: HCPCS | Performed by: ORTHOPAEDIC SURGERY

## 2023-12-04 NOTE — PROGRESS NOTES
resulted in some phonetic based errors in grammar and contents. Even though attempts were made to correct all the mistakes, some may have been missed, and remained in the body of the document. If questions arise, please contact our department. An electronic signature was used to authenticate this note. Tae Nieto may have a reminder for a \"due or due soon\" health maintenance. I have asked that she contact her primary care provider for follow-up on this health maintenance. Documentation by wilian Raza, as dictated by Blanca Wolf MD on 12/4/2023.

## 2023-12-05 RX ORDER — TOPIRAMATE 100 MG/1
100 TABLET, FILM COATED ORAL 2 TIMES DAILY
Qty: 180 TABLET | Refills: 3 | OUTPATIENT
Start: 2023-12-05

## 2023-12-10 DIAGNOSIS — E78.2 MIXED HYPERLIPIDEMIA: ICD-10-CM

## 2023-12-12 RX ORDER — EZETIMIBE 10 MG/1
10 TABLET ORAL DAILY
Qty: 90 TABLET | Refills: 2 | OUTPATIENT
Start: 2023-12-12

## 2023-12-14 ENCOUNTER — TELEPHONE (OUTPATIENT)
Age: 58
End: 2023-12-14

## 2023-12-14 NOTE — TELEPHONE ENCOUNTER
Patient requested cardiac clearance for left tendon repair in January. Verbal order and read back per Glory Media, DO    Dr. Sumaya Rodriguez reviewed patient's chart, and she is low to moderate risk. Unclear what doctor is doing the procedure, but letter for clearance is in the chart.

## 2024-01-03 ENCOUNTER — HOSPITAL ENCOUNTER (OUTPATIENT)
Facility: HOSPITAL | Age: 59
Setting detail: INFUSION SERIES
Discharge: HOME | End: 2024-01-06
Payer: MEDICARE

## 2024-01-03 VITALS
HEART RATE: 82 BPM | OXYGEN SATURATION: 95 % | TEMPERATURE: 98 F | DIASTOLIC BLOOD PRESSURE: 65 MMHG | RESPIRATION RATE: 16 BRPM | SYSTOLIC BLOOD PRESSURE: 99 MMHG

## 2024-01-03 DIAGNOSIS — J45.50 SEVERE PERSISTENT ASTHMA, UNSPECIFIED WHETHER COMPLICATED: Primary | ICD-10-CM

## 2024-01-03 PROCEDURE — 6360000002 HC RX W HCPCS: Performed by: INTERNAL MEDICINE

## 2024-01-03 PROCEDURE — 96372 THER/PROPH/DIAG INJ SC/IM: CPT

## 2024-01-03 RX ORDER — ACETAMINOPHEN 325 MG/1
650 TABLET ORAL
OUTPATIENT
Start: 2024-02-28

## 2024-01-03 RX ORDER — DIPHENHYDRAMINE HYDROCHLORIDE 50 MG/ML
50 INJECTION INTRAMUSCULAR; INTRAVENOUS
OUTPATIENT
Start: 2024-02-28

## 2024-01-03 RX ORDER — ONDANSETRON 2 MG/ML
8 INJECTION INTRAMUSCULAR; INTRAVENOUS
OUTPATIENT
Start: 2024-02-28

## 2024-01-03 RX ORDER — ALBUTEROL SULFATE 90 UG/1
4 AEROSOL, METERED RESPIRATORY (INHALATION) PRN
OUTPATIENT
Start: 2024-02-28

## 2024-01-03 RX ORDER — EPINEPHRINE 1 MG/ML
0.3 INJECTION, SOLUTION, CONCENTRATE INTRAVENOUS PRN
OUTPATIENT
Start: 2024-02-28

## 2024-01-03 RX ORDER — SODIUM CHLORIDE 9 MG/ML
INJECTION, SOLUTION INTRAVENOUS CONTINUOUS
OUTPATIENT
Start: 2024-02-28

## 2024-01-03 RX ADMIN — BENRALIZUMAB 30 MG: 30 INJECTION, SOLUTION SUBCUTANEOUS at 14:32

## 2024-01-03 ASSESSMENT — PAIN DESCRIPTION - DESCRIPTORS: DESCRIPTORS: ACHING;THROBBING

## 2024-01-03 ASSESSMENT — PAIN - FUNCTIONAL ASSESSMENT: PAIN_FUNCTIONAL_ASSESSMENT: 0-10

## 2024-01-03 NOTE — PROGRESS NOTES
[facility] Progress Note    Date: January 3, 2024    Name: Maribell Marshall    MRN: [id number]         : 1965    Ms. Marshall arrived in the [facility] today at 1415, in stable condition, here for her FASENRA INJECTION (EVERY 8 WEEKS). She was assessed and education was provided.     Ms. Marshall's vitals were reviewed.  Vitals:    24 1415   BP: 99/65   Pulse: 82   Resp: 16   Temp: 98 °F (36.7 °C)   SpO2: 95%         Ms. Marshall stated that she was doing well, and voiced no major complaints other than her chronic intermittent migraine headache pain, which she rated at # 8 on the pain scale today.     Also, she stated that she continues to tolerate the FASENRA injections well and without any problems.         Benralizumab (FASENRA) 30 mg, was administered SQ, in the back of her LEFT arm at 1432, per order and without incident.       Ms. Marshall tolerated well and voiced no complaints.     [facility] at 1435.     She is to return in 8 weeks, on 24 at 1130, for her next appointment, for her next FASENRA injection.     Sona Valverde RN  January 3, 2024  2:30 PM

## 2024-01-05 ENCOUNTER — TELEPHONE (OUTPATIENT)
Age: 59
End: 2024-01-05

## 2024-01-05 NOTE — TELEPHONE ENCOUNTER
Spoke with Twyla with Dr. Owens's office. Patient last seen 3/2023 to return in 6 months for follow up. Twyla understands and did not think he would be able to do the clearance from that long ago. She will calll back after taking with Dr. Owens and when he was going to do the surgery

## 2024-01-05 NOTE — TELEPHONE ENCOUNTER
Twyla from [facility] called to inquiry if patient can be cleared for surgery (Left foot Tendon tear) on 1.26.24 .  Please contact Twyla at [phone]

## 2024-01-10 DIAGNOSIS — S86.312A TEAR OF PERONEAL TENDON OF LEFT FOOT: ICD-10-CM

## 2024-01-10 DIAGNOSIS — Z01.818 PREOP EXAMINATION: Primary | ICD-10-CM

## 2024-01-10 RX ORDER — BLOOD SUGAR DIAGNOSTIC
STRIP MISCELLANEOUS
Qty: 300 STRIP | Refills: 5 | Status: SHIPPED | OUTPATIENT
Start: 2024-01-10

## 2024-01-12 ENCOUNTER — HOSPITAL ENCOUNTER (OUTPATIENT)
Facility: HOSPITAL | Age: 59
End: 2024-01-12
Payer: MEDICARE

## 2024-01-12 DIAGNOSIS — Z01.818 PREOP EXAMINATION: ICD-10-CM

## 2024-01-12 LAB
25(OH)D3 SERPL-MCNC: 56.2 NG/ML (ref 30–100)
ALBUMIN SERPL-MCNC: 3.4 G/DL (ref 3.4–5)
ALBUMIN/GLOB SERPL: 1.1 (ref 0.8–1.7)
ALP SERPL-CCNC: 81 U/L (ref 45–117)
ALT SERPL-CCNC: 51 U/L (ref 13–56)
ANION GAP SERPL CALC-SCNC: 3 MMOL/L (ref 3–18)
AST SERPL-CCNC: 42 U/L (ref 10–38)
BASOPHILS # BLD: 0 K/UL (ref 0–0.1)
BASOPHILS NFR BLD: 0 % (ref 0–2)
BILIRUB SERPL-MCNC: 0.3 MG/DL (ref 0.2–1)
BUN SERPL-MCNC: 34 MG/DL (ref 7–18)
BUN/CREAT SERPL: 15 (ref 12–20)
CALCIUM SERPL-MCNC: 9.3 MG/DL (ref 8.5–10.1)
CHLORIDE SERPL-SCNC: 111 MMOL/L (ref 100–111)
CO2 SERPL-SCNC: 29 MMOL/L (ref 21–32)
CREAT SERPL-MCNC: 2.23 MG/DL (ref 0.6–1.3)
DIFFERENTIAL METHOD BLD: ABNORMAL
EOSINOPHIL # BLD: 0 K/UL (ref 0–0.4)
EOSINOPHIL NFR BLD: 0 % (ref 0–5)
ERYTHROCYTE [DISTWIDTH] IN BLOOD BY AUTOMATED COUNT: 12.3 % (ref 11.6–14.5)
EST. AVERAGE GLUCOSE BLD GHB EST-MCNC: 146 MG/DL
GLOBULIN SER CALC-MCNC: 3.2 G/DL (ref 2–4)
GLUCOSE SERPL-MCNC: 106 MG/DL (ref 74–99)
HBA1C MFR BLD: 6.7 % (ref 4.2–5.6)
HCT VFR BLD AUTO: 41.2 % (ref 35–45)
HGB BLD-MCNC: 13 G/DL (ref 12–16)
IMM GRANULOCYTES # BLD AUTO: 0 K/UL (ref 0–0.04)
IMM GRANULOCYTES NFR BLD AUTO: 0 % (ref 0–0.5)
INR PPP: 1 (ref 0.9–1.1)
LYMPHOCYTES # BLD: 2.4 K/UL (ref 0.9–3.6)
LYMPHOCYTES NFR BLD: 39 % (ref 21–52)
MCH RBC QN AUTO: 31.5 PG (ref 24–34)
MCHC RBC AUTO-ENTMCNC: 31.6 G/DL (ref 31–37)
MCV RBC AUTO: 99.8 FL (ref 78–100)
MONOCYTES # BLD: 0.6 K/UL (ref 0.05–1.2)
MONOCYTES NFR BLD: 10 % (ref 3–10)
NEUTS SEG # BLD: 3.1 K/UL (ref 1.8–8)
NEUTS SEG NFR BLD: 51 % (ref 40–73)
NRBC # BLD: 0 K/UL (ref 0–0.01)
NRBC BLD-RTO: 0 PER 100 WBC
PLATELET # BLD AUTO: 161 K/UL (ref 135–420)
PMV BLD AUTO: 11.1 FL (ref 9.2–11.8)
POTASSIUM SERPL-SCNC: 3.9 MMOL/L (ref 3.5–5.5)
PROT SERPL-MCNC: 6.6 G/DL (ref 6.4–8.2)
PROTHROMBIN TIME: 12.8 SEC (ref 11.9–14.7)
RBC # BLD AUTO: 4.13 M/UL (ref 4.2–5.3)
SODIUM SERPL-SCNC: 143 MMOL/L (ref 136–145)
WBC # BLD AUTO: 6.1 K/UL (ref 4.6–13.2)

## 2024-01-12 PROCEDURE — 85025 COMPLETE CBC W/AUTO DIFF WBC: CPT

## 2024-01-12 PROCEDURE — 80053 COMPREHEN METABOLIC PANEL: CPT

## 2024-01-12 PROCEDURE — 83036 HEMOGLOBIN GLYCOSYLATED A1C: CPT

## 2024-01-12 PROCEDURE — 85610 PROTHROMBIN TIME: CPT

## 2024-01-12 PROCEDURE — 82306 VITAMIN D 25 HYDROXY: CPT

## 2024-01-12 PROCEDURE — 36415 COLL VENOUS BLD VENIPUNCTURE: CPT

## 2024-01-15 DIAGNOSIS — S93.492A SPRAIN OF ANTERIOR TALOFIBULAR LIGAMENT OF LEFT ANKLE, INITIAL ENCOUNTER: Primary | ICD-10-CM

## 2024-01-15 NOTE — PROGRESS NOTES
Instructions for your surgery at [facility]      Today's Date:  1/15/2024      Patient's Name:  Maribell Marshall           Surgery Date:  1/26/2024              Please enter the main entrance of the hospital and check-in at the  located in the lobby. Once checked in at the , you will take the elevators to the second floor, and report to the waiting room on the left. The room will say Procedure Registration.    Do NOT eat or drink anything, including candy, gum, or ice chips after midnight prior to your surgery, unless you have specific instructions from your surgeon or anesthesia provider to do so.  Brush your teeth before coming to the hospital. You may swish with water, but do not swallow.  No smoking/Vaping/E-Cigarettes 24 hours prior to the day of surgery.  No alcohol 24 hours prior to the day of surgery.  No recreational drugs for one week prior to the day of surgery.  Bring Photo ID, Insurance information, and Co-pay if required on day of surgery.  Bring in pertinent legal documents, such as, Medical Power of , DNR, Advance Directive, etc.  Leave all valuables, including money/purse, at home.  Remove all jewelry, including ALL body piercings, nail polish, acrylic nails, and makeup (including mascara); no lotions, powders, deodorant, or perfume/cologne/after shave on the skin.  Follow instruction for Hibiclens washes and CHG wipes from surgeon's office.   Glasses and dentures may be worn to the hospital. They must be removed prior to surgery. Please bring case/container for glasses or dentures.   Contact lenses should not be worn on day of surgery.   Call your doctor's office if symptoms of a cold or illness develop within 24-48 hours prior to your surgery.  Call your doctor's office if you have any questions concerning insurance or co-pays.  15. AN ADULT (relative or friend 18 years or older) MUST DRIVE YOU HOME AFTER YOUR SURGERY.  16. Please make

## 2024-01-16 ENCOUNTER — OFFICE VISIT (OUTPATIENT)
Age: 59
End: 2024-01-16
Payer: MEDICARE

## 2024-01-16 ENCOUNTER — HOSPITAL ENCOUNTER (OUTPATIENT)
Facility: HOSPITAL | Age: 59
Discharge: HOME | End: 2024-01-19
Payer: MEDICARE

## 2024-01-16 VITALS
SYSTOLIC BLOOD PRESSURE: 118 MMHG | DIASTOLIC BLOOD PRESSURE: 70 MMHG | RESPIRATION RATE: 16 BRPM | TEMPERATURE: 98.1 F | HEART RATE: 72 BPM | WEIGHT: 154 LBS | OXYGEN SATURATION: 95 % | BODY MASS INDEX: 28.34 KG/M2 | HEIGHT: 62 IN

## 2024-01-16 DIAGNOSIS — D86.9 SARCOIDOSIS: ICD-10-CM

## 2024-01-16 DIAGNOSIS — G89.29 CHRONIC MIDLINE LOW BACK PAIN WITH LEFT-SIDED SCIATICA: ICD-10-CM

## 2024-01-16 DIAGNOSIS — Z01.818 PREOPERATIVE CLEARANCE: ICD-10-CM

## 2024-01-16 DIAGNOSIS — Z79.4 TYPE 2 DIABETES MELLITUS WITH STAGE 4 CHRONIC KIDNEY DISEASE, WITH LONG-TERM CURRENT USE OF INSULIN (HCC): Primary | ICD-10-CM

## 2024-01-16 DIAGNOSIS — E11.22 TYPE 2 DIABETES MELLITUS WITH STAGE 4 CHRONIC KIDNEY DISEASE, WITH LONG-TERM CURRENT USE OF INSULIN (HCC): Primary | ICD-10-CM

## 2024-01-16 DIAGNOSIS — E11.40 DIABETIC NEUROPATHY, PAINFUL (HCC): ICD-10-CM

## 2024-01-16 DIAGNOSIS — N18.4 CKD (CHRONIC KIDNEY DISEASE) STAGE 4, GFR 15-29 ML/MIN (HCC): ICD-10-CM

## 2024-01-16 DIAGNOSIS — I10 PRIMARY HYPERTENSION: ICD-10-CM

## 2024-01-16 DIAGNOSIS — M54.42 CHRONIC MIDLINE LOW BACK PAIN WITH LEFT-SIDED SCIATICA: ICD-10-CM

## 2024-01-16 DIAGNOSIS — S86.012A ACHILLES TENDON TEAR, LEFT, INITIAL ENCOUNTER: ICD-10-CM

## 2024-01-16 DIAGNOSIS — S93.492A SPRAIN OF ANTERIOR TALOFIBULAR LIGAMENT OF LEFT ANKLE, INITIAL ENCOUNTER: ICD-10-CM

## 2024-01-16 DIAGNOSIS — N18.4 TYPE 2 DIABETES MELLITUS WITH STAGE 4 CHRONIC KIDNEY DISEASE, WITH LONG-TERM CURRENT USE OF INSULIN (HCC): Primary | ICD-10-CM

## 2024-01-16 DIAGNOSIS — J44.1 CHRONIC OBSTRUCTIVE PULMONARY DISEASE WITH ACUTE EXACERBATION (HCC): ICD-10-CM

## 2024-01-16 PROCEDURE — 2022F DILAT RTA XM EVC RTNOPTHY: CPT | Performed by: NURSE PRACTITIONER

## 2024-01-16 PROCEDURE — 3044F HG A1C LEVEL LT 7.0%: CPT | Performed by: NURSE PRACTITIONER

## 2024-01-16 PROCEDURE — 99211 OFF/OP EST MAY X REQ PHY/QHP: CPT | Performed by: NURSE PRACTITIONER

## 2024-01-16 PROCEDURE — 3023F SPIROM DOC REV: CPT | Performed by: NURSE PRACTITIONER

## 2024-01-16 PROCEDURE — 3017F COLORECTAL CA SCREEN DOC REV: CPT | Performed by: NURSE PRACTITIONER

## 2024-01-16 PROCEDURE — 99214 OFFICE O/P EST MOD 30 MIN: CPT | Performed by: NURSE PRACTITIONER

## 2024-01-16 PROCEDURE — 3078F DIAST BP <80 MM HG: CPT | Performed by: NURSE PRACTITIONER

## 2024-01-16 PROCEDURE — 71046 X-RAY EXAM CHEST 2 VIEWS: CPT

## 2024-01-16 PROCEDURE — 3074F SYST BP LT 130 MM HG: CPT | Performed by: NURSE PRACTITIONER

## 2024-01-16 PROCEDURE — G8482 FLU IMMUNIZE ORDER/ADMIN: HCPCS | Performed by: NURSE PRACTITIONER

## 2024-01-16 PROCEDURE — G8417 CALC BMI ABV UP PARAM F/U: HCPCS | Performed by: NURSE PRACTITIONER

## 2024-01-16 PROCEDURE — 1036F TOBACCO NON-USER: CPT | Performed by: NURSE PRACTITIONER

## 2024-01-16 PROCEDURE — G8427 DOCREV CUR MEDS BY ELIG CLIN: HCPCS | Performed by: NURSE PRACTITIONER

## 2024-01-16 RX ORDER — INSULIN GLARGINE 100 [IU]/ML
30 INJECTION, SOLUTION SUBCUTANEOUS NIGHTLY
Qty: 5 ADJUSTABLE DOSE PRE-FILLED PEN SYRINGE | Refills: 5 | Status: SHIPPED | OUTPATIENT
Start: 2024-01-16

## 2024-01-16 RX ORDER — TOPIRAMATE 100 MG/1
100 TABLET, FILM COATED ORAL 2 TIMES DAILY
Qty: 186 TABLET | Refills: 1 | Status: SHIPPED | OUTPATIENT
Start: 2024-01-16

## 2024-01-16 RX ORDER — SEMAGLUTIDE 2.68 MG/ML
2 INJECTION, SOLUTION SUBCUTANEOUS
Qty: 12 ML | Refills: 1 | Status: SHIPPED | OUTPATIENT
Start: 2024-01-16

## 2024-01-16 ASSESSMENT — PATIENT HEALTH QUESTIONNAIRE - PHQ9
5. POOR APPETITE OR OVEREATING: 0
10. IF YOU CHECKED OFF ANY PROBLEMS, HOW DIFFICULT HAVE THESE PROBLEMS MADE IT FOR YOU TO DO YOUR WORK, TAKE CARE OF THINGS AT HOME, OR GET ALONG WITH OTHER PEOPLE: 0
1. LITTLE INTEREST OR PLEASURE IN DOING THINGS: 0
6. FEELING BAD ABOUT YOURSELF - OR THAT YOU ARE A FAILURE OR HAVE LET YOURSELF OR YOUR FAMILY DOWN: 0
7. TROUBLE CONCENTRATING ON THINGS, SUCH AS READING THE NEWSPAPER OR WATCHING TELEVISION: 0
8. MOVING OR SPEAKING SO SLOWLY THAT OTHER PEOPLE COULD HAVE NOTICED. OR THE OPPOSITE, BEING SO FIGETY OR RESTLESS THAT YOU HAVE BEEN MOVING AROUND A LOT MORE THAN USUAL: 0
SUM OF ALL RESPONSES TO PHQ QUESTIONS 1-9: 0
3. TROUBLE FALLING OR STAYING ASLEEP: 0
SUM OF ALL RESPONSES TO PHQ QUESTIONS 1-9: 0
SUM OF ALL RESPONSES TO PHQ9 QUESTIONS 1 & 2: 0
9. THOUGHTS THAT YOU WOULD BE BETTER OFF DEAD, OR OF HURTING YOURSELF: 0
2. FEELING DOWN, DEPRESSED OR HOPELESS: 0
SUM OF ALL RESPONSES TO PHQ QUESTIONS 1-9: 0
4. FEELING TIRED OR HAVING LITTLE ENERGY: 0
SUM OF ALL RESPONSES TO PHQ QUESTIONS 1-9: 0

## 2024-01-16 NOTE — ASSESSMENT & PLAN NOTE
Monitored by specialist- no acute findings meriting change in the plan  Dr Chaney  MRI scheduled for next week. Possible surgery

## 2024-01-16 NOTE — ASSESSMENT & PLAN NOTE
General Information:  Procedure/Surgery:Excisional debridement of left peroneal brevis tendon tenosynovectomy, primary repair of peroneal brevis tendon tear, possible allograft tendon.   Date of Procedure/Surgery: 1/26/24  Surgeon: Dr Wolf  Surgery status: Elective  Surgery risk: Intermediate (head/neck, intraperitoneal, intrathoracic, orthopedic, and prostate     Cardiovascular Risk Factors:  1. Coronary revascularization within 5 years: no  2. Recurrent chest pain: no  3. Shortness of breath:  no  4. Recent coronary evaluation/stress test/angiogram:  no  5. Recent MI (less than 1 month ago):  no  6. Prior MI (by way of history or pathological waves):  no  7. Compensated CHF or h/o CHF:  no  8. Severe valvular disease:  no  9. Decompensated CHF:  no  10. High-grade atrioventricular block:  no  11. Arrhythmia:  no     Other Risk Factors:  1. Diabetes hx:  yes Controlled: Yes   2. H/o CVA:  no  3. Uncontrolled hypertension:  no  4, Advanced age:  no  5. Low functional capacity:  no    DIAGNOSTICS:   1. EKG:  normal EKG, normal sinus rhythm, unchanged from previous tracings  2. CXR: IMPRESSION:1. Bibasilar streaky opacities, likely atelectasis or scarring. 2. Stable calcified mediastinal and hilar lymph nodes. Hx of Sarcoidosis.  3. Labs: obtained and reviewed.     ORTHO NEEDS CLEARANCE FROM NEPH - CKD 4, AND PULM - SARCOIDOSIS, COPD. SEE 1/5/24 NOTE.    CARDIO CLEARANCE REC'D 12/14/23-SEE NOTE IN CHART.      Pt cleared from PCP standpoint after she is cleared from neph and pulm.      Follow up with surgeon as scheduled post operatively.

## 2024-01-16 NOTE — PROGRESS NOTES
[facility] [id number]  [address]  [place], [place] [address]  [phone] office/[phone] fax    1/16/2024    Maribell Ros 1965 is a pleasant Black /  female.     Todays concern/HPI:  Dm. BS this am 123.   Low back pain. Dr Chaney dx spondylolisthesis. Left foot 4 and 5th digit. Always asleep. MRI scheduled for next week. Possible surgery.   Preop clearance. Left ankle tendon repair.    Review of Systems - Negative except above    Physical:   /70 (Site: Right Upper Arm, Position: Sitting, Cuff Size: Medium Adult)   Pulse 72   Temp 98.1 °F (36.7 °C) (Temporal)   Resp 16   Ht 1.575 m (5' 2\")   Wt 69.9 kg (154 lb)   SpO2 95%   BMI 28.17 kg/m²     Exam:   Physical Exam  Constitutional:       Appearance: Normal appearance. She is obese.   HENT:      Head: Normocephalic and atraumatic.   Eyes:      Extraocular Movements: Extraocular movements intact.      Conjunctiva/sclera: Conjunctivae normal.   Neck:      Vascular: No carotid bruit.   Cardiovascular:      Rate and Rhythm: Normal rate and regular rhythm.      Pulses: Normal pulses.      Heart sounds: Normal heart sounds.   Pulmonary:      Effort: Pulmonary effort is normal. No respiratory distress.      Breath sounds: Normal breath sounds. No wheezing.   Abdominal:      General: Abdomen is flat. Bowel sounds are normal.      Palpations: Abdomen is soft.   Musculoskeletal:         General: Normal range of motion.   Skin:     General: Skin is warm and dry.   Neurological:      Mental Status: She is alert and oriented to person, place, and time.   Psychiatric:         Mood and Affect: Mood normal.         Body mass index is 28.17 kg/m².     Review Data with Patient:  CBC, PT/INR, Vit D wnl  A1c 6.7  CMP-Creatinine 2.23, eGFR 27-25    Plan:    1. Type 2 diabetes mellitus with stage 4 chronic kidney disease, with long-term current use of insulin (HCC)  Assessment & Plan:   At goal, continue current 
Ridge hyperplastic    COLONOSCOPY N/A 10/19/2021    COLONOSCOPY with polypectomy performed by Jacky Hope MD at [facility] ENDOSCOPY    HEENT      nasal polypectomy Dr. Sawyer SALVADOR      tear duct surgery right Dr. Cortez ; left Dr Wilburn     ORTHOPEDIC SURGERY      DEXA -0.7 spine, -0.2 hip     MA UNLISTED PROCEDURE CARDIAC SURGERY  9/10,     thallium negative ef 72%; negative ef 70%    VASCULAR SURGERY      venous doppler negative     Social History:     Social History     Socioeconomic History    Marital status: Legally     Years of education: 15   Tobacco Use    Smoking status: Never    Smokeless tobacco: Never   Vaping Use    Vaping Use: Never used   Substance and Sexual Activity    Alcohol use: Yes     Alcohol/week: 0.0 standard drinks of alcohol    Drug use: Never    Sexual activity: Not Currently     Partners: Male   Social History Narrative    Patient lives with a friend, no pets.     Social Determinants of Health     Financial Resource Strain: Low Risk  (2023)    Overall Financial Resource Strain (CARDIA)     Difficulty of Paying Living Expenses: Not hard at all   Transportation Needs: Unknown (2023)    PRAPARE - Transportation     Lack of Transportation (Non-Medical): No   Physical Activity: Inactive (2023)    Exercise Vital Sign     Days of Exercise per Week: 0 days     Minutes of Exercise per Session: 0 min   Housing Stability: Unknown (2023)    Housing Stability Vital Sign     Unstable Housing in the Last Year: No         Review of Systems - {ros master:122303}      Objective:     Vitals:    24 0956   BP: 118/70   Pulse: 72   Resp: 16   Temp: 98.1 °F (36.7 °C)   SpO2: 95%         Physical Exam      Assessment/Plan:   {There are no diagnoses linked to this encounter. (Refresh or delete this SmartLink)}    DIAGNOSTICS:   1. EKG:  {Findings; ec::\"normal EKG, normal sinus rhythm\",\"unchanged from previous tracings\"}  2. CXR: {Findings; 
No

## 2024-01-16 NOTE — ASSESSMENT & PLAN NOTE
Monitored by specialist- no acute findings meriting change in the plan  Dr Natacha Dhaliwal 2.23; eGFR 25

## 2024-01-18 ENCOUNTER — CLINICAL DOCUMENTATION (OUTPATIENT)
Age: 59
End: 2024-01-18

## 2024-01-18 NOTE — PROGRESS NOTES
Twyla from [facility] requested medical clearance for pt to have Lt Tendon repair. Sx date dependant on clearance. Scheduled pt for 1/23 @ 11:45. Lvm for pt to confirm time & date.

## 2024-01-19 DIAGNOSIS — E78.2 MIXED HYPERLIPIDEMIA: ICD-10-CM

## 2024-01-19 PROBLEM — S00.412A ABRASION OF LEFT EAR: Status: RESOLVED | Noted: 2023-11-21 | Resolved: 2024-01-19

## 2024-01-19 PROBLEM — S93.492A SPRAIN OF ANTERIOR TALOFIBULAR LIGAMENT OF LEFT ANKLE: Status: RESOLVED | Noted: 2023-11-21 | Resolved: 2024-01-19

## 2024-01-19 RX ORDER — EZETIMIBE 10 MG/1
10 TABLET ORAL DAILY
Qty: 90 TABLET | OUTPATIENT
Start: 2024-01-19

## 2024-01-19 NOTE — ASSESSMENT & PLAN NOTE
Monitored by specialist- no acute findings meriting change in the plan  Pt to schedule f/u with Dr PRECIOUS Gilbert. Pulm  Cont albuterol prn   Cont trelegy

## 2024-01-19 NOTE — ASSESSMENT & PLAN NOTE
Monitored by specialist- no acute findings meriting change in the plan  Dr Jones  Cont current med regimen

## 2024-01-22 PROBLEM — J30.9 ALLERGIC RHINITIS: Status: ACTIVE | Noted: 2022-08-24

## 2024-01-22 PROBLEM — J30.1 ALLERGIC RHINITIS DUE TO POLLEN: Status: ACTIVE | Noted: 2022-08-24

## 2024-01-22 RX ORDER — GABAPENTIN 300 MG/1
300 CAPSULE ORAL 4 TIMES DAILY
COMMUNITY
Start: 2024-01-08

## 2024-01-22 RX ORDER — EPINASTINE HCL 0.05 %
2 DROPS OPHTHALMIC (EYE)
COMMUNITY
Start: 2024-01-05 | End: 2024-01-23

## 2024-01-22 NOTE — H&P (VIEW-ONLY)
results found for: \"APTT\"        RADIOGRAPHS// IMAGING//DIAGNOSTIC DATA     No orders of the defined types were placed in this encounter.         MRI Result (most recent):  MRI ANKLE LEFT WO CONTRAST 03/29/2023     Narrative  MRI ANKLE LEFT WO CONTRAST: 3/29/2023 8:51 AM     CLINICAL INFORMATION  Other cyst of bone, left ankle and foot.     COMPARISON  Left ankle MRI 16 September 2022     TECHNIQUE  Multiplane MR images of the left ankle obtained including T1 and T2-weighted  sequences.     FINDINGS  OSSEOUS STRUCTURES  No fracture or osseous stress related change. No focal osseous lesion. Pes  planus and hindfoot valgus on this nonweightbearing exam..     JOINTS  Normal osseous alignment. Intact articular surfaces. No evidence of joint  effusion or abnormal synovial process.     LATERAL COMPARTMENT  Ligaments: Normal.  Peroneal tendons: Ongoing longitudinal interstitial split tear peroneus brevis  tendon. Ongoing stripping of the peroneal retinaculum.     MEDIAL COMPARTMENT  Ligaments: Normal.  Medial tendons: Trace fluid at the knot of Antoine.  Tarsal tunnel: Normal.     ANTERIOR COMPARTMENT  Extensor tendons: Normal.     POSTERIOR COMPARTMENT  Achilles tendon: Normal.  Plantar fascia: Normal.  Kagers fat-pad: Normal.     SINUS TARSI  Fat replacement.     MUSCLES/OTHER  Mild disproportionate abductor digiting minimi muscle atrophy. Mild areas of  subcutaneous edema without focal fluid collection.     Impression  No fracture or osseous stress related change.     No focal osseous lesion.     Ongoing peroneus brevis longitudinal interstitial split tear.     Findings suggest sinus tarsi syndrome.     Pes planus and hindfoot valgus on this nonweightbearing exam.       I have reviewed the radiology transcribed results and I have reviewed the images of the above study.           ASSESSMENT         Encounter Diagnosis   Name Primary?    Tear of peroneal tendon, left, initial encounter Yes     Surgery indicated at this  visit: Yes: Comment:          Medications below, sent to the pharmacy      Orders Placed This Encounter    oxyCODONE (ROXICODONE) 5 MG immediate release tablet     Sig: Take 1 tablet by mouth every 4 hours as needed for Pain for up to 7 days. Max Daily Amount: 30 mg     Dispense:  28 tablet     Refill:  0     Reduce doses taken as pain becomes manageable    ondansetron (ZOFRAN) 4 MG tablet     Sig: Take 1 tablet by mouth every 8 hours as needed for Nausea or Vomiting     Dispense:  10 tablet     Refill:  0    rivaroxaban (XARELTO) 10 MG TABS tablet     Sig: Take 1 tablet by mouth daily (with breakfast)     Dispense:  21 tablet     Refill:  0    clindamycin (CLEOCIN) 300 MG capsule     Sig: Take 1 capsule by mouth 3 times daily for 7 days     Dispense:  21 capsule     Refill:  0                      Again, the alternatives, risks, and complications, as well as expected outcome were discussed. The patient understands and agrees to proceed with the above listed surgery. Patient has been given Hibiclens wash with instructions and prescriptions and or orders listed above.    Jose Luis Wolf MD  1/22/2024  12:37 PM      This note was completed using voice recognition software. Any typographical/name errors or mistakes are unintentional.

## 2024-01-23 ENCOUNTER — OFFICE VISIT (OUTPATIENT)
Age: 59
End: 2024-01-23
Payer: MEDICARE

## 2024-01-23 VITALS
HEIGHT: 62 IN | WEIGHT: 155.2 LBS | OXYGEN SATURATION: 99 % | TEMPERATURE: 97.7 F | SYSTOLIC BLOOD PRESSURE: 138 MMHG | BODY MASS INDEX: 28.56 KG/M2 | HEART RATE: 79 BPM | DIASTOLIC BLOOD PRESSURE: 85 MMHG | RESPIRATION RATE: 18 BRPM

## 2024-01-23 DIAGNOSIS — D86.89 SARCOIDOSIS OF OTHER SITES: ICD-10-CM

## 2024-01-23 DIAGNOSIS — Z01.811 PREOP PULMONARY/RESPIRATORY EXAM: Primary | ICD-10-CM

## 2024-01-23 DIAGNOSIS — D86.0 SARCOIDOSIS OF LUNG (HCC): ICD-10-CM

## 2024-01-23 DIAGNOSIS — J45.50 SEVERE PERSISTENT ASTHMA, UNCOMPLICATED: ICD-10-CM

## 2024-01-23 DIAGNOSIS — Z79.52 LONG TERM (CURRENT) USE OF SYSTEMIC STEROIDS: ICD-10-CM

## 2024-01-23 PROCEDURE — 1036F TOBACCO NON-USER: CPT | Performed by: INTERNAL MEDICINE

## 2024-01-23 PROCEDURE — 3017F COLORECTAL CA SCREEN DOC REV: CPT | Performed by: INTERNAL MEDICINE

## 2024-01-23 PROCEDURE — G8417 CALC BMI ABV UP PARAM F/U: HCPCS | Performed by: INTERNAL MEDICINE

## 2024-01-23 PROCEDURE — G8428 CUR MEDS NOT DOCUMENT: HCPCS | Performed by: INTERNAL MEDICINE

## 2024-01-23 PROCEDURE — 99214 OFFICE O/P EST MOD 30 MIN: CPT | Performed by: INTERNAL MEDICINE

## 2024-01-23 PROCEDURE — G8482 FLU IMMUNIZE ORDER/ADMIN: HCPCS | Performed by: INTERNAL MEDICINE

## 2024-01-23 PROCEDURE — 3075F SYST BP GE 130 - 139MM HG: CPT | Performed by: INTERNAL MEDICINE

## 2024-01-23 PROCEDURE — 3079F DIAST BP 80-89 MM HG: CPT | Performed by: INTERNAL MEDICINE

## 2024-01-23 RX ORDER — ASPIRIN 81 MG/1
81 TABLET ORAL DAILY
Status: ON HOLD | COMMUNITY
End: 2024-01-26 | Stop reason: HOSPADM

## 2024-01-23 RX ORDER — FLUTICASONE FUROATE, UMECLIDINIUM BROMIDE AND VILANTEROL TRIFENATATE 200; 62.5; 25 UG/1; UG/1; UG/1
1 POWDER RESPIRATORY (INHALATION) DAILY
Qty: 3 EACH | Refills: 3 | Status: SHIPPED | OUTPATIENT
Start: 2024-01-23

## 2024-01-23 RX ORDER — ALBUTEROL SULFATE 90 UG/1
1-2 AEROSOL, METERED RESPIRATORY (INHALATION) EVERY 4 HOURS PRN
Qty: 1 EACH | Refills: 5 | Status: SHIPPED | OUTPATIENT
Start: 2024-01-23

## 2024-01-23 NOTE — PROGRESS NOTES
[address]   [address]   [place]  [address]   [phone]       [facility]  Pulmonary, Critical Care, and Sleep Medicine     Pulmonary Office F/U  Name: Maribell Marshall 58 y.o. female   MRN: [id number]  : 1965  Service Date: 24  Chief Complaint:   Chief Complaint   Patient presents with    Pre-op Exam     Per [facility] for left peroneal tendon repair    Asthma     Last seen 3/23/2023    Sarcoidosis    Other     Long term current use of systemic steroids, hypercalcemia, chronic rhinitis          History of Present Illness:  Maribell Marshall is a 58 y.o. female, who presents to Pulmonary clinic for follow-up of sarcoidosis and asthma.  Patient was last seen in our clinic on 3/23/23.  Pt remains on fasenra, working well - no exacerbations in the interval.  Having peroneal tendon surgery by Dr. Wolf in [place] later this week and she is having spine surgery next month by Dr. Chaney.  Pt now off of SCIT therapy.  Pt back on Trelegy 200mcg once daily.  Infrequent use of PRN albuterol -- usually with changes of season  No nocturnal awakenings  No exacerbations in interval  Sees Dr. Macdonald, remains on pred 5mg      Past Medical History:   Diagnosis Date    Achilles tendon tear, left, initial encounter 2024    Acquired cyst of kidney 2020    naseem    Age-related nuclear cataract, bilateral 2021    Dr Cabezas    Bilateral great toe fractures 2014    Cerebrospinal fluid leak from spinal puncture 2022    Chronic midline low back pain with left-sided sciatica 2022    Had lumbar injection Dr Freitas 2021. Procedure had to be stopped x2 due to pt having left leg pain. Developed H/A. Obici did epidural blood patch.  Topamax and Mag Ox has held off sx until 2 days ago.     According to pt, Dr Wolf was going to refer pt to spine due to c/o sciatica and low back pain. Pt declined because she did not want to see Dr Freitas.   Pt seeking referral to another

## 2024-01-23 NOTE — PROGRESS NOTES
Maribell Marshall presents today for   Chief Complaint   Patient presents with    Pre-op Exam     Per [facility] for left peroneal tendon repair    Asthma     Last seen 3/23/2023    Sarcoidosis    Other     Long term current use of systemic steroids, hypercalcemia, chronic rhinitis       Is someone accompanying this pt? No    Is the patient using any DME equipment during OV? Yes. glucometer    -DME Company N/A    Depression Screenin/16/2024    10:01 AM   PHQ-9 Questionaire   Little interest or pleasure in doing things 0   Feeling down, depressed, or hopeless 0   Trouble falling or staying asleep, or sleeping too much 0   Feeling tired or having little energy 0   Poor appetite or overeating 0   Feeling bad about yourself - or that you are a failure or have let yourself or your family down 0   Trouble concentrating on things, such as reading the newspaper or watching television 0   Moving or speaking so slowly that other people could have noticed. Or the opposite - being so fidgety or restless that you have been moving around a lot more than usual 0   Thoughts that you would be better off dead, or of hurting yourself in some way 0   PHQ-9 Total Score 0   If you checked off any problems, how difficult have these problems made it for you to do your work, take care of things at home, or get along with other people? 0       Learning Needs Questionnaire:     Who is the primary learner? Patient    What is the preferred language for health care of the primary learner? ENGLISH    How does the primary learner prefer to learn new concepts? DEMONSTRATION    Answered By Patient    Relationship to Learner SELF          Fall Risk:         2023     8:41 AM 2023    10:49 AM 3/15/2023     9:10 AM   Fall Risk   2 or more falls in past year? no no no   Fall with injury in past year? no no no        Abuse Screening:          No data to display                  Coordination of Care:    1. Have you been to the ER, urgent care

## 2024-01-25 ENCOUNTER — ANESTHESIA EVENT (OUTPATIENT)
Facility: HOSPITAL | Age: 59
End: 2024-01-25
Payer: MEDICARE

## 2024-01-26 ENCOUNTER — ANESTHESIA (OUTPATIENT)
Facility: HOSPITAL | Age: 59
End: 2024-01-26
Payer: MEDICARE

## 2024-01-26 ENCOUNTER — HOSPITAL ENCOUNTER (OUTPATIENT)
Facility: HOSPITAL | Age: 59
Setting detail: OUTPATIENT SURGERY
Discharge: HOME | End: 2024-01-26
Attending: ORTHOPAEDIC SURGERY | Admitting: ORTHOPAEDIC SURGERY
Payer: MEDICARE

## 2024-01-26 VITALS
SYSTOLIC BLOOD PRESSURE: 127 MMHG | HEIGHT: 62 IN | DIASTOLIC BLOOD PRESSURE: 74 MMHG | RESPIRATION RATE: 18 BRPM | TEMPERATURE: 98.9 F | HEART RATE: 79 BPM | WEIGHT: 151.8 LBS | OXYGEN SATURATION: 98 % | BODY MASS INDEX: 27.94 KG/M2

## 2024-01-26 DIAGNOSIS — S86.312A TEAR OF PERONEAL TENDON, LEFT, INITIAL ENCOUNTER: ICD-10-CM

## 2024-01-26 LAB
GLUCOSE BLD STRIP.AUTO-MCNC: 132 MG/DL (ref 70–110)
GLUCOSE BLD STRIP.AUTO-MCNC: 140 MG/DL (ref 70–110)

## 2024-01-26 PROCEDURE — 82962 GLUCOSE BLOOD TEST: CPT

## 2024-01-26 PROCEDURE — 7100000010 HC PHASE II RECOVERY - FIRST 15 MIN: Performed by: ORTHOPAEDIC SURGERY

## 2024-01-26 PROCEDURE — 7100000011 HC PHASE II RECOVERY - ADDTL 15 MIN: Performed by: ORTHOPAEDIC SURGERY

## 2024-01-26 PROCEDURE — 3700000001 HC ADD 15 MINUTES (ANESTHESIA): Performed by: ORTHOPAEDIC SURGERY

## 2024-01-26 PROCEDURE — 3600000012 HC SURGERY LEVEL 2 ADDTL 15MIN: Performed by: ORTHOPAEDIC SURGERY

## 2024-01-26 PROCEDURE — 64445 NJX AA&/STRD SCIATIC NRV IMG: CPT | Performed by: ANESTHESIOLOGY

## 2024-01-26 PROCEDURE — 6360000002 HC RX W HCPCS: Performed by: ANESTHESIOLOGY

## 2024-01-26 PROCEDURE — 2500000003 HC RX 250 WO HCPCS: Performed by: NURSE ANESTHETIST, CERTIFIED REGISTERED

## 2024-01-26 PROCEDURE — 6360000002 HC RX W HCPCS: Performed by: NURSE ANESTHETIST, CERTIFIED REGISTERED

## 2024-01-26 PROCEDURE — 2709999900 HC NON-CHARGEABLE SUPPLY: Performed by: ORTHOPAEDIC SURGERY

## 2024-01-26 PROCEDURE — 7100000000 HC PACU RECOVERY - FIRST 15 MIN: Performed by: ORTHOPAEDIC SURGERY

## 2024-01-26 PROCEDURE — 3700000000 HC ANESTHESIA ATTENDED CARE: Performed by: ORTHOPAEDIC SURGERY

## 2024-01-26 PROCEDURE — 6370000000 HC RX 637 (ALT 250 FOR IP): Performed by: NURSE ANESTHETIST, CERTIFIED REGISTERED

## 2024-01-26 PROCEDURE — 3600000002 HC SURGERY LEVEL 2 BASE: Performed by: ORTHOPAEDIC SURGERY

## 2024-01-26 PROCEDURE — 27658 REPAIR OF LEG TENDON EACH: CPT | Performed by: ORTHOPAEDIC SURGERY

## 2024-01-26 PROCEDURE — 2580000003 HC RX 258: Performed by: NURSE ANESTHETIST, CERTIFIED REGISTERED

## 2024-01-26 PROCEDURE — 7100000001 HC PACU RECOVERY - ADDTL 15 MIN: Performed by: ORTHOPAEDIC SURGERY

## 2024-01-26 PROCEDURE — 6360000002 HC RX W HCPCS: Performed by: ORTHOPAEDIC SURGERY

## 2024-01-26 PROCEDURE — 88304 TISSUE EXAM BY PATHOLOGIST: CPT

## 2024-01-26 RX ORDER — SODIUM CHLORIDE, SODIUM LACTATE, POTASSIUM CHLORIDE, CALCIUM CHLORIDE 600; 310; 30; 20 MG/100ML; MG/100ML; MG/100ML; MG/100ML
INJECTION, SOLUTION INTRAVENOUS CONTINUOUS
Status: DISCONTINUED | OUTPATIENT
Start: 2024-01-26 | End: 2024-01-26 | Stop reason: HOSPADM

## 2024-01-26 RX ORDER — SODIUM CHLORIDE 0.9 % (FLUSH) 0.9 %
5-40 SYRINGE (ML) INJECTION EVERY 12 HOURS SCHEDULED
Status: DISCONTINUED | OUTPATIENT
Start: 2024-01-26 | End: 2024-01-26 | Stop reason: HOSPADM

## 2024-01-26 RX ORDER — EPHEDRINE SULFATE/0.9% NACL/PF 25 MG/5 ML
SYRINGE (ML) INTRAVENOUS PRN
Status: DISCONTINUED | OUTPATIENT
Start: 2024-01-26 | End: 2024-01-26 | Stop reason: SDUPTHER

## 2024-01-26 RX ORDER — LIDOCAINE HYDROCHLORIDE 10 MG/ML
5 INJECTION, SOLUTION EPIDURAL; INFILTRATION; INTRACAUDAL; PERINEURAL
Status: COMPLETED | OUTPATIENT
Start: 2024-01-26 | End: 2024-01-26

## 2024-01-26 RX ORDER — SODIUM CHLORIDE 9 MG/ML
INJECTION, SOLUTION INTRAVENOUS PRN
Status: DISCONTINUED | OUTPATIENT
Start: 2024-01-26 | End: 2024-01-26 | Stop reason: HOSPADM

## 2024-01-26 RX ORDER — PROPOFOL 10 MG/ML
INJECTION, EMULSION INTRAVENOUS PRN
Status: DISCONTINUED | OUTPATIENT
Start: 2024-01-26 | End: 2024-01-26 | Stop reason: SDUPTHER

## 2024-01-26 RX ORDER — SODIUM CHLORIDE 0.9 % (FLUSH) 0.9 %
5-40 SYRINGE (ML) INJECTION PRN
Status: CANCELLED | OUTPATIENT
Start: 2024-01-26

## 2024-01-26 RX ORDER — ROPIVACAINE HYDROCHLORIDE 5 MG/ML
INJECTION, SOLUTION EPIDURAL; INFILTRATION; PERINEURAL
Status: COMPLETED | OUTPATIENT
Start: 2024-01-26 | End: 2024-01-26

## 2024-01-26 RX ORDER — INSULIN LISPRO 100 [IU]/ML
0-12 INJECTION, SOLUTION INTRAVENOUS; SUBCUTANEOUS ONCE
Status: DISCONTINUED | OUTPATIENT
Start: 2024-01-26 | End: 2024-01-26 | Stop reason: HOSPADM

## 2024-01-26 RX ORDER — FENTANYL CITRATE 50 UG/ML
100 INJECTION, SOLUTION INTRAMUSCULAR; INTRAVENOUS ONCE
Status: COMPLETED | OUTPATIENT
Start: 2024-01-26 | End: 2024-01-26

## 2024-01-26 RX ORDER — SODIUM CHLORIDE 9 MG/ML
INJECTION, SOLUTION INTRAVENOUS PRN
Status: CANCELLED | OUTPATIENT
Start: 2024-01-26

## 2024-01-26 RX ORDER — MIDAZOLAM HYDROCHLORIDE 2 MG/2ML
2 INJECTION, SOLUTION INTRAMUSCULAR; INTRAVENOUS ONCE
Status: COMPLETED | OUTPATIENT
Start: 2024-01-26 | End: 2024-01-26

## 2024-01-26 RX ORDER — ROCURONIUM BROMIDE 10 MG/ML
INJECTION, SOLUTION INTRAVENOUS PRN
Status: DISCONTINUED | OUTPATIENT
Start: 2024-01-26 | End: 2024-01-26 | Stop reason: SDUPTHER

## 2024-01-26 RX ORDER — ONDANSETRON 2 MG/ML
INJECTION INTRAMUSCULAR; INTRAVENOUS PRN
Status: DISCONTINUED | OUTPATIENT
Start: 2024-01-26 | End: 2024-01-26 | Stop reason: SDUPTHER

## 2024-01-26 RX ORDER — SUCCINYLCHOLINE/SOD CL,ISO/PF 100 MG/5ML
SYRINGE (ML) INTRAVENOUS PRN
Status: DISCONTINUED | OUTPATIENT
Start: 2024-01-26 | End: 2024-01-26 | Stop reason: SDUPTHER

## 2024-01-26 RX ORDER — SODIUM CHLORIDE 0.9 % (FLUSH) 0.9 %
5-40 SYRINGE (ML) INJECTION PRN
Status: DISCONTINUED | OUTPATIENT
Start: 2024-01-26 | End: 2024-01-26 | Stop reason: HOSPADM

## 2024-01-26 RX ORDER — ROPIVACAINE HYDROCHLORIDE 5 MG/ML
30 INJECTION, SOLUTION EPIDURAL; INFILTRATION; PERINEURAL ONCE
Status: DISCONTINUED | OUTPATIENT
Start: 2024-01-26 | End: 2024-01-26 | Stop reason: HOSPADM

## 2024-01-26 RX ORDER — FENTANYL CITRATE 50 UG/ML
INJECTION, SOLUTION INTRAMUSCULAR; INTRAVENOUS PRN
Status: DISCONTINUED | OUTPATIENT
Start: 2024-01-26 | End: 2024-01-26 | Stop reason: SDUPTHER

## 2024-01-26 RX ORDER — FAMOTIDINE 20 MG/1
20 TABLET, FILM COATED ORAL ONCE
Status: COMPLETED | OUTPATIENT
Start: 2024-01-26 | End: 2024-01-26

## 2024-01-26 RX ORDER — CLINDAMYCIN PHOSPHATE 900 MG/50ML
900 INJECTION, SOLUTION INTRAVENOUS ONCE
Status: COMPLETED | OUTPATIENT
Start: 2024-01-26 | End: 2024-01-26

## 2024-01-26 RX ORDER — ONDANSETRON 2 MG/ML
4 INJECTION INTRAMUSCULAR; INTRAVENOUS
Status: CANCELLED | OUTPATIENT
Start: 2024-01-26 | End: 2024-01-27

## 2024-01-26 RX ORDER — DEXTROSE MONOHYDRATE 100 MG/ML
INJECTION, SOLUTION INTRAVENOUS CONTINUOUS PRN
Status: DISCONTINUED | OUTPATIENT
Start: 2024-01-26 | End: 2024-01-26 | Stop reason: HOSPADM

## 2024-01-26 RX ORDER — SODIUM CHLORIDE 0.9 % (FLUSH) 0.9 %
5-40 SYRINGE (ML) INJECTION EVERY 12 HOURS SCHEDULED
Status: CANCELLED | OUTPATIENT
Start: 2024-01-26

## 2024-01-26 RX ADMIN — LIDOCAINE HYDROCHLORIDE 5 ML: 10 INJECTION, SOLUTION EPIDURAL; INFILTRATION; INTRACAUDAL; PERINEURAL at 10:36

## 2024-01-26 RX ADMIN — ROCURONIUM BROMIDE 5 MG: 10 INJECTION, SOLUTION INTRAVENOUS at 11:02

## 2024-01-26 RX ADMIN — SODIUM CHLORIDE, POTASSIUM CHLORIDE, SODIUM LACTATE AND CALCIUM CHLORIDE: 600; 310; 30; 20 INJECTION, SOLUTION INTRAVENOUS at 10:16

## 2024-01-26 RX ADMIN — ROPIVACAINE HYDROCHLORIDE 30 ML: 5 INJECTION EPIDURAL; INFILTRATION; PERINEURAL at 10:45

## 2024-01-26 RX ADMIN — FAMOTIDINE 20 MG: 20 TABLET ORAL at 10:16

## 2024-01-26 RX ADMIN — PHENYLEPHRINE HYDROCHLORIDE 100 MCG: 10 INJECTION INTRAVENOUS at 11:19

## 2024-01-26 RX ADMIN — Medication 80 MG: at 11:02

## 2024-01-26 RX ADMIN — PROPOFOL 150 MG: 10 INJECTION, EMULSION INTRAVENOUS at 11:02

## 2024-01-26 RX ADMIN — HYDROCORTISONE SODIUM SUCCINATE 100 MG: 100 INJECTION, POWDER, FOR SOLUTION INTRAMUSCULAR; INTRAVENOUS at 11:10

## 2024-01-26 RX ADMIN — FENTANYL CITRATE 100 MCG: 50 INJECTION INTRAMUSCULAR; INTRAVENOUS at 11:02

## 2024-01-26 RX ADMIN — MIDAZOLAM 2 MG: 1 INJECTION INTRAMUSCULAR; INTRAVENOUS at 10:36

## 2024-01-26 RX ADMIN — ONDANSETRON 4 MG: 2 INJECTION INTRAMUSCULAR; INTRAVENOUS at 11:53

## 2024-01-26 RX ADMIN — CLINDAMYCIN PHOSPHATE 900 MG: 900 INJECTION, SOLUTION INTRAVENOUS at 11:05

## 2024-01-26 RX ADMIN — Medication 5 MG: at 11:37

## 2024-01-26 RX ADMIN — FENTANYL CITRATE 100 MCG: 50 INJECTION, SOLUTION INTRAMUSCULAR; INTRAVENOUS at 10:36

## 2024-01-26 ASSESSMENT — PAIN SCALES - GENERAL
PAINLEVEL_OUTOF10: 0

## 2024-01-26 ASSESSMENT — PAIN - FUNCTIONAL ASSESSMENT: PAIN_FUNCTIONAL_ASSESSMENT: 0-10

## 2024-01-26 NOTE — BRIEF OP NOTE
Brief Postoperative Note      Patient: Maribell Marshall  YOB: 1965  MRN: [id number]    Date of Procedure: 1/26/2024    Pre-Op Diagnosis Codes:     * Tear of peroneal tendon, left, initial encounter [S86.312A]    Post-Op Diagnosis: {MH OR SAME:[id number]}       Procedure(s):  EXCISIONAL DEBRIDEMENT LEFT PERONEAL BREVIS TENDON TENOSYNOVECTOMY, PRIMARY REPAIR LEFT PERONEAL BREVIS TENDON TEAR, POSSIBLE ALLOGRAFT TENDON; C-ARM; [[facility]]; REGIONAL BLOCK  *SEE COMMENTS*    Surgeon(s):  Jose Luis Wolf MD    Assistant:  Surgical Assistant: ARI ROMAN    Anesthesia: General and Regional Anesthesia    IV FLUIDS: *** ml  TOURNIQUET TIME:  ***  minutes @ *** mg HG. ***none used     Estimated Blood Loss (mL): {NUMBERS; EBL:14323}    Complications: {Symptoms; Intra-op complications:84330}    Specimens:   * No specimens in log *    Implants:  * No implants in log *      Drains: * No LDAs found *    Findings: ***      Electronically signed by Jose Luis Wolf MD on 1/26/2024 at 10:49 AM

## 2024-01-26 NOTE — BRIEF OP NOTE
Brief Postoperative Note      Patient: Maribell Marshall  YOB: 1965  MRN: [id number]    Date of Procedure: 1/26/2024    Pre-Op Diagnosis Codes:     * Tear of peroneal tendon, left, initial encounter [S86.312A]    Post-Op Diagnosis: Same       Procedure(s):  EXCISIONAL DEBRIDEMENT LEFT PERONEAL BREVIS TENDON TENOSYNOVECTOMY, PRIMARY REPAIR LEFT PERONEAL BREVIS TENDON TEAR, REGIONAL BLOCK  *SEE COMMENTS*    Surgeon(s):  Jose Luis Wolf MD    Assistant:  Surgical Assistant: ARI ROMAN    Anesthesia: General and Regional Block    IV FLUIDS: 1150 ml  TOURNIQUET TIME: none used     Estimated Blood Loss (mL): 20 ml EBL    Complications: None    Specimens:   ID Type Source Tests Collected by Time Destination   A : torn left peroneal brevis tendon Tissue Foot SURGICAL PATHOLOGY Jose Luis Wolf MD 1/26/2024 1136        Implants:  * No implants in log *      Drains: * No LDAs found *    Findings: botton hole tear personal brevis tear      Electronically signed by Jose Luis Wolf MD on 1/26/2024 at 12:11 PM

## 2024-01-26 NOTE — ANESTHESIA POSTPROCEDURE EVALUATION
Department of Anesthesiology  Postprocedure Note    Patient: Maribell Marshall  MRN: [id number]  YOB: 1965  Date of evaluation: 1/26/2024    Procedure Summary       Date: 01/26/24 Room / Location: [facility] MAIN 06 / [facility] MAIN OR    Anesthesia Start: 1057 Anesthesia Stop: 1250    Procedure: EXCISIONAL DEBRIDEMENT LEFT PERONEAL BREVIS TENDON TENOSYNOVECTOMY, PRIMARY REPAIR LEFT PERONEAL BREVIS TENDON TEAR, REGIONAL BLOCK  *SEE COMMENTS* (Left: Foot) Diagnosis:       Tear of peroneal tendon, left, initial encounter      (Tear of peroneal tendon, left, initial encounter [S86.312A])    Surgeons: Jose Luis Wolf MD Responsible Provider: Sedrick Angel MD    Anesthesia Type: General ASA Status: 3            Anesthesia Type: General    Oh Phase I: Oh Score: 10    Oh Phase II:      Anesthesia Post Evaluation    Patient location during evaluation: PACU  Patient participation: complete - patient participated  Level of consciousness: awake  Airway patency: patent  Nausea & Vomiting: no nausea  Cardiovascular status: blood pressure returned to baseline  Respiratory status: acceptable  Hydration status: euvolemic  Pain management: adequate    No notable events documented.

## 2024-01-26 NOTE — DISCHARGE INSTRUCTIONS
ORTHOPAEDIC DISCHARGE PLAN     1. DISCHARGE DISPOSITION:  Home    2. FOLLOW UP RETURN TO OFFICE: 1 weeks    Call  to confirm your appointment to see Dr. ANGELO Wolf MD.   Follow up with Primary Care Provider in 7 to 10 days.    3. WEIGHT BEARING STATUS/ROM:    NO WEIGHT BEARING TO the Left LOWER EXTREMITY    4. ELEVATE the Left lower extremity on 1 pillow.    Place the pillow horizontal so that no pressure is on the back of your heel    Please leave your current dressings and in place.   Keep your dressings clean and dry to the: Left lower extremity      Please call  ([facility] ) if any: fever, shakes, chills, intractable pain, or for any questions you have regarding your care/medical condition   1. If you experience any calf pain or swelling, or are having any shortness of  breath, chest pain, or extremity swelling, or bleeding thru any surgical dressings,  or Bleeding at any body location while you are taking on any blood thinners.     ie (mouth,nose, skin sites:)   2. Please go to closest ER  ASAP for assessment to rule out a leg clot and to  assess any  bleeding.    3. After general anesthesia or intravenous sedation, for 24 hours or while taking  prescription Narcotics:      [x]  Limit your activities     [x]   Do not drive and operate hazardous machinery    [x]   Do not make important personal or business decisions    [x]   Do  not drink alcoholic beverages    [x]  If you have not urinated within 8 hours after discharge, please contact    your surgeon on call.     Report the following to your surgeon: If any below is true         [x]   Excessive pain, swelling, redness or odor of or around the surgical area       [x]   Temperature over 100.5       [x]  Nausea and vomiting lasting longer than 4 hours or if unable to take medications       [x]    Any signs of decreased circulation or nerve impairment to extremity:    Change in color, persistent  numbness, tingling,  coldness or increase pain          [x]  No smoking/ No tobacco products/ Avoid exposure to second hand smoke    5.DIET:  cardiac diet  OTC (Nutritional supplements/multivitamins/calcium w/ Vitamin  D     6. ACTIVITY:  NO DRIVING // No lifting, twisting, squatting, deep bending.      7. INCISION CARE/DRESSINGS: Keep wound clean and dry ,Keep the current dressings on and in place. There is no need to change these current dressings    Keep all pets away from  any wound present in order to prevent infection.    8. PAIN CONTROL:  Start taking your pain medications when you get home. See below items in Blue Box.    9. VTE prophylaxis : Start Xarelto on date . Start 1/27/2024 at 12 noon    10. ANTIBIOTICS: Clindymycin  start at 12 noon on 1/27/2024    11. DME/ PRESCRIPTIONS WRITTEN ALREADY WRITTEN:    PRESCRIPTIONS AND/OR ORDERS PROVIDED DURING H&P:     Medications below, sent to the pharmacy             Orders Placed This Encounter    oxyCODONE (ROXICODONE) 5 MG immediate release tablet       Sig: Take 1 tablet by mouth every 4 hours as needed for Pain for up to 7 days. Max Daily Amount: 30 mg       Dispense:  28 tablet       Refill:  0       Reduce doses taken as pain becomes manageable    ondansetron (ZOFRAN) 4 MG tablet       Sig: Take 1 tablet by mouth every 8 hours as needed for Nausea or Vomiting       Dispense:  10 tablet       Refill:  0    rivaroxaban (XARELTO) 10 MG TABS tablet       Sig: Take 1 tablet by mouth daily (with breakfast)       Dispense:  21 tablet       Refill:  0    clindamycin (CLEOCIN) 300 MG capsule       Sig: Take 1 capsule by mouth 3 times daily for 7 days       Dispense:  21 capsule       Refill:  0             Rx sent to :  her pharmacy.              Jose Luis Wolf MD  1/26/2024  11:02 AM

## 2024-01-26 NOTE — ANESTHESIA PRE PROCEDURE
Department of Anesthesiology  Preprocedure Note       Name:  Maribell Marshall   Age:  58 y.o.  :  1965                                          MRN:  [id number]         Date:  2024      Surgeon: Surgeon(s):  Jose Luis Wolf MD    Procedure: Procedure(s):  EXCISIONAL DEBRIDEMENT LEFT PERONEAL BREVIS TENDON TENOSYNOVECTOMY, PRIMARY REPAIR LEFT PERONEAL BREVIS TENDON TEAR, POSSIBLE ALLOGRAFT TENDON; C-ARM; [[facility]]; REGIONAL BLOCK  *SEE COMMENTS*    Medications prior to admission:   Prior to Admission medications    Medication Sig Start Date End Date Taking? Authorizing Provider   aspirin 81 MG EC tablet Take 1 tablet by mouth daily    Jason Polk MD   fluticasone-umeclidin-vilant (TRELEGY ELLIPTA) 200-62.5-25 MCG/ACT AEPB inhaler Inhale 1 puff into the lungs daily Rinse and gargle mouth after each use 24   Germán Gilbert MD   albuterol sulfate HFA (PROVENTIL HFA) 108 (90 Base) MCG/ACT inhaler Inhale 1-2 puffs into the lungs every 4 hours as needed for Wheezing or Shortness of Breath Can substitute any formulary approved albuterol formulation (Proventil, Proair, Ventolin, etc) 24   Germán Gilbert MD   gabapentin (NEURONTIN) 300 MG capsule Take 1 capsule by mouth 4 times daily. 24   Jason Polk MD   oxyCODONE (ROXICODONE) 5 MG immediate release tablet Take 1 tablet by mouth every 4 hours as needed for Pain for up to 7 days. Max Daily Amount: 30 mg  Patient not taking: Reported on 2024  Jose Luis Wolf MD   ondansetron (ZOFRAN) 4 MG tablet Take 1 tablet by mouth every 8 hours as needed for Nausea or Vomiting  Patient not taking: Reported on 2024   Jose Luis Wolf MD   rivaroxaban (XARELTO) 10 MG TABS tablet Take 1 tablet by mouth daily (with breakfast)  Patient not taking: Reported on 2024   Jose Luis Wolf MD   insulin glargine (LANTUS SOLOSTAR) 100 UNIT/ML injection pen Inject 30 Units into the skin

## 2024-01-26 NOTE — ANESTHESIA PROCEDURE NOTES
Peripheral Block    Patient location during procedure: pre-op  Reason for block: post-op pain management and at surgeon's request  Start time: 1/26/2024 10:45 AM  End time: 1/26/2024 10:52 AM  Staffing  Performed: anesthesiologist   Performed by: Sedrick Angel MD  Authorized by: Sedrick Angel MD    Preanesthetic Checklist  Completed: patient identified, IV checked, site marked, risks and benefits discussed, surgical/procedural consents, equipment checked, pre-op evaluation, timeout performed, anesthesia consent given, oxygen available, monitors applied/VS acknowledged, fire risk safety assessment completed and verbalized and blood product R/B/A discussed and consented  Peripheral Block   Patient position: right lateral decubitus  Prep: ChloraPrep  Provider prep: mask and sterile gloves  Patient monitoring: cardiac monitor, continuous pulse ox, frequent blood pressure checks, IV access, oxygen and responsive to questions  Block type: Sciatic  Popliteal  Laterality: left  Injection technique: single-shot  Guidance: nerve stimulator and ultrasound guided    Needle   Needle type: insulated echogenic nerve stimulator needle   Needle gauge: 21 G  Needle localization: nerve stimulator and ultrasound guidance  Needle length: 10 cm  Assessment   Injection assessment: negative aspiration for heme, no paresthesia on injection, local visualized surrounding nerve on ultrasound and no intravascular symptoms  Paresthesia pain: none  Slow fractionated injection: yes  Hemodynamics: stable  Real-time US image taken/store: yes  Outcomes: uncomplicated and patient tolerated procedure well    Medications Administered  ropivacaine (NAROPIN) injection 0.5% - Perineural   30 mL - 1/26/2024 10:45:00 AM

## 2024-01-26 NOTE — OP NOTE
Brief Postoperative Note      Patient: Maribell Marshall  YOB: 1965  MRN: [id number]    Date of Procedure: 1/26/2024    Pre-Op Diagnosis Codes:     * Tear of peroneal tendon, left, initial encounter [S86.312A]    Post-Op Diagnosis: Same       Procedure(s):  EXCISIONAL DEBRIDEMENT LEFT PERONEAL BREVIS TENDON TENOSYNOVECTOMY, PRIMARY REPAIR LEFT PERONEAL BREVIS TENDON TEAR, REGIONAL BLOCK  *SEE COMMENTS*    Surgeon(s):  Jose Luis Wolf MD    Assistant:  Surgical Assistant: ARI ROMAN    Anesthesia: General and Regional Block    IV FLUIDS: 1150 ml  TOURNIQUET TIME: none used     Estimated Blood Loss (mL): 20 ml EBL    Complications: None    Specimens:   ID Type Source Tests Collected by Time Destination   A : torn left peroneal brevis tendon Tissue Foot SURGICAL PATHOLOGY Jose Luis Wolf MD 1/26/2024 1136        Implants:  * No implants in log *      Drains: * No LDAs found *    Findings: botton hole tear personal brevis tear        OPERATIVE NOTE:     Maribell Marshall was taken to the operating room today 1/26/2024  and positioned supine initially, general anesthesia was conducted. A popliteal block had already been performed prior to bringing her to the operating room by the anesthesia service. Maribell Marshall was positioned supine and all bony prominences were protected.    The entire left lower extremity was fully prepped with Chlorhexidene Scrub and Chlorhexidene yellow prep stick Prep Stick x 2.     A formal verbal time out was conducted: identifying the patient, identifying the surgical location, reading the informed written signed consent for procedure, confirmation that  the patient did receive preoperative antibiotics and that my signature on the patient was visible at the surgical field. All members of the surgical team agreed to the consent process.    I made a   5 to 6 cm incision midline at the left distal fibula, making my  incision through skin and subcutaneous tissue, directly  down to the left fibula.     I identified the superior peroneal retinaculum (SPR) sheath and opened the (SPR) sheath . I identified the peroneal brevis and peroneal longus tendon and these were noted to be in this condition: peroneal longus is healthy and without any tear . The peroneal brevis has a 3.5 cm central button hole tear.      I sharply excised the torn degenerative portions on the left peroneal  brevis tendon. I repaired the personal brevis tendon with 4/0 Fiber wire suture in a running stitch fashion.    After through Irrisept chlorhexidine, I closed the peroneal tendon sheath with  2-0 Vicryl in an figure-of-eight interrupted knot type fashion.I thoroughly irrigated the surgical incision. The peroneal tendons sat nicely in proper position in retro fibular space.The incision was then closed in layers using 2-0 and 3/0 Vicryl, 3/0 Monocryl, and 3-0 nylon.     Prior to closure, correct needle counts were noted, documented on the chart. The patient tolerated the procedure well. Sterile dressings were placed which consisted of   Xeroform, 4 x 4's, cast padding. A carefully molded posterior splint was applied with the ankle in neutral. The patient was extubated and transferred to recovery room in stable condition. There were no complications.       ANGELO KENDALL MD  [unfilled]   12:13 PM       Electronically signed by Jose Luis Kendall MD on 1/26/2024 at 12:11 PM

## 2024-01-26 NOTE — INTERVAL H&P NOTE
Update History & Physical    The patient's History and Physical of  , 1/26/2024 9:58 AM   was reviewed with the patient and I examined the patient. There was no change. The surgical site was confirmed by the patient and me.     Plan: The risks, benefits, expected outcome, and alternative to the recommended procedure have been discussed with the patient. Patient understands and wants to proceed with the procedure.     Electronically signed by Jose Luis Wolf MD on 1/26/2024 at 9:58 AM

## 2024-01-26 NOTE — OP NOTE
Operative Note      Patient: Maribell Marshall  YOB: 1965  MRN: [id number]    Date of Procedure: 1/26/2024    Pre-Op Diagnosis Codes:     * Tear of peroneal tendon, left, initial encounter [S86.312A]    Post-Op Diagnosis: {MH OR SAME:[id number]}       Procedure(s):  EXCISIONAL DEBRIDEMENT LEFT PERONEAL BREVIS TENDON TENOSYNOVECTOMY, PRIMARY REPAIR LEFT PERONEAL BREVIS TENDON TEAR, POSSIBLE ALLOGRAFT TENDON; C-ARM; [[facility]]; REGIONAL BLOCK  *SEE COMMENTS*    Surgeon(s):  Jose Luis Wolf MD    Assistant:   Surgical Assistant: ARI ROMAN    Anesthesia: General    Estimated Blood Loss (mL): {NUMBERS; EBL:84652}    Complications: {Symptoms; Intra-op complications:97712}    Specimens:   * No specimens in log *    Implants:  * No implants in log *      Drains: * No LDAs found *    Findings: ***    Patient has failed the following conservative measures: Inflammatory shoewear changes formal physical therapy, and over-the-counter bracing  Patient's pain rating: Still having persistent posterolateral, left ankle pain.  She has had imaging studies, that confirms, the tear to the peroneal brevis tendon despite conservative measures. She reports Continues to have a posterolateral left ankle pain despite using ankle brace.  She is recovered from her acute component of her left ankle sprain.  In the past she had an MRI, that has demonstrated,.  All tendon interstitial tearing the peroneal brevis tendon.  She does have history of stage III stage IV kidney disease, sarcoidosis involving the kidney and lung, and has required prednisone, in the past apparently.     She has PAD, had     Detailed Description of Procedure:       OPERATIVE NOTE:     Maribell Marshall was taken to the operating room today 1/26/2024  and positioned supine initially, general anesthesia was conducted. A popliteal block had already been performed prior to bringing her to the operating room by the anesthesia service. Maribell PEPE

## 2024-01-31 ENCOUNTER — OFFICE VISIT (OUTPATIENT)
Age: 59
End: 2024-01-31

## 2024-01-31 VITALS — BODY MASS INDEX: 27.76 KG/M2 | HEIGHT: 62 IN

## 2024-01-31 DIAGNOSIS — S86.312A TEAR OF PERONEAL TENDON, LEFT, INITIAL ENCOUNTER: Primary | ICD-10-CM

## 2024-01-31 PROCEDURE — 99024 POSTOP FOLLOW-UP VISIT: CPT | Performed by: ORTHOPAEDIC SURGERY

## 2024-01-31 NOTE — PROGRESS NOTES
Excisional Debridement Left Peroneal Brevis Tendon Tenosynovectomy, Primary Repair Left Peroneal Brevis Tendon Tear, Regional Block  *see Comments* - Left   SURGERY DATE: 1/26/2024   DAYS SINCE SURGERY: 5     ICD-10-CM    1. Tear of peroneal tendon, left, initial encounter  S86.312A          No orders of the defined types were placed in this encounter.         SUBJECTIVE: Maribell Marshall is a 58 y.o. female is seen for a routine postop check.    She rates her current pain an 8/10. Reports taking the Norco 1 tablet every 4 hours for the pain, has no problems with the wound or other issues.  Activity, diet and bowels are normal. No pain.  No chest pain fever shakes chills night sweats, no calf pain.    OBJECTIVE: Appears well.  Incision is healing without complications or infection. Has some bruising to the surgical site.     Current Outpatient Medications   Medication Instructions    albuterol sulfate HFA (PROVENTIL HFA) 108 (90 Base) MCG/ACT inhaler 1-2 puffs, Inhalation, EVERY 4 HOURS PRN, Can substitute any formulary approved albuterol formulation (Proventil, Proair, Ventolin, etc)    BD PEN NEEDLE SERGIO 2ND GEN 32G X 4 MM MISC 1 each, Does not apply, DAILY    Blood Glucose Monitoring Suppl (ACCU-CHEK GUIDE) w/Device KIT No dose, route, or frequency recorded.    blood glucose test strips (ACCU-CHEK GUIDE) strip USE TO TEST BLOOD GLUCOSE THREE TIMES A DAY    butalbital-APAP-caffeine -40 MG CAPS per capsule 1 capsule, Oral, EVERY 4 HOURS PRN    cetirizine (ZYRTEC) 10 mg, Oral, DAILY    citalopram (CELEXA) 10 mg, Oral, DAILY    cyanocobalamin 50 mcg, Oral, DAILY    diclofenac sodium (VOLTAREN) 2 g, Topical, 4 TIMES DAILY PRN    dilTIAZem (TIAZAC) 120 mg, Oral, DAILY    esomeprazole (NEXIUM) 40 mg, Oral, DAILY PRN    ezetimibe (ZETIA) 10 mg, Oral, DAILY    Fasenra 30 mg, SubCUTAneous, EVERY 2 MONTHS, EVERY 8 WEEKS    fluticasone-umeclidin-vilant (TRELEGY ELLIPTA) 200-62.5-25 MCG/ACT AEPB inhaler

## 2024-02-01 ENCOUNTER — TELEPHONE (OUTPATIENT)
Age: 59
End: 2024-02-01

## 2024-02-01 NOTE — TELEPHONE ENCOUNTER
Patient called and is requesting a call back states that she is having a burning sensation on her left foot and would like to know how to move forward.      Please call and advise patient at   [phone]

## 2024-02-02 DIAGNOSIS — S86.312A TEAR OF PERONEAL TENDON, LEFT, INITIAL ENCOUNTER: Primary | ICD-10-CM

## 2024-02-02 RX ORDER — HYDROCODONE BITARTRATE AND ACETAMINOPHEN 7.5; 325 MG/1; MG/1
1 TABLET ORAL EVERY 8 HOURS PRN
Qty: 21 TABLET | Refills: 0 | Status: SHIPPED | OUTPATIENT
Start: 2024-02-02 | End: 2024-02-09

## 2024-02-02 NOTE — TELEPHONE ENCOUNTER
Spoke with patient and instructed her to loosen ace bandage and ice and elevate.  Patient is also requesting pain medication.

## 2024-02-02 NOTE — TELEPHONE ENCOUNTER
Patient just called back regarding yesterday's message.     She states the new bandage is giving a burning sensation and she needs to speak with someone about it as soon as possible.     Patient can be reached at [phone].

## 2024-02-03 NOTE — PROGRESS NOTES
Maribell Marshall   [address] [address]    Medications below, sent to the pharmacy      Orders Placed This Encounter    HYDROcodone-acetaminophen (NORCO) 7.5-325 MG per tablet     Sig: Take 1 tablet by mouth every 8 hours as needed for Pain for up to 7 days. Intended supply: 30 days Max Daily Amount: 3 tablets     Dispense:  21 tablet     Refill:  0     Reduce doses taken as pain becomes manageable           Jose Luis Wolf MD  2/2/2024  7:03 PM

## 2024-02-09 ENCOUNTER — ENROLLMENT (OUTPATIENT)
Dept: PHARMACY | Facility: CLINIC | Age: 59
End: 2024-02-09

## 2024-02-12 DIAGNOSIS — S86.312A TEAR OF PERONEAL TENDON, LEFT, INITIAL ENCOUNTER: ICD-10-CM

## 2024-02-14 ENCOUNTER — OFFICE VISIT (OUTPATIENT)
Age: 59
End: 2024-02-14

## 2024-02-14 ENCOUNTER — TELEPHONE (OUTPATIENT)
Age: 59
End: 2024-02-14

## 2024-02-14 DIAGNOSIS — S86.312A TEAR OF PERONEAL TENDON, LEFT, INITIAL ENCOUNTER: Primary | ICD-10-CM

## 2024-02-14 DIAGNOSIS — Z09 POSTOP CHECK: ICD-10-CM

## 2024-02-14 PROCEDURE — 99024 POSTOP FOLLOW-UP VISIT: CPT | Performed by: ORTHOPAEDIC SURGERY

## 2024-02-14 RX ORDER — TRAMADOL HYDROCHLORIDE 50 MG/1
50 TABLET ORAL EVERY 6 HOURS PRN
Qty: 28 TABLET | Refills: 0 | Status: SHIPPED | OUTPATIENT
Start: 2024-02-14 | End: 2024-02-21

## 2024-02-14 RX ORDER — RIVAROXABAN 10 MG/1
10 TABLET, FILM COATED ORAL DAILY
Qty: 21 TABLET | Refills: 0 | Status: SHIPPED | OUTPATIENT
Start: 2024-02-14

## 2024-02-14 NOTE — TELEPHONE ENCOUNTER
Patient states Dr. Wolf put a cast on her foot today, and wants to know if she can walk on it.      Patient tel: [phone]

## 2024-02-14 NOTE — PROGRESS NOTES
Excisional Debridement Left Peroneal Brevis Tendon Tenosynovectomy, Primary Repair Left Peroneal Brevis Tendon Tear, Regional Block  *see Comments* - Left   SURGERY DATE: 1/26/2024   DAYS SINCE SURGERY: 19     ICD-10-CM    1. Tear of peroneal tendon, left, initial encounter  S86.312A traMADol (ULTRAM) 50 MG tablet      2. Postop check  Z09          Orders Placed This Encounter    traMADol (ULTRAM) 50 MG tablet     Sig: Take 1 tablet by mouth every 6 hours as needed for Pain for up to 7 days. Intended supply: 7 days. Take lowest dose possible to manage pain Max Daily Amount: 200 mg     Dispense:  28 tablet     Refill:  0     Reduce doses taken as pain becomes manageable        SUBJECTIVE: Maribell Marshall is a 58 y.o. female is seen for a routine postop check.    Reports the Percocet makes her drowsy, but she is managing the pain. She has no problems with the wound or other issues.  Activity, diet and bowels are normal. No pain.  No chest pain fever shakes chills night sweats, no calf pain.    OBJECTIVE: Appears well.  Incision is healed without complications or infection. Still has some swelling to the surgical site, to be expected.     Current Outpatient Medications   Medication Instructions    albuterol sulfate HFA (PROVENTIL HFA) 108 (90 Base) MCG/ACT inhaler 1-2 puffs, Inhalation, EVERY 4 HOURS PRN, Can substitute any formulary approved albuterol formulation (Proventil, Proair, Ventolin, etc)    BD PEN NEEDLE SERGIO 2ND GEN 32G X 4 MM MISC 1 each, Does not apply, DAILY    Blood Glucose Monitoring Suppl (ACCU-CHEK GUIDE) w/Device KIT No dose, route, or frequency recorded.    blood glucose test strips (ACCU-CHEK GUIDE) strip USE TO TEST BLOOD GLUCOSE THREE TIMES A DAY    butalbital-APAP-caffeine -40 MG CAPS per capsule 1 capsule, Oral, EVERY 4 HOURS PRN    cetirizine (ZYRTEC) 10 mg, Oral, DAILY    citalopram (CELEXA) 10 mg, Oral, DAILY    cyanocobalamin 50 mcg, Oral, DAILY    diclofenac sodium

## 2024-02-15 PROBLEM — Z01.818 PREOPERATIVE CLEARANCE: Status: RESOLVED | Noted: 2024-01-16 | Resolved: 2024-02-15

## 2024-02-20 RX ORDER — DIPHENHYDRAMINE HYDROCHLORIDE 50 MG/ML
50 INJECTION INTRAMUSCULAR; INTRAVENOUS
Status: CANCELLED | OUTPATIENT
Start: 2024-02-25

## 2024-02-20 RX ORDER — ACETAMINOPHEN 325 MG/1
650 TABLET ORAL
Status: CANCELLED | OUTPATIENT
Start: 2024-02-25

## 2024-02-20 RX ORDER — ALBUTEROL SULFATE 90 UG/1
4 AEROSOL, METERED RESPIRATORY (INHALATION) PRN
Status: CANCELLED | OUTPATIENT
Start: 2024-02-25

## 2024-02-20 RX ORDER — SODIUM CHLORIDE 9 MG/ML
INJECTION, SOLUTION INTRAVENOUS CONTINUOUS
Status: CANCELLED | OUTPATIENT
Start: 2024-02-25

## 2024-02-20 RX ORDER — ONDANSETRON 2 MG/ML
8 INJECTION INTRAMUSCULAR; INTRAVENOUS
Status: CANCELLED | OUTPATIENT
Start: 2024-02-25

## 2024-02-20 RX ORDER — EPINEPHRINE 1 MG/ML
0.3 INJECTION, SOLUTION, CONCENTRATE INTRAVENOUS PRN
Status: CANCELLED | OUTPATIENT
Start: 2024-02-25

## 2024-02-22 ENCOUNTER — TELEPHONE (OUTPATIENT)
Age: 59
End: 2024-02-22

## 2024-02-22 NOTE — TELEPHONE ENCOUNTER
Patient states that she last saw Dr. Wolf on 02/14. She says he put her on pain meds and blood thinners and asked at her last appt does she have issues with constipation and at the time she didn't.     She's now experiencing constipation, excessive bloody stools and recent nose bleeds.   She also mentioned that she has a history of Internal Hemorrhoids as well.      I reached out to clinical to have someone speak with the patient but was advised to tell the patient to go the ED.    Patient can be reached at [phone].

## 2024-02-27 ENCOUNTER — HOSPITAL ENCOUNTER (OUTPATIENT)
Facility: HOSPITAL | Age: 59
Setting detail: INFUSION SERIES
Discharge: HOME | End: 2024-03-01
Payer: MEDICARE

## 2024-02-27 VITALS
TEMPERATURE: 98.5 F | HEART RATE: 79 BPM | OXYGEN SATURATION: 97 % | SYSTOLIC BLOOD PRESSURE: 108 MMHG | RESPIRATION RATE: 16 BRPM | DIASTOLIC BLOOD PRESSURE: 71 MMHG

## 2024-02-27 DIAGNOSIS — J45.50 SEVERE PERSISTENT ASTHMA, UNSPECIFIED WHETHER COMPLICATED: Primary | ICD-10-CM

## 2024-02-27 PROCEDURE — 96372 THER/PROPH/DIAG INJ SC/IM: CPT

## 2024-02-27 PROCEDURE — 6360000002 HC RX W HCPCS: Performed by: INTERNAL MEDICINE

## 2024-02-27 RX ORDER — ALBUTEROL SULFATE 90 UG/1
4 AEROSOL, METERED RESPIRATORY (INHALATION) PRN
OUTPATIENT
Start: 2024-04-23

## 2024-02-27 RX ORDER — EPINEPHRINE 1 MG/ML
0.3 INJECTION, SOLUTION, CONCENTRATE INTRAVENOUS PRN
OUTPATIENT
Start: 2024-04-23

## 2024-02-27 RX ORDER — TRAMADOL HYDROCHLORIDE 50 MG/1
50 TABLET ORAL EVERY 6 HOURS PRN
COMMUNITY

## 2024-02-27 RX ORDER — ACETAMINOPHEN 325 MG/1
650 TABLET ORAL
OUTPATIENT
Start: 2024-04-23

## 2024-02-27 RX ORDER — SODIUM CHLORIDE 9 MG/ML
INJECTION, SOLUTION INTRAVENOUS CONTINUOUS
OUTPATIENT
Start: 2024-04-23

## 2024-02-27 RX ORDER — DIPHENHYDRAMINE HYDROCHLORIDE 50 MG/ML
50 INJECTION INTRAMUSCULAR; INTRAVENOUS
OUTPATIENT
Start: 2024-04-23

## 2024-02-27 RX ORDER — ONDANSETRON 2 MG/ML
8 INJECTION INTRAMUSCULAR; INTRAVENOUS
OUTPATIENT
Start: 2024-04-23

## 2024-02-27 RX ADMIN — BENRALIZUMAB 30 MG: 30 INJECTION, SOLUTION SUBCUTANEOUS at 11:53

## 2024-02-27 ASSESSMENT — PAIN SCALES - GENERAL: PAINLEVEL_OUTOF10: 6

## 2024-02-27 ASSESSMENT — PAIN DESCRIPTION - LOCATION: LOCATION: FOOT

## 2024-02-27 ASSESSMENT — PAIN DESCRIPTION - PAIN TYPE: TYPE: SURGICAL PAIN

## 2024-02-27 ASSESSMENT — PAIN DESCRIPTION - DESCRIPTORS: DESCRIPTORS: ACHING;THROBBING;SHARP

## 2024-02-27 ASSESSMENT — PAIN DESCRIPTION - FREQUENCY: FREQUENCY: CONTINUOUS

## 2024-02-27 NOTE — PROGRESS NOTES
[facility] Progress Note    Date: 2024    Name: Maribell Marshall    MRN: [id number]         : 1965      FASENRA Q8 WEEKS      Ms. Marshall arrived to [facility] [place] at 1140. Pt is currently non-weight bearing due to recent L foot surgery & is using a wheelchair for mobility. She is accompanied by her friend today.    Ms. Marshall was assessed and education was provided.     Ms. Marshall's vitals were reviewed.  Vitals:    24 1140   BP: 108/71   Pulse: 79   Resp: 16   Temp: 98.5 °F (36.9 °C)   SpO2: 97%       Fasenra 30mg was administered as ordered SQ in patient's L upper arm. Band-aid applied to site.     Ms. Marshall tolerated well without complaints.    Discharge/ follow-up instructions discussed w/ pt. Pt verbalized understanding.    Pt armband removed & shredded.    [facility] at 1205. She is to return on 24 at 1130 for her next appointment.    Amarilis May RN  2024

## 2024-03-04 DIAGNOSIS — S86.312A TEAR OF PERONEAL TENDON, LEFT, INITIAL ENCOUNTER: ICD-10-CM

## 2024-03-04 RX ORDER — RIVAROXABAN 10 MG/1
10 TABLET, FILM COATED ORAL DAILY
Qty: 21 TABLET | Refills: 0 | OUTPATIENT
Start: 2024-03-04

## 2024-03-07 ENCOUNTER — OFFICE VISIT (OUTPATIENT)
Age: 59
End: 2024-03-07

## 2024-03-07 VITALS — BODY MASS INDEX: 27.76 KG/M2 | HEIGHT: 62 IN

## 2024-03-07 DIAGNOSIS — S86.312A TEAR OF PERONEAL TENDON, LEFT, INITIAL ENCOUNTER: Primary | ICD-10-CM

## 2024-03-07 DIAGNOSIS — Z09 POSTOP CHECK: ICD-10-CM

## 2024-03-07 NOTE — PROGRESS NOTES
Excisional Debridement Left Peroneal Brevis Tendon Tenosynovectomy, Primary Repair Left Peroneal Brevis Tendon Tear, Regional Block  *see Comments* - Left   SURGERY DATE: 1/26/2024   DAYS SINCE SURGERY: 41     ICD-10-CM    1. Tear of peroneal tendon, left, initial encounter  S86.312A [facility]     Walker rolling      2. Postop check  Z09 Cam Boot     Walker rolling         Orders Placed This Encounter    [facility]     Referral Priority:   Routine     Referral Type:   Eval and Treat     Referral Reason:   Specialty Services Required     Requested Specialty:   Physical Therapist     Number of Visits Requested:   1    Cam Boot    Walker rolling          SUBJECTIVE: Maribell Marshall is a 58 y.o. female is seen for a routine postop check.    Reports   no problems with the wound or other issues.  Activity, diet and bowels are normal. No pain.  No chest pain fever shakes chills night sweats, no calf pain.    OBJECTIVE: Appears well.  Incision Healed without complications or infection.    Current Outpatient Medications   Medication Instructions    albuterol sulfate HFA (PROVENTIL HFA) 108 (90 Base) MCG/ACT inhaler 1-2 puffs, Inhalation, EVERY 4 HOURS PRN, Can substitute any formulary approved albuterol formulation (Proventil, Proair, Ventolin, etc)    BD PEN NEEDLE SERGIO 2ND GEN 32G X 4 MM MISC 1 each, Does not apply, DAILY    Blood Glucose Monitoring Suppl (ACCU-CHEK GUIDE) w/Device KIT No dose, route, or frequency recorded.    blood glucose test strips (ACCU-CHEK GUIDE) strip USE TO TEST BLOOD GLUCOSE THREE TIMES A DAY    butalbital-APAP-caffeine -40 MG CAPS per capsule 1 capsule, Oral, EVERY 4 HOURS PRN    cetirizine (ZYRTEC) 10 mg, Oral, DAILY    citalopram (CELEXA) 10 mg, Oral, DAILY    cyanocobalamin 50 mcg, Oral, DAILY    diclofenac sodium (VOLTAREN) 2 g, Topical, 4 TIMES DAILY PRN    dilTIAZem

## 2024-03-07 NOTE — PATIENT INSTRUCTIONS
WRAPUP INSTRUCTIONS FOR FATOU TEAM         [x] Follow-up with Jose Luis Wolf MD   in 4 weeks.   [x]  [facility] referral to: PHYSICAL THERAPY

## 2024-03-12 ENCOUNTER — HOSPITAL ENCOUNTER (OUTPATIENT)
Facility: HOSPITAL | Age: 59
Setting detail: RECURRING SERIES
Discharge: HOME | End: 2024-03-15
Payer: MEDICARE

## 2024-03-12 PROCEDURE — 97161 PT EVAL LOW COMPLEX 20 MIN: CPT

## 2024-03-12 PROCEDURE — 97535 SELF CARE MNGMENT TRAINING: CPT

## 2024-03-12 PROCEDURE — 97110 THERAPEUTIC EXERCISES: CPT

## 2024-03-12 NOTE — PROGRESS NOTES
PHYSICAL / OCCUPATIONAL THERAPY - DAILY TREATMENT NOTE     Patient Name: Maribell Marshall    Date: 3/12/2024    : 1965  Insurance: Payor: [facility] MEDICARE / Plan: UNITEDHEALTHCARE DUAL COMPLETE / Product Type: *No Product type* /      Patient  verified Yes     Visit #   Current / Total 1 24   Time   In / Out 1235 1:10   Pain   In / Out 6 6   Subjective Functional Status/Changes: The pt reports left lateral ankle pain   Changes to:  Allergies, Med Hx, Sx Hx?   no       TREATMENT AREA =  Pain in left ankle and joints of left foot [M25.572]    OBJECTIVE    Modalities Rationale:          min [] Estim Unattended, type/location:                                      []  w/ice    []  w/heat    min [] Estim Attended, type/location:                                     []  w/US     []  w/ice    []  w/heat    []  TENS insruct      min []  Mechanical Traction: type/lbs                   []  pro   []  sup   []  int   []  cont    []  before manual    []  after manual    min []  Ultrasound, settings/location:      min []  Iontophoresis w/ dexamethasone, location:                                               []  take home patch       []  in clinic        min  unbilled []  Ice     []  Heat    location/position:     min []  Paraffin,  details:     min []  Vasopneumatic Device, press/temp:     min []  Whirlpool / Fluido:    If using vaso (only need to measure limb vaso being performed on)      pre-treatment girth :       post-treatment girth :       measured at (landmark location) :      min []  Other:    Skin assessment post-treatment (if applicable):    []  intact    []  redness- no adverse reaction                 []redness - adverse reaction:         Therapeutic Procedures:  Tx Min Billable or 1:1 Min (if diff from Tx Min) Procedure, Rationale, Specifics   15  26468 Therapeutic Exercise (timed):  increase ROM, strength, coordination, balance, and proprioception to improve patient's ability to progress to PLOF and address 
for gait.   IE- lacking 12 degrees from neutral   3. Improve left ankle MMT to 4+/5 for improved ability for standing and walking.   IE- 3+/5 DF and PF, IV and EV:2/5  4. The pt will progress to walking without use of cam boot for community ambulation.    IE- requires cam boot and RW for gait.   5. The pt will report only a little difficulty with walking between rooms at home.    IE- Quite a bit    Frequency / Duration: Patient to be seen 2 times per week for 12 weeks  Goals will be assigned and reassessed every 10 visits/ 30 days per guidelines .    Patient/ Caregiver education and instruction: Diagnosis, prognosis, self care, activity modification, brace/ splint application, and exercises [x]  Plan of care has been reviewed with PTA    Certification Period: 3/12/24 to 6/10/24    Geremias Petersen, PT       3/12/2024       12:53 PM    I certify that the above Therapy Services are being furnished while the patient is under my care. I agree with the treatment plan and certify that this therapy is necessary.    Physician's Signature:_________________________   DATE:_________   TIME:________                           Jose Luis Wolf MD  Insurance: Payor: [facility] MEDICARE / Plan: UNITEDHEALTHCARE DUAL COMPLETE / Product Type: *No Product type* /      ** Signature, Date and Time must be completed for valid certification **  [facility] or you may fax the signed copy to [phone]. Thank you.

## 2024-03-14 ENCOUNTER — HOSPITAL ENCOUNTER (OUTPATIENT)
Facility: HOSPITAL | Age: 59
Setting detail: RECURRING SERIES
Discharge: HOME | End: 2024-03-17
Payer: MEDICARE

## 2024-03-14 PROCEDURE — 97016 VASOPNEUMATIC DEVICE THERAPY: CPT

## 2024-03-14 PROCEDURE — 97110 THERAPEUTIC EXERCISES: CPT

## 2024-03-14 PROCEDURE — 97140 MANUAL THERAPY 1/> REGIONS: CPT

## 2024-03-14 PROCEDURE — 97530 THERAPEUTIC ACTIVITIES: CPT

## 2024-03-14 NOTE — PROGRESS NOTES
PHYSICAL / OCCUPATIONAL THERAPY - DAILY TREATMENT NOTE     Patient Name: Maribell Marshall    Date: 3/14/2024    : 1965  Insurance: Payor: [facility] MEDICARE / Plan: [employer] DUAL COMPLETE / Product Type: *No Product type* /      Patient  verified Yes     Visit #   Current / Total 2 24   Time   In / Out 11:10 12:10   Pain   In / Out 8 8   Subjective Functional Status/Changes: Pt reported feeling o.k, no new changes reported   Changes to:  Allergies, Med Hx, Sx Hx?   no       TREATMENT AREA =  Left ankle pain [M25.572]    OBJECTIVE    Modalities Rationale:     decrease edema, decrease pain, and increase tissue extensibility to improve patient's ability to progress to PLOF and address remaining functional goals.     min [] Estim Unattended, type/location:                                      []  w/ice    []  w/heat    min [] Estim Attended, type/location:                                     []  w/US     []  w/ice    []  w/heat    []  TENS insruct      min []  Mechanical Traction: type/lbs                   []  pro   []  sup   []  int   []  cont    []  before manual    []  after manual    min []  Ultrasound, settings/location:      min []  Iontophoresis w/ dexamethasone, location:                                               []  take home patch       []  in clinic        min  unbilled []  Ice     []  Heat    location/position:     min []  Paraffin,  details:    10 min [x]  Vasopneumatic Device, press/temp: LP/LT    min []  Whirlpool / Fluido:    If using vaso (only need to measure limb vaso being performed on)      pre-treatment girth : 46.5 cms      post-treatment girth : 45.5 cms      measured at (landmark location) :  Figure 8    min []  Other:    Skin assessment post-treatment (if applicable):    []  intact    []  redness- no adverse reaction                 []redness - adverse reaction:         Therapeutic Procedures:  Tx Min Billable or 1:1 Min (if diff from Tx Min) Procedure, Rationale, Specifics

## 2024-03-19 DIAGNOSIS — E83.42 HYPOMAGNESEMIA: ICD-10-CM

## 2024-03-20 ENCOUNTER — OFFICE VISIT (OUTPATIENT)
Age: 59
End: 2024-03-20
Payer: MEDICARE

## 2024-03-20 ENCOUNTER — HOSPITAL ENCOUNTER (OUTPATIENT)
Facility: HOSPITAL | Age: 59
Setting detail: RECURRING SERIES
Discharge: HOME | End: 2024-03-23
Payer: MEDICARE

## 2024-03-20 VITALS
SYSTOLIC BLOOD PRESSURE: 110 MMHG | HEIGHT: 62 IN | HEART RATE: 70 BPM | OXYGEN SATURATION: 98 % | DIASTOLIC BLOOD PRESSURE: 68 MMHG | BODY MASS INDEX: 28.34 KG/M2 | WEIGHT: 154 LBS

## 2024-03-20 DIAGNOSIS — I47.10 SUPRAVENTRICULAR TACHYCARDIA: Primary | ICD-10-CM

## 2024-03-20 DIAGNOSIS — R07.9 CHEST PAIN, UNSPECIFIED TYPE: ICD-10-CM

## 2024-03-20 DIAGNOSIS — R00.2 PALPITATIONS: ICD-10-CM

## 2024-03-20 PROCEDURE — G8482 FLU IMMUNIZE ORDER/ADMIN: HCPCS | Performed by: INTERNAL MEDICINE

## 2024-03-20 PROCEDURE — 3074F SYST BP LT 130 MM HG: CPT | Performed by: INTERNAL MEDICINE

## 2024-03-20 PROCEDURE — 3078F DIAST BP <80 MM HG: CPT | Performed by: INTERNAL MEDICINE

## 2024-03-20 PROCEDURE — 97110 THERAPEUTIC EXERCISES: CPT

## 2024-03-20 PROCEDURE — G8417 CALC BMI ABV UP PARAM F/U: HCPCS | Performed by: INTERNAL MEDICINE

## 2024-03-20 PROCEDURE — 97016 VASOPNEUMATIC DEVICE THERAPY: CPT

## 2024-03-20 PROCEDURE — 3017F COLORECTAL CA SCREEN DOC REV: CPT | Performed by: INTERNAL MEDICINE

## 2024-03-20 PROCEDURE — 97112 NEUROMUSCULAR REEDUCATION: CPT

## 2024-03-20 PROCEDURE — 1036F TOBACCO NON-USER: CPT | Performed by: INTERNAL MEDICINE

## 2024-03-20 PROCEDURE — G8427 DOCREV CUR MEDS BY ELIG CLIN: HCPCS | Performed by: INTERNAL MEDICINE

## 2024-03-20 PROCEDURE — 97140 MANUAL THERAPY 1/> REGIONS: CPT

## 2024-03-20 PROCEDURE — 93000 ELECTROCARDIOGRAM COMPLETE: CPT | Performed by: INTERNAL MEDICINE

## 2024-03-20 PROCEDURE — 99215 OFFICE O/P EST HI 40 MIN: CPT | Performed by: INTERNAL MEDICINE

## 2024-03-20 RX ORDER — LANOLIN ALCOHOL/MO/W.PET/CERES
400 CREAM (GRAM) TOPICAL DAILY
Qty: 120 TABLET | OUTPATIENT
Start: 2024-03-20

## 2024-03-20 ASSESSMENT — PATIENT HEALTH QUESTIONNAIRE - PHQ9
3. TROUBLE FALLING OR STAYING ASLEEP: NOT AT ALL
9. THOUGHTS THAT YOU WOULD BE BETTER OFF DEAD, OR OF HURTING YOURSELF: NOT AT ALL
SUM OF ALL RESPONSES TO PHQ QUESTIONS 1-9: 0
SUM OF ALL RESPONSES TO PHQ9 QUESTIONS 1 & 2: 0
2. FEELING DOWN, DEPRESSED OR HOPELESS: NOT AT ALL
4. FEELING TIRED OR HAVING LITTLE ENERGY: NOT AT ALL
1. LITTLE INTEREST OR PLEASURE IN DOING THINGS: NOT AT ALL
SUM OF ALL RESPONSES TO PHQ QUESTIONS 1-9: 0
6. FEELING BAD ABOUT YOURSELF - OR THAT YOU ARE A FAILURE OR HAVE LET YOURSELF OR YOUR FAMILY DOWN: NOT AT ALL
8. MOVING OR SPEAKING SO SLOWLY THAT OTHER PEOPLE COULD HAVE NOTICED. OR THE OPPOSITE, BEING SO FIGETY OR RESTLESS THAT YOU HAVE BEEN MOVING AROUND A LOT MORE THAN USUAL: NOT AT ALL
5. POOR APPETITE OR OVEREATING: NOT AT ALL
7. TROUBLE CONCENTRATING ON THINGS, SUCH AS READING THE NEWSPAPER OR WATCHING TELEVISION: NOT AT ALL
10. IF YOU CHECKED OFF ANY PROBLEMS, HOW DIFFICULT HAVE THESE PROBLEMS MADE IT FOR YOU TO DO YOUR WORK, TAKE CARE OF THINGS AT HOME, OR GET ALONG WITH OTHER PEOPLE: NOT DIFFICULT AT ALL

## 2024-03-20 NOTE — PROGRESS NOTES
Maribell Marshall presents today for   Chief Complaint   Patient presents with    Follow-up     6 month f/u        Maribell Marshall preferred language for health care discussion is english/other.    Is someone accompanying this pt? no    Is the patient using any DME equipment during OV? no    Depression Screening:  Depression: Not at risk (3/20/2024)    PHQ-2     PHQ-2 Score: 0        Learning Assessment:  Who is the primary learner? Patient    What is the preferred language for health care of the primary learner? ENGLISH    How does the primary learner prefer to learn new concepts? DEMONSTRATION    Answered By patient    Relationship to Learner SELF           Pt currently taking Anticoagulant therapy? Xarelto 20 mg 1x daily     Pt currently taking Antiplatelet therapy ? no      Coordination of Care:  1. Have you been to the ER, urgent care clinic since your last visit? Hospitalized since your last visit? no    2. Have you seen or consulted any other health care providers outside of the [facility] since your last visit? Include any pap smears or colon screening. no    
  Housing Stability: Not on file        FH: neg premature ASCVD, no SCD    Review of Systems    14 pt Review of Systems is negative unless otherwise mentioned in the HPI.    Wt Readings from Last 3 Encounters:   12/08/22 78.9 kg (174 lb)   12/01/22 78.9 kg (174 lb)   11/17/22 79.4 kg (175 lb)     Temp Readings from Last 3 Encounters:   12/01/22 98 °F (36.7 °C) (Temporal)   11/17/22 98.7 °F (37.1 °C)   11/17/22 98.5 °F (36.9 °C)     BP Readings from Last 3 Encounters:   12/08/22 132/84   12/01/22 134/72   11/17/22 (!) 149/70     Pulse Readings from Last 3 Encounters:   12/08/22 92   12/01/22 91   11/17/22 84     03/30/23    NM STRESS TEST WITH MYOCARDIAL PERFUSION 03/30/2023  4:56 PM (Final)    Interpretation Summary    Stress Test: A pharmacological stress test was performed using lexiscan.    ECG: Resting ECG demonstrates normal sinus rhythm.    Perfusion Conclusion: TID ratio is 0.75.  Hyperdynamic left ventricular systolic function with Ejection fraction 97%.  No is evidence of ischemia or previous infarct.  Normal wall motion.    Impression: Low risk pharmacologic cardiac nuclear stress test.    Signed by: Hossein Merrill MD on 3/30/2023  4:56 PM        Physical Exam:    Visit Vitals  Vitals:    03/20/24 1102   BP: 110/68   Site: Left Upper Arm   Position: Sitting   Cuff Size: Medium Adult   Pulse: 70   SpO2: 98%   Weight: 69.9 kg (154 lb)   Height: 1.575 m (5' 2\")      Physical Exam  HENT:      Head: Normocephalic and atraumatic.   Eyes:      Pupils: Pupils are equal, round, and reactive to light.   Cardiovascular:      Rate and Rhythm: Normal rate and regular rhythm.      Heart sounds: Normal heart sounds. No murmur heard.    No friction rub. No gallop.   Pulmonary:      Effort: Pulmonary effort is normal. No respiratory distress.      Breath sounds: Normal breath sounds. No wheezing or rales.   Chest:      Chest wall: No tenderness.   Abdominal:      General: Bowel sounds are normal.      Palpations: Abdomen

## 2024-03-20 NOTE — PATIENT INSTRUCTIONS
Referral to Dr. Merrill  [phone]   Symptoms: Patient describes having hot flashes, headache, nausea    OB/Office: [place] [facility]/[address]    Onset: Yesterday around 3 pm   Location/Description:  Nausea with vomiting 2x since it started. Migraine headache currently 310  Cramping/Contractions:  Denies  Vaginal Drainage/Bleeding:  Denies  Fetus Active?  N/A  Precipitating Factors:  Unsure  Pain Scale (1-10), 10 highest: 310  LMP: Patient's last menstrual period was 2023 (approximate).  EDC:  2023  Gestational Age:  6w5d  Blood Type: O Rh Positive  OB History:    OB History    Para Term  AB Living   1 0 0 0 0 0   SAB IAB Ectopic Molar Multiple Live Births   0 0 0 0 0 0        Vaginal/C Section: N/A  Group B Strep (pos or neg):    History of previous Labor & Delivery:  Denies  Recent visits (last 3-4 weeks) for same reason or recent surgery:  Denies    PLAN:  Home Care Advice provided    Patient/Caller agrees to follow recommendations.    Reason for Disposition  • MILD-MODERATE vomiting (e.g., 1-5 times / day) or nausea    Protocols used: PREGNANCY - MORNING SICKNESS (NAUSEA AND VOMITING OF PREGNANCY)-A-

## 2024-03-20 NOTE — PROGRESS NOTES
(timed):  increase ROM, strength, coordination, balance, and proprioception to improve patient's ability to progress to PLOF and address remaining functional goals. (see flow sheet as applicable)     Details if applicable:       8  36410 Neuromuscular Re-Education (timed):  improve balance, coordination, kinesthetic sense, posture, core stability and proprioception to improve patient's ability to develop conscious control of individual muscles and awareness of position of extremities in order to progress to PLOF and address remaining functional goals. (see flow sheet as applicable)     Details if applicable:     8  63333 Manual Therapy (timed):  decrease pain and increase ROM to improve patient's ability to progress to PLOF and address remaining functional goals.  The manual therapy interventions were performed at a separate and distinct time from the therapeutic activities interventions . (see flow sheet as applicable)     Details if applicable:  grade III talocrural mobs/ PROM in all directions          Details if applicable:            Details if applicable:     45  [facility] Totals Reminder: bill using total billable min of TIMED therapeutic procedures (example: do not include dry needle or estim unattended, both untimed codes, in totals to left)  8-22 min = 1 unit; 23-37 min = 2 units; 38-52 min = 3 units; 53-67 min = 4 units; 68-82 min = 5 units   Total Total     TOTAL TREATMENT TIME:        55     [x]  Patient Education billed concurrently with other procedures   [x] Review HEP    [] Progressed/Changed HEP, detail:    [] Other detail:       Objective Information/Functional Measures/Assessment    Increased reps per flow sheet without increase in pain. Required cueing to relax during manual and to refrain from moving knee/leg with ankle AROM.    Patient will continue to benefit from skilled PT / OT services to modify and progress therapeutic interventions, analyze and address functional mobility deficits, analyze

## 2024-03-22 ENCOUNTER — APPOINTMENT (OUTPATIENT)
Facility: HOSPITAL | Age: 59
End: 2024-03-22
Payer: MEDICARE

## 2024-03-25 ENCOUNTER — HOSPITAL ENCOUNTER (OUTPATIENT)
Facility: HOSPITAL | Age: 59
Setting detail: RECURRING SERIES
Discharge: HOME | End: 2024-03-28
Payer: MEDICARE

## 2024-03-25 PROCEDURE — 97112 NEUROMUSCULAR REEDUCATION: CPT

## 2024-03-25 PROCEDURE — 97110 THERAPEUTIC EXERCISES: CPT

## 2024-03-25 PROCEDURE — 97530 THERAPEUTIC ACTIVITIES: CPT

## 2024-03-25 NOTE — PROGRESS NOTES
PHYSICAL / OCCUPATIONAL THERAPY - DAILY TREATMENT NOTE     Patient Name: Maribell Marshall    Date: 3/25/2024    : 1965  Insurance: Payor: [facility] MEDICARE / Plan: UNITEDHE[employer] DUAL COMPLETE / Product Type: *No Product type* /      Patient  verified Yes     Visit #   Current / Total 4 24   Time   In / Out 10:29 11:10   Pain   In / Out 0/10 0/10   Subjective Functional Status/Changes: Pt reports not wanting to wear the CAM boot.   Changes to:  Allergies, Med Hx, Sx Hx?   no       TREATMENT AREA =  Left ankle pain [M25.572]    OBJECTIVE    Therapeutic Procedures:  Tx Min Billable or 1:1 Min (if diff from Tx Min) Procedure, Rationale, Specifics   20  08822 Therapeutic Exercise (timed):  increase ROM, strength, coordination, balance, and proprioception to improve patient's ability to progress to PLOF and address remaining functional goals. (see flow sheet as applicable)    Details if applicable:       8 8 08475 Neuromuscular Re-Education (timed):  improve balance, coordination, kinesthetic sense, posture, core stability and proprioception to improve patient's ability to develop conscious control of individual muscles and awareness of position of extremities in order to progress to PLOF and address remaining functional goals. (see flow sheet as applicable)    Details if applicable:  Weight shifts, towel swipes   8 8 07915 Therapeutic Activity (timed):  use of dynamic activities replicating functional movements to increase ROM, strength, coordination, balance, and proprioception in order to improve patient's ability to progress to PLOF and address remaining functional goals.  (see flow sheet as applicable)     Details if applicable:  Sit to stand, bridges.   5 5 32713 Manual Therapy (timed):  decrease pain, increase ROM, increase tissue extensibility, and decrease trigger points to improve patient's ability to progress to PLOF and address remaining functional goals.  The manual therapy interventions were

## 2024-03-26 DIAGNOSIS — E83.42 HYPOMAGNESEMIA: ICD-10-CM

## 2024-03-26 DIAGNOSIS — E78.2 MIXED HYPERLIPIDEMIA: ICD-10-CM

## 2024-03-26 NOTE — TELEPHONE ENCOUNTER
Maribell Marshall has been identified with a care gap for Statin Use in Person With Diabetes (SUPD).    Per patient chart review: Patient has a listed allergy to statins. Unfortunately, allergies are not billed and a CMS approved dx code must be entered on a billable office visit in order for this patient to be excluded.  She is also currently prescribed Pravastatin 80 mg, and it is listed as \"Taking\" at LOV 3/20/2024    There are no current fills in 2024, Last fill was 7/7/2023 for a 93 day supply with [facility].  Patient no longer has [facility]- Will need a new rx sent to [facility] or [facility]    Update:  Patient called back- she is currently take pravastatin 80 mg, and zetia 10 mg once daily. Both were last filled with [facility] mail order. She needs new rx's sent to [facility] as well as Magnesium Oxide.- Will pend to PCP    Please let me know if you have any questions!    Thank you,  Ayana Temple, PharmD, [facility]  Population Health Pharmacist  [facility]   Department, toll free: [phone] Option 2   ===============================================================================       POPULATION HEALTH CLINICAL PHARMACY: STATIN THERAPY REVIEW  Identified Statin Use In Diabetes care gap per [employer] Records dated:03/26/24     Statin Use in Persons with Diabetes Definition:  Eligible:Members with diabetes ages 40-75 during the measurement year    Definition: Medicare members with diabetes ages 40-75 who receive at least 1 fill of a statin medication in the measurement year   Members with diabetes are defined as those who have at least 2 fills of diabetes medications during the measurement year     Compliance: To comply with this measure, a member with diabetes must have a fill for at least 1 statin or statin combination medication in any strength or dose using their Part D benefit during the measurement year.     Last Visit:  1/16/2024 PCP  3/20/2024 with Cardio  Next Visit:  4/16/2024  PCP  Has appt

## 2024-03-27 RX ORDER — EZETIMIBE 10 MG/1
10 TABLET ORAL DAILY
Qty: 90 TABLET | Refills: 0 | Status: SHIPPED | OUTPATIENT
Start: 2024-03-27

## 2024-03-27 RX ORDER — PRAVASTATIN SODIUM 80 MG/1
80 TABLET ORAL DAILY
Qty: 90 TABLET | Refills: 0 | Status: SHIPPED | OUTPATIENT
Start: 2024-03-27

## 2024-03-27 RX ORDER — MAGNESIUM OXIDE 400 MG/1
400 TABLET ORAL DAILY
Qty: 90 TABLET | Refills: 0 | Status: SHIPPED | OUTPATIENT
Start: 2024-03-27

## 2024-03-27 NOTE — TELEPHONE ENCOUNTER
Francine Salvador DNP     Maribell Marshall has been flagged for Adherence and Statin Use in Persons with Diabetes by [place].   Patient's insurance will allow a 100 day supply for a $0 Copay. These medications were also last filled with [facility] and she is trying to get them switched to the correct pharmay- [facility]- her correct Mail Order    Requested Prescriptions     Pending Prescriptions Disp Refills    ezetimibe (ZETIA) 10 MG tablet 100 tablet 2     Sig: Take 1 tablet by mouth daily    magnesium oxide (MAG-OX) 400 MG tablet 100 tablet 2     Sig: Take 1 tablet by mouth daily    pravastatin (PRAVACHOL) 80 MG tablet 100 tablet 2     Sig: Take 1 tablet by mouth daily       Prescriber: Francine Salvador DNP   Pharmacy: [facility] - [address] [phone] [facility] [phone]      I have pended refills for your approval and changed to a 100 day supply. Please let me know if there is anything I can help with.    LOV:2024  NOV:2024     Thank you,  Ayana Tempel, PharmD, BCACP  Population Health Pharmacist  [facility] Clinical Pharmacy   Department, toll free: [phone] Option 2    ================================================================================  Thank you, Francine Salvador DNP!    For Pharmacy Admin Tracking Only    Program: [facility]  CPA in place:  No  Recommendation Provided To: Provider: 2 via Note to Provider and Patient/Caregiver: 2 via Telephone  Intervention Detail: Adherence Monitorin and New Rx: 3, reason: Improve Adherence, Needs Additional Therapy, Patient Preference  Intervention Accepted By: Provider: 2 and Patient/Caregiver: 2  Gap Closed?: Yes   Time Spent (min): 30

## 2024-03-28 ENCOUNTER — HOSPITAL ENCOUNTER (OUTPATIENT)
Facility: HOSPITAL | Age: 59
Setting detail: RECURRING SERIES
Discharge: HOME | End: 2024-03-31
Payer: MEDICARE

## 2024-03-28 PROCEDURE — 97110 THERAPEUTIC EXERCISES: CPT

## 2024-03-28 PROCEDURE — 97112 NEUROMUSCULAR REEDUCATION: CPT

## 2024-03-28 PROCEDURE — 97530 THERAPEUTIC ACTIVITIES: CPT

## 2024-03-28 NOTE — PROGRESS NOTES
PHYSICAL / OCCUPATIONAL THERAPY - DAILY TREATMENT NOTE     Patient Name: Maribell Marshall    Date: 3/28/2024    : 1965  Insurance: Payor: [facility] MEDICARE / Plan: [employer] DUAL COMPLETE / Product Type: *No Product type* /      Patient  verified Yes     Visit #   Current / Total 5 24   Time   In / Out 11:02 11:44   Pain   In / Out 0/10 010   Subjective Functional Status/Changes: \"I've been resting and doing the exercises.\"   Changes to:  Allergies, Med Hx, Sx Hx?   no       TREATMENT AREA =  Left ankle pain [M25.572]    OBJECTIVE    Therapeutic Procedures:  Tx Min Billable or 1:1 Min (if diff from Tx Min) Procedure, Rationale, Specifics   13  04910 Therapeutic Exercise (timed):  increase ROM, strength, coordination, balance, and proprioception to improve patient's ability to progress to PLOF and address remaining functional goals. (see flow sheet as applicable)    Details if applicable:       8  59149 Neuromuscular Re-Education (timed):  improve balance, coordination, kinesthetic sense, posture, core stability and proprioception to improve patient's ability to develop conscious control of individual muscles and awareness of position of extremities in order to progress to PLOF and address remaining functional goals. (see flow sheet as applicable)    Details if applicable:  Weight shifts, towel swipes    8  09057 Therapeutic Activity (timed):  use of dynamic activities replicating functional movements to increase ROM, strength, coordination, balance, and proprioception in order to improve patient's ability to progress to PLOF and address remaining functional goals.  (see flow sheet as applicable)     Details if applicable:  Sit to stand, bridges.    5  04748 Manual Therapy (timed):  decrease pain, increase ROM, increase tissue extensibility, and decrease trigger points to improve patient's ability to progress to PLOF and address remaining functional goals.  The manual therapy interventions were

## 2024-04-03 ENCOUNTER — OFFICE VISIT (OUTPATIENT)
Age: 59
End: 2024-04-03

## 2024-04-03 ENCOUNTER — TELEPHONE (OUTPATIENT)
Age: 59
End: 2024-04-03

## 2024-04-03 ENCOUNTER — CLINICAL DOCUMENTATION (OUTPATIENT)
Age: 59
End: 2024-04-03

## 2024-04-03 DIAGNOSIS — S86.312A TEAR OF PERONEAL TENDON, LEFT, INITIAL ENCOUNTER: Primary | ICD-10-CM

## 2024-04-03 DIAGNOSIS — S93.492A SPRAIN OF ANTERIOR TALOFIBULAR LIGAMENT OF LEFT ANKLE, INITIAL ENCOUNTER: ICD-10-CM

## 2024-04-03 PROCEDURE — 99024 POSTOP FOLLOW-UP VISIT: CPT | Performed by: ORTHOPAEDIC SURGERY

## 2024-04-03 NOTE — TELEPHONE ENCOUNTER
Patient informed we can not refer her to another pulmonary doctor, she would need to get referral from PCP

## 2024-04-03 NOTE — PROGRESS NOTES
Excisional Debridement Left Peroneal Brevis Tendon Tenosynovectomy, Primary Repair Left Peroneal Brevis Tendon Tear, Regional Block  *see Comments* - Left   SURGERY DATE: 1/26/2024   DAYS SINCE SURGERY: 68     ICD-10-CM    1. Tear of peroneal tendon, left, initial encounter  S86.312A DME Order for (Specify) as OP      2. Sprain of anterior talofibular ligament of left ankle, initial encounter  S93.492A DME Order for (Specify) as OP         Orders Placed This Encounter    DME Order for (Specify) as OP     - DME device ordered - left Airsport brace Medium          SUBJECTIVE: Maribell Marshall is a 58 y.o. female is seen for a routine postop check.    Reports no pain or problems with the wound or other issues. She is able to walk around the house without the short CAM boot with no major issues. She is currently in PT for the left ankle.    Activity, diet and bowels are normal. No pain.  No chest pain fever shakes chills night sweats, no calf pain.    OBJECTIVE: Appears well.  Incision is healed without complications or infection, slight discoloration of the skin around the incision. Limited inversion strength.     Current Outpatient Medications   Medication Instructions    albuterol sulfate HFA (PROVENTIL HFA) 108 (90 Base) MCG/ACT inhaler 1-2 puffs, Inhalation, EVERY 4 HOURS PRN, Can substitute any formulary approved albuterol formulation (Proventil, Proair, Ventolin, etc)    BD PEN NEEDLE SERGIO 2ND GEN 32G X 4 MM MISC 1 each, Does not apply, DAILY    Blood Glucose Monitoring Suppl (ACCU-CHEK GUIDE) w/Device KIT No dose, route, or frequency recorded.    blood glucose test strips (ACCU-CHEK GUIDE) strip USE TO TEST BLOOD GLUCOSE THREE TIMES A DAY    butalbital-APAP-caffeine -40 MG CAPS per capsule 1 capsule, Oral, EVERY 4 HOURS PRN    cetirizine (ZYRTEC) 10 mg, Oral, DAILY    citalopram (CELEXA) 10 mg, Oral, DAILY    cyanocobalamin 50 mcg, Oral, DAILY    diclofenac sodium (VOLTAREN) 2 g, Topical, 4

## 2024-04-03 NOTE — PATIENT INSTRUCTIONS
WRAPUP INSTRUCTIONS FOR FATOU TEAM         [x] Follow-up with Jose Luis Wolf MD  in 4 weeks.

## 2024-04-03 NOTE — TELEPHONE ENCOUNTER
Pt called in wanting to savita a F/U with HP, I informed the pt that his next available apt is Aug/sept for follow ups and np apt. Pt states that she wants [facility] to refer her to another pulm ..Pls call pt at [phone]

## 2024-04-03 NOTE — PROGRESS NOTES
I offered patient an August appointment and to call her if we have a July cancellation she would not take any available appointments and asked to be referred to another pulmonary physician outside of our group.

## 2024-04-04 ENCOUNTER — HOSPITAL ENCOUNTER (OUTPATIENT)
Facility: HOSPITAL | Age: 59
Setting detail: RECURRING SERIES
Discharge: HOME | End: 2024-04-07
Payer: MEDICARE

## 2024-04-04 PROCEDURE — 97112 NEUROMUSCULAR REEDUCATION: CPT

## 2024-04-04 PROCEDURE — 97110 THERAPEUTIC EXERCISES: CPT

## 2024-04-04 PROCEDURE — 97530 THERAPEUTIC ACTIVITIES: CPT

## 2024-04-04 NOTE — PROGRESS NOTES
activities once out of the cam boot.               IE- IV 8 degrees, EV 0  Current: IV 15, EV 3 degrees. 3/28/2024   Long Term Goals: To be accomplished in 12 weeks  Improve FOTO score to the predicted outcome for improved ability for functional tasks.   IE- 31  2. The pt will demonstrate 10 degrees of left ankle DF AROM for improved ability for gait.              IE- lacking 12 degrees from neutral   3. Improve left ankle MMT to 4+/5 for improved ability for standing and walking.              IE- 3+/5 DF and PF, IV and EV:2/5  4. The pt will progress to walking without use of cam boot for community ambulation.               IE- requires cam boot and RW for gait.  Current: Mild antalgic gait, ambulating with ankle brace. 4/4/2024   5. The pt will report only a little difficulty with walking between rooms at home.               IE- Quite a bit   Current: Met, pt reports no difficulty walking around her home. 4/4/2024    PLAN  Yes  Continue plan of care  []  Upgrade activities as tolerated  []  Discharge due to :  []  Other:    Jaspreet Max, YOUNG    4/4/2024    10:42 AM    Future Appointments   Date Time Provider Department Center   4/8/2024 12:30 PM Geremias Petersen, PT [facility]   4/10/2024 10:00 AM [facility] LAB VISIT [facility] BS AMB   4/10/2024 11:50 AM Geremias Petersen, PT [facility]   4/16/2024  8:30 AM Francine Salvador DNP [facility] BS AMB   4/23/2024 11:30 AM HBV FAST TRACK NURSE [facility]   5/1/2024  9:30 AM Jose Luis Wolf MD [facility] BS AMB   5/28/2024 11:30 AM Francine Salvador DNP [facility] BS AMB   5/29/2024  1:20 PM Hossein Merrill MD [facility] BS AMB   7/1/2024  9:30 AM Jose Luis Stuart, [facility] BS AMB   9/19/2024 10:00 AM Kimberly Dee, APRN - NP [facility] BS AMB   3/20/2025 10:00 AM Adrienne Jones DO [facility] BS AMB

## 2024-04-05 ENCOUNTER — TELEPHONE (OUTPATIENT)
Age: 59
End: 2024-04-05

## 2024-04-05 DIAGNOSIS — E83.42 HYPOMAGNESEMIA: ICD-10-CM

## 2024-04-05 NOTE — TELEPHONE ENCOUNTER
Received fax from [facility] requesting cardiac clearance for posterior lumbar fusion L4-5 on 6/12/24. Will discuss with Dr. Jones.

## 2024-04-05 NOTE — TELEPHONE ENCOUNTER
Verbal order and read back per Adrienne Jones, DO  Patient can proceed at low to moderate risk with procedure. Clearance form faxed to [phone].

## 2024-04-08 ENCOUNTER — HOSPITAL ENCOUNTER (OUTPATIENT)
Facility: HOSPITAL | Age: 59
Setting detail: RECURRING SERIES
Discharge: HOME | End: 2024-04-11
Payer: MEDICARE

## 2024-04-08 PROCEDURE — 97530 THERAPEUTIC ACTIVITIES: CPT

## 2024-04-08 PROCEDURE — 97110 THERAPEUTIC EXERCISES: CPT

## 2024-04-08 PROCEDURE — 97112 NEUROMUSCULAR REEDUCATION: CPT

## 2024-04-08 RX ORDER — LANOLIN ALCOHOL/MO/W.PET/CERES
400 CREAM (GRAM) TOPICAL DAILY
Qty: 120 TABLET | OUTPATIENT
Start: 2024-04-08

## 2024-04-08 NOTE — PROGRESS NOTES
PHYSICAL / OCCUPATIONAL THERAPY - DAILY TREATMENT NOTE     Patient Name: Maribell Marshall    Date: 2024    : 1965  Insurance: Payor: [facility] MEDICARE / Plan: [employer] DUAL COMPLETE / Product Type: *No Product type* /      Patient  verified Yes     Visit #   Current / Total 7 24   Time   In / Out 12:29 1:16   Pain   In / Out 0 0   Subjective Functional Status/Changes: The pt reports she is now walking with the Aircast brace.    Changes to:  Allergies, Med Hx, Sx Hx?   yes       TREATMENT AREA =  Left ankle pain [M25.572]    OBJECTIVE        Therapeutic Procedures:  Tx Min Billable or 1:1 Min (if diff from Tx Min) Procedure, Rationale, Specifics   24  08803 Therapeutic Exercise (timed):  increase ROM, strength, coordination, balance, and proprioception to improve patient's ability to progress to PLOF and address remaining functional goals. (see flow sheet as applicable)    Details if applicable:       10  16772 Neuromuscular Re-Education (timed):  improve balance, coordination, kinesthetic sense, posture, core stability and proprioception to improve patient's ability to develop conscious control of individual muscles and awareness of position of extremities in order to progress to PLOF and address remaining functional goals. (see flow sheet as applicable)    Details if applicable:     8  53526 Therapeutic Activity (timed):  use of dynamic activities replicating functional movements to increase ROM, strength, coordination, balance, and proprioception in order to improve patient's ability to progress to PLOF and address remaining functional goals.  (see flow sheet as applicable)     Details if applicable:     5  81093 Manual Therapy (timed):  increase ROM and increase tissue extensibility to improve patient's ability to progress to PLOF and address remaining functional goals.  The manual therapy interventions were performed at a separate and distinct time from the therapeutic activities interventions

## 2024-04-09 DIAGNOSIS — N18.4 TYPE 2 DIABETES MELLITUS WITH STAGE 4 CHRONIC KIDNEY DISEASE, WITH LONG-TERM CURRENT USE OF INSULIN (HCC): ICD-10-CM

## 2024-04-09 DIAGNOSIS — E11.22 TYPE 2 DIABETES MELLITUS WITH STAGE 4 CHRONIC KIDNEY DISEASE, WITH LONG-TERM CURRENT USE OF INSULIN (HCC): ICD-10-CM

## 2024-04-09 DIAGNOSIS — Z79.4 TYPE 2 DIABETES MELLITUS WITH STAGE 4 CHRONIC KIDNEY DISEASE, WITH LONG-TERM CURRENT USE OF INSULIN (HCC): ICD-10-CM

## 2024-04-09 DIAGNOSIS — E78.2 MIXED HYPERLIPIDEMIA: ICD-10-CM

## 2024-04-09 DIAGNOSIS — E83.42 HYPOMAGNESEMIA: ICD-10-CM

## 2024-04-09 DIAGNOSIS — E55.9 VITAMIN D DEFICIENCY: ICD-10-CM

## 2024-04-09 DIAGNOSIS — I10 PRIMARY HYPERTENSION: Primary | ICD-10-CM

## 2024-04-09 NOTE — PROGRESS NOTES
Placed lab orders for upcoming AWV 4/16/2024     Diagnosis Orders   1. Primary hypertension  Comprehensive Metabolic Panel      2. Type 2 diabetes mellitus with stage 4 chronic kidney disease, with long-term current use of insulin (HCC)  Comprehensive Metabolic Panel    Hemoglobin A1C    Microalbumin / Creatinine Urine Ratio      3. Vitamin D deficiency  Vitamin D 25 Hydroxy      4. Mixed hyperlipidemia  Lipid Panel      5. Hypomagnesemia  Magnesium

## 2024-04-09 NOTE — TELEPHONE ENCOUNTER
Medication was sent to [facility] pharmacy   Needs to be sent to     [facility] [id number] [address] P [phone] - F [phone]

## 2024-04-10 ENCOUNTER — HOSPITAL ENCOUNTER (OUTPATIENT)
Facility: HOSPITAL | Age: 59
Setting detail: SPECIMEN
Discharge: HOME | End: 2024-04-13
Payer: MEDICARE

## 2024-04-10 ENCOUNTER — HOSPITAL ENCOUNTER (OUTPATIENT)
Facility: HOSPITAL | Age: 59
Setting detail: RECURRING SERIES
Discharge: HOME | End: 2024-04-13
Payer: MEDICARE

## 2024-04-10 DIAGNOSIS — E55.9 VITAMIN D DEFICIENCY: ICD-10-CM

## 2024-04-10 DIAGNOSIS — I10 PRIMARY HYPERTENSION: ICD-10-CM

## 2024-04-10 DIAGNOSIS — E78.2 MIXED HYPERLIPIDEMIA: ICD-10-CM

## 2024-04-10 DIAGNOSIS — Z79.4 TYPE 2 DIABETES MELLITUS WITH STAGE 4 CHRONIC KIDNEY DISEASE, WITH LONG-TERM CURRENT USE OF INSULIN (HCC): ICD-10-CM

## 2024-04-10 DIAGNOSIS — E83.42 HYPOMAGNESEMIA: ICD-10-CM

## 2024-04-10 DIAGNOSIS — E11.22 TYPE 2 DIABETES MELLITUS WITH STAGE 4 CHRONIC KIDNEY DISEASE, WITH LONG-TERM CURRENT USE OF INSULIN (HCC): ICD-10-CM

## 2024-04-10 DIAGNOSIS — N18.4 TYPE 2 DIABETES MELLITUS WITH STAGE 4 CHRONIC KIDNEY DISEASE, WITH LONG-TERM CURRENT USE OF INSULIN (HCC): ICD-10-CM

## 2024-04-10 LAB
25(OH)D3 SERPL-MCNC: 56.6 NG/ML (ref 30–100)
ALBUMIN SERPL-MCNC: 3.2 G/DL (ref 3.4–5)
ALBUMIN/GLOB SERPL: 0.9 (ref 0.8–1.7)
ALP SERPL-CCNC: 84 U/L (ref 45–117)
ALT SERPL-CCNC: 45 U/L (ref 13–56)
ANION GAP SERPL CALC-SCNC: 6 MMOL/L (ref 3–18)
AST SERPL-CCNC: 37 U/L (ref 10–38)
BILIRUB SERPL-MCNC: 0.3 MG/DL (ref 0.2–1)
BUN SERPL-MCNC: 31 MG/DL (ref 7–18)
BUN/CREAT SERPL: 15 (ref 12–20)
CALCIUM SERPL-MCNC: 8.9 MG/DL (ref 8.5–10.1)
CHLORIDE SERPL-SCNC: 110 MMOL/L (ref 100–111)
CHOLEST SERPL-MCNC: 176 MG/DL
CO2 SERPL-SCNC: 26 MMOL/L (ref 21–32)
CREAT SERPL-MCNC: 2.07 MG/DL (ref 0.6–1.3)
CREAT UR-MCNC: 156 MG/DL (ref 30–125)
EST. AVERAGE GLUCOSE BLD GHB EST-MCNC: 143 MG/DL
GLOBULIN SER CALC-MCNC: 3.4 G/DL (ref 2–4)
GLUCOSE SERPL-MCNC: 112 MG/DL (ref 74–99)
HBA1C MFR BLD: 6.6 % (ref 4.2–5.6)
HDLC SERPL-MCNC: 51 MG/DL (ref 40–60)
HDLC SERPL: 3.5 (ref 0–5)
LDLC SERPL CALC-MCNC: 78.2 MG/DL (ref 0–100)
LIPID PANEL: ABNORMAL
MAGNESIUM SERPL-MCNC: 2 MG/DL (ref 1.6–2.6)
MICROALBUMIN UR-MCNC: <0.5 MG/DL (ref 0–3)
MICROALBUMIN/CREAT UR-RTO: ABNORMAL MG/G (ref 0–30)
POTASSIUM SERPL-SCNC: 3.7 MMOL/L (ref 3.5–5.5)
PROT SERPL-MCNC: 6.6 G/DL (ref 6.4–8.2)
SODIUM SERPL-SCNC: 142 MMOL/L (ref 136–145)
TRIGL SERPL-MCNC: 234 MG/DL
VLDLC SERPL CALC-MCNC: 46.8 MG/DL

## 2024-04-10 PROCEDURE — 83735 ASSAY OF MAGNESIUM: CPT

## 2024-04-10 PROCEDURE — 82570 ASSAY OF URINE CREATININE: CPT

## 2024-04-10 PROCEDURE — 97110 THERAPEUTIC EXERCISES: CPT

## 2024-04-10 PROCEDURE — 82043 UR ALBUMIN QUANTITATIVE: CPT

## 2024-04-10 PROCEDURE — 80061 LIPID PANEL: CPT

## 2024-04-10 PROCEDURE — 83036 HEMOGLOBIN GLYCOSYLATED A1C: CPT

## 2024-04-10 PROCEDURE — 82306 VITAMIN D 25 HYDROXY: CPT

## 2024-04-10 PROCEDURE — 97140 MANUAL THERAPY 1/> REGIONS: CPT

## 2024-04-10 PROCEDURE — 80053 COMPREHEN METABOLIC PANEL: CPT

## 2024-04-10 PROCEDURE — 36415 COLL VENOUS BLD VENIPUNCTURE: CPT

## 2024-04-10 PROCEDURE — 97112 NEUROMUSCULAR REEDUCATION: CPT

## 2024-04-10 NOTE — PROGRESS NOTES
PHYSICAL / OCCUPATIONAL THERAPY - DAILY TREATMENT NOTE     Patient Name: Maribell Marshall    Date: 4/10/2024    : 1965  Insurance: Payor: [facility] MEDICARE / Plan: [employer] DUAL COMPLETE / Product Type: *No Product type* /      Patient  verified Yes     Visit #   Current / Total 8 24   Time   In / Out 1152 12:40   Pain   In / Out 5 0   Subjective Functional Status/Changes: The pt reports she feels her ankle has been more swollen since last session.  The pt reports she noticed some pain increase and swelling about 2 hours after her appointment.     Changes to:  Allergies, Med Hx, Sx Hx?   no       TREATMENT AREA =  Left ankle pain [M25.572]    OBJECTIVE    Modalities Rationale:     decrease edema, decrease inflammation, and decrease pain to improve patient's ability to progress to PLOF and address remaining functional goals.     min [] Estim Unattended, type/location:                                      []  w/ice    []  w/heat    min [] Estim Attended, type/location:                                     []  w/US     []  w/ice    []  w/heat    []  TENS insruct      min []  Mechanical Traction: type/lbs                   []  pro   []  sup   []  int   []  cont    []  before manual    []  after manual    min []  Ultrasound, settings/location:      min []  Iontophoresis w/ dexamethasone, location:                                               []  take home patch       []  in clinic        min  unbilled []  Ice     []  Heat    location/position:     min []  Paraffin,  details:    10 min [x]  Vasopneumatic Device, press/temp:  Low low    min []  Whirlpool / Fluido:    If using vaso (only need to measure limb vaso being performed on)      pre-treatment girth :       post-treatment girth :       measured at (landmark location) :      min []  Other:    Skin assessment post-treatment (if applicable):    []  intact    []  redness- no adverse reaction                 []redness - adverse reaction:         Therapeutic 
EV:2/5              Current: progressing with initial strengthening  4/8/24  4. The pt will progress to walking without use of cam boot for community ambulation.               IE- requires cam boot and RW for gait.  Current: Mild antalgic gait, ambulating with ankle brace. 4/4/2024   5. The pt will report only a little difficulty with walking between rooms at home.               IE- Quite a bit              Current: Met, pt reports no difficulty walking around her home. 4/4/2024    Summary of Care/ Key Functional Changes: ***      ASSESSMENT/RECOMMENDATIONS:    Continue per plan of care.     Thank you for this referral.   Geremias Petersen, PT 4/10/2024 1:29 PM

## 2024-04-16 ENCOUNTER — OFFICE VISIT (OUTPATIENT)
Age: 59
End: 2024-04-16
Payer: MEDICARE

## 2024-04-16 VITALS
DIASTOLIC BLOOD PRESSURE: 84 MMHG | TEMPERATURE: 98.1 F | WEIGHT: 158 LBS | HEIGHT: 62 IN | SYSTOLIC BLOOD PRESSURE: 130 MMHG | HEART RATE: 70 BPM | OXYGEN SATURATION: 97 % | BODY MASS INDEX: 29.08 KG/M2

## 2024-04-16 DIAGNOSIS — Z12.31 ENCOUNTER FOR SCREENING MAMMOGRAM FOR BREAST CANCER: ICD-10-CM

## 2024-04-16 DIAGNOSIS — E11.40 DIABETIC NEUROPATHY, PAINFUL (HCC): ICD-10-CM

## 2024-04-16 DIAGNOSIS — E11.22 TYPE 2 DIABETES MELLITUS WITH STAGE 4 CHRONIC KIDNEY DISEASE, WITH LONG-TERM CURRENT USE OF INSULIN (HCC): ICD-10-CM

## 2024-04-16 DIAGNOSIS — Z79.4 TYPE 2 DIABETES MELLITUS WITH STAGE 4 CHRONIC KIDNEY DISEASE, WITH LONG-TERM CURRENT USE OF INSULIN (HCC): ICD-10-CM

## 2024-04-16 DIAGNOSIS — J30.89 ENVIRONMENTAL AND SEASONAL ALLERGIES: ICD-10-CM

## 2024-04-16 DIAGNOSIS — Z23 NEED FOR PROPHYLACTIC VACCINATION AGAINST STREPTOCOCCUS PNEUMONIAE (PNEUMOCOCCUS): ICD-10-CM

## 2024-04-16 DIAGNOSIS — Z23 NEED FOR PROPHYLACTIC VACCINATION AGAINST DIPHTHERIA-TETANUS-PERTUSSIS (DTP): ICD-10-CM

## 2024-04-16 DIAGNOSIS — E88.09 HYPOALBUMINEMIA: ICD-10-CM

## 2024-04-16 DIAGNOSIS — J45.50 SEVERE PERSISTENT ASTHMA, UNSPECIFIED WHETHER COMPLICATED: ICD-10-CM

## 2024-04-16 DIAGNOSIS — G89.29 CHRONIC MIDLINE LOW BACK PAIN WITH LEFT-SIDED SCIATICA: ICD-10-CM

## 2024-04-16 DIAGNOSIS — Z23 NEED FOR PROPHYLACTIC VACCINATION AND INOCULATION AGAINST VARICELLA: ICD-10-CM

## 2024-04-16 DIAGNOSIS — H35.363: ICD-10-CM

## 2024-04-16 DIAGNOSIS — K21.9 GASTROESOPHAGEAL REFLUX DISEASE WITHOUT ESOPHAGITIS: ICD-10-CM

## 2024-04-16 DIAGNOSIS — J44.1 CHRONIC OBSTRUCTIVE PULMONARY DISEASE WITH ACUTE EXACERBATION (HCC): ICD-10-CM

## 2024-04-16 DIAGNOSIS — I73.9 PAD (PERIPHERAL ARTERY DISEASE) (HCC): ICD-10-CM

## 2024-04-16 DIAGNOSIS — E78.2 MIXED HYPERLIPIDEMIA: ICD-10-CM

## 2024-04-16 DIAGNOSIS — M48.061 SPINAL STENOSIS OF LUMBAR REGION WITHOUT NEUROGENIC CLAUDICATION: ICD-10-CM

## 2024-04-16 DIAGNOSIS — E55.9 VITAMIN D DEFICIENCY: ICD-10-CM

## 2024-04-16 DIAGNOSIS — I10 PRIMARY HYPERTENSION: ICD-10-CM

## 2024-04-16 DIAGNOSIS — E83.42 HYPOMAGNESEMIA: ICD-10-CM

## 2024-04-16 DIAGNOSIS — S86.012A ACHILLES TENDON TEAR, LEFT, INITIAL ENCOUNTER: ICD-10-CM

## 2024-04-16 DIAGNOSIS — D86.9 SARCOIDOSIS: ICD-10-CM

## 2024-04-16 DIAGNOSIS — M54.42 CHRONIC MIDLINE LOW BACK PAIN WITH LEFT-SIDED SCIATICA: ICD-10-CM

## 2024-04-16 DIAGNOSIS — Z00.00 MEDICARE ANNUAL WELLNESS VISIT, SUBSEQUENT: Primary | ICD-10-CM

## 2024-04-16 DIAGNOSIS — F33.9 RECURRENT MAJOR DEPRESSIVE DISORDER, REMISSION STATUS UNSPECIFIED (HCC): ICD-10-CM

## 2024-04-16 DIAGNOSIS — N18.4 TYPE 2 DIABETES MELLITUS WITH STAGE 4 CHRONIC KIDNEY DISEASE, WITH LONG-TERM CURRENT USE OF INSULIN (HCC): ICD-10-CM

## 2024-04-16 PROBLEM — J30.9 ALLERGIC RHINITIS: Status: RESOLVED | Noted: 2022-08-24 | Resolved: 2024-04-16

## 2024-04-16 PROBLEM — H33.311 RETINAL TEAR OF RIGHT EYE: Status: RESOLVED | Noted: 2022-02-28 | Resolved: 2024-04-16

## 2024-04-16 PROBLEM — J30.1 ALLERGIC RHINITIS DUE TO POLLEN: Status: RESOLVED | Noted: 2022-08-24 | Resolved: 2024-04-16

## 2024-04-16 PROCEDURE — 3044F HG A1C LEVEL LT 7.0%: CPT | Performed by: NURSE PRACTITIONER

## 2024-04-16 PROCEDURE — 3075F SYST BP GE 130 - 139MM HG: CPT | Performed by: NURSE PRACTITIONER

## 2024-04-16 PROCEDURE — 2022F DILAT RTA XM EVC RTNOPTHY: CPT | Performed by: NURSE PRACTITIONER

## 2024-04-16 PROCEDURE — 1036F TOBACCO NON-USER: CPT | Performed by: NURSE PRACTITIONER

## 2024-04-16 PROCEDURE — 3023F SPIROM DOC REV: CPT | Performed by: NURSE PRACTITIONER

## 2024-04-16 PROCEDURE — G8428 CUR MEDS NOT DOCUMENT: HCPCS | Performed by: NURSE PRACTITIONER

## 2024-04-16 PROCEDURE — 99214 OFFICE O/P EST MOD 30 MIN: CPT | Performed by: NURSE PRACTITIONER

## 2024-04-16 PROCEDURE — 3079F DIAST BP 80-89 MM HG: CPT | Performed by: NURSE PRACTITIONER

## 2024-04-16 PROCEDURE — 90677 PCV20 VACCINE IM: CPT | Performed by: NURSE PRACTITIONER

## 2024-04-16 PROCEDURE — G8417 CALC BMI ABV UP PARAM F/U: HCPCS | Performed by: NURSE PRACTITIONER

## 2024-04-16 PROCEDURE — G0439 PPPS, SUBSEQ VISIT: HCPCS | Performed by: NURSE PRACTITIONER

## 2024-04-16 PROCEDURE — 3017F COLORECTAL CA SCREEN DOC REV: CPT | Performed by: NURSE PRACTITIONER

## 2024-04-16 PROCEDURE — G0009 ADMIN PNEUMOCOCCAL VACCINE: HCPCS | Performed by: NURSE PRACTITIONER

## 2024-04-16 RX ORDER — ESOMEPRAZOLE MAGNESIUM 40 MG/1
40 CAPSULE, DELAYED RELEASE ORAL DAILY PRN
Qty: 93 CAPSULE | Refills: 3 | Status: SHIPPED | OUTPATIENT
Start: 2024-04-16

## 2024-04-16 RX ORDER — PRAVASTATIN SODIUM 80 MG/1
80 TABLET ORAL DAILY
Qty: 95 TABLET | Refills: 3 | Status: SHIPPED | OUTPATIENT
Start: 2024-04-16

## 2024-04-16 RX ORDER — CITALOPRAM HYDROBROMIDE 10 MG/1
10 TABLET ORAL DAILY
Qty: 93 TABLET | Refills: 3 | Status: SHIPPED | OUTPATIENT
Start: 2024-04-16

## 2024-04-16 RX ORDER — TOPIRAMATE 100 MG/1
100 TABLET, FILM COATED ORAL 2 TIMES DAILY
Qty: 186 TABLET | Refills: 1 | Status: SHIPPED | OUTPATIENT
Start: 2024-04-16

## 2024-04-16 RX ORDER — SEMAGLUTIDE 2.68 MG/ML
2 INJECTION, SOLUTION SUBCUTANEOUS
Qty: 21 ML | Refills: 0 | Status: SHIPPED | OUTPATIENT
Start: 2024-04-16

## 2024-04-16 RX ORDER — EZETIMIBE 10 MG/1
10 TABLET ORAL DAILY
Qty: 95 TABLET | Refills: 3 | Status: SHIPPED | OUTPATIENT
Start: 2024-04-16

## 2024-04-16 RX ORDER — MAGNESIUM OXIDE 400 MG/1
400 TABLET ORAL DAILY
Qty: 95 TABLET | Refills: 3 | Status: SHIPPED | OUTPATIENT
Start: 2024-04-16

## 2024-04-16 RX ORDER — INSULIN GLARGINE 100 [IU]/ML
30 INJECTION, SOLUTION SUBCUTANEOUS NIGHTLY
Qty: 5 ADJUSTABLE DOSE PRE-FILLED PEN SYRINGE | Refills: 5 | Status: SHIPPED | OUTPATIENT
Start: 2024-04-16

## 2024-04-16 RX ORDER — FUROSEMIDE 20 MG/1
20 TABLET ORAL DAILY
Qty: 93 TABLET | Refills: 1 | Status: SHIPPED | OUTPATIENT
Start: 2024-04-16

## 2024-04-16 SDOH — ECONOMIC STABILITY: FOOD INSECURITY: WITHIN THE PAST 12 MONTHS, THE FOOD YOU BOUGHT JUST DIDN'T LAST AND YOU DIDN'T HAVE MONEY TO GET MORE.: NEVER TRUE

## 2024-04-16 SDOH — ECONOMIC STABILITY: FOOD INSECURITY: WITHIN THE PAST 12 MONTHS, YOU WORRIED THAT YOUR FOOD WOULD RUN OUT BEFORE YOU GOT MONEY TO BUY MORE.: NEVER TRUE

## 2024-04-16 SDOH — ECONOMIC STABILITY: TRANSPORTATION INSECURITY
IN THE PAST 12 MONTHS, HAS LACK OF TRANSPORTATION KEPT YOU FROM MEETINGS, WORK, OR FROM GETTING THINGS NEEDED FOR DAILY LIVING?: NO

## 2024-04-16 SDOH — ECONOMIC STABILITY: INCOME INSECURITY: IN THE LAST 12 MONTHS, WAS THERE A TIME WHEN YOU WERE NOT ABLE TO PAY THE MORTGAGE OR RENT ON TIME?: NO

## 2024-04-16 SDOH — ECONOMIC STABILITY: TRANSPORTATION INSECURITY
IN THE PAST 12 MONTHS, HAS THE LACK OF TRANSPORTATION KEPT YOU FROM MEDICAL APPOINTMENTS OR FROM GETTING MEDICATIONS?: NO

## 2024-04-16 SDOH — ECONOMIC STABILITY: HOUSING INSECURITY: IN THE LAST 12 MONTHS, HOW MANY PLACES HAVE YOU LIVED?: 1

## 2024-04-16 ASSESSMENT — SOCIAL DETERMINANTS OF HEALTH (SDOH)
HOW HARD IS IT FOR YOU TO PAY FOR THE VERY BASICS LIKE FOOD, HOUSING, MEDICAL CARE, AND HEATING?: NOT HARD AT ALL
WITHIN THE LAST YEAR, HAVE YOU BEEN HUMILIATED OR EMOTIONALLY ABUSED IN OTHER WAYS BY YOUR PARTNER OR EX-PARTNER?: NO
WITHIN THE LAST YEAR, HAVE TO BEEN RAPED OR FORCED TO HAVE ANY KIND OF SEXUAL ACTIVITY BY YOUR PARTNER OR EX-PARTNER?: NO
WITHIN THE LAST YEAR, HAVE YOU BEEN KICKED, HIT, SLAPPED, OR OTHERWISE PHYSICALLY HURT BY YOUR PARTNER OR EX-PARTNER?: NO
WITHIN THE LAST YEAR, HAVE YOU BEEN AFRAID OF YOUR PARTNER OR EX-PARTNER?: NO

## 2024-04-16 ASSESSMENT — PATIENT HEALTH QUESTIONNAIRE - PHQ9
2. FEELING DOWN, DEPRESSED OR HOPELESS: NOT AT ALL
SUM OF ALL RESPONSES TO PHQ QUESTIONS 1-9: 0
10. IF YOU CHECKED OFF ANY PROBLEMS, HOW DIFFICULT HAVE THESE PROBLEMS MADE IT FOR YOU TO DO YOUR WORK, TAKE CARE OF THINGS AT HOME, OR GET ALONG WITH OTHER PEOPLE: NOT DIFFICULT AT ALL
SUM OF ALL RESPONSES TO PHQ QUESTIONS 1-9: 0
SUM OF ALL RESPONSES TO PHQ9 QUESTIONS 1 & 2: 0
1. LITTLE INTEREST OR PLEASURE IN DOING THINGS: NOT AT ALL
6. FEELING BAD ABOUT YOURSELF - OR THAT YOU ARE A FAILURE OR HAVE LET YOURSELF OR YOUR FAMILY DOWN: NOT AT ALL
SUM OF ALL RESPONSES TO PHQ QUESTIONS 1-9: 0
5. POOR APPETITE OR OVEREATING: NOT AT ALL
7. TROUBLE CONCENTRATING ON THINGS, SUCH AS READING THE NEWSPAPER OR WATCHING TELEVISION: NOT AT ALL
9. THOUGHTS THAT YOU WOULD BE BETTER OFF DEAD, OR OF HURTING YOURSELF: NOT AT ALL
3. TROUBLE FALLING OR STAYING ASLEEP: NOT AT ALL
8. MOVING OR SPEAKING SO SLOWLY THAT OTHER PEOPLE COULD HAVE NOTICED. OR THE OPPOSITE, BEING SO FIGETY OR RESTLESS THAT YOU HAVE BEEN MOVING AROUND A LOT MORE THAN USUAL: NOT AT ALL
SUM OF ALL RESPONSES TO PHQ QUESTIONS 1-9: 0
4. FEELING TIRED OR HAVING LITTLE ENERGY: NOT AT ALL

## 2024-04-16 ASSESSMENT — LIFESTYLE VARIABLES
HOW OFTEN DO YOU HAVE A DRINK CONTAINING ALCOHOL: MONTHLY OR LESS
HOW MANY STANDARD DRINKS CONTAINING ALCOHOL DO YOU HAVE ON A TYPICAL DAY: 1 OR 2

## 2024-04-16 NOTE — PATIENT INSTRUCTIONS
back on using tobacco and other nicotine products. This includes smoking and vaping. If you need help quitting, talk to your doctor about stop-smoking programs and medicines. These can increase your chances of quitting for good. Quitting is one of the most important things you can do to protect your heart. It is never too late to quit. Try to avoid secondhand smoke too.     Stay at a weight that's healthy for you. Talk to your doctor if you need help losing weight.     Try to get 7 to 9 hours of sleep each night.     Limit alcohol to 2 drinks a day for men and 1 drink a day for women. Too much alcohol can cause health problems.     Manage other health problems such as diabetes, high blood pressure, and high cholesterol. If you think you may have a problem with alcohol or drug use, talk to your doctor.   Medicines    Take your medicines exactly as prescribed. Call your doctor if you think you are having a problem with your medicine.     If your doctor recommends aspirin, take the amount directed each day. Make sure you take aspirin and not another kind of pain reliever, such as acetaminophen (Tylenol).   When should you call for help?   Call 911 if you have symptoms of a heart attack. These may include:    Chest pain or pressure, or a strange feeling in the chest.     Sweating.     Shortness of breath.     Pain, pressure, or a strange feeling in the back, neck, jaw, or upper belly or in one or both shoulders or arms.     Lightheadedness or sudden weakness.     A fast or irregular heartbeat.   After you call 911, the  may tell you to chew 1 adult-strength or 2 to 4 low-dose aspirin. Wait for an ambulance. Do not try to drive yourself.  Watch closely for changes in your health, and be sure to contact your doctor if you have any problems.  Where can you learn more?  Go to https://www.healthwise.net/patientEd and enter F075 to learn more about \"A Healthy Heart: Care Instructions.\"  Current as of: June 24,

## 2024-04-16 NOTE — PROGRESS NOTES
Maribell Marshall presents today for   Chief Complaint   Patient presents with    Medicare AWV                 1. \"Have you been to the ER, urgent care clinic since your last visit?  Hospitalized since your last visit?\" no    2. \"Have you seen or consulted any other health care providers outside of the [facility] since your last visit?\" yes     3. For patients aged 45-75: Has the patient had a colonoscopy / FIT/ Cologuard? Yes - no Care Gap present      If the patient is female:    4. For patients aged 40-74: Has the patient had a mammogram within the past 2 years? Yes - no Care Gap present      5. For patients aged 21-65: Has the patient had a pap smear? No

## 2024-04-16 NOTE — ASSESSMENT & PLAN NOTE
Monitored by specialist- no acute findings meriting change in the plan  Dr. LEN Gilbert  Continue Fasenra as prescribed

## 2024-04-16 NOTE — ASSESSMENT & PLAN NOTE
Hx of uncontrolled DM  Sx not controlled under current therapy   Continue topamax 100mg BID  Discussed OTC creams and supplements

## 2024-04-16 NOTE — ASSESSMENT & PLAN NOTE
Level 3.2  Discussed importance of increasing protein in diet such as peanut butter (keep this to a minimum due to the oily ingredients), fish, turkey, chicken.

## 2024-04-16 NOTE — ASSESSMENT & PLAN NOTE
Urine creatinine 373-156  A1c 6.7-6.6  Cont lantus and ozempic therapies  Prabha A1c 6m  BUN 31; creatinine 2.07; EGFR 27  Follow up with Dr Manzano

## 2024-04-16 NOTE — ASSESSMENT & PLAN NOTE
[facility] Wellness: Reviewed all HM and ordered tests/imaging appropriately. Reviewed care teams and medications with updates.     Lab Reviewed:  Urine creatinine 373-156  A1c 6.7-6.6  -234; LDL 76-78  BUN 31; creatinine 2.07; EGFR 27  Vitamin D 56  Hypoalbuminemia 3.2  Mag 2.0    Health Maintenance:  Colonoscopy:  10/2026  Mammogram: 5/2024  Bone density: n/a  Pap Smear: pt declines  Eye exam: 6/2024  LDCT: n/a    Specialists:  Dr Cabezas, opt  Dr AFSHIN Wolf, ortho  Dr Manzano, neph  Dr Robert Roe, ortho-back  AFSHIN Stuart, PharmD

## 2024-04-16 NOTE — ASSESSMENT & PLAN NOTE
Monitored by specialist- no acute findings meriting change in the plan  Had lumbar injection Dr Freitas 12/2021. Procedure had to be stopped x2 due to pt having left leg pain. Developed H/RANJIT Puri did epidural blood patch.     Seeing Dr. NELSON Roe

## 2024-04-16 NOTE — PROGRESS NOTES
Medicare Annual Wellness Visit    Maribell Marshall is here for Medicare AWV    Assessment & Plan   Medicare annual wellness visit, subsequent  Assessment & Plan:  [facility] Wellness: Reviewed all HM and ordered tests/imaging appropriately. Reviewed care teams and medications with updates.     Lab Reviewed:  Urine creatinine 373-156  A1c 6.7-6.6  -234; LDL 76-78  BUN 31; creatinine 2.07; EGFR 27  Vitamin D 56  Hypoalbuminemia 3.2  Mag 2.0    Health Maintenance:  Colonoscopy:  10/2026  Mammogram: 5/2024  Bone density: n/a  Pap Smear: pt declines  Eye exam: 6/2024  LDCT: n/a    Specialists:  Dr Cabezas, opt  Dr AFSHIN Wolf, ortho  Dr Manzano, neph  Dr Robert Roe, ortho-back  AFSHIN Stuart, PharmD  Orders:  -     [facility] SMITA DIGITAL SCREEN BILATERAL; Future  -     Pneumococcal Conjugate PCV20, PF (Prevnar 20)  -     Tdap (ADACEL) 5-2-15.5 LF-MCG/0.5 injection; Inject 0.5 mLs into the muscle once for 1 dose, Disp-0.5 mL, R-0Print  -     zoster recombinant adjuvanted vaccine (SHINGRIX) 50 MCG/0.5ML SUSR injection; Inject 0.5 mLs into the muscle once for 1 dose, Disp-0.5 mL, R-0Print  Encounter for screening mammogram for breast cancer  -     [facility] SMITA DIGITAL SCREEN BILATERAL; Future  Need for prophylactic vaccination against Streptococcus pneumoniae (pneumococcus)  -     Pneumococcal Conjugate PCV20, PF (Prevnar 20)  Need for prophylactic vaccination against diphtheria-tetanus-pertussis (DTP)  -     Tdap (ADACEL) 5-2-15.5 LF-MCG/0.5 injection; Inject 0.5 mLs into the muscle once for 1 dose, Disp-0.5 mL, R-0Print  Need for prophylactic vaccination and inoculation against varicella  -     zoster recombinant adjuvanted vaccine (SHINGRIX) 50 MCG/0.5ML SUSR injection; Inject 0.5 mLs into the muscle once for 1 dose, Disp-0.5 mL, R-0Print  Primary hypertension  Assessment & Plan:   Monitored by specialist- no acute findings meriting change in the plan  Dr Robert Vergara current med regimen  Orders:  -     furosemide

## 2024-04-16 NOTE — ASSESSMENT & PLAN NOTE
Monitored by specialist- no acute findings meriting change in the plan  Dr. Wolf Ortho  Underwent surgery 1/2024  Utilizing left ankle brace  No ambulatory device needed

## 2024-04-16 NOTE — PROGRESS NOTES
[facility] of [address]  [address]  [place], [place] [address]  [phone] office/[phone] fax    4/16/2024    Maribell Marshall 1965 is a pleasant Black /  female. Here for [facility] Annual Wellness.    Todays concerns/HPI:  Cholesterol. Was out for awhile. Restarted last week.   DM. Taking lantus and Ozempic. Check BS at home. Last pm 125.   Macular Degeneration. Dr Cabezas. F/u June 2024.   Left ankle fusion. 1/2024, Dr Wolf. Wearing ankle brace.     Denies CP, SOB, GI/ issues, dizziness, fatigue.    Tolerating medications w/o side effects.    Physical:   Vitals:    04/16/24 0829   BP: 130/84   Site: Right Upper Arm   Position: Sitting   Cuff Size: Small Adult   Pulse: 70   Temp: 98.1 °F (36.7 °C)   TempSrc: Temporal   SpO2: 97%   Weight: 71.7 kg (158 lb)   Height: 1.575 m (5' 2\")      Wt Readings from Last 3 Encounters:   04/16/24 71.7 kg (158 lb)   03/20/24 69.9 kg (154 lb)   01/26/24 68.9 kg (151 lb 12.8 oz)       Exam:   Physical Exam  Constitutional:       Appearance: Normal appearance.   HENT:      Head: Normocephalic and atraumatic.      Right Ear: Tympanic membrane, ear canal and external ear normal.      Left Ear: Tympanic membrane, ear canal and external ear normal.      Nose: Nose normal.      Mouth/Throat:      Mouth: Mucous membranes are moist.   Eyes:      Extraocular Movements: Extraocular movements intact.      Conjunctiva/sclera: Conjunctivae normal.   Neck:      Vascular: No carotid bruit.   Cardiovascular:      Rate and Rhythm: Normal rate and regular rhythm.      Pulses: Normal pulses.      Heart sounds: Normal heart sounds.   Pulmonary:      Effort: Pulmonary effort is normal. No respiratory distress.      Breath sounds: Normal breath sounds. No wheezing.   Abdominal:      General: Abdomen is flat. Bowel sounds are normal. There is no distension.      Palpations: Abdomen is soft.      Tenderness: There is no abdominal tenderness.

## 2024-04-16 NOTE — ASSESSMENT & PLAN NOTE
Uncontrolled, continue current medications, medication adherence emphasized, and lifestyle modifications recommended  Pt was out of zetia for an unknown amt of time  Restart zetia  Cont statin therapy  Prabha level 12m    Reduce fried fatty or oily foods in diet  Limit red meats, processed foods, and alcohol.    Limit fast food  Increase physical activity to 60 minutes of moderate intensity aerobic exercise per week.  Increase water intake  Reduce alcohol intake    How to raise HDL if low:  Consume oats, beans, legumes, olive oil, whole grains, nuts, seeds, fatty fish, and berries.  Lose weight/fat  Reduce alcohol consumption  Smoking cessation

## 2024-04-16 NOTE — ASSESSMENT & PLAN NOTE
Well-controlled, continue current medications   Cont Nexium  Mag level normal at 2.0  Avoid gut irritants such as spicy foods  Avoid laying down for 30 to 45 minutes after eating  Avoid excessive alcohol consumption

## 2024-04-17 ENCOUNTER — TELEPHONE (OUTPATIENT)
Facility: HOSPITAL | Age: 59
End: 2024-04-17

## 2024-04-23 ENCOUNTER — HOSPITAL ENCOUNTER (OUTPATIENT)
Facility: HOSPITAL | Age: 59
Setting detail: INFUSION SERIES
Discharge: HOME | End: 2024-04-26
Payer: MEDICARE

## 2024-04-23 VITALS
SYSTOLIC BLOOD PRESSURE: 112 MMHG | DIASTOLIC BLOOD PRESSURE: 76 MMHG | RESPIRATION RATE: 20 BRPM | HEART RATE: 81 BPM | TEMPERATURE: 97.7 F | OXYGEN SATURATION: 96 %

## 2024-04-23 DIAGNOSIS — J45.50 SEVERE PERSISTENT ASTHMA, UNSPECIFIED WHETHER COMPLICATED: Primary | ICD-10-CM

## 2024-04-23 PROCEDURE — 96372 THER/PROPH/DIAG INJ SC/IM: CPT

## 2024-04-23 PROCEDURE — 6360000002 HC RX W HCPCS: Performed by: INTERNAL MEDICINE

## 2024-04-23 RX ORDER — DIPHENHYDRAMINE HYDROCHLORIDE 50 MG/ML
50 INJECTION INTRAMUSCULAR; INTRAVENOUS
OUTPATIENT
Start: 2024-06-18

## 2024-04-23 RX ORDER — SODIUM CHLORIDE 9 MG/ML
INJECTION, SOLUTION INTRAVENOUS CONTINUOUS
OUTPATIENT
Start: 2024-06-18

## 2024-04-23 RX ORDER — EPINEPHRINE 1 MG/ML
0.3 INJECTION, SOLUTION, CONCENTRATE INTRAVENOUS PRN
OUTPATIENT
Start: 2024-06-18

## 2024-04-23 RX ORDER — ALBUTEROL SULFATE 90 UG/1
4 AEROSOL, METERED RESPIRATORY (INHALATION) PRN
OUTPATIENT
Start: 2024-06-18

## 2024-04-23 RX ORDER — ONDANSETRON 2 MG/ML
8 INJECTION INTRAMUSCULAR; INTRAVENOUS
OUTPATIENT
Start: 2024-06-18

## 2024-04-23 RX ORDER — ACETAMINOPHEN 325 MG/1
650 TABLET ORAL
OUTPATIENT
Start: 2024-06-18

## 2024-04-23 RX ADMIN — BENRALIZUMAB 30 MG: 30 INJECTION, SOLUTION SUBCUTANEOUS at 11:41

## 2024-04-23 NOTE — PROGRESS NOTES
Anticoagulation Summary  As of 9/14/2017    INR goal:   2.0-3.0   TTR:   32.3 % (2 wk)   Today's INR:   2.5   Maintenance plan:   2.5 mg (5 mg x 0.5) on Tue, Thu; 5 mg (5 mg x 1) all other days   Weekly total:   30 mg   Plan last modified:   Tobias Encinas, PharmD (9/11/2017)   Next INR check:   9/18/2017   Target end date:   Indefinite    Indications    Coagulopathy (CMS-HCC) [D68.9]  Long term (current) use of anticoagulants [Z79.01] [Z79.01]  A-fib (CMS-[facility]) (Resolved) [I48.91]             Anticoagulation Episode Summary     INR check location:   [facility]    Preferred lab:       Send INR reminders to:       Comments:           Anticoagulation Care Providers     Provider Role Specialty Phone number    [facility] Responsible  [phone]        Anticoagulation Patient Findings            Francesco Schulte seen in clinic today  INR  is-therapeutic.    Denies signs/symptoms of bleeding and/or thrombosis.    Denies changes to diet or medications.   Follow up appointment in 4 days(s).      Take 2.5 mg tonight and Sunday/ 5 mg rest of the week.      ELAINE Wilson.     [facility] Progress Note    Date: 2024    Name: Maribell Marshall    MRN: [id number]         : 1965    Ms. Marshall arrived in the [facility] today at 1130, ambulatory and in stable condition, here for her FASENRA INJECTION (EVERY 8 WEEKS). She was assessed and education was provided.     Ms. Marshall's vitals were reviewed.  Vitals:    24 1130   BP: 112/76   Pulse: 81   Resp: 20   Temp: 97.7 °F (36.5 °C)   SpO2: 96%         Ms. Marshall stated that she was doing well, and voiced no major complaints.    Also, she stated that she continues to tolerate the FASENRA injections well and without any problems.         Benralizumab (FASENRA) 30 mg, was administered SQ, in the back of her LEFT arm at 1141, per order and without incident.       Ms. Marshall tolerated the injection well, and voiced no complaints.     Discharge instructions reviewed with patient and she expressed understanding. Patient's armband was removed and shredded.    [facility] ambulatory and in stable condition at 1150.     She is to return in 8 weeks, on Tuesday, 24 at 1130, for her next appointment, for her FASENRA injection.     Amy Rodriguez RN  2024  6:11 PM

## 2024-04-25 ENCOUNTER — TELEPHONE (OUTPATIENT)
Facility: HOSPITAL | Age: 59
End: 2024-04-25

## 2024-04-25 NOTE — TELEPHONE ENCOUNTER
Pt would like to know if her auth alvall allow her to have more visits. Pt would like to schedule more appointments if allowed to do so.

## 2024-05-01 ENCOUNTER — TELEPHONE (OUTPATIENT)
Facility: HOSPITAL | Age: 59
End: 2024-05-01

## 2024-05-01 ENCOUNTER — OFFICE VISIT (OUTPATIENT)
Age: 59
End: 2024-05-01
Payer: MEDICARE

## 2024-05-01 ENCOUNTER — TELEPHONE (OUTPATIENT)
Age: 59
End: 2024-05-01

## 2024-05-01 VITALS — HEIGHT: 62 IN | BODY MASS INDEX: 28.9 KG/M2

## 2024-05-01 DIAGNOSIS — S86.312A TEAR OF PERONEAL TENDON, LEFT, INITIAL ENCOUNTER: ICD-10-CM

## 2024-05-01 DIAGNOSIS — S93.492A SPRAIN OF ANTERIOR TALOFIBULAR LIGAMENT OF LEFT ANKLE, INITIAL ENCOUNTER: ICD-10-CM

## 2024-05-01 DIAGNOSIS — Z09 POSTOP CHECK: Primary | Chronic | ICD-10-CM

## 2024-05-01 PROCEDURE — 99213 OFFICE O/P EST LOW 20 MIN: CPT | Performed by: ORTHOPAEDIC SURGERY

## 2024-05-01 PROCEDURE — G8417 CALC BMI ABV UP PARAM F/U: HCPCS | Performed by: ORTHOPAEDIC SURGERY

## 2024-05-01 PROCEDURE — 3017F COLORECTAL CA SCREEN DOC REV: CPT | Performed by: ORTHOPAEDIC SURGERY

## 2024-05-01 PROCEDURE — 1036F TOBACCO NON-USER: CPT | Performed by: ORTHOPAEDIC SURGERY

## 2024-05-01 PROCEDURE — G8427 DOCREV CUR MEDS BY ELIG CLIN: HCPCS | Performed by: ORTHOPAEDIC SURGERY

## 2024-05-01 NOTE — TELEPHONE ENCOUNTER
Lan from [facility] [phone] wants to know if the pt can be cleared from her last visit. Please call at your earliest.

## 2024-05-01 NOTE — TELEPHONE ENCOUNTER
Patient was seen 1/2024 and had surgical clearance for Dr. Owens. Now a clearance is needed for Dr. Roe for Lumbar fusion. [facility] is faxing the clearance form that is needed to be completed. Dr. PRECIOUS Gilbert will be here tomorrow and if able to complete off 1/2024 visit she will need to know and will need the form completed

## 2024-05-01 NOTE — PROGRESS NOTES
a left peroneal brevis tendon tenosynovectomy.     She reports feeling wonderful and has no pain or soreness to the ankle compared to before surgery. She is able to walk and go up and down steps without issues. Denies any morning pain or night pain.    Patient is currently on disability.     Ht 1.575 m (5' 2\")   BMI 28.90 kg/m²     Appearance: Alert, well appearing and pleasant patient who is in no distress, oriented to person, place/time, and who follows commands. Normal dress/motor activity/thought processes/memory. This patient presents in the examination room by herself. Patient arrives to office via: without assistive device. Footwear: slippers with AS/AC brace on left    Psychiatric:  Normal Affect/mood.  Judgement, behavior, and conduct are appropriate. Speech normal in context and clarity, memory intact grossly, no involuntary movements - tremors.   H EENT (2): Head normocephalic & atraumatic.  Eye: pupils are round// EOM are intact // Neck: ROM WNL  // Hearings Intact  Respiratory: Breathing non labored     LEFT ANKLE/FOOT    Gait:  normal  Tenderness: nontender to peroneals  Cutaneous: slightly hypertrophic surgical scar  Joint Motion: WNL   Joint / Tendon Stability:  No Ankle or Subtalar instability or joint laxity.                       No peroneal sublux ability or dislocation  Alignment: neutral Hindfoot,    Neuro Motor/Sensory: NL/NL  Vascular: NL foot/ankle pulses,   Lymphatics: No extremity lymphedema, No calf swelling, no tenderness to calf muscles.    RADIOGRAPHS// IMAGING//DIAGNOSTIC DATA     No orders of the defined types were placed in this encounter.           No new radiographs today.           CHART REVIEW     Maribell Marshall has been experiencing pain and discomfort confirmed as outlined in the pain assessment outlined below.     was reviewed by Jose Luis Wolf MD on 5/1/2024.     PAST MEDICAL HISTORY:   Past Medical History:   Diagnosis Date    Achilles tendon tear, left, initial

## 2024-05-02 RX ORDER — METOPROLOL SUCCINATE 25 MG/1
25 TABLET, EXTENDED RELEASE ORAL DAILY
Qty: 100 TABLET | Refills: 2 | Status: SHIPPED | OUTPATIENT
Start: 2024-05-02

## 2024-05-16 PROBLEM — Z00.00 MEDICARE ANNUAL WELLNESS VISIT, SUBSEQUENT: Status: RESOLVED | Noted: 2023-04-07 | Resolved: 2024-05-16

## 2024-05-22 RX ORDER — DILTIAZEM HYDROCHLORIDE 120 MG/1
120 CAPSULE, EXTENDED RELEASE ORAL DAILY
Qty: 100 CAPSULE | Refills: 3 | Status: SHIPPED | OUTPATIENT
Start: 2024-05-22

## 2024-05-28 ENCOUNTER — OFFICE VISIT (OUTPATIENT)
Age: 59
End: 2024-05-28
Payer: MEDICARE

## 2024-05-28 VITALS
SYSTOLIC BLOOD PRESSURE: 114 MMHG | TEMPERATURE: 98.3 F | OXYGEN SATURATION: 96 % | WEIGHT: 153 LBS | HEIGHT: 62 IN | DIASTOLIC BLOOD PRESSURE: 72 MMHG | HEART RATE: 72 BPM | BODY MASS INDEX: 28.16 KG/M2

## 2024-05-28 DIAGNOSIS — Z01.818 PREOPERATIVE CLEARANCE: Primary | ICD-10-CM

## 2024-05-28 PROCEDURE — 1036F TOBACCO NON-USER: CPT | Performed by: NURSE PRACTITIONER

## 2024-05-28 PROCEDURE — 3074F SYST BP LT 130 MM HG: CPT | Performed by: NURSE PRACTITIONER

## 2024-05-28 PROCEDURE — 3017F COLORECTAL CA SCREEN DOC REV: CPT | Performed by: NURSE PRACTITIONER

## 2024-05-28 PROCEDURE — 3078F DIAST BP <80 MM HG: CPT | Performed by: NURSE PRACTITIONER

## 2024-05-28 PROCEDURE — G8417 CALC BMI ABV UP PARAM F/U: HCPCS | Performed by: NURSE PRACTITIONER

## 2024-05-28 PROCEDURE — G8428 CUR MEDS NOT DOCUMENT: HCPCS | Performed by: NURSE PRACTITIONER

## 2024-05-28 PROCEDURE — 99214 OFFICE O/P EST MOD 30 MIN: CPT | Performed by: NURSE PRACTITIONER

## 2024-05-28 NOTE — PROGRESS NOTES
Problems Maternal Grandmother     No Known Problems Maternal Grandfather     No Known Problems Paternal Grandmother     No Known Problems Paternal Grandfather     No Known Problems Other      Social History:   She  reports that she has never smoked. She has never used smokeless tobacco.   Social History     Substance and Sexual Activity   Alcohol Use Yes    Alcohol/week: 0.0 standard drinks of alcohol     Immunization History:  Immunization History   Administered Date(s) Administered    Influenza Virus Vaccine 09/28/2017, 11/26/2018, 02/15/2021, 10/07/2021, 12/09/2022, 10/05/2023    Influenza, FLUARIX, FLULAVAL, FLUZONE (age 6 mo+) AND AFLURIA, (age 3 y+), PF, 0.5mL 09/28/2017, 11/26/2018, 12/09/2022    Influenza, FLUCELVAX, (age 6 mo+), MDCK, PF, 0.5mL 10/03/2023    Pneumococcal, PCV20, PREVNAR 20, (age 6w+), IM, 0.5mL 04/16/2024    Pneumococcal, PPSV23, PNEUMOVAX 23, (age 2y+), SC/IM, 0.5mL 09/28/2017    TDaP, ADACEL (age 10y-64y), BOOSTRIX (age 10y+), IM, 0.5mL 04/24/2013         Return if symptoms worsen or fail to improve.      Dr. Francine Salvador, AGNP-C, DNP  [facility]     The patient (or guardian, if applicable) and other individuals in attendance with the patient were advised that Artificial Intelligence will be utilized during this visit to record and process the conversation to generate a clinical note. The patient (or guardian, if applicable) and other individuals in attendance at the appointment consented to the use of AI, including the recording.

## 2024-05-29 ENCOUNTER — OFFICE VISIT (OUTPATIENT)
Age: 59
End: 2024-05-29
Payer: MEDICARE

## 2024-05-29 VITALS
HEIGHT: 62 IN | OXYGEN SATURATION: 96 % | BODY MASS INDEX: 27.97 KG/M2 | SYSTOLIC BLOOD PRESSURE: 98 MMHG | WEIGHT: 152 LBS | DIASTOLIC BLOOD PRESSURE: 62 MMHG | HEART RATE: 88 BPM

## 2024-05-29 DIAGNOSIS — D86.9 SARCOID: ICD-10-CM

## 2024-05-29 DIAGNOSIS — R00.2 PALPITATION: Primary | ICD-10-CM

## 2024-05-29 DIAGNOSIS — I47.10 SVT (SUPRAVENTRICULAR TACHYCARDIA) (HCC): ICD-10-CM

## 2024-05-29 PROCEDURE — 99214 OFFICE O/P EST MOD 30 MIN: CPT | Performed by: INTERNAL MEDICINE

## 2024-05-29 PROCEDURE — 3017F COLORECTAL CA SCREEN DOC REV: CPT | Performed by: INTERNAL MEDICINE

## 2024-05-29 PROCEDURE — G8428 CUR MEDS NOT DOCUMENT: HCPCS | Performed by: INTERNAL MEDICINE

## 2024-05-29 PROCEDURE — 3074F SYST BP LT 130 MM HG: CPT | Performed by: INTERNAL MEDICINE

## 2024-05-29 PROCEDURE — 3078F DIAST BP <80 MM HG: CPT | Performed by: INTERNAL MEDICINE

## 2024-05-29 PROCEDURE — 1036F TOBACCO NON-USER: CPT | Performed by: INTERNAL MEDICINE

## 2024-05-29 PROCEDURE — G8417 CALC BMI ABV UP PARAM F/U: HCPCS | Performed by: INTERNAL MEDICINE

## 2024-05-29 RX ORDER — LORAZEPAM 1 MG/1
TABLET ORAL
Qty: 1 TABLET | Refills: 0 | Status: SHIPPED | OUTPATIENT
Start: 2024-05-29 | End: 2024-08-29

## 2024-05-29 NOTE — ASSESSMENT & PLAN NOTE
Scheduled for posterior transforaminal lumbar interbody fusion of L4-L5 with Dr. Lorenzo Roe on 06/12/2024.    Reviewed all labs, EKG, and CXR.   Labs and imaging within acceptable range. No contraindications to planned surgery. Discontinue NSAIDS 1 week prior to surgical procedure. Follow up with surgeon as scheduled post operatively.     Felt to be average risk for the planned procedure.  No contraindications noted.  Routine post prophylaxis as per protocol.     Patient may proceed with planned surgery. All questions to be answered, risks and benefits to be explained, and consent to be obtained via surgeon and their staff.     Pt has received surgical clearance from cardio and pulm.

## 2024-05-29 NOTE — PROGRESS NOTES
tablet Take 1 tablet by mouth daily 93 tablet 3    furosemide (LASIX) 20 MG tablet Take 1 tablet by mouth daily 93 tablet 1    insulin glargine (LANTUS SOLOSTAR) 100 UNIT/ML injection pen Inject 30 Units into the skin nightly 5 Adjustable Dose Pre-filled Pen Syringe 5    magnesium oxide (MAG-OX) 400 MG tablet Take 1 tablet by mouth daily 95 tablet 3    pravastatin (PRAVACHOL) 80 MG tablet Take 1 tablet by mouth daily For cholesterol 95 tablet 3    topiramate (TOPAMAX) 100 MG tablet Take 1 tablet by mouth 2 times daily 186 tablet 1    semaglutide, 2 MG/DOSE, (OZEMPIC, 2 MG/DOSE,) 8 MG/3ML SOPN sc injection Inject 2 mg into the skin every 7 days Every Monday 21 mL 0    fluticasone-umeclidin-vilant (TRELEGY ELLIPTA) 200-62.5-25 MCG/ACT AEPB inhaler Inhale 1 puff into the lungs daily Rinse and gargle mouth after each use 3 each 3    albuterol sulfate HFA (PROVENTIL HFA) 108 (90 Base) MCG/ACT inhaler Inhale 1-2 puffs into the lungs every 4 hours as needed for Wheezing or Shortness of Breath Can substitute any formulary approved albuterol formulation (Proventil, Proair, Ventolin, etc) 1 each 5    gabapentin (NEURONTIN) 300 MG capsule Take 1 capsule by mouth 4 times daily.      blood glucose test strips (ACCU-CHEK GUIDE) strip USE TO TEST BLOOD GLUCOSE THREE TIMES A  strip 5    BD PEN NEEDLE SERGIO 2ND GEN 32G X 4 MM MISC 1 each by Does not apply route daily 100 each 11    butalbital-APAP-caffeine -40 MG CAPS per capsule Take 1 capsule by mouth every 4 hours as needed for Headaches 20 capsule 0    cetirizine (ZYRTEC) 10 MG tablet Take 1 tablet by mouth daily      Multiple Vitamins-Minerals (THERAPEUTIC MULTIVITAMIN-MINERALS) tablet Take 1 tablet by mouth daily      Blood Glucose Monitoring Suppl (ACCU-CHEK GUIDE) w/Device KIT       benralizumab (FASENRA) 30 MG/ML SOSY injection Inject 1 mL into the skin Every 2 months EVERY 8 WEEKS      Lancets MISC Use as directed to monitor blood glucose up to three times

## 2024-06-27 ENCOUNTER — APPOINTMENT (OUTPATIENT)
Facility: HOSPITAL | Age: 59
End: 2024-06-27
Payer: MEDICARE

## 2024-06-27 ENCOUNTER — HOSPITAL ENCOUNTER (EMERGENCY)
Facility: HOSPITAL | Age: 59
Discharge: HOME | End: 2024-06-27
Payer: MEDICARE

## 2024-06-27 VITALS
HEIGHT: 62 IN | WEIGHT: 155 LBS | DIASTOLIC BLOOD PRESSURE: 64 MMHG | BODY MASS INDEX: 28.52 KG/M2 | OXYGEN SATURATION: 99 % | HEART RATE: 95 BPM | SYSTOLIC BLOOD PRESSURE: 109 MMHG | RESPIRATION RATE: 18 BRPM | TEMPERATURE: 98.1 F

## 2024-06-27 DIAGNOSIS — G89.18 POST-OP PAIN: Primary | ICD-10-CM

## 2024-06-27 PROCEDURE — 72100 X-RAY EXAM L-S SPINE 2/3 VWS: CPT

## 2024-06-27 PROCEDURE — 99283 EMERGENCY DEPT VISIT LOW MDM: CPT

## 2024-06-27 ASSESSMENT — LIFESTYLE VARIABLES
HOW MANY STANDARD DRINKS CONTAINING ALCOHOL DO YOU HAVE ON A TYPICAL DAY: PATIENT DOES NOT DRINK
HOW OFTEN DO YOU HAVE A DRINK CONTAINING ALCOHOL: NEVER

## 2024-06-27 ASSESSMENT — ENCOUNTER SYMPTOMS
DIARRHEA: 0
ABDOMINAL PAIN: 0
SHORTNESS OF BREATH: 0
SORE THROAT: 0
VOMITING: 0
BACK PAIN: 1

## 2024-06-27 ASSESSMENT — PAIN - FUNCTIONAL ASSESSMENT: PAIN_FUNCTIONAL_ASSESSMENT: 0-10

## 2024-06-27 ASSESSMENT — PAIN SCALES - GENERAL: PAINLEVEL_OUTOF10: 10

## 2024-06-27 NOTE — ED TRIAGE NOTES
Patient presents to ER after having back surgery on June 12th stating she needs to be evaluated for a piece of surgical equipment left in her back and back pain.

## 2024-06-27 NOTE — ED PROVIDER NOTES
% GEL Apply 2 g topically 4 times daily as needed for Pain      triamcinolone (KENALOG) 0.1 % cream Apply 1 g topically 2 times daily as needed         Past History     Past Medical History:   Past Medical History:   Diagnosis Date    Achilles tendon tear, left, initial encounter 01/16/2024    Acquired cyst of kidney 01/16/2020    naseem    Age-related nuclear cataract, bilateral 06/04/2021    Dr Cabezas    Bilateral great toe fractures 2014    Cerebrospinal fluid leak from spinal puncture 01/03/2022    Chronic midline low back pain with left-sided sciatica 04/03/2022    Had lumbar injection Dr Freitas 12/2021. Procedure had to be stopped x2 due to pt having left leg pain. Developed H/A. Obici did epidural blood patch.  Topamax and Mag Ox has held off sx until 2 days ago.     According to pt, Dr Wolf was going to refer pt to spine due to c/o sciatica and low back pain. Pt declined because she did not want to see Dr Freitas.   Pt seeking referral to another spine spe    Chronic obstructive pulmonary disease with acute exacerbation ([facility]) 03/21/2023    Dr. LEN Gilbert    Chronic sinusitis     Dr. Jacques in the past    CKD (chronic kidney disease) stage 4, GFR 15-29 ml/min ([facility]) 06/01/2022    Dr Manzano     9/15/23 Reviewed hosp labs from 9/2/23  Noted increasing creatinine and declining eGFR 2.08   These numbers appear worse from labs Dr Manzano obtained a few months back  Dr Jones started on toprol back in Dec, ? Is causing decline in kidneys  Pt has an appt with cardio, will address at appt next week  Strongly encouraged pt to follow up with Dr Manzano as well.       Colon polyp 05/2016    Dr Sabillon    Diabetic neuropathy, painful (HCC) 06/10/2019    Increase topamax from 50mg BID to 100mg BID  Discussed OTC creams and supplements    DJD (degenerative joint disease) of cervical spine 2016    DJD (degenerative joint disease), lumbar 2013    MRI c spine w degen changes; Dr Gilson Aguiar plus pigment change stage

## 2024-06-28 PROBLEM — Z01.818 PREOPERATIVE CLEARANCE: Status: RESOLVED | Noted: 2024-01-16 | Resolved: 2024-06-28

## 2024-07-08 ENCOUNTER — HOSPITAL ENCOUNTER (OUTPATIENT)
Facility: HOSPITAL | Age: 59
Setting detail: INFUSION SERIES
Discharge: HOME | End: 2024-07-11
Payer: MEDICARE

## 2024-07-08 VITALS
OXYGEN SATURATION: 98 % | RESPIRATION RATE: 16 BRPM | TEMPERATURE: 97 F | HEART RATE: 90 BPM | DIASTOLIC BLOOD PRESSURE: 75 MMHG | SYSTOLIC BLOOD PRESSURE: 123 MMHG

## 2024-07-08 DIAGNOSIS — J45.50 SEVERE PERSISTENT ASTHMA, UNSPECIFIED WHETHER COMPLICATED: Primary | ICD-10-CM

## 2024-07-08 PROCEDURE — 96372 THER/PROPH/DIAG INJ SC/IM: CPT

## 2024-07-08 PROCEDURE — 6360000002 HC RX W HCPCS: Performed by: INTERNAL MEDICINE

## 2024-07-08 RX ORDER — DIPHENHYDRAMINE HYDROCHLORIDE 50 MG/ML
50 INJECTION INTRAMUSCULAR; INTRAVENOUS
OUTPATIENT
Start: 2024-08-12

## 2024-07-08 RX ORDER — ONDANSETRON 2 MG/ML
8 INJECTION INTRAMUSCULAR; INTRAVENOUS
OUTPATIENT
Start: 2024-08-12

## 2024-07-08 RX ORDER — ACETAMINOPHEN 325 MG/1
650 TABLET ORAL
OUTPATIENT
Start: 2024-08-12

## 2024-07-08 RX ORDER — SODIUM CHLORIDE 9 MG/ML
INJECTION, SOLUTION INTRAVENOUS CONTINUOUS
OUTPATIENT
Start: 2024-08-12

## 2024-07-08 RX ORDER — ALBUTEROL SULFATE 90 UG/1
4 AEROSOL, METERED RESPIRATORY (INHALATION) PRN
OUTPATIENT
Start: 2024-08-12

## 2024-07-08 RX ORDER — EPINEPHRINE 1 MG/ML
0.3 INJECTION, SOLUTION, CONCENTRATE INTRAVENOUS PRN
OUTPATIENT
Start: 2024-08-12

## 2024-07-08 RX ADMIN — BENRALIZUMAB 30 MG: 30 INJECTION, SOLUTION SUBCUTANEOUS at 13:50

## 2024-07-08 ASSESSMENT — PAIN - FUNCTIONAL ASSESSMENT: PAIN_FUNCTIONAL_ASSESSMENT: 0-10

## 2024-07-08 ASSESSMENT — PAIN DESCRIPTION - DESCRIPTORS: DESCRIPTORS: THROBBING

## 2024-07-08 NOTE — PROGRESS NOTES
[facility] Progress Note    Date: 2024    Name: Maribell Marshall    MRN: [id number]         : 1965    Ms. Marshall arrived in the [facility] today at 1335, ambulatory and in stable condition, here for her FASENRA INJECTION (EVERY 8 WEEKS). She was assessed and education was provided.     Ms. Marshall's vitals were reviewed.  Vitals:    24 1337   BP: 123/75   Pulse: 90   Resp: 16   Temp: 97 °F (36.1 °C)   SpO2: 98%       Ms. Marshall reports noticing significant improvement since having Fasenra injections. She states she hasn't had an asthma flare up in a long time. Denies any adverse effects or side effects in previous administrations.    Patient denies any antibiotic use or active infection at this time.    Benralizumab (FASENRA) 30 mg, was administered SQ, in her LEFT arm as ordered and without incident.     Ms. Marshall tolerated the injection well, and voiced no complaints.    Discharge instructions reviewed with patient and she expressed understanding. Patient's armband was removed and shredded.    [facility]. [facility], ambulatory and in stable condition at 1355.     She is to return in 8 weeks, on Tuesday, 24 at 1130, for her next appointment, for her FASENRA injection.     Ana Laura Richards RN  2024  1:41 PM

## 2024-07-12 DIAGNOSIS — Z79.4 TYPE 2 DIABETES MELLITUS WITH STAGE 4 CHRONIC KIDNEY DISEASE, WITH LONG-TERM CURRENT USE OF INSULIN (HCC): ICD-10-CM

## 2024-07-12 DIAGNOSIS — N18.4 TYPE 2 DIABETES MELLITUS WITH STAGE 4 CHRONIC KIDNEY DISEASE, WITH LONG-TERM CURRENT USE OF INSULIN (HCC): ICD-10-CM

## 2024-07-12 DIAGNOSIS — E11.22 TYPE 2 DIABETES MELLITUS WITH STAGE 4 CHRONIC KIDNEY DISEASE, WITH LONG-TERM CURRENT USE OF INSULIN (HCC): ICD-10-CM

## 2024-07-12 RX ORDER — SEMAGLUTIDE 2.68 MG/ML
INJECTION, SOLUTION SUBCUTANEOUS
Qty: 9 ML | Refills: 0 | Status: SHIPPED | OUTPATIENT
Start: 2024-07-12

## 2024-07-31 NOTE — PROGRESS NOTES
Hypomagnesemia, Intractable migraine without status migrainosus, Lateral epicondylitis of both elbows, Macular degeneration of both eyes, Mild intermittent asthma without complication, Mixed hyperlipidemia, Moderate malnutrition (HCC), Morbid obesity (HCC), Osteopenia, Primary hypertension, Recurrent major depressive disorder (HCC), Retinal tear of right eye, Sarcoidosis, Spinal stenosis of lumbar region, Type 2 diabetes mellitus with stage 4 chronic kidney disease, with long-term current use of insulin ([facility]), and Vitamin D deficiency.  Pt was seen today for diabetes management.  Patient's last A1c was:   Hemoglobin A1C   Date Value Ref Range Status   04/10/2024 6.6 (H) 4.2 - 5.6 % Final     Comment:     (NOTE)  HbA1C Interpretive Ranges  <5.7              Normal  5.7 - 6.4         Consider Prediabetes  >6.5              Consider Diabetes       Hemoglobin A1C, POC   Date Value Ref Range Status   10/03/2023 6.2 % Final       Interim update: Pt was last seen by me on 4/3/2024.  Per my prior note: Pt's A1c is at goal of < 7%.  Her reported SMBG logs are all at goal.  Her physical activity is hindered by decreased mobility.  She has not had any instances of hypoglycemia and her lowest BG values have been in the 80s.  Will maintain her current tx and reassess with SMBG logs at follow up in 3 months.    Today:   Pt states that she is recovering well from her surgery and is picking up with her physical activity.  She denies any issues with her current medications at this time.  She has been maintaining her diet.  She denies any s/sx of hypoglycemia at values in to the 80s.    Current anti-hyperglycemic regimen includes:    Key Antihyperglycemic Medications               semaglutide, 2 MG/DOSE, (OZEMPIC, 2 MG/DOSE,) 8 MG/3ML SOPN sc injection DIAL AND INJECT UNDER THE SKIN 2 MG WEEKLY (EVERY MONDAY)    insulin glargine (LANTUS SOLOSTAR) 100 UNIT/ML injection pen Inject 30 Units into the skin nightly          Complete

## 2024-08-02 ENCOUNTER — PHARMACY VISIT (OUTPATIENT)
Facility: CLINIC | Age: 59
End: 2024-08-02

## 2024-08-02 DIAGNOSIS — N18.4 TYPE 2 DIABETES MELLITUS WITH STAGE 4 CHRONIC KIDNEY DISEASE, WITH LONG-TERM CURRENT USE OF INSULIN (HCC): Primary | ICD-10-CM

## 2024-08-02 DIAGNOSIS — Z79.4 TYPE 2 DIABETES MELLITUS WITH STAGE 4 CHRONIC KIDNEY DISEASE, WITH LONG-TERM CURRENT USE OF INSULIN (HCC): Primary | ICD-10-CM

## 2024-08-02 DIAGNOSIS — E11.22 TYPE 2 DIABETES MELLITUS WITH STAGE 4 CHRONIC KIDNEY DISEASE, WITH LONG-TERM CURRENT USE OF INSULIN (HCC): Primary | ICD-10-CM

## 2024-08-02 RX ORDER — CELECOXIB 100 MG/1
100 CAPSULE ORAL 2 TIMES DAILY
COMMUNITY
Start: 2024-07-15

## 2024-08-02 RX ORDER — HYDROCODONE BITARTRATE AND ACETAMINOPHEN 10; 325 MG/1; MG/1
1 TABLET ORAL EVERY 8 HOURS PRN
COMMUNITY
Start: 2024-07-19

## 2024-08-02 RX ORDER — CYCLOBENZAPRINE HCL 10 MG
10 TABLET ORAL EVERY 8 HOURS PRN
COMMUNITY
Start: 2024-06-14

## 2024-08-02 RX ORDER — PREDNISONE 5 MG/1
5 TABLET ORAL DAILY
COMMUNITY
Start: 2024-07-14

## 2024-08-13 ENCOUNTER — APPOINTMENT (OUTPATIENT)
Facility: HOSPITAL | Age: 59
End: 2024-08-13
Payer: MEDICARE

## 2024-08-14 ENCOUNTER — OFFICE VISIT (OUTPATIENT)
Facility: CLINIC | Age: 59
End: 2024-08-14
Payer: MEDICARE

## 2024-08-14 VITALS
TEMPERATURE: 97.7 F | SYSTOLIC BLOOD PRESSURE: 130 MMHG | WEIGHT: 156 LBS | OXYGEN SATURATION: 97 % | BODY MASS INDEX: 28.71 KG/M2 | HEART RATE: 88 BPM | DIASTOLIC BLOOD PRESSURE: 78 MMHG | RESPIRATION RATE: 16 BRPM | HEIGHT: 62 IN

## 2024-08-14 DIAGNOSIS — T81.500A: Primary | ICD-10-CM

## 2024-08-14 PROCEDURE — 99213 OFFICE O/P EST LOW 20 MIN: CPT | Performed by: NURSE PRACTITIONER

## 2024-08-14 PROCEDURE — 3075F SYST BP GE 130 - 139MM HG: CPT | Performed by: NURSE PRACTITIONER

## 2024-08-14 PROCEDURE — 3078F DIAST BP <80 MM HG: CPT | Performed by: NURSE PRACTITIONER

## 2024-08-14 NOTE — PROGRESS NOTES
Gregg came to chemo infusion this afternoon for a pump d/c upon completion of his 46 hour home infusion of 5FU.  VSS.  Pt assessed.  He is feeling well today.  He reports home infusion went well.  Cadd pump d/c'd.  Port flushed with ns, heparinized then de-accessed and site covered.  Gregg d/c from clinic ambulatory.  He is aware of his future appointment.   [facility] [address]  [address]  [place] [id number]  [phone] office/[phone] fax    8/14/2024    Maribell Marshall 1965 is a pleasant Black /  female.       History of Present Illness  The patient presents for evaluation of back pain and left-sided pain.    She underwent lower lumbar surgery on 06/12/2024, performed by Dr. Roe, which involved the 4th and 5th discs. Post-surgery, she experienced an incident where adhesive tape applied to her face resulted in skin removal upon its detachment, necessitating an additional day in the hospital. During her discharge, a piece of the drain tube was inadvertently left in her back.    She reports that her follow-up appointments were not with Dr. Roe, but with his colleagues. Upon finally meeting with Dr. Roe, she expressed concerns about the residual tube, given her allergies to similar materials. A CAT scan was ordered due to her ongoing complications. The results of the scan, reviewed last week, indicated the need for another surgery.    Dr. Roe suggested that the screws identified in the scan might be causing her persistent pre-surgery symptoms, which have now worsened. These symptoms include numbness and tingling extending down to her left foot, a sensation of impaired circulation on her left side, and a feeling akin to having a plate under her foot. She also notes a darker coloration in her left foot compared to the right.    She has been avoiding scheduling the recommended surgery as she suspects the residual tube might be contributing to her symptoms. An x-ray revealed the tube lying on top of the box. She is considering proceeding with the surgery.      Physical Exam      Physical Exam  Constitutional:       Appearance: Normal appearance.   Eyes:      Extraocular Movements: Extraocular movements intact.      Conjunctiva/sclera: Conjunctivae normal.   Cardiovascular:      Rate and Rhythm: Normal rate

## 2024-08-14 NOTE — ASSESSMENT & PLAN NOTE
Reviewed CT results and other notes with pt pertaining to her recent lumbar surgery. Pt noticeably upset due to current situation. She thought Dr Roe was ignoring her because she was seeing his colleagues and not him for hospital follow-up. I explained the surgeons usually have pts see their assistance post op and I dont believe Dr Roe was avoiding her. I explained that originally reopening her surgical site just to remove the retained tube, that most likely would not cause complications, was too risky but now she has an issue with a screw to L5 which is irritating the nerve that is causing the numbness and tingling in her left lower extremity.  With this issue, surgery is necessary to correct the problem.  I also explained while the surgeon is in correcting the screw, he can then remove the drain.  Patient appeared happy with the explanation and will be reaching out to Dr. Roe's office to schedule the surgery.    The recent CT scan indicates that the L5 pedicle screw is likely impinging on the left nerve root, causing her symptoms of numbness, tingling, and discoloration in the left foot. The plan is to undergo surgery to reposition the screw and remove the piece of tubing left from the previous surgery. This intervention is necessary to alleviate her symptoms and prevent further complications. She was informed that the recovery period should be significantly shorter than her previous surgery and that a drain would not be required post-surgery. She was advised to arrange for someone to stay with her for one night post-surgery.      Dr Roe note:  6/12/24 In preparation for discharge, the patient's drain was pulled by the nursing staff as ordered. The nurse who performed the procedure noted some resistance and then the drain snapped off at the juncture between the flat white portion in the tubular portion. I was notified at that time and came to evaluate the patient. There was note drain tube visible in the drain

## 2024-08-14 NOTE — PROGRESS NOTES
Maribell Marshall presents today for   Chief Complaint   Patient presents with    Back Pain           \"Have you been to the ER, urgent care clinic since your last visit?  Hospitalized since your last visit?\"    YES - When: approximately 2 months ago.  Where and Why: [facility] ED for Post op back pain .    “Have you seen or consulted any other health care providers outside of [facility] since your last visit?”    NO

## 2024-08-21 PROBLEM — M43.16 SPONDYLOLISTHESIS AT L4-L5 LEVEL: Status: ACTIVE | Noted: 2024-06-12

## 2024-08-22 ENCOUNTER — OFFICE VISIT (OUTPATIENT)
Age: 59
End: 2024-08-22
Payer: MEDICARE

## 2024-08-22 VITALS — HEIGHT: 62 IN | BODY MASS INDEX: 28.53 KG/M2

## 2024-08-22 DIAGNOSIS — D86.0 SARCOIDOSIS OF LUNG (HCC): ICD-10-CM

## 2024-08-22 DIAGNOSIS — J45.50 SEVERE PERSISTENT ASTHMA, UNCOMPLICATED: ICD-10-CM

## 2024-08-22 DIAGNOSIS — Z01.811 PREOP PULMONARY/RESPIRATORY EXAM: Primary | ICD-10-CM

## 2024-08-22 DIAGNOSIS — Z79.52 LONG TERM (CURRENT) USE OF SYSTEMIC STEROIDS: ICD-10-CM

## 2024-08-22 PROCEDURE — 99214 OFFICE O/P EST MOD 30 MIN: CPT | Performed by: INTERNAL MEDICINE

## 2024-08-22 NOTE — PROGRESS NOTES
Maribell Marshall presents today for   Chief Complaint   Patient presents with    Pre-op Exam     Per Dr. Roe for L5, L4 revision of left sided screws    Asthma     Last seen 2024    Other     Long term current use of systemic steroid    Sarcoidosis    Results     CXR 2024       Is someone accompanying this pt? No    Is the patient using any DME equipment during OV? Yes. glucometer    -DME Company N/A    Depression Screenin/16/2024     8:29 AM   PHQ-9 Questionaire   Little interest or pleasure in doing things 0   Feeling down, depressed, or hopeless 0   Trouble falling or staying asleep, or sleeping too much 0   Feeling tired or having little energy 0   Poor appetite or overeating 0   Feeling bad about yourself - or that you are a failure or have let yourself or your family down 0   Trouble concentrating on things, such as reading the newspaper or watching television 0   Moving or speaking so slowly that other people could have noticed. Or the opposite - being so fidgety or restless that you have been moving around a lot more than usual 0   Thoughts that you would be better off dead, or of hurting yourself in some way 0   PHQ-9 Total Score 0   If you checked off any problems, how difficult have these problems made it for you to do your work, take care of things at home, or get along with other people? 0       Learning Needs Questionnaire:     Who is the primary learner? Patient    What is the preferred language for health care of the primary learner? ENGLISH    How does the primary learner prefer to learn new concepts? DEMONSTRATION    Answered By Patient    Relationship to Learner SELF          Fall Risk:         2024     8:32 AM 3/20/2024    11:01 AM 2023     8:41 AM 2023    10:49 AM 3/15/2023     9:10 AM   Fall Risk   Do you feel unsteady or are you worried about falling?  no no no no no   2 or more falls in past year? no no no no no   Fall with injury in past year? no no no no 
   MCV 99.8 01/12/2024     01/12/2024    on 3/7/22  Lab Results   Component Value Date     04/10/2024    K 3.7 04/10/2024     04/10/2024    CO2 26 04/10/2024    BUN 31 (H) 04/10/2024    CREATININE 2.07 (H) 04/10/2024    GLUCOSE 112 (H) 04/10/2024    CALCIUM 8.9 04/10/2024    BILITOT 0.3 04/10/2024    ALKPHOS 84 04/10/2024    AST 37 04/10/2024    ALT 45 04/10/2024    LABGLOM 27 (L) 04/10/2024    GFRAA 29 (L) 05/04/2022    AGRATIO 1.6 03/01/2023    GLOB 3.4 04/10/2024   Last ACE level was 52 on 6/30/2021        Imaging:  I have personally reviewed patient's imaging as follows--No new imaging in the interval.  CTA chest with contrast from 11/17/2022 shows scattered linear fibrotic  opacities apically extending from the hilum, otherwise no infiltrates or effusions.  Patient also has some scattered bilateral reticular changes and fibrotic changes in the bases.  She has some calcified mediastinal and hilar lymphadenopathy.  No nodules  or active inflammation seen.     PFTs: 5/24/2022 as follows: Spirometry is normal, no BD response.  Lung volumes show a mild restriction.  Diffusion capacity is moderately  reduced     TTE:  I have reviewed the patient's TTE results  05/14/19     05/15/2019  7:13 AM, 05/15/2019 12:00 AM (Final)    Narrative  This is a summary report. The complete report is available in the patient's medical record. If you cannot access the medical record, please contact the sending organization for a detailed fax or copy.    · Left Ventricle: Estimated left ventricular ejection fraction is 61 - 65%. No regional wall motion abnormality noted. Age-appropriate left ventricular diastolic function.    Signed by: Al De La Torre DO on 5/15/2019  7:13 AM, Signed by: Unknown Provider Result on 5/15/2019 12:00 AM       Assessment and Plan:  59 y.o. female with:     Impression:  1. Severe persistent asthma, well controlled: Pt on chronic prednisone (for hypercalcemia in setting of

## 2024-08-27 ENCOUNTER — TELEPHONE (OUTPATIENT)
Age: 59
End: 2024-08-27

## 2024-08-27 NOTE — TELEPHONE ENCOUNTER
Received a fax from [facility] requesting cardiac clearance for L5, L4 Revision of left sided screw. Procedure date 10/2/24. Will discuss with Dr. Jones.

## 2024-08-29 NOTE — TELEPHONE ENCOUNTER
Verbal order and read back per Adrienne Jones, DO  Patient can proceed at low to moderate risk from a cardiac standpoint.   Clearance form faxed to [phone]

## 2024-09-06 ENCOUNTER — TELEPHONE (OUTPATIENT)
Age: 59
End: 2024-09-06

## 2024-09-11 ENCOUNTER — HOSPITAL ENCOUNTER (OUTPATIENT)
Facility: HOSPITAL | Age: 59
Setting detail: INFUSION SERIES
Discharge: HOME | End: 2024-09-14
Payer: MEDICARE

## 2024-09-11 VITALS
DIASTOLIC BLOOD PRESSURE: 81 MMHG | SYSTOLIC BLOOD PRESSURE: 132 MMHG | TEMPERATURE: 98.2 F | RESPIRATION RATE: 16 BRPM | OXYGEN SATURATION: 96 % | HEART RATE: 89 BPM

## 2024-09-11 DIAGNOSIS — J45.50 SEVERE PERSISTENT ASTHMA, UNSPECIFIED WHETHER COMPLICATED: Primary | ICD-10-CM

## 2024-09-11 PROCEDURE — 6360000002 HC RX W HCPCS: Performed by: HOSPITALIST

## 2024-09-11 PROCEDURE — 96372 THER/PROPH/DIAG INJ SC/IM: CPT

## 2024-09-11 RX ORDER — EPINEPHRINE 1 MG/ML
0.3 INJECTION, SOLUTION, CONCENTRATE INTRAVENOUS PRN
OUTPATIENT
Start: 2024-10-30

## 2024-09-11 RX ORDER — SODIUM CHLORIDE 9 MG/ML
INJECTION, SOLUTION INTRAVENOUS CONTINUOUS
OUTPATIENT
Start: 2024-10-30

## 2024-09-11 RX ORDER — ALBUTEROL SULFATE 90 UG/1
4 INHALANT RESPIRATORY (INHALATION) PRN
OUTPATIENT
Start: 2024-10-30

## 2024-09-11 RX ORDER — ACETAMINOPHEN 325 MG/1
650 TABLET ORAL
OUTPATIENT
Start: 2024-10-30

## 2024-09-11 RX ORDER — ONDANSETRON 2 MG/ML
8 INJECTION INTRAMUSCULAR; INTRAVENOUS
OUTPATIENT
Start: 2024-10-30

## 2024-09-11 RX ORDER — DIPHENHYDRAMINE HYDROCHLORIDE 50 MG/ML
50 INJECTION INTRAMUSCULAR; INTRAVENOUS
OUTPATIENT
Start: 2024-10-30

## 2024-09-11 RX ADMIN — BENRALIZUMAB 30 MG: 30 INJECTION, SOLUTION SUBCUTANEOUS at 09:08

## 2024-09-11 ASSESSMENT — PAIN DESCRIPTION - DESCRIPTORS: DESCRIPTORS: THROBBING

## 2024-09-11 ASSESSMENT — PAIN - FUNCTIONAL ASSESSMENT: PAIN_FUNCTIONAL_ASSESSMENT: 0-10

## 2024-09-16 ENCOUNTER — OFFICE VISIT (OUTPATIENT)
Facility: CLINIC | Age: 59
End: 2024-09-16
Payer: MEDICARE

## 2024-09-16 VITALS
WEIGHT: 153 LBS | SYSTOLIC BLOOD PRESSURE: 130 MMHG | HEART RATE: 68 BPM | BODY MASS INDEX: 28.16 KG/M2 | DIASTOLIC BLOOD PRESSURE: 82 MMHG | TEMPERATURE: 97.7 F | RESPIRATION RATE: 16 BRPM | HEIGHT: 62 IN | OXYGEN SATURATION: 96 %

## 2024-09-16 DIAGNOSIS — Z01.818 PREOPERATIVE CLEARANCE: Primary | ICD-10-CM

## 2024-09-16 DIAGNOSIS — Z22.9 COLONIZED WITH INFECTIOUS ORGANISM: ICD-10-CM

## 2024-09-16 PROCEDURE — 3075F SYST BP GE 130 - 139MM HG: CPT | Performed by: NURSE PRACTITIONER

## 2024-09-16 PROCEDURE — 99214 OFFICE O/P EST MOD 30 MIN: CPT | Performed by: NURSE PRACTITIONER

## 2024-09-16 PROCEDURE — 3079F DIAST BP 80-89 MM HG: CPT | Performed by: NURSE PRACTITIONER

## 2024-09-16 RX ORDER — MUPIROCIN CALCIUM 20 MG/G
CREAM TOPICAL
Qty: 15 G | Refills: 0 | Status: SHIPPED | OUTPATIENT
Start: 2024-09-16 | End: 2024-10-16

## 2024-09-19 ENCOUNTER — TELEPHONE (OUTPATIENT)
Facility: CLINIC | Age: 59
End: 2024-09-19

## 2024-09-19 RX ORDER — BUTALBITAL, ACETAMINOPHEN AND CAFFEINE 300; 40; 50 MG/1; MG/1; MG/1
1 CAPSULE ORAL EVERY 4 HOURS PRN
Qty: 20 CAPSULE | Refills: 0 | COMMUNITY
Start: 2024-09-19 | End: 2024-09-19

## 2024-09-20 ENCOUNTER — PATIENT MESSAGE (OUTPATIENT)
Facility: CLINIC | Age: 59
End: 2024-09-20

## 2024-09-23 ENCOUNTER — HOSPITAL ENCOUNTER (OUTPATIENT)
Facility: HOSPITAL | Age: 59
Discharge: HOME | End: 2024-09-26
Payer: MEDICARE

## 2024-09-23 VITALS — HEIGHT: 62 IN | BODY MASS INDEX: 27.98 KG/M2

## 2024-09-23 DIAGNOSIS — Z00.00 MEDICARE ANNUAL WELLNESS VISIT, SUBSEQUENT: ICD-10-CM

## 2024-09-23 DIAGNOSIS — Z12.31 ENCOUNTER FOR SCREENING MAMMOGRAM FOR BREAST CANCER: ICD-10-CM

## 2024-09-23 PROCEDURE — 77063 BREAST TOMOSYNTHESIS BI: CPT

## 2024-09-26 ENCOUNTER — OFFICE VISIT (OUTPATIENT)
Age: 59
End: 2024-09-26
Payer: MEDICARE

## 2024-09-26 VITALS
SYSTOLIC BLOOD PRESSURE: 118 MMHG | WEIGHT: 160 LBS | BODY MASS INDEX: 29.44 KG/M2 | HEIGHT: 62 IN | OXYGEN SATURATION: 95 % | DIASTOLIC BLOOD PRESSURE: 74 MMHG | HEART RATE: 88 BPM

## 2024-09-26 DIAGNOSIS — E78.5 HYPERLIPIDEMIA, UNSPECIFIED HYPERLIPIDEMIA TYPE: ICD-10-CM

## 2024-09-26 DIAGNOSIS — D86.9 SARCOID: ICD-10-CM

## 2024-09-26 DIAGNOSIS — J45.50 SEVERE PERSISTENT ASTHMA, UNSPECIFIED WHETHER COMPLICATED: ICD-10-CM

## 2024-09-26 DIAGNOSIS — I10 ESSENTIAL (PRIMARY) HYPERTENSION: ICD-10-CM

## 2024-09-26 DIAGNOSIS — R00.2 PALPITATION: Primary | ICD-10-CM

## 2024-09-26 DIAGNOSIS — Z79.4 TYPE 2 DIABETES MELLITUS WITH STAGE 4 CHRONIC KIDNEY DISEASE, WITH LONG-TERM CURRENT USE OF INSULIN (HCC): ICD-10-CM

## 2024-09-26 DIAGNOSIS — N18.4 TYPE 2 DIABETES MELLITUS WITH STAGE 4 CHRONIC KIDNEY DISEASE, WITH LONG-TERM CURRENT USE OF INSULIN (HCC): ICD-10-CM

## 2024-09-26 DIAGNOSIS — E11.22 TYPE 2 DIABETES MELLITUS WITH STAGE 4 CHRONIC KIDNEY DISEASE, WITH LONG-TERM CURRENT USE OF INSULIN (HCC): ICD-10-CM

## 2024-09-26 PROCEDURE — 3044F HG A1C LEVEL LT 7.0%: CPT | Performed by: NURSE PRACTITIONER

## 2024-09-26 PROCEDURE — 93000 ELECTROCARDIOGRAM COMPLETE: CPT | Performed by: NURSE PRACTITIONER

## 2024-09-26 PROCEDURE — 3078F DIAST BP <80 MM HG: CPT | Performed by: NURSE PRACTITIONER

## 2024-09-26 PROCEDURE — 99214 OFFICE O/P EST MOD 30 MIN: CPT | Performed by: NURSE PRACTITIONER

## 2024-09-26 PROCEDURE — 3074F SYST BP LT 130 MM HG: CPT | Performed by: NURSE PRACTITIONER

## 2024-09-26 ASSESSMENT — PATIENT HEALTH QUESTIONNAIRE - PHQ9
2. FEELING DOWN, DEPRESSED OR HOPELESS: NOT AT ALL
10. IF YOU CHECKED OFF ANY PROBLEMS, HOW DIFFICULT HAVE THESE PROBLEMS MADE IT FOR YOU TO DO YOUR WORK, TAKE CARE OF THINGS AT HOME, OR GET ALONG WITH OTHER PEOPLE: NOT DIFFICULT AT ALL
9. THOUGHTS THAT YOU WOULD BE BETTER OFF DEAD, OR OF HURTING YOURSELF: NOT AT ALL
6. FEELING BAD ABOUT YOURSELF - OR THAT YOU ARE A FAILURE OR HAVE LET YOURSELF OR YOUR FAMILY DOWN: NOT AT ALL
SUM OF ALL RESPONSES TO PHQ QUESTIONS 1-9: 0
SUM OF ALL RESPONSES TO PHQ9 QUESTIONS 1 & 2: 0
1. LITTLE INTEREST OR PLEASURE IN DOING THINGS: NOT AT ALL
SUM OF ALL RESPONSES TO PHQ QUESTIONS 1-9: 0
4. FEELING TIRED OR HAVING LITTLE ENERGY: NOT AT ALL
5. POOR APPETITE OR OVEREATING: NOT AT ALL
7. TROUBLE CONCENTRATING ON THINGS, SUCH AS READING THE NEWSPAPER OR WATCHING TELEVISION: NOT AT ALL
SUM OF ALL RESPONSES TO PHQ QUESTIONS 1-9: 0
SUM OF ALL RESPONSES TO PHQ QUESTIONS 1-9: 0
8. MOVING OR SPEAKING SO SLOWLY THAT OTHER PEOPLE COULD HAVE NOTICED. OR THE OPPOSITE, BEING SO FIGETY OR RESTLESS THAT YOU HAVE BEEN MOVING AROUND A LOT MORE THAN USUAL: NOT AT ALL
3. TROUBLE FALLING OR STAYING ASLEEP: NOT AT ALL

## 2024-09-26 ASSESSMENT — ENCOUNTER SYMPTOMS
NAUSEA: 0
CHEST TIGHTNESS: 0
BLOOD IN STOOL: 0
VOMITING: 0
BACK PAIN: 1
SHORTNESS OF BREATH: 0
ABDOMINAL DISTENTION: 0
WHEEZING: 0
DIARRHEA: 0
COUGH: 0
CONSTIPATION: 0

## 2024-10-07 ENCOUNTER — TELEPHONE (OUTPATIENT)
Facility: CLINIC | Age: 59
End: 2024-10-07

## 2024-10-07 NOTE — TELEPHONE ENCOUNTER
My apologies. Here's the information:[facility]   Phone: [phone]   Fax: [phone]

## 2024-10-07 NOTE — TELEPHONE ENCOUNTER
Amy called on behalf of pt and said that pt has been opened to home health. Pt had a revision to her back surgery and will be receiving after surgery care. Amy requested to let Luan know this information. Please advise if needed.

## 2024-10-07 NOTE — TELEPHONE ENCOUNTER
Please make sure to get a phone number for me to call this person back and advise her to send all home health orders to the orthopedist as they will need to sign the orders.

## 2024-10-10 NOTE — TELEPHONE ENCOUNTER
Called [facility] and spoke to Radha who was the Assistant Director of Nursing and relayed message to have all home health orders sent to [facility]. Message was taken by Radha.

## 2024-10-16 PROBLEM — Z01.818 PREOPERATIVE CLEARANCE: Status: RESOLVED | Noted: 2024-01-16 | Resolved: 2024-10-16

## 2024-10-21 ENCOUNTER — TELEPHONE (OUTPATIENT)
Age: 59
End: 2024-10-21

## 2024-10-21 NOTE — TELEPHONE ENCOUNTER
[facility] cardiac MRI dept called today. This patient was suppose to get a Cardiac MRI done today, but her Creatine was 2.2. the patient already contacted her nephrologist, but was told to make her cardiologists aware. Her creatine has to be at least 1.2 to be able to administer contrast.     MRI dept can be reached at [phone]

## 2024-10-23 ENCOUNTER — OFFICE VISIT (OUTPATIENT)
Age: 59
End: 2024-10-23
Payer: MEDICARE

## 2024-10-23 VITALS
HEART RATE: 98 BPM | OXYGEN SATURATION: 99 % | BODY MASS INDEX: 29.08 KG/M2 | SYSTOLIC BLOOD PRESSURE: 130 MMHG | DIASTOLIC BLOOD PRESSURE: 80 MMHG | WEIGHT: 159 LBS

## 2024-10-23 DIAGNOSIS — I47.10 SUPRAVENTRICULAR TACHYCARDIA (HCC): Primary | ICD-10-CM

## 2024-10-23 DIAGNOSIS — I47.20 VENTRICULAR TACHYCARDIA (HCC): ICD-10-CM

## 2024-10-23 DIAGNOSIS — D86.9 SARCOID: ICD-10-CM

## 2024-10-23 DIAGNOSIS — R00.2 PALPITATION: ICD-10-CM

## 2024-10-23 PROCEDURE — 99214 OFFICE O/P EST MOD 30 MIN: CPT | Performed by: INTERNAL MEDICINE

## 2024-10-23 PROCEDURE — 3075F SYST BP GE 130 - 139MM HG: CPT | Performed by: INTERNAL MEDICINE

## 2024-10-23 PROCEDURE — 3079F DIAST BP 80-89 MM HG: CPT | Performed by: INTERNAL MEDICINE

## 2024-10-23 NOTE — PROGRESS NOTES
developed, well groomed.    Head/Neck:   No obvious jugular venous distention     No obvious carotid pulsations.      No evidence of xanthelasma.  Lungs:   No respiratory distress.      Clear bilaterally.  Heart:  Regular rate and rhythm.    Abdomen:   Soft and nontender  Extremities:   Intact peripheral pulses.      No significant edema.    Neurological:   Alert and oriented to person, place, time.      No focal neurological deficit visually.  Skin:   No obvious rash

## 2024-10-25 DIAGNOSIS — I10 PRIMARY HYPERTENSION: ICD-10-CM

## 2024-10-28 RX ORDER — FUROSEMIDE 20 MG/1
20 TABLET ORAL DAILY
Qty: 90 TABLET | Refills: 1 | Status: SHIPPED | OUTPATIENT
Start: 2024-10-28

## 2024-10-28 NOTE — TELEPHONE ENCOUNTER
Maribell Marshall called for their medication refill.    Last Office visit:  9/16/2024    Last Filled: 4/16/2024    Follow up visit:    Future Appointments   Date Time Provider Department Center   11/1/2024  9:00 AM Jose Luis Stuart, [facility]RIOC [facility] DEP   11/6/2024 11:00 AM HBV FAST TRACK NURSE [facility]   11/15/2024 11:30 AM CSI HV ECHO GE 95 [facility] BS AMB   1/30/2025  1:40 PM Hossein Merrill MD [facility] BS AMB   3/20/2025 10:00 AM Adrienne Jones,  [facility] BS AMB

## 2024-11-05 ENCOUNTER — APPOINTMENT (OUTPATIENT)
Facility: HOSPITAL | Age: 59
End: 2024-11-05
Payer: MEDICARE

## 2024-11-05 ENCOUNTER — APPOINTMENT (OUTPATIENT)
Facility: HOSPITAL | Age: 59
End: 2024-11-05
Attending: EMERGENCY MEDICINE
Payer: MEDICARE

## 2024-11-05 ENCOUNTER — HOSPITAL ENCOUNTER (EMERGENCY)
Facility: HOSPITAL | Age: 59
Discharge: ANOTHER HOSPITAL | End: 2024-11-06
Attending: EMERGENCY MEDICINE
Payer: MEDICARE

## 2024-11-05 DIAGNOSIS — A41.9 SEVERE SEPSIS (HCC): Primary | ICD-10-CM

## 2024-11-05 DIAGNOSIS — E86.0 DEHYDRATION: ICD-10-CM

## 2024-11-05 DIAGNOSIS — R65.20 SEVERE SEPSIS (HCC): Primary | ICD-10-CM

## 2024-11-05 DIAGNOSIS — L03.90 CELLULITIS, UNSPECIFIED CELLULITIS SITE: ICD-10-CM

## 2024-11-05 LAB
ALBUMIN SERPL-MCNC: 2.4 G/DL (ref 3.4–5)
ALBUMIN/GLOB SERPL: 0.5 (ref 0.8–1.7)
ALP SERPL-CCNC: 112 U/L (ref 45–117)
ALT SERPL-CCNC: 28 U/L (ref 13–56)
AMORPH CRY URNS QL MICRO: ABNORMAL
ANION GAP SERPL CALC-SCNC: 11 MMOL/L (ref 3–18)
APPEARANCE UR: ABNORMAL
AST SERPL-CCNC: 42 U/L (ref 10–38)
BACTERIA URNS QL MICRO: ABNORMAL /HPF
BASOPHILS # BLD: 0 K/UL (ref 0–0.1)
BASOPHILS NFR BLD: 0 % (ref 0–2)
BILIRUB SERPL-MCNC: 0.7 MG/DL (ref 0.2–1)
BILIRUB UR QL: NEGATIVE
BUN SERPL-MCNC: 35 MG/DL (ref 7–18)
BUN/CREAT SERPL: 15 (ref 12–20)
CALCIUM SERPL-MCNC: 8.6 MG/DL (ref 8.5–10.1)
CHLORIDE SERPL-SCNC: 106 MMOL/L (ref 100–111)
CO2 SERPL-SCNC: 20 MMOL/L (ref 21–32)
COLOR UR: YELLOW
CREAT SERPL-MCNC: 2.33 MG/DL (ref 0.6–1.3)
CRP SERPL-MCNC: 20.7 MG/DL (ref 0–0.3)
DIFFERENTIAL METHOD BLD: ABNORMAL
EOSINOPHIL # BLD: 0 K/UL (ref 0–0.4)
EOSINOPHIL NFR BLD: 0 % (ref 0–5)
EPITH CASTS URNS QL MICRO: ABNORMAL /LPF (ref 0–5)
ERYTHROCYTE [DISTWIDTH] IN BLOOD BY AUTOMATED COUNT: 13.2 % (ref 11.6–14.5)
ERYTHROCYTE [SEDIMENTATION RATE] IN BLOOD: 54 MM/HR (ref 0–30)
FLUAV RNA SPEC QL NAA+PROBE: NOT DETECTED
FLUBV RNA SPEC QL NAA+PROBE: NOT DETECTED
GLOBULIN SER CALC-MCNC: 4.9 G/DL (ref 2–4)
GLUCOSE SERPL-MCNC: 169 MG/DL (ref 74–99)
GLUCOSE UR STRIP.AUTO-MCNC: NEGATIVE MG/DL
HCT VFR BLD AUTO: 30.2 % (ref 35–45)
HGB BLD-MCNC: 9.4 G/DL (ref 12–16)
HGB UR QL STRIP: NEGATIVE
IMM GRANULOCYTES # BLD AUTO: 0.1 K/UL (ref 0–0.04)
IMM GRANULOCYTES NFR BLD AUTO: 1 % (ref 0–0.5)
KETONES UR QL STRIP.AUTO: ABNORMAL MG/DL
LACTATE BLD-SCNC: 0.67 MMOL/L (ref 0.4–2)
LACTATE BLD-SCNC: 2.19 MMOL/L (ref 0.4–2)
LEUKOCYTE ESTERASE UR QL STRIP.AUTO: ABNORMAL
LYMPHOCYTES # BLD: 1.5 K/UL (ref 0.9–3.6)
LYMPHOCYTES NFR BLD: 13 % (ref 21–52)
MCH RBC QN AUTO: 29.9 PG (ref 24–34)
MCHC RBC AUTO-ENTMCNC: 31.1 G/DL (ref 31–37)
MCV RBC AUTO: 96.2 FL (ref 78–100)
MONOCYTES # BLD: 1.5 K/UL (ref 0.05–1.2)
MONOCYTES NFR BLD: 12 % (ref 3–10)
NEUTS SEG # BLD: 8.9 K/UL (ref 1.8–8)
NEUTS SEG NFR BLD: 74 % (ref 40–73)
NITRITE UR QL STRIP.AUTO: NEGATIVE
NRBC # BLD: 0 K/UL (ref 0–0.01)
NRBC BLD-RTO: 0 PER 100 WBC
PH UR STRIP: 5.5 (ref 5–8)
PLATELET # BLD AUTO: 148 K/UL (ref 135–420)
PMV BLD AUTO: 11.5 FL (ref 9.2–11.8)
POTASSIUM SERPL-SCNC: 4.2 MMOL/L (ref 3.5–5.5)
PROCALCITONIN SERPL-MCNC: 3.61 NG/ML
PROT SERPL-MCNC: 7.3 G/DL (ref 6.4–8.2)
PROT UR STRIP-MCNC: 100 MG/DL
RBC # BLD AUTO: 3.14 M/UL (ref 4.2–5.3)
RBC #/AREA URNS HPF: ABNORMAL /HPF (ref 0–5)
SARS-COV-2 RNA RESP QL NAA+PROBE: NOT DETECTED
SODIUM SERPL-SCNC: 137 MMOL/L (ref 136–145)
SOURCE: NORMAL
SP GR UR REFRACTOMETRY: 1.02 (ref 1–1.03)
TROPONIN I SERPL HS-MCNC: 9 NG/L (ref 0–54)
UROBILINOGEN UR QL STRIP.AUTO: 1 EU/DL (ref 0.2–1)
WBC # BLD AUTO: 12 K/UL (ref 4.6–13.2)
WBC URNS QL MICRO: ABNORMAL /HPF (ref 0–4)

## 2024-11-05 PROCEDURE — 87154 CUL TYP ID BLD PTHGN 6+ TRGT: CPT

## 2024-11-05 PROCEDURE — 70450 CT HEAD/BRAIN W/O DYE: CPT

## 2024-11-05 PROCEDURE — 84484 ASSAY OF TROPONIN QUANT: CPT

## 2024-11-05 PROCEDURE — 2580000003 HC RX 258: Performed by: EMERGENCY MEDICINE

## 2024-11-05 PROCEDURE — 80053 COMPREHEN METABOLIC PANEL: CPT

## 2024-11-05 PROCEDURE — 83605 ASSAY OF LACTIC ACID: CPT

## 2024-11-05 PROCEDURE — 71045 X-RAY EXAM CHEST 1 VIEW: CPT

## 2024-11-05 PROCEDURE — 6370000000 HC RX 637 (ALT 250 FOR IP): Performed by: EMERGENCY MEDICINE

## 2024-11-05 PROCEDURE — 86140 C-REACTIVE PROTEIN: CPT

## 2024-11-05 PROCEDURE — 96365 THER/PROPH/DIAG IV INF INIT: CPT

## 2024-11-05 PROCEDURE — 93005 ELECTROCARDIOGRAM TRACING: CPT | Performed by: EMERGENCY MEDICINE

## 2024-11-05 PROCEDURE — 85025 COMPLETE CBC W/AUTO DIFF WBC: CPT

## 2024-11-05 PROCEDURE — 87040 BLOOD CULTURE FOR BACTERIA: CPT

## 2024-11-05 PROCEDURE — 72131 CT LUMBAR SPINE W/O DYE: CPT

## 2024-11-05 PROCEDURE — 84145 PROCALCITONIN (PCT): CPT

## 2024-11-05 PROCEDURE — 81001 URINALYSIS AUTO W/SCOPE: CPT

## 2024-11-05 PROCEDURE — 99285 EMERGENCY DEPT VISIT HI MDM: CPT

## 2024-11-05 PROCEDURE — 87186 SC STD MICRODIL/AGAR DIL: CPT

## 2024-11-05 PROCEDURE — 96366 THER/PROPH/DIAG IV INF ADDON: CPT

## 2024-11-05 PROCEDURE — 85652 RBC SED RATE AUTOMATED: CPT

## 2024-11-05 PROCEDURE — 6360000002 HC RX W HCPCS: Performed by: EMERGENCY MEDICINE

## 2024-11-05 PROCEDURE — 72125 CT NECK SPINE W/O DYE: CPT

## 2024-11-05 PROCEDURE — 96361 HYDRATE IV INFUSION ADD-ON: CPT

## 2024-11-05 PROCEDURE — 73502 X-RAY EXAM HIP UNI 2-3 VIEWS: CPT

## 2024-11-05 PROCEDURE — 96374 THER/PROPH/DIAG INJ IV PUSH: CPT

## 2024-11-05 PROCEDURE — 96367 TX/PROPH/DG ADDL SEQ IV INF: CPT

## 2024-11-05 PROCEDURE — 87077 CULTURE AEROBIC IDENTIFY: CPT

## 2024-11-05 PROCEDURE — 87636 SARSCOV2 & INF A&B AMP PRB: CPT

## 2024-11-05 RX ORDER — ACETAMINOPHEN 325 MG/1
650 TABLET ORAL
Status: COMPLETED | OUTPATIENT
Start: 2024-11-05 | End: 2024-11-05

## 2024-11-05 RX ORDER — 0.9 % SODIUM CHLORIDE 0.9 %
30 INTRAVENOUS SOLUTION INTRAVENOUS ONCE
Status: COMPLETED | OUTPATIENT
Start: 2024-11-05 | End: 2024-11-05

## 2024-11-05 RX ORDER — SODIUM CHLORIDE 9 MG/ML
INJECTION, SOLUTION INTRAVENOUS CONTINUOUS
Status: DISCONTINUED | OUTPATIENT
Start: 2024-11-05 | End: 2024-11-06 | Stop reason: HOSPADM

## 2024-11-05 RX ORDER — MORPHINE SULFATE 4 MG/ML
4 INJECTION, SOLUTION INTRAMUSCULAR; INTRAVENOUS
Status: COMPLETED | OUTPATIENT
Start: 2024-11-05 | End: 2024-11-05

## 2024-11-05 RX ADMIN — MEROPENEM 1000 MG: 1 INJECTION, POWDER, FOR SOLUTION INTRAVENOUS at 16:52

## 2024-11-05 RX ADMIN — SODIUM CHLORIDE: 900 INJECTION, SOLUTION INTRAVENOUS at 18:40

## 2024-11-05 RX ADMIN — SODIUM CHLORIDE 2178 ML: 9 INJECTION, SOLUTION INTRAVENOUS at 16:32

## 2024-11-05 RX ADMIN — ACETAMINOPHEN 325MG 650 MG: 325 TABLET ORAL at 16:30

## 2024-11-05 RX ADMIN — VANCOMYCIN HYDROCHLORIDE 750 MG: 750 INJECTION, POWDER, LYOPHILIZED, FOR SOLUTION INTRAVENOUS at 17:58

## 2024-11-05 RX ADMIN — VANCOMYCIN HYDROCHLORIDE 750 MG: 750 INJECTION, POWDER, LYOPHILIZED, FOR SOLUTION INTRAVENOUS at 19:40

## 2024-11-05 RX ADMIN — MORPHINE SULFATE 4 MG: 4 INJECTION, SOLUTION INTRAMUSCULAR; INTRAVENOUS at 21:50

## 2024-11-05 ASSESSMENT — LIFESTYLE VARIABLES
HOW OFTEN DO YOU HAVE A DRINK CONTAINING ALCOHOL: NEVER
HOW MANY STANDARD DRINKS CONTAINING ALCOHOL DO YOU HAVE ON A TYPICAL DAY: PATIENT DOES NOT DRINK

## 2024-11-05 ASSESSMENT — ENCOUNTER SYMPTOMS
ABDOMINAL PAIN: 0
CHEST TIGHTNESS: 0
EYES NEGATIVE: 1
BACK PAIN: 1

## 2024-11-05 ASSESSMENT — PAIN - FUNCTIONAL ASSESSMENT: PAIN_FUNCTIONAL_ASSESSMENT: 0-10

## 2024-11-05 ASSESSMENT — PAIN SCALES - GENERAL
PAINLEVEL_OUTOF10: 10
PAINLEVEL_OUTOF10: 10
PAINLEVEL_OUTOF10: 8
PAINLEVEL_OUTOF10: 0

## 2024-11-05 ASSESSMENT — PAIN DESCRIPTION - LOCATION: LOCATION: BACK;HIP

## 2024-11-05 NOTE — ED NOTES
[facility] consult with Dr. Shannon

## 2024-11-05 NOTE — ED TRIAGE NOTES
Pt states she bumped her head on the sink Friday and passed out. Fell and hit her head again on ceramic tile. Did not seek treatment. Called EMS today because she is now unable to bear weight on her left leg. C/o left hip and lower back pain

## 2024-11-05 NOTE — ED PROVIDER NOTES
EMERGENCY DEPARTMENT HISTORY AND PHYSICAL EXAM    7:08 PM      Date: 11/5/2024  Patient Name: Maribell Marshall    History of Presenting Illness     Chief Complaint   Patient presents with    Fall    Back Pain    Leg Pain       History From: Patient    Maribell Marshall is a 59 y.o. female   Patient is a 59-year-old female with a history of COPD, neuropathy, hypercalcemia, diabetes, asthma, PAD, that presents emergency department after developing increased left-sided weakness and pain.  Patient was in the kitchen on Friday and lives with her nephew and says she was heating up some soup and then the last week she remembers that she was falling down and passed out.  Patient said she hit her head and was unresponsive according to the nephew's reports.  Patient could not get up on her own but refused to go to the hospital and ended up going back to her room.  Patient was lightheaded when she stands and over the last several days of progressively worsened.  The patient over the last several days has not been able to get up and walk and has not been able to go get food.  Has not wanted to bother her nephew and so she has been progressively worsening.  The patient said that she has not anything to eat in the last 2 days and now she started to have chills.  Patient says she has pain into her back, left leg, and feels like something is worsening.  Patient denies any cough or congestion.  Patient has not any medications the last several days either.  Patient is not a smoker, drinker, no drug user.  Patient is not working at this time.  Patient said that she would have to use pull-ups and could not change them on her own today and do she need to go to the hospital.  Patient is also noted she has had some diarrhea.           Nursing Notes were all reviewed and agreed with or any disagreements were addressed in the HPI.    PCP: Francine Salvador, DNP    Current Facility-Administered Medications   Medication Dose Route

## 2024-11-05 NOTE — PROGRESS NOTES
Took a call from Dr. Shannon at [facility] ER regarding concern for postop sepsis.    59yoF 1mo s/p revision L4-5 fusion with Dr. Roe. Reportedly began having dizziness on Friday, had a syncopal events and hit her head. Did not seek treatment at that time. Progressive low back pain with new onset radicular LLE pain prompting visit to the ER today. Associated subjective fever/chills. Reportedly no cauda equina symptoms.     Per report and chart review, manifesting SIRS criteria with T 103F, , lactate 2.1, no hypotension, borderline leukocytosis 12.0. Provider reports no bela neurological weakness or sensory changes though exam limited by pain. Incision looks discolored at the midaspect, surrounding erythema, no apparent drainage. CT lumbar w/o obtained, images not available to me but report is, which reads as large nonspecific fluid collection at the surgical site with emphysema.     Urine studies negative, Bcx obtained and pending    Findings above concerning for developing sepsis, likely from lumbar source. Recommended transfer to [facility] for hospitalist admission and initiation of broad spectrum abx. I will [facility] Dr[facility] and [facility] so they can evaluate in the morning. Please keep NPO after midnight in case operative intervention is needed tomorrow.    Jhonatan Pugh MD  Orthopedic Surgery, Hip/Knee Reconstruction  [facility] ([facility])  6:14 PM  11/05/24

## 2024-11-05 NOTE — ED NOTES
Pt states she had spinal revision surgery on 10/2. States she had seen her surgeon and had the back dressing changed. Dressinng change done here after cleaning back wound with dermal wound cleanser

## 2024-11-05 NOTE — ED NOTES
Called Dr. Roe @ [phone] reached after hours answering service. Followed details and left call back number and patient details

## 2024-11-06 VITALS
SYSTOLIC BLOOD PRESSURE: 126 MMHG | WEIGHT: 160 LBS | BODY MASS INDEX: 29.44 KG/M2 | RESPIRATION RATE: 21 BRPM | OXYGEN SATURATION: 97 % | TEMPERATURE: 102.8 F | HEIGHT: 62 IN | HEART RATE: 116 BPM | DIASTOLIC BLOOD PRESSURE: 55 MMHG

## 2024-11-06 LAB
ACB COMPLEX DNA BLD POS QL NAA+NON-PROBE: NOT DETECTED
ACCESSION NUMBER, LLC1M: ABNORMAL
B FRAGILIS DNA BLD POS QL NAA+NON-PROBE: NOT DETECTED
BIOFIRE TEST COMMENT: ABNORMAL
C ALBICANS DNA BLD POS QL NAA+NON-PROBE: NOT DETECTED
C AURIS DNA BLD POS QL NAA+NON-PROBE: NOT DETECTED
C GATTII+NEOFOR DNA BLD POS QL NAA+N-PRB: NOT DETECTED
C GLABRATA DNA BLD POS QL NAA+NON-PROBE: NOT DETECTED
C KRUSEI DNA BLD POS QL NAA+NON-PROBE: NOT DETECTED
C PARAP DNA BLD POS QL NAA+NON-PROBE: NOT DETECTED
C TROPICLS DNA BLD POS QL NAA+NON-PROBE: NOT DETECTED
E CLOAC COMP DNA BLD POS NAA+NON-PROBE: NOT DETECTED
E COLI DNA BLD POS QL NAA+NON-PROBE: NOT DETECTED
E FAECALIS DNA BLD POS QL NAA+NON-PROBE: NOT DETECTED
E FAECIUM DNA BLD POS QL NAA+NON-PROBE: NOT DETECTED
ENTEROBACTERALES DNA BLD POS NAA+N-PRB: NOT DETECTED
GP B STREP DNA BLD POS QL NAA+NON-PROBE: NOT DETECTED
HAEM INFLU DNA BLD POS QL NAA+NON-PROBE: NOT DETECTED
K OXYTOCA DNA BLD POS QL NAA+NON-PROBE: NOT DETECTED
KLEBSIELLA SP DNA BLD POS QL NAA+NON-PRB: NOT DETECTED
KLEBSIELLA SP DNA BLD POS QL NAA+NON-PRB: NOT DETECTED
L MONOCYTOG DNA BLD POS QL NAA+NON-PROBE: NOT DETECTED
MECA+MECC+MREJ ISLT/SPM QL: NOT DETECTED
N MEN DNA BLD POS QL NAA+NON-PROBE: NOT DETECTED
P AERUGINOSA DNA BLD POS NAA+NON-PROBE: NOT DETECTED
PROTEUS SP DNA BLD POS QL NAA+NON-PROBE: NOT DETECTED
RESISTANT GENE TARGETS: ABNORMAL
S AUREUS DNA BLD POS QL NAA+NON-PROBE: DETECTED
S AUREUS+CONS DNA BLD POS NAA+NON-PROBE: DETECTED
S EPIDERMIDIS DNA BLD POS QL NAA+NON-PRB: NOT DETECTED
S LUGDUNENSIS DNA BLD POS QL NAA+NON-PRB: NOT DETECTED
S MALTOPHILIA DNA BLD POS QL NAA+NON-PRB: NOT DETECTED
S MARCESCENS DNA BLD POS NAA+NON-PROBE: NOT DETECTED
S PNEUM DNA BLD POS QL NAA+NON-PROBE: NOT DETECTED
S PYO DNA BLD POS QL NAA+NON-PROBE: NOT DETECTED
SALMONELLA DNA BLD POS QL NAA+NON-PROBE: NOT DETECTED
STREPTOCOCCUS DNA BLD POS NAA+NON-PROBE: NOT DETECTED

## 2024-11-06 RX ORDER — ACETAMINOPHEN 325 MG/1
650 TABLET ORAL
Status: DISCONTINUED | OUTPATIENT
Start: 2024-11-06 | End: 2024-11-06 | Stop reason: HOSPADM

## 2024-11-06 NOTE — ED NOTES
Introduced myself to patient as their Primary RN. Encourage to voice any concerns.  All questions/concerns addressed and POC has been reviewed and pt made aware we are awaiting room assignment and then transport would have to arranged after.  Denies CP or SOB.  A/Ox4, talking in clear and complete sentences with no airway compromise or work of breathing.  Pt placed on BP, SPO2 and full cardiac montior currently in Sinus Tach ,VSS.  Pt encouraged to call for assistance.  Pt instructed that staff will make hourly rounds.  Call bell within reach, stretcher locked and in lowest position.  Full care assumed at this time.

## 2024-11-06 NOTE — ED NOTES
All proper paperwork signed and reviewed with charge nurse then handed over to transport team by Jovita PEPE RN  Complete report given to transport team, all questions and concerns addressed.   Personal belongings collected by patient / family member and placed into belongings bag, then given to transport team.   Patient is DERIC with transport team ([facility])

## 2024-11-06 NOTE — ED NOTES
Patient resting on stretcher NAD noted, call bell in reach, stretchers low and locked with side rails up x 2. Updated on plan of care, patient verbalizes understanding.

## 2024-11-06 NOTE — ED NOTES
TRANSFER - OUT REPORT:    Verbal report given to Gabriela HARRINGTON RN on Maribell M Bottoms  being transferred to [facility] Rm#[id number] for routine progression of patient care       Report consisted of patient's Situation, Background, Assessment and   Recommendations(SBAR).     Information from the following report(s) Nurse Handoff Report, ED Encounter Summary, ED SBAR, Adult Overview, MAR, Recent Results, Cardiac Rhythm Sinus Tach, and Neuro Assessment was reviewed with the receiving nurse.    [place] Fall Assessment:    Presents to emergency department  because of falls (Syncope, seizure, or loss of consciousness): Yes  Age > 70: No  Altered Mental Status, Intoxication with alcohol or substance confusion (Disorientation, impaired judgment, poor safety awaremess, or inability to follow instructions): No  Impaired Mobility: Ambulates or transfers with assistive devices or assistance; Unable to ambulate or transer.: Yes  Nursing Judgement: No          Lines:   Peripheral IV 11/05/24 Left;Dorsal Wrist (Active)   Site Assessment Clean, dry & intact 11/05/24 1610   Line Status Blood return noted;Normal saline locked 11/05/24 1610   Line Care Cap changed;Connections checked and tightened 11/05/24 1610   Phlebitis Assessment No symptoms 11/05/24 1610   Infiltration Assessment 0 11/05/24 1610   Dressing Status New dressing applied;Clean, dry & intact 11/05/24 1610   Dressing Type Transparent 11/05/24 1610   Dressing Intervention New 11/05/24 1610       Peripheral IV 11/05/24 Posterior;Proximal;Right Forearm (Active)   Site Assessment Clean, dry & intact 11/05/24 1622   Line Status Blood return noted 11/05/24 1622   Phlebitis Assessment No symptoms 11/05/24 1622   Infiltration Assessment 0 11/05/24 1622        Opportunity for questions and clarification was provided.      Patient transported with:  Monitor and all personal belongings

## 2024-11-07 LAB
EKG ATRIAL RATE: 116 BPM
EKG DIAGNOSIS: NORMAL
EKG P AXIS: 21 DEGREES
EKG P-R INTERVAL: 142 MS
EKG Q-T INTERVAL: 316 MS
EKG QRS DURATION: 72 MS
EKG QTC CALCULATION (BAZETT): 439 MS
EKG R AXIS: 25 DEGREES
EKG T AXIS: 48 DEGREES
EKG VENTRICULAR RATE: 116 BPM

## 2024-11-07 PROCEDURE — 93010 ELECTROCARDIOGRAM REPORT: CPT | Performed by: INTERNAL MEDICINE

## 2024-11-08 LAB
BACTERIA SPEC CULT: ABNORMAL
BACTERIA SPEC CULT: ABNORMAL
GRAM STN SPEC: ABNORMAL
SERVICE CMNT-IMP: ABNORMAL
SERVICE CMNT-IMP: ABNORMAL

## 2024-11-12 ENCOUNTER — ENROLLMENT (OUTPATIENT)
Facility: CLINIC | Age: 59
End: 2024-11-12

## 2024-11-12 ENCOUNTER — CARE COORDINATION (OUTPATIENT)
Facility: CLINIC | Age: 59
End: 2024-11-12

## 2024-11-12 NOTE — CARE COORDINATION
Care Transitions Note      Care Transitions Nurse ( CTN)  attempted to reach out to staff with [facility] to confirm that patient transitioned to this facility post hospital discharge.     A staff member answered the phone and transferred CTN to the \" Blue\" ( unit/ wing).   However, there was no answer.     Care Transitions Nurse ( CTN)  Called [facility] again and spoke with a staff member on the blue unit. It was confirmed that patient is a resident at his facility.   Only patient's name was shared for purpose of identification.      Transition of care outreach postponed for approximately 14 days due to patient's discharge to SNF.

## 2024-11-21 ENCOUNTER — CARE COORDINATION (OUTPATIENT)
Facility: CLINIC | Age: 59
End: 2024-11-21

## 2024-11-21 NOTE — CARE COORDINATION
Care Transitions     Care Transitions Nurse ( CTN)  contacted staff with [facility] via phone call.  It was related that patient is a current resident at the facility.  It was also related that social Work staff are not currently available for additional follow up at this time.

## 2024-12-03 ENCOUNTER — CARE COORDINATION (OUTPATIENT)
Facility: CLINIC | Age: 59
End: 2024-12-03

## 2024-12-03 NOTE — CARE COORDINATION
Care Transitions     Care Transitions Nurse ( CTN)  spoke with a staff member with [facility] phone call.  It was related that patient is a currently a resident at the facility.  Social work staff members are not currently available for further follow up.   CTN will continue to monitior for discharge.

## 2024-12-12 ENCOUNTER — CARE COORDINATION (OUTPATIENT)
Facility: CLINIC | Age: 59
End: 2024-12-12

## 2024-12-12 NOTE — CARE COORDINATION
Care Transition Follow Up SNF    Call place to [facility].   Confirmed that patient is admitted.  This program is completed.    Sade Lance LPN

## 2024-12-23 ENCOUNTER — TELEPHONE (OUTPATIENT)
Facility: CLINIC | Age: 59
End: 2024-12-23

## 2024-12-23 NOTE — TELEPHONE ENCOUNTER
Patient c/o low BP; patient was told to go to ER for more evaluation, but she stated that she was just released from the hospital for sepsis.

## 2024-12-24 DIAGNOSIS — N18.4 TYPE 2 DIABETES MELLITUS WITH STAGE 4 CHRONIC KIDNEY DISEASE, WITH LONG-TERM CURRENT USE OF INSULIN (HCC): ICD-10-CM

## 2024-12-24 DIAGNOSIS — E11.22 TYPE 2 DIABETES MELLITUS WITH STAGE 4 CHRONIC KIDNEY DISEASE, WITH LONG-TERM CURRENT USE OF INSULIN (HCC): ICD-10-CM

## 2024-12-24 DIAGNOSIS — Z79.4 TYPE 2 DIABETES MELLITUS WITH STAGE 4 CHRONIC KIDNEY DISEASE, WITH LONG-TERM CURRENT USE OF INSULIN (HCC): ICD-10-CM

## 2024-12-24 RX ORDER — SEMAGLUTIDE 2.68 MG/ML
2 INJECTION, SOLUTION SUBCUTANEOUS
Qty: 6 ML | Refills: 0 | Status: SHIPPED | OUTPATIENT
Start: 2024-12-24

## 2024-12-24 NOTE — TELEPHONE ENCOUNTER
PCP: Francine Salvador DNP    LAST OFFICE VISIT: 09/16/2024    LAST REFILL PER CHART:  Medication:semaglutide, 2 MG/DOSE, (OZEMPIC, 2 MG/DOSE,) 8 MG/3ML SOPN sc injection   Ordered On:07/12/2024  Instructions:: DIAL AND INJECT UNDER THE SKIN 2 MG WEEKLY (EVERY MONDAY)   Dispense:9mL  Refills:0      Future Appointments   Date Time Provider Department Center   1/2/2025 11:00 AM HBV FAST TRACK NURSE [facility]   1/30/2025  1:40 PM Hossein Merrill MD [facility] BS AMB   3/20/2025 10:00 AM Adrienne Jones DO [facility] BS AMB        Yes

## 2025-01-02 ENCOUNTER — HOSPITAL ENCOUNTER (OUTPATIENT)
Facility: HOSPITAL | Age: 60
Setting detail: INFUSION SERIES
End: 2025-01-02

## 2025-01-10 ENCOUNTER — TELEPHONE (OUTPATIENT)
Facility: CLINIC | Age: 60
End: 2025-01-10

## 2025-01-10 ENCOUNTER — PATIENT MESSAGE (OUTPATIENT)
Facility: CLINIC | Age: 60
End: 2025-01-10

## 2025-01-10 NOTE — TELEPHONE ENCOUNTER
Pt dropped off Serious Medical Condition Certification form at the  for pcp to fill out. Pt stressed urgency on this to be filled out by 1/16/25 or her lights will be cut off. Pt also repeated that she was in the hospital all of November and most of December. Pt also said that she came by the office before, but pcp was on vacation. Scanned form in pt's chart and placed in pcp's box.  
none

## 2025-01-16 ENCOUNTER — OFFICE VISIT (OUTPATIENT)
Facility: CLINIC | Age: 60
End: 2025-01-16
Payer: MEDICARE

## 2025-01-16 VITALS
OXYGEN SATURATION: 97 % | TEMPERATURE: 97.7 F | SYSTOLIC BLOOD PRESSURE: 138 MMHG | HEART RATE: 102 BPM | WEIGHT: 154 LBS | DIASTOLIC BLOOD PRESSURE: 86 MMHG | HEIGHT: 62 IN | RESPIRATION RATE: 16 BRPM | BODY MASS INDEX: 28.34 KG/M2

## 2025-01-16 DIAGNOSIS — I10 PRIMARY HYPERTENSION: Primary | ICD-10-CM

## 2025-01-16 DIAGNOSIS — E11.22 TYPE 2 DIABETES MELLITUS WITH STAGE 4 CHRONIC KIDNEY DISEASE, WITH LONG-TERM CURRENT USE OF INSULIN (HCC): ICD-10-CM

## 2025-01-16 DIAGNOSIS — T81.500A: ICD-10-CM

## 2025-01-16 DIAGNOSIS — N18.4 TYPE 2 DIABETES MELLITUS WITH STAGE 4 CHRONIC KIDNEY DISEASE, WITH LONG-TERM CURRENT USE OF INSULIN (HCC): ICD-10-CM

## 2025-01-16 DIAGNOSIS — E78.2 MIXED HYPERLIPIDEMIA: ICD-10-CM

## 2025-01-16 DIAGNOSIS — G89.29 CHRONIC MIDLINE LOW BACK PAIN WITH LEFT-SIDED SCIATICA: ICD-10-CM

## 2025-01-16 DIAGNOSIS — Z79.4 TYPE 2 DIABETES MELLITUS WITH STAGE 4 CHRONIC KIDNEY DISEASE, WITH LONG-TERM CURRENT USE OF INSULIN (HCC): ICD-10-CM

## 2025-01-16 DIAGNOSIS — M54.42 CHRONIC MIDLINE LOW BACK PAIN WITH LEFT-SIDED SCIATICA: ICD-10-CM

## 2025-01-16 PROBLEM — I47.20 VENTRICULAR TACHYCARDIA (HCC): Status: ACTIVE | Noted: 2025-01-16

## 2025-01-16 PROCEDURE — 3079F DIAST BP 80-89 MM HG: CPT | Performed by: NURSE PRACTITIONER

## 2025-01-16 PROCEDURE — 3075F SYST BP GE 130 - 139MM HG: CPT | Performed by: NURSE PRACTITIONER

## 2025-01-16 PROCEDURE — 99214 OFFICE O/P EST MOD 30 MIN: CPT | Performed by: NURSE PRACTITIONER

## 2025-01-16 RX ORDER — FUROSEMIDE 20 MG/1
20 TABLET ORAL DAILY
Qty: 90 TABLET | Refills: 1 | Status: SHIPPED | OUTPATIENT
Start: 2025-01-16

## 2025-01-16 RX ORDER — TRAMADOL HYDROCHLORIDE 50 MG/1
50 TABLET ORAL NIGHTLY PRN
Qty: 30 TABLET | Refills: 0 | Status: SHIPPED | OUTPATIENT
Start: 2025-01-16 | End: 2025-04-16

## 2025-01-16 SDOH — ECONOMIC STABILITY: FOOD INSECURITY: WITHIN THE PAST 12 MONTHS, YOU WORRIED THAT YOUR FOOD WOULD RUN OUT BEFORE YOU GOT MONEY TO BUY MORE.: PATIENT DECLINED

## 2025-01-16 SDOH — ECONOMIC STABILITY: FOOD INSECURITY: WITHIN THE PAST 12 MONTHS, THE FOOD YOU BOUGHT JUST DIDN'T LAST AND YOU DIDN'T HAVE MONEY TO GET MORE.: PATIENT DECLINED

## 2025-01-16 ASSESSMENT — PATIENT HEALTH QUESTIONNAIRE - PHQ9
SUM OF ALL RESPONSES TO PHQ QUESTIONS 1-9: 0
4. FEELING TIRED OR HAVING LITTLE ENERGY: NOT AT ALL
SUM OF ALL RESPONSES TO PHQ QUESTIONS 1-9: 0
8. MOVING OR SPEAKING SO SLOWLY THAT OTHER PEOPLE COULD HAVE NOTICED. OR THE OPPOSITE, BEING SO FIGETY OR RESTLESS THAT YOU HAVE BEEN MOVING AROUND A LOT MORE THAN USUAL: NOT AT ALL
SUM OF ALL RESPONSES TO PHQ QUESTIONS 1-9: 0
SUM OF ALL RESPONSES TO PHQ QUESTIONS 1-9: 0
3. TROUBLE FALLING OR STAYING ASLEEP: NOT AT ALL
5. POOR APPETITE OR OVEREATING: NOT AT ALL
7. TROUBLE CONCENTRATING ON THINGS, SUCH AS READING THE NEWSPAPER OR WATCHING TELEVISION: NOT AT ALL
SUM OF ALL RESPONSES TO PHQ9 QUESTIONS 1 & 2: 0
2. FEELING DOWN, DEPRESSED OR HOPELESS: NOT AT ALL
6. FEELING BAD ABOUT YOURSELF - OR THAT YOU ARE A FAILURE OR HAVE LET YOURSELF OR YOUR FAMILY DOWN: NOT AT ALL
10. IF YOU CHECKED OFF ANY PROBLEMS, HOW DIFFICULT HAVE THESE PROBLEMS MADE IT FOR YOU TO DO YOUR WORK, TAKE CARE OF THINGS AT HOME, OR GET ALONG WITH OTHER PEOPLE: NOT DIFFICULT AT ALL
9. THOUGHTS THAT YOU WOULD BE BETTER OFF DEAD, OR OF HURTING YOURSELF: NOT AT ALL
1. LITTLE INTEREST OR PLEASURE IN DOING THINGS: NOT AT ALL

## 2025-01-16 NOTE — PROGRESS NOTES
Maribell Marshall is a 59 y.o. female (: 1965) presenting to address:    Chief Complaint   Patient presents with    Follow-up     Follow Up after hospital & nursing home stay    Blood Pressure Check       Vitals:    25 0958   BP: 138/86   Pulse:    Resp:    Temp:    SpO2:        \"Have you been to the ER, urgent care clinic since your last visit?  Hospitalized since your last visit?\"    YES - When: approximately 1 months ago.  Where and Why: Nursing home and hospital.    “Have you seen or consulted any other health care providers outside of [facility] since your last visit?”    NO        “Have you had a pap smear?”    NO    Date of last Cervical Cancer screen (HPV or PAP): 2018           
pedicle screw is likely impinging on the left nerve root, causing her symptoms of numbness, tingling, and discoloration in the left foot. The plan is to undergo surgery to reposition the screw and remove the piece of tubing left from the previous surgery. This intervention is necessary to alleviate her symptoms and prevent further complications. She was informed that the recovery period should be significantly shorter than her previous surgery and that a drain would not be required post-surgery. She was advised to arrange for someone to stay with her for one night post-surgery.      Dr Roe note:  6/12/24 In preparation for discharge, the patient's drain was pulled by the nursing staff as ordered. The nurse who performed the procedure noted some resistance and then the drain snapped off at the juncture between the flat white portion in the tubular portion. I was notified at that time and came to evaluate the patient. There was note drain tube visible in the drain hole, the patient was notified that there was a retained piece of drain but that removal of that would be riskier and require a return trip to the operating room with general anesthesia and reopening the wound. It was felt to be safe for 2 retained the drain unless some other complication should occur that would require opening of the wound again.    Pt saw Dr Roe last week who repeated CT:  CT LUMBAR SPINE WO CONTRAST  Order: [id number]  Impression  1. Evidence of interval dorsal decompression with circumferential fusion at L4-L5. Mild improvement in anterolisthesis and the disc is distracted.  2. The left L5 pedicle screw is medial to the pedicle, extending into the lateral recess and does appear very likely to affect the left L5 nerve root. Clinical correlation is recommended as to whether or not the patient's left leg symptoms might be L5 in nature.  3. The right L4 pedicle screw is primarily lateral to the pedicle but does enter the pedicle distally and

## 2025-01-17 NOTE — ASSESSMENT & PLAN NOTE
PROCEDURE  The patient underwent a surgical procedure on 10/02/2024 to remove a foreign object from her back. Cauterization was performed in the office following the surgery. A second surgery was performed to clean the wound during the patient's hospitalization from 11/06/2024 to 11/11/2024 due to sepsis.     8/14/2024:  Reviewed CT results and other notes with pt pertaining to her recent lumbar surgery. Pt noticeably upset due to current situation. She thought Dr Roe was ignoring her because she was seeing his colleagues and not him for hospital follow-up. I explained the surgeons usually have pts see their assistance post op and I dont believe Dr Roe was avoiding her. I explained that originally reopening her surgical site just to remove the retained tube, that most likely would not cause complications, was too risky but now she has an issue with a screw to L5 which is irritating the nerve that is causing the numbness and tingling in her left lower extremity.  With this issue, surgery is necessary to correct the problem.  I also explained while the surgeon is in correcting the screw, he can then remove the drain.  Patient appeared happy with the explanation and will be reaching out to Dr. Roe's office to schedule the surgery.    The recent CT scan indicates that the L5 pedicle screw is likely impinging on the left nerve root, causing her symptoms of numbness, tingling, and discoloration in the left foot. The plan is to undergo surgery to reposition the screw and remove the piece of tubing left from the previous surgery. This intervention is necessary to alleviate her symptoms and prevent further complications. She was informed that the recovery period should be significantly shorter than her previous surgery and that a drain would not be required post-surgery. She was advised to arrange for someone to stay with her for one night post-surgery.      Dr Roe note:  6/12/24 In preparation for discharge, the patient's

## 2025-01-17 NOTE — ASSESSMENT & PLAN NOTE
Her blood pressure is elevated during this visit.     Restart furosemide 20 mg daily  Continue diltiazem  Continue metoprolol    She is instructed to monitor her blood pressure at home. If her systolic blood pressure drops below 100, she may reduce or discontinue Lasix to every other day. However, if her systolic blood pressure remains above 100, she should continue taking Lasix daily. She is scheduled to see her cardiologist on 01/30/2025.

## 2025-01-17 NOTE — ASSESSMENT & PLAN NOTE
She reports persistent back pain with a pain score of 8 out of 10. She has a stitch hanging out from a previous surgery. The wound appears completely dry with no drainage.     Strongly encouraged pt to follow up with Dr Roe as he had mentioned to her possible pain management. And she needs to make him aware of the remaining stitch to distal area of incision.    Initiate tramadol 50 mg, to be taken at bedtime as needed    She is advised to take Tylenol in between doses of tramadol for additional pain relief.     4/16/2024:   Monitored by specialist- no acute findings meriting change in the plan  Had lumbar injection Dr Freitas 12/2021. Procedure had to be stopped x2 due to pt having left leg pain. Developed H/A. Obvadim did epidural blood patch.     Seeing Dr. NELSON Roe    11/21/2023:  Had lumbar injection Dr Freitas 12/2021. Procedure had to be stopped x2 due to pt having left leg pain. Developed H/A. Jaxson did epidural blood patch.  Topamax and Mag Ox has held off sx until 2 days ago.    According to pt, Dr Wolf was going to refer pt to spine due to c/o sciatica and low back pain. Pt declined because she did not want to see Dr Freitas.   Pt seeking referral to another spine specialist  Referral placed.

## (undated) DEVICE — SYRINGE MED 25GA 3ML L5/8IN SUBQ PLAS W/ DETACH NDL SFTY

## (undated) DEVICE — BANDAGE,ELASTIC,ESMARK,STERILE,4"X9',LF: Brand: MEDLINE

## (undated) DEVICE — BANDAGE ADH W0.75XL3IN UNIV WVN FAB NAT GEN USE STRP N ADH

## (undated) DEVICE — BANDAGE,GAUZE,BULKEE II,4.5"X4.1YD,STRL: Brand: MEDLINE

## (undated) DEVICE — SNARE POLYP M W27MMXL240CM OVL STIFF DISP CAPTIVATOR

## (undated) DEVICE — DRESSING,GAUZE,XEROFORM,CURAD,1"X8",ST: Brand: CURAD

## (undated) DEVICE — SUTURE FIBERWIRE SZ 2-0 L18IN BLU L26.5MM 1/2 CIR TAPR NDL AR7242

## (undated) DEVICE — FORCEPS BX L240CM JAW DIA2.8MM L CAP W/ NDL MIC MESH TOOTH

## (undated) DEVICE — SUTURE VCRL SZ 2-0 L27IN ABSRB UD L26MM SH 1/2 CIR J417H

## (undated) DEVICE — PADDING CAST W4XL144IN WHT COT SYN RL NONADHESIVE SOF-ROL

## (undated) DEVICE — MEDIA CONTRAST 10ML 200MG/ML 41%

## (undated) DEVICE — CANNULA ORIG TL CLR W FOAM CUSHIONS AND 14FT SUPL TB 3 CHN

## (undated) DEVICE — CANNULA NSL AD TBNG L14FT STD PVC O2 CRV CONN NONFLARED NSL

## (undated) DEVICE — C-ARMOR C-ARM EQUIPMENT COVERS CLEAR STERILE UNIVERSAL FIT 12 PER CASE: Brand: C-ARMOR

## (undated) DEVICE — SYR 20ML LL STRL LF --

## (undated) DEVICE — SYR 50ML SLIP TIP NSAF LF STRL --

## (undated) DEVICE — SUTURE VCRL SZ 3-0 L27IN ABSRB UD L26MM SH 1/2 CIR J416H

## (undated) DEVICE — MEDI-VAC NON-CONDUCTIVE SUCTION TUBING: Brand: CARDINAL HEALTH

## (undated) DEVICE — CARDINAL HEALTH VESSEL LOOPS MAXI RED: Brand: DEVON

## (undated) DEVICE — SNARE VASC L240CM LOOP W10MM SHTH DIA2.4MM RND STIFF CLD

## (undated) DEVICE — PACK SURG BSHR TOT KNEE LF

## (undated) DEVICE — SOLUTION IRRIG 1000ML 0.9% SOD CHL USP POUR PLAS BTL

## (undated) DEVICE — INTENDED FOR TISSUE SEPARATION, AND OTHER PROCEDURES THAT REQUIRE A SHARP SURGICAL BLADE TO PUNCTURE OR CUT.: Brand: BARD-PARKER SAFETY BLADES SIZE 15, STERILE

## (undated) DEVICE — GAUZE,SPONGE,4"X4",16PLY,STRL,LF,10/TRAY: Brand: MEDLINE

## (undated) DEVICE — SUTURE MCRYL SZ 3-0 L27IN ABSRB UD L26MM SH 1/2 CIR Y416H

## (undated) DEVICE — SOLUTION IRRIG 1000ML H2O STRL BLT

## (undated) DEVICE — DRAPE C ARM UNIV W41XL74IN CLR PLAS XR VELC CLSR POLY STRP

## (undated) DEVICE — BANDAGE COMPR W6INXL5YD WHT BGE POLY COT M E WRP WV HK AND

## (undated) DEVICE — SYR 10ML LUER LOK 1/5ML GRAD --

## (undated) DEVICE — Device: Brand: MEDEX

## (undated) DEVICE — APPLICATOR MEDICATED 26 CC SOLUTION HI LT ORNG CHLORAPREP

## (undated) DEVICE — 3 ML SYRINGE WITH HYPODERMIC SAFETY NEEDLE: Brand: MAGELLAN

## (undated) DEVICE — TRAP SPEC COLL POLYP POLYSTYR --

## (undated) DEVICE — PAD,ABDOMINAL,8"X10",ST,LF: Brand: MEDLINE

## (undated) DEVICE — ENDOSCOPY PUMP TUBING/ CAP SET: Brand: ERBE

## (undated) DEVICE — MEDI-VAC SUCTION HIGH CAPACITY: Brand: CARDINAL HEALTH

## (undated) DEVICE — BLANKET WRM W29.9XL79.1IN UP BODY FORC AIR MISTRAL-AIR

## (undated) DEVICE — BNDG,ELSTC,MATRIX,STRL,6"X5YD,LF,HOOK&LP: Brand: MEDLINE

## (undated) DEVICE — GLOVE SURG SZ 8 CRM LTX FREE POLYISOPRENE POLYMER BEAD ANTI

## (undated) DEVICE — ZIMMER® STERILE DISPOSABLE TOURNIQUET CUFF WITH PLC, DUAL PORT, SINGLE BLADDER, 34 IN. (86 CM)

## (undated) DEVICE — PREMIUM DRY TRAY LF: Brand: MEDLINE INDUSTRIES, INC.

## (undated) DEVICE — SET EPI 18GA L3.5IN TUOHY NDL W/ 20GA CLS TIP NYL CATH

## (undated) DEVICE — FLUFF AND POLYMER UNDERPAD,EXTRA HEAVY: Brand: WINGS

## (undated) DEVICE — GOWN ISOL IMPERV UNIV, DISP, OPEN BACK, BLUE --

## (undated) DEVICE — STERILE HOOK LOCK LATEX FREE ELASTIC BANDAGE 4INX5YD: Brand: HOOK LOCK™

## (undated) DEVICE — CATHETER SUCT TR FL TIP 14FR W/ O CTRL

## (undated) DEVICE — ELECTRODE PT RET AD L9FT HI MOIST COND ADH HYDRGEL CORDED

## (undated) DEVICE — FLEX ADVANTAGE 3000CC: Brand: FLEX ADVANTAGE

## (undated) DEVICE — BANDAGE COBAN 4 IN COMPR W4INXL5YD FOAM COHESIVE QUIK STK SELF ADH SFT

## (undated) DEVICE — PADDING CAST W4INXL4YD ST COT COHESIVE HND TEARABLE SPEC